# Patient Record
Sex: MALE | Race: WHITE | NOT HISPANIC OR LATINO | Employment: FULL TIME | URBAN - METROPOLITAN AREA
[De-identification: names, ages, dates, MRNs, and addresses within clinical notes are randomized per-mention and may not be internally consistent; named-entity substitution may affect disease eponyms.]

---

## 2017-11-15 ENCOUNTER — LAB VISIT (OUTPATIENT)
Dept: LAB | Facility: HOSPITAL | Age: 62
End: 2017-11-15
Attending: INTERNAL MEDICINE
Payer: COMMERCIAL

## 2017-11-15 DIAGNOSIS — E78.5 HYPERLIPIDEMIA, UNSPECIFIED HYPERLIPIDEMIA TYPE: ICD-10-CM

## 2017-11-15 DIAGNOSIS — E80.4 GILBERT'S SYNDROME: ICD-10-CM

## 2017-11-15 LAB
ALBUMIN SERPL BCP-MCNC: 3.6 G/DL
ALP SERPL-CCNC: 89 U/L
ALT SERPL W/O P-5'-P-CCNC: 29 U/L
ANION GAP SERPL CALC-SCNC: 6 MMOL/L
AST SERPL-CCNC: 23 U/L
BILIRUB SERPL-MCNC: 2 MG/DL
BUN SERPL-MCNC: 13 MG/DL
CALCIUM SERPL-MCNC: 9.1 MG/DL
CHLORIDE SERPL-SCNC: 107 MMOL/L
CHOLEST SERPL-MCNC: 140 MG/DL
CHOLEST/HDLC SERPL: 2.8 {RATIO}
CO2 SERPL-SCNC: 28 MMOL/L
COMPLEXED PSA SERPL-MCNC: 0.37 NG/ML
CREAT SERPL-MCNC: 0.9 MG/DL
ERYTHROCYTE [DISTWIDTH] IN BLOOD BY AUTOMATED COUNT: 13 %
EST. GFR  (AFRICAN AMERICAN): >60 ML/MIN/1.73 M^2
EST. GFR  (NON AFRICAN AMERICAN): >60 ML/MIN/1.73 M^2
GLUCOSE SERPL-MCNC: 86 MG/DL
HCT VFR BLD AUTO: 42.2 %
HDLC SERPL-MCNC: 50 MG/DL
HDLC SERPL: 35.7 %
HGB BLD-MCNC: 14.5 G/DL
LDLC SERPL CALC-MCNC: 80.2 MG/DL
MCH RBC QN AUTO: 30.3 PG
MCHC RBC AUTO-ENTMCNC: 34.4 G/DL
MCV RBC AUTO: 88 FL
NONHDLC SERPL-MCNC: 90 MG/DL
PLATELET # BLD AUTO: 171 K/UL
PMV BLD AUTO: 10.5 FL
POTASSIUM SERPL-SCNC: 4.4 MMOL/L
PROT SERPL-MCNC: 6.7 G/DL
RBC # BLD AUTO: 4.78 M/UL
SODIUM SERPL-SCNC: 141 MMOL/L
TRIGL SERPL-MCNC: 49 MG/DL
WBC # BLD AUTO: 4.07 K/UL

## 2017-11-15 PROCEDURE — 80053 COMPREHEN METABOLIC PANEL: CPT

## 2017-11-15 PROCEDURE — 85027 COMPLETE CBC AUTOMATED: CPT

## 2017-11-15 PROCEDURE — 36415 COLL VENOUS BLD VENIPUNCTURE: CPT

## 2017-11-15 PROCEDURE — 80061 LIPID PANEL: CPT

## 2017-11-15 PROCEDURE — 84153 ASSAY OF PSA TOTAL: CPT

## 2017-11-28 ENCOUNTER — OFFICE VISIT (OUTPATIENT)
Dept: CARDIOLOGY | Facility: CLINIC | Age: 62
End: 2017-11-28
Payer: COMMERCIAL

## 2017-11-28 VITALS
HEART RATE: 72 BPM | HEIGHT: 67 IN | BODY MASS INDEX: 25.3 KG/M2 | DIASTOLIC BLOOD PRESSURE: 82 MMHG | WEIGHT: 161.19 LBS | SYSTOLIC BLOOD PRESSURE: 124 MMHG | OXYGEN SATURATION: 100 %

## 2017-11-28 DIAGNOSIS — I20.89 EXERTIONAL ANGINA: ICD-10-CM

## 2017-11-28 DIAGNOSIS — E80.4 GILBERT'S SYNDROME: ICD-10-CM

## 2017-11-28 DIAGNOSIS — E78.5 HYPERLIPIDEMIA, UNSPECIFIED HYPERLIPIDEMIA TYPE: Primary | ICD-10-CM

## 2017-11-28 PROCEDURE — 99999 PR PBB SHADOW E&M-EST. PATIENT-LVL IV: CPT | Mod: PBBFAC,,, | Performed by: INTERNAL MEDICINE

## 2017-11-28 PROCEDURE — 99213 OFFICE O/P EST LOW 20 MIN: CPT | Mod: S$GLB,,, | Performed by: INTERNAL MEDICINE

## 2017-11-28 PROCEDURE — 93000 ELECTROCARDIOGRAM COMPLETE: CPT | Mod: S$GLB,,, | Performed by: INTERNAL MEDICINE

## 2017-11-28 NOTE — ASSESSMENT & PLAN NOTE
-EKG in the office showed NSR with RBBB  -Atypical symptoms but given risk factors and strong family history we will obtain exercise stress echo  -Should continue optimal blood pressure control and cholesterol management

## 2017-11-28 NOTE — PROGRESS NOTES
Subjective:    Patient ID:  Michael Espinoza is a 62 y.o. male who presents for follow-up of Hyperlipidemia      HPI  Mr Espinoza is a 63 yo man with medical history significant for DLPD who comes in today for annual check up. He was last seen in the clinic one year ago. At that time, he was doing well.  He reports today to be doing well. He exercises 2x/week in the gym. He reports intermittent angina on exertion after walking up to 400 yards with associated SOB. The discomfort last couple of minutes and goes away after he takes a break. He started to notice this after this summer when he did not have any symptoms after exertion.  He also reports left lower extremity edema by the level of his ankle. The edema is noticeable at night time and improves in the morning. He denies edema of his right leg. Symptoms are present for the past 3 months.   He denies palpitations, diaphoresis or syncopal events.   He is the high-school cross country team .  Today's labs reviewed with patient and demonstrate an excellent cholesterol profile (, HDL 50, LDL 80), LFT's however tbili elevated (2.0) due to Gilbert's syndrome.    Review of Systems   Constitution: Negative.   HENT: Negative.    Eyes: Negative.    Cardiovascular: Negative for chest pain, claudication, cyanosis, dyspnea on exertion, irregular heartbeat, leg swelling, near-syncope, orthopnea, palpitations, paroxysmal nocturnal dyspnea and syncope.   Respiratory: Negative for cough, hemoptysis, shortness of breath, sleep disturbances due to breathing, snoring, sputum production and wheezing.    Endocrine: Negative.    Hematologic/Lymphatic: Negative.    Gastrointestinal: Negative.         Objective:    Physical Exam   Constitutional: He is oriented to person, place, and time. He appears well-developed and well-nourished.   HENT:   Head: Normocephalic and atraumatic.   Eyes: Conjunctivae are normal.   Neck: Normal range of motion. No JVD present. No thyromegaly present.    Cardiovascular: Normal rate, regular rhythm and normal heart sounds.  Exam reveals no gallop and no friction rub.    No murmur heard.  Pulmonary/Chest: Effort normal and breath sounds normal. No respiratory distress. He has no wheezes. He has no rales. He exhibits no tenderness.   Abdominal: Soft. Bowel sounds are normal. He exhibits no distension. There is no tenderness. There is no rebound.   Musculoskeletal: Normal range of motion. He exhibits no edema or deformity.   Neurological: He is oriented to person, place, and time.   Nursing note and vitals reviewed.        Assessment:       1. Hyperlipidemia, unspecified hyperlipidemia type    2. Gilbert's syndrome    3. Exertional angina         Plan:       Exertional angina  -EKG in the office showed NSR with RBBB  -Atypical chest pain symptoms but given risk factors and strong family history we will obtain exercise stress echo  -Should continue optimal blood pressure control and cholesterol management        HLD (hyperlipidemia)  Reviewed labs   Should continue current medical therapy  Discussed about healthy life style modification including daily exercise 30 min/5 x week and diet improvement. Questions and concerns were properly answered.       Odilon Gustafson MD  Cardiology Fellow, PGY VI  Pager: [phone]  11/28/2017 9:53 AM        I have personally taken the history and examined this patient. I have discussed and agree with the resident's findings and plan as documented in the resident's note.  RBBB on ECG, no prior for comparison. Smx = Sob and occasional chest tightness with walking. Denies smx working out in Gym. Atypical for cardiac smx however I will perform Stress ECHO to rule out CAD.   Lc Rowe

## 2017-11-28 NOTE — ASSESSMENT & PLAN NOTE
Reviewed labs   Should continue current medical therapy  Discussed about healthy life style modification including daily exercise 30 min/5 x week and diet improvement. Questions and concerns were properly answered.

## 2017-11-30 ENCOUNTER — HOSPITAL ENCOUNTER (OUTPATIENT)
Dept: CARDIOLOGY | Facility: CLINIC | Age: 62
Discharge: HOME | End: 2017-11-30
Attending: STUDENT IN AN ORGANIZED HEALTH CARE EDUCATION/TRAINING PROGRAM
Payer: COMMERCIAL

## 2017-11-30 DIAGNOSIS — E78.5 HYPERLIPIDEMIA, UNSPECIFIED HYPERLIPIDEMIA TYPE: ICD-10-CM

## 2017-11-30 DIAGNOSIS — I20.89 EXERTIONAL ANGINA: ICD-10-CM

## 2017-11-30 LAB
AORTIC VALVE REGURGITATION: NORMAL
DIASTOLIC DYSFUNCTION: NO
ESTIMATED PA SYSTOLIC PRESSURE: 27.18
RETIRED EF AND QEF - SEE NOTES: 55 (ref 55–65)

## 2017-11-30 PROCEDURE — 93320 DOPPLER ECHO COMPLETE: CPT | Mod: S$GLB,,, | Performed by: INTERNAL MEDICINE

## 2017-11-30 PROCEDURE — 93351 STRESS TTE COMPLETE: CPT | Mod: S$GLB,,, | Performed by: INTERNAL MEDICINE

## 2017-11-30 PROCEDURE — 93325 DOPPLER ECHO COLOR FLOW MAPG: CPT | Mod: S$GLB,,, | Performed by: INTERNAL MEDICINE

## 2017-12-01 DIAGNOSIS — I25.3 ATRIAL SEPTAL ANEURYSM: Primary | ICD-10-CM

## 2017-12-07 ENCOUNTER — TELEPHONE (OUTPATIENT)
Dept: CARDIOLOGY | Facility: CLINIC | Age: 62
End: 2017-12-07

## 2017-12-07 ENCOUNTER — DOCUMENTATION ONLY (OUTPATIENT)
Dept: CARDIOLOGY | Facility: CLINIC | Age: 62
End: 2017-12-07

## 2017-12-07 NOTE — PROGRESS NOTES
You have been scheduled for a procedure in the echo lab on Monday, December 11, 2017.     Please report to the Cardiology Waiting Area on the Third floor of the hospital and check in at 1 PM.     You will then be taken to the SSCU (Short Stay Cardiac Unit) and prepared for your procedure. Please be aware that this is not the time of your procedure but the time you are to arrive. The procedures are scheduled on an hourly basis; however, emergency cases take precedence over all other cases.     You may not have anything to eat or drink after midnight the night before your test. You may take your regular morning medications with water.    The procedure will take 1-2 hours to perform. After the procedure, you will return to SSCU on the third floor of the hospital. Your family may remain in the room with you during this time.     You will be discharged home that same afternoon. You must have someone to drive you home.  Your doctor will determine, based on your progress, the time of your discharge. The results of your procedure will be discussed with you before you are discharged. Any further testing or procedures will be scheduled for you either before you leave or you will be called with these appointments.     You will be contacted by the echo department prior to your procedure for a  pre-procedure questionnaire.    If you should have any questions, concerns, or need to change the date of your procedure, please call  LIZZIE Faria @ [phone]    Special Instructions:  none        THE ABOVE INSTRUCTIONS WERE GIVEN TO THE PATIENT VERBALLY AND THEY VERBALIZED UNDERSTANDING.  THEY DO NOT REQUIRE ANY SPECIAL NEEDS AND DO NOT HAVE ANY LEARNING BARRIERS.          Directions for Reporting to Cardiology Waiting Area in the Hospital  If you park in the Parking Garage:  Take elevators to the1st floor of the parking garage.  Continue past the gift shop, coffee shop, and piano.  Take a right and go to the gold elevators.  (Elevator B)  Take the elevator to the 3rd floor.  Follow the arrow on the sign on the wall that says Cath Lab Registration/EP Lab Registration.  Follow the long hallway all the way around until you come to a big open area.  This is the registration area.  Check in at Reception Desk.    OR    If family is dropping you off:  Have them drop you off at the front of the Hospital under the green overhang.  Enter through the doors and take a right.  Take the E elevators to the 3rd floor Cardiology Waiting Area.  Check in at the Reception Desk in the waiting room.

## 2017-12-07 NOTE — TELEPHONE ENCOUNTER
Patient called about CHARMAINE scheduled on 12/11/17. Pre-procedural questions asked.  Denies swallowing issues and esophageal issues. Denies previous sedation/anesthesia problems. States does not snore or have sleep apnea. Has dentures and implants.  Denies recent trauma/surgery/radiation therapy to head/neck/airway. States is able to move neck without difficulty. CHARMAINE described to patient. Instructed NPO past midnight and to have a designated  to drive patient home after the procedures due to sedation being given. Instructed to report to   sscu at 1300.  Verbalizes understanding. Questions answered.

## 2017-12-11 ENCOUNTER — ANESTHESIA EVENT (OUTPATIENT)
Dept: MEDSURG UNIT | Facility: HOSPITAL | Age: 62
End: 2017-12-11
Payer: COMMERCIAL

## 2017-12-11 ENCOUNTER — ANESTHESIA (OUTPATIENT)
Dept: MEDSURG UNIT | Facility: HOSPITAL | Age: 62
End: 2017-12-11
Payer: COMMERCIAL

## 2017-12-11 ENCOUNTER — HOSPITAL ENCOUNTER (OUTPATIENT)
Facility: HOSPITAL | Age: 62
Discharge: HOME | End: 2017-12-11
Attending: INTERNAL MEDICINE | Admitting: INTERNAL MEDICINE
Payer: COMMERCIAL

## 2017-12-11 VITALS
HEART RATE: 81 BPM | SYSTOLIC BLOOD PRESSURE: 134 MMHG | HEIGHT: 67 IN | DIASTOLIC BLOOD PRESSURE: 73 MMHG | BODY MASS INDEX: 25.27 KG/M2 | WEIGHT: 161 LBS | RESPIRATION RATE: 16 BRPM | TEMPERATURE: 97 F | OXYGEN SATURATION: 95 %

## 2017-12-11 DIAGNOSIS — I25.3 ATRIAL SEPTAL ANEURYSM: ICD-10-CM

## 2017-12-11 DIAGNOSIS — Q21.10 ASD (ATRIAL SEPTAL DEFECT): Primary | ICD-10-CM

## 2017-12-11 DIAGNOSIS — E80.4 GILBERT'S SYNDROME: ICD-10-CM

## 2017-12-11 LAB
ANION GAP SERPL CALC-SCNC: 10 MMOL/L
AORTIC VALVE REGURGITATION: NORMAL
BUN SERPL-MCNC: 11 MG/DL
CALCIUM SERPL-MCNC: 9.6 MG/DL
CHLORIDE SERPL-SCNC: 106 MMOL/L
CO2 SERPL-SCNC: 23 MMOL/L
CREAT SERPL-MCNC: 0.9 MG/DL
DIASTOLIC DYSFUNCTION: NO
ERYTHROCYTE [DISTWIDTH] IN BLOOD BY AUTOMATED COUNT: 13.2 %
EST. GFR  (AFRICAN AMERICAN): >60 ML/MIN/1.73 M^2
EST. GFR  (NON AFRICAN AMERICAN): >60 ML/MIN/1.73 M^2
GLUCOSE SERPL-MCNC: 78 MG/DL
HCT VFR BLD AUTO: 42.7 %
HGB BLD-MCNC: 14.6 G/DL
INR PPP: 1.1
MCH RBC QN AUTO: 30.5 PG
MCHC RBC AUTO-ENTMCNC: 34.2 G/DL
MCV RBC AUTO: 89 FL
MITRAL VALVE MOBILITY: NORMAL
PLATELET # BLD AUTO: 198 K/UL
PMV BLD AUTO: 10 FL
POTASSIUM SERPL-SCNC: 4.3 MMOL/L
PROTHROMBIN TIME: 11.5 SEC
RBC # BLD AUTO: 4.78 M/UL
RETIRED EF AND QEF - SEE NOTES: 66 (ref 55–65)
SODIUM SERPL-SCNC: 139 MMOL/L
TRICUSPID VALVE REGURGITATION: NORMAL
WBC # BLD AUTO: 5.27 K/UL

## 2017-12-11 PROCEDURE — 93010 ELECTROCARDIOGRAM REPORT: CPT | Mod: ,,, | Performed by: INTERNAL MEDICINE

## 2017-12-11 PROCEDURE — D9220A PRA ANESTHESIA: Mod: ANES,,, | Performed by: ANESTHESIOLOGY

## 2017-12-11 PROCEDURE — 93320 DOPPLER ECHO COMPLETE: CPT

## 2017-12-11 PROCEDURE — 85610 PROTHROMBIN TIME: CPT

## 2017-12-11 PROCEDURE — 93325 DOPPLER ECHO COLOR FLOW MAPG: CPT | Mod: 26,,, | Performed by: INTERNAL MEDICINE

## 2017-12-11 PROCEDURE — D9220A PRA ANESTHESIA: Mod: CRNA,,, | Performed by: NURSE ANESTHETIST, CERTIFIED REGISTERED

## 2017-12-11 PROCEDURE — 37000008 HC ANESTHESIA 1ST 15 MINUTES: Performed by: INTERNAL MEDICINE

## 2017-12-11 PROCEDURE — 93312 ECHO TRANSESOPHAGEAL: CPT | Mod: 26,,, | Performed by: INTERNAL MEDICINE

## 2017-12-11 PROCEDURE — 85027 COMPLETE CBC AUTOMATED: CPT

## 2017-12-11 PROCEDURE — 37000009 HC ANESTHESIA EA ADD 15 MINS: Performed by: INTERNAL MEDICINE

## 2017-12-11 PROCEDURE — 25000003 PHARM REV CODE 250: Performed by: NURSE ANESTHETIST, CERTIFIED REGISTERED

## 2017-12-11 PROCEDURE — 93320 DOPPLER ECHO COMPLETE: CPT | Mod: 26,,, | Performed by: INTERNAL MEDICINE

## 2017-12-11 PROCEDURE — 80048 BASIC METABOLIC PNL TOTAL CA: CPT

## 2017-12-11 PROCEDURE — 93325 DOPPLER ECHO COLOR FLOW MAPG: CPT

## 2017-12-11 PROCEDURE — 63600175 PHARM REV CODE 636 W HCPCS: Performed by: NURSE ANESTHETIST, CERTIFIED REGISTERED

## 2017-12-11 PROCEDURE — 25000003 PHARM REV CODE 250: Performed by: INTERNAL MEDICINE

## 2017-12-11 PROCEDURE — 93005 ELECTROCARDIOGRAM TRACING: CPT

## 2017-12-11 RX ORDER — LIDOCAINE HCL/PF 100 MG/5ML
SYRINGE (ML) INTRAVENOUS
Status: DISCONTINUED | OUTPATIENT
Start: 2017-12-11 | End: 2017-12-11

## 2017-12-11 RX ORDER — SODIUM CHLORIDE 9 MG/ML
INJECTION, SOLUTION INTRAVENOUS ONCE
Status: COMPLETED | OUTPATIENT
Start: 2017-12-11 | End: 2017-12-11

## 2017-12-11 RX ORDER — PROPOFOL 10 MG/ML
VIAL (ML) INTRAVENOUS
Status: DISCONTINUED | OUTPATIENT
Start: 2017-12-11 | End: 2017-12-11

## 2017-12-11 RX ORDER — ETOMIDATE 2 MG/ML
INJECTION INTRAVENOUS
Status: DISCONTINUED | OUTPATIENT
Start: 2017-12-11 | End: 2017-12-11

## 2017-12-11 RX ORDER — PROPOFOL 10 MG/ML
VIAL (ML) INTRAVENOUS CONTINUOUS PRN
Status: DISCONTINUED | OUTPATIENT
Start: 2017-12-11 | End: 2017-12-11

## 2017-12-11 RX ORDER — ONDANSETRON 2 MG/ML
INJECTION INTRAMUSCULAR; INTRAVENOUS
Status: DISCONTINUED | OUTPATIENT
Start: 2017-12-11 | End: 2017-12-11

## 2017-12-11 RX ORDER — FENTANYL CITRATE 50 UG/ML
INJECTION, SOLUTION INTRAMUSCULAR; INTRAVENOUS
Status: DISCONTINUED | OUTPATIENT
Start: 2017-12-11 | End: 2017-12-11

## 2017-12-11 RX ORDER — SODIUM CHLORIDE 0.9 % (FLUSH) 0.9 %
3 SYRINGE (ML) INJECTION
Status: DISCONTINUED | OUTPATIENT
Start: 2017-12-11 | End: 2017-12-11 | Stop reason: HOSPADM

## 2017-12-11 RX ADMIN — PROPOFOL 50 MG: 10 INJECTION, EMULSION INTRAVENOUS at 03:12

## 2017-12-11 RX ADMIN — LIDOCAINE HYDROCHLORIDE 50 MG: 20 INJECTION, SOLUTION INTRAVENOUS at 03:12

## 2017-12-11 RX ADMIN — ETOMIDATE 2 MG: 2 INJECTION, SOLUTION INTRAVENOUS at 03:12

## 2017-12-11 RX ADMIN — FENTANYL CITRATE 25 MCG: 50 INJECTION, SOLUTION INTRAMUSCULAR; INTRAVENOUS at 03:12

## 2017-12-11 RX ADMIN — FENTANYL CITRATE 50 MCG: 50 INJECTION, SOLUTION INTRAMUSCULAR; INTRAVENOUS at 03:12

## 2017-12-11 RX ADMIN — ONDANSETRON 4 MG: 2 INJECTION INTRAMUSCULAR; INTRAVENOUS at 03:12

## 2017-12-11 RX ADMIN — PROPOFOL 150 MCG/KG/MIN: 10 INJECTION, EMULSION INTRAVENOUS at 03:12

## 2017-12-11 RX ADMIN — SODIUM CHLORIDE: 0.9 INJECTION, SOLUTION INTRAVENOUS at 02:12

## 2017-12-11 RX ADMIN — ETOMIDATE 4 MG: 2 INJECTION, SOLUTION INTRAVENOUS at 03:12

## 2017-12-11 NOTE — NURSING
Pt ambulated on unit with transporter at side from procedure.  Awake alert and oriented.  Denies pain or SOB.  Resp even and unlabored.  Breath sounds clear.  Dr. Harrington at pt side.

## 2017-12-11 NOTE — DISCHARGE SUMMARY
.Physician Discharge Summary     Patient ID:  Michael Espinoza  [id number]  62 y.o.  1955    Admit date: 12/11/2017    Discharge date and time:  12/11/2017 3:33 PM    Admitting Service:Echo    Admitting Physician: Michell Gunn MD     Discharge Physician: Russell Harrington MD    Primary Diagnoses: ASD (atrial septal defect) [Q21.1]    Secondary Diagnoses:   Past Medical History:   Diagnosis Date    Hyperlipidemia         Hospital Course:   62 y.o. man with PMHx of HLD, [place] syndrome found to have possible ASD with L-->R shunt on REYNA for CONTRERAS who is here for CHARMAINE for further eval of ASD. Pt with mildly depressed RV function, PAP 27mmhg and RBBB on EKG. Underwent successful CHARMAINE w/o complications. No ASD or PFO noted by color flow or bubble study. There is an Atrial septal aneuryms    Discharge Medications:    Current Discharge Medication List      CONTINUE these medications which have NOT CHANGED    Details   aspirin (ECOTRIN) 81 MG EC tablet Take 81 mg by mouth once daily.        atorvastatin (LIPITOR) 40 MG tablet Take 1 tablet (40 mg total) by mouth every evening.  Qty: 90 tablet, Refills: 4    Associated Diagnoses: Dyslipidemia      flu vac ts 2013,18yr+,cell,PF, (FLULAVAL) 45 mcg (15 mcg x 3)/0.5 mL Syrg Inject 0.5 mLs into the muscle.               Patient Instructions:   Activity: activity as tolerated  Diet: cardiac diet  Wound Care: keep wound clean and dry    Follow-up with Dr. Rowe as scheduled    Discharged Condition: good    Signed:  Russell Harrington  12/11/2017  3:33 PM

## 2017-12-11 NOTE — TRANSFER OF CARE
"Anesthesia Transfer of Care Note    Patient: Michael Espinoza    Procedure(s) Performed: Procedure(s) (LRB):  TRANSESOPHAGEAL ECHOCARDIOGRAM (CHARMAINE) (N/A)    Patient location: PACU    Anesthesia Type: general    Transport from OR: Transported from OR on room air with adequate spontaneous ventilation    Post pain: adequate analgesia    Post assessment: no apparent anesthetic complications and tolerated procedure well    Post vital signs: stable    Level of consciousness: awake, alert and oriented    Nausea/Vomiting: no nausea/vomiting    Complications: none    Transfer of care protocol was followed      Last vitals:   Visit Vitals  /83 (BP Location: Left arm, Patient Position: Sitting)   Pulse 82   Temp 36.1 °C (96.9 °F) (Oral)   Resp 20   Ht 5' 7" (1.702 m)   Wt 73 kg (161 lb)   SpO2 98%   BMI 25.22 kg/m²     "

## 2017-12-11 NOTE — NURSING
IV d/c'd with cath tip intact.  Dr. Harrington at pt side.  Denies pain or SOB.  Verbalized understanding of dc instructions.  Ambulated off unit for discharge home

## 2017-12-11 NOTE — ANESTHESIA PREPROCEDURE EVALUATION
12/11/2017  Michael Espinoza is a 62 y.o., male presenting for CHARMAINE.    Past Medical History:   Diagnosis Date    Hyperlipidemia      History reviewed. No pertinent surgical history.  Review of patient's allergies indicates:  No Known Allergies  No current facility-administered medications on file prior to encounter.      Current Outpatient Prescriptions on File Prior to Encounter   Medication Sig Dispense Refill    aspirin (ECOTRIN) 81 MG EC tablet Take 81 mg by mouth once daily.        atorvastatin (LIPITOR) 40 MG tablet Take 1 tablet (40 mg total) by mouth every evening. 90 tablet 4    flu vac ts 2013,18yr+,cell,PF, (FLULAVAL) 45 mcg (15 mcg x 3)/0.5 mL Syrg Inject 0.5 mLs into the muscle.       Lab Results   Component Value Date    WBC 5.27 12/11/2017    HGB 14.6 12/11/2017    HCT 42.7 12/11/2017    MCV 89 12/11/2017     12/11/2017     BMP  Lab Results   Component Value Date     12/11/2017    K 4.3 12/11/2017     12/11/2017    CO2 23 12/11/2017    BUN 11 12/11/2017    CREATININE 0.9 12/11/2017    CALCIUM 9.6 12/11/2017    ANIONGAP 10 12/11/2017    ESTGFRAFRICA >60.0 12/11/2017    EGFRNONAA >60.0 12/11/2017         Anesthesia Evaluation    I have reviewed the Patient Summary Reports.     I have reviewed the Medications.     Review of Systems  Anesthesia Hx:  No problems with previous Anesthesia   Denies Personal Hx of Anesthesia complications.   Cardiovascular:   Exercise tolerance: good Denies Valvular problems/Murmurs.  Denies MI.   Angina    Pulmonary:  Pulmonary Normal  Denies Sleep Apnea.    Renal/:  Renal/ Normal     Hepatic/GI:  Hepatic/GI Normal    Neurological:  Neurology Normal        Physical Exam  General:  Well nourished    Airway/Jaw/Neck:  Airway Findings: Mouth Opening: Normal Tongue: Normal  General Airway Assessment: Adult  Mallampati: III  Improves to II with  phonation.  TM Distance: Normal, at least 6 cm  Jaw/Neck Findings:  Neck ROM: Normal ROM      Dental:  Dental Findings: In tact        Mental Status:  Mental Status Findings:  Cooperative, Alert and Oriented         Anesthesia Plan  Type of Anesthesia, risks & benefits discussed:  Anesthesia Type:  MAC, general  Patient's Preference:   Intra-op Monitoring Plan: standard ASA monitors  Intra-op Monitoring Plan Comments:   Post Op Pain Control Plan: per primary service following discharge from PACU  Post Op Pain Control Plan Comments:   Induction:   IV  Beta Blocker:  Patient is not currently on a Beta-Blocker (No further documentation required).       Informed Consent: Patient understands risks and agrees with Anesthesia plan.  Questions answered. Anesthesia consent signed with patient.  ASA Score: 2     Day of Surgery Review of History & Physical:    H&P update referred to the surgeon.         Ready For Surgery From Anesthesia Perspective.

## 2017-12-11 NOTE — H&P
TRANSESOPHAGEAL ECHOCARDIOGRAPHY   PRE-PROCEDURE NOTE    2017    HPI:     Michael Espinoza is a 62 y.o. man with PMHx of HLD, [place] syndrome found to have ASD with L-->R shunt on REYNA for CONTRERAS who is here for CHARMAINE for further eval of ASD. Pt with mildly depressed RV function, PAP 27mmhg and RBBB on EKG. never had problems with sedation or EGD in past for gastritis. EF normal.     Dysphagia or odynophagia:  No  Liver Disease, esophageal disease, or known varices:  No  Upper GI Bleeding: No  Snoring:  No  Sleep Apnea:  No  Prior neck surgery or radiation:  No  History of anesthetic difficulties:  No  Family history of anesthetic difficulties:  No  Last oral intake:  12 hours ago  Able to move neck in all directions:  Yes      Meds:     Scheduled Meds:   sodium chloride 0.9%   Intravenous Once     PRN Meds:  Continuous Infusions:     Physical Exam:     Vitals:  Temp:  [96.9 °F (36.1 °C)]   Pulse:  [82]   Resp:  [20]   BP: (128-132)/(78-83)   SpO2:  [98 %]        Constitutional: NAD, conversant  HEENT:   Sclera anicteric, Uvula midline, EOMI, OP clear  Neck:               No JVD, moves to all direction without any limitations  CV:               RRR, no murmurs / rubs / gallops, normal S1/S2  Pulm:               CTAB, no wheezes, rales, or ronchi  GI:               Abdomen soft, NTND, +BS  Extremities:              No LE edema, warm with palpable pulses  Neuro:   AAOX3, no focal motor deficits    Mallampati:II   ASA:II      Labs:     No results found for this or any previous visit (from the past 336 hour(s)).    No results found for this or any previous visit (from the past 336 hour(s)).    CrCl cannot be calculated (Patient's most recent lab result is older than the maximum 7 days allowed.).        Imaging:      [id number]  NSR RBBB          Assessment & Plan:     PLAN:  1. CHARMAINE for evaluation of ASD for possible closure    -The risks, benefits & alternatives of the procedure were explained to the patient.     -The risks of transesophageal echo include but are not limited to:  Dental trauma, esophageal trauma/perforation, bleeding, laryngospasm/brochospasm, aspiration, sore throat/hoarseness, & dislodgement of the endotracheal tube/nasogastric tube (where applicable).    -The risks of moderate sedation include hypotension, respiratory depression, arrhythmias, bronchospasm, & death.    -Informed consent was obtained & the patient is agreeable to proceed with the procedure.    I will discuss with the attending physician. Attending addendum is to follow.

## 2017-12-12 NOTE — ANESTHESIA POSTPROCEDURE EVALUATION
"Anesthesia Post Evaluation    Patient: Michael Espinoza    Procedure(s) Performed: Procedure(s) (LRB):  TRANSESOPHAGEAL ECHOCARDIOGRAM (CHARMAINE) (N/A)    Final Anesthesia Type: general  Patient location during evaluation: PACU  Patient participation: Yes- Able to Participate  Level of consciousness: awake and alert  Post-procedure vital signs: reviewed and stable  Pain management: adequate  Airway patency: patent  PONV status at discharge: No PONV  Anesthetic complications: no      Cardiovascular status: hemodynamically stable  Respiratory status: unassisted  Hydration status: euvolemic  Follow-up not needed.        Visit Vitals  /73   Pulse 81   Temp 36.3 °C (97.4 °F) (Oral)   Resp 16   Ht 5' 7" (1.702 m)   Wt 73 kg (161 lb)   SpO2 95%   BMI 25.22 kg/m²       Pain/Eduin Score: Pain Assessment Performed: Yes (12/11/2017  4:29 PM)  Presence of Pain: denies (12/11/2017  4:29 PM)  Eduin Score: 10 (12/11/2017  4:29 PM)      "

## 2017-12-13 DIAGNOSIS — R06.02 SOB (SHORTNESS OF BREATH) ON EXERTION: Primary | ICD-10-CM

## 2017-12-20 ENCOUNTER — HOSPITAL ENCOUNTER (OUTPATIENT)
Dept: PULMONOLOGY | Facility: CLINIC | Age: 62
Discharge: HOME | End: 2017-12-20
Payer: COMMERCIAL

## 2017-12-20 ENCOUNTER — HOSPITAL ENCOUNTER (OUTPATIENT)
Dept: RADIOLOGY | Facility: HOSPITAL | Age: 62
Discharge: HOME | End: 2017-12-20
Attending: INTERNAL MEDICINE
Payer: COMMERCIAL

## 2017-12-20 VITALS — WEIGHT: 164.44 LBS | BODY MASS INDEX: 27.4 KG/M2 | HEIGHT: 65 IN

## 2017-12-20 DIAGNOSIS — R06.02 SOB (SHORTNESS OF BREATH) ON EXERTION: ICD-10-CM

## 2017-12-20 LAB
POST FEV1 FVC: 0.84
POST FEV1: 2.23
POST FVC: 2.65
PRE FEV1 FVC: 79
PRE FEV1: 2.11
PRE FVC: 2.68
PREDICTED FEV1 FVC: 81
PREDICTED FEV1: 2.67
PREDICTED FVC: 3.29

## 2017-12-20 PROCEDURE — 94727 GAS DIL/WSHOT DETER LNG VOL: CPT | Mod: S$GLB,,, | Performed by: INTERNAL MEDICINE

## 2017-12-20 PROCEDURE — 71250 CT THORAX DX C-: CPT | Mod: TC

## 2017-12-20 PROCEDURE — 94060 EVALUATION OF WHEEZING: CPT | Mod: S$GLB,,, | Performed by: INTERNAL MEDICINE

## 2017-12-20 PROCEDURE — 94620 PR PULMONARY STRESS TESTING,SIMPLE: CPT | Mod: S$GLB,,, | Performed by: INTERNAL MEDICINE

## 2017-12-20 PROCEDURE — 94729 DIFFUSING CAPACITY: CPT | Mod: S$GLB,,, | Performed by: INTERNAL MEDICINE

## 2017-12-20 PROCEDURE — 71250 CT THORAX DX C-: CPT | Mod: 26,,, | Performed by: RADIOLOGY

## 2017-12-20 NOTE — PROCEDURES
Michael Espinoza is a 62 y.o.  male patient, who presents for a 6 minute walk test ordered by Lc Rowe MD.  The diagnosis is Shortness of Breath.  The patient's BMI is 27.4 kg/m2.  Predicted distance (lower limit of normal) is 403.01 meters.      Test Results:    The test was completed without stopping.  The total time walked was 360 seconds.  During walking, the patient reported:  Dyspnea.  The patient used no assistive devices during testing.     12/20/2017---------Distance: 487.68 meters (1600 feet)     O2 Sat % Supplemental Oxygen Heart Rate Blood Pressure Aurora Scale   Pre-exercise  (Resting) 97 % Room Air 77 bpm 128/74 mmHg 0.5   During Exercise 100 % Room Air 94 bpm 145/78 mmHg 3   Post-exercise  (Recovery) 98 % Room Air  80 bpm       Recovery Time:  55 seconds    Performing nurse/tech:  MAG Ellison.      PREVIOUS STUDY:   The patient has not had a previous study.      CLINICAL INTERPRETATION:  Six minute walk distance is 487.68 meters (1600 feet) with moderate dyspnea.  During exercise, there was no desaturation while breathing room air.  Both blood pressure and heart rate remained stable with walking.  The patient did not report non-pulmonary symptoms during exercise.  No previous study performed.  Based upon age and body mass index, exercise capacity is normal.

## 2017-12-22 DIAGNOSIS — R06.02 SOB (SHORTNESS OF BREATH): Primary | ICD-10-CM

## 2017-12-29 ENCOUNTER — DOCUMENTATION ONLY (OUTPATIENT)
Dept: CARDIOLOGY | Facility: CLINIC | Age: 62
End: 2017-12-29

## 2017-12-29 ENCOUNTER — PATIENT MESSAGE (OUTPATIENT)
Dept: CARDIOLOGY | Facility: CLINIC | Age: 62
End: 2017-12-29

## 2017-12-29 NOTE — PROGRESS NOTES
Left messages on two separate occasions to schedule pulmonary and endocrine consultations. Left messages with my direct number. Await his return call.

## 2018-01-08 ENCOUNTER — TELEPHONE (OUTPATIENT)
Dept: ENDOCRINOLOGY | Facility: CLINIC | Age: 63
End: 2018-01-08

## 2018-01-08 NOTE — TELEPHONE ENCOUNTER
Spoke to Odilon at Dr. Rowe office and offered our first available apt which was with Dr. Levy for March 13th , he said that would be fine. Tamie BARAKAT MA              ----- Message from Amalia Cummings sent at 1/8/2018 10:43 AM CST -----  Contact: odilon [phone] Lc Rowe Office     Odilon called to schedule an endo appointment for pt for an abnormal CT Report / CT chest without Contrast.   He can be reached at [phone]

## 2018-02-24 DIAGNOSIS — E78.5 DYSLIPIDEMIA: ICD-10-CM

## 2018-02-25 RX ORDER — ATORVASTATIN CALCIUM 40 MG/1
TABLET, FILM COATED ORAL
Qty: 90 TABLET | Refills: 0 | Status: SHIPPED | OUTPATIENT
Start: 2018-02-25 | End: 2018-05-20 | Stop reason: SDUPTHER

## 2018-03-06 ENCOUNTER — HOSPITAL ENCOUNTER (OUTPATIENT)
Dept: PULMONOLOGY | Facility: CLINIC | Age: 63
Discharge: HOME | End: 2018-03-06
Payer: COMMERCIAL

## 2018-03-06 ENCOUNTER — OFFICE VISIT (OUTPATIENT)
Dept: PULMONOLOGY | Facility: CLINIC | Age: 63
End: 2018-03-06
Payer: COMMERCIAL

## 2018-03-06 VITALS
DIASTOLIC BLOOD PRESSURE: 74 MMHG | HEIGHT: 65 IN | WEIGHT: 165.56 LBS | HEART RATE: 80 BPM | OXYGEN SATURATION: 98 % | SYSTOLIC BLOOD PRESSURE: 126 MMHG | BODY MASS INDEX: 27.58 KG/M2

## 2018-03-06 DIAGNOSIS — R93.89 ABNORMAL CT OF THE CHEST: ICD-10-CM

## 2018-03-06 DIAGNOSIS — R93.89 ABNORMAL CT OF THE CHEST: Primary | ICD-10-CM

## 2018-03-06 DIAGNOSIS — R06.02 SHORTNESS OF BREATH: ICD-10-CM

## 2018-03-06 DIAGNOSIS — M79.89 LEG SWELLING: ICD-10-CM

## 2018-03-06 PROCEDURE — 99204 OFFICE O/P NEW MOD 45 MIN: CPT | Mod: S$GLB,,, | Performed by: INTERNAL MEDICINE

## 2018-03-06 PROCEDURE — 99999 PR PBB SHADOW E&M-EST. PATIENT-LVL III: CPT | Mod: PBBFAC,,, | Performed by: INTERNAL MEDICINE

## 2018-03-06 RX ORDER — ALBUTEROL SULFATE 90 UG/1
AEROSOL, METERED RESPIRATORY (INHALATION)
Qty: 1 INHALER | Refills: 5 | Status: SHIPPED | OUTPATIENT
Start: 2018-03-06 | End: 2018-06-12

## 2018-03-06 NOTE — LETTER
March 6, 2018      Lc Rowe MD  [address]  [place] [address]           [facility]  [address]  [place] [address]  Phone: [phone]          Patient: Michael Espinoza   MR Number: [id number]   YOB: 1955   Date of Visit: 3/6/2018       Dear Dr. Lc Rowe:    Thank you for referring Michael Espinoza to me for evaluation. Attached you will find relevant portions of my assessment and plan of care.    If you have questions, please do not hesitate to call me. I look forward to following Michael Espinoza along with you.    Sincerely,    Melinda Monge MD    Enclosure  CC:  No Recipients    If you would like to receive this communication electronically, please contact [email or web address] or [phone] to request more information on [employer] access.    For providers and/or their staff who would like to refer a patient to [facility], please contact us through our one-stop-shop provider referral line, [facility] , at [phone].    If you feel you have received this communication in error or would no longer like to receive these types of communications, please e-mail [email or web address]

## 2018-03-06 NOTE — PROGRESS NOTES
Subjective:       Patient ID: Michael Espinoza is a 62 y.o. male.    Chief Complaint: abnormal pft's    HPI:   Michael Espinoza is a 62 y.o. male who presents with dyspnea on exertion.    Mr. Espinoza is an active person and typically exercises twice weekly.  Coaches a cross country team. Recnetly noticed chest tightness after a 1/4 mile run, more difficultly recovering after sets, and  and sought the advice of his cardiologist.    He was originally seen by his cardiologist, who performed an REYNA (EF=55-60%, biatrial enlargement, depressed RV systolic fxn, EPASP=27mmHg).  There was initially concern for an exercised induced atrial shunt and thus a CHARMAINE was performed and shunt was ruled out.     6MWT was performed and showed no evidence of desaturation with walking and pulse and BP remained stable.  PFTs were then done which showed no evidence of obstruction (though + bronchodilator response), very mild restriction, and very mildly decreased DLCO.     Chest CT is significant for elevated left hemidiaphragm with linear atelectasis in the LLL. No comparison imaging available.     No known occupational exposures- but some mold growing in the ceilings at the school.   Works as a teacher.   Never smoker   Denies snoring. No history consistent with sleep apnea.    Review of Systems   Constitutional: Positive for weight gain. Negative for fever, chills and activity change.   HENT: Negative for postnasal drip, rhinorrhea, sinus pressure and congestion.    Respiratory: Positive for dyspnea on extertion. Negative for cough, hemoptysis, sputum production, shortness of breath and wheezing.    Cardiovascular: Positive for leg swelling (left leg, late in the day 2cm larger than right). Negative for chest pain.   Genitourinary: Negative for difficulty urinating.   Endocrine: Negative for cold intolerance and heat intolerance.    Musculoskeletal: Negative for joint swelling and myalgias.   Gastrointestinal: Negative for nausea, vomiting and  abdominal pain.   Neurological: Negative for headaches.   Hematological: Negative for adenopathy. No excessive bruising.   Psychiatric/Behavioral: Negative for confusion and sleep disturbance.         Social History   Substance Use Topics    Smoking status: Never Smoker    Smokeless tobacco: Never Used    Alcohol use 0.6 oz/week     1 Glasses of wine per week      Comment: social       Review of patient's allergies indicates:  No Known Allergies  Past Medical History:   Diagnosis Date    Hyperlipidemia      No past surgical history on file.  Current Outpatient Prescriptions on File Prior to Visit   Medication Sig    aspirin (ECOTRIN) 81 MG EC tablet Take 81 mg by mouth once daily.      atorvastatin (LIPITOR) 40 MG tablet TAKE 1 TABLET(40 MG) BY MOUTH EVERY EVENING    flu vac ts 2013,18yr+,cell,PF, (FLULAVAL) 45 mcg (15 mcg x 3)/0.5 mL Syrg Inject 0.5 mLs into the muscle.     No current facility-administered medications on file prior to visit.        Objective:      Vitals:    03/06/18 1350   BP: 126/74   Pulse: 80     Physical Exam   Constitutional: He is oriented to person, place, and time. He appears well-developed and well-nourished. No distress.   HENT:   Head: Normocephalic.   Mouth/Throat: Mallampati Score: IV.   Neck: Normal range of motion.   Cardiovascular: Normal rate, regular rhythm and normal heart sounds.    No murmur heard.  Pulmonary/Chest: Effort normal and breath sounds normal. He has no wheezes. He has no rhonchi. He has no rales.   Abdominal: Soft. Bowel sounds are normal. He exhibits no distension. There is no tenderness.   Musculoskeletal: Normal range of motion. He exhibits no edema.   Neurological: He is alert and oriented to person, place, and time.   Skin: Skin is warm and dry. He is not diaphoretic. No cyanosis. Nails show no clubbing.   Psychiatric: He has a normal mood and affect.     Personal Diagnostic Review    Pulmonary Function Tests 3/6/2018   SpO2 98   Height 65.000   Weight  2649.05   BMI (Calculated) 27.6   Some recent data might be hidden         Assessment:     Orders Placed This Encounter   Procedures    AFB Culture & Smear    Culture, Respiratory with Gram Stain    Fungus culture    Sputum Induction (PFT)     Standing Status:   Future     Number of Occurrences:   1     Standing Expiration Date:   3/6/2019     1. Abnormal CT of the chest    2. Shortness of breath      Dyspnea on exertion of unclear etiology.  No profound pulmonary compromise noted on pulmonary function testing.  CT Chest does have linear atelectasis at the left lung base and elevation of the left hemidiaphragm.  No evidence that this elevation was 'sudden' in nature, but it could lead to the symptoms he relays. Unfortunately no prior imaging is available to evaluate for this phenomenon.  Less likely PE: no hypoxia, no tachycardia, present only with exertion.  Plan:       · Based on positive bronchodilator response noted on PFTs would recommend albuterol pre-treatment prior to exercise.  · Attempt sputum induction for micro testing, but patient was unable to produce a sample.  · Will communicate with his cardiologist, Dr. Rowe, regarding venous studies of the lower extremities.

## 2018-03-09 DIAGNOSIS — R06.09 DYSPNEA ON EXERTION: Primary | ICD-10-CM

## 2018-03-13 ENCOUNTER — HOSPITAL ENCOUNTER (OUTPATIENT)
Dept: RADIOLOGY | Facility: HOSPITAL | Age: 63
Discharge: HOME | End: 2018-03-13
Attending: INTERNAL MEDICINE
Payer: COMMERCIAL

## 2018-03-13 ENCOUNTER — CLINICAL SUPPORT (OUTPATIENT)
Dept: CARDIOLOGY | Facility: CLINIC | Age: 63
End: 2018-03-13
Attending: INTERNAL MEDICINE
Payer: COMMERCIAL

## 2018-03-13 ENCOUNTER — OFFICE VISIT (OUTPATIENT)
Dept: ENDOCRINOLOGY | Facility: CLINIC | Age: 63
End: 2018-03-13
Payer: COMMERCIAL

## 2018-03-13 VITALS
WEIGHT: 166 LBS | HEART RATE: 72 BPM | DIASTOLIC BLOOD PRESSURE: 78 MMHG | SYSTOLIC BLOOD PRESSURE: 118 MMHG | HEIGHT: 65 IN | BODY MASS INDEX: 27.66 KG/M2

## 2018-03-13 DIAGNOSIS — R06.09 DYSPNEA ON EXERTION: ICD-10-CM

## 2018-03-13 DIAGNOSIS — S22.009A CLOSED FRACTURE OF THORACIC VERTEBRA, UNSPECIFIED FRACTURE MORPHOLOGY, UNSPECIFIED THORACIC VERTEBRAL LEVEL, INITIAL ENCOUNTER: Primary | ICD-10-CM

## 2018-03-13 LAB
CREAT SERPL-MCNC: 0.9 MG/DL (ref 0.5–1.4)
SAMPLE: NORMAL

## 2018-03-13 PROCEDURE — 93970 EXTREMITY STUDY: CPT | Mod: S$GLB,,, | Performed by: INTERNAL MEDICINE

## 2018-03-13 PROCEDURE — 99204 OFFICE O/P NEW MOD 45 MIN: CPT | Mod: S$GLB,,, | Performed by: INTERNAL MEDICINE

## 2018-03-13 PROCEDURE — 71275 CT ANGIOGRAPHY CHEST: CPT | Mod: 26,,, | Performed by: RADIOLOGY

## 2018-03-13 PROCEDURE — 25500020 PHARM REV CODE 255: Performed by: INTERNAL MEDICINE

## 2018-03-13 PROCEDURE — 99999 PR PBB SHADOW E&M-EST. PATIENT-LVL III: CPT | Mod: PBBFAC,,, | Performed by: INTERNAL MEDICINE

## 2018-03-13 PROCEDURE — 71275 CT ANGIOGRAPHY CHEST: CPT | Mod: TC

## 2018-03-13 RX ADMIN — IOHEXOL 75 ML: 350 INJECTION, SOLUTION INTRAVENOUS at 05:03

## 2018-03-13 NOTE — PROGRESS NOTES
"Mr. Espinoza is a 62 y.o. M with history of hyperlipidemia, here for initial visit for thoracic fractures.     He is following w pulmonary and cardiology also.    Since mid 2017 had increased CONTRERAS.  CT Dec 2017 done for for this SOB incidentally showed "The osseous structures demonstrate kyphosis of the thoracic spine with several prominent biconcave compression deformities."   Pt was not aware of previous fractures or falls/traumatic events that may have lead to these vertebral abnormalities.  No back pain or history of episodes of significant back pain.  His PFTs show a small restrictive defect in early 2018.   No significant steroid use history.   He does endorse exercising - strength training - free weights 300lbs dead lift.  Squats using weight 120lbs. Also walks a lot.     Has normal size for parents.  No family hx of Op, calcium disorders, fractures. Thyroglossal cyst at age 8, club foot, only 8 permanent tooth even came in. Notes he had "soft teeth" as a child by a dentist.  No history of kidney stones.     Also over the last several years noticed gradual increase in kyphosis of his upper back.     Takes 1-2 servings calcium daily in diet. No vitD.  Takes biotin daily.     No back pain, no leg numbness weakness.     Review of labs show hx of calcium, GFR>60.            Past Medical History:   Diagnosis Date    Hyperlipidemia         reports that he has never smoked. He has never used smokeless tobacco. He reports that he drinks about 0.6 oz of alcohol per week . He reports that he does not use drugs.    Family History   Problem Relation Age of Onset    Hyperlipidemia Brother     Hypertension Brother     Heart disease Brother     Heart attack Brother        No past surgical history on file.    Patient's Medications   New Prescriptions    No medications on file   Previous Medications    ALBUTEROL 90 MCG/ACTUATION INHALER    2 puffs 15 minutes prior to exercise    ASPIRIN (ECOTRIN) 81 MG EC TABLET    Take 81 mg " "by mouth once daily.      ATORVASTATIN (LIPITOR) 40 MG TABLET    TAKE 1 TABLET(40 MG) BY MOUTH EVERY EVENING    FLU VAC TS 2013,18YR+,CELL,PF, (FLULAVAL) 45 MCG (15 MCG X 3)/0.5 ML SYRG    Inject 0.5 mLs into the muscle.   Modified Medications    No medications on file   Discontinued Medications    No medications on file         Review of Systems:  General: no fevers  Eyes: no vision changes  ENT: no sore throat  Lung: +sob  CV: no palpitations  GI: no nausea   : no dysuria   Endo: no heat intolerance  Heme: no easy bruising   MSK: no joint swelling      /78   Pulse 72   Ht 5' 5" (1.651 m)   Wt 75.3 kg (166 lb 0.1 oz)   BMI 27.62 kg/m²     Physical Exam:  General: normal body habitus, not in acute distress   Eyes: anicteric, no proptosis   ENT: no facial lesions, no oral lesions - pt had many implants  Neck: no masses, no LAD   Back: moderate thoracic kyphosis, no step off, no tender points  Lung; ctabl, no wheezing or stridor   Psych: normal affect, appropriate in conversation  CV: RRR, no rubs or murmurs   Skin: exposed skin without bruising or bleeding   Ext: no peripheral edema or erythema  Neuro: AOx3, moving all extremities, normal gait     Labs:    Chemistry        Component Value Date/Time     12/11/2017 1302    K 4.3 12/11/2017 1302     12/11/2017 1302    CO2 23 12/11/2017 1302    BUN 11 12/11/2017 1302    CREATININE 0.9 12/11/2017 1302    GLU 78 12/11/2017 1302        Component Value Date/Time    CALCIUM 9.6 12/11/2017 1302    ALKPHOS 89 11/15/2017 0701    AST 23 11/15/2017 0701    ALT 29 11/15/2017 0701    BILITOT 2.0 (H) 11/15/2017 0701    ESTGFRAFRICA >60.0 12/11/2017 1302    EGFRNONAA >60.0 12/11/2017 1302          Lab Results   Component Value Date    WBC 5.27 12/11/2017    HGB 14.6 12/11/2017    HCT 42.7 12/11/2017    MCV 89 12/11/2017     12/11/2017        Lab Results   Component Value Date    HDL 50 11/15/2017    HDL 57 11/17/2016    HDL 56 11/11/2015     Lab Results "   Component Value Date    LDLCALC 80.2 11/15/2017    LDLCALC 71.2 11/17/2016    LDLCALC 80.4 11/11/2015     Lab Results   Component Value Date    TRIG 49 11/15/2017    TRIG 49 11/17/2016    TRIG 43 11/11/2015     Lab Results   Component Value Date    CHOLHDL 35.7 11/15/2017    CHOLHDL 41.3 11/17/2016    CHOLHDL 38.6 11/11/2015       No results found for: HGBA1C    Lab Results   Component Value Date    TSH 2.993 10/10/2012       Assessment and Plan:  Mr. Espinoza is a 62 y.o. M with history of hyperlipidemia, here for followup.    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

## 2018-03-14 ENCOUNTER — DOCUMENTATION ONLY (OUTPATIENT)
Dept: CARDIOLOGY | Facility: CLINIC | Age: 63
End: 2018-03-14

## 2018-03-14 NOTE — PROGRESS NOTES
OUTPATIENT CATHETERIZATION INSTRUCTIONS    You have been scheduled for a procedure in the catheterization lab on Monday, April 2, 2018.     Please report to the Cardiology Waiting Area on the Third floor of the hospital and check in at 6 AM.   You will then be taken to the SSCU (Short Stay Cardiac Unit) and prepared for your procedure. Please be aware that this is not the time of your procedure but the time you are to arrive. The procedures are scheduled on an hourly basis; however, emergency cases take precedence over all other cases.       You may not have anything to eat or drink after midnight the night before your test. You may take your regular morning medications with water. If there are any medications that you should not take you will be instructed to hold them that morning. If you are diabetic and on Metformin (Glucophage) do not take it the day before, the day of, and for 2 days after your procedure.      The procedure will take 1-2 hours to perform. After the procedure, you will return to SSCU on the third floor of the hospital. You will need to lie still (or keep your arm still) for the next 4 to 6 hours to minimize bleeding from the puncture site. Your family may remain in the room with you during this time.       You may be able to be discharged home that same afternoon if there is someone to drive you home and there were no complications. If you have one of the balloon, stent, or device procedures you may spend the night in the hospital. Your doctor will determine, based on your progress, the date and time of your discharge. The results of your procedure will be discussed with you before you are discharged. Any further testing or procedures will be scheduled for you either before you leave or you will be called with these appointments.       If you should have any questions, concerns, or need to change the date of your procedure, please call  LIZZIE Faria @ [phone]    Special  Instructions:  none        THE ABOVE INSTRUCTIONS WERE GIVEN TO THE PATIENT VERBALLY AND THEY VERBALIZED UNDERSTANDING.  THEY DO NOT REQUIRE ANY SPECIAL NEEDS AND DO NOT HAVE ANY LEARNING BARRIERS.          Directions for Reporting to Cardiology Waiting Area in the Hospital  If you park in the Parking Garage:  Take elevators to the1st floor of the parking garage.  Continue past the gift shop, coffee shop, and piano.  Take a right and go to the gold elevators. (Elevator B)  Take the elevator to the 3rd floor.  Follow the arrow on the sign on the wall that says Cath Lab Registration/EP Lab Registration.  Follow the long hallway all the way around until you come to a big open area.  This is the registration area.  Check in at Reception Desk.    OR    If family is dropping you off:  Have them drop you off at the front of the Hospital under the green overhang.  Enter through the doors and take a right.  Take the E elevators to the 3rd floor Cardiology Waiting Area.  Check in at the Reception Desk in the waiting room.

## 2018-03-21 ENCOUNTER — LAB VISIT (OUTPATIENT)
Dept: LAB | Facility: HOSPITAL | Age: 63
End: 2018-03-21
Attending: INTERNAL MEDICINE
Payer: COMMERCIAL

## 2018-03-21 DIAGNOSIS — S22.009A CLOSED FRACTURE OF THORACIC VERTEBRA, UNSPECIFIED FRACTURE MORPHOLOGY, UNSPECIFIED THORACIC VERTEBRAL LEVEL, INITIAL ENCOUNTER: ICD-10-CM

## 2018-03-21 LAB
25(OH)D3+25(OH)D2 SERPL-MCNC: 29 NG/ML
ALBUMIN SERPL BCP-MCNC: 4.1 G/DL
ALP SERPL-CCNC: 90 U/L
ALT SERPL W/O P-5'-P-CCNC: 26 U/L
ANION GAP SERPL CALC-SCNC: 7 MMOL/L
AST SERPL-CCNC: 22 U/L
BILIRUB SERPL-MCNC: 2.4 MG/DL
BUN SERPL-MCNC: 14 MG/DL
CALCIUM SERPL-MCNC: 9.2 MG/DL
CHLORIDE SERPL-SCNC: 106 MMOL/L
CO2 SERPL-SCNC: 27 MMOL/L
CREAT SERPL-MCNC: 0.9 MG/DL
EST. GFR  (AFRICAN AMERICAN): >60 ML/MIN/1.73 M^2
EST. GFR  (NON AFRICAN AMERICAN): >60 ML/MIN/1.73 M^2
FERRITIN SERPL-MCNC: 35 NG/ML
FSH SERPL-ACNC: 3.1 MIU/ML
GLUCOSE SERPL-MCNC: 88 MG/DL
LH SERPL-ACNC: 4.1 MIU/ML
MAGNESIUM SERPL-MCNC: 2 MG/DL
PHOSPHATE SERPL-MCNC: 3.2 MG/DL
POTASSIUM SERPL-SCNC: 4.1 MMOL/L
PROLACTIN SERPL IA-MCNC: 6.9 NG/ML
PROT SERPL-MCNC: 7 G/DL
PTH-INTACT SERPL-MCNC: 66 PG/ML
SODIUM SERPL-SCNC: 140 MMOL/L
TSH SERPL DL<=0.005 MIU/L-ACNC: 2.44 UIU/ML

## 2018-03-21 PROCEDURE — 80053 COMPREHEN METABOLIC PANEL: CPT

## 2018-03-21 PROCEDURE — 36415 COLL VENOUS BLD VENIPUNCTURE: CPT

## 2018-03-21 PROCEDURE — 83970 ASSAY OF PARATHORMONE: CPT

## 2018-03-21 PROCEDURE — 84443 ASSAY THYROID STIM HORMONE: CPT

## 2018-03-21 PROCEDURE — 82024 ASSAY OF ACTH: CPT

## 2018-03-21 PROCEDURE — 83735 ASSAY OF MAGNESIUM: CPT

## 2018-03-21 PROCEDURE — 84165 PROTEIN E-PHORESIS SERUM: CPT

## 2018-03-21 PROCEDURE — 82306 VITAMIN D 25 HYDROXY: CPT

## 2018-03-21 PROCEDURE — 84146 ASSAY OF PROLACTIN: CPT

## 2018-03-21 PROCEDURE — 82728 ASSAY OF FERRITIN: CPT

## 2018-03-21 PROCEDURE — 83002 ASSAY OF GONADOTROPIN (LH): CPT

## 2018-03-21 PROCEDURE — 83001 ASSAY OF GONADOTROPIN (FSH): CPT

## 2018-03-21 PROCEDURE — 84100 ASSAY OF PHOSPHORUS: CPT

## 2018-03-21 PROCEDURE — 84165 PROTEIN E-PHORESIS SERUM: CPT | Mod: 26,,, | Performed by: PATHOLOGY

## 2018-03-22 LAB
ALBUMIN SERPL ELPH-MCNC: 4.04 G/DL
ALPHA1 GLOB SERPL ELPH-MCNC: 0.23 G/DL
ALPHA2 GLOB SERPL ELPH-MCNC: 0.67 G/DL
B-GLOBULIN SERPL ELPH-MCNC: 0.68 G/DL
GAMMA GLOB SERPL ELPH-MCNC: 0.88 G/DL
PATHOLOGIST INTERPRETATION SPE: NORMAL
PROT SERPL-MCNC: 6.5 G/DL

## 2018-03-23 DIAGNOSIS — I25.118 CORONARY ARTERY DISEASE OF NATIVE ARTERY OF NATIVE HEART WITH STABLE ANGINA PECTORIS: Primary | ICD-10-CM

## 2018-03-23 DIAGNOSIS — Q21.10 ASD (ATRIAL SEPTAL DEFECT): ICD-10-CM

## 2018-03-23 DIAGNOSIS — R07.9 CHEST PAIN: ICD-10-CM

## 2018-03-23 LAB — ACTH PLAS-MCNC: 22 PG/ML

## 2018-03-23 RX ORDER — DIPHENHYDRAMINE HCL 50 MG
50 CAPSULE ORAL EVERY 6 HOURS
Status: CANCELLED | OUTPATIENT
Start: 2018-03-23 | End: 2018-03-24

## 2018-03-23 RX ORDER — SODIUM CHLORIDE 9 MG/ML
3 INJECTION, SOLUTION INTRAVENOUS CONTINUOUS
Status: CANCELLED | OUTPATIENT
Start: 2018-03-23 | End: 2018-03-23

## 2018-03-28 ENCOUNTER — HOSPITAL ENCOUNTER (OUTPATIENT)
Dept: RADIOLOGY | Facility: CLINIC | Age: 63
Discharge: HOME | End: 2018-03-28
Attending: INTERNAL MEDICINE
Payer: COMMERCIAL

## 2018-03-28 DIAGNOSIS — S22.009A CLOSED FRACTURE OF THORACIC VERTEBRA, UNSPECIFIED FRACTURE MORPHOLOGY, UNSPECIFIED THORACIC VERTEBRAL LEVEL, INITIAL ENCOUNTER: ICD-10-CM

## 2018-03-28 PROCEDURE — 77080 DXA BONE DENSITY AXIAL: CPT | Mod: TC

## 2018-03-28 PROCEDURE — 77080 DXA BONE DENSITY AXIAL: CPT | Mod: 26,,, | Performed by: INTERNAL MEDICINE

## 2018-04-02 ENCOUNTER — PATIENT MESSAGE (OUTPATIENT)
Dept: ENDOCRINOLOGY | Facility: CLINIC | Age: 63
End: 2018-04-02

## 2018-04-02 ENCOUNTER — HOSPITAL ENCOUNTER (OUTPATIENT)
Facility: HOSPITAL | Age: 63
Discharge: HOME | End: 2018-04-02
Attending: INTERNAL MEDICINE | Admitting: INTERNAL MEDICINE
Payer: COMMERCIAL

## 2018-04-02 VITALS
HEIGHT: 67 IN | HEART RATE: 85 BPM | TEMPERATURE: 97 F | BODY MASS INDEX: 25.27 KG/M2 | SYSTOLIC BLOOD PRESSURE: 118 MMHG | OXYGEN SATURATION: 95 % | DIASTOLIC BLOOD PRESSURE: 86 MMHG | WEIGHT: 161 LBS | RESPIRATION RATE: 18 BRPM

## 2018-04-02 DIAGNOSIS — M81.0 OSTEOPOROSIS, UNSPECIFIED OSTEOPOROSIS TYPE, UNSPECIFIED PATHOLOGICAL FRACTURE PRESENCE: Primary | ICD-10-CM

## 2018-04-02 DIAGNOSIS — I20.89 EXERTIONAL ANGINA: Primary | ICD-10-CM

## 2018-04-02 DIAGNOSIS — Z95.1 HX OF CABG: Primary | ICD-10-CM

## 2018-04-02 DIAGNOSIS — E78.5 HYPERLIPIDEMIA: ICD-10-CM

## 2018-04-02 DIAGNOSIS — R07.9 CHEST PAIN: ICD-10-CM

## 2018-04-02 LAB
ABO + RH BLD: NORMAL
ANION GAP SERPL CALC-SCNC: 6 MMOL/L
ANION GAP SERPL CALC-SCNC: 7 MMOL/L
BASOPHILS # BLD AUTO: 0.05 K/UL
BASOPHILS NFR BLD: 1.2 %
BLD GP AB SCN CELLS X3 SERPL QL: NORMAL
BUN SERPL-MCNC: 12 MG/DL
BUN SERPL-MCNC: 15 MG/DL
CALCIUM SERPL-MCNC: 8.7 MG/DL
CALCIUM SERPL-MCNC: 9.7 MG/DL
CHLORIDE SERPL-SCNC: 106 MMOL/L
CHLORIDE SERPL-SCNC: 109 MMOL/L
CO2 SERPL-SCNC: 24 MMOL/L
CO2 SERPL-SCNC: 27 MMOL/L
CORONARY STENOSIS: ABNORMAL
CREAT SERPL-MCNC: 0.8 MG/DL
CREAT SERPL-MCNC: 0.8 MG/DL
DIFFERENTIAL METHOD: NORMAL
EOSINOPHIL # BLD AUTO: 0.2 K/UL
EOSINOPHIL NFR BLD: 4.8 %
ERYTHROCYTE [DISTWIDTH] IN BLOOD BY AUTOMATED COUNT: 13 %
EST. GFR  (AFRICAN AMERICAN): >60 ML/MIN/1.73 M^2
EST. GFR  (AFRICAN AMERICAN): >60 ML/MIN/1.73 M^2
EST. GFR  (NON AFRICAN AMERICAN): >60 ML/MIN/1.73 M^2
EST. GFR  (NON AFRICAN AMERICAN): >60 ML/MIN/1.73 M^2
GLUCOSE SERPL-MCNC: 146 MG/DL
GLUCOSE SERPL-MCNC: 93 MG/DL
HCT VFR BLD AUTO: 46.5 %
HGB BLD-MCNC: 15.9 G/DL
IMM GRANULOCYTES # BLD AUTO: 0.02 K/UL
IMM GRANULOCYTES NFR BLD AUTO: 0.5 %
LYMPHOCYTES # BLD AUTO: 1.1 K/UL
LYMPHOCYTES NFR BLD: 25.9 %
MCH RBC QN AUTO: 30.2 PG
MCHC RBC AUTO-ENTMCNC: 34.2 G/DL
MCV RBC AUTO: 88 FL
MONOCYTES # BLD AUTO: 0.4 K/UL
MONOCYTES NFR BLD: 9.2 %
NEUTROPHILS # BLD AUTO: 2.5 K/UL
NEUTROPHILS NFR BLD: 58.4 %
NRBC BLD-RTO: 0 /100 WBC
PLATELET # BLD AUTO: 174 K/UL
PMV BLD AUTO: 10.2 FL
POTASSIUM SERPL-SCNC: 4.1 MMOL/L
POTASSIUM SERPL-SCNC: 4.1 MMOL/L
RBC # BLD AUTO: 5.26 M/UL
SODIUM SERPL-SCNC: 139 MMOL/L
SODIUM SERPL-SCNC: 140 MMOL/L
WBC # BLD AUTO: 4.33 K/UL

## 2018-04-02 PROCEDURE — 25000003 PHARM REV CODE 250: Performed by: INTERNAL MEDICINE

## 2018-04-02 PROCEDURE — 25000003 PHARM REV CODE 250

## 2018-04-02 PROCEDURE — 36415 COLL VENOUS BLD VENIPUNCTURE: CPT

## 2018-04-02 PROCEDURE — 93458 L HRT ARTERY/VENTRICLE ANGIO: CPT

## 2018-04-02 PROCEDURE — 93458 L HRT ARTERY/VENTRICLE ANGIO: CPT | Mod: 26,,, | Performed by: INTERNAL MEDICINE

## 2018-04-02 PROCEDURE — 93010 ELECTROCARDIOGRAM REPORT: CPT | Mod: ,,, | Performed by: INTERNAL MEDICINE

## 2018-04-02 PROCEDURE — 63600175 PHARM REV CODE 636 W HCPCS

## 2018-04-02 PROCEDURE — 85025 COMPLETE CBC W/AUTO DIFF WBC: CPT

## 2018-04-02 PROCEDURE — 99152 MOD SED SAME PHYS/QHP 5/>YRS: CPT

## 2018-04-02 PROCEDURE — 99152 MOD SED SAME PHYS/QHP 5/>YRS: CPT | Mod: ,,, | Performed by: INTERNAL MEDICINE

## 2018-04-02 PROCEDURE — 80048 BASIC METABOLIC PNL TOTAL CA: CPT | Mod: 91

## 2018-04-02 PROCEDURE — 86901 BLOOD TYPING SEROLOGIC RH(D): CPT

## 2018-04-02 PROCEDURE — 80048 BASIC METABOLIC PNL TOTAL CA: CPT

## 2018-04-02 PROCEDURE — 93005 ELECTROCARDIOGRAM TRACING: CPT

## 2018-04-02 RX ORDER — DIPHENHYDRAMINE HCL 50 MG
50 CAPSULE ORAL EVERY 6 HOURS
Status: DISCONTINUED | OUTPATIENT
Start: 2018-04-02 | End: 2018-04-02 | Stop reason: HOSPADM

## 2018-04-02 RX ORDER — ASPIRIN 81 MG/1
81 TABLET ORAL ONCE
Status: COMPLETED | OUTPATIENT
Start: 2018-04-02 | End: 2018-04-02

## 2018-04-02 RX ORDER — CLOPIDOGREL 300 MG/1
600 TABLET, FILM COATED ORAL ONCE
Status: COMPLETED | OUTPATIENT
Start: 2018-04-02 | End: 2018-04-02

## 2018-04-02 RX ORDER — SODIUM CHLORIDE 9 MG/ML
3 INJECTION, SOLUTION INTRAVENOUS CONTINUOUS
Status: ACTIVE | OUTPATIENT
Start: 2018-04-02 | End: 2018-04-02

## 2018-04-02 RX ORDER — CALCIUM CARBONATE 600 MG
600 TABLET ORAL DAILY
COMMUNITY
End: 2019-08-06

## 2018-04-02 RX ADMIN — SODIUM CHLORIDE 3 ML/KG/HR: 0.9 INJECTION, SOLUTION INTRAVENOUS at 06:04

## 2018-04-02 RX ADMIN — CLOPIDOGREL BISULFATE 600 MG: 300 TABLET, FILM COATED ORAL at 07:04

## 2018-04-02 RX ADMIN — ASPIRIN 81 MG: 81 TABLET, COATED ORAL at 07:04

## 2018-04-02 RX ADMIN — DIPHENHYDRAMINE HYDROCHLORIDE 50 MG: 50 CAPSULE ORAL at 06:04

## 2018-04-02 NOTE — SUBJECTIVE & OBJECTIVE
Past Medical History:   Diagnosis Date    Hyperlipidemia        Past Surgical History:   Procedure Laterality Date    TONSILLECTOMY         Review of patient's allergies indicates:  No Known Allergies    PTA Medications   Medication Sig    aspirin (ECOTRIN) 81 MG EC tablet Take 81 mg by mouth once daily.      atorvastatin (LIPITOR) 40 MG tablet TAKE 1 TABLET(40 MG) BY MOUTH EVERY EVENING    calcium carbonate (CALCIUM 600) 600 mg calcium (1,500 mg) Tab Take 600 mg by mouth once daily.    ERGOCALCIFEROL, VITAMIN D2, (VITAMIN D ORAL) Take by mouth once daily.    albuterol 90 mcg/actuation inhaler 2 puffs 15 minutes prior to exercise    flu vac ts 2013,18yr+,cell,PF, (FLULAVAL) 45 mcg (15 mcg x 3)/0.5 mL Syrg Inject 0.5 mLs into the muscle.     Family History     Problem Relation (Age of Onset)    Heart attack Brother    Heart disease Brother    Hyperlipidemia Brother    Hypertension Brother        Social History Main Topics    Smoking status: Never Smoker    Smokeless tobacco: Never Used    Alcohol use 0.6 oz/week     1 Glasses of wine per week      Comment: 1 drink 2-3 times/weekly    Drug use: No    Sexual activity: Not on file     ROS   Review of Systems   Constitution: Negative.   HENT: Negative.    Eyes: Negative.    Cardiovascular: Negative for chest pain, claudication, cyanosis, dyspnea on exertion, irregular heartbeat, leg swelling, near-syncope, orthopnea, palpitations, paroxysmal nocturnal dyspnea and syncope.   Respiratory: Negative for cough, hemoptysis, shortness of breath, sleep disturbances due to breathing, snoring, sputum production and wheezing.    Endocrine: Negative.    Hematologic/Lymphatic: Negative.    Gastrointestinal: Negative.              Objective:     Vital Signs (Most Recent):  Pulse: 70 (04/02/18 0618)  BP: 131/81 (04/02/18 0618) Vital Signs (24h Range):  Pulse:  [70] 70  BP: (131)/(81) 131/81     Weight: 73 kg (161 lb)  Body mass index is 25.22 kg/m².            No intake or  output data in the 24 hours ending 04/02/18 0639    Lines/Drains/Airways     Peripheral Intravenous Line                 Peripheral IV - Single Lumen 03/13/18 1651 Left Antecubital 19 days         Peripheral IV - Single Lumen 04/02/18 0618 Left Antecubital less than 1 day         Peripheral IV - Single Lumen 04/02/18 0618 Left Forearm less than 1 day                Physical Exam   Objective:    Physical Exam   Constitutional: He is oriented to person, place, and time. He appears well-developed and well-nourished.   HENT:   Head: Normocephalic and atraumatic.   Eyes: Conjunctivae are normal.   Neck: Normal range of motion. No JVD present. No thyromegaly present.   Cardiovascular: Normal rate, regular rhythm and normal heart sounds.  Exam reveals no gallop and no friction rub.    No murmur heard.  Pulmonary/Chest: Effort normal and breath sounds normal. No respiratory distress. He has no wheezes. He has no rales. He exhibits no tenderness.   Abdominal: Soft. Bowel sounds are normal. He exhibits no distension. There is no tenderness. There is no rebound.   Musculoskeletal: Normal range of motion. He exhibits no edema or deformity.   Neurological: He is oriented to person, place, and time.   Nursing note and vitals reviewed.    Significant Labs: All pertinent lab results from the last 24 hours have been reviewed.    Significant Imaging: Echocardiogram: 2D echo with color flow doppler: No results found for this or any previous visit.

## 2018-04-02 NOTE — DISCHARGE SUMMARY
[facility]  Interventional Cardiology  Discharge Summary      Patient Name: Michael Espinoza  MRN: [id number]  Admission Date: 4/2/2018  Hospital Length of Stay: 0 days  Discharge Date and Time:  04/02/2018 8:46 AM  Attending Physician: Lc Rowe MD  Discharging Provider: Bere Gardner MD  Primary Care Physician: Primary Doctor No      Procedure(s) (LRB):  HEART CATH-LEFT (Left)     Indwelling Lines/Drains at time of discharge:  Lines/Drains/Airways          No matching active lines, drains, or airways          Hospital Course:    Pt presented for outpt [facility]. He has severe 3V CAD. See full cath report for further details. Pt tolerated the procedure well without any complications. He remained asymptomatic and hemodynamically stable on day of discharge without any acute event.. He was able to ambulate adequately without difficulty and did not have any vascular access site complications/chest pain/claudication symptoms.      We are referring him for CABG. D/w Dr. Edwards. Have scheduled for Carotid US.    Significant Diagnostic Studies: Labs: All labs within the past 24 hours have been reviewed    Pending Diagnostic Studies:     None        No new Assessment & Plan notes have been filed under this hospital service since the last note was generated.  Service: Interventional Cardiology      Discharged Condition: good    Follow Up:  Follow-up Information     Lc Rowe MD. Schedule an appointment as soon as possible for a visit in 4 weeks.    Specialties:  Cardiology, INTERVENTIONAL CARDIOLOGY  Contact information:  [facility]  [place] [address]  [phone]                 Patient Instructions:   No discharge procedures on file.  Medications:  Reconciled Home Medications:      Medication List      CONTINUE taking these medications    albuterol 90 mcg/actuation inhaler  2 puffs 15 minutes prior to exercise     aspirin 81 MG EC tablet  Commonly known as:  ECOTRIN     atorvastatin 40  MG tablet  Commonly known as:  LIPITOR  TAKE 1 TABLET(40 MG) BY MOUTH EVERY EVENING     CALCIUM 600 600 mg calcium (1,500 mg) Tab  Generic drug:  calcium carbonate     flu vacc ts13-14(18,up)violetta(PF) 45 mcg (15 mcg x 3)/0.5 mL Syrg  Commonly known as:  Flulaval     VITAMIN D ORAL            Time spent on the discharge of patient: 30 minutes    Bere Gardner MD  Interventional Cardiology  [facility]

## 2018-04-02 NOTE — TELEPHONE ENCOUNTER
Component      Latest Ref Rng & Units 3/21/2018 3/19/2018   Cortisol, Saliva      ng/dL  <50   AM Cortisol        Test Not Performed   PM Cortisol        Test Not Performed   Midnight Cortisol        Test Not Performed   TSH      0.400 - 4.000 uIU/mL 2.439    Vit D, 25-Hydroxy      30 - 96 ng/mL 29 (L)    PTH      9.0 - 77.0 pg/mL 66.0    Phosphorus      2.7 - 4.5 mg/dL 3.2    LH      0.6 - 12.1 mIU/mL 4.1    FSH      0.95 - 11.95 mIU/mL 3.10    Prolactin      3.5 - 19.4 ng/mL 6.9    Ferritin      20.0 - 300.0 ng/mL 35    Magnesium      1.6 - 2.6 mg/dL 2.0    ACTH      0 - 46 pg/mL 22    Pathologist Interpretation SPE       REVIEWED (normal)

## 2018-04-02 NOTE — CONSULTS
[facility]  Interventional Cardiology  Consult Note    Patient Name: Michael Espinoza  MRN: [id number]  Admission Date: 4/2/2018  Hospital Length of Stay: 0 days  Code Status: No Order   Attending Provider: Lc Rowe MD  Consulting Provider: Bere Gardner MD  Primary Care Physician: Primary Doctor No  Principal Problem:<principal problem not specified>    Patient information was obtained from patient and past medical records.     Consults  Subjective:       HPI:  63 yo man with medical history significant for DLPD who comes in today for annual check up. He was last seen in the clinic one year ago. At that time, he was doing well.  He reports today to be doing well. He exercises 2x/week in the gym. He reports intermittent angina on exertion after walking up to 400 yards with associated SOB. The discomfort last couple of minutes and goes away after he takes a break. He started to notice this after this summer when he did not have any symptoms after exertion.  He also reports left lower extremity edema by the level of his ankle. The edema is noticeable at night time and improves in the morning. He denies edema of his right leg. Symptoms are present for the past 3 months.   He denies palpitations, diaphoresis or syncopal events.   He is the high-school cross country team .  Today's labs reviewed with patient and demonstrate an excellent cholesterol profile (, HDL 50, LDL 80), LFT's however tbili elevated (2.0) due to Gilbert's syndrome.    Past Medical History:   Diagnosis Date    Hyperlipidemia        Past Surgical History:   Procedure Laterality Date    TONSILLECTOMY         Review of patient's allergies indicates:  No Known Allergies    PTA Medications   Medication Sig    aspirin (ECOTRIN) 81 MG EC tablet Take 81 mg by mouth once daily.      atorvastatin (LIPITOR) 40 MG tablet TAKE 1 TABLET(40 MG) BY MOUTH EVERY EVENING    calcium carbonate (CALCIUM 600) 600 mg calcium (1,500 mg)  Tab Take 600 mg by mouth once daily.    ERGOCALCIFEROL, VITAMIN D2, (VITAMIN D ORAL) Take by mouth once daily.    albuterol 90 mcg/actuation inhaler 2 puffs 15 minutes prior to exercise    flu vac ts 2013,18yr+,cell,PF, (FLULAVAL) 45 mcg (15 mcg x 3)/0.5 mL Syrg Inject 0.5 mLs into the muscle.     Family History     Problem Relation (Age of Onset)    Heart attack Brother    Heart disease Brother    Hyperlipidemia Brother    Hypertension Brother        Social History Main Topics    Smoking status: Never Smoker    Smokeless tobacco: Never Used    Alcohol use 0.6 oz/week     1 Glasses of wine per week      Comment: 1 drink 2-3 times/weekly    Drug use: No    Sexual activity: Not on file     ROS   Review of Systems   Constitution: Negative.   HENT: Negative.    Eyes: Negative.    Cardiovascular: Negative for chest pain, claudication, cyanosis, dyspnea on exertion, irregular heartbeat, leg swelling, near-syncope, orthopnea, palpitations, paroxysmal nocturnal dyspnea and syncope.   Respiratory: Negative for cough, hemoptysis, shortness of breath, sleep disturbances due to breathing, snoring, sputum production and wheezing.    Endocrine: Negative.    Hematologic/Lymphatic: Negative.    Gastrointestinal: Negative.              Objective:     Vital Signs (Most Recent):  Pulse: 70 (04/02/18 0618)  BP: 131/81 (04/02/18 0618) Vital Signs (24h Range):  Pulse:  [70] 70  BP: (131)/(81) 131/81     Weight: 73 kg (161 lb)  Body mass index is 25.22 kg/m².            No intake or output data in the 24 hours ending 04/02/18 0639    Lines/Drains/Airways     Peripheral Intravenous Line                 Peripheral IV - Single Lumen 03/13/18 1651 Left Antecubital 19 days         Peripheral IV - Single Lumen 04/02/18 0618 Left Antecubital less than 1 day         Peripheral IV - Single Lumen 04/02/18 0618 Left Forearm less than 1 day                Physical Exam   Objective:    Physical Exam   Constitutional: He is oriented to person,  place, and time. He appears well-developed and well-nourished.   HENT:   Head: Normocephalic and atraumatic.   Eyes: Conjunctivae are normal.   Neck: Normal range of motion. No JVD present. No thyromegaly present.   Cardiovascular: Normal rate, regular rhythm and normal heart sounds.  Exam reveals no gallop and no friction rub.    No murmur heard.  Pulmonary/Chest: Effort normal and breath sounds normal. No respiratory distress. He has no wheezes. He has no rales. He exhibits no tenderness.   Abdominal: Soft. Bowel sounds are normal. He exhibits no distension. There is no tenderness. There is no rebound.   Musculoskeletal: Normal range of motion. He exhibits no edema or deformity.   Neurological: He is oriented to person, place, and time.   Nursing note and vitals reviewed.    Significant Labs: All pertinent lab results from the last 24 hours have been reviewed.    Significant Imaging: Echocardiogram: 2D echo with color flow doppler: No results found for this or any previous visit.    Assessment and Plan:     Exertional angina    - Cardiac Catheterization with probable PCI  - Access: R Radial 5F  - Allergies: No shellfish/Iodine allergy  - Pre-Hydration: 3 cc/kg/hr IV, continuous, for 1 hour, Pre-Procedure  - Pre-Op Med: Diphenhydramine (Benadryl) 50 mg, Oral, Once, Pre-Procedure   - Pt is a MINOR candidate and understands the importance of taking Ticagrelor 90 mg BID or Plavix 75 mg daily or Prasugrel 10 mg daily for at least one year, understands that in case of receiving a drug coated stent the failure to comply with dual anti-platelet therapy is likely to result in stent clotting, heart attack and death.   - The patient understands the risks and benefits and wishes to go ahead with the procedure.  - All patient's questions were answered.  - The risks, benefits & alternatives of the procedure were explained to the patient.   - The risks of coronary angiography include but are not limited to: Bleeding, infection,  heart rhythm abnormalities, allergic reactions, kidney injury requiring dilaysis, limb loss, stroke and death.   - Should stenting be indicated, the patient has agreed to dual anti-platelet therapy for 1-consecutive year with a drug-eluting stent and a minimum of 1-month with the use of a bare metal stent.   - Additionally, pt is aware that non-compliance is likely to result in stent clotting with heart attack, heart failure, and/or death.  - The risks of moderate sedation include hypotension, respiratory depression, arrhythmias, bronchospasm, & death.   - Informed consent was obtained & the patient is agreeable to proceed with the procedure.  - This patient was discussed with the attending interventional cardiologist who agrees with the above assessment & plan.              VTE Risk Mitigation     None          Thank you for your consult.    Bere Gardner MD  Interventional Cardiology   [facility]

## 2018-04-02 NOTE — ASSESSMENT & PLAN NOTE
- Cardiac Catheterization with probable PCI  - Access: R Radial 5F  - Allergies: No shellfish/Iodine allergy  - Pre-Hydration: 3 cc/kg/hr IV, continuous, for 1 hour, Pre-Procedure  - Pre-Op Med: Diphenhydramine (Benadryl) 50 mg, Oral, Once, Pre-Procedure   - Pt is a MINOR candidate and understands the importance of taking Ticagrelor 90 mg BID or Plavix 75 mg daily or Prasugrel 10 mg daily for at least one year, understands that in case of receiving a drug coated stent the failure to comply with dual anti-platelet therapy is likely to result in stent clotting, heart attack and death.   - The patient understands the risks and benefits and wishes to go ahead with the procedure.  - All patient's questions were answered.  - The risks, benefits & alternatives of the procedure were explained to the patient.   - The risks of coronary angiography include but are not limited to: Bleeding, infection, heart rhythm abnormalities, allergic reactions, kidney injury requiring dilaysis, limb loss, stroke and death.   - Should stenting be indicated, the patient has agreed to dual anti-platelet therapy for 1-consecutive year with a drug-eluting stent and a minimum of 1-month with the use of a bare metal stent.   - Additionally, pt is aware that non-compliance is likely to result in stent clotting with heart attack, heart failure, and/or death.  - The risks of moderate sedation include hypotension, respiratory depression, arrhythmias, bronchospasm, & death.   - Informed consent was obtained & the patient is agreeable to proceed with the procedure.  - This patient was discussed with the attending interventional cardiologist who agrees with the above assessment & plan.

## 2018-04-02 NOTE — PROCEDURES
"            Interventional Cardiology   Post Cath Note      Referring Physician: Lc Rowe MD  Procedure: [facility]  Indication: Angina    SUBJECTIVE:     Patient tolerated the procedure well, no complications    Cath Results:   Access:  R Radial 5F    Severe 3V CAD    See full cath report for further details        Post Cath Exam:   /88 (BP Location: Right arm, Patient Position: Lying)   Pulse 70   Temp 96.4 °F (35.8 °C) (Oral)   Resp 18   Ht 5' 7" (1.702 m)   Wt 73 kg (161 lb)   SpO2 99%   BMI 25.22 kg/m²     No unusual pain, hematoma, thrill or bruit at vascular access site.  Distal pulse present without signs of ischemia.    Assessment / Plan:       - Routine post cath protocol  - IVFs for MAUREEN prevention  - CT Surgery referral for CABG    Please page/call if any questions or concerns.    Bere Gardner MD  Interventional Cardiology Fellow  Pager: [phone]      "

## 2018-04-02 NOTE — HPI
61 yo man with medical history significant for DLPD who comes in today for annual check up. He was last seen in the clinic one year ago. At that time, he was doing well.  He reports today to be doing well. He exercises 2x/week in the gym. He reports intermittent angina on exertion after walking up to 400 yards with associated SOB. The discomfort last couple of minutes and goes away after he takes a break. He started to notice this after this summer when he did not have any symptoms after exertion.  He also reports left lower extremity edema by the level of his ankle. The edema is noticeable at night time and improves in the morning. He denies edema of his right leg. Symptoms are present for the past 3 months.   He denies palpitations, diaphoresis or syncopal events.   He is the high-school cross country team .  Today's labs reviewed with patient and demonstrate an excellent cholesterol profile (, HDL 50, LDL 80), LFT's however tbili elevated (2.0) due to Gilbert's syndrome.

## 2018-04-04 ENCOUNTER — CLINICAL SUPPORT (OUTPATIENT)
Dept: CARDIOLOGY | Facility: CLINIC | Age: 63
End: 2018-04-04
Payer: COMMERCIAL

## 2018-04-04 DIAGNOSIS — Z95.1 HX OF CABG: ICD-10-CM

## 2018-04-04 PROCEDURE — 93880 EXTRACRANIAL BILAT STUDY: CPT | Mod: PBBFAC | Performed by: INTERNAL MEDICINE

## 2018-04-05 LAB — INTERNAL CAROTID STENOSIS: NORMAL

## 2018-04-10 ENCOUNTER — OFFICE VISIT (OUTPATIENT)
Dept: CARDIOTHORACIC SURGERY | Facility: CLINIC | Age: 63
End: 2018-04-10
Payer: COMMERCIAL

## 2018-04-10 ENCOUNTER — HOSPITAL ENCOUNTER (OUTPATIENT)
Dept: CARDIOLOGY | Facility: CLINIC | Age: 63
Discharge: HOME | End: 2018-04-10
Payer: COMMERCIAL

## 2018-04-10 VITALS
DIASTOLIC BLOOD PRESSURE: 89 MMHG | HEART RATE: 71 BPM | SYSTOLIC BLOOD PRESSURE: 139 MMHG | WEIGHT: 162.38 LBS | BODY MASS INDEX: 25.43 KG/M2 | TEMPERATURE: 98 F

## 2018-04-10 DIAGNOSIS — I25.118 CORONARY ARTERY DISEASE OF NATIVE ARTERY OF NATIVE HEART WITH STABLE ANGINA PECTORIS: ICD-10-CM

## 2018-04-10 DIAGNOSIS — Q21.10 ASD (ATRIAL SEPTAL DEFECT): ICD-10-CM

## 2018-04-10 LAB
AORTIC VALVE REGURGITATION: NORMAL
ESTIMATED PA SYSTOLIC PRESSURE: 17.81
MITRAL VALVE MOBILITY: NORMAL
RETIRED EF AND QEF - SEE NOTES: 60 (ref 55–65)
TRICUSPID VALVE REGURGITATION: NORMAL

## 2018-04-10 PROCEDURE — 99999 PR PBB SHADOW E&M-EST. PATIENT-LVL III: CPT | Mod: PBBFAC,,, | Performed by: THORACIC SURGERY (CARDIOTHORACIC VASCULAR SURGERY)

## 2018-04-10 PROCEDURE — 99205 OFFICE O/P NEW HI 60 MIN: CPT | Mod: S$GLB,,, | Performed by: THORACIC SURGERY (CARDIOTHORACIC VASCULAR SURGERY)

## 2018-04-10 PROCEDURE — 93306 TTE W/DOPPLER COMPLETE: CPT | Mod: S$GLB,,, | Performed by: INTERNAL MEDICINE

## 2018-04-10 RX ORDER — NITROGLYCERIN 0.4 MG/1
0.4 TABLET SUBLINGUAL EVERY 5 MIN PRN
Qty: 60 TABLET | Refills: 3 | Status: ON HOLD | OUTPATIENT
Start: 2018-04-10 | End: 2018-05-17 | Stop reason: HOSPADM

## 2018-04-10 NOTE — PROGRESS NOTES
[facility] - Cardiovascular Surg  History & Physical  Cardiothoracic Surgery    SUBJECTIVE:     Chief Complaint/Reason for Admission: CAD    History of Present Illness:  Patient is a 62 y.o. male presents with 3V CAD. PMHx significant for for diffuse parenchymal lung disease. He has been having chest pain on exertion associated with some dypsnea.  He also reports left lower extremity edema by the level of his ankle. The edema is noticeable at night time and improves in the morning. He denies edema of his right leg. Symptoms are present for the past 3 months. Denies orthopnea, palpitations, arrhythmias, diaphoresis or syncopal events.     He is the high-school cross country team .  Today's labs reviewed with patient and demonstrate an excellent cholesterol profile (, HDL 50, LDL 80), LFT's however tbili elevated (2.0) due to Gilbert's syndrome.    NYHA Class: 0  Family History of Heart Disease: brother      (Not in a hospital admission)    Review of patient's allergies indicates:  No Known Allergies    Past Medical History:   Diagnosis Date    Hyperlipidemia      Past Surgical History:   Procedure Laterality Date    TONSILLECTOMY       Family History   Problem Relation Age of Onset    Hyperlipidemia Brother     Hypertension Brother     Heart disease Brother     Heart attack Brother      Social History   Substance Use Topics    Smoking status: Never Smoker    Smokeless tobacco: Never Used    Alcohol use 0.6 oz/week     1 Glasses of wine per week      Comment: 1 drink 2-3 times/weekly        Review of Systems:  Constitutional: no fever or chills  Eyes: no visual changes  ENT: no nasal congestion or sore throat  Respiratory: positive for dyspnea on exertion  Cardiovascular: positive for exertional chest pressure/discomfort  Gastrointestinal: no nausea or vomiting, tolerating diet  Genitourinary: no hematuria or dysuria  Integument/Breast: no rash or pruritis  Hematologic/Lymphatic: no easy bruising or  lymphadenopathy  Musculoskeletal: no arthralgias or myalgias  Neurological: no seizures or tremors  Behavioral/Psych: no auditory or visual hallucinations  Endocrine: no heat or cold intolerance    OBJECTIVE:     Vital Signs (Most Recent):  Temp: 97.9 °F (36.6 °C) (04/10/18 1124)  Pulse: 71 (04/10/18 1124)  BP: 139/89 (04/10/18 1124)    Admission: Weight: 73.6 kg (162 lb 5.9 oz) (04/10/18 1124)   Most Recent: Weight: 73.6 kg (162 lb 5.9 oz) (04/10/18 1124)    Physical Exam:  GEN: No acute distress  HEENT: normocephalic, atraumatic  CV: RRR, no murmurs, rubs, gallops  Resp: normal work of breathing, full inspiratory expansion, clear to auscultation  Abd: non distended, bowel sounds normal  Ext: Distal pulses intact, no peripheral edema  Neuro: alert and oriented x3  Skin: no rashes, bruising    Laboratory:  BMP:     Diagnostic Results:    CHARMAINE 12/2017    Procedure:  Informed consent was obtained from patient.  Deep sedation was achieved.    Hemodynamics:    Baseline: HR: 80, BP: 116/80.    Complications:  no complications.     Left Atrium:  The left atrium is normal in size.  There is no visualized thrombus.  Spontaneous echo contrast was not present.  The atrial septum is aneurysmal and bulging to the left.   Right Atrium:  The right atrium is normal in size.    Right Ventricle:  The right ventricle is normal in size. Global right ventricular systolic function appears normal.   Left Ventricle:  The left ventricle is normal in size.    Left ventricular systolic function appears normal. Visually estimated ejection fraction is 65-70%.   Diastolic indices: Diastolic function is normal.   Intracardiac Shunt Evaluation:  No evidence of intracardiac shunt as evaluated by both color flow Doppler and bubble study from left arm.    Aortic Valve:  The aortic valve is normal in structure with normal leaflet mobility. Additionally, there is trivial aortic regurgitation.   Mitral Valve:  The mitral valve is normal in structure  with normal leaflet mobility.   Tricuspid Valve:  The tricuspid valve is normal in structure with normal leaflet mobility. There is trivial tricuspid regurgitation.   Pulmonic Valve:  The pulmonic valve is not well seen.   Aorta:  The aortic root is normal in size.  The proximal ascending aorta measures 2.9 cm across.  No atheromatous disease was detected in the aorta.    Pericardium: Visualized pericardium is normal. There is no evidence of pericardial effusion.      CONCLUSIONS     1 - Atrial septal aneurysm without evidence of right to left shunt.     2 - TTE images reviewed, the color flow Doppler seen on atrial septum of  the 4 chamber most likely represents flow secondary to the floppying of intra-atrial septum and not a defect in the IAS.     3 - Normal left ventricular systolic function (EF 65-70%).     4 - Challenging images.       [facility] 4/2/18:  Hemodynamic Results  LVEDP (Pre): 10 mmHg  LVEDP (Post): 15 mmHg  Ejection Fraction: 65%    Angiographic Results     Diagnostic:          Patient has a right dominant coronary artery.        - Left Main Coronary Artery:             The LM has luminal irregularities. There is SHARMAINE 3 flow.     - Left Anterior Descending Artery:             The proximal LAD has a 95% stenosis. There is SHARMAINE 3 flow.     - Left Circumflex Artery:             The LCX has a 70% stenosis. There is SHARMAINE 3 flow. Tandem lesions.      - Right Coronary Artery:             The RCA has chronic total occlusion. There is SHARMAINE 0 flow.    Carotid Duplex  RIGHT  The right Distal Common Carotid Artery is visualized.   The right carotid bulb artery is visualized.   The right Distal Internal Carotid Artery has 0 - 19% stenosis.   The right external carotid artery is visualized.   The right vertebral artery is visualized, associated with anterograde flow.   The right ICA/CCA ratio is: .91    LEFT  The left Mid Common Carotid Artery is visualized.   The left carotid bulb artery is visualized, associated with  heterogeneous plaque.   The left Mid Internal Carotid Artery has 20 - 39% stenosis, associated with tortuosity.   The left external carotid artery is visualized.   The left vertebral artery is visualized, associated with anterograde flow.   The left ICA/CCA ratio is: .80      CONCLUSIONS   There is 0 - 19% right Internal Carotid stenosis.  There is 20 - 39% left Internal Carotid stenosis.    Risk Scores:  STS Score:   Procedure: CAB Only  Risk of Mortality: 0.321%  Morbidity or Mortality: 4.048%  Long Length of Stay: 1.307%  Short Length of Stay: 79.759%  Permanent Stroke: 0.237%  Prolonged Ventilation: 2.412%  DSW Infection: 0.147%  Renal Failure: 0.351%  Reoperation: 2.676%    ASSESSMENT/PLAN:     63 yo man with stable CAD.  Reviewed [facility]  Good candidate for 3V CABG    CTS Attending Note:    I have personally taken the history and examined this patient and agree with the resident's note as stated above.  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  He notes discomfort with brisk walking.  He underwent a thoughtful and thorough evaluation which included coronary angiography.  This study demonstrated 3 vessel disease.  I recommended 3 vessel coronary artery bypass grafting, with left internal mammary artery to the left anterior descending, saphenous vein graft to the first obtuse marginal, and a saphenous vein to the PL VB.  We discussed the risks and benefits of the proposed procedure, including but not limited to death, stroke, respiratory complications, renal complications, arrythmia, and infection. His questions have been answered, and he wishes to proceed.

## 2018-04-10 NOTE — LETTER
April 10, 2018        Lc Rowe MD  [address]  [place] [address]             [facility] - Cardiovascular Surg  [address]  [facility] [address]  Phone: [phone]   Patient: Micheal Espinoza   MR Number: [id number]   YOB: 1955   Date of Visit: 4/10/2018       Dear Dr. Rowe:    Thank you for referring Michael Espinoza to me for evaluation. Below are the relevant portions of my assessment and plan of care.            If you have questions, please do not hesitate to call me. I look forward to following Michael CURIEL along with you.    Sincerely,      Chad Edwards MD           CC  No Recipients

## 2018-04-13 DIAGNOSIS — I25.118 CORONARY ARTERY DISEASE OF NATIVE ARTERY OF NATIVE HEART WITH STABLE ANGINA PECTORIS: Primary | ICD-10-CM

## 2018-05-10 ENCOUNTER — OFFICE VISIT (OUTPATIENT)
Dept: CARDIOTHORACIC SURGERY | Facility: CLINIC | Age: 63
End: 2018-05-10
Payer: COMMERCIAL

## 2018-05-10 ENCOUNTER — HOSPITAL ENCOUNTER (OUTPATIENT)
Dept: CARDIOLOGY | Facility: CLINIC | Age: 63
Discharge: HOME | End: 2018-05-10
Attending: THORACIC SURGERY (CARDIOTHORACIC VASCULAR SURGERY)
Payer: COMMERCIAL

## 2018-05-10 ENCOUNTER — DOCUMENTATION ONLY (OUTPATIENT)
Dept: CARDIOTHORACIC SURGERY | Facility: CLINIC | Age: 63
End: 2018-05-10

## 2018-05-10 ENCOUNTER — HOSPITAL ENCOUNTER (OUTPATIENT)
Dept: PULMONOLOGY | Facility: CLINIC | Age: 63
Discharge: HOME | End: 2018-05-10
Attending: THORACIC SURGERY (CARDIOTHORACIC VASCULAR SURGERY)
Payer: COMMERCIAL

## 2018-05-10 ENCOUNTER — HOSPITAL ENCOUNTER (OUTPATIENT)
Dept: PREADMISSION TESTING | Facility: HOSPITAL | Age: 63
Discharge: HOME | End: 2018-05-10
Attending: ANESTHESIOLOGY
Payer: COMMERCIAL

## 2018-05-10 ENCOUNTER — HOSPITAL ENCOUNTER (OUTPATIENT)
Dept: VASCULAR SURGERY | Facility: CLINIC | Age: 63
Discharge: HOME | End: 2018-05-10
Attending: THORACIC SURGERY (CARDIOTHORACIC VASCULAR SURGERY)
Payer: COMMERCIAL

## 2018-05-10 ENCOUNTER — ANESTHESIA EVENT (OUTPATIENT)
Dept: SURGERY | Facility: HOSPITAL | Age: 63
DRG: 236 | End: 2018-05-10
Payer: COMMERCIAL

## 2018-05-10 ENCOUNTER — HOSPITAL ENCOUNTER (OUTPATIENT)
Dept: RADIOLOGY | Facility: HOSPITAL | Age: 63
Discharge: HOME | End: 2018-05-10
Attending: THORACIC SURGERY (CARDIOTHORACIC VASCULAR SURGERY)
Payer: COMMERCIAL

## 2018-05-10 VITALS
HEART RATE: 85 BPM | SYSTOLIC BLOOD PRESSURE: 115 MMHG | TEMPERATURE: 98 F | OXYGEN SATURATION: 97 % | WEIGHT: 162 LBS | HEIGHT: 67 IN | RESPIRATION RATE: 16 BRPM | DIASTOLIC BLOOD PRESSURE: 80 MMHG | BODY MASS INDEX: 25.43 KG/M2

## 2018-05-10 VITALS
HEART RATE: 64 BPM | BODY MASS INDEX: 25.28 KG/M2 | DIASTOLIC BLOOD PRESSURE: 92 MMHG | OXYGEN SATURATION: 99 % | TEMPERATURE: 98 F | SYSTOLIC BLOOD PRESSURE: 136 MMHG | WEIGHT: 161.06 LBS | HEIGHT: 67 IN

## 2018-05-10 DIAGNOSIS — I25.118 CORONARY ARTERY DISEASE OF NATIVE ARTERY OF NATIVE HEART WITH STABLE ANGINA PECTORIS: ICD-10-CM

## 2018-05-10 DIAGNOSIS — Z01.810 PRE-OPERATIVE CARDIOVASCULAR EXAMINATION: ICD-10-CM

## 2018-05-10 DIAGNOSIS — I25.118 CORONARY ARTERY DISEASE OF NATIVE ARTERY OF NATIVE HEART WITH STABLE ANGINA PECTORIS: Primary | ICD-10-CM

## 2018-05-10 LAB
PRE FEV1 FVC: 78
PRE FEV1: 2.13
PRE FVC: 2.73
PREDICTED FEV1 FVC: 81
PREDICTED FEV1: 2.67
PREDICTED FVC: 3.29

## 2018-05-10 PROCEDURE — 71046 X-RAY EXAM CHEST 2 VIEWS: CPT | Mod: TC,FY

## 2018-05-10 PROCEDURE — 99999 PR PBB SHADOW E&M-EST. PATIENT-LVL III: CPT | Mod: PBBFAC,,, | Performed by: THORACIC SURGERY (CARDIOTHORACIC VASCULAR SURGERY)

## 2018-05-10 PROCEDURE — 36600 WITHDRAWAL OF ARTERIAL BLOOD: CPT | Mod: S$GLB,,, | Performed by: INTERNAL MEDICINE

## 2018-05-10 PROCEDURE — 82803 BLOOD GASES ANY COMBINATION: CPT | Mod: S$GLB,,, | Performed by: INTERNAL MEDICINE

## 2018-05-10 PROCEDURE — 94010 BREATHING CAPACITY TEST: CPT | Mod: S$GLB,,, | Performed by: INTERNAL MEDICINE

## 2018-05-10 PROCEDURE — 93000 ELECTROCARDIOGRAM COMPLETE: CPT | Mod: S$GLB,,, | Performed by: INTERNAL MEDICINE

## 2018-05-10 PROCEDURE — 99499 UNLISTED E&M SERVICE: CPT | Mod: S$GLB,,, | Performed by: THORACIC SURGERY (CARDIOTHORACIC VASCULAR SURGERY)

## 2018-05-10 PROCEDURE — 71046 X-RAY EXAM CHEST 2 VIEWS: CPT | Mod: 26,,, | Performed by: RADIOLOGY

## 2018-05-10 PROCEDURE — 93880 EXTRACRANIAL BILAT STUDY: CPT | Mod: S$GLB,,, | Performed by: SURGERY

## 2018-05-10 NOTE — PROGRESS NOTES
"PREPARING FOR SURGERY  Your surgery has been scheduled for:   Day: _Monday_____ Date:  _5-80-14_____  Arrival Time: 5A  Start Time: 7A  You should report to the second floor surgery center, located on the [facility] side of the second floor of the [facility]. The phone number is [phone].     PLEASE NOTE  · If you are allergic to any medications, please inform your doctor or the nurse responsible for your care.  · Tell the doctor if you take aspirin, products containing aspirin, herbal medications or blood thinners, such as Coumadin, Pradaxa, or Plavix.  · Notify your doctor if you are diabetic and provide information about the medications you take.  · Arrange for someone to drive you home following surgery. You will not be allowed to leave the surgical facility alone or drive yourself home following sedation and anesthesia.  · If you have not already done so, please bring a list of your medications with you the day of your surgery.     BEFORE SURGERY  Stop taking all herbal medications 14 days prior to surgery  Stop taking aspirin, products containing aspirin 0 days before surgery  Stop taking blood thinners 5 days before surgery  Refrain from drinking alcohol beverages for 24 hours before and after surgery  Stop or limit smoking 0 days before surgery  Other: ________________________________________________________     THE NIGHT BEFORE SURGERY  Eat a light supper on the night before your surgery, no greasy or fatty foods.  DO NOT EAT OR DRINK ANYTHING AFTER MIDNIGHT, INCLUDING GUM, HARD CANDY, MINTS, OR CHEWING TOBACCO  Take a complete shower. Wash your body from the neck down with Hibiclens (chlorhexidine gluconate) soap. Hibiclens soap may be purchased over the counter at the pharmacy. Keep the soap away from your eyes, ears, and mouth. After washing with Hibiclens, rinse thoroughly. You may also use any soap labeled "antibacterial". Shampoo your hair with your regular shampoo.     THE DAY OF " SURGERY  Take another shower with Hibiclens or any antibacterial soap, to reduce the change of infection.  Medications to take the morning of surgery:__N/A____ with a small sip of water. Do not take diuretics or fluid pills.  Diabetic medication instructions will be given by the preop center. They will call you before your surgery.  You may brush your teeth and rinse your mouth, but do not shallow any water.  Do not apply perfume, powder, body lotions or deodorant on the day of surgery.  Do not wear any makeup, including mascara and false eyelashes.  Nail polish should be removed.  Wear comfortable clothes, such as button front shirt and loose-fitting pants.  Leave all jewelry, including body piercings and valuables at home.  Hairpins and clasps must be removed before you enter the operating room.  You may wear glasses, dentures and hearing aids before and after surgery. They may need to be removed before going into the operating room. Contact lenses worn before surgery must be removed before entering the operating room. Please bring a case for your hearing aids, glasses and/or contacts.  Bring any devices you will need after surgery such as crutches or canes.  If you have sleep apnea, please bring your CPAP machine.  If you have an implantable device, such as a pacemaker or AICD, please bring the device information card, if you have one.     If you have any questions or concerns, please don't hesitate to call.     Janett Garcia RN  RN Clinician  Thoracic and Cardiovascular Surgery  [address] [place] [address] [id number] [phone] fax

## 2018-05-10 NOTE — ANESTHESIA PREPROCEDURE EVALUATION
[facility]  Anesthesia Pre-Operative Evaluation         Patient Name: Michael Espinoza  YOB: 1955  MRN: [id number]    SUBJECTIVE:     Pre-operative evaluation for Procedure(s) (LRB):  AORTOCORONARY BYPASS-CABG x 3 (N/A)  [place]-Vein-Endovascular (N/A)     05/10/2018    Michael Espinoza is a 62 y.o. male w/ a significant PMHx of HLD, Gilbert's syndrome, parenchymal lung disease, ASD, and three vessel CAD who presents for the above procedure(s). Surgical plan is to perform 3 vessel coronary artery bypass grafting, with left internal mammary artery to the left anterior descending, saphenous vein graft to the first obtuse marginal, and a saphenous vein to the PL VB.        LDA: Normal Saline.      Prev airway: None documented.    Drips: Normal Saline.       Patient Active Problem List   Diagnosis    HLD (hyperlipidemia)    Gilbert's syndrome    Exertional angina    ASD (atrial septal defect)    SOB (shortness of breath) on exertion    Abnormal CT of the chest    Chest pain    Coronary artery disease of native artery of native heart with stable angina pectoris       Review of patient's allergies indicates:  No Known Allergies    Current Inpatient Medications:      Current Outpatient Prescriptions on File Prior to Encounter   Medication Sig Dispense Refill    albuterol 90 mcg/actuation inhaler 2 puffs 15 minutes prior to exercise 1 Inhaler 5    aspirin (ECOTRIN) 81 MG EC tablet Take 81 mg by mouth once daily.        atorvastatin (LIPITOR) 40 MG tablet TAKE 1 TABLET(40 MG) BY MOUTH EVERY EVENING 90 tablet 0    calcium carbonate (CALCIUM 600) 600 mg calcium (1,500 mg) Tab Take 600 mg by mouth once daily.      ERGOCALCIFEROL, VITAMIN D2, (VITAMIN D ORAL) Take by mouth once daily.      flu vac ts 2013,18yr+,cell,PF, (FLULAVAL) 45 mcg (15 mcg x 3)/0.5 mL Syrg Inject 0.5 mLs into the muscle.      nitroGLYCERIN (NITROSTAT) 0.4 MG SL tablet Place 1 tablet (0.4 mg total) under the tongue  every 5 (five) minutes as needed for Chest pain. 60 tablet 3     No current facility-administered medications on file prior to encounter.        Past Surgical History:   Procedure Laterality Date    TONSILLECTOMY         Social History     Social History    Marital status: Single     Spouse name: N/A    Number of children: N/A    Years of education: N/A     Occupational History    Not on file.     Social History Main Topics    Smoking status: Never Smoker    Smokeless tobacco: Never Used    Alcohol use 0.6 oz/week     1 Glasses of wine per week      Comment: 1 drink 2-3 times/weekly    Drug use: No    Sexual activity: Not on file     Other Topics Concern    Not on file     Social History Narrative    No narrative on file       OBJECTIVE:     Vital Signs Range (Last 24H):  Temp:  [36.7 °C (98 °F)]   Pulse:  [85]   Resp:  [16]   BP: (115)/(80)   SpO2:  [97 %]       CBC:   No results for input(s): WBC, RBC, HGB, HCT, PLT, MCV, MCH, MCHC in the last 72 hours.    CMP: No results for input(s): NA, K, CL, CO2, BUN, CREATININE, GLU, MG, PHOS, CALCIUM, ALBUMIN, PROT, ALKPHOS, ALT, AST, BILITOT in the last 72 hours.    INR:  No results for input(s): PT, INR, PROTIME, APTT in the last 72 hours.    Diagnostic Studies:     CHARMAINE 12/2017    Procedure:  Informed consent was obtained from patient.  Deep sedation was achieved.    Hemodynamics:    Baseline: HR: 80, BP: 116/80.    Complications:  no complications.     Left Atrium:  The left atrium is normal in size.  There is no visualized thrombus.  Spontaneous echo contrast was not present.  The atrial septum is aneurysmal and bulging to the left.   Right Atrium:  The right atrium is normal in size.    Right Ventricle:  The right ventricle is normal in size. Global right ventricular systolic function appears normal.   Left Ventricle:  The left ventricle is normal in size.    Left ventricular systolic function appears normal. Visually estimated ejection fraction is 65-70%.    Diastolic indices: Diastolic function is normal.   Intracardiac Shunt Evaluation:  No evidence of intracardiac shunt as evaluated by both color flow Doppler and bubble study from left arm.    Aortic Valve:  The aortic valve is normal in structure with normal leaflet mobility. Additionally, there is trivial aortic regurgitation.   Mitral Valve:  The mitral valve is normal in structure with normal leaflet mobility.   Tricuspid Valve:  The tricuspid valve is normal in structure with normal leaflet mobility. There is trivial tricuspid regurgitation.   Pulmonic Valve:  The pulmonic valve is not well seen.   Aorta:  The aortic root is normal in size.  The proximal ascending aorta measures 2.9 cm across.  No atheromatous disease was detected in the aorta.    Pericardium: Visualized pericardium is normal. There is no evidence of pericardial effusion.      CONCLUSIONS     1 - Atrial septal aneurysm without evidence of right to left shunt.     2 - TTE images reviewed, the color flow Doppler seen on atrial septum of  the 4 chamber most likely represents flow secondary to the floppying of intra-atrial septum and not a defect in the IAS.     3 - Normal left ventricular systolic function (EF 65-70%).     4 - Challenging images.         [facility] 4/2/18:  Hemodynamic Results  LVEDP (Pre): 10 mmHg  LVEDP (Post): 15 mmHg  Ejection Fraction: 65%    Angiographic Results     Diagnostic:          Patient has a right dominant coronary artery.        - Left Main Coronary Artery:             The LM has luminal irregularities. There is SHARMAINE 3 flow.     - Left Anterior Descending Artery:             The proximal LAD has a 95% stenosis. There is SHARMAINE 3 flow.     - Left Circumflex Artery:             The LCX has a 70% stenosis. There is SHARMAINE 3 flow. Tandem lesions.      - Right Coronary Artery:             The RCA has chronic total occlusion. There is SHARMAINE 0 flow.     Carotid Duplex  RIGHT  The right Distal Common Carotid Artery is visualized.    The right carotid bulb artery is visualized.   The right Distal Internal Carotid Artery has 0 - 19% stenosis.   The right external carotid artery is visualized.   The right vertebral artery is visualized, associated with anterograde flow.   The right ICA/CCA ratio is: .91    LEFT  The left Mid Common Carotid Artery is visualized.   The left carotid bulb artery is visualized, associated with heterogeneous plaque.   The left Mid Internal Carotid Artery has 20 - 39% stenosis, associated with tortuosity.   The left external carotid artery is visualized.   The left vertebral artery is visualized, associated with anterograde flow.   The left ICA/CCA ratio is: .80      CONCLUSIONS   There is 0 - 19% right Internal Carotid stenosis.  There is 20 - 39% left Internal Carotid stenosis.     Risk Scores:  STS Score:   Procedure: CAB Only  Risk of Mortality: 0.321%  Morbidity or Mortality: 4.048%  Long Length of Stay: 1.307%  Short Length of Stay: 79.759%  Permanent Stroke: 0.237%  Prolonged Ventilation: 2.412%  DSW Infection: 0.147%  Renal Failure: 0.351%  Reoperation: 2.676%        ASSESSMENT/PLAN:         Anesthesia Evaluation    I have reviewed the Patient Summary Reports.    I have reviewed the Nursing Notes.      Review of Systems  Anesthesia Hx:  History of prior surgery of interest to airway management or planning: Denies Family Hx of Anesthesia complications.   Denies Personal Hx of Anesthesia complications.   Hematology/Oncology:  Hematology Normal   Oncology Normal     EENT/Dental:EENT/Dental Normal   Cardiovascular:   CAD   Angina hyperlipidemia CONTRERAS ASD   Pulmonary:   parenchymal lung disease   Renal/:  Renal/ Normal     Hepatic/GI:   Gilbert's syndrome   Musculoskeletal:  Musculoskeletal Normal    Neurological:  Neurology Normal    Endocrine:  Endocrine Normal    Dermatological:  Skin Normal    Psych:  Psychiatric Normal           Physical Exam  General:  Well nourished    Airway/Jaw/Neck:  Airway Findings:  Mouth Opening: Normal Tongue: Normal  General Airway Assessment: Adult  Mallampati: II  TM Distance: Normal, at least 6 cm  Jaw/Neck Findings:  Neck ROM: Normal ROM  Neck Findings: Normal    Eyes/Ears/Nose:  EYES/EARS/NOSE FINDINGS: Normal   Dental:  Dental Findings: In tact, Periodontal disease, Mild   Chest/Lungs:  Chest/Lungs Findings: Clear to auscultation, Normal Respiratory Rate     Heart/Vascular:  Heart Findings: Rate: Normal  Rhythm: Regular Rhythm  Sounds: Normal  Vascular Findings: Normal    Abdomen:  Abdomen Findings: Normal    Musculoskeletal:  Musculoskeletal Findings: Normal   Skin:  Skin Findings: Normal    Mental Status:  Mental Status Findings:  Cooperative, Alert and Oriented         Anesthesia Plan  Type of Anesthesia, risks & benefits discussed:  Anesthesia Type:  general  Patient's Preference:   Intra-op Monitoring Plan: arterial line, central line and standard ASA monitors  Intra-op Monitoring Plan Comments:   Post Op Pain Control Plan: per primary service following discharge from PACU and multimodal analgesia  Post Op Pain Control Plan Comments:   Induction:   IV  Beta Blocker:  Patient is not currently on a Beta-Blocker (No further documentation required).       Informed Consent: Patient understands risks and agrees with Anesthesia plan.  Questions answered. Anesthesia consent signed with patient.  ASA Score: 3     Day of Surgery Review of History & Physical:    H&P update referred to the surgeon.         Ready For Surgery From Anesthesia Perspective.

## 2018-05-10 NOTE — PROGRESS NOTES
Patient ID: Michael Espinoza is a 62 y.o. male.    Chief Complaint: Pre-op Exam      HPI:  Patient is a 62 y.o. male presents with 3V CAD. PMHx significant for for diffuse parenchymal lung disease. He has been having chest pain on exertion associated with some dypsnea.  He also reports left lower extremity edema by the level of his ankle. The edema is noticeable at night time and improves in the morning. He denies edema of his right leg. Symptoms are present for the past 3 months. Denies orthopnea, palpitations, arrhythmias, diaphoresis or syncopal events.      He is the high-school cross country team .  Today's labs reviewed with patient and demonstrate an excellent cholesterol profile (, HDL 50, LDL 80), LFT's however tbili elevated (2.0) due to Gilbert's syndrome.     NYHA Class: 0  Family History of Heart Disease: brother        Review of Systems  Constitutional: no fever or chills  Eyes: no visual changes  ENT: no nasal congestion or sore throat  Respiratory: positive for dyspnea on exertion  Cardiovascular: positive for exertional chest pressure/discomfort  Gastrointestinal: no nausea or vomiting, tolerating diet  Genitourinary: no hematuria or dysuria  Integument/Breast: no rash or pruritis  Hematologic/Lymphatic: no easy bruising or lymphadenopathy  Musculoskeletal: no arthralgias or myalgias  Neurological: no seizures or tremors  Behavioral/Psych: no auditory or visual hallucinations  Endocrine: no heat or cold intolerance  Current Outpatient Prescriptions   Medication Sig Dispense Refill    aspirin (ECOTRIN) 81 MG EC tablet Take 81 mg by mouth once daily.        atorvastatin (LIPITOR) 40 MG tablet TAKE 1 TABLET(40 MG) BY MOUTH EVERY EVENING 90 tablet 0    calcium carbonate (CALCIUM 600) 600 mg calcium (1,500 mg) Tab Take 600 mg by mouth once daily.      ERGOCALCIFEROL, VITAMIN D2, (VITAMIN D ORAL) Take by mouth once daily.      nitroGLYCERIN (NITROSTAT) 0.4 MG SL tablet Place 1 tablet (0.4 mg  total) under the tongue every 5 (five) minutes as needed for Chest pain. 60 tablet 3    albuterol 90 mcg/actuation inhaler 2 puffs 15 minutes prior to exercise 1 Inhaler 5    flu vac ts 2013,18yr+,cell,PF, (FLULAVAL) 45 mcg (15 mcg x 3)/0.5 mL Syrg Inject 0.5 mLs into the muscle.       No current facility-administered medications for this visit.        Review of patient's allergies indicates:  No Known Allergies    Past Medical History:   Diagnosis Date    Coronary artery disease of native artery of native heart with stable angina pectoris 4/10/2018    Hyperlipidemia        Past Surgical History:   Procedure Laterality Date    TONSILLECTOMY         Family History   Problem Relation Age of Onset    Hyperlipidemia Brother     Hypertension Brother     Heart disease Brother     Heart attack Brother        Social History     Social History    Marital status: Single     Spouse name: N/A    Number of children: N/A    Years of education: N/A     Occupational History    Not on file.     Social History Main Topics    Smoking status: Never Smoker    Smokeless tobacco: Never Used    Alcohol use 0.6 oz/week     1 Glasses of wine per week      Comment: 1 drink 2-3 times/weekly    Drug use: No    Sexual activity: Not on file     Other Topics Concern    Not on file     Social History Narrative    No narrative on file       Vitals:    05/10/18 1623   BP: (!) 136/92   Pulse: 64   Temp: 98.4 °F (36.9 °C)       Physical Exam  GEN: No acute distress  HEENT: normocephalic, atraumatic  CV: RRR, no murmurs, rubs, gallops  Resp: normal work of breathing, full inspiratory expansion, clear to auscultation  Abd: non distended, bowel sounds normal  Ext: Distal pulses intact, no peripheral edema  Neuro: alert and oriented x3  Skin: no rashes, bruising    CHARMAINE 12/2017    Procedure:  Informed consent was obtained from patient.  Deep sedation was achieved.    Hemodynamics:    Baseline: HR: 80, BP: 116/80.    Complications:  no  complications.     Left Atrium:  The left atrium is normal in size.  There is no visualized thrombus.  Spontaneous echo contrast was not present.  The atrial septum is aneurysmal and bulging to the left.   Right Atrium:  The right atrium is normal in size.    Right Ventricle:  The right ventricle is normal in size. Global right ventricular systolic function appears normal.   Left Ventricle:  The left ventricle is normal in size.    Left ventricular systolic function appears normal. Visually estimated ejection fraction is 65-70%.   Diastolic indices: Diastolic function is normal.   Intracardiac Shunt Evaluation:  No evidence of intracardiac shunt as evaluated by both color flow Doppler and bubble study from left arm.    Aortic Valve:  The aortic valve is normal in structure with normal leaflet mobility. Additionally, there is trivial aortic regurgitation.   Mitral Valve:  The mitral valve is normal in structure with normal leaflet mobility.   Tricuspid Valve:  The tricuspid valve is normal in structure with normal leaflet mobility. There is trivial tricuspid regurgitation.   Pulmonic Valve:  The pulmonic valve is not well seen.   Aorta:  The aortic root is normal in size.  The proximal ascending aorta measures 2.9 cm across.  No atheromatous disease was detected in the aorta.    Pericardium: Visualized pericardium is normal. There is no evidence of pericardial effusion.      CONCLUSIONS     1 - Atrial septal aneurysm without evidence of right to left shunt.     2 - TTE images reviewed, the color flow Doppler seen on atrial septum of  the 4 chamber most likely represents flow secondary to the floppying of intra-atrial septum and not a defect in the IAS.     3 - Normal left ventricular systolic function (EF 65-70%).     4 - Challenging images.         [facility] 4/2/18:  Hemodynamic Results  LVEDP (Pre): 10 mmHg  LVEDP (Post): 15 mmHg  Ejection Fraction: 65%    Angiographic Results     Diagnostic:          Patient has a right  dominant coronary artery.        - Left Main Coronary Artery:             The LM has luminal irregularities. There is SHARMAINE 3 flow.     - Left Anterior Descending Artery:             The proximal LAD has a 95% stenosis. There is SHARMAINE 3 flow.     - Left Circumflex Artery:             The LCX has a 70% stenosis. There is SHARMAINE 3 flow. Tandem lesions.      - Right Coronary Artery:             The RCA has chronic total occlusion. There is SHARMAINE 0 flow.     Carotid Duplex  RIGHT  The right Distal Common Carotid Artery is visualized.   The right carotid bulb artery is visualized.   The right Distal Internal Carotid Artery has 0 - 19% stenosis.   The right external carotid artery is visualized.   The right vertebral artery is visualized, associated with anterograde flow.   The right ICA/CCA ratio is: .91    LEFT  The left Mid Common Carotid Artery is visualized.   The left carotid bulb artery is visualized, associated with heterogeneous plaque.   The left Mid Internal Carotid Artery has 20 - 39% stenosis, associated with tortuosity.   The left external carotid artery is visualized.   The left vertebral artery is visualized, associated with anterograde flow.   The left ICA/CCA ratio is: .80      CONCLUSIONS   There is 0 - 19% right Internal Carotid stenosis.  There is 20 - 39% left Internal Carotid stenosis.     Risk Scores:  STS Score:   Procedure: CAB Only  Risk of Mortality: 0.321%  Morbidity or Mortality: 4.048%  Long Length of Stay: 1.307%  Short Length of Stay: 79.759%  Permanent Stroke: 0.237%  Prolonged Ventilation: 2.412%  DSW Infection: 0.147%  Renal Failure: 0.351%  Reoperation: 2.676%    Assessment & Plan:  63yo M with stable CAD    - Plan for CABG x3v  - Consent done in clinic    Yolanda Marques MD  Surgery Resident, PGY-2  Pager [phone]  05/10/2018     CTS Attending Note:    I have personally taken the history and examined this patient and agree with the resident's note as stated above.  Plan three-vessel CABG.   His questions have been answered, and he wishes to proceed.

## 2018-05-11 ENCOUNTER — TELEPHONE (OUTPATIENT)
Dept: CARDIOTHORACIC SURGERY | Facility: CLINIC | Age: 63
End: 2018-05-11

## 2018-05-11 NOTE — TELEPHONE ENCOUNTER
Pt called and informed of surgery and arrival time on Monday.  Pt instructed to report to [facility] at 5:00 Monday morning.  Pt reminded to become NPO at midnight and to perform the Hibiclens shower Sunday night and Monday morning.  Pt verbalized understanding.

## 2018-05-14 ENCOUNTER — ANESTHESIA (OUTPATIENT)
Dept: SURGERY | Facility: HOSPITAL | Age: 63
DRG: 236 | End: 2018-05-14
Payer: COMMERCIAL

## 2018-05-14 ENCOUNTER — HOSPITAL ENCOUNTER (INPATIENT)
Facility: HOSPITAL | Age: 63
LOS: 5 days | Discharge: HOME | DRG: 236 | End: 2018-05-19
Attending: THORACIC SURGERY (CARDIOTHORACIC VASCULAR SURGERY) | Admitting: THORACIC SURGERY (CARDIOTHORACIC VASCULAR SURGERY)
Payer: COMMERCIAL

## 2018-05-14 DIAGNOSIS — I25.10 CAD (CORONARY ARTERY DISEASE): ICD-10-CM

## 2018-05-14 DIAGNOSIS — I25.10 CORONARY ARTERY DISEASE INVOLVING NATIVE CORONARY ARTERY OF NATIVE HEART WITHOUT ANGINA PECTORIS: ICD-10-CM

## 2018-05-14 DIAGNOSIS — R73.9 HYPERGLYCEMIA: ICD-10-CM

## 2018-05-14 DIAGNOSIS — Z98.890 HISTORY OF HEART SURGERY: ICD-10-CM

## 2018-05-14 DIAGNOSIS — Z99.11 ENCOUNTER FOR WEANING FROM VENTILATOR: ICD-10-CM

## 2018-05-14 DIAGNOSIS — I49.9 ARRHYTHMIA: ICD-10-CM

## 2018-05-14 LAB
ALLENS TEST: ABNORMAL
ANION GAP SERPL CALC-SCNC: 8 MMOL/L
APTT BLDCRRT: 24.2 SEC
BASOPHILS # BLD AUTO: 0.04 K/UL
BASOPHILS NFR BLD: 0.3 %
BUN SERPL-MCNC: 11 MG/DL
CALCIUM SERPL-MCNC: 8.2 MG/DL
CHLORIDE SERPL-SCNC: 110 MMOL/L
CO2 SERPL-SCNC: 23 MMOL/L
CREAT SERPL-MCNC: 0.8 MG/DL
DIFFERENTIAL METHOD: ABNORMAL
EOSINOPHIL # BLD AUTO: 0 K/UL
EOSINOPHIL NFR BLD: 0.3 %
ERYTHROCYTE [DISTWIDTH] IN BLOOD BY AUTOMATED COUNT: 13.3 %
EST. GFR  (AFRICAN AMERICAN): >60 ML/MIN/1.73 M^2
EST. GFR  (NON AFRICAN AMERICAN): >60 ML/MIN/1.73 M^2
GLUCOSE SERPL-MCNC: 169 MG/DL (ref 70–110)
GLUCOSE SERPL-MCNC: 176 MG/DL
GLUCOSE SERPL-MCNC: 194 MG/DL (ref 70–110)
GLUCOSE SERPL-MCNC: 212 MG/DL (ref 70–110)
HCO3 UR-SCNC: 20.1 MMOL/L (ref 24–28)
HCO3 UR-SCNC: 23 MMOL/L (ref 24–28)
HCO3 UR-SCNC: 23.4 MMOL/L (ref 24–28)
HCO3 UR-SCNC: 24.6 MMOL/L (ref 24–28)
HCO3 UR-SCNC: 24.8 MMOL/L (ref 24–28)
HCO3 UR-SCNC: 25.9 MMOL/L (ref 24–28)
HCO3 UR-SCNC: 27 MMOL/L (ref 24–28)
HCT VFR BLD AUTO: 38.4 %
HCT VFR BLD CALC: 26 %PCV (ref 36–54)
HCT VFR BLD CALC: 27 %PCV (ref 36–54)
HCT VFR BLD CALC: 30 %PCV (ref 36–54)
HCT VFR BLD CALC: 36 %PCV (ref 36–54)
HGB BLD-MCNC: 13.1 G/DL
IMM GRANULOCYTES # BLD AUTO: 0.07 K/UL
IMM GRANULOCYTES NFR BLD AUTO: 0.5 %
INR PPP: 1.3
LYMPHOCYTES # BLD AUTO: 1.5 K/UL
LYMPHOCYTES NFR BLD: 10.6 %
MAGNESIUM SERPL-MCNC: 2.5 MG/DL
MAGNESIUM SERPL-MCNC: 2.5 MG/DL
MCH RBC QN AUTO: 31.3 PG
MCHC RBC AUTO-ENTMCNC: 34.1 G/DL
MCV RBC AUTO: 92 FL
MONOCYTES # BLD AUTO: 1 K/UL
MONOCYTES NFR BLD: 7.4 %
NEUTROPHILS # BLD AUTO: 11.4 K/UL
NEUTROPHILS NFR BLD: 80.9 %
NRBC BLD-RTO: 0 /100 WBC
PCO2 BLDA: 40.5 MMHG (ref 35–45)
PCO2 BLDA: 41.1 MMHG (ref 35–45)
PCO2 BLDA: 45.8 MMHG (ref 35–45)
PCO2 BLDA: 47.9 MMHG (ref 35–45)
PCO2 BLDA: 55.2 MMHG (ref 35–45)
PCO2 BLDA: 55.7 MMHG (ref 35–45)
PCO2 BLDA: 65.4 MMHG (ref 35–45)
PH SMN: 7.17 [PH] (ref 7.35–7.45)
PH SMN: 7.18 [PH] (ref 7.35–7.45)
PH SMN: 7.22 [PH] (ref 7.35–7.45)
PH SMN: 7.32 [PH] (ref 7.35–7.45)
PH SMN: 7.32 [PH] (ref 7.35–7.45)
PH SMN: 7.41 [PH] (ref 7.35–7.45)
PH SMN: 7.43 [PH] (ref 7.35–7.45)
PHOSPHATE SERPL-MCNC: 4.5 MG/DL
PHOSPHATE SERPL-MCNC: 4.5 MG/DL
PLATELET # BLD AUTO: 131 K/UL
PMV BLD AUTO: 10.3 FL
PO2 BLDA: 117 MMHG (ref 80–100)
PO2 BLDA: 162 MMHG (ref 80–100)
PO2 BLDA: 213 MMHG (ref 80–100)
PO2 BLDA: 36 MMHG (ref 40–60)
PO2 BLDA: 395 MMHG (ref 80–100)
PO2 BLDA: 53 MMHG (ref 40–60)
PO2 BLDA: 82 MMHG (ref 80–100)
POC BE: -1 MMOL/L
POC BE: -3 MMOL/L
POC BE: -4 MMOL/L
POC BE: -5 MMOL/L
POC BE: -8 MMOL/L
POC BE: 1 MMOL/L
POC BE: 3 MMOL/L
POC IONIZED CALCIUM: 0.94 MMOL/L (ref 1.06–1.42)
POC IONIZED CALCIUM: 0.96 MMOL/L (ref 1.06–1.42)
POC IONIZED CALCIUM: 1.03 MMOL/L (ref 1.06–1.42)
POC IONIZED CALCIUM: 1.24 MMOL/L (ref 1.06–1.42)
POC SATURATED O2: 100 % (ref 95–100)
POC SATURATED O2: 100 % (ref 95–100)
POC SATURATED O2: 70 % (ref 95–100)
POC SATURATED O2: 84 % (ref 95–100)
POC SATURATED O2: 92 % (ref 95–100)
POC SATURATED O2: 97 % (ref 95–100)
POC SATURATED O2: 99 % (ref 95–100)
POC TCO2: 22 MMOL/L (ref 23–27)
POC TCO2: 25 MMOL/L (ref 23–27)
POC TCO2: 25 MMOL/L (ref 24–29)
POC TCO2: 26 MMOL/L (ref 23–27)
POC TCO2: 27 MMOL/L (ref 23–27)
POC TCO2: 27 MMOL/L (ref 24–29)
POC TCO2: 28 MMOL/L (ref 23–27)
POCT GLUCOSE: 111 MG/DL (ref 70–110)
POCT GLUCOSE: 112 MG/DL (ref 70–110)
POCT GLUCOSE: 114 MG/DL (ref 70–110)
POCT GLUCOSE: 117 MG/DL (ref 70–110)
POCT GLUCOSE: 136 MG/DL (ref 70–110)
POCT GLUCOSE: 147 MG/DL (ref 70–110)
POCT GLUCOSE: 161 MG/DL (ref 70–110)
POCT GLUCOSE: 162 MG/DL (ref 70–110)
POCT GLUCOSE: 165 MG/DL (ref 70–110)
POCT GLUCOSE: 174 MG/DL (ref 70–110)
POCT GLUCOSE: 192 MG/DL (ref 70–110)
POTASSIUM BLD-SCNC: 4.2 MMOL/L (ref 3.5–5.1)
POTASSIUM BLD-SCNC: 6.1 MMOL/L (ref 3.5–5.1)
POTASSIUM BLD-SCNC: 6.2 MMOL/L (ref 3.5–5.1)
POTASSIUM BLD-SCNC: 6.3 MMOL/L (ref 3.5–5.1)
POTASSIUM SERPL-SCNC: 4.4 MMOL/L
POTASSIUM SERPL-SCNC: 4.4 MMOL/L
PROTHROMBIN TIME: 13.2 SEC
RBC # BLD AUTO: 4.19 M/UL
SAMPLE: ABNORMAL
SITE: ABNORMAL
SODIUM BLD-SCNC: 136 MMOL/L (ref 136–145)
SODIUM BLD-SCNC: 142 MMOL/L (ref 136–145)
SODIUM SERPL-SCNC: 141 MMOL/L
WBC # BLD AUTO: 14.11 K/UL

## 2018-05-14 PROCEDURE — 99222 1ST HOSP IP/OBS MODERATE 55: CPT | Mod: ,,, | Performed by: NURSE PRACTITIONER

## 2018-05-14 PROCEDURE — 85025 COMPLETE CBC W/AUTO DIFF WBC: CPT

## 2018-05-14 PROCEDURE — 83735 ASSAY OF MAGNESIUM: CPT

## 2018-05-14 PROCEDURE — 27200678 HC TRANSDUCER MONITOR KIT TRIPLE: Performed by: ANESTHESIOLOGY

## 2018-05-14 PROCEDURE — 99900035 HC TECH TIME PER 15 MIN (STAT)

## 2018-05-14 PROCEDURE — C1751 CATH, INF, PER/CENT/MIDLINE: HCPCS | Performed by: ANESTHESIOLOGY

## 2018-05-14 PROCEDURE — 36592 COLLECT BLOOD FROM PICC: CPT

## 2018-05-14 PROCEDURE — 33508 ENDOSCOPIC VEIN HARVEST: CPT | Mod: ,,, | Performed by: THORACIC SURGERY (CARDIOTHORACIC VASCULAR SURGERY)

## 2018-05-14 PROCEDURE — 63600175 PHARM REV CODE 636 W HCPCS: Performed by: STUDENT IN AN ORGANIZED HEALTH CARE EDUCATION/TRAINING PROGRAM

## 2018-05-14 PROCEDURE — 27200953 HC CARDIOPLEGIA SYSTEM

## 2018-05-14 PROCEDURE — 37799 UNLISTED PX VASCULAR SURGERY: CPT

## 2018-05-14 PROCEDURE — 84132 ASSAY OF SERUM POTASSIUM: CPT

## 2018-05-14 PROCEDURE — 94761 N-INVAS EAR/PLS OXIMETRY MLT: CPT

## 2018-05-14 PROCEDURE — 27201423 OPTIME MED/SURG SUP & DEVICES STERILE SUPPLY: Performed by: THORACIC SURGERY (CARDIOTHORACIC VASCULAR SURGERY)

## 2018-05-14 PROCEDURE — 021109W BYPASS CORONARY ARTERY, TWO ARTERIES FROM AORTA WITH AUTOLOGOUS VENOUS TISSUE, OPEN APPROACH: ICD-10-PCS | Performed by: THORACIC SURGERY (CARDIOTHORACIC VASCULAR SURGERY)

## 2018-05-14 PROCEDURE — 33533 CABG ARTERIAL SINGLE: CPT | Mod: ,,, | Performed by: THORACIC SURGERY (CARDIOTHORACIC VASCULAR SURGERY)

## 2018-05-14 PROCEDURE — 02100Z9 BYPASS CORONARY ARTERY, ONE ARTERY FROM LEFT INTERNAL MAMMARY, OPEN APPROACH: ICD-10-PCS | Performed by: THORACIC SURGERY (CARDIOTHORACIC VASCULAR SURGERY)

## 2018-05-14 PROCEDURE — 27000175 HC ADULT BYPASS PUMP

## 2018-05-14 PROCEDURE — 20000000 HC ICU ROOM

## 2018-05-14 PROCEDURE — 36556 INSERT NON-TUNNEL CV CATH: CPT | Mod: 59,,, | Performed by: ANESTHESIOLOGY

## 2018-05-14 PROCEDURE — 27201037 HC PRESSURE MONITORING SET UP

## 2018-05-14 PROCEDURE — 25000003 PHARM REV CODE 250: Performed by: THORACIC SURGERY (CARDIOTHORACIC VASCULAR SURGERY)

## 2018-05-14 PROCEDURE — 27000191 HC C-V MONITORING

## 2018-05-14 PROCEDURE — 36000712 HC OR TIME LEV V 1ST 15 MIN: Performed by: THORACIC SURGERY (CARDIOTHORACIC VASCULAR SURGERY)

## 2018-05-14 PROCEDURE — 85730 THROMBOPLASTIN TIME PARTIAL: CPT

## 2018-05-14 PROCEDURE — 63600175 PHARM REV CODE 636 W HCPCS: Performed by: THORACIC SURGERY (CARDIOTHORACIC VASCULAR SURGERY)

## 2018-05-14 PROCEDURE — 36620 INSERTION CATHETER ARTERY: CPT | Mod: 59,,, | Performed by: ANESTHESIOLOGY

## 2018-05-14 PROCEDURE — 76942 ECHO GUIDE FOR BIOPSY: CPT | Mod: 26,,, | Performed by: ANESTHESIOLOGY

## 2018-05-14 PROCEDURE — 63600175 PHARM REV CODE 636 W HCPCS: Performed by: ANESTHESIOLOGY

## 2018-05-14 PROCEDURE — 85014 HEMATOCRIT: CPT

## 2018-05-14 PROCEDURE — 25000003 PHARM REV CODE 250: Performed by: STUDENT IN AN ORGANIZED HEALTH CARE EDUCATION/TRAINING PROGRAM

## 2018-05-14 PROCEDURE — 27000221 HC OXYGEN, UP TO 24 HOURS

## 2018-05-14 PROCEDURE — 82330 ASSAY OF CALCIUM: CPT

## 2018-05-14 PROCEDURE — 85610 PROTHROMBIN TIME: CPT

## 2018-05-14 PROCEDURE — C1729 CATH, DRAINAGE: HCPCS | Performed by: THORACIC SURGERY (CARDIOTHORACIC VASCULAR SURGERY)

## 2018-05-14 PROCEDURE — 25000003 PHARM REV CODE 250: Performed by: ANESTHESIOLOGY

## 2018-05-14 PROCEDURE — 27200651 HC AIRWAY, LMA: Performed by: ANESTHESIOLOGY

## 2018-05-14 PROCEDURE — 25000242 PHARM REV CODE 250 ALT 637 W/ HCPCS: Performed by: STUDENT IN AN ORGANIZED HEALTH CARE EDUCATION/TRAINING PROGRAM

## 2018-05-14 PROCEDURE — 80048 BASIC METABOLIC PNL TOTAL CA: CPT

## 2018-05-14 PROCEDURE — 93010 ELECTROCARDIOGRAM REPORT: CPT | Mod: ,,, | Performed by: INTERNAL MEDICINE

## 2018-05-14 PROCEDURE — 37000008 HC ANESTHESIA 1ST 15 MINUTES: Performed by: THORACIC SURGERY (CARDIOTHORACIC VASCULAR SURGERY)

## 2018-05-14 PROCEDURE — 37000009 HC ANESTHESIA EA ADD 15 MINS: Performed by: THORACIC SURGERY (CARDIOTHORACIC VASCULAR SURGERY)

## 2018-05-14 PROCEDURE — 27100025 HC TUBING, SET FLUID WARMER: Performed by: ANESTHESIOLOGY

## 2018-05-14 PROCEDURE — 94640 AIRWAY INHALATION TREATMENT: CPT

## 2018-05-14 PROCEDURE — 99223 1ST HOSP IP/OBS HIGH 75: CPT | Mod: ,,, | Performed by: SURGERY

## 2018-05-14 PROCEDURE — 36000713 HC OR TIME LEV V EA ADD 15 MIN: Performed by: THORACIC SURGERY (CARDIOTHORACIC VASCULAR SURGERY)

## 2018-05-14 PROCEDURE — 84295 ASSAY OF SERUM SODIUM: CPT

## 2018-05-14 PROCEDURE — A4216 STERILE WATER/SALINE, 10 ML: HCPCS | Performed by: STUDENT IN AN ORGANIZED HEALTH CARE EDUCATION/TRAINING PROGRAM

## 2018-05-14 PROCEDURE — 84100 ASSAY OF PHOSPHORUS: CPT

## 2018-05-14 PROCEDURE — 64450 NJX AA&/STRD OTHER PN/BRANCH: CPT | Mod: 50,59,, | Performed by: ANESTHESIOLOGY

## 2018-05-14 PROCEDURE — 27200750 HC INSULATED NEEDLE/ STIMUPLEX: Performed by: ANESTHESIOLOGY

## 2018-05-14 PROCEDURE — C9113 INJ PANTOPRAZOLE SODIUM, VIA: HCPCS | Performed by: STUDENT IN AN ORGANIZED HEALTH CARE EDUCATION/TRAINING PROGRAM

## 2018-05-14 PROCEDURE — 27100021 HC MULTIPORT INFUSION MANIFOLD: Performed by: ANESTHESIOLOGY

## 2018-05-14 PROCEDURE — 33518 CABG ARTERY-VEIN TWO: CPT | Mod: ,,, | Performed by: THORACIC SURGERY (CARDIOTHORACIC VASCULAR SURGERY)

## 2018-05-14 PROCEDURE — D9220A PRA ANESTHESIA: Mod: ,,, | Performed by: ANESTHESIOLOGY

## 2018-05-14 PROCEDURE — 82803 BLOOD GASES ANY COMBINATION: CPT

## 2018-05-14 PROCEDURE — 5A1221Z PERFORMANCE OF CARDIAC OUTPUT, CONTINUOUS: ICD-10-PCS | Performed by: THORACIC SURGERY (CARDIOTHORACIC VASCULAR SURGERY)

## 2018-05-14 PROCEDURE — 93312 ECHO TRANSESOPHAGEAL: CPT | Mod: 26,59,, | Performed by: ANESTHESIOLOGY

## 2018-05-14 PROCEDURE — 93005 ELECTROCARDIOGRAM TRACING: CPT

## 2018-05-14 PROCEDURE — 85520 HEPARIN ASSAY: CPT

## 2018-05-14 PROCEDURE — 06BQ4ZZ EXCISION OF LEFT SAPHENOUS VEIN, PERCUTANEOUS ENDOSCOPIC APPROACH: ICD-10-PCS | Performed by: THORACIC SURGERY (CARDIOTHORACIC VASCULAR SURGERY)

## 2018-05-14 RX ORDER — PROTAMINE SULFATE 10 MG/ML
INJECTION, SOLUTION INTRAVENOUS
Status: DISCONTINUED | OUTPATIENT
Start: 2018-05-14 | End: 2018-05-14

## 2018-05-14 RX ORDER — ONDANSETRON 2 MG/ML
4 INJECTION INTRAMUSCULAR; INTRAVENOUS EVERY 12 HOURS PRN
Status: DISCONTINUED | OUTPATIENT
Start: 2018-05-14 | End: 2018-05-14

## 2018-05-14 RX ORDER — BACITRACIN 50000 [IU]/1
INJECTION, POWDER, FOR SOLUTION INTRAMUSCULAR
Status: DISCONTINUED | OUTPATIENT
Start: 2018-05-14 | End: 2018-05-14 | Stop reason: HOSPADM

## 2018-05-14 RX ORDER — MAGNESIUM SULFATE HEPTAHYDRATE 40 MG/ML
4 INJECTION, SOLUTION INTRAVENOUS
Status: DISCONTINUED | OUTPATIENT
Start: 2018-05-14 | End: 2018-05-15

## 2018-05-14 RX ORDER — ONDANSETRON 2 MG/ML
INJECTION INTRAMUSCULAR; INTRAVENOUS
Status: DISCONTINUED | OUTPATIENT
Start: 2018-05-14 | End: 2018-05-14

## 2018-05-14 RX ORDER — ASPIRIN 325 MG
325 TABLET ORAL DAILY
Status: DISCONTINUED | OUTPATIENT
Start: 2018-05-14 | End: 2018-05-15 | Stop reason: SDUPTHER

## 2018-05-14 RX ORDER — OXYCODONE HYDROCHLORIDE 5 MG/1
5 TABLET ORAL EVERY 4 HOURS PRN
Status: DISCONTINUED | OUTPATIENT
Start: 2018-05-14 | End: 2018-05-19 | Stop reason: HOSPADM

## 2018-05-14 RX ORDER — GLYCOPYRROLATE 0.2 MG/ML
INJECTION INTRAMUSCULAR; INTRAVENOUS
Status: DISCONTINUED | OUTPATIENT
Start: 2018-05-14 | End: 2018-05-14

## 2018-05-14 RX ORDER — LIDOCAINE HCL/PF 100 MG/5ML
SYRINGE (ML) INTRAVENOUS
Status: DISCONTINUED | OUTPATIENT
Start: 2018-05-14 | End: 2018-05-14

## 2018-05-14 RX ORDER — FENTANYL CITRATE 50 UG/ML
50 INJECTION, SOLUTION INTRAMUSCULAR; INTRAVENOUS
Status: DISCONTINUED | OUTPATIENT
Start: 2018-05-19 | End: 2018-05-15

## 2018-05-14 RX ORDER — KETAMINE HCL IN 0.9 % NACL 50 MG/5 ML
SYRINGE (ML) INTRAVENOUS
Status: DISCONTINUED | OUTPATIENT
Start: 2018-05-14 | End: 2018-05-14

## 2018-05-14 RX ORDER — HEPARIN SODIUM 1000 [USP'U]/ML
INJECTION, SOLUTION INTRAVENOUS; SUBCUTANEOUS
Status: DISCONTINUED | OUTPATIENT
Start: 2018-05-14 | End: 2018-05-14

## 2018-05-14 RX ORDER — TRANEXAMIC ACID 100 MG/ML
INJECTION, SOLUTION INTRAVENOUS CONTINUOUS PRN
Status: DISCONTINUED | OUTPATIENT
Start: 2018-05-14 | End: 2018-05-14

## 2018-05-14 RX ORDER — ACETAMINOPHEN 10 MG/ML
INJECTION, SOLUTION INTRAVENOUS
Status: DISCONTINUED | OUTPATIENT
Start: 2018-05-14 | End: 2018-05-14

## 2018-05-14 RX ORDER — ACETAMINOPHEN 10 MG/ML
1000 INJECTION, SOLUTION INTRAVENOUS ONCE
Status: COMPLETED | OUTPATIENT
Start: 2018-05-14 | End: 2018-05-14

## 2018-05-14 RX ORDER — LIDOCAINE HYDROCHLORIDE 10 MG/ML
1 INJECTION, SOLUTION EPIDURAL; INFILTRATION; INTRACAUDAL; PERINEURAL
Status: COMPLETED | OUTPATIENT
Start: 2018-05-14 | End: 2018-05-14

## 2018-05-14 RX ORDER — DEXTROSE MONOHYDRATE, SODIUM CHLORIDE, AND POTASSIUM CHLORIDE 50; 1.49; 4.5 G/1000ML; G/1000ML; G/1000ML
INJECTION, SOLUTION INTRAVENOUS CONTINUOUS
Status: DISCONTINUED | OUTPATIENT
Start: 2018-05-14 | End: 2018-05-15

## 2018-05-14 RX ORDER — CEFAZOLIN SODIUM 1 G/3ML
2 INJECTION, POWDER, FOR SOLUTION INTRAMUSCULAR; INTRAVENOUS
Status: COMPLETED | OUTPATIENT
Start: 2018-05-14 | End: 2018-05-16

## 2018-05-14 RX ORDER — ASPIRIN 325 MG
325 TABLET ORAL ONCE
Status: DISCONTINUED | OUTPATIENT
Start: 2018-05-14 | End: 2018-05-15

## 2018-05-14 RX ORDER — PROPOFOL 10 MG/ML
VIAL (ML) INTRAVENOUS
Status: DISCONTINUED | OUTPATIENT
Start: 2018-05-14 | End: 2018-05-14

## 2018-05-14 RX ORDER — IPRATROPIUM BROMIDE AND ALBUTEROL SULFATE 2.5; .5 MG/3ML; MG/3ML
3 SOLUTION RESPIRATORY (INHALATION) EVERY 4 HOURS PRN
Status: DISCONTINUED | OUTPATIENT
Start: 2018-05-14 | End: 2018-05-19 | Stop reason: HOSPADM

## 2018-05-14 RX ORDER — METOPROLOL TARTRATE 25 MG/1
25 TABLET, FILM COATED ORAL
Status: COMPLETED | OUTPATIENT
Start: 2018-05-14 | End: 2018-05-14

## 2018-05-14 RX ORDER — MUPIROCIN 20 MG/G
OINTMENT TOPICAL
Status: DISCONTINUED | OUTPATIENT
Start: 2018-05-14 | End: 2018-05-14 | Stop reason: HOSPADM

## 2018-05-14 RX ORDER — PROPOFOL 10 MG/ML
5 INJECTION, EMULSION INTRAVENOUS CONTINUOUS
Status: DISCONTINUED | OUTPATIENT
Start: 2018-05-14 | End: 2018-05-14

## 2018-05-14 RX ORDER — MAGNESIUM SULFATE HEPTAHYDRATE 40 MG/ML
2 INJECTION, SOLUTION INTRAVENOUS
Status: DISCONTINUED | OUTPATIENT
Start: 2018-05-14 | End: 2018-05-15

## 2018-05-14 RX ORDER — MUPIROCIN 20 MG/G
1 OINTMENT TOPICAL
Status: COMPLETED | OUTPATIENT
Start: 2018-05-14 | End: 2018-05-14

## 2018-05-14 RX ORDER — HEPARIN SODIUM 1000 [USP'U]/ML
INJECTION, SOLUTION INTRAVENOUS; SUBCUTANEOUS
Status: DISCONTINUED | OUTPATIENT
Start: 2018-05-14 | End: 2018-05-14 | Stop reason: HOSPADM

## 2018-05-14 RX ORDER — ONDANSETRON 4 MG/1
4 TABLET, ORALLY DISINTEGRATING ORAL EVERY 12 HOURS PRN
Status: DISCONTINUED | OUTPATIENT
Start: 2018-05-14 | End: 2018-05-19 | Stop reason: HOSPADM

## 2018-05-14 RX ORDER — CEFAZOLIN SODIUM 1 G/3ML
INJECTION, POWDER, FOR SOLUTION INTRAMUSCULAR; INTRAVENOUS
Status: DISCONTINUED | OUTPATIENT
Start: 2018-05-14 | End: 2018-05-14

## 2018-05-14 RX ORDER — ASPIRIN 300 MG/1
300 SUPPOSITORY RECTAL ONCE AS NEEDED
Status: ACTIVE | OUTPATIENT
Start: 2018-05-14 | End: 2018-05-14

## 2018-05-14 RX ORDER — BISACODYL 10 MG
10 SUPPOSITORY, RECTAL RECTAL EVERY 12 HOURS PRN
Status: DISCONTINUED | OUTPATIENT
Start: 2018-05-14 | End: 2018-05-19 | Stop reason: HOSPADM

## 2018-05-14 RX ORDER — POLYETHYLENE GLYCOL 3350 17 G/17G
17 POWDER, FOR SOLUTION ORAL DAILY
Status: DISCONTINUED | OUTPATIENT
Start: 2018-05-14 | End: 2018-05-19 | Stop reason: HOSPADM

## 2018-05-14 RX ORDER — CEFAZOLIN SODIUM 1 G/3ML
2 INJECTION, POWDER, FOR SOLUTION INTRAMUSCULAR; INTRAVENOUS
Status: DISCONTINUED | OUTPATIENT
Start: 2018-05-14 | End: 2018-05-14

## 2018-05-14 RX ORDER — ALBUMIN HUMAN 50 G/1000ML
500 SOLUTION INTRAVENOUS ONCE AS NEEDED
Status: ACTIVE | OUTPATIENT
Start: 2018-05-14 | End: 2018-05-14

## 2018-05-14 RX ORDER — SODIUM CHLORIDE 9 MG/ML
INJECTION, SOLUTION INTRAVENOUS CONTINUOUS
Status: DISCONTINUED | OUTPATIENT
Start: 2018-05-14 | End: 2018-05-15

## 2018-05-14 RX ORDER — SODIUM CHLORIDE 0.9 % (FLUSH) 0.9 %
3 SYRINGE (ML) INJECTION EVERY 8 HOURS
Status: DISCONTINUED | OUTPATIENT
Start: 2018-05-14 | End: 2018-05-19 | Stop reason: HOSPADM

## 2018-05-14 RX ORDER — MIDAZOLAM HYDROCHLORIDE 1 MG/ML
INJECTION, SOLUTION INTRAMUSCULAR; INTRAVENOUS
Status: DISCONTINUED | OUTPATIENT
Start: 2018-05-14 | End: 2018-05-14

## 2018-05-14 RX ORDER — ACETAMINOPHEN 325 MG/1
650 TABLET ORAL EVERY 4 HOURS PRN
Status: DISCONTINUED | OUTPATIENT
Start: 2018-05-14 | End: 2018-05-15

## 2018-05-14 RX ORDER — MUPIROCIN 20 MG/G
1 OINTMENT TOPICAL 2 TIMES DAILY
Status: DISCONTINUED | OUTPATIENT
Start: 2018-05-14 | End: 2018-05-19 | Stop reason: HOSPADM

## 2018-05-14 RX ORDER — METOCLOPRAMIDE HYDROCHLORIDE 5 MG/ML
5 INJECTION INTRAMUSCULAR; INTRAVENOUS EVERY 6 HOURS PRN
Status: DISCONTINUED | OUTPATIENT
Start: 2018-05-14 | End: 2018-05-19 | Stop reason: HOSPADM

## 2018-05-14 RX ORDER — FENTANYL CITRATE 50 UG/ML
25 INJECTION, SOLUTION INTRAMUSCULAR; INTRAVENOUS
Status: DISCONTINUED | OUTPATIENT
Start: 2018-05-14 | End: 2018-05-15

## 2018-05-14 RX ORDER — PHENYLEPHRINE HYDROCHLORIDE 10 MG/ML
INJECTION INTRAVENOUS
Status: DISCONTINUED | OUTPATIENT
Start: 2018-05-14 | End: 2018-05-14

## 2018-05-14 RX ORDER — ROCURONIUM BROMIDE 10 MG/ML
INJECTION, SOLUTION INTRAVENOUS
Status: DISCONTINUED | OUTPATIENT
Start: 2018-05-14 | End: 2018-05-14

## 2018-05-14 RX ORDER — PAPAVERINE HYDROCHLORIDE 30 MG/ML
INJECTION INTRAMUSCULAR; INTRAVENOUS
Status: DISCONTINUED | OUTPATIENT
Start: 2018-05-14 | End: 2018-05-14 | Stop reason: HOSPADM

## 2018-05-14 RX ORDER — IPRATROPIUM BROMIDE AND ALBUTEROL SULFATE 2.5; .5 MG/3ML; MG/3ML
3 SOLUTION RESPIRATORY (INHALATION) EVERY 4 HOURS
Status: COMPLETED | OUTPATIENT
Start: 2018-05-14 | End: 2018-05-15

## 2018-05-14 RX ORDER — PANTOPRAZOLE SODIUM 40 MG/1
40 TABLET, DELAYED RELEASE ORAL DAILY
Status: DISCONTINUED | OUTPATIENT
Start: 2018-05-15 | End: 2018-05-19 | Stop reason: HOSPADM

## 2018-05-14 RX ORDER — NEOSTIGMINE METHYLSULFATE 1 MG/ML
INJECTION, SOLUTION INTRAVENOUS
Status: DISCONTINUED | OUTPATIENT
Start: 2018-05-14 | End: 2018-05-14

## 2018-05-14 RX ORDER — OXYCODONE HYDROCHLORIDE 5 MG/1
10 TABLET ORAL EVERY 4 HOURS PRN
Status: DISCONTINUED | OUTPATIENT
Start: 2018-05-14 | End: 2018-05-19 | Stop reason: HOSPADM

## 2018-05-14 RX ORDER — FENTANYL CITRATE 50 UG/ML
INJECTION, SOLUTION INTRAMUSCULAR; INTRAVENOUS
Status: DISCONTINUED | OUTPATIENT
Start: 2018-05-14 | End: 2018-05-14

## 2018-05-14 RX ORDER — CEFAZOLIN SODIUM 1 G/3ML
2 INJECTION, POWDER, FOR SOLUTION INTRAMUSCULAR; INTRAVENOUS
Status: DISCONTINUED | OUTPATIENT
Start: 2018-05-14 | End: 2018-05-14 | Stop reason: HOSPADM

## 2018-05-14 RX ORDER — TRANEXAMIC ACID 100 MG/ML
INJECTION, SOLUTION INTRAVENOUS
Status: DISCONTINUED | OUTPATIENT
Start: 2018-05-14 | End: 2018-05-14

## 2018-05-14 RX ORDER — DOCUSATE SODIUM 100 MG/1
100 CAPSULE, LIQUID FILLED ORAL 2 TIMES DAILY
Status: DISCONTINUED | OUTPATIENT
Start: 2018-05-14 | End: 2018-05-19 | Stop reason: HOSPADM

## 2018-05-14 RX ADMIN — PROTAMINE SULFATE 200 MG: 10 INJECTION, SOLUTION INTRAVENOUS at 12:05

## 2018-05-14 RX ADMIN — MIDAZOLAM HYDROCHLORIDE 2 MG: 1 INJECTION, SOLUTION INTRAMUSCULAR; INTRAVENOUS at 07:05

## 2018-05-14 RX ADMIN — SODIUM CHLORIDE, SODIUM GLUCONATE, SODIUM ACETATE, POTASSIUM CHLORIDE, MAGNESIUM CHLORIDE, SODIUM PHOSPHATE, DIBASIC, AND POTASSIUM PHOSPHATE: .53; .5; .37; .037; .03; .012; .00082 INJECTION, SOLUTION INTRAVENOUS at 07:05

## 2018-05-14 RX ADMIN — LIDOCAINE HYDROCHLORIDE 100 MG: 20 INJECTION, SOLUTION INTRAVENOUS at 07:05

## 2018-05-14 RX ADMIN — PHENYLEPHRINE HYDROCHLORIDE 200 MCG: 10 INJECTION INTRAVENOUS at 07:05

## 2018-05-14 RX ADMIN — SODIUM CHLORIDE 1 UNITS/HR: 9 INJECTION, SOLUTION INTRAVENOUS at 04:05

## 2018-05-14 RX ADMIN — FENTANYL CITRATE 100 MCG: 50 INJECTION, SOLUTION INTRAMUSCULAR; INTRAVENOUS at 11:05

## 2018-05-14 RX ADMIN — ROCURONIUM BROMIDE 100 MG: 10 INJECTION, SOLUTION INTRAVENOUS at 07:05

## 2018-05-14 RX ADMIN — FENTANYL CITRATE 50 MCG: 50 INJECTION, SOLUTION INTRAMUSCULAR; INTRAVENOUS at 01:05

## 2018-05-14 RX ADMIN — LIDOCAINE HYDROCHLORIDE 0.1 MG: 10 INJECTION, SOLUTION EPIDURAL; INFILTRATION; INTRACAUDAL; PERINEURAL at 05:05

## 2018-05-14 RX ADMIN — IPRATROPIUM BROMIDE AND ALBUTEROL SULFATE 3 ML: .5; 3 SOLUTION RESPIRATORY (INHALATION) at 08:05

## 2018-05-14 RX ADMIN — ROCURONIUM BROMIDE 50 MG: 10 INJECTION, SOLUTION INTRAVENOUS at 08:05

## 2018-05-14 RX ADMIN — TRANEXAMIC ACID 1 MG/KG/HR: 100 INJECTION, SOLUTION INTRAVENOUS at 07:05

## 2018-05-14 RX ADMIN — OXYCODONE HYDROCHLORIDE 10 MG: 5 TABLET ORAL at 08:05

## 2018-05-14 RX ADMIN — ACETAMINOPHEN 1000 MG: 10 INJECTION, SOLUTION INTRAVENOUS at 02:05

## 2018-05-14 RX ADMIN — METOPROLOL TARTRATE 25 MG: 25 TABLET ORAL at 05:05

## 2018-05-14 RX ADMIN — GLYCOPYRROLATE 0.6 MG: 0.2 INJECTION, SOLUTION INTRAMUSCULAR; INTRAVENOUS at 12:05

## 2018-05-14 RX ADMIN — DOCUSATE SODIUM 100 MG: 50 CAPSULE, LIQUID FILLED ORAL at 08:05

## 2018-05-14 RX ADMIN — PROPOFOL 50 MG: 10 INJECTION, EMULSION INTRAVENOUS at 07:05

## 2018-05-14 RX ADMIN — CALCIUM CHLORIDE 500 MG: 100 INJECTION, SOLUTION INTRAVENOUS at 12:05

## 2018-05-14 RX ADMIN — DEXTROSE MONOHYDRATE, SODIUM CHLORIDE, AND POTASSIUM CHLORIDE: 50; 4.5; 1.49 INJECTION, SOLUTION INTRAVENOUS at 02:05

## 2018-05-14 RX ADMIN — Medication 3 ML: at 02:05

## 2018-05-14 RX ADMIN — MUPIROCIN 1 G: 20 OINTMENT TOPICAL at 05:05

## 2018-05-14 RX ADMIN — OXYCODONE HYDROCHLORIDE 5 MG: 5 TABLET ORAL at 04:05

## 2018-05-14 RX ADMIN — IPRATROPIUM BROMIDE AND ALBUTEROL SULFATE 3 ML: .5; 3 SOLUTION RESPIRATORY (INHALATION) at 03:05

## 2018-05-14 RX ADMIN — FENTANYL CITRATE 150 MCG: 50 INJECTION, SOLUTION INTRAMUSCULAR; INTRAVENOUS at 08:05

## 2018-05-14 RX ADMIN — FENTANYL CITRATE 50 MCG: 50 INJECTION, SOLUTION INTRAMUSCULAR; INTRAVENOUS at 07:05

## 2018-05-14 RX ADMIN — MUPIROCIN 1 G: 20 OINTMENT TOPICAL at 09:05

## 2018-05-14 RX ADMIN — CEFAZOLIN 2 G: 1 INJECTION, POWDER, FOR SOLUTION INTRAMUSCULAR; INTRAVENOUS at 08:05

## 2018-05-14 RX ADMIN — Medication 3 ML: at 09:05

## 2018-05-14 RX ADMIN — NEOSTIGMINE METHYLSULFATE 5 MG: 1 INJECTION INTRAVENOUS at 12:05

## 2018-05-14 RX ADMIN — ONDANSETRON 4 MG: 2 INJECTION INTRAMUSCULAR; INTRAVENOUS at 12:05

## 2018-05-14 RX ADMIN — CEFAZOLIN 2 G: 330 INJECTION, POWDER, FOR SOLUTION INTRAMUSCULAR; INTRAVENOUS at 12:05

## 2018-05-14 RX ADMIN — FENTANYL CITRATE 25 MCG: 50 INJECTION, SOLUTION INTRAMUSCULAR; INTRAVENOUS at 02:05

## 2018-05-14 RX ADMIN — PANTOPRAZOLE SODIUM 40 MG: 40 INJECTION, POWDER, FOR SOLUTION INTRAVENOUS at 02:05

## 2018-05-14 RX ADMIN — IPRATROPIUM BROMIDE AND ALBUTEROL SULFATE 3 ML: .5; 3 SOLUTION RESPIRATORY (INHALATION) at 11:05

## 2018-05-14 RX ADMIN — POLYETHYLENE GLYCOL 3350 17 G: 17 POWDER, FOR SOLUTION ORAL at 04:05

## 2018-05-14 RX ADMIN — TRANEXAMIC ACID 700 MG: 100 INJECTION, SOLUTION INTRAVENOUS at 07:05

## 2018-05-14 RX ADMIN — SODIUM CHLORIDE: 0.9 INJECTION, SOLUTION INTRAVENOUS at 05:05

## 2018-05-14 RX ADMIN — CEFAZOLIN 2 G: 330 INJECTION, POWDER, FOR SOLUTION INTRAMUSCULAR; INTRAVENOUS at 08:05

## 2018-05-14 RX ADMIN — HEPARIN SODIUM 25000 UNITS: 1000 INJECTION, SOLUTION INTRAVENOUS; SUBCUTANEOUS at 09:05

## 2018-05-14 RX ADMIN — ACETAMINOPHEN 1000 MG: 10 INJECTION, SOLUTION INTRAVENOUS at 07:05

## 2018-05-14 RX ADMIN — ASPIRIN 325 MG ORAL TABLET 325 MG: 325 PILL ORAL at 04:05

## 2018-05-14 RX ADMIN — FENTANYL CITRATE 150 MCG: 50 INJECTION, SOLUTION INTRAMUSCULAR; INTRAVENOUS at 07:05

## 2018-05-14 RX ADMIN — Medication 50 MG: at 07:05

## 2018-05-14 RX ADMIN — NOREPINEPHRINE BITARTRATE 0.04 MCG/KG/MIN: 1 INJECTION, SOLUTION, CONCENTRATE INTRAVENOUS at 07:05

## 2018-05-14 NOTE — ANESTHESIA PROCEDURE NOTES
Central Line    Diagnosis: CAD  Doctor requesting consult: Grace  Patient location during procedure: done in OR  Procedure start time: 5/14/2018 7:21 AM  Timeout: 5/14/2018 7:20 AM  Procedure end time: 5/14/2018 7:30 AM  Staffing  Anesthesiologist: MERE ESCOBAR  Other anesthesia staff: GORAN LIMA  Performed: other anesthesia staff   Anesthesiologist was present at the time of the procedure.  Preanesthetic Checklist  Completed: patient identified, site marked, surgical consent, pre-op evaluation, timeout performed, IV checked, risks and benefits discussed, monitors and equipment checked and anesthesia consent given  Indication  Indication: hemodynamic monitoring, vascular access, med administration     Anesthesia   general anesthesia    Central Line  Skin Prep: skin prepped with ChloraPrep, skin prep agent completely dried prior to procedure  maximum sterile barriers used during central venous catheter insertion  hand hygiene performed prior to central venous catheter insertion  Location: left subclavian,   Catheter type: quad lumen  Catheter Size: 8.5 Fr  Inserted Catheter Length: 15 cm  Ultrasound: vascular probe with ultrasound  Vessel Caliber: medium, patent  Vascular Doppler:  not done, compressibility normal  Needle advanced into vessel with real time Ultrasound guidance.  Guidewire confirmed in vessel.  Sterile sheath used.   Manometry: Venous cannualation confirmed by visual estimation of blood vessel pressure using manometry.  Insertion Attempts: 1   Securement:line sutured, chlorhexidine patch, sterile dressing applied and blood return through all ports     Post-Procedure  Adverse Events:none

## 2018-05-14 NOTE — ANESTHESIA PROCEDURE NOTES
Bilateral Transverse Thoracic Plane Block    Patient location during procedure: OR   Block not for primary anesthetic.  Reason for block: at surgeon's request and post-op pain management   Post-op Pain Location: Sternum  Start time: 5/14/2018 7:46 AM  Timeout: 5/14/2018 7:45 AM   End time: 5/14/2018 7:50 AM  Surgery related to: CAD  Staffing  Anesthesiologist: MERE ESCOBAR  Resident/CRNA: MILENA SUMNER  Other anesthesia staff: GORAN LIMA  Performed: resident/CRNA and other anesthesia staff   Preanesthetic Checklist  Completed: patient identified, site marked, surgical consent, pre-op evaluation, timeout performed, IV checked, risks and benefits discussed and monitors and equipment checked  Peripheral Block  Patient position: supine  Prep: ChloraPrep  Patient monitoring: heart rate, cardiac monitor, continuous pulse ox, continuous capnometry and frequent blood pressure checks  Anesthesia block type: transverse thoracic.  Laterality: bilateral  Injection technique: single shot  Needle  Needle type: Stimuplex   Needle gauge: 22 G  Needle length: 2 in  Needle localization: ultrasound guidance   -ultrasound image captured on disc.  Assessment  Injection assessment: negative aspiration, negative parasthesia and local visualized surrounding nerve  Paresthesia pain: none  Heart rate change: no  Slow fractionated injection: yes  Medications:  Bolus administered: 30 mL of 0.5 bupivacaine  Epinephrine added: 3.75 mcg/mL (1/300,000)  Additional Notes  15cc of 0.5% bupivacaine injected bilaterally under ultrasound guidance.  No evidence of pneumothorax or intravascular injection

## 2018-05-14 NOTE — ASSESSMENT & PLAN NOTE
BG goal 110-140 mg/dl  Continue CTS protocol w/ q1h bg monitoring.     Discharge plans: anticipate damon resolution.

## 2018-05-14 NOTE — TRANSFER OF CARE
"Anesthesia Transfer of Care Note    Patient: Michael Espinoza    Procedure(s) Performed: Procedure(s) (LRB):  AORTOCORONARY BYPASS-CABG x 3 (N/A)  [place]-Vein-Endovascular (N/A)    Patient location: ICU    Anesthesia Type: general    Transport from OR: Transported from OR on 6-10 L/min O2 by face mask with adequate spontaneous ventilation. Continuous ECG monitoring in transport. Continuous SpO2 monitoring in transport. Continuos invasive BP monitoring in transport. Continuous CVP monitoring in transport    Post pain: adequate analgesia    Post assessment: no apparent anesthetic complications and tolerated procedure well    Post vital signs: stable    Level of consciousness: awake, alert and oriented    Nausea/Vomiting: no nausea/vomiting    Complications: none    Transfer of care protocol was followed      Last vitals:   Visit Vitals  /87 (BP Location: Left arm, Patient Position: Sitting)   Pulse 73   Temp 36.7 °C (98 °F) (Oral)   Resp 16   Ht 5' 7" (1.702 m)   Wt 73 kg (161 lb)   SpO2 100%   BMI 25.22 kg/m²     "

## 2018-05-14 NOTE — SUBJECTIVE & OBJECTIVE
Interval HPI:   Intubated, on CTS protocol.      PMH, PSH, FH, SH updated and reviewed     Review of Systems   Alyse r/t sedation, intubation    Current Medications and/or Treatments Impacting Glycemic Control  Immunotherapy:    Immunosuppressants     None        Steroids:   Hormones     None        Pressors:    Autonomic Drugs     Start     Stop Route Frequency Ordered    05/14/18 1345  epinephrine 4 mg in sodium chloride 0.9% 250 mL infusion     Question Answer Comment   Titrate by: (in mcg/kg/min) 0.02    Titrate interval: (in minutes) 5    Titrate to maintain: (SBP or MAP or Cardiac Index) MAP    Greater than: (in mmHg) 65    Cardiac index greater than: (in L/min) 2.2    Maximum dose: (in mcg/kg/min) 2        -- IV Continuous 05/14/18 1340        Hyperglycemia/Diabetes Medications:   Antihyperglycemics     Start     Stop Route Frequency Ordered    05/14/18 1345  insulin regular (Humulin R) 100 Units in sodium chloride 0.9% 100 mL infusion     Question:  Insulin rate changes (DO NOT MODIFY ANSWER)  Answer:  [email or web address] [email or web address] [id number]    -- IV Continuous 05/14/18 1340             PHYSICAL EXAMINATION:  Vitals:    05/14/18 1435   BP: 107/67   Pulse: 72   Resp: (!) 27   Temp:      Body mass index is 25.22 kg/m².    Physical Exam     Constitutional:  Well developed, well nourished, NAD.  ENT: External ears no masses with nose patent; normal hearing.   Neck:  Supple; trachea midline; no thyromegaly.   Cardiovascular: Normal heart sounds, no LE edema.     Lungs:  Normal effort; lungs anterior bilaterally clear to auscultation.  Abdomen:  Soft, no masses,  no hernias.  MS: No clubbing or cyanosis of nails noted; unable to assess gait.  Skin: No rashes, lesions, or ulcers; no nodules.  Psychiatric: alyse  Neurological: alyse

## 2018-05-14 NOTE — OP NOTE
DATE OF PROCEDURE:  05/14/2018    ATTENDING SURGEON:  Chad Edwards M.D.    PREOPERATIVE DIAGNOSES:  1.  Coronary artery disease.  2.  Interstitial lung disease.  3.  Exertional angina.  4.  Hyperlipidemia.  5.  Gilbert syndrome.    POSTOPERATIVE DIAGNOSES:  1.  Coronary artery disease.  2.  Interstitial lung disease.  3.  Exertional angina.  4.  Hyperlipidemia.  5.  Gilbert syndrome.    OPERATION PERFORMED:  Coronary artery bypass grafting x3, with left internal   mammary artery to the left anterior descending, saphenous vein graft to the   first obtuse marginal, and saphenous vein graft to the posterior descending   artery.    ANESTHESIA:  General endotracheal.    ESTIMATED BLOOD LOSS:  100 mL    BRIEF HISTORY:  Mr. Espinoza is a 62-year-old gentleman who remains very active.  He   works out twice a week at a gym.  He reported intermittent angina with exertion   that was also associated with dyspnea.  He underwent coronary angiography,   which demonstrated three-vessel disease.  He now presents for coronary artery   bypass grafting.    PROCEDURE IN DETAIL:  After obtaining informed and written consent, the patient   was brought to the Operating Room and placed on the operating table in supine   position.  After induction of adequate general endotracheal anesthesia, the   patient's chest, abdomen, pelvis and bilateral lower extremities were prepped   and draped in usual sterile fashion.  Skin incisions were made over the chest   and left lower extremity.  Through the left lower extremity skin incision,   greater saphenous vein was harvested using endoscopic technique.  Once the vein   was out, the leg was closed in multiple layers of absorbable suture material.    Through the chest incision, a median sternotomy was performed.  The left   internal mammary artery was completely dissected, but not divided.  A   pericardial well was created.  The patient was systemically heparinized.    Cannulation sutures were  placed in the aorta and in the right atrial appendage.    The aortic cannula was inserted, followed by the venous.  Antegrade and   retrograde cardioplegia catheters were placed.  The mammary was divided   distally, and its end was prepared.  The patient was then put on cardiopulmonary   bypass.  Once on bypass, the aortic crossclamp was applied, and the heart was   arrested using cold blood enhanced antegrade cardioplegia.  A prompt   electromechanical arrest was achieved.  Once the cardioplegia was all in, we   first addressed the distal right coronary circulation.  The right coronary   itself was severely diseased, and the posterior descending artery was relatively   diminutive given the LAD course, which wrapped around the apex of the heart.    The right coronary itself was unsuitable for grafting due to significant   atherosclerotic disease.  The PDA was a very small vessel.  It was exposed.  A   site suitable for grafting was located, and an arteriotomy was made.  The vein   was brought up, its end was spatulated, and the anastomosis was performed using   a running 7-0 Prolene suture.  After completion of the anastomosis, it was   tested.  It was hemostatic.  Another dose of cardioplegia was given retrograde   and down the PDA vein.  We then turned our attention to the lateral wall.  The   obtuse marginal was a large vessel.  A site suitable for grafting was located,   and an arteriotomy was made.  The vein was brought up, its end was spatulated,   and the anastomosis was performed using a running 7-0 Prolene suture.  After   completion of the anastomosis, it was tested.  It was hemostatic.  Another dose   of cardioplegia was given retrograde and down the lateral wall vein.  We then   turned our attention to the anterior surface of the heart.  The LAD was   identified in its middle third, and a site suitable for grafting was located.    An arteriotomy was made, the mammary was brought up, and the anastomosis  was   performed using a running 7-0 Prolene suture.  After completion of the   anastomosis, it was tested.  It was hemostatic.  Another dose of cardioplegia   was given retrograde and down the lateral wall vein.  The mammary pedicle was   tacked to the heart using two 5-0 Prolene sutures.  We then prepared to do the 2   proximal anastomoses.  Both were done in a similar fashion, using #11 blade to   incise the aorta and an aortic punch to enlarge the aortotomies.  The veins were   checked for rotation, and cut to an appropriate length.  Their ends were   spatulated, and the proximal anastomoses were performed using running 5-0   Prolene suture.  After completion of the proximals, the hot shot was given   retrograde.  Root de-airing maneuvers were performed, and the aortic cross-clamp   was removed.  The patient was subsequently weaned from cardiopulmonary bypass.    The patient did separate easily from bypass.  Once off bypass, all surgical   sites were inspected.  There was good hemostasis.  The test dose of protamine   was administered, and this was well tolerated.  The total dose was then given.    MCFP through the total dose, all cannulas were removed.  All surgical sites   were again inspected, again there was good hemostasis.  Ventricular pacing wires   were placed.  Two drains were placed in the mediastinum, and one in the left   chest.  After again confirming adequate hemostasis, the patient's chest was   closed using eight #6 stainless steel wires to reapproximate the sternum.  The   overlying soft tissues were reapproximated using absorbable suture material.    The patient's chest was washed and dried, and a dry dressing was applied.  The   patient tolerated the procedure well, there were no complications.  At the   conclusion of the case, sponge and instrument counts were correct.      PP/IN  dd: 05/14/2018 13:01:50 (CDT)  td: 05/14/2018 13:43:33 (CDT)  Doc ID   #[id number]  Job ID #[id number]    CC:

## 2018-05-14 NOTE — PLAN OF CARE
Problem: Patient Care Overview  Goal: Plan of Care Review  Hx: HLD, Gilbert's syndrome, parenchymal lung disease, ASD    5/14: CABG x3, extubated       Patient stable since arrival from OR. VSS. No pressure support required. Patient on non-rebreather mask due to elevated CO2 level upon arrival from OR. HR 80-90's SR. Insulin gtt initiated and followed per protocol. Bedside swallow study passed. Pain managed with PRN medication. UO adequate. Chest tube output approx 320 ml.

## 2018-05-14 NOTE — H&P (VIEW-ONLY)
Patient ID: Michael Espinoza is a 62 y.o. male.    Chief Complaint: Pre-op Exam      HPI:  Patient is a 62 y.o. male presents with 3V CAD. PMHx significant for for diffuse parenchymal lung disease. He has been having chest pain on exertion associated with some dypsnea.  He also reports left lower extremity edema by the level of his ankle. The edema is noticeable at night time and improves in the morning. He denies edema of his right leg. Symptoms are present for the past 3 months. Denies orthopnea, palpitations, arrhythmias, diaphoresis or syncopal events.      He is the high-school cross country team .  Today's labs reviewed with patient and demonstrate an excellent cholesterol profile (, HDL 50, LDL 80), LFT's however tbili elevated (2.0) due to Gilbert's syndrome.     NYHA Class: 0  Family History of Heart Disease: brother        Review of Systems  Constitutional: no fever or chills  Eyes: no visual changes  ENT: no nasal congestion or sore throat  Respiratory: positive for dyspnea on exertion  Cardiovascular: positive for exertional chest pressure/discomfort  Gastrointestinal: no nausea or vomiting, tolerating diet  Genitourinary: no hematuria or dysuria  Integument/Breast: no rash or pruritis  Hematologic/Lymphatic: no easy bruising or lymphadenopathy  Musculoskeletal: no arthralgias or myalgias  Neurological: no seizures or tremors  Behavioral/Psych: no auditory or visual hallucinations  Endocrine: no heat or cold intolerance  Current Outpatient Prescriptions   Medication Sig Dispense Refill    aspirin (ECOTRIN) 81 MG EC tablet Take 81 mg by mouth once daily.        atorvastatin (LIPITOR) 40 MG tablet TAKE 1 TABLET(40 MG) BY MOUTH EVERY EVENING 90 tablet 0    calcium carbonate (CALCIUM 600) 600 mg calcium (1,500 mg) Tab Take 600 mg by mouth once daily.      ERGOCALCIFEROL, VITAMIN D2, (VITAMIN D ORAL) Take by mouth once daily.      nitroGLYCERIN (NITROSTAT) 0.4 MG SL tablet Place 1 tablet (0.4 mg  total) under the tongue every 5 (five) minutes as needed for Chest pain. 60 tablet 3    albuterol 90 mcg/actuation inhaler 2 puffs 15 minutes prior to exercise 1 Inhaler 5    flu vac ts 2013,18yr+,cell,PF, (FLULAVAL) 45 mcg (15 mcg x 3)/0.5 mL Syrg Inject 0.5 mLs into the muscle.       No current facility-administered medications for this visit.        Review of patient's allergies indicates:  No Known Allergies    Past Medical History:   Diagnosis Date    Coronary artery disease of native artery of native heart with stable angina pectoris 4/10/2018    Hyperlipidemia        Past Surgical History:   Procedure Laterality Date    TONSILLECTOMY         Family History   Problem Relation Age of Onset    Hyperlipidemia Brother     Hypertension Brother     Heart disease Brother     Heart attack Brother        Social History     Social History    Marital status: Single     Spouse name: N/A    Number of children: N/A    Years of education: N/A     Occupational History    Not on file.     Social History Main Topics    Smoking status: Never Smoker    Smokeless tobacco: Never Used    Alcohol use 0.6 oz/week     1 Glasses of wine per week      Comment: 1 drink 2-3 times/weekly    Drug use: No    Sexual activity: Not on file     Other Topics Concern    Not on file     Social History Narrative    No narrative on file       Vitals:    05/10/18 1623   BP: (!) 136/92   Pulse: 64   Temp: 98.4 °F (36.9 °C)       Physical Exam  GEN: No acute distress  HEENT: normocephalic, atraumatic  CV: RRR, no murmurs, rubs, gallops  Resp: normal work of breathing, full inspiratory expansion, clear to auscultation  Abd: non distended, bowel sounds normal  Ext: Distal pulses intact, no peripheral edema  Neuro: alert and oriented x3  Skin: no rashes, bruising    CHARMAINE 12/2017    Procedure:  Informed consent was obtained from patient.  Deep sedation was achieved.    Hemodynamics:    Baseline: HR: 80, BP: 116/80.    Complications:  no  complications.     Left Atrium:  The left atrium is normal in size.  There is no visualized thrombus.  Spontaneous echo contrast was not present.  The atrial septum is aneurysmal and bulging to the left.   Right Atrium:  The right atrium is normal in size.    Right Ventricle:  The right ventricle is normal in size. Global right ventricular systolic function appears normal.   Left Ventricle:  The left ventricle is normal in size.    Left ventricular systolic function appears normal. Visually estimated ejection fraction is 65-70%.   Diastolic indices: Diastolic function is normal.   Intracardiac Shunt Evaluation:  No evidence of intracardiac shunt as evaluated by both color flow Doppler and bubble study from left arm.    Aortic Valve:  The aortic valve is normal in structure with normal leaflet mobility. Additionally, there is trivial aortic regurgitation.   Mitral Valve:  The mitral valve is normal in structure with normal leaflet mobility.   Tricuspid Valve:  The tricuspid valve is normal in structure with normal leaflet mobility. There is trivial tricuspid regurgitation.   Pulmonic Valve:  The pulmonic valve is not well seen.   Aorta:  The aortic root is normal in size.  The proximal ascending aorta measures 2.9 cm across.  No atheromatous disease was detected in the aorta.    Pericardium: Visualized pericardium is normal. There is no evidence of pericardial effusion.      CONCLUSIONS     1 - Atrial septal aneurysm without evidence of right to left shunt.     2 - TTE images reviewed, the color flow Doppler seen on atrial septum of  the 4 chamber most likely represents flow secondary to the floppying of intra-atrial septum and not a defect in the IAS.     3 - Normal left ventricular systolic function (EF 65-70%).     4 - Challenging images.         [facility] 4/2/18:  Hemodynamic Results  LVEDP (Pre): 10 mmHg  LVEDP (Post): 15 mmHg  Ejection Fraction: 65%    Angiographic Results     Diagnostic:          Patient has a right  dominant coronary artery.        - Left Main Coronary Artery:             The LM has luminal irregularities. There is SHARMAINE 3 flow.     - Left Anterior Descending Artery:             The proximal LAD has a 95% stenosis. There is SHARMAINE 3 flow.     - Left Circumflex Artery:             The LCX has a 70% stenosis. There is SHARMAINE 3 flow. Tandem lesions.      - Right Coronary Artery:             The RCA has chronic total occlusion. There is SHARMAINE 0 flow.     Carotid Duplex  RIGHT  The right Distal Common Carotid Artery is visualized.   The right carotid bulb artery is visualized.   The right Distal Internal Carotid Artery has 0 - 19% stenosis.   The right external carotid artery is visualized.   The right vertebral artery is visualized, associated with anterograde flow.   The right ICA/CCA ratio is: .91    LEFT  The left Mid Common Carotid Artery is visualized.   The left carotid bulb artery is visualized, associated with heterogeneous plaque.   The left Mid Internal Carotid Artery has 20 - 39% stenosis, associated with tortuosity.   The left external carotid artery is visualized.   The left vertebral artery is visualized, associated with anterograde flow.   The left ICA/CCA ratio is: .80      CONCLUSIONS   There is 0 - 19% right Internal Carotid stenosis.  There is 20 - 39% left Internal Carotid stenosis.     Risk Scores:  STS Score:   Procedure: CAB Only  Risk of Mortality: 0.321%  Morbidity or Mortality: 4.048%  Long Length of Stay: 1.307%  Short Length of Stay: 79.759%  Permanent Stroke: 0.237%  Prolonged Ventilation: 2.412%  DSW Infection: 0.147%  Renal Failure: 0.351%  Reoperation: 2.676%    Assessment & Plan:  61yo M with stable CAD    - Plan for CABG x3v  - Consent done in clinic    Yolanda Maqrues MD  Surgery Resident, PGY-2  Pager [phone]  05/10/2018     CTS Attending Note:    I have personally taken the history and examined this patient and agree with the resident's note as stated above.  Plan three-vessel CABG.   His questions have been answered, and he wishes to proceed.

## 2018-05-14 NOTE — ANESTHESIA PROCEDURE NOTES
Final CHARMAINE    Diagnosis: CAD  Patient location during procedure: OR  Procedure start time: 5/14/2018 12:31 PM  Timeout: 5/14/2018 12:30 PM  Procedure end time: 5/14/2018 12:35 PM  Surgery related to: CAD  Exam type: Final  Staffing  Anesthesiologist: MERE ESCOBAR  Other anesthesia staff: GORAN LIMA  Performed: other anesthesia staff and anesthesiologist   Preanesthetic Checklist  Completed: patient identified, surgical consent, pre-op evaluation, timeout performed, risks and benefits discussed, monitors and equipment checked, anesthesia consent given, oxygen available, suction available, hand hygiene performed and patient being monitored    Exam  Estimated Ejection Fraction: > 55% normal  Regional Wall Abnormalities: no RWMA        Right Heart  Right Ventricle Function: normal      Aortic Valve:  Prosthesis: none  Perivalvular leak: no  Regurgitation(color flow): 0-tr     Mitral Valve:  Prosthesis: none  Perivalvlular Leak: no  Regurgitation(color flow): 0-tr     Tricuspid Valve:  Prosthesis: none  Pervalvular Leak: no  Regurgitation: 0-tr    Pulmonic Valve:  Prosthesis: none  Regurgitation(color flow): 0-tr  Perivalvular Leak: no      Aorta  Descending Aorta dissection: no  Descending aorta IABP: no  Aortic Arch Dissection: no  Ascending Aorta Dissection: no    LVAD:no        Effusions    Summary    Other Findings  Normal biventricular function, no valvular abnormalities  Possible ASD

## 2018-05-14 NOTE — SUBJECTIVE & OBJECTIVE
Follow-up For: Procedure(s) (LRB):  AORTOCORONARY BYPASS-CABG x 3 (N/A)  [place]-Vein-Endovascular (N/A)    Post-Operative Day: Day of Surgery     Past Medical History:   Diagnosis Date    Coronary artery disease of native artery of native heart with stable angina pectoris 4/10/2018    Hyperlipidemia        Past Surgical History:   Procedure Laterality Date    CYST REMOVAL      TONSILLECTOMY         Review of patient's allergies indicates:  No Known Allergies    Family History     Problem Relation (Age of Onset)    Heart attack Brother    Heart disease Brother    Hyperlipidemia Brother    Hypertension Brother        Social History Main Topics    Smoking status: Never Smoker    Smokeless tobacco: Never Used    Alcohol use 0.6 oz/week     1 Glasses of wine per week      Comment: 1 drink 2-3 times/weekly    Drug use: No    Sexual activity: Not on file      Review of Systems   Unable to perform ROS: Acuity of condition     Objective:     Vital Signs (Most Recent):  Temp: 97.6 °F (36.4 °C) (05/14/18 1345)  Pulse: 61 (05/14/18 1345)  Resp: 20 (05/14/18 1345)  BP: (!) 102/59 (05/14/18 1345)  SpO2: 95 % (05/14/18 1345) Vital Signs (24h Range):  Temp:  [97.6 °F (36.4 °C)-98 °F (36.7 °C)] 97.6 °F (36.4 °C)  Pulse:  [61-73] 61  Resp:  [16-20] 20  SpO2:  [95 %-100 %] 95 %  BP: (102-131)/(59-87) 102/59  Arterial Line BP: (104)/(56) 104/56     Weight: 73 kg (161 lb)  Body mass index is 25.22 kg/m².      Intake/Output Summary (Last 24 hours) at 05/14/18 1402  Last data filed at 05/14/18 1330   Gross per 24 hour   Intake                0 ml   Output             1050 ml   Net            -1050 ml       Physical Exam   Constitutional: He appears well-developed and well-nourished.   HENT:   Head: Normocephalic and atraumatic.   Eyes: EOM are normal. Pupils are equal, round, and reactive to light.   Neck: Normal range of motion. Neck supple.   Cardiovascular: Normal rate, regular rhythm, normal heart sounds and intact distal  pulses.    Pulmonary/Chest: Effort normal and breath sounds normal.   Abdominal: Bowel sounds are normal.   Musculoskeletal: Normal range of motion.   Neurological: He is alert.   Skin: Skin is dry.        Lines/Drains/Airways     Central Venous Catheter Line                 Percutaneous Central Line Insertion/Assessment - Quad lumen  05/14/18 0721 left subclavian less than 1 day         Percutaneous Central Line Insertion/Assessment - quad lumen  05/14/18 0721 left subclavian;other (see comments) less than 1 day          Drain                 Chest Tube 05/14/18 1243 3 Right Mediastinal 19 Fr. less than 1 day         Urethral Catheter 05/14/18 0720 Temperature probe;Straight-tip;Latex 14 Fr. less than 1 day         Y Chest Tube 1 and 2 05/14/18 1242 1 Right Pleural 19 Fr. 2 Left Pleural less than 1 day          Epidural Line                 Perineural Analgesia/Anesthesia Assessment (using dermatomes) Epidural 05/14/18 0746 less than 1 day          Arterial Line                 Arterial Line 05/14/18 0706 Left Radial less than 1 day          Line                 Pacer Wires 05/14/18 1216 less than 1 day          Peripheral Intravenous Line                 Peripheral IV - Single Lumen 05/14/18 0552 Left Forearm less than 1 day                Significant Labs:    CBC/Anemia Profile:    Recent Labs  Lab 05/14/18  1045 05/14/18  1111 05/14/18  1143   HCT 30* 27* 26*        Chemistries:  No results for input(s): NA, K, CL, CO2, BUN, CREATININE, CALCIUM, ALBUMIN, PROT, BILITOT, ALKPHOS, ALT, AST, GLUCOSE, MG, PHOS in the last 48 hours.

## 2018-05-14 NOTE — H&P
[facility]  Critical Care - Surgery  History & Physical    Patient Name: Michael Espinoza  MRN: [id number]  Admission Date: 5/14/2018  Code Status: Full Code  Attending Physician: Chad Edwards MD   Primary Care Provider: Primary Doctor No   Principal Problem: <principal problem not specified>    Subjective:     HPI:  Michael Espinoza is a 62 y.o. male w/ a significant PMHx of HLD, Gilbert's syndrome, parenchymal lung disease, ASD, and three vessel CAD who presents to SICU following CABG x3. He's extubated and off pressors. He is awake and alert but not oriented.     Follow-up For: Procedure(s) (LRB):  AORTOCORONARY BYPASS-CABG x 3 (N/A)  [place]-Vein-Endovascular (N/A)    Post-Operative Day: Day of Surgery     Past Medical History:   Diagnosis Date    Coronary artery disease of native artery of native heart with stable angina pectoris 4/10/2018    Hyperlipidemia        Past Surgical History:   Procedure Laterality Date    CYST REMOVAL      TONSILLECTOMY         Review of patient's allergies indicates:  No Known Allergies    Family History     Problem Relation (Age of Onset)    Heart attack Brother    Heart disease Brother    Hyperlipidemia Brother    Hypertension Brother        Social History Main Topics    Smoking status: Never Smoker    Smokeless tobacco: Never Used    Alcohol use 0.6 oz/week     1 Glasses of wine per week      Comment: 1 drink 2-3 times/weekly    Drug use: No    Sexual activity: Not on file      Review of Systems   Unable to perform ROS: Acuity of condition     Objective:     Vital Signs (Most Recent):  Temp: 97.6 °F (36.4 °C) (05/14/18 1345)  Pulse: 61 (05/14/18 1345)  Resp: 20 (05/14/18 1345)  BP: (!) 102/59 (05/14/18 1345)  SpO2: 95 % (05/14/18 1345) Vital Signs (24h Range):  Temp:  [97.6 °F (36.4 °C)-98 °F (36.7 °C)] 97.6 °F (36.4 °C)  Pulse:  [61-73] 61  Resp:  [16-20] 20  SpO2:  [95 %-100 %] 95 %  BP: (102-131)/(59-87) 102/59  Arterial Line BP: (104)/(56) 104/56      Weight: 73 kg (161 lb)  Body mass index is 25.22 kg/m².      Intake/Output Summary (Last 24 hours) at 05/14/18 1402  Last data filed at 05/14/18 1330   Gross per 24 hour   Intake                0 ml   Output             1050 ml   Net            -1050 ml       Physical Exam   Constitutional: He appears well-developed and well-nourished.   HENT:   Head: Normocephalic and atraumatic.   Eyes: EOM are normal. Pupils are equal, round, and reactive to light.   Neck: Normal range of motion. Neck supple.   Cardiovascular: Normal rate, regular rhythm, normal heart sounds and intact distal pulses.    Pulmonary/Chest: Effort normal and breath sounds normal.   Abdominal: Bowel sounds are normal.   Musculoskeletal: Normal range of motion.   Neurological: He is alert.   Skin: Skin is dry.        Lines/Drains/Airways     Central Venous Catheter Line                 Percutaneous Central Line Insertion/Assessment - Quad lumen  05/14/18 0721 left subclavian less than 1 day         Percutaneous Central Line Insertion/Assessment - quad lumen  05/14/18 0721 left subclavian;other (see comments) less than 1 day          Drain                 Chest Tube 05/14/18 1243 3 Right Mediastinal 19 Fr. less than 1 day         Urethral Catheter 05/14/18 0720 Temperature probe;Straight-tip;Latex 14 Fr. less than 1 day         Y Chest Tube 1 and 2 05/14/18 1242 1 Right Pleural 19 Fr. 2 Left Pleural less than 1 day          Epidural Line                 Perineural Analgesia/Anesthesia Assessment (using dermatomes) Epidural 05/14/18 0746 less than 1 day          Arterial Line                 Arterial Line 05/14/18 0706 Left Radial less than 1 day          Line                 Pacer Wires 05/14/18 1216 less than 1 day          Peripheral Intravenous Line                 Peripheral IV - Single Lumen 05/14/18 0552 Left Forearm less than 1 day                Significant Labs:    CBC/Anemia Profile:    Recent Labs  Lab 05/14/18  1045 05/14/18  1111  05/14/18  1143   HCT 30* 27* 26*        Chemistries:  No results for input(s): NA, K, CL, CO2, BUN, CREATININE, CALCIUM, ALBUMIN, PROT, BILITOT, ALKPHOS, ALT, AST, GLUCOSE, MG, PHOS in the last 48 hours.        Assessment/Plan:   Michael Espinoza is a 62 y.o. male w/ a significant PMHx of HLD, Gilbert's syndrome, parenchymal lung disease, ASD, and three vessel CAD who presented to the SICU s/p CABG x3.     CAD (coronary artery disease)    Plan:  Neuro:  - Awake and alert; somewhat disoriented postop; continue to monitor for improvement     Cardiac:  - Off pressors; hemodynamically stable  - POD 0 from CABG x3  - MAP goal greater than 65    Pulmonary:  - Extubated and satting well on O2 mask  - PRN O2 nasal cannula  - Respiratory acidosis upon arrival to SICU; pH 7.183, pCO2 65.4, pO2 82, HCO3 24.6, O2 Sat 92%; Continue to monitor with PRN ABG's    Renal:  - Preop BUN/Crt was 14/0.9  - Monitor I/O's closely  - Daily CMP's     Hematology/Oncology:  - H/H preop of 13.1/38.4  - Daily H/H's  - No need for intraop blood products    ID:  - Postop cefazolin  - F/u WBC    Endocrine:  - F/u POCT glucose  - Endocrine consulted; insulin gtt per endocrine    FEN/GI:  - NPO; advance diet as tolerated  - Replace electrolytes PRN    Dispo: Continue SICU care; CTS primary            Critical care was time spent personally by me on the following activities: development of treatment plan with patient or surrogate and bedside caregivers, discussions with consultants, evaluation of patient's response to treatment, examination of patient, ordering and performing treatments and interventions, ordering and review of laboratory studies, ordering and review of radiographic studies, pulse oximetry, re-evaluation of patient's condition.  This critical care time did not overlap with that of any other provider or involve time for any procedures.     Kit Taylor MD  Critical Care - Surgery  [facility]

## 2018-05-14 NOTE — CONSULTS
[facility]  Endocrinology  Diabetes Consult Note    Consult Requested by: Chad Edwards MD   Reason for admit: <principal problem not specified>    HISTORY OF PRESENT ILLNESS:  Reason for Consult: Management of Hyperglycemia     Surgical Procedure and Date: s/p CABG x 3, [place] vein endovascular on 5/14/18      HPI:   Patient is a 62 y.o. male with no prior history of diabetes. He presented w/ 3V CAD. PMHx significant for for diffuse parenchymal lung disease. He is now s/p CABG x 3. Endocrinology consulted for optimal bg management.           Interval HPI:   Intubated, on CTS protocol.      PMH, PSH, FH, SH updated and reviewed     Review of Systems   Beatrice r/t sedation, intubation    Current Medications and/or Treatments Impacting Glycemic Control  Immunotherapy:    Immunosuppressants     None        Steroids:   Hormones     None        Pressors:    Autonomic Drugs     Start     Stop Route Frequency Ordered    05/14/18 1345  epinephrine 4 mg in sodium chloride 0.9% 250 mL infusion     Question Answer Comment   Titrate by: (in mcg/kg/min) 0.02    Titrate interval: (in minutes) 5    Titrate to maintain: (SBP or MAP or Cardiac Index) MAP    Greater than: (in mmHg) 65    Cardiac index greater than: (in L/min) 2.2    Maximum dose: (in mcg/kg/min) 2        -- IV Continuous 05/14/18 1340        Hyperglycemia/Diabetes Medications:   Antihyperglycemics     Start     Stop Route Frequency Ordered    05/14/18 1345  insulin regular (Humulin R) 100 Units in sodium chloride 0.9% 100 mL infusion     Question:  Insulin rate changes (DO NOT MODIFY ANSWER)  Answer:  [email or web address] [id number]    -- IV Continuous 05/14/18 1340             PHYSICAL EXAMINATION:  Vitals:    05/14/18 1435   BP: 107/67   Pulse: 72   Resp: (!) 27   Temp:      Body mass index is 25.22 kg/m².    Physical Exam     Constitutional:  Well developed, well nourished, NAD.  ENT: External ears  no masses with nose patent; normal hearing.   Neck:  Supple; trachea midline; no thyromegaly.   Cardiovascular: Normal heart sounds, no LE edema.     Lungs:  On oxygen per face mask. Normal effort; lungs anterior bilaterally clear to auscultation.  Abdomen:  Soft, no masses,  no hernias.  MS: No clubbing or cyanosis of nails noted; unable to assess gait.  Skin: No rashes, lesions, or ulcers; no nodules. MS surgical dressing intact.  Psychiatric: alyse  Neurological: alyse      Labs Reviewed and Include     Recent Labs  Lab 05/14/18  1340   *   CALCIUM 8.2*      K 4.4   CO2 23      BUN 11   CREATININE 0.8     Lab Results   Component Value Date    WBC 14.11 (H) 05/14/2018    HGB 13.1 (L) 05/14/2018    HCT 36 05/14/2018    MCV 92 05/14/2018     (L) 05/14/2018     No results for input(s): TSH, FREET4 in the last 168 hours.  Lab Results   Component Value Date    HGBA1C 5.4 05/10/2018       Nutritional status:   Body mass index is 25.22 kg/m².  Lab Results   Component Value Date    ALBUMIN 4.0 05/10/2018    ALBUMIN 4.1 03/21/2018    ALBUMIN 3.6 11/15/2017     No results found for: PREALBUMIN    Estimated Creatinine Clearance: 89.5 mL/min (based on SCr of 0.8 mg/dL).    Accu-Checks  Recent Labs      05/14/18   1340  05/14/18   1408  05/14/18   1507   POCTGLUCOSE  162*  147*  192*        ASSESSMENT and PLAN    Hyperglycemia      BG goal 110-140 mg/dl  Continue CTS protocol w/ q1h bg monitoring, last bg 174-start insulin gtt at 1.5 u/hr.    Discharge plans: anticipate damon resolution.         CAD (coronary artery disease)    S/p CABG x 3 . Managed per cards.              Plan discussed with patient and RN at bedside.     KELIN Ruelas, FNP  Endocrinology  [facility]

## 2018-05-14 NOTE — HPI
Reason for Consult: Management of Hyperglycemia     Surgical Procedure and Date: s/p CABG x 3, [place] vein endovascular on 5/14/18      HPI:   Patient is a 62 y.o. male with no prior history of diabetes. He presented w/ 3V CAD. PMHx significant for for diffuse parenchymal lung disease. He is now s/p CABG x 3. Endocrinology consulted for optimal bg management.

## 2018-05-14 NOTE — ANESTHESIA PROCEDURE NOTES
Arterial    Diagnosis: CAD  Doctor requesting consult: sebastián    Patient location during procedure: done in OR  Procedure start time: 5/14/2018 7:06 AM  Timeout: 5/14/2018 7:05 AM  Procedure end time: 5/14/2018 7:10 AM  Staffing  Anesthesiologist: MERE ESCOBAR  Other anesthesia staff: GORAN LIMA  Performed: other anesthesia staff   Anesthesiologist was present at the time of the procedure.  Preanesthetic Checklist  Completed: patient identified, site marked, surgical consent, pre-op evaluation, timeout performed, IV checked, risks and benefits discussed, monitors and equipment checked and anesthesia consent givenArterial  Skin Prep: chlorhexidine gluconate and isopropyl alcohol  Local Infiltration: lidocaine  Orientation: left  Location: radial  Catheter Size: 20 G  Catheter placement by Anatomical landmarks. Heme positive aspiration all ports.Insertion Attempts: 1  Assessment  Dressing: secured with tape and tegaderm

## 2018-05-14 NOTE — ANESTHESIA PROCEDURE NOTES
Baseline CHARMAINE    Diagnosis: CAD  Patient location during procedure: OR  Procedure start time: 5/14/2018 8:01 AM  Timeout: 5/14/2018 8:00 AM  Procedure end time: 5/14/2018 8:20 AM  Surgery related to: CAD  Exam type: Baseline  Staffing  Anesthesiologist: MERE ESCOBAR  Resident/CRNA: MILENA SUMNER  Other anesthesia staff: GORAN LIMA  Performed: anesthesiologist, resident/CRNA and other anesthesia staff   Preanesthetic Checklist  Completed: patient identified, surgical consent, pre-op evaluation, timeout performed, risks and benefits discussed, monitors and equipment checked, anesthesia consent given, oxygen available, suction available, hand hygiene performed and patient being monitored  Setup & Induction  Patient preparation: bite block inserted  Probe Insertion: difficult  Exam: complete  Exam     Left Heart        LV Wall Thickness (posterior wall):1.22 cm    LVAD:no  Estimated Ejection Fraction: > 55% normal        Left Ventricle Diastolic Function    E/A Ratio: 1.9, normal (1-2)  Lateral E': 10 cm/s  E/E' Ratio: 5.7    Right Heart  Right Ventricle: normal  Right Ventricle Function: normal    Intra Atrial Septum  PFO: no shunt by color flow doppler  no IAS aneurysm  no lipomatous hypertrophy  no Atrial Septal Defect (Asd)    Right Ventricle  Size: normal    Aortic Valve:  Stenosis: none  Morphology: trileaflet  Regurgitation: no aortic valve regurgitation     Mitral Valve:  Morphology:normal  Jet Description: none    Tricuspid Valve:  Morphology: normal  Regurgitation: none    Pulmonic Valve:  Morphology:normal  Regurgitation(color flow): none    Great Vessels  Ascending Aorta Atherosclerosis: 1=nl-min dz  Aortic Arch Atherosclerosis: 1=nl-min dz  IABP: no  Descending Aorta Atherosclerosis: 1=nl-min dz  Aorta    Descending aorta IABP: no    Effusions  Effusions: none    Summary  Findings discussed with surgeon.    Other Findings   Normal biventricular function  All valves normal structure and  function  Atrial septum large but not aneurysmal, no definitive PFO or ASD

## 2018-05-14 NOTE — BRIEF OP NOTE
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        Complications: None    Estimated Blood Loss (EBL): 100 mL             Specimens:   Specimen (12h ago through future)    None                  Attestation:  I was present and scrubbed for the entire procedure. No qualified resident was available.

## 2018-05-15 LAB
ANION GAP SERPL CALC-SCNC: 6 MMOL/L
APTT BLDCRRT: 22.4 SEC
BASOPHILS # BLD AUTO: 0.01 K/UL
BASOPHILS NFR BLD: 0.1 %
BUN SERPL-MCNC: 12 MG/DL
CALCIUM SERPL-MCNC: 8.2 MG/DL
CHLORIDE SERPL-SCNC: 106 MMOL/L
CO2 SERPL-SCNC: 25 MMOL/L
CREAT SERPL-MCNC: 0.8 MG/DL
DIFFERENTIAL METHOD: ABNORMAL
EOSINOPHIL # BLD AUTO: 0 K/UL
EOSINOPHIL NFR BLD: 0 %
ERYTHROCYTE [DISTWIDTH] IN BLOOD BY AUTOMATED COUNT: 13.4 %
EST. GFR  (AFRICAN AMERICAN): >60 ML/MIN/1.73 M^2
EST. GFR  (NON AFRICAN AMERICAN): >60 ML/MIN/1.73 M^2
GLUCOSE SERPL-MCNC: 113 MG/DL (ref 70–110)
GLUCOSE SERPL-MCNC: 116 MG/DL
GLUCOSE SERPL-MCNC: 213 MG/DL (ref 70–110)
HCO3 UR-SCNC: 22.7 MMOL/L (ref 24–28)
HCO3 UR-SCNC: 22.7 MMOL/L (ref 24–28)
HCT VFR BLD AUTO: 37.4 %
HCT VFR BLD CALC: 24 %PCV (ref 36–54)
HCT VFR BLD CALC: 38 %PCV (ref 36–54)
HGB BLD-MCNC: 12.8 G/DL
IMM GRANULOCYTES # BLD AUTO: 0.03 K/UL
IMM GRANULOCYTES NFR BLD AUTO: 0.4 %
INR PPP: 1.2
LYMPHOCYTES # BLD AUTO: 0.8 K/UL
LYMPHOCYTES NFR BLD: 9.8 %
MAGNESIUM SERPL-MCNC: 2 MG/DL
MCH RBC QN AUTO: 30.6 PG
MCHC RBC AUTO-ENTMCNC: 34.2 G/DL
MCV RBC AUTO: 90 FL
MONOCYTES # BLD AUTO: 0.8 K/UL
MONOCYTES NFR BLD: 9.3 %
NEUTROPHILS # BLD AUTO: 6.6 K/UL
NEUTROPHILS NFR BLD: 80.4 %
NRBC BLD-RTO: 0 /100 WBC
PCO2 BLDA: 35.8 MMHG (ref 35–45)
PCO2 BLDA: 39.9 MMHG (ref 35–45)
PH SMN: 7.36 [PH] (ref 7.35–7.45)
PH SMN: 7.41 [PH] (ref 7.35–7.45)
PHOSPHATE SERPL-MCNC: 4 MG/DL
PLATELET # BLD AUTO: 118 K/UL
PMV BLD AUTO: 10.7 FL
PO2 BLDA: 126 MMHG (ref 80–100)
PO2 BLDA: 90 MMHG (ref 80–100)
POC BE: -2 MMOL/L
POC BE: -3 MMOL/L
POC IONIZED CALCIUM: 1.17 MMOL/L (ref 1.06–1.42)
POC IONIZED CALCIUM: 1.27 MMOL/L (ref 1.06–1.42)
POC SATURATED O2: 97 % (ref 95–100)
POC SATURATED O2: 99 % (ref 95–100)
POC TCO2: 24 MMOL/L (ref 23–27)
POC TCO2: 24 MMOL/L (ref 23–27)
POCT GLUCOSE: 102 MG/DL (ref 70–110)
POCT GLUCOSE: 103 MG/DL (ref 70–110)
POCT GLUCOSE: 106 MG/DL (ref 70–110)
POCT GLUCOSE: 107 MG/DL (ref 70–110)
POCT GLUCOSE: 115 MG/DL (ref 70–110)
POCT GLUCOSE: 115 MG/DL (ref 70–110)
POCT GLUCOSE: 116 MG/DL (ref 70–110)
POCT GLUCOSE: 117 MG/DL (ref 70–110)
POCT GLUCOSE: 95 MG/DL (ref 70–110)
POCT GLUCOSE: 98 MG/DL (ref 70–110)
POCT GLUCOSE: 99 MG/DL (ref 70–110)
POTASSIUM BLD-SCNC: 3.8 MMOL/L (ref 3.5–5.1)
POTASSIUM BLD-SCNC: 4.3 MMOL/L (ref 3.5–5.1)
POTASSIUM SERPL-SCNC: 3.7 MMOL/L
POTASSIUM SERPL-SCNC: 4.3 MMOL/L
PROTHROMBIN TIME: 12.7 SEC
RBC # BLD AUTO: 4.18 M/UL
SAMPLE: ABNORMAL
SAMPLE: ABNORMAL
SODIUM BLD-SCNC: 139 MMOL/L (ref 136–145)
SODIUM BLD-SCNC: 140 MMOL/L (ref 136–145)
SODIUM SERPL-SCNC: 137 MMOL/L
WBC # BLD AUTO: 8.19 K/UL

## 2018-05-15 PROCEDURE — 97165 OT EVAL LOW COMPLEX 30 MIN: CPT

## 2018-05-15 PROCEDURE — 25000242 PHARM REV CODE 250 ALT 637 W/ HCPCS: Performed by: STUDENT IN AN ORGANIZED HEALTH CARE EDUCATION/TRAINING PROGRAM

## 2018-05-15 PROCEDURE — 25000003 PHARM REV CODE 250: Performed by: STUDENT IN AN ORGANIZED HEALTH CARE EDUCATION/TRAINING PROGRAM

## 2018-05-15 PROCEDURE — 63600175 PHARM REV CODE 636 W HCPCS: Performed by: THORACIC SURGERY (CARDIOTHORACIC VASCULAR SURGERY)

## 2018-05-15 PROCEDURE — 94761 N-INVAS EAR/PLS OXIMETRY MLT: CPT

## 2018-05-15 PROCEDURE — 85610 PROTHROMBIN TIME: CPT

## 2018-05-15 PROCEDURE — 84100 ASSAY OF PHOSPHORUS: CPT

## 2018-05-15 PROCEDURE — 99232 SBSQ HOSP IP/OBS MODERATE 35: CPT | Mod: ,,, | Performed by: NURSE PRACTITIONER

## 2018-05-15 PROCEDURE — 94640 AIRWAY INHALATION TREATMENT: CPT

## 2018-05-15 PROCEDURE — 20600001 HC STEP DOWN PRIVATE ROOM

## 2018-05-15 PROCEDURE — A4216 STERILE WATER/SALINE, 10 ML: HCPCS | Performed by: STUDENT IN AN ORGANIZED HEALTH CARE EDUCATION/TRAINING PROGRAM

## 2018-05-15 PROCEDURE — 85025 COMPLETE CBC W/AUTO DIFF WBC: CPT

## 2018-05-15 PROCEDURE — 63600175 PHARM REV CODE 636 W HCPCS: Performed by: STUDENT IN AN ORGANIZED HEALTH CARE EDUCATION/TRAINING PROGRAM

## 2018-05-15 PROCEDURE — 84132 ASSAY OF SERUM POTASSIUM: CPT

## 2018-05-15 PROCEDURE — 85730 THROMBOPLASTIN TIME PARTIAL: CPT

## 2018-05-15 PROCEDURE — 83735 ASSAY OF MAGNESIUM: CPT

## 2018-05-15 PROCEDURE — 99233 SBSQ HOSP IP/OBS HIGH 50: CPT | Mod: ,,, | Performed by: SURGERY

## 2018-05-15 PROCEDURE — 94799 UNLISTED PULMONARY SVC/PX: CPT

## 2018-05-15 PROCEDURE — 27000221 HC OXYGEN, UP TO 24 HOURS

## 2018-05-15 PROCEDURE — 80048 BASIC METABOLIC PNL TOTAL CA: CPT

## 2018-05-15 PROCEDURE — 97535 SELF CARE MNGMENT TRAINING: CPT

## 2018-05-15 PROCEDURE — 27100088 HC CELL SAVER

## 2018-05-15 RX ORDER — PANTOPRAZOLE SODIUM 40 MG/1
40 TABLET, DELAYED RELEASE ORAL
Status: DISCONTINUED | OUTPATIENT
Start: 2018-05-16 | End: 2018-05-15

## 2018-05-15 RX ORDER — METOPROLOL TARTRATE 25 MG/1
12.5 TABLET ORAL 2 TIMES DAILY
Status: DISCONTINUED | OUTPATIENT
Start: 2018-05-15 | End: 2018-05-16

## 2018-05-15 RX ORDER — IBUPROFEN 200 MG
24 TABLET ORAL
Status: DISCONTINUED | OUTPATIENT
Start: 2018-05-15 | End: 2018-05-19 | Stop reason: HOSPADM

## 2018-05-15 RX ORDER — ATORVASTATIN CALCIUM 20 MG/1
40 TABLET, FILM COATED ORAL NIGHTLY
Status: DISCONTINUED | OUTPATIENT
Start: 2018-05-15 | End: 2018-05-19 | Stop reason: HOSPADM

## 2018-05-15 RX ORDER — IBUPROFEN 200 MG
16 TABLET ORAL
Status: DISCONTINUED | OUTPATIENT
Start: 2018-05-15 | End: 2018-05-19 | Stop reason: HOSPADM

## 2018-05-15 RX ORDER — GLUCAGON 1 MG
1 KIT INJECTION
Status: DISCONTINUED | OUTPATIENT
Start: 2018-05-15 | End: 2018-05-19 | Stop reason: HOSPADM

## 2018-05-15 RX ORDER — MUPIROCIN 20 MG/G
1 OINTMENT TOPICAL 2 TIMES DAILY
Status: DISCONTINUED | OUTPATIENT
Start: 2018-05-15 | End: 2018-05-15

## 2018-05-15 RX ORDER — FUROSEMIDE 10 MG/ML
20 INJECTION INTRAMUSCULAR; INTRAVENOUS 2 TIMES DAILY
Status: DISCONTINUED | OUTPATIENT
Start: 2018-05-15 | End: 2018-05-16

## 2018-05-15 RX ORDER — ASPIRIN 325 MG
325 TABLET, DELAYED RELEASE (ENTERIC COATED) ORAL DAILY
Status: DISCONTINUED | OUTPATIENT
Start: 2018-05-16 | End: 2018-05-19 | Stop reason: HOSPADM

## 2018-05-15 RX ORDER — INSULIN ASPART 100 [IU]/ML
1-10 INJECTION, SOLUTION INTRAVENOUS; SUBCUTANEOUS
Status: DISCONTINUED | OUTPATIENT
Start: 2018-05-15 | End: 2018-05-19 | Stop reason: HOSPADM

## 2018-05-15 RX ADMIN — Medication 12.5 MG: at 08:05

## 2018-05-15 RX ADMIN — DOCUSATE SODIUM 100 MG: 50 CAPSULE, LIQUID FILLED ORAL at 08:05

## 2018-05-15 RX ADMIN — CEFAZOLIN 2 G: 1 INJECTION, POWDER, FOR SOLUTION INTRAMUSCULAR; INTRAVENOUS at 04:05

## 2018-05-15 RX ADMIN — OXYCODONE HYDROCHLORIDE 10 MG: 5 TABLET ORAL at 12:05

## 2018-05-15 RX ADMIN — Medication 3 ML: at 10:05

## 2018-05-15 RX ADMIN — FUROSEMIDE 20 MG: 10 INJECTION, SOLUTION INTRAMUSCULAR; INTRAVENOUS at 05:05

## 2018-05-15 RX ADMIN — OXYCODONE HYDROCHLORIDE 10 MG: 5 TABLET ORAL at 08:05

## 2018-05-15 RX ADMIN — POTASSIUM BICARBONATE 25 MEQ: 25 TABLET, EFFERVESCENT ORAL at 12:05

## 2018-05-15 RX ADMIN — IPRATROPIUM BROMIDE AND ALBUTEROL SULFATE 3 ML: .5; 3 SOLUTION RESPIRATORY (INHALATION) at 12:05

## 2018-05-15 RX ADMIN — IPRATROPIUM BROMIDE AND ALBUTEROL SULFATE 3 ML: .5; 3 SOLUTION RESPIRATORY (INHALATION) at 08:05

## 2018-05-15 RX ADMIN — IPRATROPIUM BROMIDE AND ALBUTEROL SULFATE 3 ML: .5; 3 SOLUTION RESPIRATORY (INHALATION) at 04:05

## 2018-05-15 RX ADMIN — ATORVASTATIN CALCIUM 40 MG: 20 TABLET, FILM COATED ORAL at 09:05

## 2018-05-15 RX ADMIN — CEFAZOLIN 2 G: 1 INJECTION, POWDER, FOR SOLUTION INTRAMUSCULAR; INTRAVENOUS at 12:05

## 2018-05-15 RX ADMIN — PANTOPRAZOLE SODIUM 40 MG: 40 TABLET, DELAYED RELEASE ORAL at 08:05

## 2018-05-15 RX ADMIN — Medication 3 ML: at 08:05

## 2018-05-15 RX ADMIN — MUPIROCIN 1 G: 20 OINTMENT TOPICAL at 08:05

## 2018-05-15 RX ADMIN — ASPIRIN 325 MG ORAL TABLET 325 MG: 325 PILL ORAL at 08:05

## 2018-05-15 RX ADMIN — POTASSIUM BICARBONATE 25 MEQ: 25 TABLET, EFFERVESCENT ORAL at 08:05

## 2018-05-15 RX ADMIN — POLYETHYLENE GLYCOL 3350 17 G: 17 POWDER, FOR SOLUTION ORAL at 08:05

## 2018-05-15 RX ADMIN — OXYCODONE HYDROCHLORIDE 10 MG: 5 TABLET ORAL at 04:05

## 2018-05-15 RX ADMIN — Medication 3 ML: at 06:05

## 2018-05-15 RX ADMIN — CEFAZOLIN 2 G: 1 INJECTION, POWDER, FOR SOLUTION INTRAMUSCULAR; INTRAVENOUS at 08:05

## 2018-05-15 NOTE — PLAN OF CARE
Problem: Occupational Therapy Goal  Goal: Occupational Therapy Goal  Goals to be met by: 5/25/18     Patient will increase functional independence with ADLs by performing:    Feeding with [place].  UE Dressing with Supervision.  LE Dressing with Supervision.  Grooming while standing at sink with Supervision.  Toileting from toilet with Supervision for hygiene and clothing management.   Toilet transfer to toilet with Supervision.    Outcome: Ongoing (interventions implemented as appropriate)  OT eval completed, and above goals established. WONG Chong  5/15/2018

## 2018-05-15 NOTE — ANESTHESIA POSTPROCEDURE EVALUATION
"Anesthesia Post Evaluation    Patient: Michael Espinoza    Procedure(s) Performed: Procedure(s) (LRB):  AORTOCORONARY BYPASS-CABG x 3 (N/A)  [place]-Vein-Endovascular (N/A)    Final Anesthesia Type: general  Patient location during evaluation: ICU  Patient participation: Yes- Able to Participate  Level of consciousness: awake and alert and oriented  Post-procedure vital signs: reviewed and stable  Pain management: adequate  Airway patency: patent  PONV status at discharge: No PONV  Anesthetic complications: no      Cardiovascular status: blood pressure returned to baseline and hemodynamically stable  Respiratory status: unassisted and spontaneous ventilation  Hydration status: euvolemic  Follow-up not needed.        Visit Vitals  BP 97/64 (BP Location: Left arm, Patient Position: Lying)   Pulse 96   Temp 36.6 °C (97.9 °F) (Oral)   Resp (!) 31   Ht 5' 7" (1.702 m)   Wt 76.2 kg (167 lb 15.9 oz)   SpO2 97%   BMI 26.31 kg/m²       Pain/Eduin Score: Pain Assessment Performed: Yes (5/15/2018  3:00 AM)  Presence of Pain: denies (5/15/2018  3:00 AM)  Pain Rating Prior to Med Admin: 7 (5/15/2018  4:15 AM)  Pain Rating Post Med Admin: 3 (5/15/2018  5:15 AM)      "

## 2018-05-15 NOTE — SUBJECTIVE & OBJECTIVE
"Interval HPI:   BG at or below goal.  On intensive overnight, rate 1.5 to 0.5 u/hr.  Afebrile.  No hypoglycemia.  On SF clears.    BP 97/64 (BP Location: Left arm, Patient Position: Lying)   Pulse 92   Temp 98.4 °F (36.9 °C) (Oral)   Resp (!) 2   Ht 5' 7" (1.702 m)   Wt 76.2 kg (167 lb 15.9 oz)   SpO2 96%   BMI 26.31 kg/m²     Labs Reviewed and Include      Recent Labs  Lab 05/15/18  0300   *   CALCIUM 8.2*      K 4.3   CO2 25      BUN 12   CREATININE 0.8     Lab Results   Component Value Date    WBC 8.19 05/15/2018    HGB 12.8 (L) 05/15/2018    HCT 37.4 (L) 05/15/2018    MCV 90 05/15/2018     (L) 05/15/2018     No results for input(s): TSH, FREET4 in the last 168 hours.  Lab Results   Component Value Date    HGBA1C 5.4 05/10/2018       Nutritional status:   Body mass index is 26.31 kg/m².  Lab Results   Component Value Date    ALBUMIN 4.0 05/10/2018    ALBUMIN 4.1 03/21/2018    ALBUMIN 3.6 11/15/2017     No results found for: PREALBUMIN    Estimated Creatinine Clearance: 89.5 mL/min (based on SCr of 0.8 mg/dL).    Accu-Checks  Recent Labs      05/14/18   2310  05/15/18   0006  05/15/18   0111  05/15/18   0200  05/15/18   0302  05/15/18   0403  05/15/18   0506  05/15/18   0558  05/15/18   0710  05/15/18   0805   POCTGLUCOSE  117*  107  117*  115*  115*  98  103  99  95  102       Current Medications and/or Treatments Impacting Glycemic Control  Immunotherapy:  Immunosuppressants     None        Steroids:   Hormones     None        Pressors:    Autonomic Drugs     Start     Stop Route Frequency Ordered    05/14/18 1345  epinephrine 4 mg in sodium chloride 0.9% 250 mL infusion     Question Answer Comment   Titrate by: (in mcg/kg/min) 0.02    Titrate interval: (in minutes) 5    Titrate to maintain: (SBP or MAP or Cardiac Index) MAP    Greater than: (in mmHg) 65    Cardiac index greater than: (in L/min) 2.2    Maximum dose: (in mcg/kg/min) 2        -- IV Continuous 05/14/18 1340    "     Hyperglycemia/Diabetes Medications: Antihyperglycemics     Start     Stop Route Frequency Ordered    05/15/18 0918  insulin aspart U-100 pen 1-10 Units      -- SubQ Before meals & nightly PRN 05/15/18 0818

## 2018-05-15 NOTE — ASSESSMENT & PLAN NOTE
Neuro  - Oxy PRN    CV  - CTs to suction  - HDS  - Metoprolol 12.5 BID    Pulm  - Satting well on NC    Renal  - Nur  - Strict I/Os  - Lasix 20 BID    FEN/GI  - Advance diet  - Fluid restriction 1500cc    Dispo  - Stepdown

## 2018-05-15 NOTE — PROGRESS NOTES
"[facility]  Endocrinology  Progress Note    Admit Date: 5/14/2018     Reason for Consult: Management of Hyperglycemia     Surgical Procedure and Date: s/p CABG x 3, [place] vein endovascular on 5/14/18      HPI:   Patient is a 62 y.o. male with no prior history of diabetes. He presented w/ 3V CAD. PMHx significant for for diffuse parenchymal lung disease. He is now s/p CABG x 3. Endocrinology consulted for optimal bg management.           Interval HPI:   BG at or below goal.  On intensive overnight, rate 1.5 to 0.5 u/hr.  Afebrile.  No hypoglycemia.  On SF clears.    BP 97/64 (BP Location: Left arm, Patient Position: Lying)   Pulse 92   Temp 98.4 °F (36.9 °C) (Oral)   Resp (!) 2   Ht 5' 7" (1.702 m)   Wt 76.2 kg (167 lb 15.9 oz)   SpO2 96%   BMI 26.31 kg/m²       Labs Reviewed and Include      Recent Labs  Lab 05/15/18  0300   *   CALCIUM 8.2*      K 4.3   CO2 25      BUN 12   CREATININE 0.8     Lab Results   Component Value Date    WBC 8.19 05/15/2018    HGB 12.8 (L) 05/15/2018    HCT 37.4 (L) 05/15/2018    MCV 90 05/15/2018     (L) 05/15/2018     No results for input(s): TSH, FREET4 in the last 168 hours.  Lab Results   Component Value Date    HGBA1C 5.4 05/10/2018       Nutritional status:   Body mass index is 26.31 kg/m².  Lab Results   Component Value Date    ALBUMIN 4.0 05/10/2018    ALBUMIN 4.1 03/21/2018    ALBUMIN 3.6 11/15/2017     No results found for: PREALBUMIN    Estimated Creatinine Clearance: 89.5 mL/min (based on SCr of 0.8 mg/dL).    Accu-Checks  Recent Labs      05/14/18   2310  05/15/18   0006  05/15/18   0111  05/15/18   0200  05/15/18   0302  05/15/18   0403  05/15/18   0506  05/15/18   0558  05/15/18   0710  05/15/18   0805   POCTGLUCOSE  117*  107  117*  115*  115*  98  103  99  95  102       Current Medications and/or Treatments Impacting Glycemic Control  Immunotherapy:  Immunosuppressants     None        Steroids:   Hormones     None    "     Pressors:    Autonomic Drugs     Start     Stop Route Frequency Ordered    05/14/18 1345  epinephrine 4 mg in sodium chloride 0.9% 250 mL infusion     Question Answer Comment   Titrate by: (in mcg/kg/min) 0.02    Titrate interval: (in minutes) 5    Titrate to maintain: (SBP or MAP or Cardiac Index) MAP    Greater than: (in mmHg) 65    Cardiac index greater than: (in L/min) 2.2    Maximum dose: (in mcg/kg/min) 2        -- IV Continuous 05/14/18 1340        Hyperglycemia/Diabetes Medications: Antihyperglycemics     Start     Stop Route Frequency Ordered    05/15/18 0918  insulin aspart U-100 pen 1-10 Units      -- SubQ Before meals & nightly PRN 05/15/18 0818          ASSESSMENT and PLAN    * CAD (coronary artery disease)    S/p CABG x 3 . Managed per cards.          Hyperglycemia      BG goal 110-140 mg/dl  BG at or below goal.  Change to transition at 0.5 u/hr  BG monitoring ac/hs/0200  Moderate dose correction scale.       Addendum: currently off insulin, only on correction scale, bg monitoring ac/hs  Will sign off today.    Discharge plans: anticipate damon resolution.             KELIN Ruelas, FNP  Endocrinology  [facility]

## 2018-05-15 NOTE — PLAN OF CARE
S/p CABG x3 on 5/14/18. CM visited with pt to discuss dc plan. Pt lives alone. Is independent. CM informed him of LOS and dc plan. No dc needs noted at this time. CM gave pt heart surgery pillow to use to splint incision to cough and deep breath.        05/15/18 1351   Discharge Assessment   Assessment Type Discharge Planning Assessment   Confirmed/corrected address and phone number on facesheet? Yes   Assessment information obtained from? Patient   Expected Length of Stay (days) 4.5   Prior to hospitilization cognitive status: Alert/Oriented   Prior to hospitalization functional status: Independent   Current cognitive status: Alert/Oriented   Current Functional Status: Independent   Lives With alone   Able to Return to Prior Arrangements yes   Is patient able to care for self after discharge? Yes   Who are your caregiver(s) and their phone number(s)? Gerard Espinoza brother [phone]   Patient's perception of discharge disposition home or selfcare   Readmission Within The Last 30 Days no previous admission in last 30 days   Patient currently being followed by outpatient case management? No   Patient currently receives any other outside agency services? No   Equipment Currently Used at Home none   Do you have any problems affording any of your prescribed medications? No   Is the patient taking medications as prescribed? yes   Does the patient have transportation home? Yes   Transportation Available family or friend will provide   Discharge Plan A Home   Discharge Plan B Home Health   Patient/Family In Agreement With Plan yes

## 2018-05-15 NOTE — ASSESSMENT & PLAN NOTE
BG goal 110-140 mg/dl  BG at or below goal.  Change to transition at 0.5 u/hr  BG monitoring ac/hs/0200  Moderate dose correction scale.   Discharge plans: anticipate damon resolution.

## 2018-05-15 NOTE — SUBJECTIVE & OBJECTIVE
Interval History/Significant Events: Patient seen and examined this AM. STELLA.     Follow-up For: Procedure(s) (LRB):  AORTOCORONARY BYPASS-CABG x 3 (N/A)  [place]-Vein-Endovascular (N/A)    Post-Operative Day: 1 Day Post-Op    Objective:     Vital Signs (Most Recent):  Temp: 97.9 °F (36.6 °C) (05/15/18 0300)  Pulse: 87 (05/15/18 0500)  Resp: 18 (05/15/18 0500)  BP: 91/61 (05/15/18 0500)  SpO2: 98 % (05/15/18 0500) Vital Signs (24h Range):  Temp:  [97.6 °F (36.4 °C)-98 °F (36.7 °C)] 97.9 °F (36.6 °C)  Pulse:  [61-96] 87  Resp:  [14-29] 18  SpO2:  [95 %-100 %] 98 %  BP: ()/(50-91) 91/61  Arterial Line BP: (100-119)/(56-70) 104/60     Weight: 76.2 kg (167 lb 15.9 oz)  Body mass index is 26.31 kg/m².      Intake/Output Summary (Last 24 hours) at 05/15/18 0537  Last data filed at 05/15/18 0500   Gross per 24 hour   Intake            575.3 ml   Output             2465 ml   Net          -1889.7 ml       Physical Exam    Vents:  Oxygen Concentration (%): 40 (05/15/18 0000)    Lines/Drains/Airways     Central Venous Catheter Line                 Percutaneous Central Line Insertion/Assessment - Quad lumen  05/14/18 0721 left subclavian less than 1 day         Percutaneous Central Line Insertion/Assessment - quad lumen  05/14/18 0721 left subclavian;other (see comments) less than 1 day          Drain                 Chest Tube 05/14/18 1243 3 Right Mediastinal 19 Fr. less than 1 day         Urethral Catheter 05/14/18 0720 Temperature probe;Straight-tip;Latex 14 Fr. less than 1 day         Y Chest Tube 1 and 2 05/14/18 1242 1 Right Pleural 19 Fr. 2 Left Pleural less than 1 day          Arterial Line                 Arterial Line 05/14/18 0706 Left Radial less than 1 day          Line                 Pacer Wires 05/14/18 1216 less than 1 day          Peripheral Intravenous Line                 Peripheral IV - Single Lumen 05/14/18 0552 Left Forearm less than 1 day                Significant Labs:    CBC/Anemia  Profile:    Recent Labs  Lab 05/14/18  1340 05/14/18  1346 05/15/18  0300   WBC 14.11*  --  8.19   HGB 13.1*  --  12.8*   HCT 38.4* 36 37.4*   *  --  118*   MCV 92  --  90   RDW 13.3  --  13.4        Chemistries:    Recent Labs  Lab 05/14/18  1340 05/14/18  2000 05/15/18  0300     --  137   K 4.4 4.4 4.3     --  106   CO2 23  --  25   BUN 11  --  12   CREATININE 0.8  --  0.8   CALCIUM 8.2*  --  8.2*   MG 2.5  2.5  --  2.0   PHOS 4.5  4.5  --  4.0

## 2018-05-15 NOTE — PT/OT/SLP EVAL
Occupational Therapy   Evaluation    Name: Michael Espinoza  MRN: [id number]  Admitting Diagnosis:  CAD (coronary artery disease) s/p CABG x 3 1 Day Post-Op    Recommendations:     Discharge Recommendations: home  Discharge Equipment Recommendations:  none  Barriers to discharge:  None    History:     Occupational Profile:  Living Environment: lives alone in [facility] with 4 LUZ MARIA and B HR  Previous level of function: (I) with ADL and ambulation  Roles and Routines: FT   Equipment Owned:  none  Assistance upon Discharge: neighbor/friend will be able to assist as needed    Past Medical History:   Diagnosis Date    Coronary artery disease of native artery of native heart with stable angina pectoris 4/10/2018    Hyperlipidemia        Past Surgical History:   Procedure Laterality Date    CYST REMOVAL      TONSILLECTOMY         Subjective     Chief Complaint: weakness  Patient/Family stated goals: to go home  Communicated with: RN prior to session.  Pain/Comfort:  · Pain Rating 1: 5/10  · Location - Side 1: Bilateral  · Location - Orientation 1: midline  · Location 1: sternal  · Pain Addressed 1: Pre-medicate for activity, Distraction  · Pain Rating Post-Intervention 1: 5/10    Patients cultural, spiritual, Methodist conflicts given the current situation: none reported    Objective:     Patient found with: arterial line, blood pressure cuff, central line, chest tube, pulse ox (continuous), telemetry, oxygen, stevens catheter (external pacer)    General Precautions: Standard, fall, sternal   Orthopedic Precautions:    Braces:       Occupational Performance:    Bed Mobility:    · NT as pt UIC    Functional Mobility/Transfers:  · Patient completed Sit <> Stand Transfer with minimum assistance  with  no assistive device from b/s chair  · Functional Mobility: Minimal A x 8 feet    Activities of Daily Living:  · Feeding:  stand by assistance    · Grooming: stand by assistance    · UB Dressing: moderate assistance    · LB  "Dressing: moderate assistance    · Toileting: maximal assistance      Cognitive/Visual Perceptual:  Oriented to: Person, Place, Time and Situation  Follows Commands/attention: Follows multistep  commands  Communication: clear/fluent  Memory:  No Deficits noted  Safety awareness/insight to disability: intact  Coping skills/emotional control: Appropriate to situation    Physical Exam:  Postural examination/scapula alignment:    -       No postural abnormalities identified  Sensation:    -       Intact  Upper Extremity Range of Motion:     -       Right Upper Extremity: WFL  -       Left Upper Extremity: WFL  Upper Extremity Strength:    -       Right Upper Extremity: WFL  -       Left Upper Extremity: WFL   Strength:    -       Right Upper Extremity: WFL  -       Left Upper Extremity: WFL  Fine Motor Coordination:    -       Intact  Gross motor coordination:   WFL    Patient left up in chair with all lines intact, call button in reach and RN notified    [facility] 6 Click:  [facility] Total Score: 14    Treatment & Education:  Pt ed on OT POC  Pt ed on sternal precautions during ADL; handout provided   Pt stood x 3 minutes with CGA for grooming/hygiene tasks  Education:    Assessment:     Michael Espinoza is a 62 y.o. male with a medical diagnosis of CAD (coronary artery disease).  He presents s/p CABG x 3 .  Performance deficits affecting function are weakness, impaired endurance, impaired self care skills, gait instability, impaired functional mobilty, impaired balance, decreased upper extremity function, pain, impaired cardiopulmonary response to activity.      Rehab Prognosis:  Good; patient would benefit from acute skilled OT services to address these deficits and reach maximum level of function.         Clinical Decision Makin.  OT Low:  "Pt evaluation falls under low complexity for evaluation coding due to performance deficits noted in 1-3 areas as stated above and 0 co-morbities affecting current functional " "status. Data obtained from problem focused assessments. No modifications or assistance was required for completion of evaluation. Only brief occupational profile and history review completed."     Plan:     Patient to be seen 5 x/week to address the above listed problems via self-care/home management, therapeutic activities, therapeutic exercises  · Plan of Care Expires: 06/14/18  · Plan of Care Reviewed with: patient    This Plan of care has been discussed with the patient who was involved in its development and understands and is in agreement with the identified goals and treatment plan    GOALS:    Occupational Therapy Goals        Problem: Occupational Therapy Goal    Goal Priority Disciplines Outcome Interventions   Occupational Therapy Goal     OT, PT/OT Ongoing (interventions implemented as appropriate)    Description:  Goals to be met by: 5/25/18     Patient will increase functional independence with ADLs by performing:    Feeding with [place].  UE Dressing with Supervision.  LE Dressing with Supervision.  Grooming while standing at sink with Supervision.  Toileting from toilet with Supervision for hygiene and clothing management.   Toilet transfer to toilet with Supervision.                      Time Tracking:     OT Date of Treatment: 05/15/18  OT Start Time: 1041  OT Stop Time: 1102  OT Total Time (min): 21 min    Billable Minutes:Evaluation 10  Therapeutic Activity 10    WONG Chong  5/15/2018    "

## 2018-05-15 NOTE — SUBJECTIVE & OBJECTIVE
Interval History: No acute event overnight. Doing well, OOBTC this am. Pain controlled.    Medications:  Continuous Infusions:   sodium chloride 0.9% 10 mL/hr at 05/14/18 0553    dextrose 5 % and 0.45 % NaCl with KCl 20 mEq 5 mL/hr at 05/15/18 1500    epinephrine Stopped (05/14/18 1345)     Scheduled Meds:   aspirin  325 mg Oral Daily    ceFAZolin (ANCEF) IVPB  2 g Intravenous Q8H    docusate sodium  100 mg Oral BID    furosemide  20 mg Intravenous BID    metoprolol tartrate  12.5 mg Oral BID    mupirocin  1 g Nasal BID    pantoprazole  40 mg Oral Daily    polyethylene glycol  17 g Oral Daily    potassium bicarbonate  25 mEq Oral BID    sodium chloride 0.9%  3 mL Intravenous Q8H     PRN Meds:acetaminophen, albuterol-ipratropium 2.5mg-0.5mg/3mL, bisacodyl, dextrose 50%, dextrose 50%, dextrose 50%, dextrose 50%, fentaNYL, fentaNYL **FOLLOWED BY** [START ON 5/19/2018] fentaNYL, glucagon (human recombinant), glucose, glucose, insulin aspart U-100, magnesium sulfate IVPB, magnesium sulfate IVPB, metoclopramide HCl, ondansetron, oxyCODONE, oxyCODONE, sodium phosphate IVPB, sodium phosphate IVPB, sodium phosphate IVPB     Objective:     Vital Signs (Most Recent):  Temp: 98.1 °F (36.7 °C) (05/15/18 1500)  Pulse: 93 (05/15/18 1515)  Resp: 10 (05/15/18 1515)  BP: 97/64 (05/15/18 0600)  SpO2: 97 % (05/15/18 1515) Vital Signs (24h Range):  Temp:  [97.9 °F (36.6 °C)-98.4 °F (36.9 °C)] 98.1 °F (36.7 °C)  Pulse:  [] 93  Resp:  [2-43] 10  SpO2:  [90 %-100 %] 97 %  BP: ()/(59-69) 97/64  Arterial Line BP: ()/() 107/64     Weight: 76.2 kg (167 lb 15.9 oz)  Body mass index is 26.31 kg/m².    SpO2: 97 %  O2 Device (Oxygen Therapy): nasal cannula    Intake/Output - Last 3 Shifts       05/13 0700 - 05/14 0659 05/14 0700 - 05/15 0659 05/15 0700 - 05/16 0659    I.V. (mL/kg)  746.3 (9.8)     Total Intake(mL/kg)  746.3 (9.8)     Urine (mL/kg/hr)  1975 (1.1) 250 (0.4)    Chest Tube  520 (0.3) 190 (0.3)     Total Output   2495 440    Net   -1748.7 -440                 Lines/Drains/Airways     Central Venous Catheter Line                 Percutaneous Central Line Insertion/Assessment - Quad lumen  05/14/18 0721 left subclavian 1 day         Percutaneous Central Line Insertion/Assessment - quad lumen  05/14/18 0721 left subclavian;other (see comments) 1 day          Drain                 Chest Tube 05/14/18 1243 3 Right Mediastinal 19 Fr. 1 day         Urethral Catheter 05/14/18 0720 Temperature probe;Straight-tip;Latex 14 Fr. 1 day         Y Chest Tube 1 and 2 05/14/18 1242 1 Right Pleural 19 Fr. 2 Left Pleural 1 day          Arterial Line                 Arterial Line 05/14/18 0706 Left Radial 1 day          Line                 Pacer Wires 05/14/18 1216 1 day          Peripheral Intravenous Line                 Peripheral IV - Single Lumen 05/14/18 0552 Left Forearm 1 day                Physical Exam   Constitutional: He appears well-developed.   Cardiovascular: Normal rate and regular rhythm.    Incision c/d/i   Pulmonary/Chest: Effort normal.   Abdominal: Soft.   Neurological: He is alert.   Skin: Skin is warm and dry.       Significant Labs:  CBC:   Recent Labs  Lab 05/15/18  0300   WBC 8.19   RBC 4.18*   HGB 12.8*   HCT 37.4*   *   MCV 90   MCH 30.6   MCHC 34.2     CMP:   Recent Labs  Lab 05/15/18  0300   *   CALCIUM 8.2*      K 4.3   CO2 25      BUN 12   CREATININE 0.8

## 2018-05-15 NOTE — PROGRESS NOTES
[facility]  Cardiothoracic Surgery  Progress Note    Patient Name: Michael Espinoza  MRN: [id number]  Admission Date: 5/14/2018  Hospital Length of Stay: 1 days  Code Status: Full Code   Attending Physician: Chad Edwards MD   Referring Provider: Chad Edwards MD  Principal Problem:CAD (coronary artery disease)    Subjective:     Post-Op Info:  Procedure(s) (LRB):  AORTOCORONARY BYPASS-CABG x 3 (N/A)  [place]-Vein-Endovascular (N/A)   1 Day Post-Op     Interval History: No acute event overnight. Doing well, OOBTC this am. Pain controlled.    Medications:  Continuous Infusions:   sodium chloride 0.9% 10 mL/hr at 05/14/18 0553    dextrose 5 % and 0.45 % NaCl with KCl 20 mEq 5 mL/hr at 05/15/18 1500    epinephrine Stopped (05/14/18 1345)     Scheduled Meds:   aspirin  325 mg Oral Daily    ceFAZolin (ANCEF) IVPB  2 g Intravenous Q8H    docusate sodium  100 mg Oral BID    furosemide  20 mg Intravenous BID    metoprolol tartrate  12.5 mg Oral BID    mupirocin  1 g Nasal BID    pantoprazole  40 mg Oral Daily    polyethylene glycol  17 g Oral Daily    potassium bicarbonate  25 mEq Oral BID    sodium chloride 0.9%  3 mL Intravenous Q8H     PRN Meds:acetaminophen, albuterol-ipratropium 2.5mg-0.5mg/3mL, bisacodyl, dextrose 50%, dextrose 50%, dextrose 50%, dextrose 50%, fentaNYL, fentaNYL **FOLLOWED BY** [START ON 5/19/2018] fentaNYL, glucagon (human recombinant), glucose, glucose, insulin aspart U-100, magnesium sulfate IVPB, magnesium sulfate IVPB, metoclopramide HCl, ondansetron, oxyCODONE, oxyCODONE, sodium phosphate IVPB, sodium phosphate IVPB, sodium phosphate IVPB     Objective:     Vital Signs (Most Recent):  Temp: 98.1 °F (36.7 °C) (05/15/18 1500)  Pulse: 93 (05/15/18 1515)  Resp: 10 (05/15/18 1515)  BP: 97/64 (05/15/18 0600)  SpO2: 97 % (05/15/18 1515) Vital Signs (24h Range):  Temp:  [97.9 °F (36.6 °C)-98.4 °F (36.9 °C)] 98.1 °F (36.7 °C)  Pulse:  [] 93  Resp:  [2-43]  10  SpO2:  [90 %-100 %] 97 %  BP: ()/(59-69) 97/64  Arterial Line BP: ()/() 107/64     Weight: 76.2 kg (167 lb 15.9 oz)  Body mass index is 26.31 kg/m².    SpO2: 97 %  O2 Device (Oxygen Therapy): nasal cannula    Intake/Output - Last 3 Shifts       05/13 0700 - 05/14 0659 05/14 0700 - 05/15 0659 05/15 0700 - 05/16 0659    I.V. (mL/kg)  746.3 (9.8)     Total Intake(mL/kg)  746.3 (9.8)     Urine (mL/kg/hr)  1975 (1.1) 250 (0.4)    Chest Tube  520 (0.3) 190 (0.3)    Total Output   2495 440    Net   -1748.7 -440                 Lines/Drains/Airways     Central Venous Catheter Line                 Percutaneous Central Line Insertion/Assessment - Quad lumen  05/14/18 0721 left subclavian 1 day         Percutaneous Central Line Insertion/Assessment - quad lumen  05/14/18 0721 left subclavian;other (see comments) 1 day          Drain                 Chest Tube 05/14/18 1243 3 Right Mediastinal 19 Fr. 1 day         Urethral Catheter 05/14/18 0720 Temperature probe;Straight-tip;Latex 14 Fr. 1 day         Y Chest Tube 1 and 2 05/14/18 1242 1 Right Pleural 19 Fr. 2 Left Pleural 1 day          Arterial Line                 Arterial Line 05/14/18 0706 Left Radial 1 day          Line                 Pacer Wires 05/14/18 1216 1 day          Peripheral Intravenous Line                 Peripheral IV - Single Lumen 05/14/18 0552 Left Forearm 1 day                Physical Exam   Constitutional: He appears well-developed.   Cardiovascular: Normal rate and regular rhythm.    Incision c/d/i   Pulmonary/Chest: Effort normal.   Abdominal: Soft.   Neurological: He is alert.   Skin: Skin is warm and dry.       Significant Labs:  CBC:   Recent Labs  Lab 05/15/18  0300   WBC 8.19   RBC 4.18*   HGB 12.8*   HCT 37.4*   *   MCV 90   MCH 30.6   MCHC 34.2     CMP:   Recent Labs  Lab 05/15/18  0300   *   CALCIUM 8.2*      K 4.3   CO2 25      BUN 12   CREATININE 0.8     Assessment/Plan:     * CAD (coronary  artery disease)    Neuro  - Oxy PRN    CV  - CTs to suction  - HDS  - Metoprolol 12.5 BID    Pulm  - Satting well on NC    Renal  - Nur  - Strict I/Os  - Lasix 20 BID    FEN/GI  - Advance diet  - Fluid restriction 1500cc    Dispo  - Stepdown            Yolanda Marques MD  Cardiothoracic Surgery  [facility]

## 2018-05-15 NOTE — PLAN OF CARE
Problem: Patient Care Overview  Goal: Plan of Care Review  Hx: HLD, Gilbert's syndrome, parenchymal lung disease, ASD    5/14: CABG x3, extubated       Outcome: Ongoing (interventions implemented as appropriate)  Patient resting comfortably up in chair on 3L nasal cannula. HR  - Ventricular wires hooked up to pacer at backup rate of 40, pacing not needed throughout shift. SBP <160. Pain well controlled with Oxy10 Q4H. Urine output 15-40mL/hr - 20mg lasix IVP given x1. Minimal serosanguinous output from medial chest tube, small amount of serosanguinous output from pleural chest tube - see flowsheets for specific volumes. Plan to continue to encourage mobility and transfer to stepdown when a bed becomes available. Patient updated regarding plan of care - questions answered. Will continue to monitor.

## 2018-05-15 NOTE — ASSESSMENT & PLAN NOTE
Michael Espinoza is a 62 y.o. male w/ a significant PMHx of HLD, Gilbert's syndrome, parenchymal lung disease, ASD, and three vessel CAD who presented to the SICU s/p CABG x3.          CAD (coronary artery disease)     Plan:  Neuro:  - Awake and alert; somewhat disoriented postop; continue to monitor for improvement      Cardiac:  - Off pressors; hemodynamically stable  - POD 0 from CABG x3  - MAP goal greater than 65     Pulmonary:  - Extubated and satting well on O2 mask  - PRN O2 nasal cannula  - Respiratory acidosis upon arrival to SICU; pH 7.183, pCO2 65.4, pO2 82, HCO3 24.6, O2 Sat 92%; Continue to monitor with PRN ABG's     Renal:  - Preop BUN/Crt was 14/0.9  - Monitor I/O's closely  - Daily CMP's      Hematology/Oncology:  - H/H preop of 13.1/38.4  - Daily H/H's  - No need for intraop blood products     ID:  - Postop cefazolin  - F/u WBC     Endocrine:  - F/u POCT glucose  - Endocrine consulted; insulin gtt per endocrine     FEN/GI:  - NPO; advance diet as tolerated  - Replace electrolytes PRN     Dispo: Continue SICU care; CTS primary

## 2018-05-15 NOTE — PROGRESS NOTES
[facility]  Critical Care - Surgery  Progress Note    Patient Name: Michael Espinoza  MRN: [id number]  Admission Date: 5/14/2018  Hospital Length of Stay: 1 days  Code Status: Full Code  Attending Provider: Chad Edwards MD  Primary Care Provider: Primary Doctor No   Principal Problem: <principal problem not specified>    Subjective:     Interval History/Significant Events: Patient seen and examined this AM. NAEON. Extubated and off pressors.     Follow-up For: Procedure(s) (LRB):  AORTOCORONARY BYPASS-CABG x 3 (N/A)  [place]-Vein-Endovascular (N/A)    Post-Operative Day: 1 Day Post-Op    Objective:     Vital Signs (Most Recent):  Temp: 97.9 °F (36.6 °C) (05/15/18 0300)  Pulse: 87 (05/15/18 0500)  Resp: 18 (05/15/18 0500)  BP: 91/61 (05/15/18 0500)  SpO2: 98 % (05/15/18 0500) Vital Signs (24h Range):  Temp:  [97.6 °F (36.4 °C)-98 °F (36.7 °C)] 97.9 °F (36.6 °C)  Pulse:  [61-96] 87  Resp:  [14-29] 18  SpO2:  [95 %-100 %] 98 %  BP: ()/(50-91) 91/61  Arterial Line BP: (100-119)/(56-70) 104/60     Weight: 76.2 kg (167 lb 15.9 oz)  Body mass index is 26.31 kg/m².      Intake/Output Summary (Last 24 hours) at 05/15/18 0537  Last data filed at 05/15/18 0500   Gross per 24 hour   Intake            575.3 ml   Output             2465 ml   Net          -1889.7 ml       Physical Exam    Vents:  Oxygen Concentration (%): 40 (05/15/18 0000)    Lines/Drains/Airways     Central Venous Catheter Line                 Percutaneous Central Line Insertion/Assessment - Quad lumen  05/14/18 0721 left subclavian less than 1 day         Percutaneous Central Line Insertion/Assessment - quad lumen  05/14/18 0721 left subclavian;other (see comments) less than 1 day          Drain                 Chest Tube 05/14/18 1243 3 Right Mediastinal 19 Fr. less than 1 day         Urethral Catheter 05/14/18 0720 Temperature probe;Straight-tip;Latex 14 Fr. less than 1 day         Y Chest Tube 1 and 2 05/14/18 1242 1 Right Pleural  19 Fr. 2 Left Pleural less than 1 day          Arterial Line                 Arterial Line 05/14/18 0706 Left Radial less than 1 day          Line                 Pacer Wires 05/14/18 1216 less than 1 day          Peripheral Intravenous Line                 Peripheral IV - Single Lumen 05/14/18 0552 Left Forearm less than 1 day                Significant Labs:    CBC/Anemia Profile:    Recent Labs  Lab 05/14/18  1340 05/14/18  1346 05/15/18  0300   WBC 14.11*  --  8.19   HGB 13.1*  --  12.8*   HCT 38.4* 36 37.4*   *  --  118*   MCV 92  --  90   RDW 13.3  --  13.4        Chemistries:    Recent Labs  Lab 05/14/18  1340 05/14/18  2000 05/15/18  0300     --  137   K 4.4 4.4 4.3     --  106   CO2 23  --  25   BUN 11  --  12   CREATININE 0.8  --  0.8   CALCIUM 8.2*  --  8.2*   MG 2.5  2.5  --  2.0   PHOS 4.5  4.5  --  4.0         Assessment/Plan:     CAD (coronary artery disease)    Michael Espinoza is a 62 y.o. male w/ a significant PMHx of HLD, Gilbert's syndrome, parenchymal lung disease, ASD, and three vessel CAD who presented to the SICU s/p CABG x3.          CAD (coronary artery disease)     Plan:  Neuro:  - Awake and alert; Off sedation      Cardiac:  - Off pressors; hemodynamically stable  - POD 1 from CABG x3  - MAP goal greater than 65     Pulmonary:  - Extubated and satting well on O2 mask  - PRN O2 nasal cannula  - Last ABG: pH 7.322, pCO2 47.9, pO2 213, HCO3 24.8, O2Sat 100     Renal:  - Preop BUN/Crt was 12/0.8  - Monitor I/O's closely  - Daily CMP's      Hematology/Oncology:  - H/H preop of 12/37  - Daily H/H's  - No need for intraop blood products     ID:  - Postop cefazolin  - WBC 8     Endocrine:  - F/u POCT glucose  - Endocrine consulted; insulin gtt per endocrine     FEN/GI:  - NPO; advance diet as tolerated  - Replace electrolytes PRN     Dispo: Continue SICU care; CTS primary                Critical care was time spent personally by me on the following activities: development of  treatment plan with patient or surrogate and bedside caregivers, discussions with consultants, evaluation of patient's response to treatment, examination of patient, ordering and performing treatments and interventions, ordering and review of laboratory studies, ordering and review of radiographic studies, pulse oximetry, re-evaluation of patient's condition.  This critical care time did not overlap with that of any other provider or involve time for any procedures.     Kit Taylor MD  Critical Care - Surgery  [facility]

## 2018-05-16 PROBLEM — R73.9 HYPERGLYCEMIA: Status: RESOLVED | Noted: 2018-05-16 | Resolved: 2018-05-16

## 2018-05-16 PROBLEM — I25.10 CAD (CORONARY ARTERY DISEASE): Status: RESOLVED | Noted: 2018-05-14 | Resolved: 2018-05-16

## 2018-05-16 PROBLEM — R73.9 HYPERGLYCEMIA: Status: RESOLVED | Noted: 2018-05-14 | Resolved: 2018-05-16

## 2018-05-16 LAB
ANION GAP SERPL CALC-SCNC: 7 MMOL/L
APTT BLDCRRT: 23.3 SEC
BASOPHILS # BLD AUTO: 0.01 K/UL
BASOPHILS NFR BLD: 0.1 %
BUN SERPL-MCNC: 12 MG/DL
CALCIUM SERPL-MCNC: 8.6 MG/DL
CHLORIDE SERPL-SCNC: 97 MMOL/L
CO2 SERPL-SCNC: 28 MMOL/L
CREAT SERPL-MCNC: 0.7 MG/DL
DIFFERENTIAL METHOD: ABNORMAL
EOSINOPHIL # BLD AUTO: 0 K/UL
EOSINOPHIL NFR BLD: 0.1 %
ERYTHROCYTE [DISTWIDTH] IN BLOOD BY AUTOMATED COUNT: 13.2 %
EST. GFR  (AFRICAN AMERICAN): >60 ML/MIN/1.73 M^2
EST. GFR  (NON AFRICAN AMERICAN): >60 ML/MIN/1.73 M^2
GLUCOSE SERPL-MCNC: 111 MG/DL
HCT VFR BLD AUTO: 37.8 %
HGB BLD-MCNC: 13 G/DL
IMM GRANULOCYTES # BLD AUTO: 0.03 K/UL
IMM GRANULOCYTES NFR BLD AUTO: 0.4 %
INR PPP: 1.2
LYMPHOCYTES # BLD AUTO: 0.8 K/UL
LYMPHOCYTES NFR BLD: 10.4 %
MAGNESIUM SERPL-MCNC: 1.7 MG/DL
MCH RBC QN AUTO: 31.2 PG
MCHC RBC AUTO-ENTMCNC: 34.4 G/DL
MCV RBC AUTO: 91 FL
MONOCYTES # BLD AUTO: 0.8 K/UL
MONOCYTES NFR BLD: 9.5 %
NEUTROPHILS # BLD AUTO: 6.5 K/UL
NEUTROPHILS NFR BLD: 79.5 %
NRBC BLD-RTO: 0 /100 WBC
PHOSPHATE SERPL-MCNC: 2.3 MG/DL
PLATELET # BLD AUTO: 122 K/UL
PMV BLD AUTO: 9.9 FL
POCT GLUCOSE: 91 MG/DL (ref 70–110)
POTASSIUM SERPL-SCNC: 4.5 MMOL/L
PROTHROMBIN TIME: 11.9 SEC
RBC # BLD AUTO: 4.17 M/UL
SODIUM SERPL-SCNC: 132 MMOL/L
WBC # BLD AUTO: 8.11 K/UL

## 2018-05-16 PROCEDURE — 85610 PROTHROMBIN TIME: CPT

## 2018-05-16 PROCEDURE — 97161 PT EVAL LOW COMPLEX 20 MIN: CPT

## 2018-05-16 PROCEDURE — 85025 COMPLETE CBC W/AUTO DIFF WBC: CPT

## 2018-05-16 PROCEDURE — 83735 ASSAY OF MAGNESIUM: CPT

## 2018-05-16 PROCEDURE — 80048 BASIC METABOLIC PNL TOTAL CA: CPT

## 2018-05-16 PROCEDURE — 25000003 PHARM REV CODE 250: Performed by: NURSE PRACTITIONER

## 2018-05-16 PROCEDURE — 25000003 PHARM REV CODE 250: Performed by: STUDENT IN AN ORGANIZED HEALTH CARE EDUCATION/TRAINING PROGRAM

## 2018-05-16 PROCEDURE — 63600175 PHARM REV CODE 636 W HCPCS: Performed by: THORACIC SURGERY (CARDIOTHORACIC VASCULAR SURGERY)

## 2018-05-16 PROCEDURE — 20600001 HC STEP DOWN PRIVATE ROOM

## 2018-05-16 PROCEDURE — 63600175 PHARM REV CODE 636 W HCPCS: Performed by: NURSE PRACTITIONER

## 2018-05-16 PROCEDURE — 85730 THROMBOPLASTIN TIME PARTIAL: CPT

## 2018-05-16 PROCEDURE — A4216 STERILE WATER/SALINE, 10 ML: HCPCS | Performed by: STUDENT IN AN ORGANIZED HEALTH CARE EDUCATION/TRAINING PROGRAM

## 2018-05-16 PROCEDURE — 84100 ASSAY OF PHOSPHORUS: CPT

## 2018-05-16 PROCEDURE — 97116 GAIT TRAINING THERAPY: CPT

## 2018-05-16 RX ORDER — FUROSEMIDE 10 MG/ML
40 INJECTION INTRAMUSCULAR; INTRAVENOUS 2 TIMES DAILY
Status: DISCONTINUED | OUTPATIENT
Start: 2018-05-16 | End: 2018-05-17

## 2018-05-16 RX ORDER — SODIUM,POTASSIUM PHOSPHATES 280-250MG
2 POWDER IN PACKET (EA) ORAL
Status: DISPENSED | OUTPATIENT
Start: 2018-05-16 | End: 2018-05-17

## 2018-05-16 RX ORDER — METOPROLOL TARTRATE 25 MG/1
25 TABLET, FILM COATED ORAL 2 TIMES DAILY
Status: DISCONTINUED | OUTPATIENT
Start: 2018-05-16 | End: 2018-05-17

## 2018-05-16 RX ORDER — MAGNESIUM SULFATE HEPTAHYDRATE 40 MG/ML
2 INJECTION, SOLUTION INTRAVENOUS ONCE
Status: COMPLETED | OUTPATIENT
Start: 2018-05-16 | End: 2018-05-16

## 2018-05-16 RX ADMIN — OXYCODONE HYDROCHLORIDE 10 MG: 5 TABLET ORAL at 06:05

## 2018-05-16 RX ADMIN — METOPROLOL TARTRATE 25 MG: 25 TABLET ORAL at 08:05

## 2018-05-16 RX ADMIN — OXYCODONE HYDROCHLORIDE 10 MG: 5 TABLET ORAL at 12:05

## 2018-05-16 RX ADMIN — ATORVASTATIN CALCIUM 40 MG: 20 TABLET, FILM COATED ORAL at 08:05

## 2018-05-16 RX ADMIN — CEFAZOLIN 2 G: 1 INJECTION, POWDER, FOR SOLUTION INTRAMUSCULAR; INTRAVENOUS at 04:05

## 2018-05-16 RX ADMIN — FUROSEMIDE 40 MG: 10 INJECTION, SOLUTION INTRAMUSCULAR; INTRAVENOUS at 05:05

## 2018-05-16 RX ADMIN — FUROSEMIDE 40 MG: 10 INJECTION, SOLUTION INTRAMUSCULAR; INTRAVENOUS at 08:05

## 2018-05-16 RX ADMIN — POTASSIUM & SODIUM PHOSPHATES POWDER PACK 280-160-250 MG 2 PACKET: 280-160-250 PACK at 05:05

## 2018-05-16 RX ADMIN — POTASSIUM & SODIUM PHOSPHATES POWDER PACK 280-160-250 MG 2 PACKET: 280-160-250 PACK at 12:05

## 2018-05-16 RX ADMIN — MAGNESIUM SULFATE IN WATER 2 G: 40 INJECTION, SOLUTION INTRAVENOUS at 09:05

## 2018-05-16 RX ADMIN — OXYCODONE HYDROCHLORIDE 5 MG: 5 TABLET ORAL at 10:05

## 2018-05-16 RX ADMIN — POTASSIUM & SODIUM PHOSPHATES POWDER PACK 280-160-250 MG 2 PACKET: 280-160-250 PACK at 08:05

## 2018-05-16 RX ADMIN — MUPIROCIN 1 G: 20 OINTMENT TOPICAL at 08:05

## 2018-05-16 RX ADMIN — DOCUSATE SODIUM 100 MG: 50 CAPSULE, LIQUID FILLED ORAL at 08:05

## 2018-05-16 RX ADMIN — ASPIRIN 325 MG: 325 TABLET, DELAYED RELEASE ORAL at 08:05

## 2018-05-16 RX ADMIN — ONDANSETRON 4 MG: 4 TABLET, ORALLY DISINTEGRATING ORAL at 01:05

## 2018-05-16 RX ADMIN — Medication 3 ML: at 08:05

## 2018-05-16 RX ADMIN — Medication 3 ML: at 04:05

## 2018-05-16 RX ADMIN — PANTOPRAZOLE SODIUM 40 MG: 40 TABLET, DELAYED RELEASE ORAL at 08:05

## 2018-05-16 RX ADMIN — POLYETHYLENE GLYCOL 3350 17 G: 17 POWDER, FOR SOLUTION ORAL at 08:05

## 2018-05-16 NOTE — PLAN OF CARE
Problem: Patient Care Overview  Goal: Plan of Care Review  Hx: HLD, Gilbert's syndrome, parenchymal lung disease, ASD    5/14: CABG x3, extubated       Outcome: Revised  Plan of care discussed with patient.  Patient ambulating with therapy. fall precautions in place.Continuing to encourage sternal precautions, IS, and ambulation. Pain management with PRN pain medication.   2g Mg replacement given.   Discussed medications and care. Patient has no questions at this time. Will continue to monitor.

## 2018-05-16 NOTE — PLAN OF CARE
Problem: Physical Therapy Goal  Goal: Physical Therapy Goal  Goals to be met by: 18     Patient will increase functional independence with mobility by performin. Supine to sit with [place]  2. Sit to stand transfer with Supervision  3. Gait  x 550 feet with Supervision   4. Ascend/descend 5 stair with bilateral Handrails Supervision  Eval completed and goals appropriate. Luz Burnett, SPT 2018

## 2018-05-16 NOTE — PT/OT/SLP EVAL
Physical Therapy Evaluation    Patient Name:  Michael Espinoza   MRN:  [id number]    Recommendations:     Discharge Recommendations:  home   Discharge Equipment Recommendations: none   Barriers to discharge: None    Assessment:     Michael Espinoza is a 62 y.o. male admitted with a medical diagnosis of CAD (coronary artery disease).  He presents with the following impairments/functional limitations:  impaired endurance, gait instability, impaired functional mobilty Pt did well with treatment of gait training and would benefit from continued skilled PT to return to highest level function possible. D/c home with no DME needs. CABG done 5/14/18.    Rehab Prognosis:  great; patient would benefit from acute skilled PT services to address these deficits and reach maximum level of function.      Recent Surgery: Procedure(s) (LRB):  AORTOCORONARY BYPASS-CABG x 3 (N/A)  [place]-Vein-Endovascular (N/A) 2 Days Post-Op    Plan:     During this hospitalization, patient to be seen 5 x/week to address the above listed problems via gait training, therapeutic activities  · Plan of Care Expires:  06/13/18   Plan of Care Reviewed with: patient    Subjective     Communicated with nurse prior to session.  Patient found sitting in chair upon PT entry to room, agreeable to evaluation.      Chief Complaint: slight pain during treatment  Patient comments/goals: to get better and to go home.  Pain/Comfort:  · Pain Rating 1: 5/10  · Location 1:  (chest)    Patients cultural, spiritual, Methodist conflicts given the current situation: none    Living Environment:  Pt lives independently in [facility] with [address] with [facility]. Pt has family and friends that are able to help if needed. Pt is a  and will have summers off.   Prior to admission, patients level of function was independent.  Patient has the following equipment: none.  DME owned (not currently used): none.  Upon discharge, patient will have assistance from family and friends.    Objective:      Patient found with: telemetry, oxygen, central line, chest tube, peripheral IV     General Precautions: Standard, fall, sternal   Orthopedic Precautions:    Braces:       Exams:  · Cognitive Exam:  Patient is oriented to Person, Place, Time and Situation and follows    · RLE ROM: WFL  · RLE Strength: WFL  · LLE ROM: WFL  · LLE Strength: WFL    Functional Mobility:  · Transfers: provided verbal cues to bring arms to chest for sit to stand    · Sit to Stand:  contact guard assistance with no AD  · Gait: Pt walked 230 ft with CGA with O2 intact.    AM-PAC 6 CLICK MOBILITY  Total Score:18       Therapeutic Activities and Exercises:   Education on sternal precautions with verbal and written instructions. Pt verbally expressed understanding of such.    Patient left up in chair with all lines intact and call button in reach.    GOALS:    Physical Therapy Goals        Problem: Physical Therapy Goal    Goal Priority Disciplines Outcome Goal Variances Interventions   Physical Therapy Goal     PT/OT, PT      Description:  Goals to be met by: 18     Patient will increase functional independence with mobility by performin. Supine to sit with [place]  2. Sit to stand transfer with Supervision  3. Gait  x 550 feet with Supervision   4. Ascend/descend 5 stair with bilateral Handrails Supervision                    History:     Past Medical History:   Diagnosis Date    Coronary artery disease of native artery of native heart with stable angina pectoris 4/10/2018    Hyperlipidemia        Past Surgical History:   Procedure Laterality Date    CYST REMOVAL      TONSILLECTOMY         Clinical Decision Making:     History  Co-morbidities and personal factors that may impact the plan of care Examination  Body Structures and Functions, activity limitations and participation restrictions that may impact the plan of care Clinical Presentation   Decision Making/ Complexity Score   Co-morbidities:   [] Time since onset  of injury / illness / exacerbation  [] Status of current condition  []Patient's cognitive status and safety concerns    [] Multiple Medical Problems (see med hx)  Personal Factors:   [] Patient's age  [] Prior Level of function   [] Patient's home situation (environment and family support)  [] Patient's level of motivation  [] Expected progression of patient      HISTORY:(criteria)    [] 43119 - no personal factors/history    [] 75267 - has 1-2 personal factor/comorbidity     [] 27863 - has >3 personal factor/comorbidity     Body Regions:  [] Objective examination findings  [] Head     []  Neck  [] Trunk   [] Upper Extremity  [] Lower Extremity    Body Systems:  [] For communication ability, affect, cognition, language, and learning style: the assessment of the ability to make needs known, consciousness, orientation (person, place, and time), expected emotional /behavioral responses, and learning preferences (eg, learning barriers, education  needs)  [] For the neuromuscular system: a general assessment of gross coordinated movement (eg, balance, gait, locomotion, transfers, and transitions) and motor function  (motor control and motor learning)  [] For the musculoskeletal system: the assessment of gross symmetry, gross range of motion, gross strength, height, and weight  [] For the integumentary system: the assessment of pliability(texture), presence of scar formation, skin color, and skin integrity  [] For cardiovascular/pulmonary system: the assessment of heart rate, respiratory rate, blood pressure, and edema     Activity limitations:    [] Patient's cognitive status and saf ety concerns          [] Status of current condition      [] Weight bearing restriction  [] Cardiopulmunary Restriction    Participation Restrictions:   [] Goals and goal agreement with the patient     [] Rehab potential (prognosis) and probable outcome      Examination of Body System: (criteria)    [] 54508 - addressing 1-2 elements    []  99131 - addressing a total of 3 or more elements     [] 38926 -  Addressing a total of 4 or more elements         Clinical Presentation: (criteria)  Choose one     On examination of body system using standardized tests and measures patient presents with (CHOOSE ONE) elements from any of the following: body structures and functions, activity limitations, and/or participation restrictions.  Leading to a clinical presentation that is considered (CHOOSE ONE)                              Clinical Decision Making  (Eval Complexity):  Choose One     Time Tracking:     PT Received On: 05/16/18  PT Start Time: 0958     PT Stop Time: 1015  PT Total Time (min): 17 min     Billable Minutes: Evaluation 8 minutes and Gait Training 9 minutes      Luz Burnett, ARIAN  05/16/2018

## 2018-05-16 NOTE — HOSPITAL COURSE
On 5/14/18, the patient was taken to the Operating Room for the above stated procedure. Please see the previously dictated operative report for complete details. Postoperatively, the patient was taken from the  Operating Room to the ICU where the vital signs were monitored and pain was kept under control. The patient was weaned from the drips and extubated in the ICU per protocol. Once hemodynamically stable, the patient was transferred to the Cardiac Step-Down floor for continued strengthening and ambulation. On postoperative day ***, the patient was ready for discharge to home. At the time of discharge, the patient was ambulating unassisted. Pain was well controlled with oral analgesics and the patient was tolerating the diet.     MOBILITY AND ACTIVITY: As tolerated. Patient may shower. No heavy lifting of greater than 5 pounds and no driving.     DIET: An 1800-calorie ADA with a 1500 mL fluid restriction.     WOUND CARE INSTRUCTIONS: Check for redness, swelling and drainage around the  incision or wound. Patient is to call for any obvious bleeding, drainage, pus from the wound, unusual problems or difficulties or temperature of greater than 101   degrees.     FOLLOWUP: Follow up with Dr. Edwards in approximately 3 weeks. Prior to this  appointment, the patient will have a chest x-ray and EKG.     Patient not placed on Lasix or Ace-Inhibitor at the time of discharge due to potential for hypotension.       DISCHARGE CONDITION: At the time of discharge, the patient was in sinus rhythm and afebrile with stable vital signs.

## 2018-05-16 NOTE — PLAN OF CARE
Problem: Patient Care Overview  Goal: Plan of Care Review  Hx: HLD, Gilbert's syndrome, parenchymal lung disease, ASD    5/14: CABG x3, extubated       Outcome: Ongoing (interventions implemented as appropriate)  Patient resting quietly and with c/o pain.  Medicated per PRN orders with adequate analgesia verbalized by patient.  Post operative instructions reviewed with patient including sternal precautions, fall precautions, use of incentive spirometer every hour during the day followed by coughing and deep breathing exercises.  Patient verbalized understanding.  Also instructed to only get out of bed with assistance from staff.  Patient verbalized understanding.  Will continue to monitor.

## 2018-05-16 NOTE — ASSESSMENT & PLAN NOTE
Contributing Nutrition Diagnosis  Inadequate energy intake    Related to (etiology):   Early satiety    Signs and Symptoms (as evidenced by):   Intake <25% of meals     Interventions/Recommendations (treatment strategy):  See recs.    Nutrition Diagnosis Status:   New

## 2018-05-16 NOTE — SUBJECTIVE & OBJECTIVE
Interval History: No acute events overnight. NSR/ST on monitor.      Medications:  Continuous Infusions:  Scheduled Meds:   aspirin  325 mg Oral Daily    atorvastatin  40 mg Oral QHS    docusate sodium  100 mg Oral BID    furosemide  40 mg Intravenous BID    metoprolol tartrate  25 mg Oral BID    mupirocin  1 g Nasal BID    pantoprazole  40 mg Oral Daily    polyethylene glycol  17 g Oral Daily    potassium, sodium phosphates  2 packet Oral Q4H    sodium chloride 0.9%  3 mL Intravenous Q8H     PRN Meds:albuterol-ipratropium 2.5mg-0.5mg/3mL, bisacodyl, dextrose 50%, dextrose 50%, glucagon (human recombinant), glucose, glucose, insulin aspart U-100, metoclopramide HCl, ondansetron, oxyCODONE, oxyCODONE     Objective:     Vital Signs (Most Recent):  Temp: 97.3 °F (36.3 °C) (05/16/18 1250)  Pulse: 96 (05/16/18 1400)  Resp: 17 (05/16/18 1250)  BP: 94/68 (05/16/18 1250)  SpO2: 96 % (05/16/18 1250) Vital Signs (24h Range):  Temp:  [97.3 °F (36.3 °C)-98.3 °F (36.8 °C)] 97.3 °F (36.3 °C)  Pulse:  [] 96  Resp:  [7-40] 17  SpO2:  [91 %-98 %] 96 %  BP: ()/(68-78) 94/68  Arterial Line BP: (102-129)/(61-78) 115/68     Weight: 76.2 kg (167 lb 15.9 oz)  Body mass index is 26.31 kg/m².    SpO2: 96 %  O2 Device (Oxygen Therapy): nasal cannula    Intake/Output - Last 3 Shifts       05/14 0700 - 05/15 0659 05/15 0700 - 05/16 0659 05/16 0700 - 05/17 0659    P.O.  270 540    I.V. (mL/kg) 746.3 (9.8) 50 (0.7) 50 (0.7)    Total Intake(mL/kg) 746.3 (9.8) 320 (4.2) 590 (7.7)    Urine (mL/kg/hr) 1975 (1.1) 1915 (1) 475 (0.7)    Chest Tube 520 (0.3) 300 (0.2)     Total Output 2495 2215 475    Net -1748.7 -1895 +115                 Lines/Drains/Airways     Central Venous Catheter Line                 Percutaneous Central Line Insertion/Assessment - Quad lumen  05/14/18 0721 left subclavian 2 days         Percutaneous Central Line Insertion/Assessment - quad lumen  05/14/18 0721 left subclavian;other (see comments) 2 days           Drain                 Chest Tube 05/14/18 1243 3 Right Mediastinal 19 Fr. 2 days         Y Chest Tube 1 and 2 05/14/18 1242 1 Right Pleural 19 Fr. 2 Left Pleural 2 days          Line                 Pacer Wires 05/14/18 1216 2 days          Peripheral Intravenous Line                 Peripheral IV - Single Lumen 05/14/18 0552 Left Forearm 2 days                Physical Exam   Constitutional: He is oriented to person, place, and time. He appears well-developed and well-nourished.   HENT:   Head: Normocephalic and atraumatic.   Eyes: Pupils are equal, round, and reactive to light.   Neck: Normal range of motion. Neck supple.   Cardiovascular: Normal rate, regular rhythm and normal heart sounds.    Pulmonary/Chest: Effort normal and breath sounds normal.   Abdominal: Soft. Bowel sounds are normal.   Musculoskeletal: Normal range of motion.   Neurological: He is alert and oriented to person, place, and time.   Skin: Skin is warm and dry.   Psychiatric: He has a normal mood and affect. His behavior is normal.       Significant Labs:  CBC:   Recent Labs  Lab 05/16/18  0407   WBC 8.11   RBC 4.17*   HGB 13.0*   HCT 37.8*   *   MCV 91   MCH 31.2*   MCHC 34.4     CMP:   Recent Labs  Lab 05/16/18  0407   *   CALCIUM 8.6*   *   K 4.5   CO2 28   CL 97   BUN 12   CREATININE 0.7       Significant Diagnostics:  CXR: I have reviewed all pertinent results/findings within the past 24 hours

## 2018-05-16 NOTE — ASSESSMENT & PLAN NOTE
--Continue ASA, increase BB, statin  --Sternal precautions  --PT/OT  --Ambulate x4   --Wean O2 as tolerated for O2 sat >92%  --monitor CXR   --CT output- P 120 Meds 100- DC Meds   --Monitor electrolytes and replace prn  --Lasix 40mg BID        DISPO: Discharge planning ongoing

## 2018-05-16 NOTE — NURSING TRANSFER
Nursing Transfer Note      5/15/2018 2015    Transfer From: ICU to 300    Transfer via bed    Transfer with  to O2, cardiac monitoring    Transported by ICU RN    Medicines sent: mupirocin ointment    Chart send with patient: Yes    Notified: family notified by patient    Patient reassessed at: 05- 2030    Upon arrival to floor: cardiac monitor applied, patient oriented to room, call bell in reach and bed in lowest position.  Initial assessment completed and care plan updated.  Patient resting quietly without complaints.  Will continue to monitor.

## 2018-05-16 NOTE — CONSULTS
"Cardiac Rehab     Michael Espinoza   [id number]   5/16/2018       Activity taught: Yes    Cardiac Rehab Phase Taught: Phase I & II    Risk Factors-Modifiable: nutrition, hyperlipidemia, exercise    Risk Factors-Non modifiable: age, heredity, gender    Teaching Method: Verbal, Written, Living with Heart Disease    Understanding: Yes    Response: Yes    Comments: S/P CABG X3 05/14/2018. Discussed cardiac rehab and risk factor modification. Patient is interested in attending [facility] Cardiac Rehab Phase II. Educational materials were used in the process and given to the patient. They included "Your Guide to Living with Heart Disease", Phase Two Cardiac Rehabilitation information along with a sample Mediterranean diet.The patient expressed understanding of the teaching and expressed desire to take a role in modifying the risk factors when they return home.      Sanjiv De Jesus RN  Cardiac Rehab  "

## 2018-05-16 NOTE — CONSULTS
"  [facility]  Adult Nutrition  Consult Note    SUMMARY     Recommendations    Recommendation/Intervention:   1. Order Boost Plus as pt with poor appetite at this time. Continue current cardiac diet.   2. Cardiac diet education provided.   3. RD following.    Goals: Intake >/=85% EEN/EPN  Nutrition Goal Status: new  Communication of RD Recs:  (POC)    Reason for Assessment    Reason for Assessment: consult  Diagnosis: surgery/postoperative complications (s/p CABG x 3)  Relevant Medical History: HLD, Gilbert's syndrome, CAD, HLD    General Information Comments: Pt reports early satiety at this time with poor PO intake. Has not had BM since sx. Agreeable to Boost. Cardiac diet education provided per pathway.    Nutrition Discharge Planning: Adequate PO intake on cardiac diet    Nutrition Risk Screen    Nutrition Risk Screen: no indicators present    Nutrition/Diet History    Patient Reported Diet/Restrictions/Preferences: general  Do you have any cultural, spiritual, Evangelical conflicts, given your current situation?: none  Factors Affecting Nutritional Intake: early satiety, constipation    Anthropometrics    Temp: 97.3 °F (36.3 °C)  Height Method: Stated  Height: 5' 7" (170.2 cm)  Height (inches): 67 in  Weight Method: Bed Scale  Weight: 76.2 kg (167 lb 15.9 oz)  Weight (lb): 167.99 lb  Ideal Body Weight (IBW), Male: 148 lb  % Ideal Body Weight, Male (lb): 113.51 lb  BMI (Calculated): 26.4  BMI Grade: 25 - 29.9 - overweight       Lab/Procedures/Meds    Pertinent Labs Reviewed: reviewed  Pertinent Labs Comments: Na 132, Glu 111, Phos 2.3  Pertinent Medications Reviewed: reviewed  Pertinent Medications Comments: statin, docusate, lasix, Mg, pantoprazole, plyethylene glycol, K-Na-phosphates    Physical Findings/Assessment    Overall Physical Appearance: nourished  Tubes:  (chest tubes)  Oral/Mouth Cavity: WDL  Skin: incision(s)    Estimated/Assessed Needs    Weight Used For Calorie Calculations: 76.2 " kg (167 lb 15.9 oz)  Energy Calorie Requirements (kcal): 1901  Energy Need Method: Adams-St Jeor (x 1.25 (PAL))  Protein Requirements:  gm (1.2-1.4 gm/kg)  Weight Used For Protein Calculations: 76.2 kg (167 lb 15.9 oz)  Fluid Requirements (mL): per MD     RDA Method (mL): 1901       Nutrition Prescription Ordered    Current Diet Order: Cardiac    Evaluation of Received Nutrient/Fluid Intake       % Intake of Estimated Energy Needs: 0 - 25 %  % Meal Intake: 0 - 25 %    Nutrition Risk    Level of Risk/Frequency of Follow-up:  (F/u 2x weekly)     Assessment and Plan    History of heart surgery    Contributing Nutrition Diagnosis  Inadequate energy intake    Related to (etiology):   Early satiety    Signs and Symptoms (as evidenced by):   Intake <25% of meals     Interventions/Recommendations (treatment strategy):  See recs.    Nutrition Diagnosis Status:   New           Monitor and Evaluation    Food and Nutrient Intake: energy intake, food and beverage intake  Food and Nutrient Adminstration: diet order  Knowledge/Beliefs/Attitudes: food and nutrition knowledge/skill  Physical Activity and Function: nutrition-related ADLs and IADLs  Anthropometric Measurements: weight, weight change, body mass index  Biochemical Data, Medical Tests and Procedures: electrolyte and renal panel, gastrointestinal profile, glucose/endocrine profile, inflammatory profile  Nutrition-Focused Physical Findings: overall appearance     Nutrition Follow-Up    RD Follow-up?: Yes

## 2018-05-16 NOTE — PROGRESS NOTES
[facility]  Cardiothoracic Surgery  Progress Note    Patient Name: Michael Espinoza  MRN: [id number]  Admission Date: 5/14/2018  Hospital Length of Stay: 2 days  Code Status: Prior   Attending Physician: Chad Edwards MD   Referring Provider: Chad Edwards MD  Principal Problem:CAD (coronary artery disease)    Subjective:     Post-Op Info:  Procedure(s) (LRB):  AORTOCORONARY BYPASS-CABG x 3 (N/A)  [place]-Vein-Endovascular (N/A)   2 Days Post-Op     Interval History: No acute events overnight. NSR/ST on monitor.      Medications:  Continuous Infusions:  Scheduled Meds:   aspirin  325 mg Oral Daily    atorvastatin  40 mg Oral QHS    docusate sodium  100 mg Oral BID    furosemide  40 mg Intravenous BID    metoprolol tartrate  25 mg Oral BID    mupirocin  1 g Nasal BID    pantoprazole  40 mg Oral Daily    polyethylene glycol  17 g Oral Daily    potassium, sodium phosphates  2 packet Oral Q4H    sodium chloride 0.9%  3 mL Intravenous Q8H     PRN Meds:albuterol-ipratropium 2.5mg-0.5mg/3mL, bisacodyl, dextrose 50%, dextrose 50%, glucagon (human recombinant), glucose, glucose, insulin aspart U-100, metoclopramide HCl, ondansetron, oxyCODONE, oxyCODONE     Objective:     Vital Signs (Most Recent):  Temp: 97.3 °F (36.3 °C) (05/16/18 1250)  Pulse: 96 (05/16/18 1400)  Resp: 17 (05/16/18 1250)  BP: 94/68 (05/16/18 1250)  SpO2: 96 % (05/16/18 1250) Vital Signs (24h Range):  Temp:  [97.3 °F (36.3 °C)-98.3 °F (36.8 °C)] 97.3 °F (36.3 °C)  Pulse:  [] 96  Resp:  [7-40] 17  SpO2:  [91 %-98 %] 96 %  BP: ()/(68-78) 94/68  Arterial Line BP: (102-129)/(61-78) 115/68     Weight: 76.2 kg (167 lb 15.9 oz)  Body mass index is 26.31 kg/m².    SpO2: 96 %  O2 Device (Oxygen Therapy): nasal cannula    Intake/Output - Last 3 Shifts       05/14 0700 - 05/15 0659 05/15 0700 - 05/16 0659 05/16 0700 - 05/17 0659    P.O.  270 540    I.V. (mL/kg) 746.3 (9.8) 50 (0.7) 50 (0.7)    Total Intake(mL/kg) 746.3  (9.8) 320 (4.2) 590 (7.7)    Urine (mL/kg/hr) 1975 (1.1) 1915 (1) 475 (0.7)    Chest Tube 520 (0.3) 300 (0.2)     Total Output 2495 2215 475    Net -1748.7 -1895 +115                 Lines/Drains/Airways     Central Venous Catheter Line                 Percutaneous Central Line Insertion/Assessment - Quad lumen  05/14/18 0721 left subclavian 2 days         Percutaneous Central Line Insertion/Assessment - quad lumen  05/14/18 0721 left subclavian;other (see comments) 2 days          Drain                 Chest Tube 05/14/18 1243 3 Right Mediastinal 19 Fr. 2 days         Y Chest Tube 1 and 2 05/14/18 1242 1 Right Pleural 19 Fr. 2 Left Pleural 2 days          Line                 Pacer Wires 05/14/18 1216 2 days          Peripheral Intravenous Line                 Peripheral IV - Single Lumen 05/14/18 0552 Left Forearm 2 days                Physical Exam   Constitutional: He is oriented to person, place, and time. He appears well-developed and well-nourished.   HENT:   Head: Normocephalic and atraumatic.   Eyes: Pupils are equal, round, and reactive to light.   Neck: Normal range of motion. Neck supple.   Cardiovascular: Normal rate, regular rhythm and normal heart sounds.    Pulmonary/Chest: Effort normal and breath sounds normal.   Abdominal: Soft. Bowel sounds are normal.   Musculoskeletal: Normal range of motion.   Neurological: He is alert and oriented to person, place, and time.   Skin: Skin is warm and dry.   Psychiatric: He has a normal mood and affect. His behavior is normal.       Significant Labs:  CBC:   Recent Labs  Lab 05/16/18  0407   WBC 8.11   RBC 4.17*   HGB 13.0*   HCT 37.8*   *   MCV 91   MCH 31.2*   MCHC 34.4     CMP:   Recent Labs  Lab 05/16/18  0407   *   CALCIUM 8.6*   *   K 4.5   CO2 28   CL 97   BUN 12   CREATININE 0.7       Significant Diagnostics:  CXR: I have reviewed all pertinent results/findings within the past 24 hours    Assessment/Plan:     S/P CABG x 3     --Continue ASA, increase BB, statin  --Sternal precautions  --PT/OT  --Ambulate x4   --Wean O2 as tolerated for O2 sat >92%  --monitor CXR   --CT output- P 120 Meds 100- DC Meds   --Monitor electrolytes and replace prn  --Lasix 40mg BID        DISPO: Discharge planning ongoing              Ashley Hernandez NP  Cardiothoracic Surgery  [facility]

## 2018-05-17 LAB
ANION GAP SERPL CALC-SCNC: 8 MMOL/L
APTT BLDCRRT: 21.9 SEC
BASOPHILS # BLD AUTO: 0.01 K/UL
BASOPHILS NFR BLD: 0.1 %
BUN SERPL-MCNC: 15 MG/DL
CALCIUM SERPL-MCNC: 8.7 MG/DL
CHLORIDE SERPL-SCNC: 92 MMOL/L
CO2 SERPL-SCNC: 32 MMOL/L
CREAT SERPL-MCNC: 0.7 MG/DL
DIFFERENTIAL METHOD: ABNORMAL
EOSINOPHIL # BLD AUTO: 0.1 K/UL
EOSINOPHIL NFR BLD: 0.7 %
ERYTHROCYTE [DISTWIDTH] IN BLOOD BY AUTOMATED COUNT: 13.2 %
EST. GFR  (AFRICAN AMERICAN): >60 ML/MIN/1.73 M^2
EST. GFR  (NON AFRICAN AMERICAN): >60 ML/MIN/1.73 M^2
GLUCOSE SERPL-MCNC: 92 MG/DL
HCT VFR BLD AUTO: 37.3 %
HGB BLD-MCNC: 12.9 G/DL
IMM GRANULOCYTES # BLD AUTO: 0.03 K/UL
IMM GRANULOCYTES NFR BLD AUTO: 0.4 %
INR PPP: 1.1
LYMPHOCYTES # BLD AUTO: 0.7 K/UL
LYMPHOCYTES NFR BLD: 10.1 %
MAGNESIUM SERPL-MCNC: 1.8 MG/DL
MCH RBC QN AUTO: 30.8 PG
MCHC RBC AUTO-ENTMCNC: 34.6 G/DL
MCV RBC AUTO: 89 FL
MONOCYTES # BLD AUTO: 0.7 K/UL
MONOCYTES NFR BLD: 10.1 %
NEUTROPHILS # BLD AUTO: 5.6 K/UL
NEUTROPHILS NFR BLD: 78.6 %
NRBC BLD-RTO: 0 /100 WBC
PHOSPHATE SERPL-MCNC: 2.7 MG/DL
PLATELET # BLD AUTO: 126 K/UL
PMV BLD AUTO: 10.3 FL
POTASSIUM SERPL-SCNC: 3.9 MMOL/L
POTASSIUM SERPL-SCNC: 3.9 MMOL/L
PROTHROMBIN TIME: 11.2 SEC
RBC # BLD AUTO: 4.19 M/UL
SODIUM SERPL-SCNC: 132 MMOL/L
WBC # BLD AUTO: 7.15 K/UL

## 2018-05-17 PROCEDURE — 85610 PROTHROMBIN TIME: CPT

## 2018-05-17 PROCEDURE — 80048 BASIC METABOLIC PNL TOTAL CA: CPT

## 2018-05-17 PROCEDURE — 83735 ASSAY OF MAGNESIUM: CPT

## 2018-05-17 PROCEDURE — 85730 THROMBOPLASTIN TIME PARTIAL: CPT

## 2018-05-17 PROCEDURE — 97116 GAIT TRAINING THERAPY: CPT

## 2018-05-17 PROCEDURE — 25000003 PHARM REV CODE 250: Performed by: NURSE PRACTITIONER

## 2018-05-17 PROCEDURE — A4216 STERILE WATER/SALINE, 10 ML: HCPCS | Performed by: STUDENT IN AN ORGANIZED HEALTH CARE EDUCATION/TRAINING PROGRAM

## 2018-05-17 PROCEDURE — 25000003 PHARM REV CODE 250: Performed by: STUDENT IN AN ORGANIZED HEALTH CARE EDUCATION/TRAINING PROGRAM

## 2018-05-17 PROCEDURE — 85025 COMPLETE CBC W/AUTO DIFF WBC: CPT

## 2018-05-17 PROCEDURE — 20600001 HC STEP DOWN PRIVATE ROOM

## 2018-05-17 PROCEDURE — 63600175 PHARM REV CODE 636 W HCPCS: Performed by: NURSE PRACTITIONER

## 2018-05-17 PROCEDURE — 97535 SELF CARE MNGMENT TRAINING: CPT

## 2018-05-17 PROCEDURE — 84100 ASSAY OF PHOSPHORUS: CPT

## 2018-05-17 PROCEDURE — 36415 COLL VENOUS BLD VENIPUNCTURE: CPT

## 2018-05-17 RX ORDER — MAGNESIUM SULFATE HEPTAHYDRATE 40 MG/ML
2 INJECTION, SOLUTION INTRAVENOUS ONCE
Status: COMPLETED | OUTPATIENT
Start: 2018-05-17 | End: 2018-05-17

## 2018-05-17 RX ORDER — POTASSIUM CHLORIDE 20 MEQ/1
20 TABLET, EXTENDED RELEASE ORAL 3 TIMES DAILY
Status: DISCONTINUED | OUTPATIENT
Start: 2018-05-17 | End: 2018-05-18

## 2018-05-17 RX ORDER — ASPIRIN 325 MG
325 TABLET, DELAYED RELEASE (ENTERIC COATED) ORAL DAILY
Refills: 0 | COMMUNITY
Start: 2018-05-18 | End: 2019-08-06 | Stop reason: DRUGHIGH

## 2018-05-17 RX ORDER — FUROSEMIDE 10 MG/ML
40 INJECTION INTRAMUSCULAR; INTRAVENOUS 3 TIMES DAILY
Status: DISCONTINUED | OUTPATIENT
Start: 2018-05-17 | End: 2018-05-19 | Stop reason: HOSPADM

## 2018-05-17 RX ORDER — METOPROLOL TARTRATE 50 MG/1
50 TABLET ORAL 2 TIMES DAILY
Status: DISCONTINUED | OUTPATIENT
Start: 2018-05-17 | End: 2018-05-18

## 2018-05-17 RX ORDER — METOPROLOL TARTRATE 50 MG/1
50 TABLET ORAL 2 TIMES DAILY
Qty: 60 TABLET | Refills: 11 | Status: SHIPPED | OUTPATIENT
Start: 2018-05-17 | End: 2018-05-19 | Stop reason: HOSPADM

## 2018-05-17 RX ADMIN — FUROSEMIDE 40 MG: 10 INJECTION, SOLUTION INTRAMUSCULAR; INTRAVENOUS at 09:05

## 2018-05-17 RX ADMIN — MUPIROCIN 1 G: 20 OINTMENT TOPICAL at 09:05

## 2018-05-17 RX ADMIN — MUPIROCIN 1 G: 20 OINTMENT TOPICAL at 10:05

## 2018-05-17 RX ADMIN — PANTOPRAZOLE SODIUM 40 MG: 40 TABLET, DELAYED RELEASE ORAL at 10:05

## 2018-05-17 RX ADMIN — Medication 3 ML: at 10:05

## 2018-05-17 RX ADMIN — MAGNESIUM SULFATE IN WATER 2 G: 40 INJECTION, SOLUTION INTRAVENOUS at 10:05

## 2018-05-17 RX ADMIN — POTASSIUM CHLORIDE 20 MEQ: 1500 TABLET, EXTENDED RELEASE ORAL at 09:05

## 2018-05-17 RX ADMIN — POLYETHYLENE GLYCOL 3350 17 G: 17 POWDER, FOR SOLUTION ORAL at 10:05

## 2018-05-17 RX ADMIN — POTASSIUM CHLORIDE 20 MEQ: 1500 TABLET, EXTENDED RELEASE ORAL at 10:05

## 2018-05-17 RX ADMIN — ATORVASTATIN CALCIUM 40 MG: 20 TABLET, FILM COATED ORAL at 09:05

## 2018-05-17 RX ADMIN — METOPROLOL TARTRATE 50 MG: 50 TABLET ORAL at 10:05

## 2018-05-17 RX ADMIN — FUROSEMIDE 40 MG: 10 INJECTION, SOLUTION INTRAMUSCULAR; INTRAVENOUS at 02:05

## 2018-05-17 RX ADMIN — ASPIRIN 325 MG: 325 TABLET, DELAYED RELEASE ORAL at 10:05

## 2018-05-17 RX ADMIN — DOCUSATE SODIUM 100 MG: 50 CAPSULE, LIQUID FILLED ORAL at 09:05

## 2018-05-17 RX ADMIN — FUROSEMIDE 40 MG: 10 INJECTION, SOLUTION INTRAMUSCULAR; INTRAVENOUS at 10:05

## 2018-05-17 RX ADMIN — POTASSIUM CHLORIDE 20 MEQ: 1500 TABLET, EXTENDED RELEASE ORAL at 02:05

## 2018-05-17 RX ADMIN — DOCUSATE SODIUM 100 MG: 50 CAPSULE, LIQUID FILLED ORAL at 10:05

## 2018-05-17 RX ADMIN — METOPROLOL TARTRATE 50 MG: 50 TABLET ORAL at 09:05

## 2018-05-17 NOTE — PLAN OF CARE
Problem: Patient Care Overview  Goal: Plan of Care Review  Hx: HLD, Gilbert's syndrome, parenchymal lung disease, ASD    5/14: CABG x3, extubated       Outcome: Ongoing (interventions implemented as appropriate)  Pt verbalized understanding of plan of care. Informed patient and family of risk of falling.  Discussed Fall prevention strategies. Bed locked and low.  Call light and personal items within reach. SR up x 2. IS use and sternal precautions reinforced. CT removed.  Pacer wires isolated and secured. Ambulated once around the nurses station.

## 2018-05-17 NOTE — PLAN OF CARE
Problem: Patient Care Overview  Goal: Plan of Care Review  Hx: HLD, Gilbert's syndrome, parenchymal lung disease, ASD    5/14: CABG x3, extubated       Plan of care discussed with patient.  Patient ambulating independently, fall precautions in place. Continuing to encourage sternal precautions, IS, and ambulation. VS stable, NAD noted. Chest Tube draining.  Patient has no complaints of pain. Discussed medications and care. Patient has no questions at this time. Will continue to monitor.

## 2018-05-17 NOTE — PLAN OF CARE
Problem: Occupational Therapy Goal  Goal: Occupational Therapy Goal  Goals to be met by: 5/25/18     Patient will increase functional independence with ADLs by performing:    Feeding with [place].  UE Dressing with Supervision.  LE Dressing with Supervision.  Grooming while standing at sink with Supervision.  Toileting from toilet with Supervision for hygiene and clothing management.   Toilet transfer to toilet with Supervision.     Outcome: Ongoing (interventions implemented as appropriate)  Goals reviewed and remain appropriate.

## 2018-05-17 NOTE — PT/OT/SLP PROGRESS
Occupational Therapy   Treatment    Name: Michael Espinoza  MRN: [id number]  Admitting Diagnosis:  CAD (coronary artery disease)  3 Days Post-Op    Recommendations:     Discharge Recommendations: home  Discharge Equipment Recommendations:  none  Barriers to discharge:  None    Subjective     Communicated with: RN (Radha) prior to session. Pt agreeable to OT treatment session.   Pain/Comfort:  · Pain Rating 1: 0/10  · Pain Rating Post-Intervention 1: 0/10    Patients cultural, spiritual, Methodist conflicts given the current situation: none reported     Objective:     Patient found with: telemetry (chest tube to wall suction; 4L O2)    General Precautions: Standard, fall, sternal   Orthopedic Precautions:N/A   Braces: N/A     Occupational Performance:    Bed Mobility:    · N/A-pt found seated [facility]    Functional Mobility/Transfers:  · Sit<>stand: CGA   · From bedside chair   · Holding onto pillow to maintain sternal precautions   · Functional Mobility: Pt engaging in functional mobility to simulate household distances from bedside chair>bathroom>chair with CGA and without the use of any AD without any LOB.    Activities of Daily Living:  · Grooming: supervision  · To stank at sink for approx 3 minutes to brush teeth and wash face   · Pt able to cross midline to access ADL items placed on counter     Patient left up in chair with all lines intact, call button in reach and RN notified    AMPA 6 Click:  AMPAC Total Score: 21    Treatment & Education:  -Pt requiring frequent reminders to maintain sternal precautions with transfers   -Updated communication board   Education:    Assessment:     Michael Espinoza is a 62 y.o. male with a medical diagnosis of CAD (coronary artery disease). Performance deficits affecting function are impaired endurance, impaired self care skills, impaired functional mobilty, impaired balance, impaired cardiopulmonary response to activity. Pt requiring CGA for transfers and functional mobility, and  supervision for grooming. Pt continuing to require mod vc's for sternal precautions.     Rehab Prognosis:  good; patient would benefit from acute skilled OT services to address these deficits and reach maximum level of function.       Plan:     Patient to be seen 4 x/week to address the above listed problems via self-care/home management, therapeutic activities, therapeutic exercises  · Plan of Care Expires: 06/04/18  · Plan of Care Reviewed with: patient    This Plan of care has been discussed with the patient who was involved in its development and understands and is in agreement with the identified goals and treatment plan    GOALS:    Occupational Therapy Goals        Problem: Occupational Therapy Goal    Goal Priority Disciplines Outcome Interventions   Occupational Therapy Goal     OT, PT/OT Ongoing (interventions implemented as appropriate)    Description:  Goals to be met by: 5/25/18     Patient will increase functional independence with ADLs by performing:    Feeding with [place].  UE Dressing with Supervision.  LE Dressing with Supervision.  Grooming while standing at sink with Supervision.  Toileting from toilet with Supervision for hygiene and clothing management.   Toilet transfer to toilet with Supervision.                      Time Tracking:     OT Date of Treatment: 05/17/18  OT Start Time: 0906  OT Stop Time: 0920  OT Total Time (min): 14 min    Billable Minutes:Self Care/Home Management 14 minutes    Sarah Beth Jorgensen OT  5/17/2018

## 2018-05-17 NOTE — PT/OT/SLP PROGRESS
Physical Therapy Treatment    Patient Name:  Michael Espinoza   MRN:  [id number]    Recommendations:     Discharge Recommendations:  Home.  Discharge Equipment Recommendations: none   Barriers to discharge: None    Assessment:     Michael Espinoza is a 62 y.o. male admitted with a medical diagnosis of CAD (coronary artery disease).  He presents with the following impairments/functional limitations:  weakness, impaired endurance, gait instability, impaired functional mobilty, impaired balance Pt tam treatment better being able to gait train further but had slight dizziness and nausea afterwards. Pt will benefit from cont skilled PT services to improve strength, gait training, and reach highest level of function. CABG 5/14/18.    Rehab Prognosis:  good; patient would benefit from acute skilled PT services to address these deficits and reach maximum level of function.      Recent Surgery: Procedure(s) (LRB):  AORTOCORONARY BYPASS-CABG x 3 (N/A)  [place]-Vein-Endovascular (N/A) 3 Days Post-Op    Plan:     During this hospitalization, patient to be seen 5 x/week to address the above listed problems via gait training, therapeutic activities  · Plan of Care Expires:  06/13/18   Plan of Care Reviewed with: patient    Subjective     Communicated with nurse prior to session.  Patient found sitting in chair upon PT entry to room, agreeable to treatment.      Chief Complaint: Pt reported stomach was a little upset from medication. Reported feeling a little shaky, dizzy, and nauseous after treatment.  Patient comments/goals: to get better and to go home.  Pain/Comfort:  Pain Rating 1: 0/10  Pain Rating Post-Intervention 1: 0/10    Patients cultural, spiritual, Nondenominational conflicts given the current situation: none    Objective:     Patient found with: telemetry, oxygen, peripheral IV     General Precautions: Standard, fall, sternal   Orthopedic Precautions:    Braces:       Functional Mobility:  · Transfers:     · Sit to Stand:  contact  guard assistance with no AD.  ·   · Gait: 550 ft CGA with O2 intact with two episodes of loss of balance but was able to self correct. Followed with chair.      AM-PAC 6 CLICK MOBILITY  Turning over in bed (including adjusting bedclothes, sheets and blankets)?: 3  Sitting down on and standing up from a chair with arms (e.g., wheelchair, bedside commode, etc.): 3  Moving from lying on back to sitting on the side of the bed?: 3  Moving to and from a bed to a chair (including a wheelchair)?: 3  Need to walk in hospital room?: 3  Climbing 3-5 steps with a railing?: 3  Total Score: 18         Patient left up in chair with all lines intact and call button in reach..    GOALS:    Physical Therapy Goals        Problem: Physical Therapy Goal    Goal Priority Disciplines Outcome Goal Variances Interventions   Physical Therapy Goal     PT/OT, PT      Description:  Goals to be met by: 18     Patient will increase functional independence with mobility by performin. Supine to sit with [place]  2. Sit to stand transfer with Supervision  3. Gait  x 550 feet with Supervision   4. Ascend/descend 5 stair with bilateral Handrails Supervision                    Time Tracking:     PT Received On: 18  PT Start Time: 1038     PT Stop Time: 1053  PT Total Time (min): 15 min     Billable Minutes: Gait Training 15 minutes    Treatment Type: Treatment  PT/PTA: PT     PTA Visit Number: 0     ARIAN Rainey  2018

## 2018-05-17 NOTE — ASSESSMENT & PLAN NOTE
--Continue ASA, increase BB to 50, statin  --Sternal precautions  --PT/OT  --Ambulate x4   --Wean O2 as tolerated for O2 sat >92%  --monitor CXR   --CTs DC'ed  --Monitor electrolytes and replace prn  --Lasix 40mg TID        DISPO: Discharge planning ongoing

## 2018-05-17 NOTE — PROGRESS NOTES
[facility]  Cardiothoracic Surgery  Progress Note    Patient Name: Michael Espinoza  MRN: [id number]  Admission Date: 5/14/2018  Hospital Length of Stay: 3 days  Code Status: Prior   Attending Physician: Chad Edwards MD   Referring Provider: Chad Edwards MD  Principal Problem:CAD (coronary artery disease)    Subjective:     Post-Op Info:  Procedure(s) (LRB):  AORTOCORONARY BYPASS-CABG x 3 (N/A)  [place]-Vein-Endovascular (N/A)   3 Days Post-Op     Interval History: No acute events overnight. NSR on monitor. Made the block     Medications:  Continuous Infusions:  Scheduled Meds:   aspirin  325 mg Oral Daily    atorvastatin  40 mg Oral QHS    docusate sodium  100 mg Oral BID    furosemide  40 mg Intravenous TID    magnesium sulfate IVPB  2 g Intravenous Once    metoprolol tartrate  50 mg Oral BID    mupirocin  1 g Nasal BID    pantoprazole  40 mg Oral Daily    polyethylene glycol  17 g Oral Daily    potassium chloride  20 mEq Oral TID    sodium chloride 0.9%  3 mL Intravenous Q8H     PRN Meds:albuterol-ipratropium, bisacodyl, dextrose 50%, dextrose 50%, glucagon (human recombinant), glucose, glucose, insulin aspart U-100, metoclopramide HCl, ondansetron, oxyCODONE, oxyCODONE     Objective:     Vital Signs (Most Recent):  Temp: 98.1 °F (36.7 °C) (05/17/18 0725)  Pulse: (!) 116 (05/17/18 1000)  Resp: 18 (05/17/18 0725)  BP: 118/73 (05/17/18 0725)  SpO2: (!) 93 % (05/17/18 1000) Vital Signs (24h Range):  Temp:  [96.2 °F (35.7 °C)-98.1 °F (36.7 °C)] 98.1 °F (36.7 °C)  Pulse:  [] 116  Resp:  [15-18] 18  SpO2:  [80 %-96 %] 93 %  BP: ()/(68-81) 118/73     Weight: 72.3 kg (159 lb 6.3 oz)  Body mass index is 24.96 kg/m².    SpO2: (!) 93 %  O2 Device (Oxygen Therapy): nasal cannula    Intake/Output - Last 3 Shifts       05/15 0700 - 05/16 0659 05/16 0700 - 05/17 0659 05/17 0700 - 05/18 0659    P.O. 270 720     I.V. (mL/kg) 50 (0.7) 50 (0.7)     Total Intake(mL/kg) 320 (4.2) 770  (10.1)     Urine (mL/kg/hr) 1915 (1) 1425 (0.8)     Chest Tube 300 (0.2) 35 (0) 30 (0.1)    Total Output 2215 1460 30    Net -1895 -690 -30           Stool Occurrence  1 x           Lines/Drains/Airways     Central Venous Catheter Line                 Percutaneous Central Line Insertion/Assessment - Quad lumen  05/14/18 0721 left subclavian 3 days         Percutaneous Central Line Insertion/Assessment - quad lumen  05/14/18 0721 left subclavian;other (see comments) 3 days          Drain                 Chest Tube 05/14/18 1243 3 Right Mediastinal 19 Fr. 2 days         Y Chest Tube 1 and 2 05/14/18 1242 1 Right Pleural 19 Fr. 2 Left Pleural 2 days          Line                 Pacer Wires 05/14/18 1216 2 days          Peripheral Intravenous Line                 Peripheral IV - Single Lumen 05/14/18 0552 Left Forearm 3 days                Physical Exam   Constitutional: He is oriented to person, place, and time. He appears well-developed and well-nourished.   HENT:   Head: Normocephalic and atraumatic.   Eyes: Pupils are equal, round, and reactive to light.   Neck: Normal range of motion. Neck supple.   Cardiovascular: Normal rate, regular rhythm and normal heart sounds.    Pulmonary/Chest: Effort normal and breath sounds normal.   Abdominal: Soft. Bowel sounds are normal.   Musculoskeletal: Normal range of motion.   Neurological: He is alert and oriented to person, place, and time.   Skin: Skin is warm and dry.   Psychiatric: He has a normal mood and affect. His behavior is normal.       Significant Labs:  CBC:   Recent Labs  Lab 05/17/18  0409   WBC 7.15   RBC 4.19*   HGB 12.9*   HCT 37.3*   *   MCV 89   MCH 30.8   MCHC 34.6     CMP:   Recent Labs  Lab 05/17/18  0409   GLU 92   CALCIUM 8.7   *   K 3.9  3.9   CO2 32*   CL 92*   BUN 15   CREATININE 0.7       Significant Diagnostics:  CXR: I have reviewed all pertinent results/findings within the past 24 hours    Assessment/Plan:     S/P CABG x 3     --Continue ASA, increase BB to 50, statin  --Sternal precautions  --PT/OT  --Ambulate x4   --Wean O2 as tolerated for O2 sat >92%  --monitor CXR   --CTs DC'ed  --Monitor electrolytes and replace prn  --Lasix 40mg TID        DISPO: Discharge planning ongoing              Ashley Hernandez NP  Cardiothoracic Surgery  [facility]

## 2018-05-17 NOTE — PLAN OF CARE
Problem: Physical Therapy Goal  Goal: Physical Therapy Goal  Goals to be met by: 18     Patient will increase functional independence with mobility by performin. Supine to sit with [place] -not met  2. Sit to stand transfer with Supervision -not met  3. Gait  x 550 feet with Supervision -not met  4. Ascend/descend 5 stair with bilateral Handrails Supervision -not met  Goals still appropriate. Luz Burnett, ARIAN 2018

## 2018-05-17 NOTE — SUBJECTIVE & OBJECTIVE
Interval History: No acute events overnight. NSR on monitor.    Medications:  Continuous Infusions:  Scheduled Meds:   aspirin  325 mg Oral Daily    atorvastatin  40 mg Oral QHS    docusate sodium  100 mg Oral BID    furosemide  40 mg Intravenous TID    magnesium sulfate IVPB  2 g Intravenous Once    metoprolol tartrate  50 mg Oral BID    mupirocin  1 g Nasal BID    pantoprazole  40 mg Oral Daily    polyethylene glycol  17 g Oral Daily    potassium chloride  20 mEq Oral TID    sodium chloride 0.9%  3 mL Intravenous Q8H     PRN Meds:albuterol-ipratropium, bisacodyl, dextrose 50%, dextrose 50%, glucagon (human recombinant), glucose, glucose, insulin aspart U-100, metoclopramide HCl, ondansetron, oxyCODONE, oxyCODONE     Objective:     Vital Signs (Most Recent):  Temp: 98.1 °F (36.7 °C) (05/17/18 0725)  Pulse: (!) 116 (05/17/18 1000)  Resp: 18 (05/17/18 0725)  BP: 118/73 (05/17/18 0725)  SpO2: (!) 93 % (05/17/18 1000) Vital Signs (24h Range):  Temp:  [96.2 °F (35.7 °C)-98.1 °F (36.7 °C)] 98.1 °F (36.7 °C)  Pulse:  [] 116  Resp:  [15-18] 18  SpO2:  [80 %-96 %] 93 %  BP: ()/(68-81) 118/73     Weight: 72.3 kg (159 lb 6.3 oz)  Body mass index is 24.96 kg/m².    SpO2: (!) 93 %  O2 Device (Oxygen Therapy): nasal cannula    Intake/Output - Last 3 Shifts       05/15 0700 - 05/16 0659 05/16 0700 - 05/17 0659 05/17 0700 - 05/18 0659    P.O. 270 720     I.V. (mL/kg) 50 (0.7) 50 (0.7)     Total Intake(mL/kg) 320 (4.2) 770 (10.1)     Urine (mL/kg/hr) 1915 (1) 1425 (0.8)     Chest Tube 300 (0.2) 35 (0) 30 (0.1)    Total Output 2215 1460 30    Net -1895 -690 -30           Stool Occurrence  1 x           Lines/Drains/Airways     Central Venous Catheter Line                 Percutaneous Central Line Insertion/Assessment - Quad lumen  05/14/18 0721 left subclavian 3 days         Percutaneous Central Line Insertion/Assessment - quad lumen  05/14/18 0721 left subclavian;other (see comments) 3 days          Drain                  Chest Tube 05/14/18 1243 3 Right Mediastinal 19 Fr. 2 days         Y Chest Tube 1 and 2 05/14/18 1242 1 Right Pleural 19 Fr. 2 Left Pleural 2 days          Line                 Pacer Wires 05/14/18 1216 2 days          Peripheral Intravenous Line                 Peripheral IV - Single Lumen 05/14/18 0552 Left Forearm 3 days                Physical Exam   Constitutional: He is oriented to person, place, and time. He appears well-developed and well-nourished.   HENT:   Head: Normocephalic and atraumatic.   Eyes: Pupils are equal, round, and reactive to light.   Neck: Normal range of motion. Neck supple.   Cardiovascular: Normal rate, regular rhythm and normal heart sounds.    Pulmonary/Chest: Effort normal and breath sounds normal.   Abdominal: Soft. Bowel sounds are normal.   Musculoskeletal: Normal range of motion.   Neurological: He is alert and oriented to person, place, and time.   Skin: Skin is warm and dry.   Psychiatric: He has a normal mood and affect. His behavior is normal.       Significant Labs:  CBC:   Recent Labs  Lab 05/17/18  0409   WBC 7.15   RBC 4.19*   HGB 12.9*   HCT 37.3*   *   MCV 89   MCH 30.8   MCHC 34.6     CMP:   Recent Labs  Lab 05/17/18  0409   GLU 92   CALCIUM 8.7   *   K 3.9  3.9   CO2 32*   CL 92*   BUN 15   CREATININE 0.7       Significant Diagnostics:  CXR: I have reviewed all pertinent results/findings within the past 24 hours

## 2018-05-18 LAB
ANION GAP SERPL CALC-SCNC: 10 MMOL/L
APTT BLDCRRT: <21 SEC
BASOPHILS # BLD AUTO: 0.01 K/UL
BASOPHILS NFR BLD: 0.2 %
BUN SERPL-MCNC: 17 MG/DL
CALCIUM SERPL-MCNC: 8.5 MG/DL
CHLORIDE SERPL-SCNC: 93 MMOL/L
CO2 SERPL-SCNC: 30 MMOL/L
CREAT SERPL-MCNC: 0.7 MG/DL
DIFFERENTIAL METHOD: ABNORMAL
EOSINOPHIL # BLD AUTO: 0 K/UL
EOSINOPHIL NFR BLD: 0.6 %
ERYTHROCYTE [DISTWIDTH] IN BLOOD BY AUTOMATED COUNT: 13.1 %
EST. GFR  (AFRICAN AMERICAN): >60 ML/MIN/1.73 M^2
EST. GFR  (NON AFRICAN AMERICAN): >60 ML/MIN/1.73 M^2
GLUCOSE SERPL-MCNC: 95 MG/DL
HCT VFR BLD AUTO: 38.3 %
HGB BLD-MCNC: 13.5 G/DL
IMM GRANULOCYTES # BLD AUTO: 0.04 K/UL
IMM GRANULOCYTES NFR BLD AUTO: 0.6 %
INR PPP: 1.1
LYMPHOCYTES # BLD AUTO: 0.8 K/UL
LYMPHOCYTES NFR BLD: 11.6 %
MAGNESIUM SERPL-MCNC: 2 MG/DL
MCH RBC QN AUTO: 31 PG
MCHC RBC AUTO-ENTMCNC: 35.2 G/DL
MCV RBC AUTO: 88 FL
MONOCYTES # BLD AUTO: 0.7 K/UL
MONOCYTES NFR BLD: 11.1 %
NEUTROPHILS # BLD AUTO: 5 K/UL
NEUTROPHILS NFR BLD: 75.9 %
NRBC BLD-RTO: 0 /100 WBC
PHOSPHATE SERPL-MCNC: 2.2 MG/DL
PLATELET # BLD AUTO: 160 K/UL
PMV BLD AUTO: 10.5 FL
POTASSIUM SERPL-SCNC: 3.7 MMOL/L
PROTHROMBIN TIME: 11 SEC
RBC # BLD AUTO: 4.36 M/UL
SODIUM SERPL-SCNC: 133 MMOL/L
WBC # BLD AUTO: 6.64 K/UL

## 2018-05-18 PROCEDURE — 25000003 PHARM REV CODE 250: Performed by: NURSE PRACTITIONER

## 2018-05-18 PROCEDURE — 97116 GAIT TRAINING THERAPY: CPT

## 2018-05-18 PROCEDURE — 25000003 PHARM REV CODE 250: Performed by: STUDENT IN AN ORGANIZED HEALTH CARE EDUCATION/TRAINING PROGRAM

## 2018-05-18 PROCEDURE — 36415 COLL VENOUS BLD VENIPUNCTURE: CPT

## 2018-05-18 PROCEDURE — 20600001 HC STEP DOWN PRIVATE ROOM

## 2018-05-18 PROCEDURE — 85610 PROTHROMBIN TIME: CPT

## 2018-05-18 PROCEDURE — 85025 COMPLETE CBC W/AUTO DIFF WBC: CPT

## 2018-05-18 PROCEDURE — 85730 THROMBOPLASTIN TIME PARTIAL: CPT

## 2018-05-18 PROCEDURE — 63600175 PHARM REV CODE 636 W HCPCS: Performed by: NURSE PRACTITIONER

## 2018-05-18 PROCEDURE — 80048 BASIC METABOLIC PNL TOTAL CA: CPT

## 2018-05-18 PROCEDURE — 83735 ASSAY OF MAGNESIUM: CPT

## 2018-05-18 PROCEDURE — 84100 ASSAY OF PHOSPHORUS: CPT

## 2018-05-18 PROCEDURE — A4216 STERILE WATER/SALINE, 10 ML: HCPCS | Performed by: STUDENT IN AN ORGANIZED HEALTH CARE EDUCATION/TRAINING PROGRAM

## 2018-05-18 RX ADMIN — FUROSEMIDE 40 MG: 10 INJECTION, SOLUTION INTRAMUSCULAR; INTRAVENOUS at 09:05

## 2018-05-18 RX ADMIN — MUPIROCIN 1 G: 20 OINTMENT TOPICAL at 09:05

## 2018-05-18 RX ADMIN — ONDANSETRON 4 MG: 4 TABLET, ORALLY DISINTEGRATING ORAL at 09:05

## 2018-05-18 RX ADMIN — Medication 3 ML: at 05:05

## 2018-05-18 RX ADMIN — METOPROLOL TARTRATE 75 MG: 50 TABLET ORAL at 09:05

## 2018-05-18 RX ADMIN — POTASSIUM BICARBONATE 25 MEQ: 25 TABLET, EFFERVESCENT ORAL at 03:05

## 2018-05-18 RX ADMIN — DOCUSATE SODIUM 100 MG: 50 CAPSULE, LIQUID FILLED ORAL at 09:05

## 2018-05-18 RX ADMIN — POTASSIUM BICARBONATE 25 MEQ: 25 TABLET, EFFERVESCENT ORAL at 09:05

## 2018-05-18 RX ADMIN — POLYETHYLENE GLYCOL 3350 17 G: 17 POWDER, FOR SOLUTION ORAL at 09:05

## 2018-05-18 RX ADMIN — Medication 3 ML: at 09:05

## 2018-05-18 RX ADMIN — FUROSEMIDE 40 MG: 10 INJECTION, SOLUTION INTRAMUSCULAR; INTRAVENOUS at 04:05

## 2018-05-18 RX ADMIN — PANTOPRAZOLE SODIUM 40 MG: 40 TABLET, DELAYED RELEASE ORAL at 09:05

## 2018-05-18 RX ADMIN — ATORVASTATIN CALCIUM 40 MG: 20 TABLET, FILM COATED ORAL at 09:05

## 2018-05-18 RX ADMIN — ASPIRIN 325 MG: 325 TABLET, DELAYED RELEASE ORAL at 09:05

## 2018-05-18 NOTE — PLAN OF CARE
Problem: Patient Care Overview  Goal: Plan of Care Review  Hx: HLD, Gilbert's syndrome, parenchymal lung disease, ASD    5/14: CABG x3, extubated       Plan of care discussed with patient.  Patient ambulating independently, fall precautions in place. Continuing to encourage sternal precautions, IS, and ambulation. VS stable, NAD noted. Patient has no complaints of pain. Discussed medications and care. Patient has no questions at this time. Will continue to monitor.

## 2018-05-18 NOTE — PLAN OF CARE
Problem: Patient Care Overview  Goal: Plan of Care Review  Hx: HLD, Gilbert's syndrome, parenchymal lung disease, ASD    5/14: CABG x3, extubated       Outcome: Ongoing (interventions implemented as appropriate)  Pt verbalized understanding of plan of care. Informed patient and family of risk of falling.  Discussed Fall prevention strategies. Bed locked and low.  Call light and personal items within reach. SR up x 2. IS use and sternal precautions reinforced. Ambulating independently in halls, once around the unit.

## 2018-05-18 NOTE — PLAN OF CARE
Problem: Physical Therapy Goal  Goal: Physical Therapy Goal  Goals to be met by: 18     Patient will increase functional independence with mobility by performin. Supine to sit with [place] -met 2018  2. Sit to stand transfer with Supervision -met 18  3. Gait  x 550 feet with Supervision -met 2018  4. Ascend/descend 5 stair with bilateral Handrails Supervision -met 2018    Goals have been met and planned to discharge from PT. Luz Burnett, ARIAN 2018

## 2018-05-18 NOTE — HPI
Patient is a 62 y.o. male presents with 3V CAD. PMHx significant for for diffuse parenchymal lung disease. He has been having chest pain on exertion associated with some dypsnea.  He also reports left lower extremity edema by the level of his ankle. The edema is noticeable at night time and improves in the morning. He denies edema of his right leg. Symptoms are present for the past 3 months. Denies orthopnea, palpitations, arrhythmias, diaphoresis or syncopal events.      He is the high-school cross country team .  Today's labs reviewed with patient and demonstrate an excellent cholesterol profile (, HDL 50, LDL 80), LFT's however tbili elevated (2.0) due to Gilbert's syndrome.

## 2018-05-18 NOTE — PT/OT/SLP PROGRESS
Physical Therapy Treatment/Discharge    Patient Name:  Michael Espinoza   MRN:  [id number]    Recommendations:     Discharge Recommendations:  home   Discharge Equipment Recommendations: none   Barriers to discharge: None    Assessment:     Michael Espinoza is a 62 y.o. male admitted with a medical diagnosis of CAD (coronary artery disease). Pt tam treatment better and is safe to ambulate with nursing staff and/or family. Planned to discharge from PT. CABG 5/14/18.    Rehab Prognosis:  good;     Recent Surgery: Procedure(s) (LRB):  AORTOCORONARY BYPASS-CABG x 3 (N/A)  [place]-Vein-Endovascular (N/A) 4 Days Post-Op    Plan:     During this hospitalization, patient is discharged, see above.  · Plan of Care Expires:  06/13/18   Plan of Care Reviewed with: patient    Subjective     Communicated with nurse prior to session.  Patient found standing upon PT entry to room, agreeable to treatment.      Chief Complaint: slight nausea from medications  Patient comments/goals: to get better and go home.  Pain/Comfort:  · Pain Rating 1: 0/10    Patients cultural, spiritual, Sabianism conflicts given the current situation: none    Objective:     Patient found with: telemetry, peripheral IV     General Precautions: Standard, fall, sternal   Orthopedic Precautions:    Braces:       Functional Mobility:  · Transfers:     · Sit to Stand:  supervision with no AD  ·   · Gait: 780ft with supervision.  ·  Ascended/descended 4 steps with supervision using LHR for balance.      AM-PAC 6 CLICK MOBILITY  Turning over in bed (including adjusting bedclothes, sheets and blankets)?: 4  Sitting down on and standing up from a chair with arms (e.g., wheelchair, bedside commode, etc.): 4  Moving from lying on back to sitting on the side of the bed?: 4  Moving to and from a bed to a chair (including a wheelchair)?: 4  Need to walk in hospital room?: 4  Climbing 3-5 steps with a railing?: 4  Total Score: 24       Therapeutic Activities and  Exercises:  Educated pt on proper usage of HR when ascending/descending stairs for balance reminding him of sternal precautions. Pt gave verbal understanding and demonstrated as such.    Patient left up in chair with all lines intact and call button in reach..    GOALS:    Physical Therapy Goals        Problem: Physical Therapy Goal    Goal Priority Disciplines Outcome Goal Variances Interventions   Physical Therapy Goal     PT/OT, PT      Description:  Goals to be met by: 18     Patient will increase functional independence with mobility by performin. Supine to sit with [place] -met 2018  2. Sit to stand transfer with Supervision -met 18  3. Gait  x 550 feet with Supervision -met 2018  4. Ascend/descend 5 stair with bilateral Handrails Supervision -met 2018                      Time Tracking:     PT Received On: 18  PT Start Time: 1025     PT Stop Time: 1035  PT Total Time (min): 10 min     Billable Minutes: Gait Training 10 minutes    Treatment Type: Treatment  PT/PTA: PT     PTA Visit Number: 0     ARIAN Rainey  2018

## 2018-05-18 NOTE — PLAN OF CARE
POD # 4 S/P CABG x 3.  AAOX4. Doing well. Ambulating in hallway. PT recommending home. Pt. lives alone. D/C planning in progress. SW/CM will facilitate identified d/c needs.

## 2018-05-19 VITALS
RESPIRATION RATE: 16 BRPM | WEIGHT: 152.75 LBS | HEART RATE: 90 BPM | DIASTOLIC BLOOD PRESSURE: 63 MMHG | BODY MASS INDEX: 23.98 KG/M2 | OXYGEN SATURATION: 95 % | TEMPERATURE: 98 F | SYSTOLIC BLOOD PRESSURE: 93 MMHG | HEIGHT: 67 IN

## 2018-05-19 LAB
ANION GAP SERPL CALC-SCNC: 13 MMOL/L
APTT BLDCRRT: <21 SEC
BASOPHILS # BLD AUTO: 0.03 K/UL
BASOPHILS NFR BLD: 0.5 %
BUN SERPL-MCNC: 22 MG/DL
CALCIUM SERPL-MCNC: 8.9 MG/DL
CHLORIDE SERPL-SCNC: 90 MMOL/L
CO2 SERPL-SCNC: 31 MMOL/L
CREAT SERPL-MCNC: 0.9 MG/DL
DIFFERENTIAL METHOD: ABNORMAL
EOSINOPHIL # BLD AUTO: 0.1 K/UL
EOSINOPHIL NFR BLD: 1.4 %
ERYTHROCYTE [DISTWIDTH] IN BLOOD BY AUTOMATED COUNT: 12.9 %
EST. GFR  (AFRICAN AMERICAN): >60 ML/MIN/1.73 M^2
EST. GFR  (NON AFRICAN AMERICAN): >60 ML/MIN/1.73 M^2
GLUCOSE SERPL-MCNC: 87 MG/DL
HCT VFR BLD AUTO: 38.2 %
HGB BLD-MCNC: 13.3 G/DL
IMM GRANULOCYTES # BLD AUTO: 0.05 K/UL
IMM GRANULOCYTES NFR BLD AUTO: 0.9 %
INR PPP: 1.1
LYMPHOCYTES # BLD AUTO: 1 K/UL
LYMPHOCYTES NFR BLD: 16.6 %
MAGNESIUM SERPL-MCNC: 1.9 MG/DL
MCH RBC QN AUTO: 30.6 PG
MCHC RBC AUTO-ENTMCNC: 34.8 G/DL
MCV RBC AUTO: 88 FL
MONOCYTES # BLD AUTO: 0.8 K/UL
MONOCYTES NFR BLD: 13.3 %
NEUTROPHILS # BLD AUTO: 4 K/UL
NEUTROPHILS NFR BLD: 67.3 %
NRBC BLD-RTO: 0 /100 WBC
PHOSPHATE SERPL-MCNC: 2.8 MG/DL
PLATELET # BLD AUTO: 187 K/UL
PMV BLD AUTO: 9.8 FL
POTASSIUM SERPL-SCNC: 3.2 MMOL/L
POTASSIUM SERPL-SCNC: 3.2 MMOL/L
PROTHROMBIN TIME: 11.2 SEC
RBC # BLD AUTO: 4.35 M/UL
SODIUM SERPL-SCNC: 134 MMOL/L
WBC # BLD AUTO: 5.86 K/UL

## 2018-05-19 PROCEDURE — 85730 THROMBOPLASTIN TIME PARTIAL: CPT

## 2018-05-19 PROCEDURE — 80048 BASIC METABOLIC PNL TOTAL CA: CPT

## 2018-05-19 PROCEDURE — 63600175 PHARM REV CODE 636 W HCPCS: Performed by: NURSE PRACTITIONER

## 2018-05-19 PROCEDURE — 84100 ASSAY OF PHOSPHORUS: CPT

## 2018-05-19 PROCEDURE — 25000003 PHARM REV CODE 250: Performed by: STUDENT IN AN ORGANIZED HEALTH CARE EDUCATION/TRAINING PROGRAM

## 2018-05-19 PROCEDURE — A4216 STERILE WATER/SALINE, 10 ML: HCPCS | Performed by: STUDENT IN AN ORGANIZED HEALTH CARE EDUCATION/TRAINING PROGRAM

## 2018-05-19 PROCEDURE — 85610 PROTHROMBIN TIME: CPT

## 2018-05-19 PROCEDURE — 83735 ASSAY OF MAGNESIUM: CPT

## 2018-05-19 PROCEDURE — 85025 COMPLETE CBC W/AUTO DIFF WBC: CPT

## 2018-05-19 RX ORDER — METOPROLOL TARTRATE 75 MG/1
75 TABLET, FILM COATED ORAL 2 TIMES DAILY
Qty: 60 TABLET | Refills: 3 | Status: SHIPPED | OUTPATIENT
Start: 2018-05-19 | End: 2019-08-06

## 2018-05-19 RX ORDER — POTASSIUM CHLORIDE 20 MEQ/1
60 TABLET, EXTENDED RELEASE ORAL ONCE
Status: DISCONTINUED | OUTPATIENT
Start: 2018-05-19 | End: 2018-05-19

## 2018-05-19 RX ORDER — POTASSIUM CHLORIDE 20 MEQ/1
40 TABLET, EXTENDED RELEASE ORAL ONCE
Status: DISCONTINUED | OUTPATIENT
Start: 2018-05-19 | End: 2018-05-19

## 2018-05-19 RX ORDER — OXYCODONE AND ACETAMINOPHEN 5; 325 MG/1; MG/1
1 TABLET ORAL EVERY 4 HOURS PRN
Qty: 30 TABLET | Refills: 0 | Status: SHIPPED | OUTPATIENT
Start: 2018-05-19 | End: 2018-06-12

## 2018-05-19 RX ORDER — POTASSIUM CHLORIDE 20 MEQ/15ML
40 SOLUTION ORAL ONCE
Status: DISCONTINUED | OUTPATIENT
Start: 2018-05-19 | End: 2018-05-19 | Stop reason: HOSPADM

## 2018-05-19 RX ADMIN — ASPIRIN 325 MG: 325 TABLET, DELAYED RELEASE ORAL at 08:05

## 2018-05-19 RX ADMIN — FUROSEMIDE 40 MG: 10 INJECTION, SOLUTION INTRAMUSCULAR; INTRAVENOUS at 08:05

## 2018-05-19 RX ADMIN — Medication 3 ML: at 05:05

## 2018-05-19 RX ADMIN — METOPROLOL TARTRATE 75 MG: 50 TABLET ORAL at 08:05

## 2018-05-19 RX ADMIN — PANTOPRAZOLE SODIUM 40 MG: 40 TABLET, DELAYED RELEASE ORAL at 08:05

## 2018-05-19 RX ADMIN — POTASSIUM BICARBONATE 50 MEQ: 25 TABLET, EFFERVESCENT ORAL at 09:05

## 2018-05-19 RX ADMIN — MUPIROCIN 1 G: 20 OINTMENT TOPICAL at 08:05

## 2018-05-19 RX ADMIN — POTASSIUM BICARBONATE 25 MEQ: 25 TABLET, EFFERVESCENT ORAL at 08:05

## 2018-05-19 NOTE — PLAN OF CARE
Problem: Patient Care Overview  Goal: Plan of Care Review  Hx: HLD, Gilbert's syndrome, parenchymal lung disease, ASD    5/14: CABG x3, extubated       Outcome: Outcome(s) achieved Date Met: 05/19/18  Pt free of falls/trauma/injuries.  Denies c/o SOB, CP, or discomfort.  Generalized skin remains CDI; no edema noted.  Pt S/P CABG 5/5.  Surgical incisions remain ANASTASIA, CDI without redness or edema; steri-strips intact.  Pt being diuresed with Lasix 40mg PO TID; diuresing well.   Wt trending down. Electrolytes replaced as ordered.  Pt to be discharged today per MD orders.  Pt tolerating plan of care.

## 2018-05-19 NOTE — SUBJECTIVE & OBJECTIVE
Interval History: No acute events overnight. NSR, OOBTC. Encouraged to ambulated.    Medications:  Continuous Infusions:  Scheduled Meds:   aspirin  325 mg Oral Daily    atorvastatin  40 mg Oral QHS    docusate sodium  100 mg Oral BID    furosemide  40 mg Intravenous TID    metoprolol tartrate  75 mg Oral BID    mupirocin  1 g Nasal BID    pantoprazole  40 mg Oral Daily    polyethylene glycol  17 g Oral Daily    potassium bicarbonate  25 mEq Oral TID    sodium chloride 0.9%  3 mL Intravenous Q8H     PRN Meds:albuterol-ipratropium, bisacodyl, dextrose 50%, dextrose 50%, glucagon (human recombinant), glucose, glucose, insulin aspart U-100, metoclopramide HCl, ondansetron, oxyCODONE, oxyCODONE     Objective:     Vital Signs (Most Recent):  Temp: 98.5 °F (36.9 °C) (05/18/18 1200)  Pulse: 106 (05/18/18 1800)  Resp: 16 (05/18/18 1612)  BP: 105/76 (05/18/18 1612)  SpO2: (!) 92 % (05/18/18 1612) Vital Signs (24h Range):  Temp:  [97.4 °F (36.3 °C)-98.6 °F (37 °C)] 98.5 °F (36.9 °C)  Pulse:  [] 106  Resp:  [16] 16  SpO2:  [90 %-94 %] 92 %  BP: (100-121)/(56-81) 105/76     Weight: 69.3 kg (152 lb 12.5 oz)  Body mass index is 23.93 kg/m².    SpO2: (!) 92 %  O2 Device (Oxygen Therapy): nasal cannula    Intake/Output - Last 3 Shifts       05/16 0700 - 05/17 0659 05/17 0700 - 05/18 0659 05/18 0700 - 05/19 0659    P.O. 720 540 600    I.V. (mL/kg) 50 (0.7) 50 (0.7)     Total Intake(mL/kg) 770 (10.1) 590 (8.2) 600 (8.7)    Urine (mL/kg/hr) 1425 (0.8) 2375 (1.4) 1950 (2.2)    Chest Tube 35 (0) 30 (0)     Total Output 1468 9896 0044    Net -690 -1815 -1350           Urine Occurrence  2 x     Stool Occurrence 1 x 1 x           Lines/Drains/Airways     Central Venous Catheter Line                 Percutaneous Central Line Insertion/Assessment - Quad lumen  05/14/18 0721 left subclavian 4 days         Percutaneous Central Line Insertion/Assessment - quad lumen  05/14/18 0721 left subclavian;other (see comments) 4 days                 Physical Exam   Constitutional: He appears well-developed.   Cardiovascular: Normal rate and regular rhythm.    Incision c/d/i   Pulmonary/Chest: Effort normal.   Neurological: He is alert.   Skin: Skin is warm and dry.       Significant Labs:  CBC:   Recent Labs  Lab 05/18/18  0558   WBC 6.64   RBC 4.36*   HGB 13.5*   HCT 38.3*      MCV 88   MCH 31.0   MCHC 35.2     CMP:   Recent Labs  Lab 05/18/18  0558   GLU 95   CALCIUM 8.5*   *   K 3.7   CO2 30*   CL 93*   BUN 17   CREATININE 0.7     Coagulation:   Recent Labs  Lab 05/18/18  0558   INR 1.1   APTT <21.0

## 2018-05-19 NOTE — PROGRESS NOTES
[facility]  Cardiothoracic Surgery  Progress Note    Patient Name: Michael Espinoza  MRN: [id number]  Admission Date: 5/14/2018  Hospital Length of Stay: 4 days  Code Status: Prior   Attending Physician: Chad Edwards MD   Referring Provider: Chad Edwards MD  Principal Problem:CAD (coronary artery disease)    Subjective:     Post-Op Info:  Procedure(s) (LRB):  AORTOCORONARY BYPASS-CABG x 3 (N/A)  [place]-Vein-Endovascular (N/A)   4 Days Post-Op     Interval History: No acute events overnight. NSR, OOBTC. Encouraged to ambulated.    Medications:  Continuous Infusions:  Scheduled Meds:   aspirin  325 mg Oral Daily    atorvastatin  40 mg Oral QHS    docusate sodium  100 mg Oral BID    furosemide  40 mg Intravenous TID    metoprolol tartrate  75 mg Oral BID    mupirocin  1 g Nasal BID    pantoprazole  40 mg Oral Daily    polyethylene glycol  17 g Oral Daily    potassium bicarbonate  25 mEq Oral TID    sodium chloride 0.9%  3 mL Intravenous Q8H     PRN Meds:albuterol-ipratropium, bisacodyl, dextrose 50%, dextrose 50%, glucagon (human recombinant), glucose, glucose, insulin aspart U-100, metoclopramide HCl, ondansetron, oxyCODONE, oxyCODONE     Objective:     Vital Signs (Most Recent):  Temp: 98.5 °F (36.9 °C) (05/18/18 1200)  Pulse: 106 (05/18/18 1800)  Resp: 16 (05/18/18 1612)  BP: 105/76 (05/18/18 1612)  SpO2: (!) 92 % (05/18/18 1612) Vital Signs (24h Range):  Temp:  [97.4 °F (36.3 °C)-98.6 °F (37 °C)] 98.5 °F (36.9 °C)  Pulse:  [] 106  Resp:  [16] 16  SpO2:  [90 %-94 %] 92 %  BP: (100-121)/(56-81) 105/76     Weight: 69.3 kg (152 lb 12.5 oz)  Body mass index is 23.93 kg/m².    SpO2: (!) 92 %  O2 Device (Oxygen Therapy): nasal cannula    Intake/Output - Last 3 Shifts       05/16 0700 - 05/17 0659 05/17 0700 - 05/18 0659 05/18 0700 - 05/19 0659    P.O. 720 540 600    I.V. (mL/kg) 50 (0.7) 50 (0.7)     Total Intake(mL/kg) 770 (10.1) 590 (8.2) 600 (8.7)    Urine (mL/kg/hr) 1425  (0.8) 2375 (1.4) 1950 (2.2)    Chest Tube 35 (0) 30 (0)     Total Output 1460 2405 1950    Net -690 -1815 -1350           Urine Occurrence  2 x     Stool Occurrence 1 x 1 x           Lines/Drains/Airways     Central Venous Catheter Line                 Percutaneous Central Line Insertion/Assessment - Quad lumen  05/14/18 0721 left subclavian 4 days         Percutaneous Central Line Insertion/Assessment - quad lumen  05/14/18 0721 left subclavian;other (see comments) 4 days                Physical Exam   Constitutional: He appears well-developed.   Cardiovascular: Normal rate and regular rhythm.    Incision c/d/i   Pulmonary/Chest: Effort normal.   Neurological: He is alert.   Skin: Skin is warm and dry.       Significant Labs:  CBC:   Recent Labs  Lab 05/18/18  0558   WBC 6.64   RBC 4.36*   HGB 13.5*   HCT 38.3*      MCV 88   MCH 31.0   MCHC 35.2     CMP:   Recent Labs  Lab 05/18/18  0558   GLU 95   CALCIUM 8.5*   *   K 3.7   CO2 30*   CL 93*   BUN 17   CREATININE 0.7     Coagulation:   Recent Labs  Lab 05/18/18  0558   INR 1.1   APTT <21.0         Assessment/Plan:     S/P CABG x 3    --Continue ASA, increase BB to 75, statin  --Sternal precautions  --PT/OT  --Ambulate x4   --Wean O2 as tolerated for O2 sat >92%  --monitor CXR   --CTs DC'ed  --Monitor electrolytes and replace prn  --Lasix 40mg TID        DISPO: Discharge planning ongoing              Ad Marques MD  Cardiothoracic Surgery  [facility]

## 2018-05-19 NOTE — ASSESSMENT & PLAN NOTE
--Continue ASA, increase BB to 75, statin  --Sternal precautions  --PT/OT  --Ambulate x4   --Wean O2 as tolerated for O2 sat >92%  --monitor CXR   --CTs DC'ed  --Monitor electrolytes and replace prn  --Lasix 40mg TID        DISPO: Discharge planning ongoing

## 2018-05-19 NOTE — PLAN OF CARE
Problem: Patient Care Overview  Goal: Plan of Care Review  Hx: HLD, Gilbert's syndrome, parenchymal lung disease, ASD    5/14: CABG x3, extubated       Outcome: Ongoing (interventions implemented as appropriate)  Plan of care discussed with patient.  Patient ambulating independently, fall precautions in place.Continuing to encourage sternal precautions, IS, and ambulation. Patient has no complaints of pain. Discussed medications and care. Patient has no questions at this time. Possible D/C today

## 2018-05-19 NOTE — PROGRESS NOTES
Pt discharged per MD orders.  Medication list and prescriptions reviewed; prescriptions sent to pt preferred pharmacy and printed prescriptions provided.  Pt verbalizes understanding of all written and verbal discharge instructions.  MIS performed and documented in chart. Pt awaiting family/escort arrival.  Will continue to monitor.

## 2018-05-19 NOTE — PROGRESS NOTES
Pt to be discharged per MD orders.  Central line removed per MD.  Pt reports that his family will be here around 1400 to pick him up.  Will let pt eat lunch, then discuss AVS.  Telemetry to remain until D/C. Will continue to monitor.

## 2018-05-19 NOTE — ASSESSMENT & PLAN NOTE
--Continue ASA, increase BB to 75, statin  --Sternal precautions  --PT/OT  --Ambulate x4   --Wean O2 as tolerated for O2 sat >92%  --monitor CXR   --Monitor electrolytes and replace prn  - Stop lasix        DISPO: Discharge home

## 2018-05-20 DIAGNOSIS — E78.5 DYSLIPIDEMIA: ICD-10-CM

## 2018-05-21 RX ORDER — ATORVASTATIN CALCIUM 40 MG/1
TABLET, FILM COATED ORAL
Qty: 90 TABLET | Refills: 0 | Status: SHIPPED | OUTPATIENT
Start: 2018-05-21 | End: 2018-08-23 | Stop reason: SDUPTHER

## 2018-05-22 DIAGNOSIS — Z95.1 S/P CABG X 3: Primary | ICD-10-CM

## 2018-05-26 NOTE — PT/OT/SLP DISCHARGE
Occupational Therapy Discharge Summary    Michael Espinoza  MRN: [id number]   Principal Problem: S/P CABG x 3      Patient Discharged from acute Occupational Therapy on 5/18/2018.  Please refer to prior OT note dated 5/17/2018 for functional status.    Assessment:      Goals partially met.    Objective:     GOALS:    Occupational Therapy Goals        Problem: Occupational Therapy Goal    Goal Priority Disciplines Outcome Interventions   Occupational Therapy Goal     OT, PT/OT Ongoing (interventions implemented as appropriate)    Description:  Goals to be met by: 5/25/18     Patient will increase functional independence with ADLs by performing:    Feeding with Roland.  UE Dressing with Supervision.  LE Dressing with Supervision.  Grooming while standing at sink with Supervision.  Toileting from toilet with Supervision for hygiene and clothing management.   Toilet transfer to toilet with Supervision.                      Reasons for Discontinuation of Therapy Services  Transfer to alternate level of care.      Plan:     Patient Discharged to: Home no OT services needed    Sarah Beth Jorgensen OT  5/26/2018

## 2018-06-12 ENCOUNTER — HOSPITAL ENCOUNTER (OUTPATIENT)
Dept: RADIOLOGY | Facility: HOSPITAL | Age: 63
Discharge: HOME | End: 2018-06-12
Attending: THORACIC SURGERY (CARDIOTHORACIC VASCULAR SURGERY)
Payer: COMMERCIAL

## 2018-06-12 ENCOUNTER — HOSPITAL ENCOUNTER (OUTPATIENT)
Dept: CARDIOLOGY | Facility: CLINIC | Age: 63
Discharge: HOME | End: 2018-06-12
Payer: COMMERCIAL

## 2018-06-12 ENCOUNTER — OFFICE VISIT (OUTPATIENT)
Dept: CARDIOTHORACIC SURGERY | Facility: CLINIC | Age: 63
End: 2018-06-12
Payer: COMMERCIAL

## 2018-06-12 VITALS
SYSTOLIC BLOOD PRESSURE: 118 MMHG | WEIGHT: 153.75 LBS | OXYGEN SATURATION: 98 % | TEMPERATURE: 98 F | BODY MASS INDEX: 24.13 KG/M2 | HEIGHT: 67 IN | HEART RATE: 65 BPM | DIASTOLIC BLOOD PRESSURE: 75 MMHG

## 2018-06-12 DIAGNOSIS — Z95.1 S/P CABG X 3: Primary | ICD-10-CM

## 2018-06-12 DIAGNOSIS — Z95.1 S/P CABG X 3: ICD-10-CM

## 2018-06-12 DIAGNOSIS — I25.118 CORONARY ARTERY DISEASE OF NATIVE ARTERY OF NATIVE HEART WITH STABLE ANGINA PECTORIS: Primary | ICD-10-CM

## 2018-06-12 PROCEDURE — 99999 PR PBB SHADOW E&M-EST. PATIENT-LVL III: CPT | Mod: PBBFAC,,, | Performed by: THORACIC SURGERY (CARDIOTHORACIC VASCULAR SURGERY)

## 2018-06-12 PROCEDURE — 93000 ELECTROCARDIOGRAM COMPLETE: CPT | Mod: S$GLB,,, | Performed by: INTERNAL MEDICINE

## 2018-06-12 PROCEDURE — 99024 POSTOP FOLLOW-UP VISIT: CPT | Mod: S$GLB,,, | Performed by: THORACIC SURGERY (CARDIOTHORACIC VASCULAR SURGERY)

## 2018-06-12 PROCEDURE — 71046 X-RAY EXAM CHEST 2 VIEWS: CPT | Mod: 26,,, | Performed by: RADIOLOGY

## 2018-06-12 PROCEDURE — 71046 X-RAY EXAM CHEST 2 VIEWS: CPT | Mod: TC,FY

## 2018-06-12 NOTE — LETTER
June 12, 2018        Lc Rowe MD  [address]  [place] [address]             [facility] Cardiovascular [facility]urg  [address]  [facility] [address]  Phone: [phone]   Patient: Michael Espinoza   MR Number: [id number]   YOB: 1955   Date of Visit: 6/12/2018       Dear Dr. Rowe:    Thank you for referring Michael Espinoza to me for evaluation. Below are the relevant portions of my assessment and plan of care.            If you have questions, please do not hesitate to call me. I look forward to following Michael CURIEL along with you.    Sincerely,      Chad Edwards MD           CC  No Recipients

## 2018-06-12 NOTE — PROGRESS NOTES
Patient seen and examined. Patient is progressively increasing activity. No significant complaints.     Sternum: stable, incision CDI  Chest xray: Acceptable post op chest  EKG: NSR     Assessment:  S/p CABG X 4     Plan:  Can begin driving as long as he has power steering  Can begin cardiac rehab in the next couple of weeks  We will refer to cardiology to assume care, Dr. Rowe  Full dose ASA for 90 days.        No scheduled appointment, RTC prn    CTS Attending Note:    I have personally taken the history and examined this patient and agree with the NP's note as stated above.

## 2018-06-13 ENCOUNTER — DOCUMENTATION ONLY (OUTPATIENT)
Dept: CARDIOTHORACIC SURGERY | Facility: CLINIC | Age: 63
End: 2018-06-13

## 2018-06-13 NOTE — PROGRESS NOTES
Phase II cardiac rehab orders submitted to [facility] in [place].  Pt will follow up with Dr. Rowe, his cardiologist, on July 17.

## 2018-06-18 ENCOUNTER — TELEPHONE (OUTPATIENT)
Dept: CARDIAC REHAB | Facility: CLINIC | Age: 63
End: 2018-06-18

## 2018-06-25 ENCOUNTER — PATIENT MESSAGE (OUTPATIENT)
Dept: CARDIOTHORACIC SURGERY | Facility: CLINIC | Age: 63
End: 2018-06-25

## 2018-08-07 ENCOUNTER — OFFICE VISIT (OUTPATIENT)
Dept: CARDIOLOGY | Facility: CLINIC | Age: 63
End: 2018-08-07
Payer: COMMERCIAL

## 2018-08-07 VITALS
BODY MASS INDEX: 25.53 KG/M2 | OXYGEN SATURATION: 98 % | WEIGHT: 162.69 LBS | DIASTOLIC BLOOD PRESSURE: 74 MMHG | HEIGHT: 67 IN | SYSTOLIC BLOOD PRESSURE: 104 MMHG | HEART RATE: 70 BPM

## 2018-08-07 DIAGNOSIS — Z95.1 S/P CABG X 3: ICD-10-CM

## 2018-08-07 DIAGNOSIS — I25.118 CORONARY ARTERY DISEASE OF NATIVE ARTERY OF NATIVE HEART WITH STABLE ANGINA PECTORIS: Primary | ICD-10-CM

## 2018-08-07 DIAGNOSIS — E78.5 HYPERLIPIDEMIA, UNSPECIFIED HYPERLIPIDEMIA TYPE: Primary | ICD-10-CM

## 2018-08-07 DIAGNOSIS — I25.10 CORONARY ARTERY DISEASE, ANGINA PRESENCE UNSPECIFIED, UNSPECIFIED VESSEL OR LESION TYPE, UNSPECIFIED WHETHER NATIVE OR TRANSPLANTED HEART: ICD-10-CM

## 2018-08-07 DIAGNOSIS — I20.89 EXERTIONAL ANGINA: ICD-10-CM

## 2018-08-07 PROCEDURE — 99999 PR PBB SHADOW E&M-EST. PATIENT-LVL III: CPT | Mod: PBBFAC,,, | Performed by: INTERNAL MEDICINE

## 2018-08-07 PROCEDURE — 99215 OFFICE O/P EST HI 40 MIN: CPT | Mod: S$GLB,,, | Performed by: INTERNAL MEDICINE

## 2018-08-07 PROCEDURE — 3008F BODY MASS INDEX DOCD: CPT | Mod: CPTII,S$GLB,, | Performed by: INTERNAL MEDICINE

## 2018-08-07 NOTE — ASSESSMENT & PLAN NOTE
-Doing well since surgery  -No acute complications  -Should continue cardiac rehab   -ASA indefinitely, high potency statin and optimal blood pressure control

## 2018-08-07 NOTE — PROGRESS NOTES
Subjective:    Patient ID:  Michael Espinoza is a 62 y.o. male who presents for follow-up of No chief complaint on file.      HPI    Mr Espinoza is a 61 yo man with medical history significant for HLD, Gilbert's syndrome, parenchymal lung disease, ASD, and three vessel CAD s/p CABG x3V LIMA-LAD, SVG to OM1 and SVG to PDA. Patient did well since surgery and he is here for post surgery follow up.  He is back to his normal daily activities. Denies any anginal symptoms. Denies diaphoresis, palpitations or SOB.       Review of Systems   Constitution: Negative.   HENT: Negative.    Eyes: Negative.    Cardiovascular: Negative for chest pain, claudication, cyanosis, dyspnea on exertion, irregular heartbeat, leg swelling, near-syncope, orthopnea, palpitations, paroxysmal nocturnal dyspnea and syncope.   Respiratory: Negative for cough, hemoptysis, shortness of breath, sleep disturbances due to breathing, snoring, sputum production and wheezing.    Endocrine: Negative.    Hematologic/Lymphatic: Negative.    Gastrointestinal: Negative.    Genitourinary: Negative.         Objective:    Physical Exam   Constitutional: He is oriented to person, place, and time. He appears well-developed and well-nourished.   HENT:   Head: Normocephalic and atraumatic.   Eyes: Conjunctivae are normal.   Neck: Neck supple. No JVD present. No thyromegaly present.   Cardiovascular: Normal rate, regular rhythm, normal heart sounds and intact distal pulses.  Exam reveals no gallop and no friction rub.    No murmur heard.  Pulmonary/Chest: Effort normal and breath sounds normal. No respiratory distress. He has no wheezes. He has no rales.   Abdominal: Soft. Bowel sounds are normal. He exhibits no distension. There is no tenderness. There is no rebound.   Neurological: He is oriented to person, place, and time.   Nursing note and vitals reviewed.        Assessment:       1. Coronary artery disease of native artery of native heart with stable angina pectoris     2. Exertional angina    3. S/P CABG x 3         Plan:       S/P CABG x 3  -Doing well since surgery  -No acute complications  -Should continue cardiac rehab   -ASA indefinitely, high potency statin and optimal blood pressure control       Coronary artery disease of native artery of native heart with stable angina pectoris  Exercise stress echo in one year  Continue statin and ASA     Odilon Gustafson MD  Interventional Cardiovascular Fellow, PGY VII  Pager: [phone]  8/7/2018 8:18 AM          I have personally taken the history and examined this patient. I have discussed and agree with the resident's findings and plan as documented in the resident's note.  Lc Rowe

## 2018-08-09 ENCOUNTER — LAB VISIT (OUTPATIENT)
Dept: LAB | Facility: HOSPITAL | Age: 63
End: 2018-08-09
Attending: INTERNAL MEDICINE
Payer: COMMERCIAL

## 2018-08-09 ENCOUNTER — TELEPHONE (OUTPATIENT)
Dept: CARDIOLOGY | Facility: CLINIC | Age: 63
End: 2018-08-09

## 2018-08-09 DIAGNOSIS — E78.5 HYPERLIPIDEMIA, UNSPECIFIED HYPERLIPIDEMIA TYPE: ICD-10-CM

## 2018-08-09 LAB
ALBUMIN SERPL BCP-MCNC: 3.9 G/DL
ALP SERPL-CCNC: 85 U/L
ALT SERPL W/O P-5'-P-CCNC: 29 U/L
AST SERPL-CCNC: 26 U/L
BILIRUB DIRECT SERPL-MCNC: 0.6 MG/DL
BILIRUB SERPL-MCNC: 1.7 MG/DL
CHOLEST SERPL-MCNC: 148 MG/DL
CHOLEST/HDLC SERPL: 2.9 {RATIO}
HDLC SERPL-MCNC: 51 MG/DL
HDLC SERPL: 34.5 %
LDLC SERPL CALC-MCNC: 83.2 MG/DL
NONHDLC SERPL-MCNC: 97 MG/DL
PROT SERPL-MCNC: 6.8 G/DL
TRIGL SERPL-MCNC: 69 MG/DL

## 2018-08-09 PROCEDURE — 80061 LIPID PANEL: CPT

## 2018-08-09 PROCEDURE — 80076 HEPATIC FUNCTION PANEL: CPT

## 2018-08-09 PROCEDURE — 36415 COLL VENOUS BLD VENIPUNCTURE: CPT

## 2018-08-14 ENCOUNTER — HOSPITAL ENCOUNTER (OUTPATIENT)
Dept: RADIOLOGY | Facility: HOSPITAL | Age: 63
Discharge: HOME | End: 2018-08-14
Attending: ORTHOPAEDIC SURGERY
Payer: COMMERCIAL

## 2018-08-14 ENCOUNTER — OFFICE VISIT (OUTPATIENT)
Dept: SPORTS MEDICINE | Facility: CLINIC | Age: 63
End: 2018-08-14
Payer: COMMERCIAL

## 2018-08-14 VITALS
HEART RATE: 59 BPM | WEIGHT: 162 LBS | HEIGHT: 67 IN | BODY MASS INDEX: 25.43 KG/M2 | SYSTOLIC BLOOD PRESSURE: 113 MMHG | DIASTOLIC BLOOD PRESSURE: 80 MMHG

## 2018-08-14 DIAGNOSIS — M25.511 RIGHT SHOULDER PAIN, UNSPECIFIED CHRONICITY: ICD-10-CM

## 2018-08-14 DIAGNOSIS — M25.511 RIGHT SHOULDER PAIN, UNSPECIFIED CHRONICITY: Primary | ICD-10-CM

## 2018-08-14 PROCEDURE — 99203 OFFICE O/P NEW LOW 30 MIN: CPT | Mod: S$GLB,,, | Performed by: ORTHOPAEDIC SURGERY

## 2018-08-14 PROCEDURE — 3008F BODY MASS INDEX DOCD: CPT | Mod: CPTII,S$GLB,, | Performed by: ORTHOPAEDIC SURGERY

## 2018-08-14 PROCEDURE — 73030 X-RAY EXAM OF SHOULDER: CPT | Mod: 26,RT,, | Performed by: RADIOLOGY

## 2018-08-14 PROCEDURE — 99999 PR PBB SHADOW E&M-EST. PATIENT-LVL III: CPT | Mod: PBBFAC,,, | Performed by: ORTHOPAEDIC SURGERY

## 2018-08-14 PROCEDURE — 73030 X-RAY EXAM OF SHOULDER: CPT | Mod: TC,FY,PO,RT

## 2018-08-14 NOTE — Clinical Note
August 16, 2018      [facility]            [facility]  [address]  [facility] [address]  Phone: [phone]          Patient: Michael Esipnoza   MR Number: [id number]   YOB: 1955   Date of Visit: 8/14/2018       Dear [facility] :    Thank you for referring Michael Espinoza to me for evaluation. Attached you will find relevant portions of my assessment and plan of care.    If you have questions, please do not hesitate to call me. I look forward to following Michael Espinoza along with you.    Sincerely,    Torrey Kaur MD    Enclosure  CC:  No Recipients    If you would like to receive this communication electronically, please contact [email or web address] or [phone] to request more information on [facility] Link access.    For providers and/or their staff who would like to refer a patient to [facility], please contact us through our one-stop-shop provider referral line, Tonya Jung, at [phone].    If you feel you have received this communication in error or would no longer like to receive these types of communications, please e-mail [email or web address]

## 2018-08-14 NOTE — PROGRESS NOTES
CC: RIGHT shoulder pain     62 y.o. Male with a history of right shoulder pain since April/May 2018.  Reports he was in bed and reached backwards (abduction with hyperextension and external rotation) to turn off a lamp and felt pain in his shoulder.  Couple weeks later he underwent a coronary artery bypass grafting x 3 with Dr. Edwards. Reports recently more frequent pain with certain movements (ex: drying off with towel for upper back).    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    He reports that the pain and weakness is worse with overhead activity. It also bothers him at night.    Is affecting ADLs.  Pain is 2/10 at it's worst.      Past Medical History:   Diagnosis Date    Coronary artery disease of native artery of native heart with stable angina pectoris 4/10/2018    Hyperlipidemia        Past Surgical History:   Procedure Laterality Date    CYST REMOVAL      TONSILLECTOMY         Family History   Problem Relation Age of Onset    Hyperlipidemia Brother     Hypertension Brother     Heart disease Brother     Heart attack Brother          Current Outpatient Medications:     aspirin (ECOTRIN) 325 MG EC tablet, Take 1 tablet (325 mg total) by mouth once daily., Disp: , Rfl: 0    atorvastatin (LIPITOR) 40 MG tablet, TAKE 1 TABLET(40 MG) BY MOUTH EVERY EVENING, Disp: 90 tablet, Rfl: 0    calcium carbonate (CALCIUM 600) 600 mg calcium (1,500 mg) Tab, Take 600 mg by mouth once daily., Disp: , Rfl:     ERGOCALCIFEROL, VITAMIN D2, (VITAMIN D ORAL), Take by mouth once daily., Disp: , Rfl:     flu vac ts 2013,18yr+,cell,PF, (FLULAVAL) 45 mcg (15 mcg x 3)/0.5 mL Syrg, Inject 0.5 mLs into the muscle., Disp: , Rfl:     metoprolol tartrate 75 mg Tab, Take 75 mg by mouth 2 (two) times daily., Disp: 60 tablet, Rfl: 3    Review of patient's allergies indicates:  No Known Allergies       REVIEW OF SYSTEMS:  Constitution: Negative. Negative for chills, fever and night sweats.   HENT: Negative for  "congestion and headaches.    Eyes: Negative for blurred vision, left vision loss and right vision loss.   Cardiovascular: Negative for chest pain and syncope.   Respiratory: Negative for cough and shortness of breath.    Endocrine: Negative for polydipsia, polyphagia and polyuria.   Hematologic/Lymphatic: Negative for bleeding problem. Does not bruise/bleed easily.   Skin: Negative for dry skin, itching and rash.   Musculoskeletal: Negative for falls.  Positive for right shoulder pain and muscle weakness.   Gastrointestinal: Negative for abdominal pain and bowel incontinence.   Genitourinary: Negative for bladder incontinence and nocturia.   Neurological: Negative for disturbances in coordination, loss of balance and seizures.   Psychiatric/Behavioral: Negative for depression. The patient does not have insomnia.    Allergic/Immunologic: Negative for hives and persistent infections.      PHYSICAL EXAMINATION:  Vitals:  /80   Pulse (!) 59   Ht 5' 7" (1.702 m)   Wt 73.5 kg (162 lb)   BMI 25.37 kg/m²    General: The patient is alert and oriented x 3.  Mood is pleasant.  Observation of ears, eyes and nose reveal no gross abnormalities.  No labored breathing observed.  Gait is coordinated. Patient can toe walk and heel walk without difficulty.      RIGHT Shoulder / Upper Extremity Exam    OBSERVATION:     Swelling  none  Deformity  none   Discoloration  none   Scapular winging none   Scars   none  Atrophy  none    TENDERNESS / CREPITUS (T/C):          T/C      T/C   Clavicle   -/-  SUPRAspinatus    -/-     AC Jt.    -/-  INFRAspinatus  -/-    SC Jt.    -/-  Deltoid    -/-      G. Tuberosity  -/-  LH BICEP groove  -/-   Acromion:  -/-  Midline Neck   -/-     Scapular Spine -/-  Trapezium   -/-   SMA Scapula  -/-  GH jt. line - post  -/-     Scapulothoracic  -/-         ROM: (* = with pain)  Left shoulder   Right shoulder        AROM (PROM)   AROM (PROM)   FE    170° (175°)     155° (160°)  *   ER at 90° " ABD  90°  (90°)    75°  (80°) *   IR (spine level)   T10     BP *    STRENGTH: (* = with pain) Left shoulder   Right shoulder   SCAPTION   5/5    4+/5    IR    5/5    5/5   ER    5/5    5/5   BICEPS   5/5    5/5   Deltoid    5/5    5/5     SIGNS:  Painful side       NEER   +    OABELS  neg    CASTILLO   +    SPEEDS  neg     DROP ARM   -   BELLY PRESS neg   Superior escape none    LIFT-OFF  neg   X-Body ADD    neg    MOVING VALGUS neg        STABILITY TESTING    Left shoulder   Right shoulder    Translation     Anterior  up face     up face    Posterior  up face    up face    Sulcus   < 10mm    < 10 mm     Signs   Apprehension   neg      neg       Relocation   no change     no change      Jerk test  neg     neg    EXTREMITY NEURO-VASCULAR EXAM:    Sensation grossly intact to light touch all dermatomal regions.    DTR 2+ Biceps, Triceps, BR and Negative Kalyans sign   Grossly intact motor function at Elbow, Wrist and Hand   Distal pulses radial and ulnar 2+, brisk cap refill, symmetric.      NECK:  Painless FROM and spinous processes non-tender. Negative Spurlings sign.      OTHER FINDINGS:  + scapular dyskinesia    XRAYS (8/14/18): Visualized osseous structures appear unremarkable, with no evidence of recent or healing fracture, lytic destructive process, appreciable arthritic change, or other significant abnormality identified.  No glenohumeral dislocation.  No abnormal soft tissue calcifications.  Sternotomy sutures are incidentally observed.      ASSESSMENT:     Right shoulder pain, possible rotator cuff tear    PLAN:    1. MRI without contrast to evaluate for rotator cuff tear  2. Follow up in clinic for MRI results    All questions were answered, patient will contact us for questions or concerns in the interim.

## 2018-08-21 ENCOUNTER — HOSPITAL ENCOUNTER (OUTPATIENT)
Dept: RADIOLOGY | Facility: HOSPITAL | Age: 63
Discharge: HOME | End: 2018-08-21
Attending: ORTHOPAEDIC SURGERY
Payer: COMMERCIAL

## 2018-08-21 DIAGNOSIS — M25.511 RIGHT SHOULDER PAIN, UNSPECIFIED CHRONICITY: ICD-10-CM

## 2018-08-21 PROCEDURE — 73221 MRI JOINT UPR EXTREM W/O DYE: CPT | Mod: TC,RT

## 2018-08-21 PROCEDURE — 73221 MRI JOINT UPR EXTREM W/O DYE: CPT | Mod: 26,RT,, | Performed by: RADIOLOGY

## 2018-08-23 ENCOUNTER — OFFICE VISIT (OUTPATIENT)
Dept: SPORTS MEDICINE | Facility: CLINIC | Age: 63
End: 2018-08-23
Payer: COMMERCIAL

## 2018-08-23 VITALS
SYSTOLIC BLOOD PRESSURE: 108 MMHG | BODY MASS INDEX: 25.43 KG/M2 | DIASTOLIC BLOOD PRESSURE: 69 MMHG | HEIGHT: 67 IN | HEART RATE: 80 BPM | WEIGHT: 162 LBS

## 2018-08-23 DIAGNOSIS — G89.29 CHRONIC RIGHT SHOULDER PAIN: Primary | ICD-10-CM

## 2018-08-23 DIAGNOSIS — E78.5 DYSLIPIDEMIA: ICD-10-CM

## 2018-08-23 DIAGNOSIS — M75.100 ROTATOR CUFF TEAR: ICD-10-CM

## 2018-08-23 DIAGNOSIS — M25.511 CHRONIC RIGHT SHOULDER PAIN: Primary | ICD-10-CM

## 2018-08-23 PROCEDURE — 99214 OFFICE O/P EST MOD 30 MIN: CPT | Mod: 25,S$GLB,, | Performed by: ORTHOPAEDIC SURGERY

## 2018-08-23 PROCEDURE — 99999 PR PBB SHADOW E&M-EST. PATIENT-LVL III: CPT | Mod: PBBFAC,,, | Performed by: ORTHOPAEDIC SURGERY

## 2018-08-23 PROCEDURE — 3008F BODY MASS INDEX DOCD: CPT | Mod: CPTII,S$GLB,, | Performed by: ORTHOPAEDIC SURGERY

## 2018-08-23 PROCEDURE — 20610 DRAIN/INJ JOINT/BURSA W/O US: CPT | Mod: RT,S$GLB,, | Performed by: ORTHOPAEDIC SURGERY

## 2018-08-23 RX ADMIN — TRIAMCINOLONE ACETONIDE 40 MG: 40 INJECTION, SUSPENSION INTRA-ARTICULAR; INTRAMUSCULAR at 11:08

## 2018-08-23 NOTE — PROGRESS NOTES
CC: RIGHT shoulder pain     62 y.o. Male with a history of right shoulder pain.  Returns to clinic to discuss MRI and treatment options.    Right shoulder pain since April/May 2018.  Reports he was in bed and reached backwards (abduction with hyperextension and external rotation) to turn off a lamp and felt pain in his shoulder.  Couple weeks later he underwent a coronary artery bypass grafting x 3 with Dr. Edwards. Reports recently more frequent pain with certain movements (ex: drying off with towel for upper back).    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    He reports that the pain and weakness is worse with overhead activity. It also bothers him at night.    Is affecting ADLs.  Pain is 0/10 at it's worst.      Past Medical History:   Diagnosis Date    Coronary artery disease of native artery of native heart with stable angina pectoris 4/10/2018    Hyperlipidemia        Past Surgical History:   Procedure Laterality Date    CYST REMOVAL      TONSILLECTOMY         Family History   Problem Relation Age of Onset    Hyperlipidemia Brother     Hypertension Brother     Heart disease Brother     Heart attack Brother          Current Outpatient Medications:     aspirin (ECOTRIN) 325 MG EC tablet, Take 1 tablet (325 mg total) by mouth once daily., Disp: , Rfl: 0    atorvastatin (LIPITOR) 40 MG tablet, TAKE 1 TABLET(40 MG) BY MOUTH EVERY EVENING, Disp: 90 tablet, Rfl: 0    calcium carbonate (CALCIUM 600) 600 mg calcium (1,500 mg) Tab, Take 600 mg by mouth once daily., Disp: , Rfl:     ERGOCALCIFEROL, VITAMIN D2, (VITAMIN D ORAL), Take by mouth once daily., Disp: , Rfl:     flu vac ts 2013,18yr+,cell,PF, (FLULAVAL) 45 mcg (15 mcg x 3)/0.5 mL Syrg, Inject 0.5 mLs into the muscle., Disp: , Rfl:     metoprolol tartrate 75 mg Tab, Take 75 mg by mouth 2 (two) times daily., Disp: 60 tablet, Rfl: 3    Review of patient's allergies indicates:  No Known Allergies       REVIEW OF SYSTEMS:  Constitution:  "Negative. Negative for chills, fever and night sweats.   HENT: Negative for congestion and headaches.    Eyes: Negative for blurred vision, left vision loss and right vision loss.   Cardiovascular: Negative for chest pain and syncope.   Respiratory: Negative for cough and shortness of breath.    Endocrine: Negative for polydipsia, polyphagia and polyuria.   Hematologic/Lymphatic: Negative for bleeding problem. Does not bruise/bleed easily.   Skin: Negative for dry skin, itching and rash.   Musculoskeletal: Negative for falls.  Positive for right shoulder pain and muscle weakness.   Gastrointestinal: Negative for abdominal pain and bowel incontinence.   Genitourinary: Negative for bladder incontinence and nocturia.   Neurological: Negative for disturbances in coordination, loss of balance and seizures.   Psychiatric/Behavioral: Negative for depression. The patient does not have insomnia.    Allergic/Immunologic: Negative for hives and persistent infections.      PHYSICAL EXAMINATION:  Vitals:  /69   Pulse 80   Ht 5' 7" (1.702 m)   Wt 73.5 kg (162 lb)   BMI 25.37 kg/m²    General: The patient is alert and oriented x 3.  Mood is pleasant.  Observation of ears, eyes and nose reveal no gross abnormalities.  No labored breathing observed.  Gait is coordinated. Patient can toe walk and heel walk without difficulty.      RIGHT Shoulder / Upper Extremity Exam    OBSERVATION:     Swelling  none  Deformity  none   Discoloration  none   Scapular winging none   Scars   none  Atrophy  none    TENDERNESS / CREPITUS (T/C):          T/C      T/C   Clavicle   -/-  SUPRAspinatus    -/-     AC Jt.    -/-  INFRAspinatus  -/-    SC Jt.    -/-  Deltoid    -/-      G. Tuberosity  -/-  LH BICEP groove  -/-   Acromion:  -/-  Midline Neck   -/-     Scapular Spine -/-  Trapezium   -/-   SMA Scapula  -/-  GH jt. line - post  -/-     Scapulothoracic  -/-         ROM: (* = with pain)  Left shoulder   Right shoulder        AROM (PROM) "   AROM (PROM)   FE    170° (175°)     155° (160°)  *   ER at 90° ABD  90°  (90°)    75°  (80°) *   IR (spine level)   T10     BP *    STRENGTH: (* = with pain) Left shoulder   Right shoulder   SCAPTION   5/5    4+/5    IR    5/5    5/5   ER    5/5    5/5   BICEPS   5/5    5/5   Deltoid    5/5    5/5     SIGNS:  Painful side       NEER   +    OABELS  neg    CASTILLO   +    SPEEDS  neg     DROP ARM   -   BELLY PRESS neg   Superior escape none    LIFT-OFF  neg   X-Body ADD    neg    MOVING VALGUS neg        STABILITY TESTING    Left shoulder   Right shoulder    Translation     Anterior  up face     up face    Posterior  up face    up face    Sulcus   < 10mm    < 10 mm     Signs   Apprehension   neg      neg       Relocation   no change     no change      Jerk test  neg     neg    EXTREMITY NEURO-VASCULAR EXAM:    Sensation grossly intact to light touch all dermatomal regions.    DTR 2+ Biceps, Triceps, BR and Negative Kalyans sign   Grossly intact motor function at Elbow, Wrist and Hand   Distal pulses radial and ulnar 2+, brisk cap refill, symmetric.      NECK:  Painless FROM and spinous processes non-tender. Negative Spurlings sign.      OTHER FINDINGS:  + scapular dyskinesia    XRAYS (8/14/18): Visualized osseous structures appear unremarkable, with no evidence of recent or healing fracture, lytic destructive process, appreciable arthritic change, or other significant abnormality identified.  No glenohumeral dislocation.  No abnormal soft tissue calcifications.  Sternotomy sutures are incidentally observed.    MRI (8/14/18):  Low-grade partial-thickness tearing/ tendinosis of the rotator cuff, involving the insertional fibers of the supraspinatus, infraspinatus, and subscapularis tendons, as above.    Intra-articular biceps tendinosis.    Trace subdeltoid fluid, probable bursitis.      ASSESSMENT:     Right shoulder pain, partial-thickness rotator cuff tear    PLAN:    1. Cortisone injection today  2.  Physical therapy at [facility]  3. Follow up 2 months    All questions were answered, patient will contact us for questions or concerns in the interim.

## 2018-08-24 RX ORDER — TRIAMCINOLONE ACETONIDE 40 MG/ML
40 INJECTION, SUSPENSION INTRA-ARTICULAR; INTRAMUSCULAR
Status: DISCONTINUED | OUTPATIENT
Start: 2018-08-23 | End: 2018-08-24 | Stop reason: HOSPADM

## 2018-08-24 RX ORDER — ATORVASTATIN CALCIUM 40 MG/1
TABLET, FILM COATED ORAL
Qty: 90 TABLET | Refills: 0 | Status: SHIPPED | OUTPATIENT
Start: 2018-08-24 | End: 2018-12-11 | Stop reason: SDUPTHER

## 2018-08-24 NOTE — PROCEDURES
Large Joint Aspiration/Injection: R subacromial bursa  Date/Time: 8/23/2018 11:55 AM  Performed by: Torrey Kaur MD  Authorized by: Torrey Kaur MD     Consent Done?:  Yes (Verbal)  Indications:  Pain  Procedure site marked: Yes    Timeout: Prior to procedure the correct patient, procedure, and site was verified      Location:  Shoulder  Site:  R subacromial bursa  Prep: Patient was prepped and draped in usual sterile fashion    Ultrasonic Guidance for needle placement: No  Needle size:  22 G  Approach:  Posterior  Medications:  40 mg triamcinolone acetonide 40 mg/mL  Patient tolerance:  Patient tolerated the procedure well with no immediate complications

## 2018-09-12 ENCOUNTER — CLINICAL SUPPORT (OUTPATIENT)
Dept: REHABILITATION | Facility: HOSPITAL | Age: 63
End: 2018-09-12
Attending: ORTHOPAEDIC SURGERY
Payer: COMMERCIAL

## 2018-09-12 DIAGNOSIS — M25.511 CHRONIC RIGHT SHOULDER PAIN: ICD-10-CM

## 2018-09-12 DIAGNOSIS — G89.29 CHRONIC RIGHT SHOULDER PAIN: ICD-10-CM

## 2018-09-12 PROCEDURE — 97110 THERAPEUTIC EXERCISES: CPT | Performed by: PHYSICAL THERAPIST

## 2018-09-12 PROCEDURE — G8979 MOBILITY GOAL STATUS: HCPCS | Mod: CJ | Performed by: PHYSICAL THERAPIST

## 2018-09-12 PROCEDURE — 97161 PT EVAL LOW COMPLEX 20 MIN: CPT | Performed by: PHYSICAL THERAPIST

## 2018-09-12 PROCEDURE — G8978 MOBILITY CURRENT STATUS: HCPCS | Mod: CJ | Performed by: PHYSICAL THERAPIST

## 2018-09-12 NOTE — PLAN OF CARE
[facility]  Physical Therapy Initial Evaluation    Name: Michael Espinoza  Clinic Number: [id number]    Therapy Diagnosis:   Encounter Diagnosis   Name Primary?    Chronic right shoulder pain      Physician: Torrey Kaur MD    Physician Orders: PT Eval and Treat   Medical Diagnosis: M25.511,G89.29 (ICD-10-CM) - Chronic right shoulder pain M75.100 (ICD-10-CM) - Rotator cuff tear   Evaluation Date: 9/12/2018  Authorization Period Expiration: 12-31-18  Plan of Care Certification Period: 11-07-18  Visit # / Visits authorized: 1/ 20    Time In: 7:45 am  Time Out: 8:45 am  Total Billable Time: 50 minutes    Precautions: Standard    Subjective     Date of onset: 3 wks ago  History of current condition - Michael reports: pain began when he reached behind his body to turn on a light.  He states sx have decreased a little after getting a cortisone injection.  States he wants to avoid surgery.        Past Medical History:   Diagnosis Date    Coronary artery disease of native artery of native heart with stable angina pectoris 4/10/2018    Hyperlipidemia      Michael Espinoza  has a past surgical history that includes Tonsillectomy; Cyst Removal; AORTOCORONARY BYPASS-CABG x 3 (N/A, 5/14/2018); [place]-Vein-Endovascular (N/A, 5/14/2018); and TRANSESOPHAGEAL ECHOCARDIOGRAM ([place]) (N/A, 12/11/2017).    Michael CURIEL has a current medication list which includes the following prescription(s): aspirin, atorvastatin, calcium carbonate, ergocalciferol (vitamin d2), flu vacc ts13-14(18,up)violetta(pf), and metoprolol tartrate.    Review of patient's allergies indicates:  No Known Allergies     Imaging: x-ray neg; MRI Low-grade partial-thickness tearing/ tendinosis of the rotator cuff, involving the insertional fibers of the supraspinatus, infraspinatus, and subscapularis tendons, as above.  Intra-articular biceps tendinosis. Trace subdeltoid fluid, probable bursitis.       Prior Therapy: na  Social History:  lives with  their family  Occupation: /teacher  Prior Level of Function: independent with ADL  Current Level of Function: limited lifting    Pain:  Current 0/10, worst 3/10, best 0/10   Location: right shoulder   Description: Aching  Aggravating Factors: Lifting  Easing Factors: rest    Pts goals: strengthen shld to avoid surgery    Objective     Postural examination:  Increased TH-kyphosis; protracted shld     Functional assessment:   - walking:   independent             AROM:  160 deg flexion/abd with painful arc, 70 ER and 60 IR with scap compensation     MMT:   4/5 ER and 5/5 others    Tone:  Decreased scapula muscles    Flexibility testing:   Tight posterior capsule    Special tests:   Neg [place] and empty can; painful Rand    Palpation:   No TTP    Joint mobility: decreased accessory movts    Swelling:  na      CMS Impairment/Limitation/Restriction for FOTO shoulder Survey    Therapist reviewed FOTO scores for Michael Espinoza on 9/12/2018.   FOTO documents entered into [employer] - see Media section.    Limitation Score: 34%  Category: Mobility    Current : CK = at least 40% but < 60% impaired, limited or restricted  Goal: CK = at least 40% but < 60% impaired, limited or restricted         TREATMENT     Treatment Time In: 7:45 am  Treatment Time Out: 8:45 am  Total Treatment time separate from Evaluation time: 50 min    Michael received therapeutic exercises to develop strength, ROM and flexibility for 20 minutes including:  UBE x 6 min, PROM x 5 min, GTB x 3 ways, GTB IR/ER step outs, prone rows with 3#, wand flexion, serratus punches with 3#, SL ER and HABD, prone ext 90-0 deg, shrugs and scap retractions with 3# and posture education.    Michael received cold pack for 10 minutes to to decrease circulation, pain, and swelling.       Home Exercises and Patient Education Provided    Education provided:   - icing for pain and posture education    Written Home Exercises Provided: yes.  Exercises were reviewed and Michael  was able to demonstrate them prior to the end of the session.  Michael demonstrated good  understanding of the education provided.     See EMR under Media for exercises provided 9/12/2018.      Assessment     Michael CURIEL is a 63 y.o. male referred to outpatient Physical Therapy with a medical diagnosis of M25.511,G89.29 (ICD-10-CM) - Chronic right shoulder pain M75.100 (ICD-10-CM) - Rotator cuff tear   .  Pt presents with pain, decreased ROM and weakness R shld.    Pt prognosis is Fair.   Pt will benefit from skilled outpatient Physical Therapy to address the deficits stated above and in the chart below, provide pt/family education, and to maximize pt's level of independence.     Plan of care discussed with patient: Yes  Pt's spiritual, cultural and educational needs considered and patient is agreeable to the plan of care and goals as stated below:     Anticipated Barriers for therapy: none    Medical Necessity is demonstrated by the following:  History  Co-morbidities and personal factors that may impact the plan of care Co-morbidities:   MI    Personal Factors:   no deficits     low   Examination  Body Structures and Functions, activity limitations and participation restrictions that may impact the plan of care Body Regions:   upper extremities    Body Systems:    ROM  strength    Participation Restrictions:   No heavy lifting    Activity limitations:   Learning and applying knowledge  no deficits    General Tasks and Commands  no deficits    Communication  no deficits    Mobility  lifting and carrying objects    Self care  no deficits    Domestic Life  no deficits    Interactions/Relationships  no deficits    Life Areas  no deficits    Community and Social Life  no deficits         low   Clinical Presentation stable and uncomplicated low   Decision Making/ Complexity Score: low     Goals:  Short Term Goals: 2 weeks         1.   Independent with HEP        2.  Pt will report decreased pain level of < 50% from above  measure with ADL    Long Term Goals:   GOALS:    8_   weeks. Pt agrees with goals set.  1. Independent with HEP.  2. Report decreased    R shld    pain  <   / =  2/10 with ADL such as light lifting  3. Increased MMT  for  R ER to 4+/5 to 5/5  with ADL such as yardwork  4. Increased arom  for  R shld to WFL-WNL with functional activities such walking or self-care      Plan     Certification Period/Plan of care expiration: 9/12/2018 to 11-07-18.    Outpatient Physical Therapy 2 times weekly for 8 weeks to include the following interventions: Manual Therapy, Moist Heat/ Ice, Patient Education and Therapeutic Exercise.     Luis Manuel Montez, PT

## 2018-09-12 NOTE — PATIENT INSTRUCTIONS
ROM: Pendulum (Circular)    Let R/L arm hang and shift body weight to move arm in a [place] clockwise, then counterclockwise.  Do 5 minutes per session. Do 2 sessions per day.        Scapular Retraction (Standing)    With arms at sides, squeeze shoulder blades together. Do not shrug and do not hold your breath. Hold 5 seconds.  Repeat 25 times per session. Do 2 sessions per day.       ROM: Flexion - Wand     In supine, hold wand with both hands and in front of you. Raise arms above your head as tolerated. Repeat 10 times  Do 1 set per session. Do 2 sessions per day.    Scapular: Protraction - 90° of Flexion - Serratus Punches     Holding  weights, push arms up toward ceiling, keeping elbows straight and back against floor.  Repeat 25 times. Do 1 set per session. Do 2 sessions per day.      Progressive Resisted: External Rotation (Side-Lying)    Lie on non affected side with  shoulder blade squeezed back and down. Raise forearm toward ceiling. Keep elbow bent and at side.  Repeat 25 times. Do 1 set per session. Do 2 sessions per day.        Abduction (Side-Lying)    Lie on non affected side. With thumb up, raise top arm above head.  Repeat 25 times . Do 1 set per session. Do 2 sessions per day.     [email or web address].[email or web address]         Prone Row    Perform 25 times, 1 set, 2x per day (HEP to go)      Shoulder External rotation    Perform 25 times, 1 set, 2x per day (HEP to go)      Shoulder Internal Rotation    Perform 25 times, 1 set, 2x per day (HEP to go)      Shoulder Extension    Perform 25 times, 1 set, 2x per day (HEP to go)

## 2018-09-14 ENCOUNTER — CLINICAL SUPPORT (OUTPATIENT)
Dept: REHABILITATION | Facility: HOSPITAL | Age: 63
End: 2018-09-14
Attending: ORTHOPAEDIC SURGERY
Payer: COMMERCIAL

## 2018-09-14 DIAGNOSIS — M25.511 CHRONIC RIGHT SHOULDER PAIN: Primary | ICD-10-CM

## 2018-09-14 DIAGNOSIS — G89.29 CHRONIC RIGHT SHOULDER PAIN: Primary | ICD-10-CM

## 2018-09-14 PROCEDURE — 97140 MANUAL THERAPY 1/> REGIONS: CPT

## 2018-09-14 PROCEDURE — 97110 THERAPEUTIC EXERCISES: CPT

## 2018-09-14 NOTE — PROGRESS NOTES
Physical Therapy Daily Treatment Note     Name: Michael CURIEL [facility] Number: [id number]    Therapy Diagnosis:        Encounter Diagnosis   Name Primary?    Chronic right shoulder pain        Physician: Torrey Kaur MD     Physician Orders: PT Eval and Treat   Medical Diagnosis: M25.511,G89.29 (ICD-10-CM) - Chronic right shoulder pain M75.100 (ICD-10-CM) - Rotator cuff tear   Evaluation Date: 9/12/2018  Authorization Period Expiration: 12-31-18  Plan of Care Certification Period: 11-07-18  Visit # / Visits authorized: 3/ 20     Time In: 1320  Time Out: 1420  Total Billable Time: 50     Precautions: Standard    Subjective     Pt reports: no pain at present time. Cortisone shot has really helped me.  He was compliant with home exercise program.  Response to previous treatment: no adverse effect   Functional change: dereased pain     Pain: 0/10  Location: right shoulder      Objective     Michael received therapeutic exercises to develop strength, endurance, ROM, flexibility and posture for 40 minutes including:    serratus punches with 3#   wand flexion 25x  UBE x 6 min  GTB x 3 ways 25x ea  GTB IR/ER step outs 25x  prone rows  3# 25x  SL ER 3# 25x   Prone [id number]   prone ext 90-0 deg  shrugs and scap retractions with 3# 25x ea       Pt received the following manual therapy techniques: Joint mobilizations were applied to the: R shld  for 10 minutes, including Shld and scap mobs and stretching      Michael received hot pack for 10 minutes to increase circulation and promote tissue healing.  Ice pack for 10' for decreased circulation, edema, and pain     Home Exercises Provided and Patient Education Provided     Education provided:   Posture awareness    Written Home Exercises Provided: yes.  Exercises were reviewed and Michael was able to demonstrate them prior to the end of the session.  Michael demonstrated good  understanding of the education provided.       Assessment   Pt guard with manual therapy. VC/TC for  mm relaxation and correcting form/technique. Min discomfort end range with shld abd and shld flex today.   Michael is progressing well towards his goals.   Pt prognosis is Good.     Pt will continue to benefit from skilled outpatient physical therapy to address the deficits listed in the problem list box on initial evaluation, provide pt/family education and to maximize pt's level of independence in the home and community environment.     Pt's spiritual, cultural and educational needs considered and pt agreeable to plan of care and goals.    Anticipated barriers to physical therapy: none    Goals: Short Term Goals: 2 weeks         1.   Independent with HEP (not met, progressing)        2.  Pt will report decreased pain level of < 50% from above measure with ADL(not met, progressing)     Long Term Goals:   GOALS:    8_   weeks. Pt agrees with goals set.  1. Independent with HEP.(not met, progressing)  2. Report decreased    R shld    pain  <   / =  2/10 with ADL such as light lifting(not met, progressing)  3. Increased MMT  for  R ER to 4+/5 to 5/5  with ADL such as yardwork(not met, progressing)  4. Increased arom  for  R shld to WFL-WNL with functional activities such (not met, progressing)      Plan     Cont with periscapular mm strengthening and ROM.     Torrey Waters, PTA, STS

## 2018-09-25 ENCOUNTER — CLINICAL SUPPORT (OUTPATIENT)
Dept: REHABILITATION | Facility: HOSPITAL | Age: 63
End: 2018-09-25
Attending: ORTHOPAEDIC SURGERY
Payer: COMMERCIAL

## 2018-09-25 DIAGNOSIS — M25.511 CHRONIC RIGHT SHOULDER PAIN: ICD-10-CM

## 2018-09-25 DIAGNOSIS — G89.29 CHRONIC RIGHT SHOULDER PAIN: ICD-10-CM

## 2018-09-25 PROCEDURE — 97110 THERAPEUTIC EXERCISES: CPT | Performed by: PHYSICAL THERAPIST

## 2018-09-25 NOTE — PROGRESS NOTES
"  Physical Therapy Daily Treatment Note     Name: Michael CURIEL [facility] Number: [id number]    Therapy Diagnosis:        Encounter Diagnosis   Name Primary?    Chronic right shoulder pain        Physician: Torrey Kaur MD     Physician Orders: PT Eval and Treat   Medical Diagnosis: M25.511,G89.29 (ICD-10-CM) - Chronic right shoulder pain M75.100 (ICD-10-CM) - Rotator cuff tear   Evaluation Date: 9/12/2018  Authorization Period Expiration: 12-31-18  Plan of Care Certification Period: 11-07-18  Visit # / Visits authorized: 3/ 20     Time In: 6:20 am  Time Out: 7:15 am  Total Billable Time: 45 min     Precautions: Standard    Subjective     Pt reports: R shld feels "better" and rates pain as 0/10 presently.  He was compliant with home exercise program.  Response to previous treatment: less pain  Functional change: increased ROM     Pain: 0/10  Location: right shoulder      Objective     PROM is WNL without pain.    Michael received therapeutic exercises to develop strength, endurance, ROM, flexibility and posture for 40 minutes including:    serratus punches with 4#   wand flexion and IR 25x  UBE x 6 min  GTB x 3 ways 25x ea  GTB IR/ER step outs 25x  prone rows  4# 25x  SL HABD 2# 25x   Prone [id number]   prone ext 90-0 deg with 2#  shrugs and scap retractions with 4# 25x ea   Ball co-contractions      Pt received the following manual therapy techniques: Joint mobilizations were applied to the: R shld  for 7 minutes, including Shld and scap mobs and stretching    Ice pack for 10' for decreased circulation, edema, and pain     Home Exercises Provided and Patient Education Provided     Education provided:   Posture awareness    Written Home Exercises Provided: yes.  Exercises were reviewed and Michael was able to demonstrate them prior to the end of the session.  Michael demonstrated good  understanding of the education provided.       Assessment   Pt exhibits improved PROM.  No c/o pain with treatment.   Michael is " progressing well towards his goals.   Pt prognosis is Good.     Pt will continue to benefit from skilled outpatient physical therapy to address the deficits listed in the problem list box on initial evaluation, provide pt/family education and to maximize pt's level of independence in the home and community environment.     Pt's spiritual, cultural and educational needs considered and pt agreeable to plan of care and goals.    Anticipated barriers to physical therapy: none    Goals: Short Term Goals: 2 weeks         1.   Independent with HEP (not met, progressing)        2.  Pt will report decreased pain level of < 50% from above measure with ADL(not met, progressing)     Long Term Goals:   GOALS:    8_   weeks. Pt agrees with goals set.  1. Independent with HEP.(not met, progressing)  2. Report decreased    R shld    pain  <   / =  2/10 with ADL such as light lifting(not met, progressing)  3. Increased MMT  for  R ER to 4+/5 to 5/5  with ADL such as yardwork(not met, progressing)  4. Increased arom  for  R shld to WFL-WNL with functional activities such (not met, progressing)      Plan     Cont with periscapular strengthening and ROM.     Luis Manuel Montez, PT, STS

## 2018-09-28 ENCOUNTER — CLINICAL SUPPORT (OUTPATIENT)
Dept: REHABILITATION | Facility: HOSPITAL | Age: 63
End: 2018-09-28
Attending: ORTHOPAEDIC SURGERY
Payer: COMMERCIAL

## 2018-09-28 DIAGNOSIS — M25.511 CHRONIC RIGHT SHOULDER PAIN: ICD-10-CM

## 2018-09-28 DIAGNOSIS — G89.29 CHRONIC RIGHT SHOULDER PAIN: ICD-10-CM

## 2018-09-28 PROCEDURE — 97110 THERAPEUTIC EXERCISES: CPT | Performed by: PHYSICAL THERAPIST

## 2018-09-28 PROCEDURE — 97140 MANUAL THERAPY 1/> REGIONS: CPT | Performed by: PHYSICAL THERAPIST

## 2018-09-28 NOTE — PROGRESS NOTES
"  Physical Therapy Daily Treatment Note     Name: Michael CURIEL [facility] Number: [id number]    Therapy Diagnosis:        Encounter Diagnosis   Name Primary?    Chronic right shoulder pain        Physician: Torrey Kaur MD     Physician Orders: PT Eval and Treat   Medical Diagnosis: M25.511,G89.29 (ICD-10-CM) - Chronic right shoulder pain M75.100 (ICD-10-CM) - Rotator cuff tear   Evaluation Date: 9/12/2018  Authorization Period Expiration: 12-31-18  Plan of Care Certification Period: 11-07-18  Visit # / Visits authorized: 3/ 20     Time In: 6:10 am  Time Out: 7:00 am  Total Billable Time: 40 min     Precautions: Standard    Subjective     Pt reports: MARY shiban feels "pretty good" and rates pain as 0/10 presently.  He was compliant with home exercise program.  Response to previous treatment: less pain  Functional change: increased ROM     Pain: 0/10  Location: right shoulder      Objective     PROM is WNL without pain.  AROM is WFL-WNL with some scap compensation noted. No TTP.    Michael received therapeutic exercises to develop strength, endurance, ROM, flexibility and posture for 40 minutes including:    serratus punches with 4#   wand flexion and IR 25x  UBE x 6 min  GTB x 3 ways 25x ea  GTB IR/ER step outs 25x  prone rows  4# 25x  SL HABD 2# 25x   Prone ext 90-0 deg with 2#  shrugs and scap retractions with 4# 25x ea   Ball co-contractions  SL ER with 2#      Pt received the following manual therapy techniques: Joint mobilizations were applied to the: R shld  for 8 minutes, including Shld and scap mobs and stretching    Ice pack for 10' for decreased circulation, edema, and pain     Home Exercises Provided and Patient Education Provided     Education provided:   Posture awareness    Written Home Exercises Provided: yes.  Exercises were reviewed and Michael was able to demonstrate them prior to the end of the session.  Michael demonstrated good  understanding of the education provided.       Assessment   Pt " exhibits improved PROM.  No TTP noted.   Michael is progressing well towards his goals.   Pt prognosis is Good.     Pt will continue to benefit from skilled outpatient physical therapy to address the deficits listed in the problem list box on initial evaluation, provide pt/family education and to maximize pt's level of independence in the home and community environment.     Pt's spiritual, cultural and educational needs considered and pt agreeable to plan of care and goals.    Anticipated barriers to physical therapy: none    Goals: Short Term Goals: 2 weeks         1.   Independent with HEP (not met, progressing)        2.  Pt will report decreased pain level of < 50% from above measure with ADL(not met, progressing)     Long Term Goals:   GOALS:    8_   weeks. Pt agrees with goals set.  1. Independent with HEP.(not met, progressing)  2. Report decreased    R shld    pain  <   / =  2/10 with ADL such as light lifting(not met, progressing)  3. Increased MMT  for  R ER to 4+/5 to 5/5  with ADL such as yardwork(not met, progressing)  4. Increased arom  for  R shld to WFL-WNL with functional activities such (not met, progressing)      Plan     Cont with periscapular strengthening and ROM.     Luis Manuel Montez, PT, STS

## 2018-10-05 ENCOUNTER — CLINICAL SUPPORT (OUTPATIENT)
Dept: REHABILITATION | Facility: HOSPITAL | Age: 63
End: 2018-10-05
Attending: ORTHOPAEDIC SURGERY
Payer: COMMERCIAL

## 2018-10-05 DIAGNOSIS — G89.29 CHRONIC RIGHT SHOULDER PAIN: ICD-10-CM

## 2018-10-05 DIAGNOSIS — M25.511 CHRONIC RIGHT SHOULDER PAIN: ICD-10-CM

## 2018-10-05 PROCEDURE — 97110 THERAPEUTIC EXERCISES: CPT | Performed by: PHYSICAL THERAPIST

## 2018-10-05 NOTE — PROGRESS NOTES
"  Physical Therapy Daily Treatment Note     Name: Michael CURIEL [facility] Number: [id number]    Therapy Diagnosis:        Encounter Diagnosis   Name Primary?    Chronic right shoulder pain        Physician: Torrey Kaur MD     Physician Orders: PT Eval and Treat   Medical Diagnosis: M25.511,G89.29 (ICD-10-CM) - Chronic right shoulder pain M75.100 (ICD-10-CM) - Rotator cuff tear   Evaluation Date: 9/12/2018  Authorization Period Expiration: 12-31-18  Plan of Care Certification Period: 11-07-18  Visit # / Visits authorized: 5/ 20     Time In: 6:15 am  Time Out: 7:10 am  Total Billable Time: 40 min     Precautions: Standard    Subjective     Pt reports: R shiban feels "better" and rates pain as 0/10 presently.  He was compliant with home exercise program.  Response to previous treatment: less pain  Functional change: increased ROM     Pain: 0/10  Location: right shoulder      Objective     PROM is WNL without pain.  AROM is WFL-WNL with some scap compensation noted. No TTP.    Michael received therapeutic exercises to develop strength, endurance, ROM, flexibility and posture for 40 minutes including:    serratus punches with 5#   wand flexion and IR 25x  UBE x 6 min  GTB x 3 ways 25x ea  GTB IR/ER step outs 25x  prone rows  4# 25x  SL HABD 2# 25x   Prone ext 90-0 deg with 2#  shrugs and scap retractions with 5# 25x ea   Ball co-contractions  SL ER with 2#  bodyblade      Pt received the following manual therapy techniques: Joint mobilizations were applied to the: R shld  for 8 minutes, including Shld and scap mobs and stretching    Ice pack for 10' for decreased circulation, edema, and pain     Home Exercises Provided and Patient Education Provided     Education provided:   Posture awareness    Written Home Exercises Provided: yes.  Exercises were reviewed and Michael was able to demonstrate them prior to the end of the session.  Michael demonstrated good  understanding of the education provided.       Assessment "   Progressing well and without c/o pain.   Michael is progressing well towards his goals.   Pt prognosis is Good.     Pt will continue to benefit from skilled outpatient physical therapy to address the deficits listed in the problem list box on initial evaluation, provide pt/family education and to maximize pt's level of independence in the home and community environment.     Pt's spiritual, cultural and educational needs considered and pt agreeable to plan of care and goals.    Anticipated barriers to physical therapy: none    Goals: Short Term Goals: 2 weeks         1.   Independent with HEP (not met, progressing)        2.  Pt will report decreased pain level of < 50% from above measure with ADL(not met, progressing)     Long Term Goals:   GOALS:    8_   weeks. Pt agrees with goals set.  1. Independent with HEP.(not met, progressing)  2. Report decreased    R shld    pain  <   / =  2/10 with ADL such as light lifting(not met, progressing)  3. Increased MMT  for  R ER to 4+/5 to 5/5  with ADL such as yardwork(not met, progressing)  4. Increased arom  for  R shld to WFL-WNL with functional activities such (not met, progressing)      Plan     Cont with periscapular strengthening and ROM.     Luis Manuel Montez, PT, STS

## 2018-10-08 ENCOUNTER — CLINICAL SUPPORT (OUTPATIENT)
Dept: REHABILITATION | Facility: HOSPITAL | Age: 63
End: 2018-10-08
Attending: ORTHOPAEDIC SURGERY
Payer: COMMERCIAL

## 2018-10-08 DIAGNOSIS — G89.29 CHRONIC RIGHT SHOULDER PAIN: ICD-10-CM

## 2018-10-08 DIAGNOSIS — M25.511 CHRONIC RIGHT SHOULDER PAIN: ICD-10-CM

## 2018-10-08 PROCEDURE — 97110 THERAPEUTIC EXERCISES: CPT | Performed by: PHYSICAL THERAPIST

## 2018-10-08 NOTE — PROGRESS NOTES
"  Physical Therapy Daily Treatment Note     Name: Michael CURIEL [facility] Number: [id number]    Therapy Diagnosis:        Encounter Diagnosis   Name Primary?    Chronic right shoulder pain        Physician: Torrey Kaur MD     Physician Orders: PT Eval and Treat   Medical Diagnosis: M25.511,G89.29 (ICD-10-CM) - Chronic right shoulder pain M75.100 (ICD-10-CM) - Rotator cuff tear   Evaluation Date: 9/12/2018  Authorization Period Expiration: 12-31-18  Plan of Care Certification Period: 11-07-18  Visit # / Visits authorized: 5/ 20     Time In: 7:50 am  Time Out: 8:40 am  Total Billable Time: 40 min     Precautions: Standard    Subjective     Pt reports: MARY travis feels "a little sore" and rates pain as 0/10 presently.  He was compliant with home exercise program.  Response to previous treatment: less pain  Functional change: increased ROM     Pain: 0/10  Location: right shoulder      Objective     PROM is WNL, pain end range elevation.  AROM is WFL-WNL with some scap compensation noted. No TTP.    Michael received therapeutic exercises to develop strength, endurance, ROM, flexibility and posture for 40 minutes including:    serratus punches with 5#   wand flexion and IR 25x  UBE x 6 min  GTB x 3 ways 25x ea  GTB IR/ER step outs 25x  prone rows  4# 25x  SL HABD 2# 25x   Prone ext 90-0 deg with 2#  shrugs and scap retractions with 5# 25x ea   Ball co-contractions  SL ER with 2#  bodyblade      Pt received the following manual therapy techniques: Joint mobilizations were applied to the: R shld  for 7 minutes, including Shld and scap mobs and stretching    Ice pack for 10' for decreased circulation, edema, and pain     Home Exercises Provided and Patient Education Provided     Education provided:   Posture awareness    Written Home Exercises Provided: yes.  Exercises were reviewed and Michael was able to demonstrate them prior to the end of the session.  Michael demonstrated good  understanding of the education provided. "       Assessment   Progressing well.  Mild pain with PROM.   Michael is progressing well towards his goals.   Pt prognosis is Good.     Pt will continue to benefit from skilled outpatient physical therapy to address the deficits listed in the problem list box on initial evaluation, provide pt/family education and to maximize pt's level of independence in the home and community environment.     Pt's spiritual, cultural and educational needs considered and pt agreeable to plan of care and goals.    Anticipated barriers to physical therapy: none    Goals: Short Term Goals: 2 weeks         1.   Independent with HEP (not met, progressing)        2.  Pt will report decreased pain level of < 50% from above measure with ADL(not met, progressing)     Long Term Goals:   GOALS:    8_   weeks. Pt agrees with goals set.  1. Independent with HEP.(not met, progressing)  2. Report decreased    R shld    pain  <   / =  2/10 with ADL such as light lifting(not met, progressing)  3. Increased MMT  for  R ER to 4+/5 to 5/5  with ADL such as yardwork(not met, progressing)  4. Increased arom  for  R shld to WFL-WNL with functional activities such (not met, progressing)      Plan     Cont with periscapular strengthening and ROM.     Luis Manuel Montez, PT, STS

## 2018-10-11 ENCOUNTER — CLINICAL SUPPORT (OUTPATIENT)
Dept: REHABILITATION | Facility: HOSPITAL | Age: 63
End: 2018-10-11
Attending: ORTHOPAEDIC SURGERY
Payer: COMMERCIAL

## 2018-10-11 PROCEDURE — 97140 MANUAL THERAPY 1/> REGIONS: CPT

## 2018-10-11 PROCEDURE — 97110 THERAPEUTIC EXERCISES: CPT

## 2018-10-11 NOTE — PROGRESS NOTES
"  Physical Therapy Daily Treatment Note     Name: Michael Espinoza  [facility] Number: [id number]    Therapy Diagnosis:        Encounter Diagnosis   Name Primary?    Chronic right shoulder pain        Physician: Torrey Kaur MD     Physician Orders: PT Eval and Treat   Medical Diagnosis: M25.511,G89.29 (ICD-10-CM) - Chronic right shoulder pain M75.100 (ICD-10-CM) - Rotator cuff tear   Evaluation Date: 9/12/2018  Authorization Period Expiration: 12-31-18  Plan of Care Certification Period: 11-07-18  Visit # / Visits authorized: 7/ 20     Time In: 1025  Time Out: 1125  Total Billable Time: 50 min     Precautions: Standard    Subjective     Pt reports: MARY travis feels "a little sore" and rates pain as 0/10 presently.  He was compliant with home exercise program.  Response to previous treatment: less pain  Functional change: increased ROM     Pain: 0/10  Location: right shoulder      Objective     PROM is WNL, pain end range elevation.  AROM is WFL-WNL with some scap compensation noted. No TTP.    Michael received therapeutic exercises to develop strength, endurance, ROM, flexibility and posture for 40 minutes including:    serratus punches with 5#   Supine wand flexion 25x 3#  Standing wand IR 25x 3#  UBE x 6 min  GTB x 3 ways 25x ea  GTB IR/ER step outs 25x  shrugs and scap retractions with 5# 25x ea   prone rows  4# 25x  SL HABD 2# 25x   Prone ext 90-0 deg with 2#      Ball co-contractions np  SL ER with 2# np  bodyblade np      Pt received the following manual therapy techniques: Joint mobilizations were applied to the: R shld  for 10' minutes, including Shld and scap mobs and stretching    Ice pack for 10' for decreased circulation, edema, and pain     Home Exercises Provided and Patient Education Provided     Education provided:   Posture awareness    Written Home Exercises Provided: yes.  Exercises were reviewed and Michael was able to demonstrate them prior to the end of the session.  Michael demonstrated good  " understanding of the education provided.       Assessment   Pt tolerating tx well with Mild pain R shld in PROM end range. VC/TC for correcting form/technique with therex. Continue to progress as tolerated.  Michael is progressing well towards his goals.   Pt prognosis is Good.     Pt will continue to benefit from skilled outpatient physical therapy to address the deficits listed in the problem list box on initial evaluation, provide pt/family education and to maximize pt's level of independence in the home and community environment.     Pt's spiritual, cultural and educational needs considered and pt agreeable to plan of care and goals.    Anticipated barriers to physical therapy: none    Goals: Short Term Goals: 2 weeks         1.   Independent with HEP (not met, progressing)        2.  Pt will report decreased pain level of < 50% from above measure with ADL(not met, progressing)     Long Term Goals:   GOALS:    8_   weeks. Pt agrees with goals set.  1. Independent with HEP.(not met, progressing)  2. Report decreased    R shld    pain  <   / =  2/10 with ADL such as light lifting(not met, progressing)  3. Increased MMT  for  R ER to 4+/5 to 5/5  with ADL such as yardwork(not met, progressing)  4. Increased arom  for  R shld to WFL-WNL with functional activities such (not met, progressing)      Plan     Cont with periscapular strengthening and ROM.     Torrey Waters, PTA, STS

## 2018-10-16 ENCOUNTER — CLINICAL SUPPORT (OUTPATIENT)
Dept: REHABILITATION | Facility: HOSPITAL | Age: 63
End: 2018-10-16
Attending: ORTHOPAEDIC SURGERY
Payer: COMMERCIAL

## 2018-10-16 DIAGNOSIS — G89.29 CHRONIC RIGHT SHOULDER PAIN: ICD-10-CM

## 2018-10-16 DIAGNOSIS — M25.511 CHRONIC RIGHT SHOULDER PAIN: ICD-10-CM

## 2018-10-16 PROCEDURE — 97110 THERAPEUTIC EXERCISES: CPT | Performed by: PHYSICAL THERAPIST

## 2018-10-16 NOTE — PROGRESS NOTES
"  Physical Therapy Daily Treatment Note     Name: Michael Espinoza  [facility] Number: [id number]    Therapy Diagnosis:        Encounter Diagnosis   Name Primary?    Chronic right shoulder pain        Physician: Torrey Kaur MD     Physician Orders: PT Eval and Treat   Medical Diagnosis: M25.511,G89.29 (ICD-10-CM) - Chronic right shoulder pain M75.100 (ICD-10-CM) - Rotator cuff tear   Evaluation Date: 9/12/2018  Authorization Period Expiration: 12-31-18  Plan of Care Certification Period: 11-07-18  Visit # / Visits authorized: 8/ 20     Time In: 1:50 am  Time Out: 2:50 am  Total Billable Time: 45 min     Precautions: Standard    Subjective     Pt reports: R shld feels "better" and rates pain as 0/10 presently.  States only occasional pain.  He was compliant with home exercise program.  Response to previous treatment: less pain  Functional change: increased ROM     Pain: 0/10  Location: right shoulder      Objective     PROM is WNL, pain end range elevation.  AROM is WFL-WNL with some scap compensation noted, pain end range flexion.  No TTP.  Gross ER strength 5/5.    Michael received therapeutic exercises to develop strength, endurance, ROM, flexibility and posture for 45 minutes including:    serratus punches with 5#   wand flexion and IR 25x  UBE x 6 min  BTB x 3 ways 25x ea  BTB IR/ER step outs 25x  prone rows  4# 25x  SL HABD 2# 25x   Prone ext 90-0 deg with 2#  shrugs and scap retractions with 5# 25x ea   Ball co-contractions  SL ER with 2#  bodyblade 2 x 1 min  Ball drop/catch in ER  Standing flexion and scaption      Pt received the following manual therapy techniques: Joint mobilizations were applied to the: R shld  for 7 minutes, including Shld and scap mobs and stretching    Ice pack for 10' for decreased circulation, edema, and pain     Home Exercises Provided and Patient Education Provided     Education provided:   Posture awareness    Written Home Exercises Provided: yes.  Exercises were reviewed and " Michael was able to demonstrate them prior to the end of the session.  Michael demonstrated good  understanding of the education provided.       Assessment   Progressing well.  Mild pain with end range flexion which I feel is due to impingement caused by severe TH kyphosis.  Gross strength improved.   Michael is progressing well towards his goals.   Pt prognosis is Good.     Pt will continue to benefit from skilled outpatient physical therapy to address the deficits listed in the problem list box on initial evaluation, provide pt/family education and to maximize pt's level of independence in the home and community environment.     Pt's spiritual, cultural and educational needs considered and pt agreeable to plan of care and goals.    Anticipated barriers to physical therapy: none    Goals: Short Term Goals: 2 weeks         1.   Independent with HEP (not met, progressing)        2.  Pt will report decreased pain level of < 50% from above measure with ADL( met)     Long Term Goals:   GOALS:    8_   weeks. Pt agrees with goals set.  1. Independent with HEP.(not met, progressing)  2. Report decreased    R shld    pain  <   / =  2/10 with ADL such as light lifting(not met, progressing)  3. Increased MMT  for  R ER to 4+/5 to 5/5  with ADL such as yardwork(met)  4. Increased arom  for  R shld to WFL-WNL with functional activities such (met)      Plan     Cont with periscapular strengthening and ROM.     Luis Manuel Montez, PT,

## 2018-10-25 ENCOUNTER — OFFICE VISIT (OUTPATIENT)
Dept: SPORTS MEDICINE | Facility: CLINIC | Age: 63
End: 2018-10-25
Payer: COMMERCIAL

## 2018-10-25 VITALS
HEIGHT: 67 IN | BODY MASS INDEX: 25.43 KG/M2 | HEART RATE: 80 BPM | DIASTOLIC BLOOD PRESSURE: 69 MMHG | SYSTOLIC BLOOD PRESSURE: 103 MMHG | WEIGHT: 162 LBS

## 2018-10-25 DIAGNOSIS — M75.111 PARTIAL NONTRAUMATIC TEAR OF ROTATOR CUFF, RIGHT: Primary | ICD-10-CM

## 2018-10-25 PROCEDURE — 99999 PR PBB SHADOW E&M-EST. PATIENT-LVL III: CPT | Mod: PBBFAC,,, | Performed by: ORTHOPAEDIC SURGERY

## 2018-10-25 PROCEDURE — 3008F BODY MASS INDEX DOCD: CPT | Mod: CPTII,S$GLB,, | Performed by: ORTHOPAEDIC SURGERY

## 2018-10-25 PROCEDURE — 99214 OFFICE O/P EST MOD 30 MIN: CPT | Mod: S$GLB,,, | Performed by: ORTHOPAEDIC SURGERY

## 2018-10-25 NOTE — PROGRESS NOTES
CC: RIGHT shoulder pain     63 y.o. Male with a history of right shoulder pain. He is here today for repeat evaluation. I last saw him on 8/24/18 at which time he was given a CSI into the R shoulder and plugged in with PT. He has been doing PT 1-2x/wk at [facility] and states that he feels stronger and has better ROM in the shoulder. He'll still have occasional pain in the shoulder but overall that is improving as well. He is pleased w/ his progress to date.      Past Medical History:   Diagnosis Date    Coronary artery disease of native artery of native heart with stable angina pectoris 4/10/2018    Hyperlipidemia        Past Surgical History:   Procedure Laterality Date    AORTOCORONARY BYPASS-CABG x 3 N/A 5/14/2018    Performed by Chad Edwards MD at [facility] OR 2ND FLR    CYST REMOVAL      [place]-Vein-Endovascular N/A 5/14/2018    Performed by Chad Edwards MD at [facility] OR 2ND FLR    TONSILLECTOMY      TRANSESOPHAGEAL ECHOCARDIOGRAM (CHARMAINE) N/A 12/11/2017    Performed by Michell Gunn MD at [facility] CATH LAB       Family History   Problem Relation Age of Onset    Hyperlipidemia Brother     Hypertension Brother     Heart disease Brother     Heart attack Brother          Current Outpatient Medications:     aspirin (ECOTRIN) 325 MG EC tablet, Take 1 tablet (325 mg total) by mouth once daily., Disp: , Rfl: 0    atorvastatin (LIPITOR) 40 MG tablet, TAKE 1 TABLET(40 MG) BY MOUTH EVERY EVENING, Disp: 90 tablet, Rfl: 0    calcium carbonate (CALCIUM 600) 600 mg calcium (1,500 mg) Tab, Take 600 mg by mouth once daily., Disp: , Rfl:     ERGOCALCIFEROL, VITAMIN D2, (VITAMIN D ORAL), Take by mouth once daily., Disp: , Rfl:     flu vac ts 2013,18yr+,cell,PF, (FLULAVAL) 45 mcg (15 mcg x 3)/0.5 mL Syrg, Inject 0.5 mLs into the muscle., Disp: , Rfl:     metoprolol tartrate 75 mg Tab, Take 75 mg by mouth 2 (two) times daily., Disp: 60 tablet, Rfl: 3    Review of patient's allergies indicates:  No Known  "Allergies       REVIEW OF SYSTEMS:  Constitution: Negative. Negative for chills, fever and night sweats.   HENT: Negative for congestion and headaches.    Eyes: Negative for blurred vision, left vision loss and right vision loss.   Cardiovascular: Negative for chest pain and syncope.   Respiratory: Negative for cough and shortness of breath.    Endocrine: Negative for polydipsia, polyphagia and polyuria.   Hematologic/Lymphatic: Negative for bleeding problem. Does not bruise/bleed easily.   Skin: Negative for dry skin, itching and rash.   Musculoskeletal: Negative for falls.  Positive for right shoulder pain and muscle weakness.   Gastrointestinal: Negative for abdominal pain and bowel incontinence.   Genitourinary: Negative for bladder incontinence and nocturia.   Neurological: Negative for disturbances in coordination, loss of balance and seizures.   Psychiatric/Behavioral: Negative for depression. The patient does not have insomnia.    Allergic/Immunologic: Negative for hives and persistent infections.      PHYSICAL EXAMINATION:  Vitals:  /69   Pulse 80   Ht 5' 7" (1.702 m)   Wt 73.5 kg (162 lb)   BMI 25.37 kg/m²    General: The patient is alert and oriented x 3.  Mood is pleasant.  Observation of ears, eyes and nose reveal no gross abnormalities.  No labored breathing observed.  Gait is coordinated. Patient can toe walk and heel walk without difficulty.      RIGHT Shoulder / Upper Extremity Exam    OBSERVATION:     Swelling  none  Deformity  none   Discoloration  none   Scapular winging none   Scars   none  Atrophy  none    TENDERNESS / CREPITUS (T/C):          T/C      T/C   Clavicle   -/-  SUPRAspinatus    -/-     AC Jt.    -/-  INFRAspinatus  -/-    SC Jt.    -/-  Deltoid    -/-      G. Tuberosity  -/-  LH BICEP groove  -/-   Acromion:  -/-  Midline Neck   -/-     Scapular Spine -/-  Trapezium   -/-   SMA Scapula  -/-  GH jt. line - post  -/-     Scapulothoracic  -/-         ROM: (* = with " pain)  Left shoulder   Right shoulder        AROM (PROM)   AROM (PROM)   FE    170° (175°)     160° (165°)  *   ER at 90° ABD  90°  (90°)    80°  (90°) *   IR (spine level)   T10     L3 *    STRENGTH: (* = with pain) Left shoulder   Right shoulder   SCAPTION   5/5    5/5    IR    5/5    5/5   ER    5/5    5/5   BICEPS   5/5    5/5   Deltoid    5/5    5/5     SIGNS:  Painful side       NEER   +    OABELS  neg    CASTILLO   +    SPEEDS  neg     DROP ARM   -   BELLY PRESS neg   Superior escape none    LIFT-OFF  neg   X-Body ADD    neg    MOVING VALGUS neg        STABILITY TESTING    Left shoulder   Right shoulder    Translation     Anterior  up face     up face    Posterior  up face    up face    Sulcus   < 10mm    < 10 mm     Signs   Apprehension   neg      neg       Relocation   no change     no change      Jerk test  neg     neg    EXTREMITY NEURO-VASCULAR EXAM:    Sensation grossly intact to light touch all dermatomal regions.    DTR 2+ Biceps, Triceps, BR and Negative Kalyans sign   Grossly intact motor function at Elbow, Wrist and Hand   Distal pulses radial and ulnar 2+, brisk cap refill, symmetric.      NECK:  Painless FROM and spinous processes non-tender. Negative Spurlings sign.      OTHER FINDINGS:  + scapular dyskinesia    XRAYS:  No new imaging obtained today    MRI (8/14/18):  Low-grade partial-thickness tearing/ tendinosis of the rotator cuff, involving the insertional fibers of the supraspinatus, infraspinatus, and subscapularis tendons, as above.    Intra-articular biceps tendinosis.    Trace subdeltoid fluid, probable bursitis.      ASSESSMENT:     Right shoulder pain, partial-thickness rotator cuff tear    PLAN:    1. Continue w/ PT at [facility] 1-2x/wk  2. RTC 6-8 weeks for repeat evaluation    All questions were answered, patient will contact us for questions or concerns in the interim.

## 2018-10-26 ENCOUNTER — CLINICAL SUPPORT (OUTPATIENT)
Dept: REHABILITATION | Facility: HOSPITAL | Age: 63
End: 2018-10-26
Attending: ORTHOPAEDIC SURGERY
Payer: COMMERCIAL

## 2018-10-26 DIAGNOSIS — G89.29 CHRONIC RIGHT SHOULDER PAIN: ICD-10-CM

## 2018-10-26 DIAGNOSIS — M25.511 CHRONIC RIGHT SHOULDER PAIN: ICD-10-CM

## 2018-10-26 PROCEDURE — 97110 THERAPEUTIC EXERCISES: CPT | Performed by: PHYSICAL THERAPIST

## 2018-10-26 NOTE — PROGRESS NOTES
"  Physical Therapy Daily Treatment Note     Name: Michael Espinoza  [facility] Number: [id number]    Therapy Diagnosis:        Encounter Diagnosis   Name Primary?    Chronic right shoulder pain        Physician: Torrey Kaur MD     Physician Orders: PT Eval and Treat   Medical Diagnosis: M25.511,G89.29 (ICD-10-CM) - Chronic right shoulder pain M75.100 (ICD-10-CM) - Rotator cuff tear   Evaluation Date: 9/12/2018  Authorization Period Expiration: 12-31-18  Plan of Care Certification Period: 11-07-18  Visit # / Visits authorized: 9/ 20     Time In: 2:15 pm  Time Out: 3:10 pm  Total Billable Time: 45 min     Precautions: Standard    Subjective     Pt reports: R shld feels "OK" and rates pain as 0/10 presently.  States only occasional pain with overhead activity.  He was compliant with home exercise program.  Response to previous treatment: less pain  Functional change: increased ROM     Pain: 0/10  Location: right shoulder      Objective     PROM is WNL, pain end range elevation.  AROM is WFL-WNL with some scap compensation noted, pain end range flexion.  No TTP.  Gross ER strength 5/5.    Michael received therapeutic exercises to develop strength, endurance, ROM, flexibility and posture for 45 minutes including:    serratus punches with 5#   wand flexion and IR 25x  UBE x 6 min  BTB x 3 ways 25x ea  BTB IR/ER step outs 25x  prone rows  4# 25x  SL HABD 2# 25x   Prone ext 90-0 deg with 2#  shrugs and scap retractions with 5# 25x ea   Ball co-contractions  SL ER with 2#  bodyblade 2 x 1 min  Ball drop/catch in ER  Standing flexion and scaption      Pt received the following manual therapy techniques: Joint mobilizations were applied to the: R shld  for 7 minutes, including Shld and scap mobs and stretching    Ice pack for 10' for decreased circulation, edema, and pain     Home Exercises Provided and Patient Education Provided     Education provided:   Posture awareness    Written Home Exercises Provided: yes.  Exercises " were reviewed and Michael was able to demonstrate them prior to the end of the session.  Michael demonstrated good  understanding of the education provided.       Assessment   Progressing well.    Michael is progressing well towards his goals.   Pt prognosis is Good.     Pt will continue to benefit from skilled outpatient physical therapy to address the deficits listed in the problem list box on initial evaluation, provide pt/family education and to maximize pt's level of independence in the home and community environment.     Pt's spiritual, cultural and educational needs considered and pt agreeable to plan of care and goals.    Anticipated barriers to physical therapy: none    Goals: Short Term Goals: 2 weeks         1.   Independent with HEP (not met, progressing)        2.  Pt will report decreased pain level of < 50% from above measure with ADL( met)     Long Term Goals:   GOALS:    8_   weeks. Pt agrees with goals set.  1. Independent with HEP.(not met, progressing)  2. Report decreased    R shld    pain  <   / =  2/10 with ADL such as light lifting(not met, progressing)  3. Increased MMT  for  R ER to 4+/5 to 5/5  with ADL such as yardwork(met)  4. Increased arom  for  R shld to WFL-WNL with functional activities such (met)      Plan     Cont with periscapular strengthening and ROM.     Luis Manuel Montez, PT,

## 2018-10-29 ENCOUNTER — CLINICAL SUPPORT (OUTPATIENT)
Dept: REHABILITATION | Facility: HOSPITAL | Age: 63
End: 2018-10-29
Attending: ORTHOPAEDIC SURGERY
Payer: COMMERCIAL

## 2018-10-29 DIAGNOSIS — G89.29 CHRONIC RIGHT SHOULDER PAIN: ICD-10-CM

## 2018-10-29 DIAGNOSIS — M25.511 CHRONIC RIGHT SHOULDER PAIN: ICD-10-CM

## 2018-10-29 PROCEDURE — 97110 THERAPEUTIC EXERCISES: CPT

## 2018-10-29 PROCEDURE — 97140 MANUAL THERAPY 1/> REGIONS: CPT

## 2018-10-29 NOTE — PROGRESS NOTES
"  Physical Therapy Daily Treatment Note     Name: Michael Espinoza  [facility] Number: [id number]    Therapy Diagnosis:        Encounter Diagnosis   Name Primary?    Chronic right shoulder pain        Physician: Torrey Kaur MD     Physician Orders: PT Eval and Treat   Medical Diagnosis: M25.511,G89.29 (ICD-10-CM) - Chronic right shoulder pain M75.100 (ICD-10-CM) - Rotator cuff tear   Evaluation Date: 9/12/2018  Authorization Period Expiration: 12-31-18  Plan of Care Certification Period: 11-07-18  Visit # / Visits authorized: 10/ 20     Time In: 1255  Time Out: 1355  Total Billable Time: 45 min     Precautions: Standard    Subjective     Pt reports: R shiban feels "OK" and rates pain as 0/10 presently.  States only occasional pain with overhead activity.  He was compliant with home exercise program.  Response to previous treatment: less pain  Functional change: increased ROM     Pain: 0/10  Location: right shoulder      Objective     PROM is WNL with min pain end range    Michael received therapeutic exercises to develop strength, endurance, ROM, flexibility and posture for 40 minutes including:    UBE x 6 min  serratus punches with 5#   shrugs and scap retractions with 5# 25x ea   Supine wand flexion 3# 25x  Standing IR 3# 25x  prone rows  4# 25x  SL HABD 2# 25x   SL shld abd 2# 25x  SL ER with 2#  BTB x 3 ways 25x ea  BTB IR/ER step outs 25x    Prone ext 90-0 deg with 2#np  bodyblade 2 x 1 minnp  Ball drop/catch in ERnp  Standing flexion and scaptionnp      Pt received the following manual therapy techniques: Joint mobilizations were applied to the: R shld  For 10' minutes, including Shld and scap mobs and stretching    Ice pack for 10' for decreased circulation, edema, and pain     Home Exercises Provided and Patient Education Provided     Education provided:   Posture awareness    Written Home Exercises Provided: yes.  Exercises were reviewed and Michael was able to demonstrate them prior to the end of the session.  " Michael demonstrated good  understanding of the education provided.       Assessment   Progressing well tolerating with no increased pain with therex but min pain with Manual therapy end range.   Michael is progressing well towards his goals.   Pt prognosis is Good.     Pt will continue to benefit from skilled outpatient physical therapy to address the deficits listed in the problem list box on initial evaluation, provide pt/family education and to maximize pt's level of independence in the home and community environment.     Pt's spiritual, cultural and educational needs considered and pt agreeable to plan of care and goals.    Anticipated barriers to physical therapy: none    Goals: Short Term Goals: 2 weeks         1.   Independent with HEP (not met, progressing)        2.  Pt will report decreased pain level of < 50% from above measure with ADL( met)     Long Term Goals:   GOALS:    8_   weeks. Pt agrees with goals set.  1. Independent with HEP.(not met, progressing)  2. Report decreased    R shld    pain  <   / =  2/10 with ADL such as light lifting(not met, progressing)  3. Increased MMT  for  R ER to 4+/5 to 5/5  with ADL such as yardwork(met)  4. Increased arom  for  R shld to WFL-WNL with functional activities such (met)      Plan     Cont with periscapular strengthening and ROM.     Torrey Waters, PTA, STS

## 2018-11-02 ENCOUNTER — CLINICAL SUPPORT (OUTPATIENT)
Dept: REHABILITATION | Facility: HOSPITAL | Age: 63
End: 2018-11-02
Attending: ORTHOPAEDIC SURGERY
Payer: COMMERCIAL

## 2018-11-02 DIAGNOSIS — G89.29 CHRONIC RIGHT SHOULDER PAIN: ICD-10-CM

## 2018-11-02 DIAGNOSIS — M25.511 CHRONIC RIGHT SHOULDER PAIN: ICD-10-CM

## 2018-11-02 PROCEDURE — 97110 THERAPEUTIC EXERCISES: CPT

## 2018-11-02 NOTE — PROGRESS NOTES
Physical Therapy Daily Treatment Note     Name: Michael Espinoza  [facility] Number: [id number]    Therapy Diagnosis:        Encounter Diagnosis   Name Primary?    Chronic right shoulder pain        Physician: Torrey Kaur MD     Physician Orders: PT Eval and Treat   Medical Diagnosis: M25.511,G89.29 (ICD-10-CM) - Chronic right shoulder pain M75.100 (ICD-10-CM) - Rotator cuff tear   Evaluation Date: 9/12/2018  Authorization Period Expiration: 12-31-18  Plan of Care Certification Period: 11-07-18  Visit # / Visits authorized: 11/ 20     Time In: 0820  Time Out: 0920  Total Billable Time: 45 min     Precautions: Standard    Subjective     Pt reports:no pain in R shld this morning.  He was compliant with home exercise program.  Response to previous treatment: no soreness   Functional change: increased ROM     Pain: 0/10  Location: right shoulder      Objective     PROM is WNL with min pain end range    Michael received therapeutic exercises to develop strength, endurance, ROM, flexibility and posture for 40 minutes including:    UBE 4'/4' protraction/retraction   serratus punches with 5#   shrugs and scap retractions with 5# 25x ea   Supine wand flexion 3# 25x  Standing IR 3# 25x  prone rows  4# 25x  SL HABD 2# 25x   SL shld abd 2# 25x  SL ER with 2#  Prone ext 90-0 deg with 2#  BTB x 3 ways 25x ea  BTB IR/ER step outs 25x      bodyblade 2 x 1 min np  Ball drop/catch in ER np  Standing flexion and scaptionnp np      Pt received the following manual therapy techniques: Joint mobilizations were applied to the: R shld  For 10' minutes, including Shld and scap mobs and stretching    Ice pack for 10' for decreased circulation, edema, and pain     Home Exercises Provided and Patient Education Provided     Education provided:   Posture awareness    Written Home Exercises Provided: yes.  Exercises were reviewed and Michael was able to demonstrate them prior to the end of the session.  Michael demonstrated good  understanding of  the education provided.       Assessment   Progressing well tolerating with no increased pain with therex but min pain with Manual therapy end range.   Michael is progressing well towards his goals.   Pt prognosis is Good.     Pt will continue to benefit from skilled outpatient physical therapy to address the deficits listed in the problem list box on initial evaluation, provide pt/family education and to maximize pt's level of independence in the home and community environment.     Pt's spiritual, cultural and educational needs considered and pt agreeable to plan of care and goals.    Anticipated barriers to physical therapy: none    Goals: Short Term Goals: 2 weeks         1.   Independent with HEP (not met, progressing)        2.  Pt will report decreased pain level of < 50% from above measure with ADL( met)     Long Term Goals:   GOALS:    8_   weeks. Pt agrees with goals set.  1. Independent with HEP.(not met, progressing)  2. Report decreased    R shld    pain  <   / =  2/10 with ADL such as light lifting(not met, progressing)  3. Increased MMT  for  R ER to 4+/5 to 5/5  with ADL such as yardwork(met)  4. Increased arom  for  R shld to WFL-WNL with functional activities such (met)      Plan     Cont with periscapular strengthening and ROM.     Torrey Waters, PTA, STS

## 2018-11-06 ENCOUNTER — CLINICAL SUPPORT (OUTPATIENT)
Dept: REHABILITATION | Facility: HOSPITAL | Age: 63
End: 2018-11-06
Attending: ORTHOPAEDIC SURGERY
Payer: COMMERCIAL

## 2018-11-06 DIAGNOSIS — G89.29 CHRONIC RIGHT SHOULDER PAIN: ICD-10-CM

## 2018-11-06 DIAGNOSIS — M25.511 CHRONIC RIGHT SHOULDER PAIN: ICD-10-CM

## 2018-11-06 PROCEDURE — 97110 THERAPEUTIC EXERCISES: CPT | Performed by: PHYSICAL THERAPIST

## 2018-11-06 PROCEDURE — 97140 MANUAL THERAPY 1/> REGIONS: CPT | Performed by: PHYSICAL THERAPIST

## 2018-11-06 NOTE — PROGRESS NOTES
"  Physical Therapy Daily Treatment Note     Name: Michael Espinoza  [facility] Number: [id number]    Therapy Diagnosis:        Encounter Diagnosis   Name Primary?    Chronic right shoulder pain        Physician: Torrey Kaur MD     Physician Orders: PT Eval and Treat   Medical Diagnosis: M25.511,G89.29 (ICD-10-CM) - Chronic right shoulder pain M75.100 (ICD-10-CM) - Rotator cuff tear   Evaluation Date: 9/12/2018  Authorization Period Expiration: 12-31-18  Plan of Care Certification Period: 11-07-18  Visit # / Visits authorized: 12/ 20     Time In: 1:50 pm  Time Out: 2:45 pm  Total Billable Time: 45 min     Precautions: Standard    Subjective     Pt reports: R shld feels "pretty good" and rates pain as 0/10 presently.  States only occasional pain with overhead activity.  He was compliant with home exercise program.  Response to previous treatment: less pain  Functional change: increased ROM     Pain: 0/10  Location: right shoulder      Objective     PROM is WNL, pain end range elevation and ER.  AROM is WFL-WNL with some scap compensation noted, pain end range flexion.  No TTP.  Gross ER strength 5/5.    Michael received therapeutic exercises to develop strength, endurance, ROM, flexibility and posture for 45 minutes including:    serratus punches with 5#   wand flexion and IR 25x  UBE x 6 min  BTB x 3 ways 25x ea  BTB IR/ER step outs 25x  prone rows  4# 25x  SL HABD 2# 25x   Prone ext 90-0 deg with 2#  shrugs and scap retractions with 5# 25x ea   Ball co-contractions  SL ER with 2#  bodyblade 2 x 1 min  RTB wall walks  RTB clock  Ball drop/catch in ER  Standing flexion and scaption      Pt received the following manual therapy techniques: Joint mobilizations were applied to the: R shld  for 7 minutes, including Shld and scap mobs and stretching    Ice pack for 10' for decreased circulation, edema, and pain     Home Exercises Provided and Patient Education Provided     Education provided:   Posture awareness    Written " Home Exercises Provided: yes.  Exercises were reviewed and Michael was able to demonstrate them prior to the end of the session.  Michael demonstrated good  understanding of the education provided.       Assessment   Progressing well.    Michael is progressing well towards his goals.   Pt prognosis is Good.     Pt will continue to benefit from skilled outpatient physical therapy to address the deficits listed in the problem list box on initial evaluation, provide pt/family education and to maximize pt's level of independence in the home and community environment.     Pt's spiritual, cultural and educational needs considered and pt agreeable to plan of care and goals.    Anticipated barriers to physical therapy: none    Goals: Short Term Goals: 2 weeks         1.   Independent with HEP (not met, progressing)        2.  Pt will report decreased pain level of < 50% from above measure with ADL( met)     Long Term Goals:   GOALS:    8_   weeks. Pt agrees with goals set.  1. Independent with HEP.(not met, progressing)  2. Report decreased    R shld    pain  <   / =  2/10 with ADL such as light lifting(not met, progressing)  3. Increased MMT  for  R ER to 4+/5 to 5/5  with ADL such as yardwork(met)  4. Increased arom  for  R shld to WFL-WNL with functional activities such (met)      Plan     Cont with periscapular strengthening and ROM.     Luis Manuel Montez, PT,

## 2018-12-11 ENCOUNTER — OFFICE VISIT (OUTPATIENT)
Dept: SPORTS MEDICINE | Facility: CLINIC | Age: 63
End: 2018-12-11
Payer: COMMERCIAL

## 2018-12-11 VITALS
HEART RATE: 60 BPM | HEIGHT: 67 IN | DIASTOLIC BLOOD PRESSURE: 72 MMHG | WEIGHT: 162 LBS | BODY MASS INDEX: 25.43 KG/M2 | SYSTOLIC BLOOD PRESSURE: 99 MMHG

## 2018-12-11 DIAGNOSIS — M25.511 CHRONIC RIGHT SHOULDER PAIN: Primary | ICD-10-CM

## 2018-12-11 DIAGNOSIS — G89.29 CHRONIC RIGHT SHOULDER PAIN: Primary | ICD-10-CM

## 2018-12-11 DIAGNOSIS — E78.5 DYSLIPIDEMIA: ICD-10-CM

## 2018-12-11 PROCEDURE — 20610 DRAIN/INJ JOINT/BURSA W/O US: CPT | Mod: RT,S$GLB,, | Performed by: ORTHOPAEDIC SURGERY

## 2018-12-11 PROCEDURE — 99214 OFFICE O/P EST MOD 30 MIN: CPT | Mod: 25,S$GLB,, | Performed by: ORTHOPAEDIC SURGERY

## 2018-12-11 PROCEDURE — 3008F BODY MASS INDEX DOCD: CPT | Mod: CPTII,S$GLB,, | Performed by: ORTHOPAEDIC SURGERY

## 2018-12-11 PROCEDURE — 99999 PR PBB SHADOW E&M-EST. PATIENT-LVL III: CPT | Mod: PBBFAC,,, | Performed by: ORTHOPAEDIC SURGERY

## 2018-12-11 RX ORDER — CELECOXIB 200 MG/1
200 CAPSULE ORAL DAILY
Qty: 60 CAPSULE | Refills: 0 | Status: SHIPPED | OUTPATIENT
Start: 2018-12-11 | End: 2019-02-09

## 2018-12-11 RX ADMIN — TRIAMCINOLONE ACETONIDE 40 MG: 40 INJECTION, SUSPENSION INTRA-ARTICULAR; INTRAMUSCULAR at 08:12

## 2018-12-11 NOTE — PROCEDURES
Large Joint Aspiration/Injection: R subacromial bursa  Date/Time: 12/11/2018 8:47 AM  Performed by: Torrey Kaur MD  Authorized by: Torrey Kaur MD     Consent Done?:  Yes (Verbal)  Indications:  Pain  Procedure site marked: Yes    Timeout: Prior to procedure the correct patient, procedure, and site was verified      Location:  Shoulder  Site:  R subacromial bursa  Prep: Patient was prepped and draped in usual sterile fashion    Needle size:  22 G  Approach:  Posterior  Medications:  40 mg triamcinolone acetonide 40 mg/mL  Patient tolerance:  Patient tolerated the procedure well with no immediate complications

## 2018-12-11 NOTE — PROGRESS NOTES
CC: RIGHT shoulder pain    HPI: 63 y.o. Male with a history of right shoulder pain. He is here today for repeat evaluation. I last saw him on 10/25/18 at which time we continued PT. He has been doing PT 1-2x/wk at [facility] with Fabby and states that he feels stronger and has better ROM in the shoulder. He'll still have occasional pain in the shoulder with certain movements. He feels as though he has plateaued.     His last CSI into the R shoulder was 8/25/18.    Past Medical History:   Diagnosis Date    Coronary artery disease of native artery of native heart with stable angina pectoris 4/10/2018    Hyperlipidemia        Past Surgical History:   Procedure Laterality Date    AORTOCORONARY BYPASS-CABG x 3 N/A 5/14/2018    Performed by [facility] at [facility] OR 2ND FLR    CYST REMOVAL      [place]-Vein-Endovascular N/A 5/14/2018    Performed by [facility] MD at [facility] OR 2ND FLR    TONSILLECTOMY      TRANSESOPHAGEAL ECHOCARDIOGRAM (CHARMAINE) N/A 12/11/2017    Performed by Michell Gunn MD at [facility] CATH LAB       Family History   Problem Relation Age of Onset    Hyperlipidemia Brother     Hypertension Brother     Heart disease Brother     Heart attack Brother          Current Outpatient Medications:     aspirin (ECOTRIN) 325 MG EC tablet, Take 1 tablet (325 mg total) by mouth once daily., Disp: , Rfl: 0    atorvastatin (LIPITOR) 40 MG tablet, TAKE 1 TABLET(40 MG) BY MOUTH EVERY EVENING, Disp: 90 tablet, Rfl: 0    calcium carbonate (CALCIUM 600) 600 mg calcium (1,500 mg) Tab, Take 600 mg by mouth once daily., Disp: , Rfl:     ERGOCALCIFEROL, VITAMIN D2, (VITAMIN D ORAL), Take by mouth once daily., Disp: , Rfl:     flu vac ts 2013,18yr+,cell,PF, (FLULAVAL) 45 mcg (15 mcg x 3)/0.5 mL Syrg, Inject 0.5 mLs into the muscle., Disp: , Rfl:     metoprolol tartrate 75 mg Tab, Take 75 mg by mouth 2 (two) times daily., Disp: 60 tablet, Rfl: 3    Review of patient's allergies  "indicates:  No Known Allergies       REVIEW OF SYSTEMS:  Constitution: Negative. Negative for chills, fever and night sweats.   HENT: Negative for congestion and headaches.    Eyes: Negative for blurred vision, left vision loss and right vision loss.   Cardiovascular: Negative for chest pain and syncope.   Respiratory: Negative for cough and shortness of breath.    Endocrine: Negative for polydipsia, polyphagia and polyuria.   Hematologic/Lymphatic: Negative for bleeding problem. Does not bruise/bleed easily.   Skin: Negative for dry skin, itching and rash.   Musculoskeletal: Negative for falls.  Positive for right shoulder pain and muscle weakness.   Gastrointestinal: Negative for abdominal pain and bowel incontinence.   Genitourinary: Negative for bladder incontinence and nocturia.   Neurological: Negative for disturbances in coordination, loss of balance and seizures.   Psychiatric/Behavioral: Negative for depression. The patient does not have insomnia.    Allergic/Immunologic: Negative for hives and persistent infections.      PHYSICAL EXAMINATION:  Vitals:  BP 99/72   Pulse 60   Ht 5' 7" (1.702 m)   Wt 73.5 kg (162 lb)   BMI 25.37 kg/m²    General: The patient is alert and oriented x 3.  Mood is pleasant.  Observation of ears, eyes and nose reveal no gross abnormalities.  No labored breathing observed.  Gait is coordinated. Patient can toe walk and heel walk without difficulty.      RIGHT Shoulder / Upper Extremity Exam    OBSERVATION:     Swelling  none  Deformity  none   Discoloration  none   Scapular winging none   Scars   none  Atrophy  none    TENDERNESS / CREPITUS (T/C):          T/C      T/C   Clavicle   -/-  SUPRAspinatus    -/-     AC Jt.    -/-  INFRAspinatus  -/-    SC Jt.    -/-  Deltoid    -/-      G. Tuberosity  -/-  LH BICEP groove  -/-   Acromion:  -/-  Midline Neck   -/-     Scapular Spine -/-  Trapezium   -/-   SMA Scapula  -/-  GH jt. line - post  -/-     Scapulothoracic "  -/-         ROM: (* = with pain)  Left shoulder   Right shoulder        AROM (PROM)   AROM (PROM)   FE    170° (175°)     170° (175°)    ER at 90° ABD  90°  (90°)    90°  (90°)    IR (spine level)   T10     L1     STRENGTH: (* = with pain) Left shoulder   Right shoulder   SCAPTION   5/5    5/5    IR    5/5    5/5   ER    5/5    5/5   BICEPS   5/5    5/5   Deltoid    5/5    5/5     SIGNS:  Painful side       NEER   +    OABELS  neg    CASTILLO   +    SPEEDS  neg     DROP ARM   -   BELLY PRESS neg   Superior escape none    LIFT-OFF  neg   X-Body ADD    neg    MOVING VALGUS neg        STABILITY TESTING    Left shoulder   Right shoulder    Translation     Anterior  up face     up face    Posterior  up face    up face    Sulcus   < 10mm    < 10 mm     Signs   Apprehension   neg      neg       Relocation   no change     no change      Jerk test  neg     neg    EXTREMITY NEURO-VASCULAR EXAM:    Sensation grossly intact to light touch all dermatomal regions.    DTR 2+ Biceps, Triceps, BR and Negative Kalyans sign   Grossly intact motor function at Elbow, Wrist and Hand   Distal pulses radial and ulnar 2+, brisk cap refill, symmetric.      NECK:  Painless FROM and spinous processes non-tender. Negative Spurlings sign.      OTHER FINDINGS:  + scapular dyskinesia    XRAYS:  No new imaging obtained today    MRI (8/14/18):  Low-grade partial-thickness tearing/ tendinosis of the rotator cuff, involving the insertional fibers of the supraspinatus, infraspinatus, and subscapularis tendons, as above.    Intra-articular biceps tendinosis.    Trace subdeltoid fluid, probable bursitis.      ASSESSMENT:     Right shoulder pain, partial-thickness rotator cuff tear    PLAN:    1. Right shoulder steroid injection  2. Continue w/ PT at [facility] 1-2x/wk and HEP  3. Celebrex prescribed  4. RTC 6-8 weeks for repeat evaluation    All questions were answered, patient will contact us for questions or concerns in the  interim.

## 2018-12-11 NOTE — TELEPHONE ENCOUNTER
Patient called to to get an appointment to discuss medications. I spoke with patient and he is asking if he can wean off of his Metoprolol 75mg PO 2x daily. Dr. Rowe reviewed chart and states that patient may wean off of Metoprolol. I called the patient and instructed him on weaning off the metoprolol. I called in 25mg tablets so that the patient could gradually decrease the dose. I also instructed patient to monitor his blood pressure and to let me know if he has any symptoms. Verbalized understanding.

## 2018-12-12 RX ORDER — ATORVASTATIN CALCIUM 40 MG/1
TABLET, FILM COATED ORAL
Qty: 90 TABLET | Refills: 0 | Status: SHIPPED | OUTPATIENT
Start: 2018-12-12 | End: 2019-03-07 | Stop reason: SDUPTHER

## 2018-12-13 RX ORDER — TRIAMCINOLONE ACETONIDE 40 MG/ML
40 INJECTION, SUSPENSION INTRA-ARTICULAR; INTRAMUSCULAR
Status: DISCONTINUED | OUTPATIENT
Start: 2018-12-11 | End: 2018-12-13 | Stop reason: HOSPADM

## 2019-03-07 DIAGNOSIS — E78.5 DYSLIPIDEMIA: ICD-10-CM

## 2019-03-08 RX ORDER — ATORVASTATIN CALCIUM 40 MG/1
TABLET, FILM COATED ORAL
Qty: 90 TABLET | Refills: 0 | Status: SHIPPED | OUTPATIENT
Start: 2019-03-08 | End: 2019-06-10 | Stop reason: SDUPTHER

## 2019-03-11 ENCOUNTER — PATIENT MESSAGE (OUTPATIENT)
Dept: CARDIOLOGY | Facility: CLINIC | Age: 64
End: 2019-03-11

## 2019-03-12 ENCOUNTER — TELEPHONE (OUTPATIENT)
Dept: CARDIOLOGY | Facility: CLINIC | Age: 64
End: 2019-03-12

## 2019-03-12 DIAGNOSIS — I87.2 VENOUS INSUFFICIENCY: Primary | ICD-10-CM

## 2019-03-12 NOTE — TELEPHONE ENCOUNTER
Patient sent email stating that as the day progresses his left lower leg swells up. He states that he can see the difference in the circumference of the leg and he has a noticeable marily on his leg from his socks at the end of the day. Dr. Rowe reviewed chart. Patient called and instructed to decrease salt intake and a venous insufficiency study was scheduled. Patient verbalized understanding.

## 2019-03-13 ENCOUNTER — CLINICAL SUPPORT (OUTPATIENT)
Dept: CARDIOLOGY | Facility: CLINIC | Age: 64
End: 2019-03-13
Attending: INTERNAL MEDICINE
Payer: COMMERCIAL

## 2019-03-13 DIAGNOSIS — I87.2 VENOUS INSUFFICIENCY: ICD-10-CM

## 2019-03-13 LAB
LEFT SMALL SAPHENOUS KNEE DIA: 3.1 MM
LEFT SMALL SAPHENOUS SPJ DIA: 3 MM
RIGHT GREAT SAPHENOUS DISTAL THIGH DIA: 3.3 MM
RIGHT GREAT SAPHENOUS JUNCTION DIA: 4.3 MM
RIGHT GREAT SAPHENOUS KNEE DIA: 3.3 MM
RIGHT GREAT SAPHENOUS MIDDLE THIGH DIA: 3.6 MM
RIGHT GREAT SAPHENOUS PROXIMAL CALF DIA: 3.1 MM

## 2019-03-13 PROCEDURE — 93970 CV US LOWER VENOUS INSUFFICIENCY BILATERAL (CUPID ONLY): ICD-10-PCS | Mod: S$GLB,,, | Performed by: INTERNAL MEDICINE

## 2019-03-13 PROCEDURE — 93970 EXTREMITY STUDY: CPT | Mod: S$GLB,,, | Performed by: INTERNAL MEDICINE

## 2019-03-14 ENCOUNTER — TELEPHONE (OUTPATIENT)
Dept: CARDIOLOGY | Facility: CLINIC | Age: 64
End: 2019-03-14

## 2019-06-10 DIAGNOSIS — E78.5 DYSLIPIDEMIA: ICD-10-CM

## 2019-06-10 RX ORDER — ATORVASTATIN CALCIUM 40 MG/1
TABLET, FILM COATED ORAL
Qty: 90 TABLET | Refills: 0 | Status: SHIPPED | OUTPATIENT
Start: 2019-06-10 | End: 2019-08-22

## 2019-06-17 ENCOUNTER — PATIENT MESSAGE (OUTPATIENT)
Dept: CARDIOLOGY | Facility: CLINIC | Age: 64
End: 2019-06-17

## 2019-08-02 ENCOUNTER — HOSPITAL ENCOUNTER (OUTPATIENT)
Dept: CARDIOLOGY | Facility: CLINIC | Age: 64
Discharge: HOME | End: 2019-08-02
Attending: INTERNAL MEDICINE
Payer: COMMERCIAL

## 2019-08-02 ENCOUNTER — LAB VISIT (OUTPATIENT)
Dept: LAB | Facility: HOSPITAL | Age: 64
End: 2019-08-02
Attending: INTERNAL MEDICINE
Payer: COMMERCIAL

## 2019-08-02 VITALS — WEIGHT: 162 LBS | HEIGHT: 67 IN | BODY MASS INDEX: 25.43 KG/M2

## 2019-08-02 DIAGNOSIS — E78.5 HYPERLIPIDEMIA, UNSPECIFIED HYPERLIPIDEMIA TYPE: ICD-10-CM

## 2019-08-02 DIAGNOSIS — I25.10 CORONARY ARTERY DISEASE, ANGINA PRESENCE UNSPECIFIED, UNSPECIFIED VESSEL OR LESION TYPE, UNSPECIFIED WHETHER NATIVE OR TRANSPLANTED HEART: ICD-10-CM

## 2019-08-02 LAB
ASCENDING AORTA: 3.05 CM
BSA FOR ECHO PROCEDURE: 1.86 M2
CHOLEST SERPL-MCNC: 167 MG/DL (ref 120–199)
CHOLEST/HDLC SERPL: 3 {RATIO} (ref 2–5)
CV ECHO LV RWT: 0.42 CM
CV STRESS BASE HR: 71 BPM
DIASTOLIC BLOOD PRESSURE: 86 MMHG
DOP CALC LVOT AREA: 3.3 CM2
DOP CALC LVOT DIAMETER: 2.04 CM
DOP CALC LVOT PEAK VEL: 0.66 M/S
DOP CALC LVOT STROKE VOLUME: 45.47 CM3
DOP CALCLVOT PEAK VEL VTI: 13.92 CM
E WAVE DECELERATION TIME: 171.77 MSEC
E/A RATIO: 0.7
E/E' RATIO: 6.24 M/S
ECHO LV POSTERIOR WALL: 0.91 CM (ref 0.6–1.1)
FRACTIONAL SHORTENING: 31 % (ref 28–44)
HDLC SERPL-MCNC: 56 MG/DL (ref 40–75)
HDLC SERPL: 33.5 % (ref 20–50)
INTERVENTRICULAR SEPTUM: 0.96 CM (ref 0.6–1.1)
IVRT: 0.13 MSEC
LA MAJOR: 6.26 CM
LA MINOR: 5.96 CM
LA WIDTH: 3.47 CM
LDLC SERPL CALC-MCNC: 95 MG/DL (ref 63–159)
LEFT ATRIUM SIZE: 4.27 CM
LEFT ATRIUM VOLUME INDEX: 41.6 ML/M2
LEFT ATRIUM VOLUME: 76.91 CM3
LEFT INTERNAL DIMENSION IN SYSTOLE: 2.97 CM (ref 2.1–4)
LEFT VENTRICLE DIASTOLIC VOLUME INDEX: 44.97 ML/M2
LEFT VENTRICLE DIASTOLIC VOLUME: 83.14 ML
LEFT VENTRICLE MASS INDEX: 70 G/M2
LEFT VENTRICLE SYSTOLIC VOLUME INDEX: 18.5 ML/M2
LEFT VENTRICLE SYSTOLIC VOLUME: 34.22 ML
LEFT VENTRICULAR INTERNAL DIMENSION IN DIASTOLE: 4.3 CM (ref 3.5–6)
LEFT VENTRICULAR MASS: 129.87 G
LV LATERAL E/E' RATIO: 5.3 M/S
LV SEPTAL E/E' RATIO: 7.57 M/S
MV PEAK A VEL: 0.76 M/S
MV PEAK E VEL: 0.53 M/S
NONHDLC SERPL-MCNC: 111 MG/DL
OHS CV CPX 1 MINUTE RECOVERY HEART RATE: 117 BPM
OHS CV CPX 85 PERCENT MAX PREDICTED HEART RATE MALE: 133
OHS CV CPX ESTIMATED METS: 15
OHS CV CPX MAX PREDICTED HEART RATE: 157
OHS CV CPX PATIENT IS FEMALE: 0
OHS CV CPX PATIENT IS MALE: 1
OHS CV CPX PEAK DIASTOLIC BLOOD PRESSURE: 103 MMHG
OHS CV CPX PEAK HEAR RATE: 157 BPM
OHS CV CPX PEAK RATE PRESSURE PRODUCT: NORMAL
OHS CV CPX PEAK SYSTOLIC BLOOD PRESSURE: 170 MMHG
OHS CV CPX PERCENT MAX PREDICTED HEART RATE ACHIEVED: 100
OHS CV CPX RATE PRESSURE PRODUCT PRESENTING: 8804
PISA TR MAX VEL: 1.97 M/S
PULM VEIN S/D RATIO: 1.21
PV PEAK D VEL: 0.39 M/S
PV PEAK S VEL: 0.47 M/S
RA MAJOR: 6.13 CM
RA PRESSURE: 3 MMHG
RA WIDTH: 3.28 CM
RIGHT VENTRICULAR END-DIASTOLIC DIMENSION: 4 CM
RV TISSUE DOPPLER FREE WALL SYSTOLIC VELOCITY 1 (APICAL 4 CHAMBER VIEW): 5.21 CM/S
SINUS: 3.27 CM
STJ: 2.83 CM
STRESS ECHO POST EXERCISE DUR MIN: 8 MINUTES
STRESS ECHO POST EXERCISE DUR SEC: 42 SECONDS
SYSTOLIC BLOOD PRESSURE: 124 MMHG
TDI LATERAL: 0.1 M/S
TDI SEPTAL: 0.07 M/S
TDI: 0.09 M/S
TR MAX PG: 16 MMHG
TRICUSPID ANNULAR PLANE SYSTOLIC EXCURSION: 1.33 CM
TRIGL SERPL-MCNC: 80 MG/DL (ref 30–150)
TV REST PULMONARY ARTERY PRESSURE: 19 MMHG

## 2019-08-02 PROCEDURE — 93351 STRESS TTE COMPLETE: CPT | Mod: S$GLB,,, | Performed by: INTERNAL MEDICINE

## 2019-08-02 PROCEDURE — 80061 LIPID PANEL: CPT

## 2019-08-02 PROCEDURE — 93351 ECHOCARDIOGRAM STRESS TEST: ICD-10-PCS | Mod: S$GLB,,, | Performed by: INTERNAL MEDICINE

## 2019-08-02 PROCEDURE — 36415 COLL VENOUS BLD VENIPUNCTURE: CPT

## 2019-08-06 ENCOUNTER — OFFICE VISIT (OUTPATIENT)
Dept: CARDIOLOGY | Facility: CLINIC | Age: 64
End: 2019-08-06
Payer: COMMERCIAL

## 2019-08-06 VITALS
HEART RATE: 80 BPM | HEIGHT: 67 IN | WEIGHT: 164.88 LBS | DIASTOLIC BLOOD PRESSURE: 81 MMHG | SYSTOLIC BLOOD PRESSURE: 118 MMHG | BODY MASS INDEX: 25.88 KG/M2

## 2019-08-06 DIAGNOSIS — Z95.1 S/P CABG X 3: Primary | ICD-10-CM

## 2019-08-06 DIAGNOSIS — I25.10 CORONARY ARTERY DISEASE INVOLVING NATIVE CORONARY ARTERY OF NATIVE HEART, ANGINA PRESENCE UNSPECIFIED: ICD-10-CM

## 2019-08-06 DIAGNOSIS — E78.5 HYPERLIPIDEMIA, UNSPECIFIED HYPERLIPIDEMIA TYPE: ICD-10-CM

## 2019-08-06 PROCEDURE — 99999 PR PBB SHADOW E&M-EST. PATIENT-LVL III: ICD-10-PCS | Mod: PBBFAC,,, | Performed by: INTERNAL MEDICINE

## 2019-08-06 PROCEDURE — 99215 PR OFFICE/OUTPT VISIT, EST, LEVL V, 40-54 MIN: ICD-10-PCS | Mod: S$GLB,,, | Performed by: INTERNAL MEDICINE

## 2019-08-06 PROCEDURE — 99999 PR PBB SHADOW E&M-EST. PATIENT-LVL III: CPT | Mod: PBBFAC,,, | Performed by: INTERNAL MEDICINE

## 2019-08-06 PROCEDURE — 99215 OFFICE O/P EST HI 40 MIN: CPT | Mod: S$GLB,,, | Performed by: INTERNAL MEDICINE

## 2019-08-06 RX ORDER — ATORVASTATIN CALCIUM 80 MG/1
80 TABLET, FILM COATED ORAL DAILY
Qty: 90 TABLET | Refills: 3 | Status: SHIPPED | OUTPATIENT
Start: 2019-08-06 | End: 2020-07-23 | Stop reason: SDUPTHER

## 2019-08-06 RX ORDER — ASPIRIN 81 MG/1
81 TABLET ORAL 2 TIMES DAILY
COMMUNITY

## 2019-08-06 NOTE — PROGRESS NOTES
Cardiology Clinic Note  Reason for Visit: Coronary Artery Disease ( swelling left leg)    HPI:   Mr. Michael Espinoza is a 63 y.o. gentleman with history of HLD, Gilbert's syndrome, parenchymal lung disease, ASD, and three vessel CAD s/p CABG x3V LIMA-LAD, SVG to OM1 and SVG to PDA. Patient did well since surgery and he is here for post surgery follow up with REYNA (-), and lipid panel (LDL 95 on atorvastatin 40).      He is back to his normal daily activities. Denies any anginal symptoms. Denies diaphoresis, palpitations or SOB.     ROS:    Review of Systems   Constitution: Negative.   HENT: Negative.    Eyes: Negative.    Cardiovascular: Negative.    Respiratory: Negative.    Endocrine: Negative.    Skin: Negative.    Musculoskeletal: Negative.    Gastrointestinal: Negative.    Genitourinary: Negative.    Neurological: Negative.      PMH:     Past Medical History:   Diagnosis Date    Coronary artery disease of native artery of native heart with stable angina pectoris 4/10/2018    Hyperlipidemia      Past Surgical History:   Procedure Laterality Date    AORTOCORONARY BYPASS-CABG x 3 N/A 5/14/2018    Performed by [facility] 2ND FLR    CYST REMOVAL      [place]-Vein-Endovascular N/A 5/14/2018    Performed by [facility] at [facility] OR 2ND FLR    TONSILLECTOMY      TRANSESOPHAGEAL ECHOCARDIOGRAM (CHARMAINE) N/A 12/11/2017    Performed by Michell Gunn MD at [facility] CATH LAB     Allergies:   Review of patient's allergies indicates:  No Known Allergies  Medications:     Current Outpatient Medications on File Prior to Visit   Medication Sig Dispense Refill    atorvastatin (LIPITOR) 40 MG tablet TAKE 1 TABLET(40 MG) BY MOUTH EVERY EVENING 90 tablet 0    ERGOCALCIFEROL, VITAMIN D2, (VITAMIN D ORAL) Take by mouth once daily.      flu vac ts 2013,18yr+,cell,PF, (FLULAVAL) 45 mcg (15 mcg x 3)/0.5 mL Syrg Inject 0.5 mLs into the muscle.      metoprolol tartrate 75 mg Tab Take 75 mg by mouth 2  (two) times daily. 60 tablet 3    [DISCONTINUED] aspirin (ECOTRIN) 325 MG EC tablet Take 1 tablet (325 mg total) by mouth once daily.  0    [DISCONTINUED] calcium carbonate (CALCIUM 600) 600 mg calcium (1,500 mg) Tab Take 600 mg by mouth once daily.       No current facility-administered medications on file prior to visit.      Social History:     Social History     Tobacco Use    Smoking status: Never Smoker    Smokeless tobacco: Never Used   Substance Use Topics    Alcohol use: Yes     Alcohol/week: 0.6 oz     Types: 1 Glasses of wine per week     Comment: 1 drink 2-3 times/weekly     Family History:     Family History   Problem Relation Age of Onset    Hyperlipidemia Brother     Hypertension Brother     Heart disease Brother     Heart attack Brother      Physical Exam:   There were no vitals taken for this visit.   Physical Exam   Constitutional: He is oriented to person, place, and time. He appears well-developed and well-nourished.   HENT:   Head: Normocephalic and atraumatic.   Eyes: Pupils are equal, round, and reactive to light. Conjunctivae and EOM are normal.   Neck: Normal range of motion. Neck supple. No JVD present.   Cardiovascular: Normal rate, regular rhythm and normal heart sounds. Exam reveals no gallop and no friction rub.   No murmur heard.  Pulmonary/Chest: Effort normal and breath sounds normal. No respiratory distress. He has no wheezes. He has no rales. He exhibits no tenderness.   Abdominal: Soft. Bowel sounds are normal. He exhibits no distension. There is no tenderness.   Musculoskeletal: Normal range of motion. He exhibits edema (LLE chronic). He exhibits no tenderness.   Neurological: He is alert and oriented to person, place, and time.   Skin: Skin is warm and dry. No erythema. No pallor.       Labs:     Lab Results   Component Value Date     (L) 05/19/2018    K 3.2 (L) 05/19/2018    K 3.2 (L) 05/19/2018    CL 90 (L) 05/19/2018    CO2 31 (H) 05/19/2018    BUN 22  05/19/2018    CREATININE 0.9 05/19/2018    ANIONGAP 13 05/19/2018     Lab Results   Component Value Date    HGBA1C 5.4 05/10/2018     No results found for: BNP, BNPTRIAGEBLO Lab Results   Component Value Date    WBC 5.86 05/19/2018    HGB 13.3 (L) 05/19/2018    HCT 38.2 (L) 05/19/2018    HCT 36 05/14/2018     05/19/2018    GRAN 4.0 05/19/2018    GRAN 67.3 05/19/2018     Lab Results   Component Value Date    CHOL 167 08/02/2019    HDL 56 08/02/2019    LDLCALC 95.0 08/02/2019    TRIG 80 08/02/2019          Imaging:   As per hpi    Assessment:     1. S/P CABG x 3    2. Coronary artery disease involving native coronary artery of native heart, angina presence unspecified    3. Hyperlipidemia, unspecified hyperlipidemia type          Plan:   S/P CABG x 3  REYNA negative  Continue asa  Increase atorva to 80mg po daily    Coronary artery disease involving native coronary artery of native heart, angina presence unspecified  Continue asa  Increase statin as above    Hyperlipidemia, unspecified hyperlipidemia type  Increase statin    Follow up with me in 3 mo in [facility] and with Dr Rowe as needed  Check lipid panel in 3 months to assess efficacy of meds      Discussed with Dr. Rowe. RTC prn.    Signed:  Yohan Celis MD  8/6/2019 10:26 AM      I have personally taken the history and examined this patient. I have discussed and agree with the resident's findings and plan as documented in the resident's note.  63-year-old male, with hyperlipidemia, coronary artery disease status post bypass grafting 1 year ago, and Gilbert's syndrome well known to me, and a friend of mine who returns for routine coronary follow-up.  He is asymptomatic, exercises on a routine basis, and has not been followed by either a primary care physician or a primary cardiologist as I have taking care of him for years.  Recommend follow-up with me p.r.n., status Care with both primary care physician and primary cardiologist for  standard Healthcare maintenance.  Lc Rowe

## 2019-08-22 ENCOUNTER — LAB VISIT (OUTPATIENT)
Dept: LAB | Facility: HOSPITAL | Age: 64
End: 2019-08-22
Attending: INTERNAL MEDICINE
Payer: COMMERCIAL

## 2019-08-22 ENCOUNTER — PATIENT MESSAGE (OUTPATIENT)
Dept: INTERNAL MEDICINE | Facility: CLINIC | Age: 64
End: 2019-08-22

## 2019-08-22 ENCOUNTER — OFFICE VISIT (OUTPATIENT)
Dept: INTERNAL MEDICINE | Facility: CLINIC | Age: 64
End: 2019-08-22
Payer: COMMERCIAL

## 2019-08-22 ENCOUNTER — IMMUNIZATION (OUTPATIENT)
Dept: PHARMACY | Facility: CLINIC | Age: 64
End: 2019-08-22
Payer: COMMERCIAL

## 2019-08-22 VITALS
HEIGHT: 67 IN | DIASTOLIC BLOOD PRESSURE: 80 MMHG | WEIGHT: 166 LBS | SYSTOLIC BLOOD PRESSURE: 122 MMHG | HEART RATE: 70 BPM | OXYGEN SATURATION: 97 % | BODY MASS INDEX: 26.06 KG/M2

## 2019-08-22 DIAGNOSIS — Z12.5 SCREENING FOR PROSTATE CANCER: ICD-10-CM

## 2019-08-22 DIAGNOSIS — Z00.00 ROUTINE PHYSICAL EXAMINATION: Primary | ICD-10-CM

## 2019-08-22 DIAGNOSIS — M75.111 PARTIAL NONTRAUMATIC TEAR OF ROTATOR CUFF, RIGHT: ICD-10-CM

## 2019-08-22 DIAGNOSIS — Z11.59 NEED FOR HEPATITIS C SCREENING TEST: ICD-10-CM

## 2019-08-22 DIAGNOSIS — Z95.1 S/P CABG X 3: ICD-10-CM

## 2019-08-22 DIAGNOSIS — I25.118 CORONARY ARTERY DISEASE OF NATIVE ARTERY OF NATIVE HEART WITH STABLE ANGINA PECTORIS: ICD-10-CM

## 2019-08-22 DIAGNOSIS — K40.90 RIGHT INGUINAL HERNIA: ICD-10-CM

## 2019-08-22 LAB — COMPLEXED PSA SERPL-MCNC: 0.34 NG/ML (ref 0–4)

## 2019-08-22 PROCEDURE — 84153 ASSAY OF PSA TOTAL: CPT

## 2019-08-22 PROCEDURE — 99999 PR PBB SHADOW E&M-EST. PATIENT-LVL IV: ICD-10-PCS | Mod: PBBFAC,,, | Performed by: INTERNAL MEDICINE

## 2019-08-22 PROCEDURE — 99386 PREV VISIT NEW AGE 40-64: CPT | Mod: S$GLB,,, | Performed by: INTERNAL MEDICINE

## 2019-08-22 PROCEDURE — 86803 HEPATITIS C AB TEST: CPT

## 2019-08-22 PROCEDURE — 36415 COLL VENOUS BLD VENIPUNCTURE: CPT

## 2019-08-22 PROCEDURE — 99999 PR PBB SHADOW E&M-EST. PATIENT-LVL IV: CPT | Mod: PBBFAC,,, | Performed by: INTERNAL MEDICINE

## 2019-08-22 PROCEDURE — 99386 PR PREVENTIVE VISIT,NEW,40-64: ICD-10-PCS | Mod: S$GLB,,, | Performed by: INTERNAL MEDICINE

## 2019-08-22 NOTE — PROGRESS NOTES
Subjective:       Patient ID: Michael Espinoza is a 63 y.o. male.    Chief Complaint: Establish Care    Extremely nice 63-year-old male new patient to me in new patient to internal medicine.  He is sent by Dr. Rowe from Cardiology.  He thinks he has a possible right-sided hernia and wanted to talk about screenings and vaccines.  We reviewed his personal and family history, cardiac history and medications.  He had bypass surgery a few years ago.  He has primarily been seeing Cardiology now would like to establish with me for his PCP.  He is due for a few labs and a colonoscopy.    Lengthy discussion with patient about screening PSA and hepatitis C screening.  He would like to proceed    Review of Systems   Constitutional: Negative for activity change, chills, fatigue, fever and unexpected weight change.   HENT: Negative for hearing loss, nosebleeds, rhinorrhea and trouble swallowing.    Eyes: Negative for pain, discharge and visual disturbance.   Respiratory: Negative for cough, chest tightness, shortness of breath and wheezing.    Cardiovascular: Negative for chest pain and palpitations.   Gastrointestinal: Negative for abdominal pain, blood in stool, constipation, diarrhea, nausea and vomiting.        Right groin bulge possible hernia   Endocrine: Negative for polydipsia and polyuria.   Genitourinary: Negative for difficulty urinating, hematuria and urgency.   Musculoskeletal: Positive for joint swelling. Negative for arthralgias and neck pain.   Skin: Negative for rash.   Neurological: Negative for dizziness, weakness and headaches.   Hematological: Does not bruise/bleed easily.   Psychiatric/Behavioral: Negative for confusion, dysphoric mood, sleep disturbance and suicidal ideas.       Objective:      Physical Exam   Constitutional: He is oriented to person, place, and time. He appears well-developed and well-nourished. No distress.   HENT:   Head: Normocephalic and atraumatic.   Right Ear: External ear normal.    Left Ear: External ear normal.   Mouth/Throat: Oropharynx is clear and moist. No oropharyngeal exudate.   TM's clear, pharynx clear   Eyes: Pupils are equal, round, and reactive to light. Conjunctivae and EOM are normal. No scleral icterus.   Neck: Normal range of motion. Neck supple. No thyromegaly present.   No supraclavicular nodes palpated   Cardiovascular: Normal rate, regular rhythm and normal heart sounds.   No murmur heard.  Pulmonary/Chest: Effort normal and breath sounds normal. He has no wheezes.   Abdominal: Soft. Bowel sounds are normal. He exhibits no mass. There is no tenderness. A hernia (Reducible right inguinal hernia) is present.   Musculoskeletal: He exhibits no edema.   Lymphadenopathy:     He has no cervical adenopathy.   Neurological: He is alert and oriented to person, place, and time.   Skin: No rash noted. No erythema. No pallor.   Psychiatric: He has a normal mood and affect. His behavior is normal.       Assessment:       1. Routine physical examination    2. Partial nontraumatic tear of rotator cuff, right    3. Coronary artery disease of native artery of native heart with stable angina pectoris    4. S/P CABG x 3    5. Screening for prostate cancer    6. Need for hepatitis C screening test    7. Right inguinal hernia        Plan:       Michael CURIEL was seen today for establish care.    Diagnoses and all orders for this visit:    Routine physical examination    Partial nontraumatic tear of rotator cuff, right    Coronary artery disease of native artery of native heart with stable angina pectoris    S/P CABG x 3    Screening for prostate cancer  -     Case request GI: COLONOSCOPY  -     PSA, Screening; Future    Need for hepatitis C screening test  -     Hepatitis C antibody; Future    Right inguinal hernia  -     Ambulatory consult to General Surgery          Follow-up annually

## 2019-08-23 LAB — HCV AB SERPL QL IA: NEGATIVE

## 2019-08-27 ENCOUNTER — OFFICE VISIT (OUTPATIENT)
Dept: SURGERY | Facility: CLINIC | Age: 64
End: 2019-08-27
Payer: COMMERCIAL

## 2019-08-27 VITALS
TEMPERATURE: 98 F | DIASTOLIC BLOOD PRESSURE: 81 MMHG | WEIGHT: 165.25 LBS | HEART RATE: 71 BPM | SYSTOLIC BLOOD PRESSURE: 122 MMHG | HEIGHT: 67 IN | BODY MASS INDEX: 25.94 KG/M2

## 2019-08-27 DIAGNOSIS — K40.90 NON-RECURRENT UNILATERAL INGUINAL HERNIA WITHOUT OBSTRUCTION OR GANGRENE: Primary | ICD-10-CM

## 2019-08-27 PROCEDURE — 99999 PR PBB SHADOW E&M-EST. PATIENT-LVL III: CPT | Mod: PBBFAC,,, | Performed by: SURGERY

## 2019-08-27 PROCEDURE — 99213 PR OFFICE/OUTPT VISIT, EST, LEVL III, 20-29 MIN: ICD-10-PCS | Mod: S$GLB,,, | Performed by: SURGERY

## 2019-08-27 PROCEDURE — 99999 PR PBB SHADOW E&M-EST. PATIENT-LVL III: ICD-10-PCS | Mod: PBBFAC,,, | Performed by: SURGERY

## 2019-08-27 PROCEDURE — 3008F PR BODY MASS INDEX (BMI) DOCUMENTED: ICD-10-PCS | Mod: CPTII,S$GLB,, | Performed by: SURGERY

## 2019-08-27 PROCEDURE — 99213 OFFICE O/P EST LOW 20 MIN: CPT | Mod: S$GLB,,, | Performed by: SURGERY

## 2019-08-27 PROCEDURE — 3008F BODY MASS INDEX DOCD: CPT | Mod: CPTII,S$GLB,, | Performed by: SURGERY

## 2019-08-27 NOTE — LETTER
September 4, 2019      Dominguez Hyatt MD  [address]  [place] [address]           [facility] General Surgery  [address]  [facility] [address]  Phone: [phone]          Patient: Michael Espinoza   MR Number: [id number]   YOB: 1955   Date of Visit: 8/27/2019       Dear Dr. Dominguez Hyatt:    Thank you for referring Michael Espinoza to me for evaluation. Attached you will find relevant portions of my assessment and plan of care.    If you have questions, please do not hesitate to call me. I look forward to following Michael Espinoza along with you.    Sincerely,    Jefry Iverson MD    Enclosure  CC:  No Recipients    If you would like to receive this communication electronically, please contact [email or web address] or [phone] to request more information on [employer] Link access.    For providers and/or their staff who would like to refer a patient to [facility], please contact us through our one-stop-shop provider referral line, [facility] , at [phone].    If you feel you have received this communication in error or would no longer like to receive these types of communications, please e-mail [email or web address]

## 2019-08-27 NOTE — PROGRESS NOTES
[facility] - General Surgery  General Surgery  History & Physical      Subjective:     Chief Complaint: right inguinal hernia    History of Present Illness:  Patient is a 63 y.o. male with who presents to clinic today for right inguinal hernia. States he noticed a right groin bulge ~2 years ago when he was working out. Recently has noticed that it has grown in size. Has not had any pain from this. Denies fevers, chills, shortness of breath, chest pain, nausea, vomiting, changes to bowel or bladder habits. Has some acid reflux, on prilosec.     PMH: CAD (CABG x3 2018), HLD, Gilbert's syndrome  PSH: CABG x3 (Dr Edwards, 2018), thyroglossal cyst removal (~1963), tonsillectomy/adenoidectomy   Social history: never smoker, social EtOH, denies drug use  Allergies: none    Review of Systems   Constitutional: Negative for chills and fever.   Respiratory: Negative for cough and shortness of breath.    Cardiovascular: Negative for chest pain and palpitations.   Gastrointestinal: Negative for abdominal pain, nausea and vomiting.   Genitourinary: Negative for frequency and urgency.   Musculoskeletal: Negative for back pain and neck pain.   Skin: Negative for itching and rash.   Neurological: Negative for weakness and headaches.         Current Outpatient Medications on File Prior to Visit   Medication Sig    aspirin (ECOTRIN) 81 MG EC tablet Take 81 mg by mouth 2 (two) times daily.    atorvastatin (LIPITOR) 80 MG tablet Take 1 tablet (80 mg total) by mouth once daily.    ERGOCALCIFEROL, VITAMIN D2, (VITAMIN D ORAL) Take by mouth once daily.    multivit-min/FA/lycopen/lutein (CENTRUM SILVER MEN ORAL) Take 1 tablet by mouth once daily.    flu vac ts 2013,18yr+,cell,PF, (FLULAVAL) 45 mcg (15 mcg x 3)/0.5 mL Syrg Inject 0.5 mLs into the muscle.     No current facility-administered medications on file prior to visit.        Review of patient's allergies indicates:  No Known Allergies    Past Medical History:   Diagnosis Date     Coronary artery disease of native artery of native heart with stable angina pectoris 4/10/2018    Hyperlipidemia      Past Surgical History:   Procedure Laterality Date    AORTOCORONARY BYPASS-CABG x 3 N/A 5/14/2018    [facility] F[facility]    CYST REMOVAL      [place]-Vein-Endovascular N/A 5/14/2018    [facility] OR 2ND FLR    TONSILLECTOMY      TRANSESOPHAGEAL ECHOCARDIOGRAM (CHARMAINE) N/A 12/11/2017    Performed by Michell Gunn [facility] at [facility] CATH LAB     Family History     Problem Relation (Age of Onset)    Diabetes Brother    Heart attack Brother    Heart disease Brother    Hyperlipidemia Brother    Hypertension Brother        Tobacco Use    Smoking status: Never Smoker    Smokeless tobacco: Never Used   Substance and Sexual Activity    Alcohol use: Yes     Alcohol/week: 0.6 oz     Types: 1 Glasses of wine per week     Frequency: 2-3 times a week     Drinks per session: 1 or 2     Binge frequency: Never     Comment: 1 drink 2-3 times/weekly    Drug use: No    Sexual activity: Not on file       Objective:     Vital Signs (Most Recent):  Temp: 98 °F (36.7 °C) (08/27/19 0852)  Pulse: 71 (08/27/19 0852)  BP: 122/81 (08/27/19 0852)     Weight: 75 kg (165 lb 3.8 oz)  Body mass index is 25.88 kg/m².    Physical exam:  General: no acute distress  Neuro: awake, alert, oriented x3  Cardio: S1 and S2, RRR  Resp: Moving air appropriately, breathing even and unlabored, breath sounds normal  Abd: Soft, non-tender, non-distended, no palpable masses; R groin bulge easily reducible; no palpable L groin hernia  Ext: Warm and well perfused      Assessment/Plan:   63 y.o. male with right inguinal hernia    - will reach out to cardiology for cardiac clearance given history of CAD, patient is currently compliant with aspirin and other medications and advised to continue  - patient desires repair prior to his trip to [place] next summer, would like repair to be  done late November/early December of this year  - will see back for pre-op visit for scheduling at the patient's convenience   - educated patient's on signs and symptoms of obstruction, advised to come to the ED if he develops these    Harmony Mixon MD  General Surgery Resident, PGY-3  [phone]  8/27/2019 9:08 AM

## 2019-10-15 ENCOUNTER — OFFICE VISIT (OUTPATIENT)
Dept: CARDIOLOGY | Facility: CLINIC | Age: 64
End: 2019-10-15
Payer: COMMERCIAL

## 2019-10-15 VITALS
DIASTOLIC BLOOD PRESSURE: 60 MMHG | BODY MASS INDEX: 25.53 KG/M2 | OXYGEN SATURATION: 99 % | HEIGHT: 67 IN | SYSTOLIC BLOOD PRESSURE: 100 MMHG | WEIGHT: 162.69 LBS | HEART RATE: 67 BPM

## 2019-10-15 DIAGNOSIS — Z95.1 S/P CABG (CORONARY ARTERY BYPASS GRAFT): ICD-10-CM

## 2019-10-15 DIAGNOSIS — Z01.810 PRE-OPERATIVE CARDIOVASCULAR EXAMINATION: Primary | ICD-10-CM

## 2019-10-15 PROCEDURE — 99213 PR OFFICE/OUTPT VISIT, EST, LEVL III, 20-29 MIN: ICD-10-PCS | Mod: S$GLB,,, | Performed by: INTERNAL MEDICINE

## 2019-10-15 PROCEDURE — 99213 OFFICE O/P EST LOW 20 MIN: CPT | Mod: S$GLB,,, | Performed by: INTERNAL MEDICINE

## 2019-10-15 PROCEDURE — 99999 PR PBB SHADOW E&M-EST. PATIENT-LVL III: ICD-10-PCS | Mod: PBBFAC,,, | Performed by: INTERNAL MEDICINE

## 2019-10-15 PROCEDURE — 99999 PR PBB SHADOW E&M-EST. PATIENT-LVL III: CPT | Mod: PBBFAC,,, | Performed by: INTERNAL MEDICINE

## 2019-10-15 NOTE — PROGRESS NOTES
Cardiology Clinic Note  Reason for Visit: Pre-op Exam    HPI:   Mr. Michael Espinoza is a 64 y.o. gentleman with history of HLD, Gilbert's syndrome, parenchymal lung disease, ASD, and three vessel CAD s/p CABG x3V LIMA-LAD, SVG to OM1 and SVG to PDA. Patient did well since surgery, and lipid panel (LDL 95 on atorvastatin 40).       He is back to his normal daily activities. Denies any anginal symptoms. Denies diaphoresis, palpitations or SOB. He has a right sided inguinal hernia that requires surgical intervention.  He presents today for pre-operative clearance.    RCRI is 1 translating to a 6% 30-day risk of MACCE given his history of ischemic heart disease.  Recent REYNA is negative as noted below.    ROS:    Review of Systems   Constitution: Negative.   HENT: Negative.    Eyes: Negative.    Cardiovascular: Negative.    Respiratory: Negative.    Endocrine: Negative.    Skin: Negative.    Musculoskeletal: Negative.    Gastrointestinal: Negative.    Genitourinary: Negative.    Neurological: Negative.      PMH:     Past Medical History:   Diagnosis Date    Coronary artery disease of native artery of native heart with stable angina pectoris 4/10/2018    Hyperlipidemia      Past Surgical History:   Procedure Laterality Date    CYST REMOVAL      TONSILLECTOMY       Allergies:   Review of patient's allergies indicates:  No Known Allergies  Medications:     Current Outpatient Medications on File Prior to Visit   Medication Sig Dispense Refill    aspirin (ECOTRIN) 81 MG EC tablet Take 81 mg by mouth 2 (two) times daily.      atorvastatin (LIPITOR) 80 MG tablet Take 1 tablet (80 mg total) by mouth once daily. 90 tablet 3    ERGOCALCIFEROL, VITAMIN D2, (VITAMIN D ORAL) Take by mouth once daily.      flu vac ts 2013,18yr+,cell,PF, (FLULAVAL) 45 mcg (15 mcg x 3)/0.5 mL Syrg Inject 0.5 mLs into the muscle.      multivit-min/FA/lycopen/lutein (CENTRUM SILVER MEN ORAL) Take 1 tablet by mouth once daily.       No current  "facility-administered medications on file prior to visit.      Social History:     Social History     Tobacco Use    Smoking status: Never Smoker    Smokeless tobacco: Never Used   Substance Use Topics    Alcohol use: Yes     Alcohol/week: 1.0 standard drinks     Types: 1 Glasses of wine per week     Frequency: 2-3 times a week     Drinks per session: 1 or 2     Binge frequency: Never     Comment: 1 drink 2-3 times/weekly     Family History:     Family History   Problem Relation Age of Onset    Hyperlipidemia Brother     Hypertension Brother     Heart disease Brother     Heart attack Brother     Diabetes Brother      Physical Exam:   /60   Pulse 67   Ht 5' 7" (1.702 m)   Wt 73.8 kg (162 lb 11.2 oz)   SpO2 99%   BMI 25.48 kg/m²    Physical Exam   Constitutional: He is oriented to person, place, and time. He appears well-developed and well-nourished.   HENT:   Head: Normocephalic and atraumatic.   Eyes: Pupils are equal, round, and reactive to light. Conjunctivae and EOM are normal.   Neck: Normal range of motion. Neck supple. No JVD present.   Cardiovascular: Normal rate, regular rhythm and normal heart sounds. Exam reveals no gallop and no friction rub.   No murmur heard.  Pulmonary/Chest: Effort normal and breath sounds normal. No respiratory distress. He has no wheezes. He has no rales. He exhibits no tenderness.   Abdominal: Soft. Bowel sounds are normal. He exhibits no distension. There is no tenderness.   Musculoskeletal: Normal range of motion. He exhibits edema (unilateral LLE). He exhibits no tenderness.   Neurological: He is alert and oriented to person, place, and time.   Skin: Skin is warm and dry. No erythema. No pallor.       Labs:     Lab Results   Component Value Date     (L) 05/19/2018    K 3.2 (L) 05/19/2018    K 3.2 (L) 05/19/2018    CL 90 (L) 05/19/2018    CO2 31 (H) 05/19/2018    BUN 22 05/19/2018    CREATININE 0.9 05/19/2018    ANIONGAP 13 05/19/2018     Lab Results "   Component Value Date    HGBA1C 5.4 05/10/2018     No results found for: BNP, BNPTRIAGEBLO Lab Results   Component Value Date    WBC 5.86 05/19/2018    HGB 13.3 (L) 05/19/2018    HCT 38.2 (L) 05/19/2018    HCT 36 05/14/2018     05/19/2018    GRAN 4.0 05/19/2018    GRAN 67.3 05/19/2018     Lab Results   Component Value Date    CHOL 167 08/02/2019    HDL 56 08/02/2019    LDLCALC 95.0 08/02/2019    TRIG 80 08/02/2019          Imaging:   · The test was stopped because the patient experienced shortness of breath.  · Arrhythmias during stress: rare PVCs,  · The EKG portion of this study is negative for myocardial ischemia.  · Moderate left atrial enlargement.  · Normal left ventricular systolic function. The estimated ejection fraction is 55%  · Normal LV diastolic function.  · Mild right ventricular enlargement.  · Normal right ventricular systolic function.  · Moderate right atrial enlargement.  · Normal central venous pressure (3 mm Hg).  · The estimated PA systolic pressure is 19 mm Hg  · The stress echo portion of this study is negative for myocardial ischemia.    EKG: nsr with rbbb  Assessment:     1. Pre-operative cardiovascular examination    2. S/P CABG (coronary artery bypass graft)          Plan:   Pre-operative cardiovascular examination  rcri as above  Recent marcelina negative    S/P CABG (coronary artery bypass graft)  Asymptomatic since that time  Continue asa  Continue atorvastatin  bloodwork scheduled for next month as ldl was 95 on last check and atorva increased to 80 daily.  Patient tolerating well, no side effects.  Will contact pt via [facility].      Discussed with Dr. Gunn. RTC in 6 months.     Signed:  Yohan Celis MD  10/15/2019 8:27 AM

## 2019-10-23 ENCOUNTER — DOCUMENTATION ONLY (OUTPATIENT)
Dept: CARDIOLOGY | Facility: CLINIC | Age: 64
End: 2019-10-23

## 2019-11-04 ENCOUNTER — PATIENT MESSAGE (OUTPATIENT)
Dept: CARDIOLOGY | Facility: CLINIC | Age: 64
End: 2019-11-04

## 2019-11-04 ENCOUNTER — LAB VISIT (OUTPATIENT)
Dept: LAB | Facility: HOSPITAL | Age: 64
End: 2019-11-04
Attending: INTERNAL MEDICINE
Payer: COMMERCIAL

## 2019-11-04 DIAGNOSIS — I25.10 CORONARY ARTERY DISEASE INVOLVING NATIVE CORONARY ARTERY OF NATIVE HEART, ANGINA PRESENCE UNSPECIFIED: ICD-10-CM

## 2019-11-04 DIAGNOSIS — E78.5 HYPERLIPIDEMIA, UNSPECIFIED HYPERLIPIDEMIA TYPE: ICD-10-CM

## 2019-11-04 DIAGNOSIS — Z95.1 S/P CABG X 3: ICD-10-CM

## 2019-11-04 LAB
CHOLEST SERPL-MCNC: 145 MG/DL (ref 120–199)
CHOLEST/HDLC SERPL: 2.8 {RATIO} (ref 2–5)
HDLC SERPL-MCNC: 51 MG/DL (ref 40–75)
HDLC SERPL: 35.2 % (ref 20–50)
LDLC SERPL CALC-MCNC: 82.6 MG/DL (ref 63–159)
NONHDLC SERPL-MCNC: 94 MG/DL
TRIGL SERPL-MCNC: 57 MG/DL (ref 30–150)

## 2019-11-04 PROCEDURE — 80061 LIPID PANEL: CPT

## 2019-11-04 PROCEDURE — 36415 COLL VENOUS BLD VENIPUNCTURE: CPT

## 2019-12-03 ENCOUNTER — TELEPHONE (OUTPATIENT)
Dept: SURGERY | Facility: CLINIC | Age: 64
End: 2019-12-03

## 2019-12-03 DIAGNOSIS — K40.90 RIGHT INGUINAL HERNIA: Primary | ICD-10-CM

## 2019-12-03 NOTE — TELEPHONE ENCOUNTER
Pt called to schedule his inguinal hernia repair with Dr. Iverson for 12/17/19.  Pre-Op appt scheduled for Tuesday 12/10/19 at 8:30am.  He is aware of appt dates and time.

## 2019-12-10 ENCOUNTER — OFFICE VISIT (OUTPATIENT)
Dept: SURGERY | Facility: CLINIC | Age: 64
End: 2019-12-10
Payer: COMMERCIAL

## 2019-12-10 ENCOUNTER — LAB VISIT (OUTPATIENT)
Dept: LAB | Facility: HOSPITAL | Age: 64
End: 2019-12-10
Attending: SURGERY
Payer: COMMERCIAL

## 2019-12-10 VITALS
DIASTOLIC BLOOD PRESSURE: 80 MMHG | WEIGHT: 162.69 LBS | SYSTOLIC BLOOD PRESSURE: 124 MMHG | TEMPERATURE: 98 F | BODY MASS INDEX: 25.53 KG/M2 | HEIGHT: 67 IN | HEART RATE: 70 BPM

## 2019-12-10 DIAGNOSIS — K40.90 RIGHT INGUINAL HERNIA: ICD-10-CM

## 2019-12-10 DIAGNOSIS — K40.90 RIGHT INGUINAL HERNIA: Primary | ICD-10-CM

## 2019-12-10 DIAGNOSIS — K40.90 NON-RECURRENT UNILATERAL INGUINAL HERNIA WITHOUT OBSTRUCTION OR GANGRENE: Primary | ICD-10-CM

## 2019-12-10 LAB
ANION GAP SERPL CALC-SCNC: 8 MMOL/L (ref 8–16)
BASOPHILS # BLD AUTO: 0.04 K/UL (ref 0–0.2)
BASOPHILS NFR BLD: 0.8 % (ref 0–1.9)
BUN SERPL-MCNC: 13 MG/DL (ref 8–23)
CALCIUM SERPL-MCNC: 9.6 MG/DL (ref 8.7–10.5)
CHLORIDE SERPL-SCNC: 104 MMOL/L (ref 95–110)
CO2 SERPL-SCNC: 28 MMOL/L (ref 23–29)
CREAT SERPL-MCNC: 0.9 MG/DL (ref 0.5–1.4)
DIFFERENTIAL METHOD: ABNORMAL
EOSINOPHIL # BLD AUTO: 0.1 K/UL (ref 0–0.5)
EOSINOPHIL NFR BLD: 1.3 % (ref 0–8)
ERYTHROCYTE [DISTWIDTH] IN BLOOD BY AUTOMATED COUNT: 13.1 % (ref 11.5–14.5)
EST. GFR  (AFRICAN AMERICAN): >60 ML/MIN/1.73 M^2
EST. GFR  (NON AFRICAN AMERICAN): >60 ML/MIN/1.73 M^2
GLUCOSE SERPL-MCNC: 87 MG/DL (ref 70–110)
HCT VFR BLD AUTO: 46.4 % (ref 40–54)
HGB BLD-MCNC: 15.7 G/DL (ref 14–18)
IMM GRANULOCYTES # BLD AUTO: 0.01 K/UL (ref 0–0.04)
IMM GRANULOCYTES NFR BLD AUTO: 0.2 % (ref 0–0.5)
LYMPHOCYTES # BLD AUTO: 1 K/UL (ref 1–4.8)
LYMPHOCYTES NFR BLD: 19.9 % (ref 18–48)
MCH RBC QN AUTO: 31.2 PG (ref 27–31)
MCHC RBC AUTO-ENTMCNC: 33.8 G/DL (ref 32–36)
MCV RBC AUTO: 92 FL (ref 82–98)
MONOCYTES # BLD AUTO: 0.4 K/UL (ref 0.3–1)
MONOCYTES NFR BLD: 8.8 % (ref 4–15)
NEUTROPHILS # BLD AUTO: 3.3 K/UL (ref 1.8–7.7)
NEUTROPHILS NFR BLD: 69 % (ref 38–73)
NRBC BLD-RTO: 0 /100 WBC
PLATELET # BLD AUTO: 182 K/UL (ref 150–350)
PMV BLD AUTO: 10.2 FL (ref 9.2–12.9)
POTASSIUM SERPL-SCNC: 4.3 MMOL/L (ref 3.5–5.1)
RBC # BLD AUTO: 5.03 M/UL (ref 4.6–6.2)
SODIUM SERPL-SCNC: 140 MMOL/L (ref 136–145)
WBC # BLD AUTO: 4.77 K/UL (ref 3.9–12.7)

## 2019-12-10 PROCEDURE — 3008F BODY MASS INDEX DOCD: CPT | Mod: CPTII,S$GLB,, | Performed by: SURGERY

## 2019-12-10 PROCEDURE — 99999 PR PBB SHADOW E&M-EST. PATIENT-LVL IV: CPT | Mod: PBBFAC,,, | Performed by: SURGERY

## 2019-12-10 PROCEDURE — 85025 COMPLETE CBC W/AUTO DIFF WBC: CPT

## 2019-12-10 PROCEDURE — 99999 PR PBB SHADOW E&M-EST. PATIENT-LVL IV: ICD-10-PCS | Mod: PBBFAC,,, | Performed by: SURGERY

## 2019-12-10 PROCEDURE — 80048 BASIC METABOLIC PNL TOTAL CA: CPT

## 2019-12-10 PROCEDURE — 36415 COLL VENOUS BLD VENIPUNCTURE: CPT

## 2019-12-10 PROCEDURE — 99213 PR OFFICE/OUTPT VISIT, EST, LEVL III, 20-29 MIN: ICD-10-PCS | Mod: S$GLB,,, | Performed by: SURGERY

## 2019-12-10 PROCEDURE — 99213 OFFICE O/P EST LOW 20 MIN: CPT | Mod: S$GLB,,, | Performed by: SURGERY

## 2019-12-10 PROCEDURE — 3008F PR BODY MASS INDEX (BMI) DOCUMENTED: ICD-10-PCS | Mod: CPTII,S$GLB,, | Performed by: SURGERY

## 2019-12-10 RX ORDER — SODIUM CHLORIDE 9 MG/ML
INJECTION, SOLUTION INTRAVENOUS CONTINUOUS
Status: CANCELLED | OUTPATIENT
Start: 2019-12-10

## 2019-12-10 NOTE — PATIENT INSTRUCTIONS
What Is a Hernia?    A hernia is when an organ or tissue pushes through a weak area in the belly (abdominal) wall. This weak area may be present at birth. Or it may be caused by abdominal strain over time. If not treated, a hernia can get worse with time and physical stress.  When a bulge forms  When there is a weak area in the abdominal wall, an organ or tissue can push outward. This often causes a bulge that you can see under your skin. The bulge may get bigger when you stand up. It may go away when you lie down. You may also feel some pressure or mild pain when lifting, coughing, urinating, or doing other activities.  Types of hernias  The type of hernia you have depends on its location. Most hernias form in the groin at or near the internal ring. This is the entrance to a canal between the abdomen and groin. Hernias can also occur in the abdomen, thigh, or genitals.  · An incisional hernia occurs at the site of a previous surgical incision.  · An umbilical hernia occurs at the navel.  · An indirect inguinal hernia occurs in the groin at the internal ring.  · A direct inguinal hernia occurs in the groin near the internal ring.  · A femoral hernia occurs just below the groin.  · An epigastric hernia occurs in the upper abdomen at the midline.  Diagnosis  In most cases, your healthcare provider can diagnose a hernia by doing a physical exam.  In some cases it might not be clear why you have a swelling in the belly wall. Then your provider may order an imaging test such as an ultrasound. This can help with the diagnosis.  Surgery  A hernia will not heal on its own. Surgery is needed to fix the weak spot in the abdominal wall. If not treated, a hernia can get larger. It can also cause serious health problems. The good news is that hernia surgery can be done quickly and safely. In some cases, you can go home the same day as your surgery.   When to call your provider  Call your healthcare provider right away if the  swelling around your hernia becomes larger, firmer, or more painful. These may be signs that your intestines are stuck in the abdominal wall. This is an emergency. The hernia must be repaired right away to avoid serious problems.  Date Last Reviewed: 7/1/2016  © 0892-0368 [employer]. [address] [place] [address]. All rights reserved. This information is not intended as a substitute for professional medical care. Always follow your healthcare professional's instructions.        How a Hernia Develops  Although a hernia bulge may appear suddenly, hernias often take years to develop. They grow larger as pressure inside the body presses the intestines or other tissues out through a weak area in the abdominal wall, often at the belly button or a site of previous surgery. With time, these tissues can bulge out beneath the skin.  Stages of hernia development    The wall weakens or tears. The abdominal lining bulges out through a weak area and begins to form a hernia sac. The sac may contain fat, intestine, or other tissues. At this point, the hernia may or may not cause a visible bulge.        The intestine pushes into the sac. As the intestine pushes further into the sac, it forms a visible bulge. The bulge may flatten when you lie down or push against it. This is called a reducible hernia and does not cause any immediate danger.             The intestine may become trapped. The sac containing the intestine may become trapped by muscle (incarcerated). If this happens, you wont be able to flatten the bulge. You may also have pain. Prompt treatment is needed.        The intestine may become strangulated. If the intestine is tightly trapped, it becomes strangulated. The strangulated area loses blood supply and may die. This can cause severe pain and block the intestine. Emergency surgery is needed.   Date Last Reviewed: 8/1/2016  © 2218-5694 [employer]. [address] [place] [address],  [place] [address]. All rights reserved. This information is not intended as a substitute for professional medical care. Always follow your healthcare professional's instructions.        Having Hernia Surgery: Patch Repair  Surgery treats a hernia by repairing the weakness in the abdominal wall. An incision is made so the surgeon has a direct view of the hernia. The repair is then done through this incision (open surgery). To repair the defect, special mesh materials are used to patch the weak area and make a tension-free repair. Follow your healthcare providers advice on how to get ready for the procedure. You can usually go home the same day as your surgery. In some cases, though, you may need to stay in the hospital overnight.  Getting ready for surgery  Your healthcare provider will talk with you about preparing for surgery. Follow all the instructions youre given and be sure to:   · Tell your healthcare provider about any medicines, supplements, or herbs you take. This includes both prescription and over-the-counter items.  · Stop taking aspirin, ibuprofen, naproxen and other NSAIDs as directed.  · Arrange for an adult family member or friend to give you a ride home after surgery.  · Stop smoking. Smoking affects blood flow and can slow healing.  · Gently wash the surgical area the night before surgery.  · Follow any directions you are given for not eating or drinking before surgery.     Repair in Front           Repair in Back           Combination Repair      The day of surgery  Arrive at the hospital or surgical center at your scheduled time. Youll be asked to change into a patient gown. Youll then be given an IV to provide fluids and medicine. Shortly before surgery, an anesthesiologist or nurse anesthetist will talk with you. He or she will explain the types of anesthesia used to prevent pain during surgery. You will have one or more of the following:  · Monitored sedation to make you relaxed and  sleepy.  · Local anesthesia to numb the surgical site.  · Regional anesthesia to numb specific areas of your body.  · General anesthesia to let you sleep during surgery.  During the surgery  Most hernias are treated using tension-free repairs. This is surgery that uses special mesh materials to repair the weak area. Unlike traditional repairs, the abdominal fascia (tissue around the muscle that gives strength to the abdominal wall) isnt sewn together. Instead, the mesh covers the weak area like a patch. This repairs the defect without tension on the muscles. It also makes it less likely to happen again. The mesh is made of strong, flexible plastic that stays in the body. Over time, nearby tissues grow into the mesh to strengthen the repair.  After surgery  When the procedure is over, youll be taken to the recovery area to rest. Your blood pressure and heart rate will be monitored. Youll also have a bandage over the surgical site. To help reduce discomfort, youll be given pain medicines. You may also be given breathing exercises to keep your lungs clear. Later, youll be asked to get up and walk. This helps prevent blood clots in the legs. You can go home when your healthcare provider says youre ready.     Risks and complications  Hernia surgery is safe, but does have risks including:  · Bleeding  · Infection  · Anesthesia risks  · Mesh complications  · Inability to urinate   · Bowel or bladder injury   · Numbness or pain in the groin or leg  · Risk the hernia will happen again  · Damage to the testicles or testicular function      Date Last Reviewed: 10/1/2016  © 2143-0082 [employer]. [address] [place] [address], [place] [address]. All rights reserved. This information is not intended as a substitute for professional medical care. Always follow your healthcare professional's instructions.

## 2019-12-10 NOTE — H&P (VIEW-ONLY)
[facility] - General Surgery  General Surgery  History & Physical    Subjective:     Chief Complaint: right inguinal hernia    History of Present Illness:  Patient is a 64 y.o. male who presents to clinic today right inguinal hernia- he has just completed he preoperative clearnance. He  First noticed the  right groin bulge ~2 years ago when he was working out. Recently has noticed that it has grown in size. Has not had any pain from this. Denies fevers, chills, shortness of breath, chest pain, nausea, vomiting, changes to bowel or bladder habits. Has some acid reflux, on prilosec.   He was most recently seen by his cardiology who cleared him for surgery.  RCRI is a 1, approx 6% 30 day risk. He is asymptomatic from a cardiac standpoint. He is to continue his ASA and statin.     PMH: CAD (CABG x3 2018), HLD, Gilbert's syndrome  PSH: CABG x3 (Dr Edwards, 2018), thyroglossal cyst removal (~1963), tonsillectomy/adenoidectomy   Social history: never smoker, social EtOH, denies drug use  Allergies: none    Review of Systems   Constitutional: Negative for chills and fever.   Respiratory: Negative for cough and shortness of breath.    Cardiovascular: Negative for chest pain and palpitations.   Gastrointestinal: Negative for abdominal pain, nausea and vomiting.   Genitourinary: Negative for frequency and urgency.   Musculoskeletal: Negative for back pain and neck pain.   Skin: Negative for itching and rash.   Neurological: Negative for weakness and headaches.         Current Outpatient Medications on File Prior to Visit   Medication Sig    aspirin (ECOTRIN) 81 MG EC tablet Take 81 mg by mouth 2 (two) times daily.    atorvastatin (LIPITOR) 80 MG tablet Take 1 tablet (80 mg total) by mouth once daily.    ERGOCALCIFEROL, VITAMIN D2, (VITAMIN D ORAL) Take by mouth once daily.    flu vac ts 2013,18yr+,cell,PF, (FLULAVAL) 45 mcg (15 mcg x 3)/0.5 mL Syrg Inject 0.5 mLs into the muscle.    multivit-min/FA/lycopen/lutein (CENTRUM  SILVER MEN ORAL) Take 1 tablet by mouth once daily.     No current facility-administered medications on file prior to visit.        Review of patient's allergies indicates:  No Known Allergies    Past Medical History:   Diagnosis Date    Coronary artery disease of native artery of native heart with stable angina pectoris 4/10/2018    Hyperlipidemia      Past Surgical History:   Procedure Laterality Date    CYST REMOVAL      TONSILLECTOMY       Family History     Problem Relation (Age of Onset)    Diabetes Brother    Heart attack Brother    Heart disease Brother    Hyperlipidemia Brother    Hypertension Brother        Tobacco Use    Smoking status: Never Smoker    Smokeless tobacco: Never Used   Substance and Sexual Activity    Alcohol use: Yes     Alcohol/week: 1.0 standard drinks     Types: 1 Glasses of wine per week     Frequency: 2-3 times a week     Drinks per session: 1 or 2     Binge frequency: Never     Comment: 1 drink 2-3 times/weekly    Drug use: No    Sexual activity: Not on file       Objective:     Vital Signs (Most Recent):  Temp: 97.9 °F (36.6 °C) (12/10/19 1101)  Pulse: 70 (12/10/19 1101)  BP: 124/80 (12/10/19 1101)     Weight: 73.8 kg (162 lb 11.2 oz)  Body mass index is 25.48 kg/m².    Physical exam:  General: no acute distress  Neuro: awake, alert, oriented x3  Cardio: S1 and S2, RRR  Resp: Moving air appropriately, breathing even and unlabored, breath sounds normal  Abd: Soft, non-tender, non-distended, no palpable masses; R groin bulge easily reducible; no palpable L groin hernia  Ext: Warm and well perfused      Assessment/Plan:   64 y.o. male with right inguinal hernia. Cleared by cardiology for procedure.    - Scheduled for right open inguinal hernia repair with mesh on 12/17/2019.   -Consented in clinic today. States he understands the risks and benefits of the procedure. All questions and concerns addressed.     NAIMA Mancini MD   General Surgery- PGYIII  [phone]

## 2019-12-10 NOTE — PROGRESS NOTES
[facility] - General Surgery  General Surgery  History & Physical    Subjective:     Chief Complaint: right inguinal hernia    History of Present Illness:  Patient is a 64 y.o. male who presents to clinic today right inguinal hernia- he has just completed he preoperative clearnance. He  First noticed the  right groin bulge ~2 years ago when he was working out. Recently has noticed that it has grown in size. Has not had any pain from this. Denies fevers, chills, shortness of breath, chest pain, nausea, vomiting, changes to bowel or bladder habits. Has some acid reflux, on prilosec.   He was most recently seen by his cardiology who cleared him for surgery.  RCRI is a 1, approx 6% 30 day risk. He is asymptomatic from a cardiac standpoint. He is to continue his ASA and statin.     PMH: CAD (CABG x3 2018), HLD, Gilbert's syndrome  PSH: CABG x3 (Dr Edwards, 2018), thyroglossal cyst removal (~1963), tonsillectomy/adenoidectomy   Social history: never smoker, social EtOH, denies drug use  Allergies: none    Review of Systems   Constitutional: Negative for chills and fever.   Respiratory: Negative for cough and shortness of breath.    Cardiovascular: Negative for chest pain and palpitations.   Gastrointestinal: Negative for abdominal pain, nausea and vomiting.   Genitourinary: Negative for frequency and urgency.   Musculoskeletal: Negative for back pain and neck pain.   Skin: Negative for itching and rash.   Neurological: Negative for weakness and headaches.         Current Outpatient Medications on File Prior to Visit   Medication Sig    aspirin (ECOTRIN) 81 MG EC tablet Take 81 mg by mouth 2 (two) times daily.    atorvastatin (LIPITOR) 80 MG tablet Take 1 tablet (80 mg total) by mouth once daily.    ERGOCALCIFEROL, VITAMIN D2, (VITAMIN D ORAL) Take by mouth once daily.    flu vac ts 2013,18yr+,cell,PF, (FLULAVAL) 45 mcg (15 mcg x 3)/0.5 mL Syrg Inject 0.5 mLs into the muscle.    multivit-min/FA/lycopen/lutein (CENTRUM  SILVER MEN ORAL) Take 1 tablet by mouth once daily.     No current facility-administered medications on file prior to visit.        Review of patient's allergies indicates:  No Known Allergies    Past Medical History:   Diagnosis Date    Coronary artery disease of native artery of native heart with stable angina pectoris 4/10/2018    Hyperlipidemia      Past Surgical History:   Procedure Laterality Date    CYST REMOVAL      TONSILLECTOMY       Family History     Problem Relation (Age of Onset)    Diabetes Brother    Heart attack Brother    Heart disease Brother    Hyperlipidemia Brother    Hypertension Brother        Tobacco Use    Smoking status: Never Smoker    Smokeless tobacco: Never Used   Substance and Sexual Activity    Alcohol use: Yes     Alcohol/week: 1.0 standard drinks     Types: 1 Glasses of wine per week     Frequency: 2-3 times a week     Drinks per session: 1 or 2     Binge frequency: Never     Comment: 1 drink 2-3 times/weekly    Drug use: No    Sexual activity: Not on file       Objective:     Vital Signs (Most Recent):  Temp: 97.9 °F (36.6 °C) (12/10/19 1101)  Pulse: 70 (12/10/19 1101)  BP: 124/80 (12/10/19 1101)     Weight: 73.8 kg (162 lb 11.2 oz)  Body mass index is 25.48 kg/m².    Physical exam:  General: no acute distress  Neuro: awake, alert, oriented x3  Cardio: S1 and S2, RRR  Resp: Moving air appropriately, breathing even and unlabored, breath sounds normal  Abd: Soft, non-tender, non-distended, no palpable masses; R groin bulge easily reducible; no palpable L groin hernia  Ext: Warm and well perfused      Assessment/Plan:   64 y.o. male with right inguinal hernia. Cleared by cardiology for procedure.    - Scheduled for right open inguinal hernia repair with mesh on 12/17/2019.   -Consented in clinic today. States he understands the risks and benefits of the procedure. All questions and concerns addressed.     NAIMA Mancini MD   General Surgery- PGYIII  [phone]

## 2019-12-16 ENCOUNTER — TELEPHONE (OUTPATIENT)
Dept: SURGERY | Facility: CLINIC | Age: 64
End: 2019-12-16

## 2019-12-16 ENCOUNTER — ANESTHESIA EVENT (OUTPATIENT)
Dept: SURGERY | Facility: HOSPITAL | Age: 64
End: 2019-12-16
Payer: COMMERCIAL

## 2019-12-16 NOTE — PRE-PROCEDURE INSTRUCTIONS
>>NPO instructions given per surgeons office.     -- Medication information (what to hold and what to take)   -- Arrival place and directions given; time to be given the day before procedure by the Surgeon's Office   -- Bathing with antibacterial/normal soap   -- Don't wear any jewelry or bring any valuables AM of surgery   -- No powder, lotions, creams (except diaper rash)    Pt verbalized understanding.

## 2019-12-16 NOTE — ANESTHESIA PREPROCEDURE EVALUATION
[facility]  Anesthesia Pre-Operative Evaluation         Patient Name: Michael Espinoza  YOB: 1955  MRN: [id number]    SUBJECTIVE:     Pre-operative evaluation for Procedure(s) (LRB):  REPAIR, HERNIA, INGUINAL, WITHOUT HISTORY OF PRIOR REPAIR, AGE 5 YEARS OR OLDER Open Right With Mesh (Right)     12/16/2019    Michael Espinoza is a 64 y.o. male w/ a significant PMHx of CAD (s/p CABG x3 in 2018; on ASA), ASD, HLD, Gilbert syndrome.    Patient now presents for the above procedure(s).      LDA: None documented.     Prev airway:   Placement Date: 05/14/18; Placement Time: 0718; Method of Intubation: Other (Comment) (intubating LMA, no issues, not difficult airway); Inserted by: Anesthesia MD; Airway Device: Endotracheal Tube, LMA; Mask Ventilation: Easy; Intubated: Postinduction; Airway Device Size: 7.5; Style: Cuffed; Cuff Inflation: Minimal occlusive pressure; Inflation Amount: 5; Placement Verified By: Auscultation, Capnometry, ETT Condensation; Complicating Factors: None; Intubation Findings: Positive EtCO2, Bilateral breath sounds, Atraumatic/Condition of teeth unchanged;  Depth of Insertion: 23; Securment: Lips; Complications: None; Breath Sounds: Equal Bilateral; Insertion Attempts: 1    Drips: None documented.      Patient Active Problem List   Diagnosis    HLD (hyperlipidemia)    Gilbert's syndrome    Exertional angina    ASD (atrial septal defect)    SOB (shortness of breath) on exertion    Abnormal CT of the chest    Chest pain    Coronary artery disease of native artery of native heart with stable angina pectoris    Pre-operative cardiovascular examination    S/P CABG x 3    Chronic right shoulder pain    Partial nontraumatic tear of rotator cuff, right       Review of patient's allergies indicates:  No Known Allergies    Current Inpatient Medications:      No current facility-administered medications on file prior to encounter.      Current Outpatient Medications on File  Prior to Encounter   Medication Sig Dispense Refill    aspirin (ECOTRIN) 81 MG EC tablet Take 81 mg by mouth 2 (two) times daily.      atorvastatin (LIPITOR) 80 MG tablet Take 1 tablet (80 mg total) by mouth once daily. 90 tablet 3    ERGOCALCIFEROL, VITAMIN D2, (VITAMIN D ORAL) Take by mouth once daily.      multivit-min/FA/lycopen/lutein (CENTRUM SILVER MEN ORAL) Take 1 tablet by mouth once daily.      flu vac ts 2013,18yr+,cell,PF, (FLULAVAL) 45 mcg (15 mcg x 3)/0.5 mL Syrg Inject 0.5 mLs into the muscle.         Past Surgical History:   Procedure Laterality Date    CYST REMOVAL      TONSILLECTOMY         Social History     Socioeconomic History    Marital status: Single     Spouse name: Not on file    Number of children: Not on file    Years of education: Not on file    Highest education level: Not on file   Occupational History    Not on file   Social Needs    Financial resource strain: Not hard at all    Food insecurity:     Worry: Never true     Inability: Never true    Transportation needs:     Medical: No     Non-medical: No   Tobacco Use    Smoking status: Never Smoker    Smokeless tobacco: Never Used   Substance and Sexual Activity    Alcohol use: Yes     Alcohol/week: 1.0 standard drinks     Types: 1 Glasses of wine per week     Frequency: 2-3 times a week     Drinks per session: 1 or 2     Binge frequency: Never     Comment: 1 drink 2-3 times/weekly    Drug use: No    Sexual activity: Not on file   Lifestyle    Physical activity:     Days per week: 1 day     Minutes per session: 20 min    Stress: Not at all   Relationships    Social connections:     Talks on phone: More than three times a week     Gets together: Patient refused     Attends Gnosticist service: Not on file     Active member of club or organization: Patient refused     Attends meetings of clubs or organizations: Patient refused     Relationship status: Never    Other Topics Concern    Not on file   Social  History Narrative    Not on file       OBJECTIVE:     Vital Signs Range (Last 24H):         Significant Labs:  Lab Results   Component Value Date    WBC 4.77 12/10/2019    HGB 15.7 12/10/2019    HCT 46.4 12/10/2019     12/10/2019    CHOL 145 11/04/2019    TRIG 57 11/04/2019    HDL 51 11/04/2019    ALT 29 08/09/2018    AST 26 08/09/2018     12/10/2019    K 4.3 12/10/2019     12/10/2019    CREATININE 0.9 12/10/2019    BUN 13 12/10/2019    CO2 28 12/10/2019    TSH 2.439 03/21/2018    PSA 0.34 08/22/2019    INR 1.1 05/19/2018    HGBA1C 5.4 05/10/2018       Diagnostic Studies:   EXAMINATION:  XR CHEST PA AND LATERAL    CLINICAL HISTORY:  Presence of aortocoronary bypass graft    TECHNIQUE:  PA and lateral views of the chest were performed.    COMPARISON:  05/17/2018    FINDINGS:  Cardiac size is slightly enlarged and changes of the recent aorta coronary artery bypass are noted.  Left hemidiaphragm is somewhat elevated and I suspect there may be some subpulmonic pleural effusion on the left.  No infiltrate or consolidation is seen.  There is mild compression 1 of 2 mid to lower thoracic vertebral bodies which can be identified on previous CT exam in December 2017..      Impression       See above      Electronically signed by: Alessandro Medel MD  Date: 06/12/2018  Time: 10:38       EKG:   Results for orders placed or performed during the hospital encounter of 06/12/18   SCHEDULED EKG 12-LEAD (to Muse)    Collection Time: 06/12/18 10:19 AM    Narrative    Test Reason : Z95.1,  Blood Pressure : **/** mmHG  Vent. Rate : 068 BPM     Atrial Rate : 068 BPM     P-R Int : 138 ms          QRS Dur : 130 ms      QT Int : 394 ms       P-R-T Axes : 029 100 -12 degrees     QTc Int : 418 ms    Normal sinus rhythm  Right axis deviation  Right bundle branch block  Nonspecific T wave abnormality  Abnormal ECG  When compared with ECG of 14-MAY-2018 14:08,  Premature atrial complexes are no longer Present    Confirmed  by Nish BROOKS, Werner Pandya ([id number]) on 6/12/2018 1:02:47 PM    Referred By: BG ELLIOTT           Confirmed By:Werner Mock MD       EXERCISE STRESS ECHO (08/02/2019):  · The test was stopped because the patient experienced shortness of breath.  · Arrhythmias during stress: rare PVCs,  · The EKG portion of this study is negative for myocardial ischemia.  · Moderate left atrial enlargement.  · Normal left ventricular systolic function. The estimated ejection fraction is 55%  · Normal LV diastolic function.  · Mild right ventricular enlargement.  · Normal right ventricular systolic function.  · Moderate right atrial enlargement.  · Normal central venous pressure (3 mm Hg).  · The estimated PA systolic pressure is 19 mm Hg  · The stress echo portion of this study is negative for myocardial ischemia.    2D ECHO:  TTE:  No results found for this or any previous visit.    CHARMAINE:  No results found for this or any previous visit.    ASSESSMENT/PLAN:     12/16/2019  Michael Espinoza is a 64 y.o., male    Anesthesia Evaluation    I have reviewed the Patient Summary Reports.    I have reviewed the Nursing Notes.      Review of Systems  Anesthesia Hx:  No problems with previous Anesthesia  History of prior surgery of interest to airway management or planning: Denies Family Hx of Anesthesia complications.   Denies Personal Hx of Anesthesia complications.   Social:  Non-Smoker, Social Alcohol Use    Hematology/Oncology:  Hematology Normal   Oncology Normal    -- Denies Anemia:    EENT/Dental:EENT/Dental Normal   Cardiovascular:   CAD  CABG/stent  hyperlipidemia ASD   Pulmonary:   parenchymal lung disease   Renal/:  Renal/ Normal     Hepatic/GI:   Denies GERD. Gilbert's syndrome   Musculoskeletal:  Musculoskeletal Normal    Neurological:  Neurology Normal  Denies CVA. Denies Seizures.    Endocrine:  Endocrine Normal Denies Diabetes. Denies Hypothyroidism.    Dermatological:  Skin Normal    Psych:  Psychiatric  Normal           Physical Exam  General:  Well nourished    Airway/Jaw/Neck:  Airway Findings: Mouth Opening: Normal Tongue: Normal  General Airway Assessment: Adult  Mallampati: II  TM Distance: Normal, at least 6 cm  Jaw/Neck Findings:  Neck ROM: Normal ROM  Neck Findings: Normal    Eyes/Ears/Nose:  EYES/EARS/NOSE FINDINGS: Normal   Dental:  Dental Findings: In tact, Periodontal disease, Mild   Chest/Lungs:  Chest/Lungs Findings: Clear to auscultation, Normal Respiratory Rate     Heart/Vascular:  Heart Findings: Rate: Normal  Rhythm: Regular Rhythm  Sounds: Normal  Vascular Findings: Normal    Abdomen:  Abdomen Findings: Normal    Musculoskeletal:  Musculoskeletal Findings: Normal   Skin:  Skin Findings: Normal    Mental Status:  Mental Status Findings:  Cooperative, Alert and Oriented         Anesthesia Plan  Type of Anesthesia, risks & benefits discussed:  Anesthesia Type:  general  Patient's Preference:   Intra-op Monitoring Plan: standard ASA monitors  Intra-op Monitoring Plan Comments:   Post Op Pain Control Plan: per primary service following discharge from PACU, multimodal analgesia and IV/PO Opioids PRN  Post Op Pain Control Plan Comments:   Induction:   IV  Beta Blocker:  Patient is not currently on a Beta-Blocker (No further documentation required).       Informed Consent: Patient understands risks and agrees with Anesthesia plan.  Questions answered. Anesthesia consent signed with patient.  ASA Score: 3     Day of Surgery Review of History & Physical:    H&P update referred to the surgeon.         Ready For Surgery From Anesthesia Perspective.

## 2019-12-17 ENCOUNTER — ANESTHESIA (OUTPATIENT)
Dept: SURGERY | Facility: HOSPITAL | Age: 64
End: 2019-12-17
Payer: COMMERCIAL

## 2019-12-17 ENCOUNTER — HOSPITAL ENCOUNTER (OUTPATIENT)
Facility: HOSPITAL | Age: 64
Discharge: HOME | End: 2019-12-17
Attending: SURGERY | Admitting: SURGERY
Payer: COMMERCIAL

## 2019-12-17 VITALS
OXYGEN SATURATION: 96 % | HEIGHT: 67 IN | RESPIRATION RATE: 14 BRPM | SYSTOLIC BLOOD PRESSURE: 120 MMHG | HEART RATE: 83 BPM | BODY MASS INDEX: 25.58 KG/M2 | WEIGHT: 163 LBS | TEMPERATURE: 98 F | DIASTOLIC BLOOD PRESSURE: 86 MMHG

## 2019-12-17 DIAGNOSIS — K40.90 RIGHT INGUINAL HERNIA: Primary | ICD-10-CM

## 2019-12-17 PROCEDURE — 63600175 PHARM REV CODE 636 W HCPCS: Performed by: STUDENT IN AN ORGANIZED HEALTH CARE EDUCATION/TRAINING PROGRAM

## 2019-12-17 PROCEDURE — 37000009 HC ANESTHESIA EA ADD 15 MINS: Performed by: SURGERY

## 2019-12-17 PROCEDURE — 63600175 PHARM REV CODE 636 W HCPCS: Performed by: PHYSICIAN ASSISTANT

## 2019-12-17 PROCEDURE — 37000008 HC ANESTHESIA 1ST 15 MINUTES: Performed by: SURGERY

## 2019-12-17 PROCEDURE — 36000706: Performed by: SURGERY

## 2019-12-17 PROCEDURE — 49505 PR REPAIR ING HERNIA,5+Y/O,REDUCIBL: ICD-10-PCS | Mod: RT,,, | Performed by: SURGERY

## 2019-12-17 PROCEDURE — 71000033 HC RECOVERY, INTIAL HOUR: Performed by: SURGERY

## 2019-12-17 PROCEDURE — 25000003 PHARM REV CODE 250: Performed by: STUDENT IN AN ORGANIZED HEALTH CARE EDUCATION/TRAINING PROGRAM

## 2019-12-17 PROCEDURE — D9220A PRA ANESTHESIA: ICD-10-PCS | Mod: ,,, | Performed by: ANESTHESIOLOGY

## 2019-12-17 PROCEDURE — 36000707: Performed by: SURGERY

## 2019-12-17 PROCEDURE — 71000015 HC POSTOP RECOV 1ST HR: Performed by: SURGERY

## 2019-12-17 PROCEDURE — S0020 INJECTION, BUPIVICAINE HYDRO: HCPCS | Performed by: SURGERY

## 2019-12-17 PROCEDURE — 49505 PRP I/HERN INIT REDUC >5 YR: CPT | Mod: RT,,, | Performed by: SURGERY

## 2019-12-17 PROCEDURE — 94761 N-INVAS EAR/PLS OXIMETRY MLT: CPT

## 2019-12-17 PROCEDURE — C1781 MESH (IMPLANTABLE): HCPCS | Performed by: SURGERY

## 2019-12-17 PROCEDURE — 25000003 PHARM REV CODE 250: Performed by: SURGERY

## 2019-12-17 PROCEDURE — D9220A PRA ANESTHESIA: Mod: ,,, | Performed by: ANESTHESIOLOGY

## 2019-12-17 DEVICE — MESH POLY KNIT PERFIX PLG MED: Type: IMPLANTABLE DEVICE | Site: INGUINAL | Status: FUNCTIONAL

## 2019-12-17 RX ORDER — DEXAMETHASONE SODIUM PHOSPHATE 4 MG/ML
INJECTION, SOLUTION INTRA-ARTICULAR; INTRALESIONAL; INTRAMUSCULAR; INTRAVENOUS; SOFT TISSUE
Status: DISCONTINUED | OUTPATIENT
Start: 2019-12-17 | End: 2019-12-17

## 2019-12-17 RX ORDER — FENTANYL CITRATE 50 UG/ML
INJECTION, SOLUTION INTRAMUSCULAR; INTRAVENOUS
Status: DISCONTINUED | OUTPATIENT
Start: 2019-12-17 | End: 2019-12-17

## 2019-12-17 RX ORDER — ROCURONIUM BROMIDE 10 MG/ML
INJECTION, SOLUTION INTRAVENOUS
Status: DISCONTINUED | OUTPATIENT
Start: 2019-12-17 | End: 2019-12-17

## 2019-12-17 RX ORDER — KETAMINE HCL IN 0.9 % NACL 50 MG/5 ML
SYRINGE (ML) INTRAVENOUS
Status: DISCONTINUED | OUTPATIENT
Start: 2019-12-17 | End: 2019-12-17

## 2019-12-17 RX ORDER — ACETAMINOPHEN 500 MG
1000 TABLET ORAL
Status: COMPLETED | OUTPATIENT
Start: 2019-12-17 | End: 2019-12-17

## 2019-12-17 RX ORDER — ONDANSETRON 2 MG/ML
INJECTION INTRAMUSCULAR; INTRAVENOUS
Status: DISCONTINUED | OUTPATIENT
Start: 2019-12-17 | End: 2019-12-17

## 2019-12-17 RX ORDER — MIDAZOLAM HYDROCHLORIDE 1 MG/ML
INJECTION, SOLUTION INTRAMUSCULAR; INTRAVENOUS
Status: DISCONTINUED | OUTPATIENT
Start: 2019-12-17 | End: 2019-12-17

## 2019-12-17 RX ORDER — HYDROMORPHONE HYDROCHLORIDE 1 MG/ML
0.2 INJECTION, SOLUTION INTRAMUSCULAR; INTRAVENOUS; SUBCUTANEOUS EVERY 5 MIN PRN
Status: DISCONTINUED | OUTPATIENT
Start: 2019-12-17 | End: 2019-12-17 | Stop reason: HOSPADM

## 2019-12-17 RX ORDER — PHENYLEPHRINE HYDROCHLORIDE 10 MG/ML
INJECTION INTRAVENOUS
Status: DISCONTINUED | OUTPATIENT
Start: 2019-12-17 | End: 2019-12-17

## 2019-12-17 RX ORDER — SODIUM CHLORIDE 0.9 % (FLUSH) 0.9 %
10 SYRINGE (ML) INJECTION
Status: DISCONTINUED | OUTPATIENT
Start: 2019-12-17 | End: 2019-12-17 | Stop reason: HOSPADM

## 2019-12-17 RX ORDER — SODIUM CHLORIDE 9 MG/ML
INJECTION, SOLUTION INTRAVENOUS CONTINUOUS
Status: DISCONTINUED | OUTPATIENT
Start: 2019-12-17 | End: 2019-12-17 | Stop reason: HOSPADM

## 2019-12-17 RX ORDER — BUPIVACAINE HYDROCHLORIDE 5 MG/ML
INJECTION, SOLUTION EPIDURAL; INTRACAUDAL
Status: DISCONTINUED | OUTPATIENT
Start: 2019-12-17 | End: 2019-12-17 | Stop reason: HOSPADM

## 2019-12-17 RX ORDER — PROPOFOL 10 MG/ML
VIAL (ML) INTRAVENOUS
Status: DISCONTINUED | OUTPATIENT
Start: 2019-12-17 | End: 2019-12-17

## 2019-12-17 RX ORDER — CEFAZOLIN SODIUM 1 G/3ML
2 INJECTION, POWDER, FOR SOLUTION INTRAMUSCULAR; INTRAVENOUS
Status: COMPLETED | OUTPATIENT
Start: 2019-12-17 | End: 2019-12-17

## 2019-12-17 RX ORDER — OXYCODONE HYDROCHLORIDE 5 MG/1
5 TABLET ORAL EVERY 4 HOURS PRN
Status: DISCONTINUED | OUTPATIENT
Start: 2019-12-17 | End: 2019-12-17 | Stop reason: HOSPADM

## 2019-12-17 RX ORDER — LIDOCAINE HCL/PF 100 MG/5ML
SYRINGE (ML) INTRAVENOUS
Status: DISCONTINUED | OUTPATIENT
Start: 2019-12-17 | End: 2019-12-17

## 2019-12-17 RX ORDER — OXYCODONE AND ACETAMINOPHEN 5; 325 MG/1; MG/1
1 TABLET ORAL EVERY 4 HOURS PRN
Qty: 25 TABLET | Refills: 0 | Status: SHIPPED | OUTPATIENT
Start: 2019-12-17 | End: 2021-03-26

## 2019-12-17 RX ADMIN — PHENYLEPHRINE HYDROCHLORIDE 100 MCG: 10 INJECTION INTRAVENOUS at 07:12

## 2019-12-17 RX ADMIN — ACETAMINOPHEN 1000 MG: 500 TABLET ORAL at 06:12

## 2019-12-17 RX ADMIN — PROPOFOL 120 MG: 10 INJECTION, EMULSION INTRAVENOUS at 07:12

## 2019-12-17 RX ADMIN — PHENYLEPHRINE HYDROCHLORIDE 100 MCG: 10 INJECTION INTRAVENOUS at 08:12

## 2019-12-17 RX ADMIN — LIDOCAINE HYDROCHLORIDE 100 MG: 20 INJECTION, SOLUTION INTRAVENOUS at 07:12

## 2019-12-17 RX ADMIN — ROCURONIUM BROMIDE 30 MG: 10 INJECTION, SOLUTION INTRAVENOUS at 07:12

## 2019-12-17 RX ADMIN — MIDAZOLAM HYDROCHLORIDE 2 MG: 1 INJECTION, SOLUTION INTRAMUSCULAR; INTRAVENOUS at 06:12

## 2019-12-17 RX ADMIN — SODIUM CHLORIDE: 0.9 INJECTION, SOLUTION INTRAVENOUS at 07:12

## 2019-12-17 RX ADMIN — ONDANSETRON 4 MG: 2 INJECTION INTRAMUSCULAR; INTRAVENOUS at 07:12

## 2019-12-17 RX ADMIN — SODIUM CHLORIDE: 0.9 INJECTION, SOLUTION INTRAVENOUS at 06:12

## 2019-12-17 RX ADMIN — Medication 5 MG: at 08:12

## 2019-12-17 RX ADMIN — SUGAMMADEX 150 MG: 100 INJECTION, SOLUTION INTRAVENOUS at 08:12

## 2019-12-17 RX ADMIN — FENTANYL CITRATE 100 MCG: 50 INJECTION, SOLUTION INTRAMUSCULAR; INTRAVENOUS at 07:12

## 2019-12-17 RX ADMIN — SODIUM CHLORIDE: 0.9 INJECTION, SOLUTION INTRAVENOUS at 08:12

## 2019-12-17 RX ADMIN — DEXAMETHASONE SODIUM PHOSPHATE 4 MG: 4 INJECTION, SOLUTION INTRAMUSCULAR; INTRAVENOUS at 07:12

## 2019-12-17 RX ADMIN — CEFAZOLIN 2 G: 330 INJECTION, POWDER, FOR SOLUTION INTRAMUSCULAR; INTRAVENOUS at 07:12

## 2019-12-17 RX ADMIN — Medication 30 MG: at 07:12

## 2019-12-17 NOTE — DISCHARGE INSTRUCTIONS
Discharge Instructions for Open Hernia Repair  You had a procedure called open hernia repair. Also called a rupture, a hernia is a tear or weakness in the wall of the belly. This weakness may be present at birth. Or it can be caused by the wear and tear of daily living. Hernias may get worse with time or with physical stress. But surgery can help repair the weakness and eliminate symptoms.  Activity after surgery  Recommendations include the following:  · After surgery, take it easy for the rest of the day. If you had general anesthesia, dont use machinery or power tools, drink alcohol, or make any major decisions for at least the first 24 hours.  · Dont drive while you are still taking opioid pain medicine, and dont drive until you are able to step firmly on the brake pedal without hesitation.  · Ask others to help with chores and errands while you recover.  · Dont lift anything heavier than 10 pounds until your healthcare provider says its OK.  · Dont mow the lawn, use a vacuum , or do other strenuous activities until your healthcare provider says its OK.  · Walk as often as you feel able.  · Continue the coughing and deep breathing exercises that you learned in the hospital.  · Ask your healthcare provider when you can expect to return to work.  · Avoid constipation:  ¨ Eat fruits, vegetables, and whole grains.  ¨ Drink 6 to 8 glasses of water a day, unless otherwise instructed.  ¨ Use a laxative or a mild stool softener as instructed by your healthcare provider.  Bandage and incision care  Tips include the following:  · Do not get the bandage or wound wet for 48 hours.  · If strips of tape were used to close your incision, dont pull them off. Let them fall off on their own.  · Remove any gauze bandage in 48 hours.  · Wash your incision with mild soap and water. Pat it dry. Dont use oils, powders, or lotions on your incision. Do not soak your incision or take tub baths until cleared by your  healthcare provider.  Follow-up care  Keep follow-up appointments during your recovery. These allow your healthcare provider to check your progress and make sure youre healing well. You may also need to have your stitches, staples, or bandage removed. During office visits, tell your healthcare provider if you have any new symptoms. And be sure to ask any questions you have.     When to call your healthcare provider  Call your healthcare provider immediately if you have any of the following:  · A large amount of swelling or bruising (some testicular swelling and bruising is common)  · Bleeding  · Increasing pain  · Increased redness or drainage of the incision  · Fever of 100.4°F (38.0°C) or higher, or as directed by your healthcare provider  · Trouble urinating  · Nausea or vomiting   Date Last Reviewed: 7/1/2016  © 0365-0769 [employer]. [address] [place] [address], [place] [address]. All rights reserved. This information is not intended as a substitute for professional medical care. Always follow your healthcare professional's instructions.

## 2019-12-17 NOTE — OP NOTE
DATE OF PROCEDURE: 12/17/2019     PREOPERATIVE DIAGNOSIS: Right inguinal hernia.     POSTOPERATIVE DIAGNOSIS: Right inguinal hernia.     PROCEDURE PERFORMED: Right inguinal hernia repair with mesh.     ATTENDING SURGEON: Jefry Iverson MD.     HOUSESTAFF SURGEON: Johnson Erazo MD (RES).     ANESTHESIA: General.     INDICATION: Michael Espinoza is a 64 y.o.male referred to my General Surgery Clinic with a history of a symptomatic, reducible inguinal bulge. The history and exam were consistent with inguinal hernia. We recommended an open inguinal hernia repair with mesh and the patient agreed to proceed. The patient signed informed consent and expressed understanding of the risks and benefits of surgery.     PROCEDURE IN DETAIL: The patient was taken to the operating room and placed supine. After induction of general endotracheal tube anesthesia, the right groin was prepped and draped in the standard fashion. Timeout was performed. Horizontal incision was made over the inguinal canal. Subcutaneous tissue was divided with Bovie   Electrocautery to the level of the external oblique aponeurosis. The aponeurosis was incised in line with its fibers, first with a scalpel and then Metzenbaum scissors, and extended through the external inguinal ring. The inguinal canal contents were bluntly encircled and isolated with a Penrose drain. Skeletonization of the spermatic cord was performed. A large direct inguinal hernia sac was identifed, which was carefully dissected away from the cord structures to the level of the internal ring.  The hernia was reduced by reapproximating the transversalis fascia. All cord structures were confirmed intact. Mesh was cut to fit the defect appropriately and sewn in place with interrupted 2-0 Ethibond suture. Medially, the mesh was anchored to the fascia overlying the pubic tubercle. Inferiorly, the mesh was anchored to the shelving edge of the inguinal ligament. Superiorly, the mesh was anchored  to the conjoined tendon. The tails of the mesh were brought together around the cord structures to create a new internal ring that just admitted the tip of a finger. The mesh laid in appropriate position without any redundancy or tension. The testicle was pulled back down to the scrotum and there was noted to be no tension on the cord structures. The external oblique aponeuosis was closed with a running 2-0 Vicryl suture. Lawrence's fascia was closed with interrupted 3-0 Vicryl sutures and the skin was closed with a running subcuticular 4-0 Monocryl suture. Dermabond was applied. The patient was then awakened from anesthesia and transferred to the PACU in good condition. All needle and sponge counts were correct at the conclusion of the case. I was present for the case in its entirety.    ESTIMATED BLOOD LOSS:  5 mL.     FINDINGS:  Large-sized direct right inguinal hernia repaired with polypropylene mesh.    SPECIMEN:  None     DRAINS: None.     COMPLICATIONS: None.

## 2019-12-17 NOTE — ANESTHESIA POSTPROCEDURE EVALUATION
Anesthesia Post Evaluation    Patient: Michael Espinoza    Procedure(s) Performed: Procedure(s) (LRB):  REPAIR, HERNIA, INGUINAL, WITHOUT HISTORY OF PRIOR REPAIR, AGE 5 YEARS OR OLDER Open Right With Mesh (Right)    Final Anesthesia Type: general    Patient location during evaluation: PACU  Patient participation: Yes- Able to Participate  Level of consciousness: awake and alert and oriented  Post-procedure vital signs: reviewed and stable  Pain management: adequate  Airway patency: patent    PONV status at discharge: No PONV  Anesthetic complications: no      Cardiovascular status: blood pressure returned to baseline  Respiratory status: unassisted, room air and spontaneous ventilation  Hydration status: euvolemic  Follow-up not needed.          Vitals Value Taken Time   /71 12/17/2019  9:17 AM   Temp 36.6 °C (97.8 °F) 12/17/2019  5:54 AM   Pulse 74 12/17/2019  9:30 AM   Resp 15 12/17/2019  9:30 AM   SpO2 93 % 12/17/2019  9:30 AM   Vitals shown include unvalidated device data.      No case tracking events are documented in the log.      Pain/Eduin Score: Pain Rating Prior to Med Admin: 0 (12/17/2019  6:29 AM)  Eduin Score: 8 (12/17/2019  9:05 AM)

## 2019-12-17 NOTE — BRIEF OP NOTE
[facility]  Brief Operative Note    Surgery Date: 12/17/2019     Surgeon(s) and Role:     * Jefry Iverson MD - Primary     * Johnson Erazo MD - Resident - Assisting        Pre-op Diagnosis:  Right inguinal hernia [K40.90]    Post-op Diagnosis:  Post-Op Diagnosis Codes:     * Right inguinal hernia [K40.90]    Procedure(s) (LRB):  REPAIR, HERNIA, INGUINAL, WITHOUT HISTORY OF PRIOR REPAIR, AGE 5 YEARS OR OLDER Open Right With Mesh (Right)    Anesthesia: General    Description of the findings of the procedure(s):  Open Right inguinal hernia repair, direct, with mesh.     Estimated Blood Loss: 10 cc         Specimens:   Specimen (12h ago, onward)    None            Discharge Note    OUTCOME: Patient tolerated treatment/procedure well without complication and is now ready for discharge.    DISPOSITION: Home or Self Care    FINAL DIAGNOSIS:  Right inguinal hernia    FOLLOWUP: In clinic

## 2019-12-17 NOTE — TRANSFER OF CARE
"Anesthesia Transfer of Care Note    Patient: Michael Espinoza    Procedure(s) Performed: Procedure(s) (LRB):  REPAIR, HERNIA, INGUINAL, WITHOUT HISTORY OF PRIOR REPAIR, AGE 5 YEARS OR OLDER Open Right With Mesh (Right)    Patient location: PACU    Anesthesia Type: general    Transport from OR: Transported from OR on 6-10 L/min O2 by face mask with adequate spontaneous ventilation    Post pain: adequate analgesia    Post assessment: no apparent anesthetic complications and tolerated procedure well    Post vital signs: stable    Level of consciousness: awake and alert    Nausea/Vomiting: no nausea/vomiting    Complications: none    Transfer of care protocol was followed      Last vitals:   Visit Vitals  /71 (BP Location: Right arm, Patient Position: Lying)   Pulse 77   Temp 36.6 °C (97.8 °F) (Oral)   Resp 16   Ht 5' 7" (1.702 m)   Wt 73.9 kg (163 lb)   SpO2 98%   BMI 25.53 kg/m²     "

## 2019-12-17 NOTE — ANESTHESIA PROCEDURE NOTES
Intubation  Performed by: Donn Aly MD  Authorized by: Harlan Villavicencio MD     Intubation:     Induction:  Intravenous    Mask Ventilation:  Easy mask    Attempts:  2    Attempted By:  Resident anesthesiologist    Method of Intubation:  Direct    Blade:  Brittney 3    Laryngeal View Grade: Grade III - only epiglottis visible      Attempted By (2nd Attempt):  Staff anesthesiologist    Method of Intubation (2nd Attempt):  Direct    Blade (2nd Attempt):  Brittney 3    Laryngeal View Grade (2nd Attempt): Grade IIa - cords partially seen      Difficult Airway Encountered?: No      Airway Device:  Oral endotracheal tube    Airway Device Size:  7.5    Style/Cuff Inflation:  Cuffed (inflated to minimal occlusive pressure)    Inflation Amount (mL):  8    Tube secured:  24    Secured at:  The lips    Placement Verified By:  Capnometry    Complicating Factors:  Anterior larynx and small mouth    Findings Post-Intubation:  BS equal bilateral  Notes:      Unable to visualize cords with Johnson #2 with cricoid pressure.  Subsequently, Mac #3 used by staff anesthesiologist with success in obtaining Grade IIb view.

## 2019-12-17 NOTE — INTERVAL H&P NOTE
The patient has been examined and the H&P has been reviewed:    I concur with the findings and no changes have occurred since H&P was written.     Site marked. Consent in media tab.   Will proceed with open right inguinal hernia repair.    Anesthesia/Surgery risks, benefits and alternative options discussed and understood by patient/family.          Active Hospital Problems    Diagnosis  POA    Right inguinal hernia [K40.90]  Yes      Resolved Hospital Problems   No resolved problems to display.

## 2019-12-19 NOTE — OP NOTE
DATE OF PROCEDURE: 12/17/19.     PREOPERATIVE DIAGNOSIS: Right inguinal hernia.     POSTOPERATIVE DIAGNOSIS: Right inguinal hernia.     PROCEDURE PERFORMED: Right inguinal hernia repair with mesh.     ATTENDING SURGEON: Jefry Iverson MD.     HOUSESTAFF SURGEON: Johnson Erazo MD (RES).     ANESTHESIA: General.     INDICATION: Michael Espinoza is a 64 y.o.male referred to my General Surgery Clinic with a history of a symptomatic, reducible inguinal bulge. The history and exam were consistent with inguinal hernia. We recommended an open inguinal hernia repair with mesh and the patient agreed to proceed. The patient signed informed consent and expressed understanding of the risks and benefits of surgery.     PROCEDURE IN DETAIL: The patient was taken to the operating room and placed supine. After induction of general endotracheal tube anesthesia, the right groin was prepped and draped in the standard fashion. Timeout was performed. Horizontal incision was made over the inguinal canal. Subcutaneous tissue was divided with Bovie Electrocautery to the level of the external oblique aponeurosis. The aponeurosis was incised in line with its fibers, first with a scalpel and then Metzenbaum scissors, and extended through the external inguinal ring. The inguinal canal contents were bluntly encircled and isolated with a Penrose drain. Skeletonization of the spermatic cord was performed. An indirect inguinal hernia sac was identifed, which was carefully dissected away from the cord structures to the level of the internal ring. The sac was reduced into the abdomen without difficulty. All cord structures were confirmed intact. Mesh was cut to fit the defect appropriately and sewn in place with interrupted 2-0 Ethibond suture. Medially, the mesh was anchored to the fascia overlying the pubic tubercle. Inferiorly, the mesh was anchored to the shelving edge of the inguinal ligament. Superiorly, the mesh was anchored to the conjoined  tendon. The tails of the mesh were brought together around the cord structures to create a new internal ring that just admitted the tip of a finger. The mesh laid in appropriate position without any redundancy or tension. The testicle was pulled back down to the scrotum and there was noted to be no tension on the cord structures. The external oblique aponeuosis was closed with a running 2-0 Vicryl suture. Lawrence's fascia was closed with interrupted 3-0 Vicryl sutures and the skin was closed with a running subcuticular 4-0 Monocryl suture. Dermabond was applied. The patient was then awakened from anesthesia and transferred to the PACU in good condition. All needle and sponge counts were correct at the conclusion of the case. I was present for the case in its entirety.    ESTIMATED BLOOD LOSS: 5 mL.     FINDINGS: Moderate-sized indirect right inguinal hernia repaired with polypropylene mesh.    SPECIMEN: None.     DRAINS: None.     COMPLICATIONS: None.

## 2019-12-24 ENCOUNTER — TELEPHONE (OUTPATIENT)
Dept: SURGERY | Facility: CLINIC | Age: 64
End: 2019-12-24

## 2019-12-24 NOTE — TELEPHONE ENCOUNTER
Spoke with patient as a f/u to his inguinal hernia repair with Dr. Iverson on 12/17/19.  He reports feeling well, denies pain, is ambulating without difficulty, eating a regular diet and having BMs.  He is showering over the incision with soap and water and keeping the site clean and dry.  He will f/u as scheduled for his PO appt on 1/7/2020.

## 2020-01-07 ENCOUNTER — OFFICE VISIT (OUTPATIENT)
Dept: SURGERY | Facility: CLINIC | Age: 65
End: 2020-01-07
Payer: COMMERCIAL

## 2020-01-07 VITALS
BODY MASS INDEX: 25.73 KG/M2 | DIASTOLIC BLOOD PRESSURE: 77 MMHG | HEIGHT: 67 IN | TEMPERATURE: 98 F | WEIGHT: 163.94 LBS | SYSTOLIC BLOOD PRESSURE: 122 MMHG | HEART RATE: 75 BPM

## 2020-01-07 DIAGNOSIS — Z48.89 POSTOPERATIVE VISIT: Primary | ICD-10-CM

## 2020-01-07 PROBLEM — K40.90 RIGHT INGUINAL HERNIA: Status: RESOLVED | Noted: 2019-12-17 | Resolved: 2020-01-07

## 2020-01-07 PROCEDURE — 99999 PR PBB SHADOW E&M-EST. PATIENT-LVL III: CPT | Mod: PBBFAC,,, | Performed by: SURGERY

## 2020-01-07 PROCEDURE — 99024 POSTOP FOLLOW-UP VISIT: CPT | Mod: S$GLB,,, | Performed by: SURGERY

## 2020-01-07 PROCEDURE — 99999 PR PBB SHADOW E&M-EST. PATIENT-LVL III: ICD-10-PCS | Mod: PBBFAC,,, | Performed by: SURGERY

## 2020-01-07 PROCEDURE — 99024 PR POST-OP FOLLOW-UP VISIT: ICD-10-PCS | Mod: S$GLB,,, | Performed by: SURGERY

## 2020-01-07 NOTE — PROGRESS NOTES
"Subjective:       Michael Espinoza presents to the clinic 2 weeks following Right inguinal hernia repair. Eating a regular diet without difficulty. Bowel movements are Normal.  The patient is not having any pain..      Objective:      /77   Pulse 75   Temp 98.3 °F (36.8 °C)   Ht 5' 7" (1.702 m)   Wt 74.3 kg (163 lb 14.6 oz)   BMI 25.67 kg/m²     General:  no distress   Abdomen: soft, bowel sounds active, non-tender   Incision:   healing well, no drainage, no erythema, no hernia, no seroma, no swelling, no dehiscence, incision well approximated       Assessment:      Doing well postoperatively.      Plan:      1. Continue any current medications.  2. Wound care discussed.  3. Continue lifting restrictions for 6 weeks from surgery  4. Follow up: as needed    Jefry Iverson MD  Acute Care Surgery  [facility] [place] [facility]    "

## 2020-04-21 ENCOUNTER — OFFICE VISIT (OUTPATIENT)
Dept: SPORTS MEDICINE | Facility: CLINIC | Age: 65
End: 2020-04-21
Payer: COMMERCIAL

## 2020-04-21 VITALS
HEART RATE: 78 BPM | DIASTOLIC BLOOD PRESSURE: 84 MMHG | SYSTOLIC BLOOD PRESSURE: 132 MMHG | BODY MASS INDEX: 25.58 KG/M2 | WEIGHT: 163 LBS | HEIGHT: 67 IN

## 2020-04-21 DIAGNOSIS — M54.6 ACUTE RIGHT-SIDED THORACIC BACK PAIN: Primary | ICD-10-CM

## 2020-04-21 DIAGNOSIS — M99.05 SOMATIC DYSFUNCTION OF PELVIS REGION: ICD-10-CM

## 2020-04-21 DIAGNOSIS — M99.03 SOMATIC DYSFUNCTION OF LUMBAR REGION: ICD-10-CM

## 2020-04-21 DIAGNOSIS — M99.04 SOMATIC DYSFUNCTION OF SACRAL REGION: ICD-10-CM

## 2020-04-21 DIAGNOSIS — M62.830 BACK MUSCLE SPASM: ICD-10-CM

## 2020-04-21 DIAGNOSIS — M99.02 SOMATIC DYSFUNCTION OF THORACIC REGION: ICD-10-CM

## 2020-04-21 PROCEDURE — 97110 THERAPEUTIC EXERCISES: CPT | Mod: S$GLB,,, | Performed by: ORTHOPAEDIC SURGERY

## 2020-04-21 PROCEDURE — 99999 PR PBB SHADOW E&M-EST. PATIENT-LVL III: CPT | Mod: PBBFAC,,, | Performed by: ORTHOPAEDIC SURGERY

## 2020-04-21 PROCEDURE — 3008F BODY MASS INDEX DOCD: CPT | Mod: CPTII,S$GLB,, | Performed by: ORTHOPAEDIC SURGERY

## 2020-04-21 PROCEDURE — 3008F PR BODY MASS INDEX (BMI) DOCUMENTED: ICD-10-PCS | Mod: CPTII,S$GLB,, | Performed by: ORTHOPAEDIC SURGERY

## 2020-04-21 PROCEDURE — 98926 OSTEOPATH MANJ 3-4 REGIONS: CPT | Mod: S$GLB,,, | Performed by: ORTHOPAEDIC SURGERY

## 2020-04-21 PROCEDURE — 99214 OFFICE O/P EST MOD 30 MIN: CPT | Mod: 25,S$GLB,, | Performed by: ORTHOPAEDIC SURGERY

## 2020-04-21 PROCEDURE — 97110 PR THERAPEUTIC EXERCISES: ICD-10-PCS | Mod: S$GLB,,, | Performed by: ORTHOPAEDIC SURGERY

## 2020-04-21 PROCEDURE — 98926 PR OSTEOPATHIC MANIP,3-4 BODY REGN: ICD-10-PCS | Mod: S$GLB,,, | Performed by: ORTHOPAEDIC SURGERY

## 2020-04-21 PROCEDURE — 99999 PR PBB SHADOW E&M-EST. PATIENT-LVL III: ICD-10-PCS | Mod: PBBFAC,,, | Performed by: ORTHOPAEDIC SURGERY

## 2020-04-21 PROCEDURE — 99214 PR OFFICE/OUTPT VISIT, EST, LEVL IV, 30-39 MIN: ICD-10-PCS | Mod: 25,S$GLB,, | Performed by: ORTHOPAEDIC SURGERY

## 2020-04-21 RX ORDER — CYCLOBENZAPRINE HCL 5 MG
5 TABLET ORAL 3 TIMES DAILY PRN
Qty: 21 TABLET | Refills: 0 | Status: SHIPPED | OUTPATIENT
Start: 2020-04-21 | End: 2021-03-26

## 2020-04-21 NOTE — PROGRESS NOTES
CC: low back pain    64 y.o. Male presents today for evaluation of his low back pain. Patient is the  at [employer]. Patient states he first began experiencing low back pain approximately one week ago exercising with his walking partner. Patient states he had been routinely going on two walks per day, 4 miles in the morning and 2 miles in the evening at a 14 minutes mile pace. Patient states he and his walking partner elected to increase the intensity of the workout by adding a 30 second period of jogging. Patient then admits to feeling low back pain that increased throughout the day. Patient states it took him approximately 5 minutes to get out of bed the next morning and reports he has only been able to sleep in his recliner. He denies numbness, tingling, or radiating pain down either of his legs. Denies weakness. Denies incontinence. Denies recent falls or trauma. When asked where his pain is located he gestures to his mid to low back and believes his pain is more right sided. He describes the quality of his pain as aching and sharp. He also believes his pain has started to gradually improve over the past two days.   How long: Patient admits to experiencing low back pain for approximately one week.   What makes it better: Patient admits to improved pain with ibuprofen 800 mg.   What makes it worse: Patient states his pain increases with activity and while laying down at night.   Does it radiate: Denies radiating pain.  Numbness/tingling down legs: Denies numbness/tingling down legs.   Attempted treatments: Patient states he has been taking ibuprofen 800 and percocet at night to help him sleep. He denies the use of ice or heat. Denies topical creams or gels. Denies formal physical therapy. Denies history of injection or surgery to his low back.   Pain score: 2/10  Any mechanical symptoms: Denies mechanical symptoms.   Feelings of instability: Denies  feelings of instability.   Problems with ADLs: Admits to this problem affecting his ability to perform ADLs.     REVIEW OF SYSTEMS:   Constitution: Patient denies fever, chills, night sweats, and weight changes.  Eyes: Patient denies eye pain or vision changes.  HENT: Patient denies headache, ear pain, sore throat, or nasal discharge.  CVS: Patient denies chest pain.  Lungs: Patient denies shortness of breath or cough.  Abd: Patient denies stomach pain, nausea, or vomiting.  Skin: Patient denies skin rash or itching.    Hematologic/Lymphatic: Patient denies easy bruising.   Musculoskeletal: Patient denies recent falls. See HPI.  Psych: Patient denies any current anxiety or nervousness.    PAST MEDICAL HISTORY:   Past Medical History:   Diagnosis Date    Coronary artery disease of native artery of native heart with stable angina pectoris 4/10/2018    Hyperlipidemia        PAST SURGICAL HISTORY:   Past Surgical History:   Procedure Laterality Date    CYST REMOVAL      TONSILLECTOMY         FAMILY HISTORY:   Family History   Problem Relation Age of Onset    Hyperlipidemia Brother     Hypertension Brother     Heart disease Brother     Heart attack Brother     Diabetes Brother        SOCIAL HISTORY:   Social History     Socioeconomic History    Marital status: Single     Spouse name: Not on file    Number of children: Not on file    Years of education: Not on file    Highest education level: Not on file   Occupational History    Not on file   Social Needs    Financial resource strain: Not hard at all    Food insecurity:     Worry: Never true     Inability: Never true    Transportation needs:     Medical: No     Non-medical: No   Tobacco Use    Smoking status: Never Smoker    Smokeless tobacco: Never Used   Substance and Sexual Activity    Alcohol use: Yes     Alcohol/week: 1.0 standard drinks     Types: 1 Glasses of wine per week     Frequency: 2-3 times a week     Drinks per session: 1 or 2     Binge  "frequency: Never     Comment: 1 drink 2-3 times/weekly    Drug use: No    Sexual activity: Not on file   Lifestyle    Physical activity:     Days per week: 1 day     Minutes per session: 20 min    Stress: Not at all   Relationships    Social connections:     Talks on phone: More than three times a week     Gets together: Patient refused     Attends [employer] service: Not on file     Active member of club or organization: Patient refused     Attends meetings of clubs or organizations: Patient refused     Relationship status: Never    Other Topics Concern    Not on file   Social History Narrative    Not on file       MEDICATIONS:     Current Outpatient Medications:     aspirin (ECOTRIN) 81 MG EC tablet, Take 81 mg by mouth 2 (two) times daily., Disp: , Rfl:     atorvastatin (LIPITOR) 80 MG tablet, Take 1 tablet (80 mg total) by mouth once daily., Disp: 90 tablet, Rfl: 3    ERGOCALCIFEROL, VITAMIN D2, (VITAMIN D ORAL), Take by mouth once daily., Disp: , Rfl:     flu vac ts 2013,18yr+,cell,PF, (FLULAVAL) 45 mcg (15 mcg x 3)/0.5 mL Syrg, Inject 0.5 mLs into the muscle., Disp: , Rfl:     multivit-min/FA/lycopen/lutein (CENTRUM SILVER MEN ORAL), Take 1 tablet by mouth once daily., Disp: , Rfl:     oxyCODONE-acetaminophen (PERCOCET) 5-325 mg per tablet, Take 1 tablet by mouth every 4 (four) hours as needed for Pain., Disp: 25 tablet, Rfl: 0    cyclobenzaprine (FLEXERIL) 5 MG tablet, Take 1 tablet (5 mg total) by mouth 3 (three) times daily as needed for Muscle spasms., Disp: 21 tablet, Rfl: 0    ALLERGIES:   Review of patient's allergies indicates:  No Known Allergies     PHYSICAL EXAMINATION:  /84   Pulse 78   Ht 5' 7" (1.702 m)   Wt 73.9 kg (163 lb)   BMI 25.53 kg/m²   Vitals signs and nursing note have been reviewed.  General: In no acute distress, well developed, well nourished, no diaphoresis  Eyes: EOM full and smooth, no eye redness or discharge  HENT: normocephalic and atraumatic, neck " supple, trachea midline, no nasal discharge, no external ear redness or discharge  Cardiovascular: no LE edema  Lungs: respirations non-labored, no conversational dyspnea   Abd: non-distended, no rigidity  MSK: no amputation, no swelling of extremities  Neuro: AAOx3, CN2-12 grossly intact  Skin: No rashes, warm and dry  Psychiatric: cooperative, pleasant, mood and affect appropriate for age    Lumbar Spine: bilateral lumbar region    Observation:    Left upper rib hump with an S shaped scoliotic curve   No edema, erythema, or ecchymosis noted in lumbosacral region.    No midline skin abnormalities.    No atrophy of lower limb musculature.    Tenderness:  No tenderness throughout the lumbar spine, iliolumbar region, posterior pelvis.  + tenderness from T8-T12 along the right paraspinal musculature  No tenderness over the sacrum, piriformis, greater/lesser trochanters.  No bony deformities or step-offs palpated.     Range of Motion:  Active flexion to 60°.   Active extension to 25°.   Active rotation to 30° on left and 30° on right.    Active sidebending to 25° on left and 25° on right.  Passive hip flexion to 135° on left and 135° on right.    Passive hip internal rotation to 45° on left and 45° on right.    Passive hip external rotation to 45° on left and 45° on right.    All ROM testing is without pain.    Strength Testing:  Hip flexion - 5/5 on left and 5/5 on right  Hip extension - 5/5 on left and 5/5 on right  Knee flexion - 5/5 on left and 5/5 on right  Knee extension - 5/5 on left and 5/5 on right  Dorsiflexion - 5/5 on left and 5/5 on right  Plantarflexion - 5/5 on left and 5/5 on right  Great toe extension - 5/5 on left and 5/5 on right    Special Tests:  Standing Sanabria's test - negative on left and negative on right  Stork test - negative on left and negative on right    Seated straight leg raise - negative on left and negative on right  Supine straight leg raise - negative on left and negative on  right  Slump test - negative on left and negative on right  Provocation maneuvers exhibit no worsening of symptoms on left or on right.    LEVAR test - negative  FADIR test - negative  Log roll test - negative    Posture:  Upright, Increased thoracic kyphosis and Anterior head carriage  Gait: Non-antalgic with Neutral ankle mechanics    TART (Tissue texture abnormality, Asymmetry,  Restriction of motion and/or Tenderness) changes:     Cervical Spine  Thoracic Spine  Lumbar Spine   C1  T1 Neutral L1 TTAR   C2  T2 Neutral L2 TTAR   C3  T3 Neutral L3 TTAR   C4  T4 TTAR L4 ERSL   C5  T5 TTAR L5 FRSR   C6  T6 TTAR     C7  T7 TTAR       T8 Neutral       T9 FRSR       T10 ERSR       T11 TTAR       T12 TTAR       Pelvis:  · Innominate:Right anterior rotation  · Pubic bone:Right inferior pubic shear    Sacrum:Right on Right sacral torsion      Key   F= Flexed   E = Extended   R = Rotated   S = Sidebent   TTA = tissue texture abnormality       Neurovascular Exam:  Sensation intact to light touch in the L2-S2 dermatomes bilaterally.   No pretibial edema or abnormal hair pattern of the shin.    Intact and symmetric DP and PT pulses bilaterally.  Normal gait without trendelenberg, heel walking, toe walking, and tandem walking.      ASSESSMENT:      ICD-10-CM ICD-9-CM   1. Acute right-sided thoracic back pain M54.6 724.1   2. Back muscle spasm M62.830 724.8   3. Somatic dysfunction of pelvis region M99.05 739.5   4. Somatic dysfunction of lumbar region M99.03 739.3   5. Somatic dysfunction of thoracic region M99.02 739.2   6. Somatic dysfunction of sacral region M99.04 739.4       PLAN:  1-2.  Acute right-sided thoracic back pain/back muscle spasm - new complaints, new to provider    - Michael is the  at [employer]. He admits to low back pain for the past week following an exercise session combining jogging and walking. He denies radicular symptoms or other disc/nerve red  flag symptoms.  He will occasionally experience sharp twinges in his lower thoracic spine that will stop him in his tracks.  He denies any specific injury or trauma to this area.  He has been taking ibuprofen during the day and if his pain wakes him up at night a Percocet from his prior hernia surgery.    - Based on his description/body language of pain and somatic dysfunction identified on exam, I discussed osteopathic manipulation as a treatment option today.  He consents to evaluation and treatment.  See below.    - HEP for abdominal bracing, ant marches, pelvic clocks and thoracic mobility  prescribed today. Handout provided, explained, and exercises were demonstrated as needed. Encouraged to do daily.  84784 HOME EXERCISE PROGRAM (HEP):  The patient was taught a homegoing physical therapy regimen as described above. The patient demonstrated understanding of the exercises and proper technique of their execution. This interaction took 15 minutes.     - Flexeril 5 mg t.i.d. prescribed at this time and advised that he can take this with his ibuprofen and with Tylenol if needed for pain.      3-6.  Somatic dysfunction of thoracic, sacral, lumbar, pelvic regions -     - OMT 3-4 regions. Oral consent obtained. Reviewed benefits and potential side effects. OMT indicated today due to signs and symptoms as well as local and remote somatic dysfunction findings and their related neurokinetic, lymphatic, fascial and/or arteriovenous body connections. OMT techniques used: Soft Tissue, Myofascial Release and Muscle Energy. Treatment was tolerated well. Improvement noted in segmental mobility post-treatment in dysfunctional regions. There were no adverse events and no complications immediately following treatment. Advised plenty of water to help alleviate soreness.      Future planning includes - Possibly more OMT if helpful and if indicated     All questions were answered to the best of my ability and all concerns were  addressed at this time.    Follow up in 2 weeks for above, or sooner if needed.      This note is dictated using the M*Modal Fluency Direct word recognition program. There are word recognition mistakes that are occasionally missed on review.

## 2020-04-28 ENCOUNTER — PATIENT MESSAGE (OUTPATIENT)
Dept: SPORTS MEDICINE | Facility: CLINIC | Age: 65
End: 2020-04-28

## 2020-05-01 ENCOUNTER — OFFICE VISIT (OUTPATIENT)
Dept: SPORTS MEDICINE | Facility: CLINIC | Age: 65
End: 2020-05-01
Payer: COMMERCIAL

## 2020-05-01 ENCOUNTER — HOSPITAL ENCOUNTER (OUTPATIENT)
Dept: RADIOLOGY | Facility: HOSPITAL | Age: 65
Discharge: HOME | End: 2020-05-01
Attending: ORTHOPAEDIC SURGERY
Payer: COMMERCIAL

## 2020-05-01 VITALS
WEIGHT: 163 LBS | DIASTOLIC BLOOD PRESSURE: 90 MMHG | HEIGHT: 67 IN | BODY MASS INDEX: 25.58 KG/M2 | HEART RATE: 92 BPM | SYSTOLIC BLOOD PRESSURE: 126 MMHG

## 2020-05-01 DIAGNOSIS — M99.02 SOMATIC DYSFUNCTION OF THORACIC REGION: ICD-10-CM

## 2020-05-01 DIAGNOSIS — M54.50 ACUTE RIGHT-SIDED LOW BACK PAIN WITHOUT SCIATICA: ICD-10-CM

## 2020-05-01 DIAGNOSIS — M54.6 ACUTE RIGHT-SIDED THORACIC BACK PAIN: Primary | ICD-10-CM

## 2020-05-01 DIAGNOSIS — M54.6 ACUTE RIGHT-SIDED THORACIC BACK PAIN: ICD-10-CM

## 2020-05-01 DIAGNOSIS — M99.05 SOMATIC DYSFUNCTION OF PELVIS REGION: ICD-10-CM

## 2020-05-01 DIAGNOSIS — M99.03 SOMATIC DYSFUNCTION OF LUMBAR REGION: ICD-10-CM

## 2020-05-01 PROCEDURE — 98926 OSTEOPATH MANJ 3-4 REGIONS: CPT | Mod: S$GLB,,, | Performed by: ORTHOPAEDIC SURGERY

## 2020-05-01 PROCEDURE — 3008F BODY MASS INDEX DOCD: CPT | Mod: CPTII,S$GLB,, | Performed by: ORTHOPAEDIC SURGERY

## 2020-05-01 PROCEDURE — 99999 PR PBB SHADOW E&M-EST. PATIENT-LVL III: CPT | Mod: PBBFAC,,, | Performed by: ORTHOPAEDIC SURGERY

## 2020-05-01 PROCEDURE — 72110 X-RAY EXAM L-2 SPINE 4/>VWS: CPT | Mod: 26,,, | Performed by: RADIOLOGY

## 2020-05-01 PROCEDURE — 99999 PR PBB SHADOW E&M-EST. PATIENT-LVL III: ICD-10-PCS | Mod: PBBFAC,,, | Performed by: ORTHOPAEDIC SURGERY

## 2020-05-01 PROCEDURE — 3008F PR BODY MASS INDEX (BMI) DOCUMENTED: ICD-10-PCS | Mod: CPTII,S$GLB,, | Performed by: ORTHOPAEDIC SURGERY

## 2020-05-01 PROCEDURE — 99214 PR OFFICE/OUTPT VISIT, EST, LEVL IV, 30-39 MIN: ICD-10-PCS | Mod: 25,S$GLB,, | Performed by: ORTHOPAEDIC SURGERY

## 2020-05-01 PROCEDURE — 72110 X-RAY EXAM L-2 SPINE 4/>VWS: CPT | Mod: TC

## 2020-05-01 PROCEDURE — 72110 XR LUMBAR SPINE COMPLETE 5 VIEW: ICD-10-PCS | Mod: 26,,, | Performed by: RADIOLOGY

## 2020-05-01 PROCEDURE — 99214 OFFICE O/P EST MOD 30 MIN: CPT | Mod: 25,S$GLB,, | Performed by: ORTHOPAEDIC SURGERY

## 2020-05-01 PROCEDURE — 98926 PR OSTEOPATHIC MANIP,3-4 BODY REGN: ICD-10-PCS | Mod: S$GLB,,, | Performed by: ORTHOPAEDIC SURGERY

## 2020-05-01 RX ORDER — MELOXICAM 15 MG/1
TABLET ORAL
Qty: 90 TABLET | Refills: 0 | Status: SHIPPED | OUTPATIENT
Start: 2020-05-01 | End: 2021-03-26

## 2020-05-01 RX ORDER — MELOXICAM 15 MG/1
15 TABLET ORAL DAILY
Qty: 30 TABLET | Refills: 0 | Status: SHIPPED | OUTPATIENT
Start: 2020-05-01 | End: 2020-05-01

## 2020-05-01 NOTE — PROGRESS NOTES
CC: low back pain    Michael is here today for follow up evaluation of his low back pain. Patient reports his pain is 2/10 today. He states his pain is consistently 1-2/10 while sitting and 5-6/10 while standing. Patient states he feels as though his back spasms have ceased, but he continues to have back pain that is preventing him from performing his ADLs. Patient reports he is beginning to feel discouraged as he continues to experience pain while brushing his teeth and picking up a tea kettle. He also feels discouraged due to the increase in his walking mile time as a result of his back pain. Patient states he is taking 800 mg ibuprofen 2-3 times per day. He admits to performing his HEP once daily and states he feels has been improving with his exercises. He also states he completed his flexeril prescription as prescribed. He states he is unsure if he observed any improvement in his symptoms with OMT but states he is not worse after the treatment. He denies falls or trauma since his last visit and continues to deny radicular symptoms.     Recall from visit on 4/21/2020  64 y.o. Male presents today for evaluation of his low back pain. Patient is the  at [employer]. Patient states he first began experiencing low back pain approximately one week ago exercising with his walking partner. Patient states he had been routinely going on two walks per day, 4 miles in the morning and 2 miles in the evening at a 14 minutes mile pace. Patient states he and his walking partner elected to increase the intensity of the workout by adding a 30 second period of jogging. Patient then admits to feeling low back pain that increased throughout the day. Patient states it took him approximately 5 minutes to get out of bed the next morning and reports he has only been able to sleep in his recliner. He denies numbness, tingling, or radiating pain down either of his legs. Denies  weakness. Denies incontinence. Denies recent falls or trauma. When asked where his pain is located he gestures to his mid to low back and believes his pain is more right sided. He describes the quality of his pain as aching and sharp. He also believes his pain has started to gradually improve over the past two days.   How long: Patient admits to experiencing low back pain for approximately one week.   What makes it better: Patient admits to improved pain with ibuprofen 800 mg.   What makes it worse: Patient states his pain increases with activity and while laying down at night.   Does it radiate: Denies radiating pain.  Numbness/tingling down legs: Denies numbness/tingling down legs.   Attempted treatments: Patient states he has been taking ibuprofen 800 and percocet at night to help him sleep. He denies the use of ice or heat. Denies topical creams or gels. Denies formal physical therapy. Denies history of injection or surgery to his low back.   Pain score: 2/10  Any mechanical symptoms: Denies mechanical symptoms.   Feelings of instability: Denies feelings of instability.   Problems with ADLs: Admits to this problem affecting his ability to perform ADLs.     REVIEW OF SYSTEMS:   Constitution: Patient denies fever, chills, night sweats, and weight changes.  Eyes: Patient denies eye pain or vision changes.  HENT: Patient denies headache, ear pain, sore throat, or nasal discharge.  CVS: Patient denies chest pain.  Lungs: Patient denies shortness of breath or cough.  Abd: Patient denies stomach pain, nausea, or vomiting.  Skin: Patient denies skin rash or itching.    Hematologic/Lymphatic: Patient denies easy bruising.   Musculoskeletal: Patient denies recent falls. See HPI.  Psych: Patient denies any current anxiety or nervousness.    PAST MEDICAL HISTORY:   Past Medical History:   Diagnosis Date    Coronary artery disease of native artery of native heart with stable angina pectoris 4/10/2018    Hyperlipidemia         PAST SURGICAL HISTORY:   Past Surgical History:   Procedure Laterality Date    CYST REMOVAL      TONSILLECTOMY         FAMILY HISTORY:   Family History   Problem Relation Age of Onset    Hyperlipidemia Brother     Hypertension Brother     Heart disease Brother     Heart attack Brother     Diabetes Brother        SOCIAL HISTORY:   Social History     Socioeconomic History    Marital status: Single     Spouse name: Not on file    Number of children: Not on file    Years of education: Not on file    Highest education level: Not on file   Occupational History    Not on file   Social Needs    Financial resource strain: Not hard at all    Food insecurity:     Worry: Never true     Inability: Never true    Transportation needs:     Medical: No     Non-medical: No   Tobacco Use    Smoking status: Never Smoker    Smokeless tobacco: Never Used   Substance and Sexual Activity    Alcohol use: Yes     Alcohol/week: 1.0 standard drinks     Types: 1 Glasses of wine per week     Frequency: 2-3 times a week     Drinks per session: 1 or 2     Binge frequency: Never     Comment: 1 drink 2-3 times/weekly    Drug use: No    Sexual activity: Not on file   Lifestyle    Physical activity:     Days per week: 1 day     Minutes per session: 20 min    Stress: Not at all   Relationships    Social connections:     Talks on phone: More than three times a week     Gets together: Patient refused     Attends Religion service: Not on file     Active member of club or organization: Patient refused     Attends meetings of clubs or organizations: Patient refused     Relationship status: Never    Other Topics Concern    Not on file   Social History Narrative    Not on file       MEDICATIONS:     Current Outpatient Medications:     aspirin (ECOTRIN) 81 MG EC tablet, Take 81 mg by mouth 2 (two) times daily., Disp: , Rfl:     atorvastatin (LIPITOR) 80 MG tablet, Take 1 tablet (80 mg total) by mouth once daily., Disp:  "90 tablet, Rfl: 3    cyclobenzaprine (FLEXERIL) 5 MG tablet, Take 1 tablet (5 mg total) by mouth 3 (three) times daily as needed for Muscle spasms., Disp: 21 tablet, Rfl: 0    ERGOCALCIFEROL, VITAMIN D2, (VITAMIN D ORAL), Take by mouth once daily., Disp: , Rfl:     flu vac ts 2013,18yr+,cell,PF, (FLULAVAL) 45 mcg (15 mcg x 3)/0.5 mL Syrg, Inject 0.5 mLs into the muscle., Disp: , Rfl:     multivit-min/FA/lycopen/lutein (CENTRUM SILVER MEN ORAL), Take 1 tablet by mouth once daily., Disp: , Rfl:     oxyCODONE-acetaminophen (PERCOCET) 5-325 mg per tablet, Take 1 tablet by mouth every 4 (four) hours as needed for Pain., Disp: 25 tablet, Rfl: 0    meloxicam (MOBIC) 15 MG tablet, TAKE 1 TABLET(15 MG) BY MOUTH EVERY DAY, Disp: 90 tablet, Rfl: 0    ALLERGIES:   Review of patient's allergies indicates:  No Known Allergies     PHYSICAL EXAMINATION:  BP (!) 126/90   Pulse 92   Ht 5' 7" (1.702 m)   Wt 73.9 kg (163 lb)   BMI 25.53 kg/m²   Vitals signs and nursing note have been reviewed.  General: In no acute distress, well developed, well nourished, no diaphoresis  Eyes: EOM full and smooth, no eye redness or discharge  HENT: normocephalic and atraumatic, neck supple, trachea midline, no nasal discharge, no external ear redness or discharge  Cardiovascular: no LE edema  Lungs: respirations non-labored, no conversational dyspnea   Abd: non-distended, no rigidity  MSK: no amputation, no swelling of extremities  Neuro: AAOx3, CN2-12 grossly intact  Skin: No rashes, warm and dry  Psychiatric: cooperative, pleasant, mood and affect appropriate for age    Lumbar Spine: bilateral thoracic and lumbar region    Observation:    Left upper rib hump, lumbar lordosis, severe thoracic kyphosis  No edema, erythema, or ecchymosis noted in lumbosacral region.    No midline skin abnormalities.    No atrophy of lower limb musculature.    Tenderness:  No tenderness throughout the lumbar spine, iliolumbar region, posterior pelvis.  + " tenderness from T8-T12 along the right paraspinal musculature  No tenderness over the sacrum, piriformis, greater/lesser trochanters.  No bony deformities or step-offs palpated.     Range of Motion:  Active flexion to 60°.   Active extension to 25°.   Active rotation to 30° on left and 30° on right.    Active sidebending to 25° on left and 25° on right.  Passive hip flexion to 135° on left and 135° on right.    Passive hip internal rotation to 45° on left and 45° on right.    Passive hip external rotation to 45° on left and 45° on right.    ROM testing causes mild pain at the thoracolumbar junction    Strength Testing:  Hip flexion - 5/5 on left and 5/5 on right  Hip extension - 5/5 on left and 5/5 on right  Knee flexion - 5/5 on left and 5/5 on right  Knee extension - 5/5 on left and 5/5 on right  Dorsiflexion - 5/5 on left and 5/5 on right  Plantarflexion - 5/5 on left and 5/5 on right  Great toe extension - 5/5 on left and 5/5 on right    Special Tests:  Standing Sanabria's test - negative on left and negative on right  Stork test - negative on left and negative on right    Seated straight leg raise - negative on left and negative on right  Supine straight leg raise - negative on left and negative on right  Slump test - negative on left and negative on right  Provocation maneuvers exhibit no worsening of symptoms on left or on right.    LEVAR test - negative  FADIR test - negative  Log roll test - negative    Posture:  Upright, Increased thoracic kyphosis and Anterior head carriage  Gait: Non-antalgic with Neutral ankle mechanics    TART (Tissue texture abnormality, Asymmetry,  Restriction of motion and/or Tenderness) changes:     Cervical Spine  Thoracic Spine  Lumbar Spine   C1  T1 Neutral L1 TTAR   C2  T2 Neutral L2 FRSR   C3  T3 Neutral L3 ERSR   C4  T4 TTAR L4 Neutral   C5  T5 TTAR L5 Neutral   C6  T6 TTAR     C7  T7 TTAR       T8 TTAR       T9 NSRRL       T10 NSRRL       T11 NSRRL       T12 NSRRL        Pelvis:  · Innominate:Left anterior rotation  · Pubic bone:Neutral    Sacrum:Neutral      Key   F= Flexed   E = Extended   R = Rotated   S = Sidebent   TTA = tissue texture abnormality       Neurovascular Exam:  Sensation intact to light touch in the L2-S2 dermatomes bilaterally.   No pretibial edema or abnormal hair pattern of the shin.    Intact and symmetric DP and PT pulses bilaterally.  Normal gait without trendelenberg, heel walking, toe walking, and tandem walking.    IMAGIN. X-ray ordered due to lumbar spine pain   2. X-ray images were reviewed personally by me and then directly with patient.  3. FINDINGS: X-ray images obtained demonstrate that the alignment is normal.  There is a T12 compression deformity.  There is mild DJD.  The T12 compression deformity is new when compared to 2018.  There is mild DJD.  There is kyphosis.  There are chronic wedge deformities at lower thoracic levels.  4. IMPRESSION: As above.       ASSESSMENT:      ICD-10-CM ICD-9-CM   1. Acute right-sided thoracic back pain M54.6 724.1   2. Acute right-sided low back pain without sciatica M54.5 724.2   3. Somatic dysfunction of lumbar region M99.03 739.3   4. Somatic dysfunction of pelvis region M99.05 739.5   5. Somatic dysfunction of thoracic region M99.02 739.2       PLAN:  1-2.  Acute right-sided thoracic back pain - improved, not resolved    - Michael is the  at [employer]. He admits to low back pain for the past 3 weeks following an exercise session combining jogging and walking. He denies radicular symptoms or other disc/nerve red flag symptoms.  He denies any specific injury or trauma to this area.  Compared to his last visit 1 week ago his spasms have resolved but he is still experiencing pain with ADLs and exercise.  He is still needing to sleep in a recliner as the bed causes him too much pain to sleep.    - XRs ordered in the office today and images were  personally reviewed with the patient. See above for further detail.    - Based on his description/body language of pain and somatic dysfunction identified on exam, I discussed osteopathic manipulation as a treatment option today.  He consents to evaluation and treatment.  See below.    - Continue with previously prescribed HEP.     - We will switch ibuprofen 600 mg t.i.d. to Mobic 15 mg daily.  Advised him to take this for 2 weeks followed by as needed.      3-5.  Somatic dysfunction of thoracic, lumbar, pelvic regions -     - OMT 3-4 regions. Oral consent obtained. Reviewed benefits and potential side effects. OMT indicated today due to signs and symptoms as well as local and remote somatic dysfunction findings and their related neurokinetic, lymphatic, fascial and/or arteriovenous body connections. OMT techniques used: Soft Tissue, Myofascial Release and Muscle Energy. Treatment was tolerated well. Improvement noted in segmental mobility post-treatment in dysfunctional regions. There were no adverse events and no complications immediately following treatment. Advised plenty of water to help alleviate soreness.      Future planning includes - Possibly more OMT if helpful and if indicated, MRI if symptoms persist or worsen     All questions were answered to the best of my ability and all concerns were addressed at this time.    Follow up in 2 weeks for above, or sooner if needed.      This note is dictated using the M*Modal Fluency Direct word recognition program. There are word recognition mistakes that are occasionally missed on review.

## 2020-06-09 ENCOUNTER — TELEPHONE (OUTPATIENT)
Dept: SPORTS MEDICINE | Facility: CLINIC | Age: 65
End: 2020-06-09

## 2020-06-09 NOTE — TELEPHONE ENCOUNTER
Patient e-mail additional back and core exercises including pelvic clocks, cat camel, QL stretching, pelvic tilts and planks.    Celi Marcus MS, OTC  Clinical Assistant to Dr. Daniel Bee

## 2020-06-22 ENCOUNTER — OFFICE VISIT (OUTPATIENT)
Dept: CARDIOLOGY | Facility: CLINIC | Age: 65
End: 2020-06-22
Payer: COMMERCIAL

## 2020-06-22 VITALS
OXYGEN SATURATION: 99 % | SYSTOLIC BLOOD PRESSURE: 121 MMHG | HEIGHT: 67 IN | DIASTOLIC BLOOD PRESSURE: 77 MMHG | WEIGHT: 154.56 LBS | HEART RATE: 65 BPM | BODY MASS INDEX: 24.26 KG/M2

## 2020-06-22 DIAGNOSIS — I25.10 CORONARY ARTERY DISEASE INVOLVING NATIVE CORONARY ARTERY OF NATIVE HEART WITHOUT ANGINA PECTORIS: Primary | ICD-10-CM

## 2020-06-22 DIAGNOSIS — I25.10 CORONARY ARTERY DISEASE INVOLVING NATIVE CORONARY ARTERY OF NATIVE HEART, ANGINA PRESENCE UNSPECIFIED: ICD-10-CM

## 2020-06-22 DIAGNOSIS — Z95.1 S/P CABG (CORONARY ARTERY BYPASS GRAFT): ICD-10-CM

## 2020-06-22 DIAGNOSIS — E78.00 PURE HYPERCHOLESTEROLEMIA: ICD-10-CM

## 2020-06-22 PROCEDURE — 99999 PR PBB SHADOW E&M-EST. PATIENT-LVL IV: CPT | Mod: PBBFAC,,, | Performed by: INTERNAL MEDICINE

## 2020-06-22 PROCEDURE — 99214 PR OFFICE/OUTPT VISIT, EST, LEVL IV, 30-39 MIN: ICD-10-PCS | Mod: S$GLB,,, | Performed by: INTERNAL MEDICINE

## 2020-06-22 PROCEDURE — 3008F BODY MASS INDEX DOCD: CPT | Mod: CPTII,S$GLB,, | Performed by: INTERNAL MEDICINE

## 2020-06-22 PROCEDURE — 3008F PR BODY MASS INDEX (BMI) DOCUMENTED: ICD-10-PCS | Mod: CPTII,S$GLB,, | Performed by: INTERNAL MEDICINE

## 2020-06-22 PROCEDURE — 99214 OFFICE O/P EST MOD 30 MIN: CPT | Mod: S$GLB,,, | Performed by: INTERNAL MEDICINE

## 2020-06-22 PROCEDURE — 99999 PR PBB SHADOW E&M-EST. PATIENT-LVL IV: ICD-10-PCS | Mod: PBBFAC,,, | Performed by: INTERNAL MEDICINE

## 2020-06-22 RX ORDER — EZETIMIBE 10 MG/1
10 TABLET ORAL DAILY
Qty: 90 TABLET | Refills: 3 | Status: SHIPPED | OUTPATIENT
Start: 2020-06-22 | End: 2021-07-23 | Stop reason: SDUPTHER

## 2020-06-22 NOTE — PROGRESS NOTES
Subjective:    Patient ID:  Michael Espinoza is a 64 y.o. 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 He is very active and walks 4 days a week. He has no chest pain or CONTRERAS. His most recent LDL was 82.  Lab Results   Component Value Date     12/10/2019    K 4.3 12/10/2019     12/10/2019    CO2 28 12/10/2019    BUN 13 12/10/2019    CREATININE 0.9 12/10/2019    GLU 87 12/10/2019    HGBA1C 5.4 05/10/2018    MG 1.9 05/19/2018    AST 26 08/09/2018    ALT 29 08/09/2018    ALBUMIN 3.9 08/09/2018    PROT 6.8 08/09/2018    BILITOT 1.7 (H) 08/09/2018    WBC 4.77 12/10/2019    HGB 15.7 12/10/2019    HCT 46.4 12/10/2019    HCT 36 05/14/2018    MCV 92 12/10/2019     12/10/2019    INR 1.1 05/19/2018    PSA 0.34 08/22/2019    TSH 2.439 03/21/2018         Lab Results   Component Value Date    CHOL 145 11/04/2019    HDL 51 11/04/2019    TRIG 57 11/04/2019       Lab Results   Component Value Date    LDLCALC 82.6 11/04/2019       Past Medical History:   Diagnosis Date    Coronary artery disease of native artery of native heart with stable angina pectoris 4/10/2018    Hyperlipidemia        Current Outpatient Medications:     aspirin (ECOTRIN) 81 MG EC tablet, Take 81 mg by mouth 2 (two) times daily., Disp: , Rfl:     atorvastatin (LIPITOR) 80 MG tablet, Take 1 tablet (80 mg total) by mouth once daily., Disp: 90 tablet, Rfl: 3    flu vac ts 2013,18yr+,cell,PF, (FLULAVAL) 45 mcg (15 mcg x 3)/0.5 mL Syrg, Inject 0.5 mLs into the muscle., Disp: , Rfl:     meloxicam (MOBIC) 15 MG tablet, TAKE 1 TABLET(15 MG) BY MOUTH EVERY DAY, Disp: 90 tablet, Rfl: 0    multivit-min/FA/lycopen/lutein (CENTRUM SILVER MEN ORAL), Take 1 tablet by mouth once daily., Disp: , Rfl:     cyclobenzaprine (FLEXERIL) 5 MG tablet, Take 1 tablet (5 mg total) by mouth 3 (three) times daily as needed for Muscle  spasms. (Patient not taking: Reported on 6/22/2020), Disp: 21 tablet, Rfl: 0    ERGOCALCIFEROL, VITAMIN D2, (VITAMIN D ORAL), Take by mouth once daily., Disp: , Rfl:     ezetimibe (ZETIA) 10 mg tablet, Take 1 tablet (10 mg total) by mouth once daily., Disp: 90 tablet, Rfl: 3    oxyCODONE-acetaminophen (PERCOCET) 5-325 mg per tablet, Take 1 tablet by mouth every 4 (four) hours as needed for Pain. (Patient not taking: Reported on 6/22/2020), Disp: 25 tablet, Rfl: 0          Review of Systems   Constitution: Negative for decreased appetite, diaphoresis, fever, malaise/fatigue, weight gain and weight loss.   HENT: Negative for congestion, ear discharge, ear pain and nosebleeds.    Eyes: Negative for blurred vision, double vision and visual disturbance.   Cardiovascular: Negative for chest pain, claudication, cyanosis, dyspnea on exertion, irregular heartbeat, leg swelling, near-syncope, orthopnea, palpitations, paroxysmal nocturnal dyspnea and syncope.   Respiratory: Negative for cough, hemoptysis, shortness of breath, sleep disturbances due to breathing, snoring, sputum production and wheezing.    Endocrine: Negative for polydipsia, polyphagia and polyuria.   Hematologic/Lymphatic: Negative for adenopathy and bleeding problem. Does not bruise/bleed easily.   Skin: Negative for color change, nail changes, poor wound healing and rash.   Musculoskeletal: Negative for muscle cramps and muscle weakness.   Gastrointestinal: Negative for abdominal pain, anorexia, change in bowel habit, hematochezia, nausea and vomiting.   Genitourinary: Negative for dysuria, frequency and hematuria.   Neurological: Negative for brief paralysis, difficulty with concentration, excessive daytime sleepiness, dizziness, focal weakness, headaches, light-headedness, seizures, vertigo and weakness.   Psychiatric/Behavioral: Negative for altered mental status and depression.   Allergic/Immunologic: Negative for persistent infections.       "  Objective:/77   Pulse 65   Ht 5' 7" (1.702 m)   Wt 70.1 kg (154 lb 8.7 oz)   SpO2 99%   BMI 24.20 kg/m²             Physical Exam   Constitutional: He is oriented to person, place, and time. He appears well-developed and well-nourished.   HENT:   Head: Normocephalic.   Right Ear: External ear normal.   Left Ear: External ear normal.   Nose: Nose normal.   Inspection of lips, teeth and gums normal   Eyes: Pupils are equal, round, and reactive to light. EOM are normal. No scleral icterus.   Neck: Normal range of motion. Neck supple. No JVD present. No tracheal deviation present. No thyromegaly present.   Cardiovascular: Normal rate, regular rhythm and intact distal pulses. Exam reveals no gallop and no friction rub.   No murmur heard.  Pulses:       Dorsalis pedis pulses are 2+ on the right side and 2+ on the left side.   Pulmonary/Chest: Effort normal and breath sounds normal.       Abdominal: Bowel sounds are normal. He exhibits no distension. There is no hepatosplenomegaly. There is no abdominal tenderness. There is no guarding.   Musculoskeletal: Normal range of motion.         General: No tenderness or edema.   Lymphadenopathy:   Palpation of neck and groin lymph nodes normal   Neurological: He is alert and oriented to person, place, and time. No cranial nerve deficit. He exhibits normal muscle tone. Coordination normal.   Skin: Skin is dry.   Palpation of skin normal   Psychiatric: His behavior is normal. Judgment and thought content normal.         Assessment:       1. Coronary artery disease involving native coronary artery of native heart without angina pectoris    2. Coronary artery disease involving native coronary artery of native heart, angina presence unspecified    3. S/P CABG (coronary artery bypass graft)    4. Pure hypercholesterolemia         Plan:       Michael CURIEL was seen today for heart problem.    Diagnoses and all orders for this visit:    Coronary artery disease involving native " coronary artery of native heart without angina pectoris  -     ezetimibe (ZETIA) 10 mg tablet; Take 1 tablet (10 mg total) by mouth once daily.  -     CBC auto differential; Future; Expected date: 06/22/2021    Coronary artery disease involving native coronary artery of native heart, angina presence unspecified  -     Comprehensive metabolic panel; Future; Expected date: 06/22/2021  -     Lipid Panel; Future; Expected date: 06/22/2021  -     Lipid Panel; Future; Expected date: 08/03/2020    S/P CABG (coronary artery bypass graft)    Pure hypercholesterolemia  -     ezetimibe (ZETIA) 10 mg tablet; Take 1 tablet (10 mg total) by mouth once daily.  -     Lipid Panel; Future; Expected date: 06/22/2021  -     Lipid Panel; Future; Expected date: 08/03/2020

## 2020-06-22 NOTE — PROGRESS NOTES
Subjective:    Patient ID:  Michael Espinoza is a 64 y.o. male who presents for follow-up of CAD    HPI  Lab Results   Component Value Date     12/10/2019    K 4.3 12/10/2019     12/10/2019    CO2 28 12/10/2019    BUN 13 12/10/2019    CREATININE 0.9 12/10/2019    GLU 87 12/10/2019    HGBA1C 5.4 05/10/2018    MG 1.9 05/19/2018    AST 26 08/09/2018    ALT 29 08/09/2018    ALBUMIN 3.9 08/09/2018    PROT 6.8 08/09/2018    BILITOT 1.7 (H) 08/09/2018    WBC 4.77 12/10/2019    HGB 15.7 12/10/2019    HCT 46.4 12/10/2019    HCT 36 05/14/2018    MCV 92 12/10/2019     12/10/2019    INR 1.1 05/19/2018    PSA 0.34 08/22/2019    TSH 2.439 03/21/2018         Lab Results   Component Value Date    CHOL 145 11/04/2019    HDL 51 11/04/2019    TRIG 57 11/04/2019       Lab Results   Component Value Date    LDLCALC 82.6 11/04/2019       Past Medical History:   Diagnosis Date    Coronary artery disease of native artery of native heart with stable angina pectoris 4/10/2018    Hyperlipidemia        Current Outpatient Medications:     aspirin (ECOTRIN) 81 MG EC tablet, Take 81 mg by mouth 2 (two) times daily., Disp: , Rfl:     atorvastatin (LIPITOR) 80 MG tablet, Take 1 tablet (80 mg total) by mouth once daily., Disp: 90 tablet, Rfl: 3    flu vac ts 2013,18yr+,cell,PF, (FLULAVAL) 45 mcg (15 mcg x 3)/0.5 mL Syrg, Inject 0.5 mLs into the muscle., Disp: , Rfl:     meloxicam (MOBIC) 15 MG tablet, TAKE 1 TABLET(15 MG) BY MOUTH EVERY DAY, Disp: 90 tablet, Rfl: 0    multivit-min/FA/lycopen/lutein (CENTRUM SILVER MEN ORAL), Take 1 tablet by mouth once daily., Disp: , Rfl:     cyclobenzaprine (FLEXERIL) 5 MG tablet, Take 1 tablet (5 mg total) by mouth 3 (three) times daily as needed for Muscle spasms. (Patient not taking: Reported on 6/22/2020), Disp: 21 tablet, Rfl: 0    ERGOCALCIFEROL, VITAMIN D2, (VITAMIN D ORAL), Take by mouth once daily., Disp: , Rfl:     oxyCODONE-acetaminophen (PERCOCET) 5-325 mg per tablet, Take 1  "tablet by mouth every 4 (four) hours as needed for Pain. (Patient not taking: Reported on 6/22/2020), Disp: 25 tablet, Rfl: 0          ROS     Objective:/77   Pulse 65   Ht 5' 7" (1.702 m)   Wt 70.1 kg (154 lb 8.7 oz)   SpO2 99%   BMI 24.20 kg/m²             Physical Exam      Assessment:       No diagnosis found.     Plan:       There are no diagnoses linked to this encounter.              "

## 2020-07-23 RX ORDER — ATORVASTATIN CALCIUM 80 MG/1
80 TABLET, FILM COATED ORAL DAILY
Qty: 90 TABLET | Refills: 3 | Status: SHIPPED | OUTPATIENT
Start: 2020-07-23 | End: 2021-07-23 | Stop reason: SDUPTHER

## 2020-08-03 ENCOUNTER — LAB VISIT (OUTPATIENT)
Dept: LAB | Facility: HOSPITAL | Age: 65
End: 2020-08-03
Attending: INTERNAL MEDICINE
Payer: MEDICARE

## 2020-08-03 DIAGNOSIS — E78.00 PURE HYPERCHOLESTEROLEMIA: ICD-10-CM

## 2020-08-03 DIAGNOSIS — I25.10 CORONARY ARTERY DISEASE INVOLVING NATIVE CORONARY ARTERY OF NATIVE HEART, ANGINA PRESENCE UNSPECIFIED: ICD-10-CM

## 2020-08-03 LAB
CHOLEST SERPL-MCNC: 130 MG/DL (ref 120–199)
CHOLEST/HDLC SERPL: 2.5 {RATIO} (ref 2–5)
HDLC SERPL-MCNC: 51 MG/DL (ref 40–75)
HDLC SERPL: 39.2 % (ref 20–50)
LDLC SERPL CALC-MCNC: 67 MG/DL (ref 63–159)
NONHDLC SERPL-MCNC: 79 MG/DL
TRIGL SERPL-MCNC: 60 MG/DL (ref 30–150)

## 2020-08-03 PROCEDURE — 36415 COLL VENOUS BLD VENIPUNCTURE: CPT

## 2020-08-03 PROCEDURE — 80061 LIPID PANEL: CPT

## 2020-09-19 ENCOUNTER — OFFICE VISIT (OUTPATIENT)
Dept: URGENT CARE | Facility: CLINIC | Age: 65
End: 2020-09-19
Payer: MEDICARE

## 2020-09-19 VITALS
DIASTOLIC BLOOD PRESSURE: 80 MMHG | HEART RATE: 99 BPM | RESPIRATION RATE: 20 BRPM | HEIGHT: 67 IN | TEMPERATURE: 98 F | SYSTOLIC BLOOD PRESSURE: 118 MMHG | BODY MASS INDEX: 25.11 KG/M2 | WEIGHT: 160 LBS | OXYGEN SATURATION: 95 %

## 2020-09-19 DIAGNOSIS — M70.22 OLECRANON BURSITIS OF LEFT ELBOW: Primary | ICD-10-CM

## 2020-09-19 PROCEDURE — 73080 XR ELBOW COMPLETE 3 VIEW LEFT: ICD-10-PCS | Mod: LT,S$GLB,, | Performed by: RADIOLOGY

## 2020-09-19 PROCEDURE — 73080 X-RAY EXAM OF ELBOW: CPT | Mod: LT,S$GLB,, | Performed by: RADIOLOGY

## 2020-09-19 PROCEDURE — 99214 OFFICE O/P EST MOD 30 MIN: CPT | Mod: S$GLB,,, | Performed by: FAMILY MEDICINE

## 2020-09-19 PROCEDURE — 99214 PR OFFICE/OUTPT VISIT, EST, LEVL IV, 30-39 MIN: ICD-10-PCS | Mod: S$GLB,,, | Performed by: FAMILY MEDICINE

## 2020-09-19 NOTE — PATIENT INSTRUCTIONS
Bursitis of the Elbow (Olecranon)  Your elbow joint contains a small fluid-filled sac called a bursa. The bursa helps the muscles and tendons move smoothly over the bone. It also cushions and protects your elbow. Bursitis is when the bursa is inflamed or swollen. This is most often due to overuse of or injury to the elbow. Symptoms include swelling and pain. If the elbow is red and feels warm to the touch, the bursa itself may be infected.  In most cases, elbow bursitis resolves with medicine and self-care at home. It may take several weeks for the bursa to heal and the swelling to go away. In some cases, your healthcare provider may drain excess fluid from the bursa. Or, he or she may inject medicine directly into the bursa to help relieve symptoms. In severe cases, you may need surgery to remove the bursa may. If there is concern that the bursa is infected, your healthcare provider may prescribe antibiotics to treat the infection.    Home care  Your healthcare provider may prescribe medicine to help relieve pain and swelling. This may be an over-the-counter pain reliever or prescription pain medicine. Take all medicines as directed. To help treat or prevent infection, your provider may prescribe antibiotics. If these are prescribed, take them as directed until they are gone.  The following are general care guidelines:  · Apply an ice pack or bag of frozen peas wrapped in a thin towel to your elbow for 15 to 20 minutes at a time. Do this 3 to 4 times a day until pain and swelling improve.  · Keep your elbow raised above the level of your heart whenever possible. This helps reduce swelling. When sitting or lying down, place your arm on a pillow that rests on your chest or on a pillow at your side.  · Use an elastic wrap around the elbow joint to compress the area while it is healing. Make the wrap snug but not tight to the point of causing pain.  · Rest your elbow to give it time to heal. You may need to wear an  elbow pad to help protect and limit the movement of your elbow. During and after healing, avoid leaning on your elbows.  Follow-up care  Follow up with your healthcare provider, or as advised. If you have been referred to a specialist, make that appointment promptly.  When to seek medical advice  Call your healthcare provider right away if any of these occur:  · Fever of 100.4°F (38°C) or higher, or as advised  · Chills  · Increased pain, swelling, warmth, redness, or drainage from the joint  · Trouble moving the elbow joint  · Numbness or tingling in the hand  · Severe pain or swelling in forearm or hand  · Loss of pink color and slow return of color after squeezing fingertip or hand  Date Last Reviewed: 6/1/2016  © 9335-1952 [employer]. [address] [place] [address], [place] [address]. All rights reserved. This information is not intended as a substitute for professional medical care. Always follow your healthcare professional's instructions.

## 2020-09-19 NOTE — PROGRESS NOTES
"Subjective:       Patient ID: Michael Espinoza is a 65 y.o. male.    Vitals:  height is 5' 7" (1.702 m) and weight is 72.6 kg (160 lb). His temperature is 97.9 °F (36.6 °C). His blood pressure is 118/80 and his pulse is 99. His respiration is 20 and oxygen saturation is 95%.     Chief Complaint: Joint Swelling (left elbow)    This is a 65 y.o. male   with Past Medical History:  4/10/2018: Coronary artery disease of native artery of native heart   with stable angina pectoris  No date: Hyperlipidemia   and Past Surgical History:  No date: CYST REMOVAL  No date: TONSILLECTOMY  who presents today with a chief complaint of left elbow swelling that began two days ago. He's complaining of redness, pain and states the area is warm to the touch. He hasn't taken any medication to help relieve his symptoms.     Elbow Injury  This is a new problem. The current episode started in the past 7 days. The problem occurs constantly. The problem has been gradually worsening. Associated symptoms include joint swelling. Pertinent negatives include no arthralgias, chest pain, chills, congestion, coughing, fatigue, fever, headaches, myalgias, nausea, rash, sore throat, vertigo or vomiting. Nothing aggravates the symptoms. He has tried nothing for the symptoms.       Constitution: Negative for chills, fatigue and fever.   HENT: Negative for congestion and sore throat.    Neck: Negative for painful lymph nodes.   Cardiovascular: Negative for chest pain and leg swelling.   Eyes: Negative for double vision and blurred vision.   Respiratory: Negative for cough and shortness of breath.    Gastrointestinal: Negative for nausea, vomiting and diarrhea.   Genitourinary: Negative for dysuria, frequency and urgency.   Musculoskeletal: Positive for joint swelling. Negative for joint pain, muscle cramps and muscle ache.   Skin: Positive for color change. Negative for pale and rash.   Allergic/Immunologic: Negative for seasonal allergies.   Neurological: " Negative for dizziness, history of vertigo, light-headedness, passing out and headaches.   Hematologic/Lymphatic: Negative for swollen lymph nodes, easy bruising/bleeding and history of blood clots. Does not bruise/bleed easily.   Psychiatric/Behavioral: Negative for nervous/anxious, sleep disturbance and depression. The patient is not nervous/anxious.        Objective:      Physical Exam   Constitutional: He appears ill. No distress. normal  Neck: Normal range of motion. Neck supple.   Cardiovascular: Normal rate, regular rhythm, normal heart sounds and normal pulses.   Pulmonary/Chest: Effort normal and breath sounds normal.   Abdominal: Soft. Normal appearance.   Musculoskeletal:         General: Swelling (left elbow, mild erythema, spongy. tender), tenderness and deformity present.   Neurological: He is alert.   Nursing note and vitals reviewed.        Assessment:       1. Olecranon bursitis of left elbow        Plan:         Olecranon bursitis of left elbow  -     X-Ray Elbow Complete 3 view Left; Future; Expected date: 09/19/2020    splint, ice, sleeve. Rest. RTC prn worsening symptoms

## 2020-09-27 ENCOUNTER — OFFICE VISIT (OUTPATIENT)
Dept: URGENT CARE | Facility: CLINIC | Age: 65
End: 2020-09-27
Payer: MEDICARE

## 2020-09-27 VITALS
WEIGHT: 160 LBS | HEART RATE: 73 BPM | DIASTOLIC BLOOD PRESSURE: 82 MMHG | HEIGHT: 67 IN | OXYGEN SATURATION: 96 % | BODY MASS INDEX: 25.11 KG/M2 | TEMPERATURE: 97 F | SYSTOLIC BLOOD PRESSURE: 123 MMHG

## 2020-09-27 DIAGNOSIS — M70.22 OLECRANON BURSITIS OF LEFT ELBOW: Primary | ICD-10-CM

## 2020-09-27 PROCEDURE — 99214 OFFICE O/P EST MOD 30 MIN: CPT | Mod: S$GLB,,, | Performed by: PHYSICIAN ASSISTANT

## 2020-09-27 PROCEDURE — 99214 PR OFFICE/OUTPT VISIT, EST, LEVL IV, 30-39 MIN: ICD-10-PCS | Mod: S$GLB,,, | Performed by: PHYSICIAN ASSISTANT

## 2020-09-27 RX ORDER — SULFAMETHOXAZOLE AND TRIMETHOPRIM 800; 160 MG/1; MG/1
1 TABLET ORAL 2 TIMES DAILY
Qty: 14 TABLET | Refills: 0 | Status: SHIPPED | OUTPATIENT
Start: 2020-09-27 | End: 2020-10-04

## 2020-09-27 NOTE — PATIENT INSTRUCTIONS
Bursitis of the Elbow (Olecranon)  Your elbow joint contains a small fluid-filled sac called a bursa. The bursa helps the muscles and tendons move smoothly over the bone. It also cushions and protects your elbow. Bursitis is when the bursa is inflamed or swollen. This is most often due to overuse of or injury to the elbow. Symptoms include swelling and pain. If the elbow is red and feels warm to the touch, the bursa itself may be infected.  In most cases, elbow bursitis resolves with medicine and self-care at home. It may take several weeks for the bursa to heal and the swelling to go away. In some cases, your healthcare provider may drain excess fluid from the bursa. Or, he or she may inject medicine directly into the bursa to help relieve symptoms. In severe cases, you may need surgery to remove the bursa may. If there is concern that the bursa is infected, your healthcare provider may prescribe antibiotics to treat the infection.    Home care  Your healthcare provider may prescribe medicine to help relieve pain and swelling. This may be an over-the-counter pain reliever or prescription pain medicine. Take all medicines as directed. To help treat or prevent infection, your provider may prescribe antibiotics. If these are prescribed, take them as directed until they are gone.  The following are general care guidelines:  · Apply an ice pack or bag of frozen peas wrapped in a thin towel to your elbow for 15 to 20 minutes at a time. Do this 3 to 4 times a day until pain and swelling improve.  · Keep your elbow raised above the level of your heart whenever possible. This helps reduce swelling. When sitting or lying down, place your arm on a pillow that rests on your chest or on a pillow at your side.  · Use an elastic wrap around the elbow joint to compress the area while it is healing. Make the wrap snug but not tight to the point of causing pain.  · Rest your elbow to give it time to heal. You may need to wear an  elbow pad to help protect and limit the movement of your elbow. During and after healing, avoid leaning on your elbows.  Follow-up care  Follow up with your healthcare provider, or as advised. If you have been referred to a specialist, make that appointment promptly.  When to seek medical advice  Call your healthcare provider right away if any of these occur:  · Fever of 100.4°F (38°C) or higher, or as advised  · Chills  · Increased pain, swelling, warmth, redness, or drainage from the joint  · Trouble moving the elbow joint  · Numbness or tingling in the hand  · Severe pain or swelling in forearm or hand  · Loss of pink color and slow return of color after squeezing fingertip or hand  Date Last Reviewed: 6/1/2016  © 7558-2309 [employer]. [address] [place] [address], [place] [address]. All rights reserved. This information is not intended as a substitute for professional medical care. Always follow your healthcare professional's instructions.      Please follow up with your Primary care provider within 2-5 days if your signs and symptoms have not resolved or worsen.     If your condition worsens or fails to improve we recommend that you receive another evaluation at the emergency room immediately or contact your primary medical clinic to discuss your concerns.   You must understand that you have received an Urgent Care treatment only and that you may be released before all of your medical problems are known or treated. You, the patient, will arrange for follow up care as instructed.     RED FLAGS/WARNING SYMPTOMS DISCUSSED WITH PATIENT THAT WOULD WARRANT EMERGENT MEDICAL ATTENTION. PATIENT VERBALIZED UNDERSTANDING.

## 2020-09-27 NOTE — PROGRESS NOTES
"Subjective:       Patient ID: Michael Espinoza is a 65 y.o. male.    Vitals:  height is 5' 7" (1.702 m) and weight is 72.6 kg (160 lb). His temperature is 97.4 °F (36.3 °C). His blood pressure is 123/82 and his pulse is 73. His oxygen saturation is 96%.     Chief Complaint: Elbow Pain (left elbow )    This is a 65 y.o. male who presents today with a chief complaint of left elbow pain due to bursitis pt was seen on 09/19/2020 treated by . pt still experiencing pain         Elbow Pain  This is a new problem. The current episode started 1 to 4 weeks ago. The problem occurs constantly. The problem has been gradually worsening. Associated symptoms include arthralgias and joint swelling. Pertinent negatives include no chest pain, chills, congestion, coughing, fatigue, fever, headaches, myalgias, nausea, rash, sore throat, vertigo or vomiting. The symptoms are aggravated by bending (general moving of left arm ). He has tried immobilization and NSAIDs for the symptoms. The treatment provided no relief.       Constitution: Negative for chills, fatigue and fever.   HENT: Negative for congestion and sore throat.    Neck: Negative for painful lymph nodes.   Cardiovascular: Negative for chest pain and leg swelling.   Eyes: Negative for double vision and blurred vision.   Respiratory: Negative for cough and shortness of breath.    Gastrointestinal: Negative for nausea, vomiting and diarrhea.   Genitourinary: Negative for dysuria, frequency and urgency.   Musculoskeletal: Positive for joint pain, joint swelling and abnormal ROM of joint. Negative for muscle cramps and muscle ache.   Skin: Negative for color change, pale and rash.   Allergic/Immunologic: Negative for seasonal allergies.   Neurological: Negative for dizziness, history of vertigo, light-headedness, passing out and headaches.   Hematologic/Lymphatic: Negative for swollen lymph nodes, easy bruising/bleeding and history of blood clots. Does not bruise/bleed " easily.   Psychiatric/Behavioral: Negative for nervous/anxious, sleep disturbance and depression. The patient is not nervous/anxious.        Objective:      Physical Exam   Constitutional: He is oriented to person, place, and time. He appears well-developed. He is cooperative.  Non-toxic appearance. He does not appear ill. No distress.   HENT:   Head: Normocephalic and atraumatic.   Ears:   Right Ear: Hearing, tympanic membrane, external ear and ear canal normal.   Left Ear: Hearing, tympanic membrane, external ear and ear canal normal.   Nose: Nose normal. No mucosal edema, rhinorrhea or nasal deformity. No epistaxis. Right sinus exhibits no maxillary sinus tenderness and no frontal sinus tenderness. Left sinus exhibits no maxillary sinus tenderness and no frontal sinus tenderness.   Mouth/Throat: Uvula is midline, oropharynx is clear and moist and mucous membranes are normal. No trismus in the jaw. Normal dentition. No uvula swelling. No posterior oropharyngeal erythema.   Eyes: Conjunctivae and lids are normal. Right eye exhibits no discharge. Left eye exhibits no discharge. No scleral icterus.   Neck: Trachea normal, normal range of motion, full passive range of motion without pain and phonation normal. Neck supple.   Cardiovascular: Normal rate, regular rhythm, normal heart sounds and normal pulses.   Pulmonary/Chest: Effort normal and breath sounds normal. No respiratory distress.   Abdominal: Soft. Normal appearance and bowel sounds are normal. He exhibits no distension, no pulsatile midline mass and no mass. There is no abdominal tenderness.   Musculoskeletal:         General: No deformity.      Left elbow: He exhibits swelling. He exhibits normal range of motion, no effusion, no deformity and no laceration. Tenderness found. Olecranon process tenderness noted. No radial head, no medial epicondyle and no lateral epicondyle tenderness noted.      Left upper arm: He exhibits no tenderness, no bony tenderness,  no swelling, no edema, no deformity and no laceration.      Left forearm: He exhibits no tenderness, no bony tenderness, no swelling, no edema, no deformity and no laceration.        Arms:    Neurological: He is alert and oriented to person, place, and time. He exhibits normal muscle tone. Coordination normal.   Skin: Skin is warm, dry, intact, not diaphoretic and not pale. Psychiatric: His speech is normal and behavior is normal. Judgment and thought content normal.   Nursing note and vitals reviewed.        Assessment:       1. Olecranon bursitis of left elbow        Plan:         Olecranon bursitis of left elbow  -     Ambulatory referral/consult to Orthopedics    Other orders  -     sulfamethoxazole-trimethoprim 800-160mg (BACTRIM DS) 800-160 mg Tab; Take 1 tablet by mouth 2 (two) times daily. for 7 days  Dispense: 14 tablet; Refill: 0    Discussed diagnosis with patient as well as treatment and home care. Discussed return to clinic precautions vs ER precautions. All patients questions answered. Patient verbalized understanding. Patient agreed with plan of care.         Bursitis of the Elbow (Olecranon)  Your elbow joint contains a small fluid-filled sac called a bursa. The bursa helps the muscles and tendons move smoothly over the bone. It also cushions and protects your elbow. Bursitis is when the bursa is inflamed or swollen. This is most often due to overuse of or injury to the elbow. Symptoms include swelling and pain. If the elbow is red and feels warm to the touch, the bursa itself may be infected.  In most cases, elbow bursitis resolves with medicine and self-care at home. It may take several weeks for the bursa to heal and the swelling to go away. In some cases, your healthcare provider may drain excess fluid from the bursa. Or, he or she may inject medicine directly into the bursa to help relieve symptoms. In severe cases, you may need surgery to remove the bursa may. If there is concern that the bursa  is infected, your healthcare provider may prescribe antibiotics to treat the infection.    Home care  Your healthcare provider may prescribe medicine to help relieve pain and swelling. This may be an over-the-counter pain reliever or prescription pain medicine. Take all medicines as directed. To help treat or prevent infection, your provider may prescribe antibiotics. If these are prescribed, take them as directed until they are gone.  The following are general care guidelines:  · Apply an ice pack or bag of frozen peas wrapped in a thin towel to your elbow for 15 to 20 minutes at a time. Do this 3 to 4 times a day until pain and swelling improve.  · Keep your elbow raised above the level of your heart whenever possible. This helps reduce swelling. When sitting or lying down, place your arm on a pillow that rests on your chest or on a pillow at your side.  · Use an elastic wrap around the elbow joint to compress the area while it is healing. Make the wrap snug but not tight to the point of causing pain.  · Rest your elbow to give it time to heal. You may need to wear an elbow pad to help protect and limit the movement of your elbow. During and after healing, avoid leaning on your elbows.  Follow-up care  Follow up with your healthcare provider, or as advised. If you have been referred to a specialist, make that appointment promptly.  When to seek medical advice  Call your healthcare provider right away if any of these occur:  · Fever of 100.4°F (38°C) or higher, or as advised  · Chills  · Increased pain, swelling, warmth, redness, or drainage from the joint  · Trouble moving the elbow joint  · Numbness or tingling in the hand  · Severe pain or swelling in forearm or hand  · Loss of pink color and slow return of color after squeezing fingertip or hand  Date Last Reviewed: 6/1/2016  © 3508-0434 [employer]. [address] [place] [address], [place] [address]. All rights reserved. This information is not intended  as a substitute for professional medical care. Always follow your healthcare professional's instructions.      Please follow up with your Primary care provider within 2-5 days if your signs and symptoms have not resolved or worsen.     If your condition worsens or fails to improve we recommend that you receive another evaluation at the emergency room immediately or contact your primary medical clinic to discuss your concerns.   You must understand that you have received an Urgent Care treatment only and that you may be released before all of your medical problems are known or treated. You, the patient, will arrange for follow up care as instructed.     RED FLAGS/WARNING SYMPTOMS DISCUSSED WITH PATIENT THAT WOULD WARRANT EMERGENT MEDICAL ATTENTION. PATIENT VERBALIZED UNDERSTANDING.

## 2020-09-29 ENCOUNTER — OFFICE VISIT (OUTPATIENT)
Dept: ORTHOPEDICS | Facility: CLINIC | Age: 65
End: 2020-09-29
Payer: MEDICARE

## 2020-09-29 VITALS
HEIGHT: 67 IN | DIASTOLIC BLOOD PRESSURE: 78 MMHG | WEIGHT: 160 LBS | BODY MASS INDEX: 25.11 KG/M2 | SYSTOLIC BLOOD PRESSURE: 135 MMHG | HEART RATE: 75 BPM

## 2020-09-29 DIAGNOSIS — M70.21 OLECRANON BURSITIS OF RIGHT ELBOW: Primary | ICD-10-CM

## 2020-09-29 PROCEDURE — 99999 PR PBB SHADOW E&M-EST. PATIENT-LVL III: CPT | Mod: PBBFAC,,, | Performed by: ORTHOPAEDIC SURGERY

## 2020-09-29 PROCEDURE — 1101F PR PT FALLS ASSESS DOC 0-1 FALLS W/OUT INJ PAST YR: ICD-10-PCS | Mod: CPTII,S$GLB,, | Performed by: ORTHOPAEDIC SURGERY

## 2020-09-29 PROCEDURE — 99203 OFFICE O/P NEW LOW 30 MIN: CPT | Mod: S$GLB,,, | Performed by: ORTHOPAEDIC SURGERY

## 2020-09-29 PROCEDURE — 3008F BODY MASS INDEX DOCD: CPT | Mod: CPTII,S$GLB,, | Performed by: ORTHOPAEDIC SURGERY

## 2020-09-29 PROCEDURE — 99999 PR PBB SHADOW E&M-EST. PATIENT-LVL III: ICD-10-PCS | Mod: PBBFAC,,, | Performed by: ORTHOPAEDIC SURGERY

## 2020-09-29 PROCEDURE — 99203 PR OFFICE/OUTPT VISIT, NEW, LEVL III, 30-44 MIN: ICD-10-PCS | Mod: S$GLB,,, | Performed by: ORTHOPAEDIC SURGERY

## 2020-09-29 PROCEDURE — 3008F PR BODY MASS INDEX (BMI) DOCUMENTED: ICD-10-PCS | Mod: CPTII,S$GLB,, | Performed by: ORTHOPAEDIC SURGERY

## 2020-09-29 PROCEDURE — 1101F PT FALLS ASSESS-DOCD LE1/YR: CPT | Mod: CPTII,S$GLB,, | Performed by: ORTHOPAEDIC SURGERY

## 2020-09-29 RX ORDER — SULFAMETHOXAZOLE AND TRIMETHOPRIM 800; 160 MG/1; MG/1
1 TABLET ORAL 2 TIMES DAILY
Status: SHIPPED | OUTPATIENT
Start: 2020-10-05 | End: 2020-10-12

## 2020-09-29 NOTE — PROGRESS NOTES
[facility]  History & Physical  Orthopedics    SUBJECTIVE:        Chief Complaint: left elbow pain    Referring Provider: Cha Mendoza PA-C     History of Present Illness:  Patient is a 65 y.o. right hand dominant male who presents today with complaints of left elbow pain, redness and swelling. He states this began 2.5 weeks ago. Initially diagnosed with non septic olecranon bursitis. Swelling and inflammation were worsening so he returned to  at which time septic bursitis diagnosis was made. He was started on Bactrim 3 days ago and reports significant improvement in appearance of redness and swelling. He denies history of similar. He denies pain elsewhere.     The patient is a/an teacher    Onset of symptoms/DOI was 2.5 weeks.    Symptoms are aggravated by activity and movement.    Symptoms are alleviated by rest.    Symptoms consist of pain, swelling and erythema.    The patient rates their pain as a 3/10.    Attempted treatment(s) and/or interventions include rest, activity modification and antibiotic therapy.     The patient denies any fevers, chills, N/V, D/C and presents for evaluation.       Past Medical History:   Diagnosis Date    Coronary artery disease of native artery of native heart with stable angina pectoris 4/10/2018    Hyperlipidemia      Past Surgical History:   Procedure Laterality Date    CYST REMOVAL      TONSILLECTOMY       Review of patient's allergies indicates:  No Known Allergies  Social History     Social History Narrative    Not on file     Family History   Problem Relation Age of Onset    Hyperlipidemia Brother     Hypertension Brother     Heart disease Brother     Heart attack Brother     Diabetes Brother          Current Outpatient Medications:     aspirin (ECOTRIN) 81 MG EC tablet, Take 81 mg by mouth 2 (two) times daily., Disp: , Rfl:     atorvastatin (LIPITOR) 80 MG tablet, Take 1 tablet (80 mg total) by mouth once daily., Disp: 90 tablet,  "Rfl: 3    cyclobenzaprine (FLEXERIL) 5 MG tablet, Take 1 tablet (5 mg total) by mouth 3 (three) times daily as needed for Muscle spasms., Disp: 21 tablet, Rfl: 0    ezetimibe (ZETIA) 10 mg tablet, Take 1 tablet (10 mg total) by mouth once daily., Disp: 90 tablet, Rfl: 3    flu vac ts 2013,18yr+,cell,PF, (FLULAVAL) 45 mcg (15 mcg x 3)/0.5 mL Syrg, Inject 0.5 mLs into the muscle., Disp: , Rfl:     meloxicam (MOBIC) 15 MG tablet, TAKE 1 TABLET(15 MG) BY MOUTH EVERY DAY, Disp: 90 tablet, Rfl: 0    multivit-min/FA/lycopen/lutein (CENTRUM SILVER MEN ORAL), Take 1 tablet by mouth once daily., Disp: , Rfl:     oxyCODONE-acetaminophen (PERCOCET) 5-325 mg per tablet, Take 1 tablet by mouth every 4 (four) hours as needed for Pain., Disp: 25 tablet, Rfl: 0    sulfamethoxazole-trimethoprim 800-160mg (BACTRIM DS) 800-160 mg Tab, Take 1 tablet by mouth 2 (two) times daily. for 7 days, Disp: 14 tablet, Rfl: 0    ERGOCALCIFEROL, VITAMIN D2, (VITAMIN D ORAL), Take by mouth once daily., Disp: , Rfl:     Current Facility-Administered Medications:     [START ON 10/5/2020] sulfamethoxazole-trimethoprim 800-160mg per tablet 1 tablet, 1 tablet, Oral, BID, Bert Mitchell MD      Review of Systems:  Constitutional: no fever or chills  Eyes: no visual changes  ENT: no nasal congestion or sore throat  Respiratory: no cough or shortness of breath  Cardiovascular: no chest pain  Gastrointestinal: no nausea or vomiting, tolerating diet  Musculoskeletal: pain and redness, swelling    OBJECTIVE:      Vital Signs (Most Recent):  Vitals:    09/29/20 1027   BP: 135/78   Pulse: 75   Weight: 72.6 kg (160 lb)   Height: 5' 7" (1.702 m)     Body mass index is 25.06 kg/m².      Physical Exam:  Constitutional: The patient appears well-developed and well-nourished. No distress.   Head: Normocephalic and atraumatic.   Nose: Nose normal.   Eyes: Conjunctivae and EOM are normal.   Neck: No tracheal deviation present.   Cardiovascular: Normal rate " and intact distal pulses.    Pulmonary/Chest: Effort normal. No respiratory distress.   Abdominal: There is no guarding.   Neurological: The patient is alert.   Psychiatric: The patient has a normal mood and affect.     Left Hand/Wrist Examination:    Observation/Inspection:  Swelling and redness over the left olecranon bursa with skin peeling over the elbow  Deformity  none  Discoloration  redness    Scars   none    Atrophy  none    Palpation  TTP over olecranon bursa    Neurovascular Exam:  Digits WWP, brisk CR < 3s throughout  NVI motor/LTS to M/R/U nerves, radial pulse 2+    ROM  ROM hand/wrist/elbow full, painless    RRR  CTAB  Abd S/NT/ND +BS    Diagnostic Results:     Xray - L elbow 9/19/20 shows soft tissue swelling over the olecranon. No fracture or dislocation. No joint effusion.    ASSESSMENT/PLAN:      65 y.o. yo male with left elbow septic olecranon bursitis    Plan: continue PO bactrim. Provided additional 7 day prescription. Discussed with patient that we will monitor this closely. RTC in 2 days on Thursday. If he continues to improve then he can continue antibiotics until completion. If stagnant improvement or worsening in appearance this Thursday will schedule bursectomy. Patient will also obtain a padded elbow sleeve to protect and rest elbow. 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

## 2020-09-29 NOTE — LETTER
September 30, 2020      Cha Mendoza PA-C  [address]  [facility] [address]           [address]  [facility] [address]  Phone: [phone]          Patient: Michael Espinoza   MR Number: [id number]   YOB: 1955   Date of Visit: 9/29/2020       Dear Cha Mendoza:    Thank you for referring Michael Espinoza to me for evaluation. Attached you will find relevant portions of my assessment and plan of care.    If you have questions, please do not hesitate to call me. I look forward to following Michael Espinoza along with you.    Sincerely,    Cristal Bell MD    Enclosure  CC:  No Recipients    If you would like to receive this communication electronically, please contact [email or web address] or [phone] to request more information on [employer] Online Scheduling Link access.    For providers and/or their staff who would like to refer a patient to [facility], please contact us through our one-stop-shop provider referral line, [facility], at [phone].    If you feel you have received this communication in error or would no longer like to receive these types of communications, please e-mail [email or web address]

## 2020-09-30 ENCOUNTER — PATIENT MESSAGE (OUTPATIENT)
Dept: ORTHOPEDICS | Facility: CLINIC | Age: 65
End: 2020-09-30

## 2020-09-30 NOTE — PROGRESS NOTES
I have personally taken the history and examined this patient. I agree with the resident's note as stated above. Pt does not want a splint or I an D, swelling does look good but erythema present.    65 y.o. yo male with left elbow septic olecranon bursitis     Plan: continue PO bactrim. Provided additional 7 day prescription. Discussed with patient that we will monitor this closely. RTC in 2 days on Thursday. If he continues to improve then he can continue antibiotics until completion. If stagnant improvement or worsening in appearance this Thursday will schedule bursectomy. Patient will also obtain a padded elbow sleeve to protect and rest elbow. 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

## 2020-10-01 ENCOUNTER — OFFICE VISIT (OUTPATIENT)
Dept: ORTHOPEDICS | Facility: CLINIC | Age: 65
End: 2020-10-01
Payer: MEDICARE

## 2020-10-01 VITALS
HEART RATE: 83 BPM | SYSTOLIC BLOOD PRESSURE: 103 MMHG | WEIGHT: 160.06 LBS | HEIGHT: 67 IN | DIASTOLIC BLOOD PRESSURE: 82 MMHG | BODY MASS INDEX: 25.12 KG/M2

## 2020-10-01 DIAGNOSIS — M70.21 OLECRANON BURSITIS OF RIGHT ELBOW: Primary | ICD-10-CM

## 2020-10-01 PROCEDURE — 99213 PR OFFICE/OUTPT VISIT, EST, LEVL III, 20-29 MIN: ICD-10-PCS | Mod: S$GLB,,, | Performed by: ORTHOPAEDIC SURGERY

## 2020-10-01 PROCEDURE — 99999 PR PBB SHADOW E&M-EST. PATIENT-LVL III: ICD-10-PCS | Mod: PBBFAC,,, | Performed by: ORTHOPAEDIC SURGERY

## 2020-10-01 PROCEDURE — 1101F PT FALLS ASSESS-DOCD LE1/YR: CPT | Mod: CPTII,S$GLB,, | Performed by: ORTHOPAEDIC SURGERY

## 2020-10-01 PROCEDURE — 99999 PR PBB SHADOW E&M-EST. PATIENT-LVL III: CPT | Mod: PBBFAC,,, | Performed by: ORTHOPAEDIC SURGERY

## 2020-10-01 PROCEDURE — 3008F PR BODY MASS INDEX (BMI) DOCUMENTED: ICD-10-PCS | Mod: CPTII,S$GLB,, | Performed by: ORTHOPAEDIC SURGERY

## 2020-10-01 PROCEDURE — 99213 OFFICE O/P EST LOW 20 MIN: CPT | Mod: S$GLB,,, | Performed by: ORTHOPAEDIC SURGERY

## 2020-10-01 PROCEDURE — 1101F PR PT FALLS ASSESS DOC 0-1 FALLS W/OUT INJ PAST YR: ICD-10-PCS | Mod: CPTII,S$GLB,, | Performed by: ORTHOPAEDIC SURGERY

## 2020-10-01 PROCEDURE — 3008F BODY MASS INDEX DOCD: CPT | Mod: CPTII,S$GLB,, | Performed by: ORTHOPAEDIC SURGERY

## 2020-10-01 NOTE — LETTER
October 1, 2020      Cha Mendoza PA-C  [address]  [facility] [address]           [address]  [facility] [address]  Phone: [phone]          Patient: Michael Espinoza   MR Number: [id number]   YOB: 1955   Date of Visit: 10/1/2020       Dear Cha Mendoza:    Thank you for referring Michael Espinoza to me for evaluation. Attached you will find relevant portions of my assessment and plan of care.    If you have questions, please do not hesitate to call me. I look forward to following Michael Espinoza along with you.    Sincerely,    Cristal Bell MD    Enclosure  CC:  No Recipients    If you would like to receive this communication electronically, please contact [email or web address] or [phone] to request more information on [employer] Link access.    For providers and/or their staff who would like to refer a patient to [facility], please contact us through our one-stop-shop provider referral line, [facility] [place], at [phone].    If you feel you have received this communication in error or would no longer like to receive these types of communications, please e-mail [email or web address]

## 2020-10-01 NOTE — PROGRESS NOTES
[facility]  History & Physical  Orthopedics    SUBJECTIVE:        Chief Complaint: left elbow pain    Referring Provider: Cha Mendoza PA-C     History of Present Illness:  At last visit:    Patient is a 65 y.o. right hand dominant male who presents today with complaints of left elbow pain, redness and swelling. He states this began 2.5 weeks ago. Initially diagnosed with non septic olecranon bursitis. Swelling and inflammation were worsening so he returned to  at which time septic bursitis diagnosis was made. He was started on Bactrim 3 days ago and reports significant improvement in appearance of redness and swelling. He denies history of similar. He denies pain elsewhere.     The patient is a/an teacher    Onset of symptoms/DOI was 2.5 weeks.    Symptoms are aggravated by activity and movement.    Symptoms are alleviated by rest.    Symptoms consist of pain, swelling and erythema.    The patient rates their pain as a 3/10.    Attempted treatment(s) and/or interventions include rest, activity modification and antibiotic therapy.     The patient denies any fevers, chills, N/V, D/C and presents for evaluation.       Today: Pt has less pain, less swelling ( per pt)- he has 4 more days of ABX. He has worn an elbow pad    Past Medical History:   Diagnosis Date    Coronary artery disease of native artery of native heart with stable angina pectoris 4/10/2018    Hyperlipidemia      Past Surgical History:   Procedure Laterality Date    CYST REMOVAL      TONSILLECTOMY       Review of patient's allergies indicates:  No Known Allergies  Social History     Social History Narrative    Not on file     Family History   Problem Relation Age of Onset    Hyperlipidemia Brother     Hypertension Brother     Heart disease Brother     Heart attack Brother     Diabetes Brother          Current Outpatient Medications:     aspirin (ECOTRIN) 81 MG EC tablet, Take 81 mg by mouth 2 (two) times daily.,  "Disp: , Rfl:     atorvastatin (LIPITOR) 80 MG tablet, Take 1 tablet (80 mg total) by mouth once daily., Disp: 90 tablet, Rfl: 3    cyclobenzaprine (FLEXERIL) 5 MG tablet, Take 1 tablet (5 mg total) by mouth 3 (three) times daily as needed for Muscle spasms., Disp: 21 tablet, Rfl: 0    ezetimibe (ZETIA) 10 mg tablet, Take 1 tablet (10 mg total) by mouth once daily., Disp: 90 tablet, Rfl: 3    flu vac ts 2013,18yr+,cell,PF, (FLULAVAL) 45 mcg (15 mcg x 3)/0.5 mL Syrg, Inject 0.5 mLs into the muscle., Disp: , Rfl:     meloxicam (MOBIC) 15 MG tablet, TAKE 1 TABLET(15 MG) BY MOUTH EVERY DAY, Disp: 90 tablet, Rfl: 0    multivit-min/FA/lycopen/lutein (CENTRUM SILVER MEN ORAL), Take 1 tablet by mouth once daily., Disp: , Rfl:     oxyCODONE-acetaminophen (PERCOCET) 5-325 mg per tablet, Take 1 tablet by mouth every 4 (four) hours as needed for Pain., Disp: 25 tablet, Rfl: 0    sulfamethoxazole-trimethoprim 800-160mg (BACTRIM DS) 800-160 mg Tab, Take 1 tablet by mouth 2 (two) times daily. for 7 days, Disp: 14 tablet, Rfl: 0    ERGOCALCIFEROL, VITAMIN D2, (VITAMIN D ORAL), Take by mouth once daily., Disp: , Rfl:     Current Facility-Administered Medications:     [START ON 10/5/2020] sulfamethoxazole-trimethoprim 800-160mg per tablet 1 tablet, 1 tablet, Oral, BID, Bert Mitchell MD      Review of Systems:  Constitutional: no fever or chills  Eyes: no visual changes  ENT: no nasal congestion or sore throat  Respiratory: no cough or shortness of breath  Cardiovascular: no chest pain  Gastrointestinal: no nausea or vomiting, tolerating diet  Musculoskeletal: pain and redness, swelling    OBJECTIVE:      Vital Signs (Most Recent):  Vitals:    10/01/20 0755   BP: 103/82   Pulse: 83   Weight: 72.6 kg (160 lb 0.9 oz)   Height: 5' 7" (1.702 m)     Body mass index is 25.07 kg/m².      Physical Exam:  Constitutional: The patient appears well-developed and well-nourished. No distress.   Head: Normocephalic and atraumatic. "   Nose: Nose normal.   Eyes: Conjunctivae and EOM are normal.   Neck: No tracheal deviation present.   Cardiovascular: Normal rate and intact distal pulses.    Pulmonary/Chest: Effort normal. No respiratory distress.   Abdominal: There is no guarding.   Neurological: The patient is alert.   Psychiatric: The patient has a normal mood and affect.     Left Hand/Wrist Examination:    Observation/Inspection:  Swelling and redness over the left olecranon bursa with skin peeling over the elbow  Deformity  none  Discoloration  redness    Scars   none    Atrophy  none    Palpation  TTP over olecranon bursa    Neurovascular Exam:  Digits WWP, brisk CR < 3s throughout  NVI motor/LTS to M/R/U nerves, radial pulse 2+    ROM  ROM hand/wrist/elbow full, painless    RRR  CTAB  Abd S/NT/ND +BS    Today: Less edema, swelling, erythema now just at the olecranon, NTTP, FROM elbow.     Diagnostic Results:     Xray - L elbow 9/19/20 shows soft tissue swelling over the olecranon. No fracture or dislocation. No joint effusion.    ASSESSMENT/PLAN:      65 y.o. yo male with left elbow septic olecranon bursitis  F/u next week with wound check, if ok then F/U PRN

## 2020-10-05 ENCOUNTER — PATIENT MESSAGE (OUTPATIENT)
Dept: ADMINISTRATIVE | Facility: HOSPITAL | Age: 65
End: 2020-10-05

## 2020-10-28 ENCOUNTER — PATIENT OUTREACH (OUTPATIENT)
Dept: ADMINISTRATIVE | Facility: HOSPITAL | Age: 65
End: 2020-10-28

## 2020-10-28 ENCOUNTER — PATIENT MESSAGE (OUTPATIENT)
Dept: ADMINISTRATIVE | Facility: HOSPITAL | Age: 65
End: 2020-10-28

## 2020-10-28 DIAGNOSIS — Z12.5 PROSTATE CANCER SCREENING: Primary | ICD-10-CM

## 2020-10-28 DIAGNOSIS — Z12.11 COLON CANCER SCREENING: Primary | ICD-10-CM

## 2020-11-06 ENCOUNTER — LAB VISIT (OUTPATIENT)
Dept: LAB | Facility: HOSPITAL | Age: 65
End: 2020-11-06
Payer: MEDICARE

## 2020-11-06 DIAGNOSIS — E78.00 PURE HYPERCHOLESTEROLEMIA: ICD-10-CM

## 2020-11-06 DIAGNOSIS — Z12.5 PROSTATE CANCER SCREENING: ICD-10-CM

## 2020-11-06 DIAGNOSIS — I25.10 CORONARY ARTERY DISEASE INVOLVING NATIVE CORONARY ARTERY OF NATIVE HEART WITHOUT ANGINA PECTORIS: ICD-10-CM

## 2020-11-06 DIAGNOSIS — I25.10 CORONARY ARTERY DISEASE INVOLVING NATIVE CORONARY ARTERY OF NATIVE HEART, ANGINA PRESENCE UNSPECIFIED: ICD-10-CM

## 2020-11-06 LAB
ALBUMIN SERPL BCP-MCNC: 4 G/DL (ref 3.5–5.2)
ALP SERPL-CCNC: 95 U/L (ref 55–135)
ALT SERPL W/O P-5'-P-CCNC: 37 U/L (ref 10–44)
ANION GAP SERPL CALC-SCNC: 10 MMOL/L (ref 8–16)
AST SERPL-CCNC: 36 U/L (ref 10–40)
BASOPHILS # BLD AUTO: 0.05 K/UL (ref 0–0.2)
BASOPHILS NFR BLD: 1 % (ref 0–1.9)
BILIRUB SERPL-MCNC: 2.2 MG/DL (ref 0.1–1)
BUN SERPL-MCNC: 14 MG/DL (ref 8–23)
CALCIUM SERPL-MCNC: 9.6 MG/DL (ref 8.7–10.5)
CHLORIDE SERPL-SCNC: 105 MMOL/L (ref 95–110)
CHOLEST SERPL-MCNC: 117 MG/DL (ref 120–199)
CHOLEST/HDLC SERPL: 2 {RATIO} (ref 2–5)
CO2 SERPL-SCNC: 26 MMOL/L (ref 23–29)
COMPLEXED PSA SERPL-MCNC: 0.36 NG/ML (ref 0–4)
CREAT SERPL-MCNC: 0.9 MG/DL (ref 0.5–1.4)
DIFFERENTIAL METHOD: NORMAL
EOSINOPHIL # BLD AUTO: 0.1 K/UL (ref 0–0.5)
EOSINOPHIL NFR BLD: 1 % (ref 0–8)
ERYTHROCYTE [DISTWIDTH] IN BLOOD BY AUTOMATED COUNT: 13 % (ref 11.5–14.5)
EST. GFR  (AFRICAN AMERICAN): >60 ML/MIN/1.73 M^2
EST. GFR  (NON AFRICAN AMERICAN): >60 ML/MIN/1.73 M^2
GLUCOSE SERPL-MCNC: 83 MG/DL (ref 70–110)
HCT VFR BLD AUTO: 46 % (ref 40–54)
HDLC SERPL-MCNC: 58 MG/DL (ref 40–75)
HDLC SERPL: 49.6 % (ref 20–50)
HGB BLD-MCNC: 15 G/DL (ref 14–18)
IMM GRANULOCYTES # BLD AUTO: 0.01 K/UL (ref 0–0.04)
IMM GRANULOCYTES NFR BLD AUTO: 0.2 % (ref 0–0.5)
LDLC SERPL CALC-MCNC: 48 MG/DL (ref 63–159)
LYMPHOCYTES # BLD AUTO: 1 K/UL (ref 1–4.8)
LYMPHOCYTES NFR BLD: 19.7 % (ref 18–48)
MCH RBC QN AUTO: 30.6 PG (ref 27–31)
MCHC RBC AUTO-ENTMCNC: 32.6 G/DL (ref 32–36)
MCV RBC AUTO: 94 FL (ref 82–98)
MONOCYTES # BLD AUTO: 0.4 K/UL (ref 0.3–1)
MONOCYTES NFR BLD: 7.2 % (ref 4–15)
NEUTROPHILS # BLD AUTO: 3.6 K/UL (ref 1.8–7.7)
NEUTROPHILS NFR BLD: 70.9 % (ref 38–73)
NONHDLC SERPL-MCNC: 59 MG/DL
NRBC BLD-RTO: 0 /100 WBC
PLATELET # BLD AUTO: 185 K/UL (ref 150–350)
PMV BLD AUTO: 10.7 FL (ref 9.2–12.9)
POTASSIUM SERPL-SCNC: 4.4 MMOL/L (ref 3.5–5.1)
PROT SERPL-MCNC: 7.1 G/DL (ref 6–8.4)
RBC # BLD AUTO: 4.9 M/UL (ref 4.6–6.2)
SODIUM SERPL-SCNC: 141 MMOL/L (ref 136–145)
TRIGL SERPL-MCNC: 55 MG/DL (ref 30–150)
WBC # BLD AUTO: 5.03 K/UL (ref 3.9–12.7)

## 2020-11-06 PROCEDURE — 80053 COMPREHEN METABOLIC PANEL: CPT

## 2020-11-06 PROCEDURE — 84153 ASSAY OF PSA TOTAL: CPT

## 2020-11-06 PROCEDURE — 80061 LIPID PANEL: CPT

## 2020-11-06 PROCEDURE — 85025 COMPLETE CBC W/AUTO DIFF WBC: CPT

## 2020-11-06 PROCEDURE — 36415 COLL VENOUS BLD VENIPUNCTURE: CPT

## 2020-11-07 ENCOUNTER — PATIENT MESSAGE (OUTPATIENT)
Dept: CARDIOLOGY | Facility: CLINIC | Age: 65
End: 2020-11-07

## 2020-11-11 ENCOUNTER — CLINICAL SUPPORT (OUTPATIENT)
Dept: INTERNAL MEDICINE | Facility: CLINIC | Age: 65
End: 2020-11-11
Payer: MEDICARE

## 2020-11-11 ENCOUNTER — OFFICE VISIT (OUTPATIENT)
Dept: INTERNAL MEDICINE | Facility: CLINIC | Age: 65
End: 2020-11-11
Payer: MEDICARE

## 2020-11-11 VITALS
DIASTOLIC BLOOD PRESSURE: 80 MMHG | BODY MASS INDEX: 23.81 KG/M2 | HEART RATE: 67 BPM | WEIGHT: 151.69 LBS | SYSTOLIC BLOOD PRESSURE: 118 MMHG | HEIGHT: 67 IN | OXYGEN SATURATION: 97 %

## 2020-11-11 DIAGNOSIS — Z12.11 COLON CANCER SCREENING: ICD-10-CM

## 2020-11-11 DIAGNOSIS — R07.9 CHEST PAIN, UNSPECIFIED TYPE: ICD-10-CM

## 2020-11-11 DIAGNOSIS — I25.118 CORONARY ARTERY DISEASE OF NATIVE ARTERY OF NATIVE HEART WITH STABLE ANGINA PECTORIS: ICD-10-CM

## 2020-11-11 DIAGNOSIS — E80.4 GILBERT'S SYNDROME: ICD-10-CM

## 2020-11-11 DIAGNOSIS — Z00.00 ROUTINE PHYSICAL EXAMINATION: Primary | ICD-10-CM

## 2020-11-11 DIAGNOSIS — Z95.1 S/P CABG X 3: ICD-10-CM

## 2020-11-11 PROCEDURE — 90670 PNEUMOCOCCAL CONJUGATE VACCINE 13-VALENT LESS THAN 5YO & GREATER THAN: ICD-10-PCS | Mod: S$GLB,,, | Performed by: INTERNAL MEDICINE

## 2020-11-11 PROCEDURE — 99397 PER PM REEVAL EST PAT 65+ YR: CPT | Mod: 25,S$GLB,, | Performed by: INTERNAL MEDICINE

## 2020-11-11 PROCEDURE — 93010 EKG 12-LEAD: ICD-10-PCS | Mod: S$GLB,,, | Performed by: INTERNAL MEDICINE

## 2020-11-11 PROCEDURE — 90670 PCV13 VACCINE IM: CPT | Mod: S$GLB,,, | Performed by: INTERNAL MEDICINE

## 2020-11-11 PROCEDURE — 99999 PR PBB SHADOW E&M-EST. PATIENT-LVL IV: ICD-10-PCS | Mod: PBBFAC,,, | Performed by: INTERNAL MEDICINE

## 2020-11-11 PROCEDURE — G0009 ADMIN PNEUMOCOCCAL VACCINE: HCPCS | Mod: S$GLB,,, | Performed by: INTERNAL MEDICINE

## 2020-11-11 PROCEDURE — G0009 PNEUMOCOCCAL CONJUGATE VACCINE 13-VALENT LESS THAN 5YO & GREATER THAN: ICD-10-PCS | Mod: S$GLB,,, | Performed by: INTERNAL MEDICINE

## 2020-11-11 PROCEDURE — 99999 PR PBB SHADOW E&M-EST. PATIENT-LVL IV: CPT | Mod: PBBFAC,,, | Performed by: INTERNAL MEDICINE

## 2020-11-11 PROCEDURE — 93005 ELECTROCARDIOGRAM TRACING: CPT | Mod: S$GLB,,, | Performed by: INTERNAL MEDICINE

## 2020-11-11 PROCEDURE — 93005 EKG 12-LEAD: ICD-10-PCS | Mod: S$GLB,,, | Performed by: INTERNAL MEDICINE

## 2020-11-11 PROCEDURE — 93010 ELECTROCARDIOGRAM REPORT: CPT | Mod: S$GLB,,, | Performed by: INTERNAL MEDICINE

## 2020-11-11 PROCEDURE — 99397 PR PREVENTIVE VISIT,EST,65 & OVER: ICD-10-PCS | Mod: 25,S$GLB,, | Performed by: INTERNAL MEDICINE

## 2020-11-11 NOTE — PROGRESS NOTES
Subjective:       Patient ID: Michael Espinoza is a 65 y.o. male.    Chief Complaint: Annual Exam    Team here for annual exam.  Overall he says he has been doing fairly well.  He has a history of CAD and is status post bypass surgery.  He said he had a few minor episodes of chest tightness or soreness.  It may have been associated with eating or digestion and did not last long but he will follow-up.  We will also do a prostate check.  He is due for colon screening and will reach out to set that up on the holiday break.  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    Review of Systems   Constitutional: Negative for activity change, chills, fatigue, fever and unexpected weight change.   HENT: Negative for hearing loss, nosebleeds, rhinorrhea and trouble swallowing.    Eyes: Negative for pain, discharge and visual disturbance.   Respiratory: Positive for chest tightness (Not during exertion. May be with eating. He is not certain. ). Negative for cough, shortness of breath and wheezing.    Cardiovascular: Negative for chest pain and palpitations.   Gastrointestinal: Negative for abdominal pain, blood in stool, constipation, diarrhea, nausea and vomiting.   Endocrine: Negative for polydipsia and polyuria.   Genitourinary: Negative for difficulty urinating, hematuria and urgency.   Musculoskeletal: Positive for arthralgias (Left elbow burisitis, treated with antibiotics. ). Negative for back pain, joint swelling and neck pain.   Integumentary:  Negative for rash.   Neurological: Negative for dizziness, weakness and headaches.   Hematological: Does not bruise/bleed easily.   Psychiatric/Behavioral: Negative for confusion, dysphoric mood and sleep disturbance.         Objective:      Physical Exam  Constitutional:       General: He is not in acute distress.     Appearance: He is well-developed.   HENT:      Head: Normocephalic and atraumatic.      Right Ear: Tympanic membrane,  ear canal and external ear normal.      Left Ear: Tympanic membrane, ear canal and external ear normal.      Nose: Nose normal. No rhinorrhea.      Mouth/Throat:      Pharynx: No oropharyngeal exudate or posterior oropharyngeal erythema.   Eyes:      General: No scleral icterus.     Conjunctiva/sclera: Conjunctivae normal.      Pupils: Pupils are equal, round, and reactive to light.   Neck:      Musculoskeletal: Normal range of motion and neck supple.      Thyroid: No thyromegaly.      Vascular: No JVD.      Comments: No supraclavicular nodes palpated  Cardiovascular:      Rate and Rhythm: Normal rate and regular rhythm.      Pulses: Normal pulses.      Heart sounds: Normal heart sounds. No murmur.   Pulmonary:      Effort: Pulmonary effort is normal.      Breath sounds: Normal breath sounds. No wheezing or rales.   Abdominal:      General: Bowel sounds are normal. There is no distension.      Palpations: Abdomen is soft. There is no mass.      Tenderness: There is no abdominal tenderness.   Genitourinary:     Prostate: Normal. Not enlarged and not tender.      Rectum: Normal. Guaiac result negative. No tenderness.   Musculoskeletal: Normal range of motion.         General: No tenderness.      Right lower leg: No edema.      Left lower leg: No edema.   Lymphadenopathy:      Cervical: No cervical adenopathy.      Upper Body:      Right upper body: No supraclavicular adenopathy.      Left upper body: No supraclavicular adenopathy.   Skin:     General: Skin is warm and dry.      Coloration: Skin is not jaundiced or pale.      Findings: No rash.   Neurological:      General: No focal deficit present.      Mental Status: He is alert and oriented to person, place, and time.      Cranial Nerves: No cranial nerve deficit.      Coordination: Coordination normal.      Deep Tendon Reflexes: Reflexes are normal and symmetric.      Reflex Scores:       Bicep reflexes are 2+ on the right side and 2+ on the left side.        Patellar reflexes are 2+ on the right side and 2+ on the left side.  Psychiatric:         Mood and Affect: Mood normal. Mood is not depressed.         Behavior: Behavior normal.         Thought Content: Thought content normal.         Assessment:       1. Routine physical examination    2. Coronary artery disease of native artery of native heart with stable angina pectoris    3. Gilbert's syndrome    4. S/P CABG x 3    5. Chest pain, unspecified type        Plan:       Michael CURIEL was seen today for annual exam.    Diagnoses and all orders for this visit:    Routine physical examination    Coronary artery disease of native artery of native heart with stable angina pectoris  -     EKG 12-lead    Gilbert's syndrome    S/P CABG x 3  -     EKG 12-lead    Chest pain, unspecified type  -     EKG 12-lead    Colon cancer screening  -     Case request GI: COLONOSCOPY            He does not have Nitro, but he will speak with his Cards doctor as to whether that is needed.   Review EKG.     Prevnar 13.  He has 20 to    EKG showed normal sinus rhythm right bundle-branch block possibly new left posterior fascicular block.  Will wait for Cardiology final interpretation

## 2020-11-12 ENCOUNTER — PATIENT MESSAGE (OUTPATIENT)
Dept: INTERNAL MEDICINE | Facility: CLINIC | Age: 65
End: 2020-11-12

## 2020-11-12 ENCOUNTER — PATIENT MESSAGE (OUTPATIENT)
Dept: CARDIOLOGY | Facility: CLINIC | Age: 65
End: 2020-11-12

## 2020-12-22 ENCOUNTER — CLINICAL SUPPORT (OUTPATIENT)
Dept: URGENT CARE | Facility: CLINIC | Age: 65
End: 2020-12-22
Payer: MEDICARE

## 2020-12-22 DIAGNOSIS — Z20.822 EXPOSURE TO COVID-19 VIRUS: Primary | ICD-10-CM

## 2020-12-22 LAB
CTP QC/QA: YES
SARS-COV-2 RDRP RESP QL NAA+PROBE: NEGATIVE

## 2020-12-22 PROCEDURE — U0002 COVID-19 LAB TEST NON-CDC: HCPCS | Mod: QW,S$GLB,, | Performed by: FAMILY MEDICINE

## 2020-12-22 PROCEDURE — U0002: ICD-10-PCS | Mod: QW,S$GLB,, | Performed by: FAMILY MEDICINE

## 2021-02-15 ENCOUNTER — IMMUNIZATION (OUTPATIENT)
Dept: INTERNAL MEDICINE | Facility: CLINIC | Age: 66
End: 2021-02-15
Payer: MEDICARE

## 2021-02-15 DIAGNOSIS — Z23 NEED FOR VACCINATION: Primary | ICD-10-CM

## 2021-02-15 PROCEDURE — 91300 COVID-19, MRNA, LNP-S, PF, 30 MCG/0.3 ML DOSE VACCINE: CPT | Mod: PBBFAC | Performed by: INTERNAL MEDICINE

## 2021-03-08 ENCOUNTER — IMMUNIZATION (OUTPATIENT)
Dept: INTERNAL MEDICINE | Facility: CLINIC | Age: 66
End: 2021-03-08
Payer: MEDICARE

## 2021-03-08 DIAGNOSIS — Z23 NEED FOR VACCINATION: Primary | ICD-10-CM

## 2021-03-08 PROCEDURE — 91300 COVID-19, MRNA, LNP-S, PF, 30 MCG/0.3 ML DOSE VACCINE: CPT | Mod: PBBFAC | Performed by: INTERNAL MEDICINE

## 2021-03-08 PROCEDURE — 0002A COVID-19, MRNA, LNP-S, PF, 30 MCG/0.3 ML DOSE VACCINE: CPT | Mod: PBBFAC | Performed by: INTERNAL MEDICINE

## 2021-03-10 ENCOUNTER — PATIENT OUTREACH (OUTPATIENT)
Dept: ADMINISTRATIVE | Facility: HOSPITAL | Age: 66
End: 2021-03-10

## 2021-03-25 ENCOUNTER — PATIENT MESSAGE (OUTPATIENT)
Dept: SPORTS MEDICINE | Facility: CLINIC | Age: 66
End: 2021-03-25

## 2021-03-25 ENCOUNTER — TELEPHONE (OUTPATIENT)
Dept: SPORTS MEDICINE | Facility: CLINIC | Age: 66
End: 2021-03-25

## 2021-03-26 ENCOUNTER — OFFICE VISIT (OUTPATIENT)
Dept: SPORTS MEDICINE | Facility: CLINIC | Age: 66
End: 2021-03-26
Payer: MEDICARE

## 2021-03-26 VITALS
SYSTOLIC BLOOD PRESSURE: 152 MMHG | WEIGHT: 152.31 LBS | DIASTOLIC BLOOD PRESSURE: 91 MMHG | BODY MASS INDEX: 23.91 KG/M2 | HEART RATE: 74 BPM | HEIGHT: 67 IN

## 2021-03-26 DIAGNOSIS — M54.6 ACUTE RIGHT-SIDED THORACIC BACK PAIN: ICD-10-CM

## 2021-03-26 DIAGNOSIS — M54.9 DORSALGIA, UNSPECIFIED: Primary | ICD-10-CM

## 2021-03-26 PROCEDURE — 3288F FALL RISK ASSESSMENT DOCD: CPT | Mod: CPTII,S$GLB,, | Performed by: ORTHOPAEDIC SURGERY

## 2021-03-26 PROCEDURE — 99999 PR PBB SHADOW E&M-EST. PATIENT-LVL III: ICD-10-PCS | Mod: PBBFAC,,, | Performed by: ORTHOPAEDIC SURGERY

## 2021-03-26 PROCEDURE — 1101F PR PT FALLS ASSESS DOC 0-1 FALLS W/OUT INJ PAST YR: ICD-10-PCS | Mod: CPTII,S$GLB,, | Performed by: ORTHOPAEDIC SURGERY

## 2021-03-26 PROCEDURE — 99214 OFFICE O/P EST MOD 30 MIN: CPT | Mod: 25,S$GLB,, | Performed by: ORTHOPAEDIC SURGERY

## 2021-03-26 PROCEDURE — 99214 PR OFFICE/OUTPT VISIT, EST, LEVL IV, 30-39 MIN: ICD-10-PCS | Mod: 25,S$GLB,, | Performed by: ORTHOPAEDIC SURGERY

## 2021-03-26 PROCEDURE — 96372 PR INJECTION,THERAP/PROPH/DIAG2ST, IM OR SUBCUT: ICD-10-PCS | Mod: S$GLB,,, | Performed by: ORTHOPAEDIC SURGERY

## 2021-03-26 PROCEDURE — 3008F PR BODY MASS INDEX (BMI) DOCUMENTED: ICD-10-PCS | Mod: CPTII,S$GLB,, | Performed by: ORTHOPAEDIC SURGERY

## 2021-03-26 PROCEDURE — 1157F PR ADVANCE CARE PLAN OR EQUIV PRESENT IN MEDICAL RECORD: ICD-10-PCS | Mod: S$GLB,,, | Performed by: ORTHOPAEDIC SURGERY

## 2021-03-26 PROCEDURE — 3008F BODY MASS INDEX DOCD: CPT | Mod: CPTII,S$GLB,, | Performed by: ORTHOPAEDIC SURGERY

## 2021-03-26 PROCEDURE — 96372 THER/PROPH/DIAG INJ SC/IM: CPT | Mod: S$GLB,,, | Performed by: ORTHOPAEDIC SURGERY

## 2021-03-26 PROCEDURE — 1125F PR PAIN SEVERITY QUANTIFIED, PAIN PRESENT: ICD-10-PCS | Mod: S$GLB,,, | Performed by: ORTHOPAEDIC SURGERY

## 2021-03-26 PROCEDURE — 3288F PR FALLS RISK ASSESSMENT DOCUMENTED: ICD-10-PCS | Mod: CPTII,S$GLB,, | Performed by: ORTHOPAEDIC SURGERY

## 2021-03-26 PROCEDURE — 1157F ADVNC CARE PLAN IN RCRD: CPT | Mod: S$GLB,,, | Performed by: ORTHOPAEDIC SURGERY

## 2021-03-26 PROCEDURE — 1125F AMNT PAIN NOTED PAIN PRSNT: CPT | Mod: S$GLB,,, | Performed by: ORTHOPAEDIC SURGERY

## 2021-03-26 PROCEDURE — 1101F PT FALLS ASSESS-DOCD LE1/YR: CPT | Mod: CPTII,S$GLB,, | Performed by: ORTHOPAEDIC SURGERY

## 2021-03-26 PROCEDURE — 99999 PR PBB SHADOW E&M-EST. PATIENT-LVL III: CPT | Mod: PBBFAC,,, | Performed by: ORTHOPAEDIC SURGERY

## 2021-03-26 RX ORDER — PREDNISONE 10 MG/1
30 TABLET ORAL DAILY
Qty: 15 TABLET | Refills: 0 | Status: SHIPPED | OUTPATIENT
Start: 2021-03-26 | End: 2021-05-28

## 2021-04-05 ENCOUNTER — HOSPITAL ENCOUNTER (OUTPATIENT)
Dept: RADIOLOGY | Facility: HOSPITAL | Age: 66
Discharge: HOME | End: 2021-04-05
Attending: ORTHOPAEDIC SURGERY
Payer: MEDICARE

## 2021-04-05 DIAGNOSIS — M54.9 DORSALGIA, UNSPECIFIED: ICD-10-CM

## 2021-04-05 PROCEDURE — 72148 MRI LUMBAR SPINE W/O DYE: CPT | Mod: TC

## 2021-04-05 PROCEDURE — 72148 MRI LUMBAR SPINE WITHOUT CONTRAST: ICD-10-PCS | Mod: 26,,, | Performed by: RADIOLOGY

## 2021-04-05 PROCEDURE — 72148 MRI LUMBAR SPINE W/O DYE: CPT | Mod: 26,,, | Performed by: RADIOLOGY

## 2021-04-07 ENCOUNTER — OFFICE VISIT (OUTPATIENT)
Dept: SPORTS MEDICINE | Facility: CLINIC | Age: 66
End: 2021-04-07
Payer: MEDICARE

## 2021-04-07 DIAGNOSIS — S32.010A COMPRESSION FRACTURE OF L1 LUMBAR VERTEBRA, CLOSED, INITIAL ENCOUNTER: Primary | ICD-10-CM

## 2021-04-07 DIAGNOSIS — M54.50 DORSALGIA OF LUMBAR REGION: ICD-10-CM

## 2021-04-07 PROCEDURE — 1157F PR ADVANCE CARE PLAN OR EQUIV PRESENT IN MEDICAL RECORD: ICD-10-PCS | Mod: 95,,, | Performed by: ORTHOPAEDIC SURGERY

## 2021-04-07 PROCEDURE — 99214 OFFICE O/P EST MOD 30 MIN: CPT | Mod: 95,,, | Performed by: ORTHOPAEDIC SURGERY

## 2021-04-07 PROCEDURE — 1157F ADVNC CARE PLAN IN RCRD: CPT | Mod: 95,,, | Performed by: ORTHOPAEDIC SURGERY

## 2021-04-07 PROCEDURE — 99214 PR OFFICE/OUTPT VISIT, EST, LEVL IV, 30-39 MIN: ICD-10-PCS | Mod: 95,,, | Performed by: ORTHOPAEDIC SURGERY

## 2021-04-20 ENCOUNTER — PATIENT MESSAGE (OUTPATIENT)
Dept: ORTHOPEDICS | Facility: CLINIC | Age: 66
End: 2021-04-20

## 2021-04-20 ENCOUNTER — PATIENT OUTREACH (OUTPATIENT)
Dept: ADMINISTRATIVE | Facility: OTHER | Age: 66
End: 2021-04-20

## 2021-04-22 ENCOUNTER — HOSPITAL ENCOUNTER (OUTPATIENT)
Dept: RADIOLOGY | Facility: HOSPITAL | Age: 66
Discharge: HOME | End: 2021-04-22
Attending: ORTHOPAEDIC SURGERY
Payer: MEDICARE

## 2021-04-22 ENCOUNTER — OFFICE VISIT (OUTPATIENT)
Dept: ORTHOPEDICS | Facility: CLINIC | Age: 66
End: 2021-04-22
Payer: MEDICARE

## 2021-04-22 VITALS — BODY MASS INDEX: 23.13 KG/M2 | WEIGHT: 147.38 LBS | HEIGHT: 67 IN

## 2021-04-22 DIAGNOSIS — M54.50 CHRONIC BILATERAL LOW BACK PAIN WITHOUT SCIATICA: Primary | ICD-10-CM

## 2021-04-22 DIAGNOSIS — S32.010A COMPRESSION FRACTURE OF L1 LUMBAR VERTEBRA, CLOSED, INITIAL ENCOUNTER: ICD-10-CM

## 2021-04-22 DIAGNOSIS — G89.29 CHRONIC BILATERAL LOW BACK PAIN WITHOUT SCIATICA: Primary | ICD-10-CM

## 2021-04-22 DIAGNOSIS — M51.36 DDD (DEGENERATIVE DISC DISEASE), LUMBAR: ICD-10-CM

## 2021-04-22 PROCEDURE — 1125F AMNT PAIN NOTED PAIN PRSNT: CPT | Mod: S$GLB,,, | Performed by: ORTHOPAEDIC SURGERY

## 2021-04-22 PROCEDURE — 3288F PR FALLS RISK ASSESSMENT DOCUMENTED: ICD-10-PCS | Mod: CPTII,S$GLB,, | Performed by: ORTHOPAEDIC SURGERY

## 2021-04-22 PROCEDURE — 3008F PR BODY MASS INDEX (BMI) DOCUMENTED: ICD-10-PCS | Mod: CPTII,S$GLB,, | Performed by: ORTHOPAEDIC SURGERY

## 2021-04-22 PROCEDURE — 1157F ADVNC CARE PLAN IN RCRD: CPT | Mod: S$GLB,,, | Performed by: ORTHOPAEDIC SURGERY

## 2021-04-22 PROCEDURE — 1101F PT FALLS ASSESS-DOCD LE1/YR: CPT | Mod: CPTII,S$GLB,, | Performed by: ORTHOPAEDIC SURGERY

## 2021-04-22 PROCEDURE — 99213 OFFICE O/P EST LOW 20 MIN: CPT | Mod: S$GLB,,, | Performed by: ORTHOPAEDIC SURGERY

## 2021-04-22 PROCEDURE — 1101F PR PT FALLS ASSESS DOC 0-1 FALLS W/OUT INJ PAST YR: ICD-10-PCS | Mod: CPTII,S$GLB,, | Performed by: ORTHOPAEDIC SURGERY

## 2021-04-22 PROCEDURE — 99213 PR OFFICE/OUTPT VISIT, EST, LEVL III, 20-29 MIN: ICD-10-PCS | Mod: S$GLB,,, | Performed by: ORTHOPAEDIC SURGERY

## 2021-04-22 PROCEDURE — 72110 X-RAY EXAM L-2 SPINE 4/>VWS: CPT | Mod: TC

## 2021-04-22 PROCEDURE — 1157F PR ADVANCE CARE PLAN OR EQUIV PRESENT IN MEDICAL RECORD: ICD-10-PCS | Mod: S$GLB,,, | Performed by: ORTHOPAEDIC SURGERY

## 2021-04-22 PROCEDURE — 99999 PR PBB SHADOW E&M-EST. PATIENT-LVL III: ICD-10-PCS | Mod: PBBFAC,,, | Performed by: ORTHOPAEDIC SURGERY

## 2021-04-22 PROCEDURE — 1125F PR PAIN SEVERITY QUANTIFIED, PAIN PRESENT: ICD-10-PCS | Mod: S$GLB,,, | Performed by: ORTHOPAEDIC SURGERY

## 2021-04-22 PROCEDURE — 99999 PR PBB SHADOW E&M-EST. PATIENT-LVL III: CPT | Mod: PBBFAC,,, | Performed by: ORTHOPAEDIC SURGERY

## 2021-04-22 PROCEDURE — 3288F FALL RISK ASSESSMENT DOCD: CPT | Mod: CPTII,S$GLB,, | Performed by: ORTHOPAEDIC SURGERY

## 2021-04-22 PROCEDURE — 72110 XR LUMBAR SPINE AP AND LAT WITH FLEX/EXT: ICD-10-PCS | Mod: 26,,, | Performed by: RADIOLOGY

## 2021-04-22 PROCEDURE — 72110 X-RAY EXAM L-2 SPINE 4/>VWS: CPT | Mod: 26,,, | Performed by: RADIOLOGY

## 2021-04-22 PROCEDURE — 3008F BODY MASS INDEX DOCD: CPT | Mod: CPTII,S$GLB,, | Performed by: ORTHOPAEDIC SURGERY

## 2021-04-27 ENCOUNTER — TELEPHONE (OUTPATIENT)
Dept: ORTHOPEDICS | Facility: CLINIC | Age: 66
End: 2021-04-27

## 2021-05-05 ENCOUNTER — TELEPHONE (OUTPATIENT)
Dept: ORTHOPEDICS | Facility: CLINIC | Age: 66
End: 2021-05-05

## 2021-05-05 ENCOUNTER — LAB VISIT (OUTPATIENT)
Dept: LAB | Facility: HOSPITAL | Age: 66
End: 2021-05-05
Payer: MEDICARE

## 2021-05-05 ENCOUNTER — OFFICE VISIT (OUTPATIENT)
Dept: ORTHOPEDICS | Facility: CLINIC | Age: 66
End: 2021-05-05
Attending: INTERNAL MEDICINE
Payer: MEDICARE

## 2021-05-05 VITALS
SYSTOLIC BLOOD PRESSURE: 118 MMHG | HEIGHT: 67 IN | DIASTOLIC BLOOD PRESSURE: 80 MMHG | BODY MASS INDEX: 22.82 KG/M2 | WEIGHT: 145.38 LBS | HEART RATE: 69 BPM

## 2021-05-05 DIAGNOSIS — M80.80XA PATHOLOGICAL FRACTURE DUE TO OSTEOPOROSIS, UNSPECIFIED FRACTURE SITE, UNSPECIFIED OSTEOPOROSIS TYPE, INITIAL ENCOUNTER: ICD-10-CM

## 2021-05-05 DIAGNOSIS — M80.00XA AGE-RELATED OSTEOPOROSIS WITH CURRENT PATHOLOGICAL FRACTURE, INITIAL ENCOUNTER: ICD-10-CM

## 2021-05-05 DIAGNOSIS — M81.6 LOCALIZED OSTEOPOROSIS OF LEQUESNE: ICD-10-CM

## 2021-05-05 DIAGNOSIS — M80.80XA PATHOLOGICAL FRACTURE DUE TO OSTEOPOROSIS, UNSPECIFIED FRACTURE SITE, UNSPECIFIED OSTEOPOROSIS TYPE, INITIAL ENCOUNTER: Primary | ICD-10-CM

## 2021-05-05 LAB
25(OH)D3+25(OH)D2 SERPL-MCNC: 38 NG/ML (ref 30–96)
ALBUMIN SERPL BCP-MCNC: 4.2 G/DL (ref 3.5–5.2)
ALP SERPL-CCNC: 121 U/L (ref 55–135)
ALT SERPL W/O P-5'-P-CCNC: 33 U/L (ref 10–44)
ANION GAP SERPL CALC-SCNC: 6 MMOL/L (ref 8–16)
AST SERPL-CCNC: 33 U/L (ref 10–40)
BILIRUB SERPL-MCNC: 2.4 MG/DL (ref 0.1–1)
BUN SERPL-MCNC: 16 MG/DL (ref 8–23)
CALCIUM SERPL-MCNC: 10.3 MG/DL (ref 8.7–10.5)
CHLORIDE SERPL-SCNC: 106 MMOL/L (ref 95–110)
CO2 SERPL-SCNC: 29 MMOL/L (ref 23–29)
CREAT SERPL-MCNC: 0.9 MG/DL (ref 0.5–1.4)
EST. GFR  (AFRICAN AMERICAN): >60 ML/MIN/1.73 M^2
EST. GFR  (NON AFRICAN AMERICAN): >60 ML/MIN/1.73 M^2
GLUCOSE SERPL-MCNC: 82 MG/DL (ref 70–110)
POTASSIUM SERPL-SCNC: 5 MMOL/L (ref 3.5–5.1)
PROT SERPL-MCNC: 7.4 G/DL (ref 6–8.4)
PTH-INTACT SERPL-MCNC: 54 PG/ML (ref 9–77)
SODIUM SERPL-SCNC: 141 MMOL/L (ref 136–145)
T4 FREE SERPL-MCNC: 1.18 NG/DL (ref 0.71–1.51)
TSH SERPL DL<=0.005 MIU/L-ACNC: 1.74 UIU/ML (ref 0.4–4)

## 2021-05-05 PROCEDURE — 84439 ASSAY OF FREE THYROXINE: CPT | Performed by: PHYSICIAN ASSISTANT

## 2021-05-05 PROCEDURE — 80053 COMPREHEN METABOLIC PANEL: CPT | Performed by: PHYSICIAN ASSISTANT

## 2021-05-05 PROCEDURE — 99999 PR PBB SHADOW E&M-EST. PATIENT-LVL III: CPT | Mod: PBBFAC,,, | Performed by: PHYSICIAN ASSISTANT

## 2021-05-05 PROCEDURE — 84075 ASSAY ALKALINE PHOSPHATASE: CPT | Mod: 91 | Performed by: PHYSICIAN ASSISTANT

## 2021-05-05 PROCEDURE — 36415 COLL VENOUS BLD VENIPUNCTURE: CPT | Performed by: PHYSICIAN ASSISTANT

## 2021-05-05 PROCEDURE — 3008F BODY MASS INDEX DOCD: CPT | Mod: CPTII,S$GLB,, | Performed by: PHYSICIAN ASSISTANT

## 2021-05-05 PROCEDURE — 3008F PR BODY MASS INDEX (BMI) DOCUMENTED: ICD-10-PCS | Mod: CPTII,S$GLB,, | Performed by: PHYSICIAN ASSISTANT

## 2021-05-05 PROCEDURE — 83970 ASSAY OF PARATHORMONE: CPT | Performed by: PHYSICIAN ASSISTANT

## 2021-05-05 PROCEDURE — 1157F PR ADVANCE CARE PLAN OR EQUIV PRESENT IN MEDICAL RECORD: ICD-10-PCS | Mod: S$GLB,,, | Performed by: PHYSICIAN ASSISTANT

## 2021-05-05 PROCEDURE — 1157F ADVNC CARE PLAN IN RCRD: CPT | Mod: S$GLB,,, | Performed by: PHYSICIAN ASSISTANT

## 2021-05-05 PROCEDURE — 82306 VITAMIN D 25 HYDROXY: CPT | Performed by: PHYSICIAN ASSISTANT

## 2021-05-05 PROCEDURE — 99999 PR PBB SHADOW E&M-EST. PATIENT-LVL III: ICD-10-PCS | Mod: PBBFAC,,, | Performed by: PHYSICIAN ASSISTANT

## 2021-05-05 PROCEDURE — 99214 OFFICE O/P EST MOD 30 MIN: CPT | Mod: 25,S$GLB,, | Performed by: PHYSICIAN ASSISTANT

## 2021-05-05 PROCEDURE — 84443 ASSAY THYROID STIM HORMONE: CPT | Performed by: PHYSICIAN ASSISTANT

## 2021-05-05 PROCEDURE — 99214 PR OFFICE/OUTPT VISIT, EST, LEVL IV, 30-39 MIN: ICD-10-PCS | Mod: 25,S$GLB,, | Performed by: PHYSICIAN ASSISTANT

## 2021-05-07 LAB — ALP BONE SERPL-MCNC: 20 UG/L (ref 7.6–14.9)

## 2021-05-25 ENCOUNTER — PATIENT OUTREACH (OUTPATIENT)
Dept: ADMINISTRATIVE | Facility: OTHER | Age: 66
End: 2021-05-25

## 2021-05-25 DIAGNOSIS — Z12.11 ENCOUNTER FOR FIT (FECAL IMMUNOCHEMICAL TEST) SCREENING: Primary | ICD-10-CM

## 2021-05-28 ENCOUNTER — HOSPITAL ENCOUNTER (OUTPATIENT)
Dept: RADIOLOGY | Facility: CLINIC | Age: 66
Discharge: HOME | End: 2021-05-28
Attending: PHYSICIAN ASSISTANT
Payer: MEDICARE

## 2021-05-28 ENCOUNTER — OFFICE VISIT (OUTPATIENT)
Dept: ORTHOPEDICS | Facility: CLINIC | Age: 66
End: 2021-05-28
Payer: MEDICARE

## 2021-05-28 DIAGNOSIS — M80.00XA AGE-RELATED OSTEOPOROSIS WITH CURRENT PATHOLOGICAL FRACTURE, INITIAL ENCOUNTER: ICD-10-CM

## 2021-05-28 DIAGNOSIS — S32.010A COMPRESSION FRACTURE OF L1 LUMBAR VERTEBRA, CLOSED, INITIAL ENCOUNTER: ICD-10-CM

## 2021-05-28 DIAGNOSIS — M80.80XA PATHOLOGICAL FRACTURE DUE TO OSTEOPOROSIS, UNSPECIFIED FRACTURE SITE, UNSPECIFIED OSTEOPOROSIS TYPE, INITIAL ENCOUNTER: ICD-10-CM

## 2021-05-28 DIAGNOSIS — M80.80XA PATHOLOGICAL FRACTURE DUE TO OSTEOPOROSIS, UNSPECIFIED FRACTURE SITE, UNSPECIFIED OSTEOPOROSIS TYPE, INITIAL ENCOUNTER: Primary | ICD-10-CM

## 2021-05-28 DIAGNOSIS — M81.6 LOCALIZED OSTEOPOROSIS OF LEQUESNE: ICD-10-CM

## 2021-05-28 PROCEDURE — 1157F ADVNC CARE PLAN IN RCRD: CPT | Mod: S$GLB,,, | Performed by: PHYSICIAN ASSISTANT

## 2021-05-28 PROCEDURE — 1157F PR ADVANCE CARE PLAN OR EQUIV PRESENT IN MEDICAL RECORD: ICD-10-PCS | Mod: S$GLB,,, | Performed by: PHYSICIAN ASSISTANT

## 2021-05-28 PROCEDURE — 99999 PR PBB SHADOW E&M-EST. PATIENT-LVL II: CPT | Mod: PBBFAC,,, | Performed by: PHYSICIAN ASSISTANT

## 2021-05-28 PROCEDURE — 1126F AMNT PAIN NOTED NONE PRSNT: CPT | Mod: S$GLB,,, | Performed by: PHYSICIAN ASSISTANT

## 2021-05-28 PROCEDURE — 99999 PR PBB SHADOW E&M-EST. PATIENT-LVL II: ICD-10-PCS | Mod: PBBFAC,,, | Performed by: PHYSICIAN ASSISTANT

## 2021-05-28 PROCEDURE — 99213 OFFICE O/P EST LOW 20 MIN: CPT | Mod: S$GLB,,, | Performed by: PHYSICIAN ASSISTANT

## 2021-05-28 PROCEDURE — 77080 DXA BONE DENSITY AXIAL: CPT | Mod: 26,,, | Performed by: INTERNAL MEDICINE

## 2021-05-28 PROCEDURE — 99213 PR OFFICE/OUTPT VISIT, EST, LEVL III, 20-29 MIN: ICD-10-PCS | Mod: S$GLB,,, | Performed by: PHYSICIAN ASSISTANT

## 2021-05-28 PROCEDURE — 77080 DXA BONE DENSITY AXIAL: CPT | Mod: TC

## 2021-05-28 PROCEDURE — 77080 DEXA BONE DENSITY SPINE HIP: ICD-10-PCS | Mod: 26,,, | Performed by: INTERNAL MEDICINE

## 2021-05-28 PROCEDURE — 1126F PR PAIN SEVERITY QUANTIFIED, NO PAIN PRESENT: ICD-10-PCS | Mod: S$GLB,,, | Performed by: PHYSICIAN ASSISTANT

## 2021-05-28 RX ORDER — ZOLEDRONIC ACID 5 MG/100ML
5 INJECTION, SOLUTION INTRAVENOUS
Status: CANCELLED | OUTPATIENT
Start: 2021-05-28

## 2021-05-28 RX ORDER — HEPARIN 100 UNIT/ML
500 SYRINGE INTRAVENOUS
Status: CANCELLED | OUTPATIENT
Start: 2021-05-28

## 2021-05-28 RX ORDER — SODIUM CHLORIDE 0.9 % (FLUSH) 0.9 %
10 SYRINGE (ML) INJECTION
Status: CANCELLED | OUTPATIENT
Start: 2021-05-28

## 2021-06-03 ENCOUNTER — INFUSION (OUTPATIENT)
Dept: INFECTIOUS DISEASES | Facility: HOSPITAL | Age: 66
End: 2021-06-03
Attending: INTERNAL MEDICINE
Payer: MEDICARE

## 2021-06-03 VITALS
RESPIRATION RATE: 18 BRPM | DIASTOLIC BLOOD PRESSURE: 75 MMHG | HEART RATE: 66 BPM | TEMPERATURE: 98 F | WEIGHT: 147.25 LBS | SYSTOLIC BLOOD PRESSURE: 135 MMHG | HEIGHT: 67 IN | BODY MASS INDEX: 23.11 KG/M2 | OXYGEN SATURATION: 97 %

## 2021-06-03 DIAGNOSIS — M81.6 LOCALIZED OSTEOPOROSIS OF LEQUESNE: ICD-10-CM

## 2021-06-03 DIAGNOSIS — M80.80XA PATHOLOGICAL FRACTURE DUE TO OSTEOPOROSIS, UNSPECIFIED FRACTURE SITE, UNSPECIFIED OSTEOPOROSIS TYPE, INITIAL ENCOUNTER: Primary | ICD-10-CM

## 2021-06-03 DIAGNOSIS — M80.00XA AGE-RELATED OSTEOPOROSIS WITH CURRENT PATHOLOGICAL FRACTURE, INITIAL ENCOUNTER: ICD-10-CM

## 2021-06-03 PROCEDURE — 63600175 PHARM REV CODE 636 W HCPCS: Performed by: PHYSICIAN ASSISTANT

## 2021-06-03 PROCEDURE — 96365 THER/PROPH/DIAG IV INF INIT: CPT

## 2021-06-03 RX ORDER — ZOLEDRONIC ACID 5 MG/100ML
5 INJECTION, SOLUTION INTRAVENOUS
Status: COMPLETED | OUTPATIENT
Start: 2021-06-03 | End: 2021-06-03

## 2021-06-03 RX ORDER — HEPARIN 100 UNIT/ML
500 SYRINGE INTRAVENOUS
Status: CANCELLED | OUTPATIENT
Start: 2021-06-03

## 2021-06-03 RX ORDER — HEPARIN 100 UNIT/ML
500 SYRINGE INTRAVENOUS
Status: DISCONTINUED | OUTPATIENT
Start: 2021-06-03 | End: 2021-06-03 | Stop reason: HOSPADM

## 2021-06-03 RX ORDER — SODIUM CHLORIDE 0.9 % (FLUSH) 0.9 %
10 SYRINGE (ML) INJECTION
Status: CANCELLED | OUTPATIENT
Start: 2021-06-03

## 2021-06-03 RX ORDER — SODIUM CHLORIDE 0.9 % (FLUSH) 0.9 %
10 SYRINGE (ML) INJECTION
Status: DISCONTINUED | OUTPATIENT
Start: 2021-06-03 | End: 2021-06-03 | Stop reason: HOSPADM

## 2021-06-03 RX ORDER — ZOLEDRONIC ACID 5 MG/100ML
5 INJECTION, SOLUTION INTRAVENOUS
Status: CANCELLED | OUTPATIENT
Start: 2021-06-03

## 2021-06-03 RX ADMIN — ZOLEDRONIC ACID 5 MG: 5 INJECTION, SOLUTION INTRAVENOUS at 01:06

## 2021-06-28 ENCOUNTER — PATIENT MESSAGE (OUTPATIENT)
Dept: ORTHOPEDICS | Facility: CLINIC | Age: 66
End: 2021-06-28

## 2021-07-01 ENCOUNTER — LAB VISIT (OUTPATIENT)
Dept: LAB | Facility: HOSPITAL | Age: 66
End: 2021-07-01
Payer: MEDICARE

## 2021-07-01 DIAGNOSIS — M80.00XA AGE-RELATED OSTEOPOROSIS WITH CURRENT PATHOLOGICAL FRACTURE, INITIAL ENCOUNTER: ICD-10-CM

## 2021-07-01 DIAGNOSIS — M81.6 LOCALIZED OSTEOPOROSIS OF LEQUESNE: ICD-10-CM

## 2021-07-01 DIAGNOSIS — M80.80XA PATHOLOGICAL FRACTURE DUE TO OSTEOPOROSIS, UNSPECIFIED FRACTURE SITE, UNSPECIFIED OSTEOPOROSIS TYPE, INITIAL ENCOUNTER: ICD-10-CM

## 2021-07-01 LAB
25(OH)D3+25(OH)D2 SERPL-MCNC: 31 NG/ML (ref 30–96)
ALBUMIN SERPL BCP-MCNC: 4 G/DL (ref 3.5–5.2)
ALP SERPL-CCNC: 78 U/L (ref 55–135)
ALT SERPL W/O P-5'-P-CCNC: 36 U/L (ref 10–44)
ANION GAP SERPL CALC-SCNC: 8 MMOL/L (ref 8–16)
AST SERPL-CCNC: 34 U/L (ref 10–40)
BILIRUB SERPL-MCNC: 2.4 MG/DL (ref 0.1–1)
BUN SERPL-MCNC: 12 MG/DL (ref 8–23)
CALCIUM SERPL-MCNC: 9.2 MG/DL (ref 8.7–10.5)
CHLORIDE SERPL-SCNC: 110 MMOL/L (ref 95–110)
CO2 SERPL-SCNC: 25 MMOL/L (ref 23–29)
CREAT SERPL-MCNC: 0.9 MG/DL (ref 0.5–1.4)
EST. GFR  (AFRICAN AMERICAN): >60 ML/MIN/1.73 M^2
EST. GFR  (NON AFRICAN AMERICAN): >60 ML/MIN/1.73 M^2
GLUCOSE SERPL-MCNC: 55 MG/DL (ref 70–110)
POTASSIUM SERPL-SCNC: 4.6 MMOL/L (ref 3.5–5.1)
PROT SERPL-MCNC: 7.1 G/DL (ref 6–8.4)
SODIUM SERPL-SCNC: 143 MMOL/L (ref 136–145)

## 2021-07-01 PROCEDURE — 80053 COMPREHEN METABOLIC PANEL: CPT | Performed by: PHYSICIAN ASSISTANT

## 2021-07-01 PROCEDURE — 82306 VITAMIN D 25 HYDROXY: CPT | Performed by: PHYSICIAN ASSISTANT

## 2021-07-01 PROCEDURE — 36415 COLL VENOUS BLD VENIPUNCTURE: CPT | Performed by: PHYSICIAN ASSISTANT

## 2021-07-07 ENCOUNTER — PATIENT MESSAGE (OUTPATIENT)
Dept: ADMINISTRATIVE | Facility: HOSPITAL | Age: 66
End: 2021-07-07

## 2021-07-07 ENCOUNTER — PATIENT MESSAGE (OUTPATIENT)
Dept: ORTHOPEDICS | Facility: CLINIC | Age: 66
End: 2021-07-07

## 2021-07-11 ENCOUNTER — PATIENT OUTREACH (OUTPATIENT)
Dept: ADMINISTRATIVE | Facility: OTHER | Age: 66
End: 2021-07-11

## 2021-07-14 ENCOUNTER — OFFICE VISIT (OUTPATIENT)
Dept: CARDIOLOGY | Facility: CLINIC | Age: 66
End: 2021-07-14
Payer: MEDICARE

## 2021-07-14 VITALS
HEIGHT: 67 IN | OXYGEN SATURATION: 98 % | HEART RATE: 65 BPM | BODY MASS INDEX: 23.25 KG/M2 | DIASTOLIC BLOOD PRESSURE: 85 MMHG | WEIGHT: 148.13 LBS | SYSTOLIC BLOOD PRESSURE: 136 MMHG

## 2021-07-14 DIAGNOSIS — E78.00 PURE HYPERCHOLESTEROLEMIA: ICD-10-CM

## 2021-07-14 DIAGNOSIS — I25.118 CORONARY ARTERY DISEASE OF NATIVE ARTERY OF NATIVE HEART WITH STABLE ANGINA PECTORIS: ICD-10-CM

## 2021-07-14 DIAGNOSIS — R07.2 PRECORDIAL PAIN: Primary | ICD-10-CM

## 2021-07-14 DIAGNOSIS — Z95.1 S/P CABG X 3: ICD-10-CM

## 2021-07-14 DIAGNOSIS — I25.118 CORONARY ARTERY DISEASE OF NATIVE ARTERY OF NATIVE HEART WITH STABLE ANGINA PECTORIS: Primary | ICD-10-CM

## 2021-07-14 PROCEDURE — 99214 OFFICE O/P EST MOD 30 MIN: CPT | Mod: S$GLB,,, | Performed by: INTERNAL MEDICINE

## 2021-07-14 PROCEDURE — 3008F BODY MASS INDEX DOCD: CPT | Mod: CPTII,S$GLB,, | Performed by: INTERNAL MEDICINE

## 2021-07-14 PROCEDURE — 99999 PR PBB SHADOW E&M-EST. PATIENT-LVL III: CPT | Mod: PBBFAC,,, | Performed by: INTERNAL MEDICINE

## 2021-07-14 PROCEDURE — 1126F AMNT PAIN NOTED NONE PRSNT: CPT | Mod: S$GLB,,, | Performed by: INTERNAL MEDICINE

## 2021-07-14 PROCEDURE — 1157F PR ADVANCE CARE PLAN OR EQUIV PRESENT IN MEDICAL RECORD: ICD-10-PCS | Mod: S$GLB,,, | Performed by: INTERNAL MEDICINE

## 2021-07-14 PROCEDURE — 1157F ADVNC CARE PLAN IN RCRD: CPT | Mod: S$GLB,,, | Performed by: INTERNAL MEDICINE

## 2021-07-14 PROCEDURE — 93000 EKG 12-LEAD: ICD-10-PCS | Mod: S$GLB,,, | Performed by: INTERNAL MEDICINE

## 2021-07-14 PROCEDURE — 93000 ELECTROCARDIOGRAM COMPLETE: CPT | Mod: S$GLB,,, | Performed by: INTERNAL MEDICINE

## 2021-07-14 PROCEDURE — 1126F PR PAIN SEVERITY QUANTIFIED, NO PAIN PRESENT: ICD-10-PCS | Mod: S$GLB,,, | Performed by: INTERNAL MEDICINE

## 2021-07-14 PROCEDURE — 99999 PR PBB SHADOW E&M-EST. PATIENT-LVL III: ICD-10-PCS | Mod: PBBFAC,,, | Performed by: INTERNAL MEDICINE

## 2021-07-14 PROCEDURE — 99214 PR OFFICE/OUTPT VISIT, EST, LEVL IV, 30-39 MIN: ICD-10-PCS | Mod: S$GLB,,, | Performed by: INTERNAL MEDICINE

## 2021-07-14 PROCEDURE — 3008F PR BODY MASS INDEX (BMI) DOCUMENTED: ICD-10-PCS | Mod: CPTII,S$GLB,, | Performed by: INTERNAL MEDICINE

## 2021-07-19 ENCOUNTER — TELEPHONE (OUTPATIENT)
Dept: CARDIOLOGY | Facility: CLINIC | Age: 66
End: 2021-07-19

## 2021-07-21 ENCOUNTER — HOSPITAL ENCOUNTER (OUTPATIENT)
Dept: CARDIOLOGY | Facility: HOSPITAL | Age: 66
Discharge: HOME | End: 2021-07-21
Attending: INTERNAL MEDICINE
Payer: MEDICARE

## 2021-07-21 ENCOUNTER — TELEPHONE (OUTPATIENT)
Dept: CARDIOLOGY | Facility: HOSPITAL | Age: 66
End: 2021-07-21

## 2021-07-21 VITALS — BODY MASS INDEX: 23.23 KG/M2 | WEIGHT: 148 LBS | HEIGHT: 67 IN

## 2021-07-21 DIAGNOSIS — Z95.1 S/P CABG X 3: ICD-10-CM

## 2021-07-21 DIAGNOSIS — R07.2 PRECORDIAL PAIN: ICD-10-CM

## 2021-07-21 DIAGNOSIS — I25.118 CORONARY ARTERY DISEASE OF NATIVE ARTERY OF NATIVE HEART WITH STABLE ANGINA PECTORIS: ICD-10-CM

## 2021-07-21 DIAGNOSIS — I44.1 2ND DEGREE AV BLOCK: Primary | ICD-10-CM

## 2021-07-21 LAB
CV PHARM DOSE: 0.4 MG
CV STRESS BASE HR: 81 BPM
DIASTOLIC BLOOD PRESSURE: 85 MMHG
EJECTION FRACTION- HIGH: 65 %
END DIASTOLIC INDEX-HIGH: 153 ML/M2
END DIASTOLIC INDEX-LOW: 93 ML/M2
END SYSTOLIC INDEX-HIGH: 71 ML/M2
END SYSTOLIC INDEX-LOW: 31 ML/M2
NUC REST DIASTOLIC VOLUME INDEX: 81
NUC REST EJECTION FRACTION: 58
NUC REST SYSTOLIC VOLUME INDEX: 34
NUC STRESS DIASTOLIC VOLUME INDEX: 90
NUC STRESS EJECTION FRACTION: 56 %
NUC STRESS SYSTOLIC VOLUME INDEX: 40
OHS CV CPX 85 PERCENT MAX PREDICTED HEART RATE MALE: 132
OHS CV CPX MAX PREDICTED HEART RATE: 155
OHS CV CPX PATIENT IS FEMALE: 0
OHS CV CPX PATIENT IS MALE: 1
OHS CV CPX PEAK DIASTOLIC BLOOD PRESSURE: 92 MMHG
OHS CV CPX PEAK HEAR RATE: 68 BPM
OHS CV CPX PEAK RATE PRESSURE PRODUCT: NORMAL
OHS CV CPX PEAK SYSTOLIC BLOOD PRESSURE: 158 MMHG
OHS CV CPX PERCENT MAX PREDICTED HEART RATE ACHIEVED: 44
OHS CV CPX RATE PRESSURE PRODUCT PRESENTING: NORMAL
RETIRED EF AND QEF - SEE NOTES: 53 %
SYSTOLIC BLOOD PRESSURE: 140 MMHG

## 2021-07-21 PROCEDURE — 93018 CV STRESS TEST I&R ONLY: CPT | Mod: ,,, | Performed by: INTERNAL MEDICINE

## 2021-07-21 PROCEDURE — 78452 HT MUSCLE IMAGE SPECT MULT: CPT | Mod: 26,,, | Performed by: INTERNAL MEDICINE

## 2021-07-21 PROCEDURE — 93016 STRESS TEST WITH MYOCARDIAL PERFUSION (CUPID ONLY): ICD-10-PCS | Mod: ,,, | Performed by: INTERNAL MEDICINE

## 2021-07-21 PROCEDURE — 78452 STRESS TEST WITH MYOCARDIAL PERFUSION (CUPID ONLY): ICD-10-PCS | Mod: 26,,, | Performed by: INTERNAL MEDICINE

## 2021-07-21 PROCEDURE — 93017 CV STRESS TEST TRACING ONLY: CPT

## 2021-07-21 PROCEDURE — 93016 CV STRESS TEST SUPVJ ONLY: CPT | Mod: ,,, | Performed by: INTERNAL MEDICINE

## 2021-07-21 PROCEDURE — 63600175 PHARM REV CODE 636 W HCPCS: Performed by: INTERNAL MEDICINE

## 2021-07-21 PROCEDURE — 93018 STRESS TEST WITH MYOCARDIAL PERFUSION (CUPID ONLY): ICD-10-PCS | Mod: ,,, | Performed by: INTERNAL MEDICINE

## 2021-07-21 PROCEDURE — A9502 TC99M TETROFOSMIN: HCPCS

## 2021-07-21 RX ORDER — REGADENOSON 0.08 MG/ML
0.4 INJECTION, SOLUTION INTRAVENOUS ONCE
Status: COMPLETED | OUTPATIENT
Start: 2021-07-21 | End: 2021-07-21

## 2021-07-21 RX ADMIN — REGADENOSON 0.4 MG: 0.08 INJECTION, SOLUTION INTRAVENOUS at 08:07

## 2021-07-22 ENCOUNTER — DOCUMENTATION ONLY (OUTPATIENT)
Dept: ADMINISTRATIVE | Facility: HOSPITAL | Age: 66
End: 2021-07-22

## 2021-07-22 ENCOUNTER — PATIENT OUTREACH (OUTPATIENT)
Dept: ADMINISTRATIVE | Facility: HOSPITAL | Age: 66
End: 2021-07-22

## 2021-07-23 ENCOUNTER — PATIENT MESSAGE (OUTPATIENT)
Dept: CARDIOLOGY | Facility: CLINIC | Age: 66
End: 2021-07-23

## 2021-07-23 DIAGNOSIS — E78.00 PURE HYPERCHOLESTEROLEMIA: ICD-10-CM

## 2021-07-23 DIAGNOSIS — I25.10 CORONARY ARTERY DISEASE INVOLVING NATIVE CORONARY ARTERY OF NATIVE HEART WITHOUT ANGINA PECTORIS: ICD-10-CM

## 2021-07-23 RX ORDER — EZETIMIBE 10 MG/1
10 TABLET ORAL DAILY
Qty: 90 TABLET | Refills: 3 | Status: SHIPPED | OUTPATIENT
Start: 2021-07-23 | End: 2022-07-20

## 2021-07-23 RX ORDER — ATORVASTATIN CALCIUM 80 MG/1
80 TABLET, FILM COATED ORAL DAILY
Qty: 90 TABLET | Refills: 3 | Status: SHIPPED | OUTPATIENT
Start: 2021-07-23 | End: 2022-07-20

## 2021-08-03 ENCOUNTER — PATIENT MESSAGE (OUTPATIENT)
Dept: CARDIOLOGY | Facility: CLINIC | Age: 66
End: 2021-08-03

## 2021-10-04 ENCOUNTER — PATIENT MESSAGE (OUTPATIENT)
Dept: ADMINISTRATIVE | Facility: HOSPITAL | Age: 66
End: 2021-10-04

## 2021-10-09 ENCOUNTER — IMMUNIZATION (OUTPATIENT)
Dept: INTERNAL MEDICINE | Facility: CLINIC | Age: 66
End: 2021-10-09
Payer: MEDICARE

## 2021-10-09 DIAGNOSIS — Z23 NEED FOR VACCINATION: Primary | ICD-10-CM

## 2021-10-09 PROCEDURE — 91300 COVID-19, MRNA, LNP-S, PF, 30 MCG/0.3 ML DOSE VACCINE: CPT | Mod: PBBFAC | Performed by: INTERNAL MEDICINE

## 2021-10-09 PROCEDURE — 0003A COVID-19, MRNA, LNP-S, PF, 30 MCG/0.3 ML DOSE VACCINE: CPT | Mod: CV19,PBBFAC | Performed by: INTERNAL MEDICINE

## 2021-10-22 ENCOUNTER — PATIENT MESSAGE (OUTPATIENT)
Dept: ADMINISTRATIVE | Facility: OTHER | Age: 66
End: 2021-10-22
Payer: MEDICARE

## 2021-12-08 ENCOUNTER — LAB VISIT (OUTPATIENT)
Dept: LAB | Facility: HOSPITAL | Age: 66
End: 2021-12-08
Attending: INTERNAL MEDICINE
Payer: MEDICARE

## 2021-12-08 ENCOUNTER — PATIENT MESSAGE (OUTPATIENT)
Dept: CARDIOLOGY | Facility: CLINIC | Age: 66
End: 2021-12-08
Payer: MEDICARE

## 2021-12-08 DIAGNOSIS — I25.118 CORONARY ARTERY DISEASE OF NATIVE ARTERY OF NATIVE HEART WITH STABLE ANGINA PECTORIS: ICD-10-CM

## 2021-12-08 DIAGNOSIS — E78.00 PURE HYPERCHOLESTEROLEMIA: ICD-10-CM

## 2021-12-08 LAB
ALBUMIN SERPL BCP-MCNC: 4 G/DL (ref 3.5–5.2)
ALP SERPL-CCNC: 58 U/L (ref 55–135)
ALT SERPL W/O P-5'-P-CCNC: 37 U/L (ref 10–44)
ANION GAP SERPL CALC-SCNC: 9 MMOL/L (ref 8–16)
AST SERPL-CCNC: 35 U/L (ref 10–40)
BASOPHILS # BLD AUTO: 0.03 K/UL (ref 0–0.2)
BASOPHILS NFR BLD: 0.7 % (ref 0–1.9)
BILIRUB SERPL-MCNC: 2.3 MG/DL (ref 0.1–1)
BUN SERPL-MCNC: 12 MG/DL (ref 8–23)
CALCIUM SERPL-MCNC: 9 MG/DL (ref 8.7–10.5)
CHLORIDE SERPL-SCNC: 105 MMOL/L (ref 95–110)
CHOLEST SERPL-MCNC: 131 MG/DL (ref 120–199)
CHOLEST/HDLC SERPL: 2.3 {RATIO} (ref 2–5)
CO2 SERPL-SCNC: 24 MMOL/L (ref 23–29)
CREAT SERPL-MCNC: 0.9 MG/DL (ref 0.5–1.4)
DIFFERENTIAL METHOD: NORMAL
EOSINOPHIL # BLD AUTO: 0.1 K/UL (ref 0–0.5)
EOSINOPHIL NFR BLD: 3.2 % (ref 0–8)
ERYTHROCYTE [DISTWIDTH] IN BLOOD BY AUTOMATED COUNT: 13.2 % (ref 11.5–14.5)
EST. GFR  (AFRICAN AMERICAN): >60 ML/MIN/1.73 M^2
EST. GFR  (NON AFRICAN AMERICAN): >60 ML/MIN/1.73 M^2
GLUCOSE SERPL-MCNC: 89 MG/DL (ref 70–110)
HCT VFR BLD AUTO: 44 % (ref 40–54)
HDLC SERPL-MCNC: 57 MG/DL (ref 40–75)
HDLC SERPL: 43.5 % (ref 20–50)
HGB BLD-MCNC: 14.7 G/DL (ref 14–18)
IMM GRANULOCYTES # BLD AUTO: 0.01 K/UL (ref 0–0.04)
IMM GRANULOCYTES NFR BLD AUTO: 0.2 % (ref 0–0.5)
LDLC SERPL CALC-MCNC: 64.8 MG/DL (ref 63–159)
LYMPHOCYTES # BLD AUTO: 1.1 K/UL (ref 1–4.8)
LYMPHOCYTES NFR BLD: 25.5 % (ref 18–48)
MCH RBC QN AUTO: 30.9 PG (ref 27–31)
MCHC RBC AUTO-ENTMCNC: 33.4 G/DL (ref 32–36)
MCV RBC AUTO: 93 FL (ref 82–98)
MONOCYTES # BLD AUTO: 0.4 K/UL (ref 0.3–1)
MONOCYTES NFR BLD: 9.9 % (ref 4–15)
NEUTROPHILS # BLD AUTO: 2.6 K/UL (ref 1.8–7.7)
NEUTROPHILS NFR BLD: 60.5 % (ref 38–73)
NONHDLC SERPL-MCNC: 74 MG/DL
NRBC BLD-RTO: 0 /100 WBC
PLATELET # BLD AUTO: 174 K/UL (ref 150–450)
PMV BLD AUTO: 11.2 FL (ref 9.2–12.9)
POTASSIUM SERPL-SCNC: 4.4 MMOL/L (ref 3.5–5.1)
PROT SERPL-MCNC: 6.6 G/DL (ref 6–8.4)
RBC # BLD AUTO: 4.75 M/UL (ref 4.6–6.2)
SODIUM SERPL-SCNC: 138 MMOL/L (ref 136–145)
TRIGL SERPL-MCNC: 46 MG/DL (ref 30–150)
WBC # BLD AUTO: 4.36 K/UL (ref 3.9–12.7)

## 2021-12-08 PROCEDURE — 85025 COMPLETE CBC W/AUTO DIFF WBC: CPT | Performed by: INTERNAL MEDICINE

## 2021-12-08 PROCEDURE — 80061 LIPID PANEL: CPT | Performed by: INTERNAL MEDICINE

## 2021-12-08 PROCEDURE — 80053 COMPREHEN METABOLIC PANEL: CPT | Performed by: INTERNAL MEDICINE

## 2021-12-08 PROCEDURE — 36415 COLL VENOUS BLD VENIPUNCTURE: CPT | Performed by: INTERNAL MEDICINE

## 2022-01-10 ENCOUNTER — LAB VISIT (OUTPATIENT)
Dept: PRIMARY CARE CLINIC | Facility: CLINIC | Age: 67
End: 2022-01-10
Payer: MEDICARE

## 2022-01-10 ENCOUNTER — PATIENT MESSAGE (OUTPATIENT)
Dept: ADMINISTRATIVE | Facility: OTHER | Age: 67
End: 2022-01-10
Payer: MEDICARE

## 2022-01-10 DIAGNOSIS — Z20.822 CONTACT WITH AND (SUSPECTED) EXPOSURE TO COVID-19: ICD-10-CM

## 2022-01-10 LAB
CTP QC/QA: YES
SARS-COV-2 AG RESP QL IA.RAPID: NEGATIVE

## 2022-01-10 PROCEDURE — 87811 SARS-COV-2 COVID19 W/OPTIC: CPT

## 2022-01-11 ENCOUNTER — PATIENT MESSAGE (OUTPATIENT)
Dept: INTERNAL MEDICINE | Facility: CLINIC | Age: 67
End: 2022-01-11
Payer: MEDICARE

## 2022-01-26 ENCOUNTER — PATIENT MESSAGE (OUTPATIENT)
Dept: ADMINISTRATIVE | Facility: HOSPITAL | Age: 67
End: 2022-01-26
Payer: MEDICARE

## 2022-01-31 ENCOUNTER — PATIENT OUTREACH (OUTPATIENT)
Dept: ADMINISTRATIVE | Facility: HOSPITAL | Age: 67
End: 2022-01-31
Payer: MEDICARE

## 2022-01-31 DIAGNOSIS — Z12.5 ENCOUNTER FOR PROSTATE CANCER SCREENING: Primary | ICD-10-CM

## 2022-01-31 NOTE — PROGRESS NOTES
Health Maintenance Due   Topic Date Due    Colorectal Cancer Screening  08/11/2005    Shingles Vaccine (1 of 2) Never done    Influenza Vaccine (1) 09/01/2021    PROSTATE-SPECIFIC ANTIGEN  11/06/2021    Pneumococcal Vaccines (Age 65+) (2 of 2 - PPSV23) 11/11/2021     Triggered LINKS & reconciled immunizations.  Unable to update Care Everywhere.  Order has been placed for PSA screening.  Chart review completed.

## 2022-02-02 NOTE — DISCHARGE SUMMARY
[facility]  Cardiothoracic Surgery  Discharge Summary      Patient Name: Michael Espinoza  MRN: [id number]  Admission Date: 5/14/2018  Hospital Length of Stay: 5 days  Discharge Date and Time:  05/19/2018 10:47 AM  Attending Physician: Chad Edwards MD   Discharging Provider: Daniel Arredondo MD  Primary Care Provider: Primary Doctor No    HPI:   Patient is a 62 y.o. male presents with 3V CAD. PMHx significant for for diffuse parenchymal lung disease. He has been having chest pain on exertion associated with some dypsnea.  He also reports left lower extremity edema by the level of his ankle. The edema is noticeable at night time and improves in the morning. He denies edema of his right leg. Symptoms are present for the past 3 months. Denies orthopnea, palpitations, arrhythmias, diaphoresis or syncopal events.      He is the high-school cross country team .  Today's labs reviewed with patient and demonstrate an excellent cholesterol profile (, HDL 50, LDL 80), LFT's however tbili elevated (2.0) due to Gilbert's syndrome.       Procedure(s) (LRB):  AORTOCORONARY BYPASS-CABG x 3 (N/A)  [place]-Vein-Endovascular (N/A)      Indwelling Lines/Drains at time of discharge:   Lines/Drains/Airways     Central Venous Catheter Line                 Percutaneous Central Line Insertion/Assessment - Quad lumen  05/14/18 0721 left subclavian 5 days         Percutaneous Central Line Insertion/Assessment - quad lumen  05/14/18 0721 left subclavian;other (see comments) 5 days              Hospital Course: On 5/14/18, the patient was taken to the Operating Room for the above stated procedure. Please see the previously dictated operative report for complete details. Postoperatively, the patient was taken from the  Operating Room to the ICU where the vital signs were monitored and pain was kept under control. The patient was weaned from the drips and extubated in the ICU per protocol. Once hemodynamically  stable, the patient was transferred to the Cardiac Step-Down floor for continued strengthening and ambulation. On postoperative day 5, the patient was ready for discharge to home. At the time of discharge, the patient was ambulating unassisted. Pain was well controlled with oral analgesics and the patient was tolerating the diet.     MOBILITY AND ACTIVITY: As tolerated. Patient may shower. No heavy lifting of greater than 5 pounds and no driving.     DIET: An 1800-calorie ADA with a 1500 mL fluid restriction.     WOUND CARE INSTRUCTIONS: Check for redness, swelling and drainage around the  incision or wound. Patient is to call for any obvious bleeding, drainage, pus from the wound, unusual problems or difficulties or temperature of greater than 101   degrees.     FOLLOWUP: Follow up with Dr. Edwards in approximately 3 weeks. Prior to this  appointment, the patient will have a chest x-ray and EKG.     Patient not placed on Lasix or Ace-Inhibitor at the time of discharge due to potential for hypotension.       DISCHARGE CONDITION: At the time of discharge, the patient was in sinus rhythm and afebrile with stable vital signs.      Consults         Status Ordering Provider     Consult Case Management/Social Work  Once     Provider:  (Not yet assigned)    Acknowledged LYNDA URBINA     Consult to Endocrinology  Once     Provider:  (Not yet assigned)    Completed LYNDA URBINA     Inpatient consult to Cardiac Rehab  Once     Provider:  (Not yet assigned)    Completed LYNDA URBINA     Inpatient consult to Registered Dietitian/Nutritionist  Once     Provider:  (Not yet assigned)    Completed LYNDA URBINA          Significant Diagnostic Studies: See chart    Pending Diagnostic Studies:     Procedure Component Value Units Date/Time    Potassium [[id number]] Collected:  05/17/18 1553    Order Status:  Sent Lab Status:  In process Updated:  05/17/18 1553    Specimen:   Blood from Blood     Potassium [[id number]] Collected:  05/15/18 0741    Order Status:  Sent Lab Status:  In process Updated:  05/15/18 0741    Specimen:  Blood from Blood         Final Active Diagnoses:    Diagnosis Date Noted POA    PRINCIPAL PROBLEM:  S/P CABG x 3 [Z95.1]  Not Applicable      Problems Resolved During this Admission:    Diagnosis Date Noted Date Resolved POA    Hyperglycemia [R73.9] 05/16/2018 05/16/2018 No    CAD (coronary artery disease) [I25.10] 05/14/2018 05/16/2018 Yes    Encounter for weaning from ventilator [Z99.11]  05/16/2018 Not Applicable      Discharged Condition: good    Disposition: Home or Self Care    Follow Up:  Follow-up Information     Chad Edwards MD In 3 weeks.    Specialties:  Cardiothoracic Surgery, Transplant  Contact information:  [address]  [facility] [address]  [phone]                 Patient Instructions:     X-Ray Chest PA And Lateral   Standing Status: Future  Standing Exp. Date: 05/19/19   Order Specific Question Answer Comments   May the Radiologist modify the order per protocol to meet the clinical needs of the patient? Yes      Diet Cardiac     Other restrictions (specify):   Order Comments: Sternal precautions     Notify your health care provider if you experience any of the following:  temperature >100.4     Notify your health care provider if you experience any of the following:  persistent nausea and vomiting or diarrhea     Notify your health care provider if you experience any of the following:  severe uncontrolled pain     Notify your health care provider if you experience any of the following:  redness, tenderness, or signs of infection (pain, swelling, redness, odor or green/yellow discharge around incision site)     Notify your health care provider if you experience any of the following:  difficulty breathing or increased cough     Notify your health care provider if you experience any of the following:  severe persistent headache      Notify your health care provider if you experience any of the following:  increased confusion or weakness     Notify your health care provider if you experience any of the following:  persistent dizziness, light-headedness, or visual disturbances     Notify your health care provider if you experience any of the following:  worsening rash     No dressing needed       Medications:  Reconciled Home Medications:      Medication List      START taking these medications    metoprolol tartrate 75 mg Tab  Take 75 mg by mouth 2 (two) times daily.        CHANGE how you take these medications    aspirin 325 MG EC tablet  Commonly known as:  ECOTRIN  Take 1 tablet (325 mg total) by mouth once daily.  What changed:  · medication strength  · how much to take        CONTINUE taking these medications    albuterol 90 mcg/actuation inhaler  2 puffs 15 minutes prior to exercise     atorvastatin 40 MG tablet  Commonly known as:  LIPITOR  TAKE 1 TABLET(40 MG) BY MOUTH EVERY EVENING     CALCIUM 600 600 mg calcium (1,500 mg) Tab  Generic drug:  calcium carbonate  Take 600 mg by mouth once daily.     flu vacc ts13-14(18,up)violetta(PF) 45 mcg (15 mcg x 3)/0.5 mL Syrg  Commonly known as:  Flulaval  Inject 0.5 mLs into the muscle.     VITAMIN D ORAL  Take by mouth once daily.        STOP taking these medications    nitroGLYCERIN 0.4 MG SL tablet  Commonly known as:  NITROSTAT          Time spent on the discharge of patient: 30 minutes    Daniel Arredondo MD  Cardiothoracic Surgery  [facility]   Patient refused

## 2022-03-16 ENCOUNTER — PATIENT MESSAGE (OUTPATIENT)
Dept: ADMINISTRATIVE | Facility: HOSPITAL | Age: 67
End: 2022-03-16
Payer: MEDICARE

## 2022-05-19 ENCOUNTER — IMMUNIZATION (OUTPATIENT)
Dept: INTERNAL MEDICINE | Facility: CLINIC | Age: 67
End: 2022-05-19
Payer: MEDICARE

## 2022-05-19 DIAGNOSIS — Z23 NEED FOR VACCINATION: Primary | ICD-10-CM

## 2022-05-19 PROCEDURE — 91305 COVID-19, MRNA, LNP-S, PF, 30 MCG/0.3 ML DOSE VACCINE (PFIZER): CPT | Mod: PBBFAC | Performed by: INTERNAL MEDICINE

## 2022-05-30 ENCOUNTER — PATIENT MESSAGE (OUTPATIENT)
Dept: ADMINISTRATIVE | Facility: HOSPITAL | Age: 67
End: 2022-05-30
Payer: MEDICARE

## 2022-06-08 NOTE — PATIENT INSTRUCTIONS
Calcium 600mg once daily    Vitamin D 2000 units daily    Salivary cortisols 11pm for 2 nights in a row - drop and get AM blood levels at 7-8am   Fall with Harm Risk

## 2022-06-14 ENCOUNTER — PATIENT MESSAGE (OUTPATIENT)
Dept: INTERNAL MEDICINE | Facility: CLINIC | Age: 67
End: 2022-06-14
Payer: MEDICARE

## 2022-06-14 DIAGNOSIS — Z12.11 COLON CANCER SCREENING: Primary | ICD-10-CM

## 2022-06-16 ENCOUNTER — PATIENT MESSAGE (OUTPATIENT)
Dept: ENDOSCOPY | Facility: HOSPITAL | Age: 67
End: 2022-06-16
Payer: MEDICARE

## 2022-06-16 DIAGNOSIS — Z12.11 SPECIAL SCREENING FOR MALIGNANT NEOPLASMS, COLON: Primary | ICD-10-CM

## 2022-06-16 RX ORDER — SODIUM, POTASSIUM,MAG SULFATES 17.5-3.13G
1 SOLUTION, RECONSTITUTED, ORAL ORAL DAILY
Qty: 1 KIT | Refills: 0 | Status: SHIPPED | OUTPATIENT
Start: 2022-06-16 | End: 2022-06-18

## 2022-06-24 ENCOUNTER — PATIENT MESSAGE (OUTPATIENT)
Dept: CARDIOLOGY | Facility: CLINIC | Age: 67
End: 2022-06-24
Payer: MEDICARE

## 2022-06-24 ENCOUNTER — LAB VISIT (OUTPATIENT)
Dept: LAB | Facility: HOSPITAL | Age: 67
End: 2022-06-24
Attending: INTERNAL MEDICINE
Payer: MEDICARE

## 2022-06-24 DIAGNOSIS — Z12.5 ENCOUNTER FOR PROSTATE CANCER SCREENING: ICD-10-CM

## 2022-06-24 LAB — COMPLEXED PSA SERPL-MCNC: 0.31 NG/ML (ref 0–4)

## 2022-06-24 PROCEDURE — 36415 COLL VENOUS BLD VENIPUNCTURE: CPT | Performed by: INTERNAL MEDICINE

## 2022-06-24 PROCEDURE — 84153 ASSAY OF PSA TOTAL: CPT | Performed by: INTERNAL MEDICINE

## 2022-06-29 ENCOUNTER — TELEPHONE (OUTPATIENT)
Dept: CARDIOLOGY | Facility: CLINIC | Age: 67
End: 2022-06-29
Payer: MEDICARE

## 2022-06-29 ENCOUNTER — OFFICE VISIT (OUTPATIENT)
Dept: CARDIOLOGY | Facility: CLINIC | Age: 67
End: 2022-06-29
Payer: MEDICARE

## 2022-06-29 VITALS
WEIGHT: 152.56 LBS | HEIGHT: 67 IN | BODY MASS INDEX: 23.94 KG/M2 | DIASTOLIC BLOOD PRESSURE: 87 MMHG | SYSTOLIC BLOOD PRESSURE: 141 MMHG | HEART RATE: 59 BPM

## 2022-06-29 DIAGNOSIS — E78.00 PURE HYPERCHOLESTEROLEMIA: ICD-10-CM

## 2022-06-29 DIAGNOSIS — I25.708 CORONARY ARTERY DISEASE OF BYPASS GRAFT OF NATIVE HEART WITH STABLE ANGINA PECTORIS: Primary | ICD-10-CM

## 2022-06-29 DIAGNOSIS — R00.2 PALPITATIONS: Primary | ICD-10-CM

## 2022-06-29 DIAGNOSIS — I20.89 ANGINA OF EFFORT: ICD-10-CM

## 2022-06-29 PROCEDURE — 1157F ADVNC CARE PLAN IN RCRD: CPT | Mod: CPTII,S$GLB,, | Performed by: INTERNAL MEDICINE

## 2022-06-29 PROCEDURE — 1101F PR PT FALLS ASSESS DOC 0-1 FALLS W/OUT INJ PAST YR: ICD-10-PCS | Mod: CPTII,S$GLB,, | Performed by: INTERNAL MEDICINE

## 2022-06-29 PROCEDURE — 3008F BODY MASS INDEX DOCD: CPT | Mod: CPTII,S$GLB,, | Performed by: INTERNAL MEDICINE

## 2022-06-29 PROCEDURE — 99999 PR PBB SHADOW E&M-EST. PATIENT-LVL III: ICD-10-PCS | Mod: PBBFAC,,, | Performed by: INTERNAL MEDICINE

## 2022-06-29 PROCEDURE — 3288F PR FALLS RISK ASSESSMENT DOCUMENTED: ICD-10-PCS | Mod: CPTII,S$GLB,, | Performed by: INTERNAL MEDICINE

## 2022-06-29 PROCEDURE — 99214 PR OFFICE/OUTPT VISIT, EST, LEVL IV, 30-39 MIN: ICD-10-PCS | Mod: S$GLB,,, | Performed by: INTERNAL MEDICINE

## 2022-06-29 PROCEDURE — 99214 OFFICE O/P EST MOD 30 MIN: CPT | Mod: S$GLB,,, | Performed by: INTERNAL MEDICINE

## 2022-06-29 PROCEDURE — 3288F FALL RISK ASSESSMENT DOCD: CPT | Mod: CPTII,S$GLB,, | Performed by: INTERNAL MEDICINE

## 2022-06-29 PROCEDURE — 3079F DIAST BP 80-89 MM HG: CPT | Mod: CPTII,S$GLB,, | Performed by: INTERNAL MEDICINE

## 2022-06-29 PROCEDURE — 3077F SYST BP >= 140 MM HG: CPT | Mod: CPTII,S$GLB,, | Performed by: INTERNAL MEDICINE

## 2022-06-29 PROCEDURE — 3077F PR MOST RECENT SYSTOLIC BLOOD PRESSURE >= 140 MM HG: ICD-10-PCS | Mod: CPTII,S$GLB,, | Performed by: INTERNAL MEDICINE

## 2022-06-29 PROCEDURE — 3079F PR MOST RECENT DIASTOLIC BLOOD PRESSURE 80-89 MM HG: ICD-10-PCS | Mod: CPTII,S$GLB,, | Performed by: INTERNAL MEDICINE

## 2022-06-29 PROCEDURE — 1159F PR MEDICATION LIST DOCUMENTED IN MEDICAL RECORD: ICD-10-PCS | Mod: CPTII,S$GLB,, | Performed by: INTERNAL MEDICINE

## 2022-06-29 PROCEDURE — 1126F AMNT PAIN NOTED NONE PRSNT: CPT | Mod: CPTII,S$GLB,, | Performed by: INTERNAL MEDICINE

## 2022-06-29 PROCEDURE — 99999 PR PBB SHADOW E&M-EST. PATIENT-LVL III: CPT | Mod: PBBFAC,,, | Performed by: INTERNAL MEDICINE

## 2022-06-29 PROCEDURE — 1101F PT FALLS ASSESS-DOCD LE1/YR: CPT | Mod: CPTII,S$GLB,, | Performed by: INTERNAL MEDICINE

## 2022-06-29 PROCEDURE — 1159F MED LIST DOCD IN RCRD: CPT | Mod: CPTII,S$GLB,, | Performed by: INTERNAL MEDICINE

## 2022-06-29 PROCEDURE — 1157F PR ADVANCE CARE PLAN OR EQUIV PRESENT IN MEDICAL RECORD: ICD-10-PCS | Mod: CPTII,S$GLB,, | Performed by: INTERNAL MEDICINE

## 2022-06-29 PROCEDURE — 1126F PR PAIN SEVERITY QUANTIFIED, NO PAIN PRESENT: ICD-10-PCS | Mod: CPTII,S$GLB,, | Performed by: INTERNAL MEDICINE

## 2022-06-29 PROCEDURE — 3008F PR BODY MASS INDEX (BMI) DOCUMENTED: ICD-10-PCS | Mod: CPTII,S$GLB,, | Performed by: INTERNAL MEDICINE

## 2022-06-29 RX ORDER — NITROGLYCERIN 0.4 MG/1
0.4 TABLET SUBLINGUAL EVERY 5 MIN PRN
Qty: 60 TABLET | Refills: 12 | Status: SHIPPED | OUTPATIENT
Start: 2022-06-29 | End: 2023-08-08

## 2022-06-29 NOTE — PROGRESS NOTES
"Subjective:    Patient ID:  Michael Espinoza is a 66 y.o. male who presents for follow-up of CAD    HPI   The patient is a 66 year old male post CABG 2018 presents for routine follow up he is a teacher and distant runner . When seen 7/14/21 he had been very active and walking 6 miles 4 days a week but was interrupted  by a compression fracture spine due to osteoporosis. He has resumed exercise and over the past month has has noted increased  CONTRERAS and on occasion exercise induced precordial "heaviness" releved with rest.  Lab Results   Component Value Date     07/05/2022    K 4.4 07/05/2022     07/05/2022    CO2 21 (L) 07/05/2022    BUN 18 07/05/2022    CREATININE 1.0 07/05/2022    GLU 91 07/05/2022    HGBA1C 5.4 05/10/2018    MG 1.9 05/19/2018    AST 34 07/05/2022    ALT 33 07/05/2022    ALBUMIN 4.4 07/05/2022    PROT 7.7 07/05/2022    BILITOT 2.8 (H) 07/05/2022    WBC 7.68 07/05/2022    HGB 16.1 07/05/2022    HCT 47.6 07/05/2022    HCT 47 07/05/2022    MCV 92 07/05/2022     07/05/2022    INR 1.1 05/19/2018    PSA 0.31 06/24/2022    TSH 1.737 05/05/2021         Lab Results   Component Value Date    CHOL 126 07/05/2022    HDL 54 07/05/2022    TRIG 62 07/05/2022       Lab Results   Component Value Date    LDLCALC 59.6 (L) 07/05/2022       Past Medical History:   Diagnosis Date    Complete heart block 7/5/2022    Coronary artery disease of native artery of native heart with stable angina pectoris 4/10/2018    Hyperlipidemia      No current facility-administered medications for this visit.  No current outpatient medications on file.    Facility-Administered Medications Ordered in Other Visits:     [START ON 7/6/2022] aspirin EC tablet 81 mg, 81 mg, Oral, Daily, Aretha Barreto MD    [START ON 7/6/2022] atorvastatin tablet 80 mg, 80 mg, Oral, Daily, Aretha Barreto MD    BUPivacaine (PF) 0.25% (2.5 mg/ml) injection, , , PRN, Alessandro Canales MD, 10 mL at 07/05/22 1443    doxycycline tablet 100 mg, " 100 mg, Oral, Q12H, Lew Marroquin MD    [START ON 7/6/2022] ezetimibe tablet 10 mg, 10 mg, Oral, Daily, Aretha Barreto MD    LIDOcaine HCL 20 mg/ml (2%) injection, , , PRN, Alessandro Canales MD, 10 mL at 07/05/22 1443    sodium chloride 0.9% flush 10 mL, 10 mL, Intravenous, PRN, Feroz Rowe MD    sodium chloride 0.9% irrigation, , , PRN, Alessandro Canales MD, 1,000 mL at 07/05/22 1433    vancomycin injection, , , PRN, Alessandro Canales MD, 1,000 mg at 07/05/22 1433          Review of Systems   Constitutional: Negative for decreased appetite, diaphoresis, fever, malaise/fatigue, weight gain and weight loss.   HENT: Negative for congestion, ear discharge, ear pain and nosebleeds.    Eyes: Negative for blurred vision, double vision and visual disturbance.   Cardiovascular: Positive for chest pain and dyspnea on exertion. Negative for claudication, cyanosis, irregular heartbeat, leg swelling, near-syncope, orthopnea, palpitations, paroxysmal nocturnal dyspnea and syncope.   Respiratory: Negative for cough, hemoptysis, shortness of breath, sleep disturbances due to breathing, snoring, sputum production and wheezing.    Endocrine: Negative for polydipsia, polyphagia and polyuria.   Hematologic/Lymphatic: Negative for adenopathy and bleeding problem. Does not bruise/bleed easily.   Skin: Negative for color change, nail changes, poor wound healing and rash.   Musculoskeletal: Negative for muscle cramps and muscle weakness.   Gastrointestinal: Negative for abdominal pain, anorexia, change in bowel habit, hematochezia, nausea and vomiting.   Genitourinary: Negative for dysuria, frequency and hematuria.   Neurological: Negative for brief paralysis, difficulty with concentration, excessive daytime sleepiness, dizziness, focal weakness, headaches, light-headedness, seizures, vertigo and weakness.   Psychiatric/Behavioral: Negative for altered mental status and depression.   Allergic/Immunologic: Negative  "for persistent infections.        Objective:BP (!) 141/87 (BP Location: Left arm, Patient Position: Sitting, BP Method: Medium (Automatic))   Pulse (!) 59   Ht 5' 7" (1.702 m)   Wt 69.2 kg (152 lb 8.9 oz)   BMI 23.89 kg/m²             Physical Exam  Constitutional:       Appearance: Normal appearance. He is well-developed and normal weight.   HENT:      Head: Normocephalic.      Right Ear: External ear normal.      Left Ear: External ear normal.      Nose: Nose normal.   Eyes:      General: No scleral icterus.     Pupils: Pupils are equal, round, and reactive to light.   Neck:      Thyroid: No thyromegaly.      Vascular: No JVD.      Trachea: No tracheal deviation.   Cardiovascular:      Rate and Rhythm: Normal rate and regular rhythm.      Pulses: Intact distal pulses.           Carotid pulses are 2+ on the right side and 2+ on the left side.       Dorsalis pedis pulses are 2+ on the right side and 2+ on the left side.      Heart sounds: No murmur heard.    No friction rub. No gallop.   Pulmonary:      Effort: Pulmonary effort is normal.      Breath sounds: Normal breath sounds.   Chest:       Abdominal:      General: Bowel sounds are normal. There is no distension.      Tenderness: There is no abdominal tenderness. There is no guarding.   Musculoskeletal:         General: No tenderness. Normal range of motion.      Cervical back: Normal range of motion and neck supple.   Lymphadenopathy:      Comments: Palpation of neck and groin lymph nodes normal   Skin:     General: Skin is dry.      Comments: Palpation of skin normal   Neurological:      Mental Status: He is alert and oriented to person, place, and time.      Cranial Nerves: No cranial nerve deficit.      Motor: No abnormal muscle tone.      Coordination: Coordination normal.   Psychiatric:         Behavior: Behavior normal.         Thought Content: Thought content normal.         Judgment: Judgment normal.           Assessment:       1. Coronary artery " disease of bypass graft of native heart with stable angina pectoris    2. Pure hypercholesterolemia    3. Angina of effort         Plan:       Michael was seen today for follow-up and fatigue.    Diagnoses and all orders for this visit:    Coronary artery disease of bypass graft of native heart with stable angina pectoris  -     Cardiac PET Scan Stress; Future  -     CBC Auto Differential; Future; Expected date: 06/29/2022  -     Comprehensive Metabolic Panel; Future; Expected date: 06/29/2022  -     nitroGLYCERIN (NITROSTAT) 0.4 MG SL tablet; Place 1 tablet (0.4 mg total) under the tongue every 5 (five) minutes as needed for Chest pain.    Pure hypercholesterolemia  -     Lipid Panel; Future; Expected date: 06/29/2022    Angina of effort  -     Cardiac PET Scan Stress; Future

## 2022-07-01 ENCOUNTER — OFFICE VISIT (OUTPATIENT)
Dept: INTERNAL MEDICINE | Facility: CLINIC | Age: 67
End: 2022-07-01
Payer: MEDICARE

## 2022-07-01 VITALS
DIASTOLIC BLOOD PRESSURE: 80 MMHG | WEIGHT: 151 LBS | HEIGHT: 67 IN | BODY MASS INDEX: 23.7 KG/M2 | SYSTOLIC BLOOD PRESSURE: 116 MMHG | OXYGEN SATURATION: 97 % | HEART RATE: 60 BPM

## 2022-07-01 DIAGNOSIS — M81.6 LOCALIZED OSTEOPOROSIS OF LEQUESNE: ICD-10-CM

## 2022-07-01 DIAGNOSIS — E78.00 PURE HYPERCHOLESTEROLEMIA: ICD-10-CM

## 2022-07-01 DIAGNOSIS — Z00.00 ROUTINE PHYSICAL EXAMINATION: Primary | ICD-10-CM

## 2022-07-01 DIAGNOSIS — M80.80XA PATHOLOGICAL FRACTURE DUE TO OSTEOPOROSIS, UNSPECIFIED FRACTURE SITE, UNSPECIFIED OSTEOPOROSIS TYPE, INITIAL ENCOUNTER: Primary | ICD-10-CM

## 2022-07-01 DIAGNOSIS — M80.00XA AGE-RELATED OSTEOPOROSIS WITH CURRENT PATHOLOGICAL FRACTURE, INITIAL ENCOUNTER: ICD-10-CM

## 2022-07-01 DIAGNOSIS — Z95.1 S/P CABG X 3: ICD-10-CM

## 2022-07-01 DIAGNOSIS — I20.89 EXERTIONAL ANGINA: ICD-10-CM

## 2022-07-01 DIAGNOSIS — I25.118 CORONARY ARTERY DISEASE OF NATIVE ARTERY OF NATIVE HEART WITH STABLE ANGINA PECTORIS: ICD-10-CM

## 2022-07-01 PROCEDURE — 1101F PR PT FALLS ASSESS DOC 0-1 FALLS W/OUT INJ PAST YR: ICD-10-PCS | Mod: CPTII,S$GLB,, | Performed by: INTERNAL MEDICINE

## 2022-07-01 PROCEDURE — 1157F ADVNC CARE PLAN IN RCRD: CPT | Mod: CPTII,S$GLB,, | Performed by: INTERNAL MEDICINE

## 2022-07-01 PROCEDURE — 3008F BODY MASS INDEX DOCD: CPT | Mod: CPTII,S$GLB,, | Performed by: INTERNAL MEDICINE

## 2022-07-01 PROCEDURE — 3079F PR MOST RECENT DIASTOLIC BLOOD PRESSURE 80-89 MM HG: ICD-10-PCS | Mod: CPTII,S$GLB,, | Performed by: INTERNAL MEDICINE

## 2022-07-01 PROCEDURE — 3074F SYST BP LT 130 MM HG: CPT | Mod: CPTII,S$GLB,, | Performed by: INTERNAL MEDICINE

## 2022-07-01 PROCEDURE — 1159F MED LIST DOCD IN RCRD: CPT | Mod: CPTII,S$GLB,, | Performed by: INTERNAL MEDICINE

## 2022-07-01 PROCEDURE — 1157F PR ADVANCE CARE PLAN OR EQUIV PRESENT IN MEDICAL RECORD: ICD-10-PCS | Mod: CPTII,S$GLB,, | Performed by: INTERNAL MEDICINE

## 2022-07-01 PROCEDURE — 1159F PR MEDICATION LIST DOCUMENTED IN MEDICAL RECORD: ICD-10-PCS | Mod: CPTII,S$GLB,, | Performed by: INTERNAL MEDICINE

## 2022-07-01 PROCEDURE — 99999 PR PBB SHADOW E&M-EST. PATIENT-LVL III: CPT | Mod: PBBFAC,,, | Performed by: INTERNAL MEDICINE

## 2022-07-01 PROCEDURE — 1126F AMNT PAIN NOTED NONE PRSNT: CPT | Mod: CPTII,S$GLB,, | Performed by: INTERNAL MEDICINE

## 2022-07-01 PROCEDURE — 99397 PR PREVENTIVE VISIT,EST,65 & OVER: ICD-10-PCS | Mod: S$GLB,,, | Performed by: INTERNAL MEDICINE

## 2022-07-01 PROCEDURE — 3074F PR MOST RECENT SYSTOLIC BLOOD PRESSURE < 130 MM HG: ICD-10-PCS | Mod: CPTII,S$GLB,, | Performed by: INTERNAL MEDICINE

## 2022-07-01 PROCEDURE — 3288F PR FALLS RISK ASSESSMENT DOCUMENTED: ICD-10-PCS | Mod: CPTII,S$GLB,, | Performed by: INTERNAL MEDICINE

## 2022-07-01 PROCEDURE — 3008F PR BODY MASS INDEX (BMI) DOCUMENTED: ICD-10-PCS | Mod: CPTII,S$GLB,, | Performed by: INTERNAL MEDICINE

## 2022-07-01 PROCEDURE — 99397 PER PM REEVAL EST PAT 65+ YR: CPT | Mod: S$GLB,,, | Performed by: INTERNAL MEDICINE

## 2022-07-01 PROCEDURE — 3288F FALL RISK ASSESSMENT DOCD: CPT | Mod: CPTII,S$GLB,, | Performed by: INTERNAL MEDICINE

## 2022-07-01 PROCEDURE — 3079F DIAST BP 80-89 MM HG: CPT | Mod: CPTII,S$GLB,, | Performed by: INTERNAL MEDICINE

## 2022-07-01 PROCEDURE — 1126F PR PAIN SEVERITY QUANTIFIED, NO PAIN PRESENT: ICD-10-PCS | Mod: CPTII,S$GLB,, | Performed by: INTERNAL MEDICINE

## 2022-07-01 PROCEDURE — 1101F PT FALLS ASSESS-DOCD LE1/YR: CPT | Mod: CPTII,S$GLB,, | Performed by: INTERNAL MEDICINE

## 2022-07-01 PROCEDURE — 99999 PR PBB SHADOW E&M-EST. PATIENT-LVL III: ICD-10-PCS | Mod: PBBFAC,,, | Performed by: INTERNAL MEDICINE

## 2022-07-01 RX ORDER — HEPARIN 100 UNIT/ML
500 SYRINGE INTRAVENOUS
Status: CANCELLED | OUTPATIENT
Start: 2022-07-01

## 2022-07-01 RX ORDER — SODIUM CHLORIDE 0.9 % (FLUSH) 0.9 %
10 SYRINGE (ML) INJECTION
Status: CANCELLED | OUTPATIENT
Start: 2022-07-01

## 2022-07-01 RX ORDER — ZOLEDRONIC ACID 5 MG/100ML
5 INJECTION, SOLUTION INTRAVENOUS
Status: CANCELLED | OUTPATIENT
Start: 2022-07-01

## 2022-07-01 NOTE — PROGRESS NOTES
Subjective:       Patient ID: Michael Espinoza is a 66 y.o. male.    Chief Complaint: Annual Exam    Patient here for annual exam.  Doing well.  Labs were reviewed, PSA stable previously had chemistry and lipids through Cardiology.  He is having a stress test soon for some mild nonspecific symptoms that may relate to angina.  He tells me he may need a angiogram.  He has an eye appointment and a colonoscopy coming up.  Prescriptions are up-to-date.  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  He had a Reclast infusion through Orthopedics but that was a year ago in said he is not sure if he missed a follow-up or something happen because of the pandemic but he was never called back.  I will send a message to orthopedics.  We will update his exam  Prescriptions reviewed.  He is due for a pneumonia follow-up but will get that later in the summer    Review of Systems   Constitutional: Positive for activity change. Negative for unexpected weight change.   HENT: Negative for hearing loss, rhinorrhea and trouble swallowing.    Eyes: Positive for visual disturbance. Negative for discharge.   Respiratory: Negative for chest tightness and wheezing.    Cardiovascular: Negative for chest pain and palpitations.   Gastrointestinal: Positive for diarrhea. Negative for blood in stool, constipation and vomiting.   Endocrine: Negative for polydipsia and polyuria.   Genitourinary: Negative for difficulty urinating, hematuria and urgency.   Musculoskeletal: Negative for arthralgias, joint swelling and neck pain.   Neurological: Negative for weakness and headaches.   Psychiatric/Behavioral: Negative for confusion and dysphoric mood.           Past Medical History:   Diagnosis Date    Coronary artery disease of native artery of native heart with stable angina pectoris 4/10/2018    Hyperlipidemia      Past Surgical History:    Procedure Laterality Date    CYST REMOVAL      TONSILLECTOMY        Patient Active Problem List   Diagnosis    HLD (hyperlipidemia)    Gilbert's syndrome    Exertional angina    ASD (atrial septal defect)    SOB (shortness of breath) on exertion    Abnormal CT of the chest    Chest pain    Coronary artery disease of native artery of native heart with stable angina pectoris    Pre-operative cardiovascular examination    S/P CABG x 3    Chronic right shoulder pain    Partial nontraumatic tear of rotator cuff, right    Pathological fracture due to osteoporosis    Age-related osteoporosis with current pathological fracture    Localized osteoporosis of [place]        Objective:      Physical Exam  Constitutional:       General: He is not in acute distress.     Appearance: He is well-developed.   HENT:      Head: Normocephalic and atraumatic.      Right Ear: Tympanic membrane, ear canal and external ear normal.      Left Ear: Tympanic membrane, ear canal and external ear normal.      Nose: Nose normal. No rhinorrhea.      Mouth/Throat:      Pharynx: No oropharyngeal exudate or posterior oropharyngeal erythema.   Eyes:      General: No scleral icterus.     Conjunctiva/sclera: Conjunctivae normal.      Pupils: Pupils are equal, round, and reactive to light.   Neck:      Thyroid: No thyromegaly.      Vascular: No JVD.      Comments: No supraclavicular nodes palpated  Cardiovascular:      Rate and Rhythm: Normal rate and regular rhythm.      Pulses: Normal pulses.      Heart sounds: Normal heart sounds. No murmur heard.  Pulmonary:      Effort: Pulmonary effort is normal.      Breath sounds: Normal breath sounds. No wheezing or rales.   Chest:   Breasts:      Right: No supraclavicular adenopathy.      Left: No supraclavicular adenopathy.       Abdominal:      General: Bowel sounds are normal. There is no distension.      Palpations: Abdomen is soft. There is no mass.      Tenderness: There is no abdominal  tenderness.   Genitourinary:     Prostate: Normal. Not enlarged and not tender.      Rectum: Normal. Guaiac result negative. No tenderness.   Musculoskeletal:         General: No tenderness. Normal range of motion.      Cervical back: Normal range of motion and neck supple.      Right lower leg: No edema.      Left lower leg: No edema.   Lymphadenopathy:      Cervical: No cervical adenopathy.      Upper Body:      Right upper body: No supraclavicular adenopathy.      Left upper body: No supraclavicular adenopathy.   Skin:     General: Skin is warm and dry.      Coloration: Skin is not jaundiced or pale.      Findings: No rash.   Neurological:      General: No focal deficit present.      Mental Status: He is alert and oriented to person, place, and time.      Cranial Nerves: No cranial nerve deficit.      Coordination: Coordination normal.      Deep Tendon Reflexes: Reflexes are normal and symmetric.      Reflex Scores:       Bicep reflexes are 2+ on the right side and 2+ on the left side.       Patellar reflexes are 2+ on the right side and 2+ on the left side.  Psychiatric:         Mood and Affect: Mood normal. Mood is not depressed.         Behavior: Behavior normal.         Thought Content: Thought content normal.         Assessment:       Problem List Items Addressed This Visit        Cardiac/Vascular    HLD (hyperlipidemia)    Exertional angina    Coronary artery disease of native artery of native heart with stable angina pectoris    S/P CABG x 3       Orthopedic    Localized osteoporosis of [place]      Other Visit Diagnoses     Routine physical examination    -  Primary          Plan:         Michael was seen today for annual exam.    Diagnoses and all orders for this visit:    Routine physical examination    Exertional angina    S/P CABG x 3    Coronary artery disease of native artery of native heart with stable angina pectoris    Pure hypercholesterolemia    Localized osteoporosis of [place]            "  C-scope scheduled for about 6 weeks from now.   Seeing his Eye doctor [facility]. May have to have cataract surgery.     Follow-up annually.  Continue to see Cardiology.    Portions of this note may have been created with voice recognition software. Occasional "wrong-word" or "sound-a-like" substitutions may have occurred due to the inherent limitations of voice recognition software. Please, read the note carefully and recognize, using context, where substitutions have occurred.  "

## 2022-07-05 ENCOUNTER — TELEPHONE (OUTPATIENT)
Dept: CARDIOLOGY | Facility: HOSPITAL | Age: 67
End: 2022-07-05
Payer: MEDICARE

## 2022-07-05 ENCOUNTER — ANESTHESIA EVENT (OUTPATIENT)
Dept: MEDSURG UNIT | Facility: HOSPITAL | Age: 67
DRG: 243 | End: 2022-07-05
Payer: MEDICARE

## 2022-07-05 ENCOUNTER — HOSPITAL ENCOUNTER (OUTPATIENT)
Dept: CARDIOLOGY | Facility: HOSPITAL | Age: 67
Discharge: HOME | DRG: 243 | End: 2022-07-05
Attending: INTERNAL MEDICINE
Payer: MEDICARE

## 2022-07-05 ENCOUNTER — HOSPITAL ENCOUNTER (INPATIENT)
Facility: HOSPITAL | Age: 67
LOS: 1 days | Discharge: HOME | DRG: 243 | End: 2022-07-06
Attending: EMERGENCY MEDICINE | Admitting: INTERNAL MEDICINE
Payer: MEDICARE

## 2022-07-05 ENCOUNTER — ANESTHESIA (OUTPATIENT)
Dept: MEDSURG UNIT | Facility: HOSPITAL | Age: 67
DRG: 243 | End: 2022-07-05
Payer: MEDICARE

## 2022-07-05 DIAGNOSIS — I44.1 2ND DEGREE AV BLOCK: Primary | ICD-10-CM

## 2022-07-05 DIAGNOSIS — R07.9 CHEST PAIN: ICD-10-CM

## 2022-07-05 DIAGNOSIS — I25.708 CORONARY ARTERY DISEASE OF BYPASS GRAFT OF NATIVE HEART WITH STABLE ANGINA PECTORIS: ICD-10-CM

## 2022-07-05 DIAGNOSIS — Z95.0 CARDIAC PACEMAKER IN SITU: ICD-10-CM

## 2022-07-05 DIAGNOSIS — I20.89 ANGINA OF EFFORT: ICD-10-CM

## 2022-07-05 DIAGNOSIS — I44.2 COMPLETE HEART BLOCK: Primary | ICD-10-CM

## 2022-07-05 DIAGNOSIS — I44.2 CHB (COMPLETE HEART BLOCK): ICD-10-CM

## 2022-07-05 DIAGNOSIS — I49.9 ARRHYTHMIA: ICD-10-CM

## 2022-07-05 PROBLEM — N39.0 UTI (URINARY TRACT INFECTION): Status: ACTIVE | Noted: 2022-07-05

## 2022-07-05 LAB
ABO + RH BLD: NORMAL
ALBUMIN SERPL BCP-MCNC: 4.4 G/DL (ref 3.5–5.2)
ALP SERPL-CCNC: 65 U/L (ref 55–135)
ALT SERPL W/O P-5'-P-CCNC: 33 U/L (ref 10–44)
ANION GAP SERPL CALC-SCNC: 9 MMOL/L (ref 8–16)
ASCENDING AORTA: 3.11 CM
AST SERPL-CCNC: 34 U/L (ref 10–40)
AV INDEX (PROSTH): 0.55
AV MEAN GRADIENT: 5 MMHG
AV PEAK GRADIENT: 9 MMHG
AV VALVE AREA: 1.78 CM2
AV VELOCITY RATIO: 0.59
BACTERIA #/AREA URNS AUTO: ABNORMAL /HPF
BASOPHILS # BLD AUTO: 0.04 K/UL (ref 0–0.2)
BASOPHILS NFR BLD: 0.5 % (ref 0–1.9)
BILIRUB SERPL-MCNC: 2.8 MG/DL (ref 0.1–1)
BILIRUB UR QL STRIP: NEGATIVE
BLD GP AB SCN CELLS X3 SERPL QL: NORMAL
BNP SERPL-MCNC: 596 PG/ML (ref 0–99)
BSA FOR ECHO PROCEDURE: 1.78 M2
BUN SERPL-MCNC: 18 MG/DL (ref 8–23)
BUN SERPL-MCNC: 21 MG/DL (ref 6–30)
CALCIUM SERPL-MCNC: 9.7 MG/DL (ref 8.7–10.5)
CHLORIDE SERPL-SCNC: 108 MMOL/L (ref 95–110)
CHLORIDE SERPL-SCNC: 108 MMOL/L (ref 95–110)
CLARITY UR REFRACT.AUTO: ABNORMAL
CO2 SERPL-SCNC: 21 MMOL/L (ref 23–29)
COLOR UR AUTO: YELLOW
CREAT SERPL-MCNC: 1 MG/DL (ref 0.5–1.4)
CREAT SERPL-MCNC: 1 MG/DL (ref 0.5–1.4)
CTP QC/QA: YES
CV ECHO LV RWT: 0.39 CM
DIFFERENTIAL METHOD: ABNORMAL
DOP CALC AO PEAK VEL: 1.53 M/S
DOP CALC AO VTI: 38.01 CM
DOP CALC LVOT AREA: 3.2 CM2
DOP CALC LVOT DIAMETER: 2.02 CM
DOP CALC LVOT PEAK VEL: 0.91 M/S
DOP CALC LVOT STROKE VOLUME: 67.49 CM3
DOP CALCLVOT PEAK VEL VTI: 21.07 CM
E WAVE DECELERATION TIME: 192.99 MSEC
E/A RATIO: 1.27
E/E' RATIO: 8.08 M/S
ECHO LV POSTERIOR WALL: 0.84 CM (ref 0.6–1.1)
EJECTION FRACTION: 65 %
EOSINOPHIL # BLD AUTO: 0.1 K/UL (ref 0–0.5)
EOSINOPHIL NFR BLD: 0.8 % (ref 0–8)
ERYTHROCYTE [DISTWIDTH] IN BLOOD BY AUTOMATED COUNT: 13.3 % (ref 11.5–14.5)
EST. GFR  (AFRICAN AMERICAN): >60 ML/MIN/1.73 M^2
EST. GFR  (NON AFRICAN AMERICAN): >60 ML/MIN/1.73 M^2
FRACTIONAL SHORTENING: 20 % (ref 28–44)
GLUCOSE SERPL-MCNC: 91 MG/DL (ref 70–110)
GLUCOSE SERPL-MCNC: 95 MG/DL (ref 70–110)
GLUCOSE UR QL STRIP: NEGATIVE
HCT VFR BLD AUTO: 47.6 % (ref 40–54)
HCT VFR BLD CALC: 47 %PCV (ref 36–54)
HGB BLD-MCNC: 16.1 G/DL (ref 14–18)
HGB UR QL STRIP: NEGATIVE
IMM GRANULOCYTES # BLD AUTO: 0.03 K/UL (ref 0–0.04)
IMM GRANULOCYTES NFR BLD AUTO: 0.4 % (ref 0–0.5)
INTERVENTRICULAR SEPTUM: 0.98 CM (ref 0.6–1.1)
KETONES UR QL STRIP: NEGATIVE
LA MAJOR: 5.96 CM
LA MINOR: 5.98 CM
LA WIDTH: 3.36 CM
LEFT ATRIUM SIZE: 3.25 CM
LEFT ATRIUM VOLUME INDEX: 31.1 ML/M2
LEFT ATRIUM VOLUME: 55.41 CM3
LEFT INTERNAL DIMENSION IN SYSTOLE: 3.44 CM (ref 2.1–4)
LEFT VENTRICLE DIASTOLIC VOLUME INDEX: 46.81 ML/M2
LEFT VENTRICLE DIASTOLIC VOLUME: 83.32 ML
LEFT VENTRICLE MASS INDEX: 71 G/M2
LEFT VENTRICLE SYSTOLIC VOLUME INDEX: 27.3 ML/M2
LEFT VENTRICLE SYSTOLIC VOLUME: 48.66 ML
LEFT VENTRICULAR INTERNAL DIMENSION IN DIASTOLE: 4.31 CM (ref 3.5–6)
LEFT VENTRICULAR MASS: 125.64 G
LEUKOCYTE ESTERASE UR QL STRIP: ABNORMAL
LV LATERAL E/E' RATIO: 5.53 M/S
LV SEPTAL E/E' RATIO: 15 M/S
LYMPHOCYTES # BLD AUTO: 1.7 K/UL (ref 1–4.8)
LYMPHOCYTES NFR BLD: 21.7 % (ref 18–48)
MCH RBC QN AUTO: 31.3 PG (ref 27–31)
MCHC RBC AUTO-ENTMCNC: 33.8 G/DL (ref 32–36)
MCV RBC AUTO: 92 FL (ref 82–98)
MICROSCOPIC COMMENT: ABNORMAL
MONOCYTES # BLD AUTO: 0.7 K/UL (ref 0.3–1)
MONOCYTES NFR BLD: 9.4 % (ref 4–15)
MV PEAK A VEL: 0.83 M/S
MV PEAK E VEL: 1.05 M/S
MV STENOSIS PRESSURE HALF TIME: 55.97 MS
MV VALVE AREA P 1/2 METHOD: 3.93 CM2
NEUTROPHILS # BLD AUTO: 5.2 K/UL (ref 1.8–7.7)
NEUTROPHILS NFR BLD: 67.2 % (ref 38–73)
NITRITE UR QL STRIP: NEGATIVE
NRBC BLD-RTO: 0 /100 WBC
PH UR STRIP: 5 [PH] (ref 5–8)
PISA TR MAX VEL: 2.52 M/S
PLATELET # BLD AUTO: 177 K/UL (ref 150–450)
PMV BLD AUTO: 10.9 FL (ref 9.2–12.9)
POC IONIZED CALCIUM: 1.12 MMOL/L (ref 1.06–1.42)
POC TCO2 (MEASURED): 24 MMOL/L (ref 23–29)
POTASSIUM BLD-SCNC: 4.3 MMOL/L (ref 3.5–5.1)
POTASSIUM SERPL-SCNC: 4.4 MMOL/L (ref 3.5–5.1)
PROT SERPL-MCNC: 7.7 G/DL (ref 6–8.4)
PROT UR QL STRIP: NEGATIVE
RA MAJOR: 5.01 CM
RA PRESSURE: 3 MMHG
RA WIDTH: 3.7 CM
RBC # BLD AUTO: 5.15 M/UL (ref 4.6–6.2)
RBC #/AREA URNS AUTO: 12 /HPF (ref 0–4)
RIGHT VENTRICULAR END-DIASTOLIC DIMENSION: 4.44 CM
SAMPLE: NORMAL
SARS-COV-2 RDRP RESP QL NAA+PROBE: NEGATIVE
SINUS: 2.91 CM
SODIUM BLD-SCNC: 141 MMOL/L (ref 136–145)
SODIUM SERPL-SCNC: 138 MMOL/L (ref 136–145)
SP GR UR STRIP: 1.01 (ref 1–1.03)
STJ: 3.06 CM
TDI LATERAL: 0.19 M/S
TDI SEPTAL: 0.07 M/S
TDI: 0.13 M/S
TR MAX PG: 25 MMHG
TRICUSPID ANNULAR PLANE SYSTOLIC EXCURSION: 1.43 CM
TROPONIN I SERPL DL<=0.01 NG/ML-MCNC: 0.01 NG/ML (ref 0–0.03)
TROPONIN I SERPL DL<=0.01 NG/ML-MCNC: <0.006 NG/ML (ref 0–0.03)
TV REST PULMONARY ARTERY PRESSURE: 28 MMHG
URN SPEC COLLECT METH UR: ABNORMAL
WBC # BLD AUTO: 7.68 K/UL (ref 3.9–12.7)
WBC #/AREA URNS AUTO: 100 /HPF (ref 0–5)

## 2022-07-05 PROCEDURE — 93010 EKG 12-LEAD: ICD-10-PCS | Mod: ,,, | Performed by: INTERNAL MEDICINE

## 2022-07-05 PROCEDURE — 33208 INSRT HEART PM ATRIAL & VENT: CPT | Mod: KX,GC,, | Performed by: INTERNAL MEDICINE

## 2022-07-05 PROCEDURE — D9220A PRA ANESTHESIA: Mod: CRNA,,, | Performed by: NURSE ANESTHETIST, CERTIFIED REGISTERED

## 2022-07-05 PROCEDURE — 99233 SBSQ HOSP IP/OBS HIGH 50: CPT | Mod: 57,,, | Performed by: INTERNAL MEDICINE

## 2022-07-05 PROCEDURE — 86901 BLOOD TYPING SEROLOGIC RH(D): CPT | Performed by: STUDENT IN AN ORGANIZED HEALTH CARE EDUCATION/TRAINING PROGRAM

## 2022-07-05 PROCEDURE — C1898 LEAD, PMKR, OTHER THAN TRANS: HCPCS | Performed by: INTERNAL MEDICINE

## 2022-07-05 PROCEDURE — 63600175 PHARM REV CODE 636 W HCPCS: Performed by: INTERNAL MEDICINE

## 2022-07-05 PROCEDURE — 99223 1ST HOSP IP/OBS HIGH 75: CPT | Mod: AI,GC,, | Performed by: INTERNAL MEDICINE

## 2022-07-05 PROCEDURE — 25000003 PHARM REV CODE 250: Performed by: INTERNAL MEDICINE

## 2022-07-05 PROCEDURE — C1769 GUIDE WIRE: HCPCS | Performed by: INTERNAL MEDICINE

## 2022-07-05 PROCEDURE — 99285 EMERGENCY DEPT VISIT HI MDM: CPT | Mod: 25

## 2022-07-05 PROCEDURE — U0002 COVID-19 LAB TEST NON-CDC: HCPCS | Performed by: STUDENT IN AN ORGANIZED HEALTH CARE EDUCATION/TRAINING PROGRAM

## 2022-07-05 PROCEDURE — 99223 PR INITIAL HOSPITAL CARE,LEVL III: ICD-10-PCS | Mod: AI,GC,, | Performed by: INTERNAL MEDICINE

## 2022-07-05 PROCEDURE — 87186 SC STD MICRODIL/AGAR DIL: CPT | Performed by: INTERNAL MEDICINE

## 2022-07-05 PROCEDURE — 99285 PR EMERGENCY DEPT VISIT,LEVEL V: ICD-10-PCS | Mod: CS,,, | Performed by: STUDENT IN AN ORGANIZED HEALTH CARE EDUCATION/TRAINING PROGRAM

## 2022-07-05 PROCEDURE — 83880 ASSAY OF NATRIURETIC PEPTIDE: CPT | Performed by: STUDENT IN AN ORGANIZED HEALTH CARE EDUCATION/TRAINING PROGRAM

## 2022-07-05 PROCEDURE — 87077 CULTURE AEROBIC IDENTIFY: CPT | Performed by: INTERNAL MEDICINE

## 2022-07-05 PROCEDURE — 25000003 PHARM REV CODE 250: Performed by: NURSE ANESTHETIST, CERTIFIED REGISTERED

## 2022-07-05 PROCEDURE — 20000000 HC ICU ROOM

## 2022-07-05 PROCEDURE — 85025 COMPLETE CBC W/AUTO DIFF WBC: CPT | Performed by: STUDENT IN AN ORGANIZED HEALTH CARE EDUCATION/TRAINING PROGRAM

## 2022-07-05 PROCEDURE — 37000009 HC ANESTHESIA EA ADD 15 MINS: Performed by: INTERNAL MEDICINE

## 2022-07-05 PROCEDURE — C1894 INTRO/SHEATH, NON-LASER: HCPCS | Performed by: INTERNAL MEDICINE

## 2022-07-05 PROCEDURE — 87086 URINE CULTURE/COLONY COUNT: CPT | Performed by: INTERNAL MEDICINE

## 2022-07-05 PROCEDURE — 84484 ASSAY OF TROPONIN QUANT: CPT | Mod: 91 | Performed by: STUDENT IN AN ORGANIZED HEALTH CARE EDUCATION/TRAINING PROGRAM

## 2022-07-05 PROCEDURE — 93005 ELECTROCARDIOGRAM TRACING: CPT

## 2022-07-05 PROCEDURE — D9220A PRA ANESTHESIA: Mod: ANES,,, | Performed by: ANESTHESIOLOGY

## 2022-07-05 PROCEDURE — 33208 PR INSER HART PACER XVENOUS ATR/VENTR: ICD-10-PCS | Mod: KX,GC,, | Performed by: INTERNAL MEDICINE

## 2022-07-05 PROCEDURE — 93010 ELECTROCARDIOGRAM REPORT: CPT | Mod: ,,, | Performed by: INTERNAL MEDICINE

## 2022-07-05 PROCEDURE — 37000008 HC ANESTHESIA 1ST 15 MINUTES: Performed by: INTERNAL MEDICINE

## 2022-07-05 PROCEDURE — 25000003 PHARM REV CODE 250: Performed by: STUDENT IN AN ORGANIZED HEALTH CARE EDUCATION/TRAINING PROGRAM

## 2022-07-05 PROCEDURE — 80053 COMPREHEN METABOLIC PANEL: CPT | Performed by: STUDENT IN AN ORGANIZED HEALTH CARE EDUCATION/TRAINING PROGRAM

## 2022-07-05 PROCEDURE — D9220A PRA ANESTHESIA: ICD-10-PCS | Mod: ANES,,, | Performed by: ANESTHESIOLOGY

## 2022-07-05 PROCEDURE — 63600175 PHARM REV CODE 636 W HCPCS: Performed by: NURSE ANESTHETIST, CERTIFIED REGISTERED

## 2022-07-05 PROCEDURE — 99233 PR SUBSEQUENT HOSPITAL CARE,LEVL III: ICD-10-PCS | Mod: 57,,, | Performed by: INTERNAL MEDICINE

## 2022-07-05 PROCEDURE — 93005 ELECTROCARDIOGRAM TRACING: CPT | Mod: 59

## 2022-07-05 PROCEDURE — 94761 N-INVAS EAR/PLS OXIMETRY MLT: CPT

## 2022-07-05 PROCEDURE — 81001 URINALYSIS AUTO W/SCOPE: CPT | Performed by: INTERNAL MEDICINE

## 2022-07-05 PROCEDURE — C1785 PMKR, DUAL, RATE-RESP: HCPCS | Performed by: INTERNAL MEDICINE

## 2022-07-05 PROCEDURE — 33208 INSRT HEART PM ATRIAL & VENT: CPT | Mod: KX,GC | Performed by: INTERNAL MEDICINE

## 2022-07-05 PROCEDURE — 99285 EMERGENCY DEPT VISIT HI MDM: CPT | Mod: CS,,, | Performed by: STUDENT IN AN ORGANIZED HEALTH CARE EDUCATION/TRAINING PROGRAM

## 2022-07-05 PROCEDURE — 87088 URINE BACTERIA CULTURE: CPT | Performed by: INTERNAL MEDICINE

## 2022-07-05 PROCEDURE — 80047 BASIC METABLC PNL IONIZED CA: CPT | Mod: 91

## 2022-07-05 PROCEDURE — D9220A PRA ANESTHESIA: ICD-10-PCS | Mod: CRNA,,, | Performed by: NURSE ANESTHETIST, CERTIFIED REGISTERED

## 2022-07-05 DEVICE — PACING LEAD
Type: IMPLANTABLE DEVICE | Site: HEART | Status: FUNCTIONAL
Brand: TENDRIL™

## 2022-07-05 DEVICE — PULSE GENERATOR
Type: IMPLANTABLE DEVICE | Site: CHEST | Status: FUNCTIONAL
Brand: ASSURITY MRI™

## 2022-07-05 RX ORDER — DOXYCYCLINE HYCLATE 100 MG
100 TABLET ORAL EVERY 12 HOURS
Status: DISCONTINUED | OUTPATIENT
Start: 2022-07-05 | End: 2022-07-06 | Stop reason: HOSPADM

## 2022-07-05 RX ORDER — ASPIRIN 81 MG/1
81 TABLET ORAL DAILY
Status: DISCONTINUED | OUTPATIENT
Start: 2022-07-06 | End: 2022-07-06 | Stop reason: HOSPADM

## 2022-07-05 RX ORDER — LIDOCAINE HYDROCHLORIDE 20 MG/ML
INJECTION, SOLUTION INFILTRATION; PERINEURAL
Status: DISCONTINUED | OUTPATIENT
Start: 2022-07-05 | End: 2022-07-06 | Stop reason: HOSPADM

## 2022-07-05 RX ORDER — EPINEPHRINE 0.1 MG/ML
INJECTION INTRAVENOUS
Status: DISCONTINUED | OUTPATIENT
Start: 2022-07-05 | End: 2022-07-05

## 2022-07-05 RX ORDER — VANCOMYCIN HYDROCHLORIDE 1 G/20ML
INJECTION, POWDER, LYOPHILIZED, FOR SOLUTION INTRAVENOUS
Status: DISCONTINUED | OUTPATIENT
Start: 2022-07-05 | End: 2022-07-06 | Stop reason: HOSPADM

## 2022-07-05 RX ORDER — ASPIRIN 325 MG
325 TABLET ORAL
Status: COMPLETED | OUTPATIENT
Start: 2022-07-05 | End: 2022-07-05

## 2022-07-05 RX ORDER — PROPOFOL 10 MG/ML
VIAL (ML) INTRAVENOUS CONTINUOUS PRN
Status: DISCONTINUED | OUTPATIENT
Start: 2022-07-05 | End: 2022-07-05

## 2022-07-05 RX ORDER — ATORVASTATIN CALCIUM 20 MG/1
80 TABLET, FILM COATED ORAL DAILY
Status: DISCONTINUED | OUTPATIENT
Start: 2022-07-06 | End: 2022-07-06 | Stop reason: HOSPADM

## 2022-07-05 RX ORDER — BUPIVACAINE HYDROCHLORIDE 2.5 MG/ML
INJECTION, SOLUTION EPIDURAL; INFILTRATION; INTRACAUDAL
Status: DISCONTINUED | OUTPATIENT
Start: 2022-07-05 | End: 2022-07-06 | Stop reason: HOSPADM

## 2022-07-05 RX ORDER — SODIUM CHLORIDE 0.9 % (FLUSH) 0.9 %
10 SYRINGE (ML) INJECTION
Status: DISCONTINUED | OUTPATIENT
Start: 2022-07-05 | End: 2022-07-06 | Stop reason: HOSPADM

## 2022-07-05 RX ORDER — EZETIMIBE 10 MG/1
10 TABLET ORAL DAILY
Status: DISCONTINUED | OUTPATIENT
Start: 2022-07-06 | End: 2022-07-06 | Stop reason: HOSPADM

## 2022-07-05 RX ORDER — PHENYLEPHRINE HYDROCHLORIDE 10 MG/ML
INJECTION INTRAVENOUS
Status: DISCONTINUED | OUTPATIENT
Start: 2022-07-05 | End: 2022-07-05

## 2022-07-05 RX ORDER — SODIUM CHLORIDE 0.9 G/100ML
IRRIGANT IRRIGATION
Status: DISCONTINUED | OUTPATIENT
Start: 2022-07-05 | End: 2022-07-06 | Stop reason: HOSPADM

## 2022-07-05 RX ADMIN — PROPOFOL 175 MCG/KG/MIN: 10 INJECTION, EMULSION INTRAVENOUS at 02:07

## 2022-07-05 RX ADMIN — EPINEPHRINE 10 MCG: 0.1 INJECTION, SOLUTION ENDOTRACHEAL; INTRACARDIAC; INTRAVENOUS at 03:07

## 2022-07-05 RX ADMIN — SODIUM CHLORIDE: 0.9 INJECTION, SOLUTION INTRAVENOUS at 02:07

## 2022-07-05 RX ADMIN — EPINEPHRINE 10 MCG: 0.1 INJECTION, SOLUTION ENDOTRACHEAL; INTRACARDIAC; INTRAVENOUS at 02:07

## 2022-07-05 RX ADMIN — ASPIRIN 325 MG ORAL TABLET 325 MG: 325 PILL ORAL at 10:07

## 2022-07-05 RX ADMIN — EPINEPHRINE 5 MCG: 0.1 INJECTION, SOLUTION ENDOTRACHEAL; INTRACARDIAC; INTRAVENOUS at 02:07

## 2022-07-05 RX ADMIN — EPINEPHRINE 5 MCG: 0.1 INJECTION, SOLUTION ENDOTRACHEAL; INTRACARDIAC; INTRAVENOUS at 03:07

## 2022-07-05 RX ADMIN — DOXYCYCLINE HYCLATE 100 MG: 100 TABLET, COATED ORAL at 09:07

## 2022-07-05 RX ADMIN — PHENYLEPHRINE HYDROCHLORIDE 100 MCG: 10 INJECTION, SOLUTION INTRAMUSCULAR; INTRAVENOUS; SUBCUTANEOUS at 03:07

## 2022-07-05 NOTE — HPI
Michael Espinoza is a 66 year old male with coronary artery disease status post CABG in 2018, HLD who presented for outpatient cardiac PET stress but was noted to be bradycardic. Patient reports 1 month hx of fatigue, exertional dyspnea, chest pain relieved by rest. He also reports occasional lightheadedness in the past 2 weeks, without any syncope. He presented for outpatient cardiac PET as part of his work up for exertional dyspnea. ECG revealed complete heart block (HR 20-30s) for which he was sent to the ED. Upon arrival to the ED, he was hypertensive. HR 30s. He denies any new medications- not on CCB or BB. He hasn't recently used NTG for chest pain relief. NPO since midnight. Not on any OAC; only taking cobb ASA 81mg. EP was consulted for CHB eval and pacemaker placement.

## 2022-07-05 NOTE — CONSULTS
[facility] - Emergency Dept  Cardiac Electrophysiology  Consult Note    Admission Date: 7/5/2022  Code Status: Full Code   Attending Provider: Ashley Felton MD  Consulting Provider: Tamika Ellsworth MD  Principal Problem:<principal problem not specified>    Inpatient consult to Electrophysiology  Consult performed by: Tamika Ellsworth MD  Consult ordered by: Feroz Rowe MD        Subjective:     Chief Complaint:  Lightheadness     HPI:   Michael Espinoza is a 66 year old male with coronary artery disease status post CABG in 2018, HLD who presented for outpatient cardiac PET stress but was noted to be bradycardic. Patient reports 1 month hx of fatigue, exertional dyspnea, chest pain relieved by rest. He also reports occasional lightheadedness in the past 2 weeks, without any syncope. He presented for outpatient cardiac PET as part of his work up for exertional dyspnea. ECG revealed complete heart block (HR 20-30s) for which he was sent to the ED. Upon arrival to the ED, he was hypertensive. HR 30s. He denies any new medications- not on CCB or BB. He hasn't recently used NTG for chest pain relief. NPO since midnight. Not on any OAC; only taking cobb ASA 81mg. EP was consulted for CHB eval and pacemaker placement.       Past Medical History:   Diagnosis Date    Coronary artery disease of native artery of native heart with stable angina pectoris 4/10/2018    Hyperlipidemia        Past Surgical History:   Procedure Laterality Date    CYST REMOVAL      TONSILLECTOMY         Review of patient's allergies indicates:  No Known Allergies    No current facility-administered medications on file prior to encounter.     Current Outpatient Medications on File Prior to Encounter   Medication Sig    aspirin (ECOTRIN) 81 MG EC tablet Take 81 mg by mouth 2 (two) times daily.    atorvastatin (LIPITOR) 80 MG tablet Take 1 tablet (80 mg total) by mouth once daily.    ERGOCALCIFEROL, VITAMIN D2, (VITAMIN D ORAL) Take by mouth once  daily.    ezetimibe (ZETIA) 10 mg tablet Take 1 tablet (10 mg total) by mouth once daily.    nitroGLYCERIN (NITROSTAT) 0.4 MG SL tablet Place 1 tablet (0.4 mg total) under the tongue every 5 (five) minutes as needed for Chest pain.    flu vac ts 2013,18yr+,cell,PF, (FLULAVAL) 45 mcg (15 mcg x 3)/0.5 mL Syrg Inject 0.5 mLs into the muscle.    multivit-min/FA/lycopen/lutein (CENTRUM SILVER MEN ORAL) Take 1 tablet by mouth once daily.     Family History       Problem Relation (Age of Onset)    Diabetes Brother    Heart attack Brother    Heart disease Brother    Hyperlipidemia Brother    Hypertension Brother          Tobacco Use    Smoking status: Never Smoker    Smokeless tobacco: Never Used   Substance and Sexual Activity    Alcohol use: Yes     Alcohol/week: 1.0 standard drink     Types: 1 Glasses of wine per week     Comment: 1 drink 2-3 times/weekly    Drug use: No    Sexual activity: Not on file     Review of Systems   Cardiovascular:  Positive for dyspnea on exertion. Negative for irregular heartbeat, leg swelling and orthopnea.   Respiratory:  Positive for shortness of breath.    Neurological:  Positive for light-headedness.   Psychiatric/Behavioral:  The patient is not nervous/anxious.    Objective:     Vital Signs (Most Recent):  Temp: 98.1 °F (36.7 °C) (07/05/22 0945)  Pulse: (!) 29 (MD isabel, Mickie in room) (07/05/22 1003)  Resp: 20 (07/05/22 0945)  BP: (!) 181/74 (07/05/22 0945)  SpO2: 97 % (07/05/22 0945)   Vital Signs (24h Range):  Temp:  [98.1 °F (36.7 °C)] 98.1 °F (36.7 °C)  Pulse:  [29-31] 29  Resp:  [20] 20  SpO2:  [97 %] 97 %  BP: (181)/(74) 181/74          There is no height or weight on file to calculate BMI.    SpO2: 97 %  O2 Device (Oxygen Therapy): room air    Physical Exam  Constitutional:       General: He is not in acute distress.     Appearance: Normal appearance. He is normal weight. He is not ill-appearing, toxic-appearing or diaphoretic.   HENT:      Head: Normocephalic and  atraumatic.      Nose: Nose normal.      Mouth/Throat:      Mouth: Mucous membranes are moist.   Eyes:      Extraocular Movements: Extraocular movements intact.   Cardiovascular:      Rate and Rhythm: Bradycardia present.      Pulses: Normal pulses.      Heart sounds: Normal heart sounds. No murmur heard.  Pulmonary:      Effort: Pulmonary effort is normal. No respiratory distress.      Breath sounds: Normal breath sounds. No wheezing.   Abdominal:      General: Abdomen is flat.   Musculoskeletal:         General: No swelling or tenderness. Normal range of motion.      Cervical back: Normal range of motion.   Skin:     General: Skin is warm.   Neurological:      Mental Status: He is alert and oriented to person, place, and time. Mental status is at baseline.   Psychiatric:         Mood and Affect: Mood normal.         Behavior: Behavior normal.         Thought Content: Thought content normal.       Significant Labs: BMP:   Recent Labs   Lab 07/05/22  0840   GLU 89      K 4.8      CO2 23   BUN 19   CREATININE 0.9   CALCIUM 9.5   , CMP:   Recent Labs   Lab 07/05/22  0840      K 4.8      CO2 23   GLU 89   BUN 19   CREATININE 0.9   CALCIUM 9.5   PROT 6.9   ALBUMIN 4.2   BILITOT 2.6*   ALKPHOS 62   AST 30   ALT 31   ANIONGAP 8   ESTGFRAFRICA >60.0   EGFRNONAA >60.0   , CBC:   Recent Labs   Lab 07/05/22  0840 07/05/22  1016   WBC 6.14  --    HGB 15.3  --    HCT 45.1 47     --    , and INR: No results for input(s): INR, PROTIME in the last 48 hours.                  Assessment and Plan:     Complete heart block  Michael Espinoza is a 66 year old male with coronary artery disease status post CABG in 2018, HLD who presented with  1 month hx of fatigue, exertional dyspnea, chest pain relieved by rest; occasional lightheadedness in the past 2 weeks, without any syncope. He presented for his cardiac PET as part of his work up for exertional dyspnea. ECG revealed complete heart block (HR 20-30s), with  RBBB for which he was sent to the ED.     Recommendations:  - Remain NPO for now. Was NPO overnight for outpatient PET stress.   - Will tentatively plan for DC pacemaker today if schedule permits.  - STAT TTE.   - Avoid AVN blocking agents  - Zoll pad on chest .  - Please call EP fellow on call at [phone] for further questions/ concerns.         Thank you for your consult. I will follow-up with patient. Please contact us if you have any additional questions.    Tamika Ellsworth MD  Cardiac Electrophysiology  [facility] - Emergency Dept

## 2022-07-05 NOTE — ED PROVIDER NOTES
Encounter Date: 7/5/2022       History     Chief Complaint   Patient presents with    Sent by MD     Sent from stress test. Pt in complete heart block. Denies any symptoms.     66-year-old male with coronary artery disease status post CABG in 2018, HLD presents for slow heart rhythm and chest pain.  Patient was plan to receive a PET stress test when he was found to have severe bradycardia and suspicion for complete heart block.  Her rate was in the high 20s to low 30s. Patient reports he has had some intermittent dull chest pain for the past several days.  Patient also reports generalized weakness and occasional lightheadedness.  Patient denies any new medications and is not on a beta-blocker.  Patient denies any nausea/vomiting, fever/chills, cough, shortness of breath    The history is provided by the patient and medical records.     Review of patient's allergies indicates:  No Known Allergies  Past Medical History:   Diagnosis Date    Complete heart block 7/5/2022    Coronary artery disease of native artery of native heart with stable angina pectoris 4/10/2018    Hyperlipidemia      Past Surgical History:   Procedure Laterality Date    CYST REMOVAL      TONSILLECTOMY       Family History   Problem Relation Age of Onset    Hyperlipidemia Brother     Hypertension Brother     Heart disease Brother     Heart attack Brother     Diabetes Brother      Social History     Tobacco Use    Smoking status: Never Smoker    Smokeless tobacco: Never Used   Substance Use Topics    Alcohol use: Yes     Alcohol/week: 1.0 standard drink     Types: 1 Glasses of wine per week     Comment: 1 drink 2-3 times/weekly    Drug use: No     Review of Systems   Constitutional: Negative for fatigue and fever.   HENT: Negative for rhinorrhea and sore throat.    Eyes: Negative for discharge and redness.   Respiratory: Positive for chest tightness. Negative for cough and shortness of breath.    Cardiovascular: Positive for chest  pain. Negative for palpitations.   Gastrointestinal: Negative for diarrhea, nausea and vomiting.   Endocrine: Negative for cold intolerance and heat intolerance.   Genitourinary: Negative for dysuria and frequency.   Musculoskeletal: Negative for myalgias and neck stiffness.   Skin: Negative for pallor and rash.   Neurological: Positive for weakness and light-headedness. Negative for dizziness and headaches.   Psychiatric/Behavioral: Negative for agitation and confusion.       Physical Exam     Initial Vitals [07/05/22 0945]   BP Pulse Resp Temp SpO2   (!) 181/74 (!) 31 20 98.1 °F (36.7 °C) 97 %      MAP       --         Physical Exam    Nursing note and vitals reviewed.  Constitutional:   Alert, anxious-appearing, speaking full sentences   HENT:   Head: Normocephalic and atraumatic.   Mouth/Throat: Oropharynx is clear and moist.   Eyes: Conjunctivae are normal. No scleral icterus.   Cardiovascular: Regular rhythm, normal heart sounds and intact distal pulses.   Severe bradycardia   Pulmonary/Chest: Breath sounds normal. No stridor. No respiratory distress.   Abdominal: Abdomen is soft. He exhibits no distension. There is no abdominal tenderness.   Musculoskeletal:         General: No tenderness or edema.     Neurological: He is alert and oriented to person, place, and time.   Skin: Skin is warm and dry. No rash noted.   Psychiatric: He has a normal mood and affect. Thought content normal.         ED Course   Procedures  Labs Reviewed   CBC W/ AUTO DIFFERENTIAL - Abnormal; Notable for the following components:       Result Value    MCH 31.3 (*)     All other components within normal limits   COMPREHENSIVE METABOLIC PANEL - Abnormal; Notable for the following components:    CO2 21 (*)     Total Bilirubin 2.8 (*)     All other components within normal limits   B-TYPE NATRIURETIC PEPTIDE - Abnormal; Notable for the following components:     (*)     All other components within normal limits   URINALYSIS, REFLEX  TO URINE CULTURE - Abnormal; Notable for the following components:    Appearance, UA Hazy (*)     Leukocytes, UA 3+ (*)     All other components within normal limits    Narrative:     Specimen Source->Urine   URINALYSIS MICROSCOPIC - Abnormal; Notable for the following components:    RBC, UA 12 (*)     WBC,  (*)     All other components within normal limits    Narrative:     Specimen Source->Urine   CULTURE, URINE   TROPONIN I   SARS-COV-2 RDRP GENE    Narrative:     This test utilizes isothermal nucleic acid amplification   technology to detect the SARS-CoV-2 RdRp nucleic acid segment.   The analytical sensitivity (limit of detection) is 125 genome   equivalents/mL.   A POSITIVE result implies infection with the SARS-CoV-2 virus;   the patient is presumed to be contagious.     A NEGATIVE result means that SARS-CoV-2 nucleic acids are not   present above the limit of detection. A NEGATIVE result should be   treated as presumptive. It does not rule out the possibility of   COVID-19 and should not be the sole basis for treatment decisions.   If COVID-19 is strongly suspected based on clinical and exposure   history, re-testing using an alternate molecular assay should be   considered.   This test is only for use under the Food and Drug   Administration s Emergency Use Authorization (EUA).   Commercial kits are provided by [employer].   Performance characteristics of the EUA have been independently   verified by [facility] Department of   Pathology and Laboratory Medicine.   _________________________________________________________________   The authorized Fact Sheet for Healthcare Providers and the authorized Fact   Sheet for Patients of the ID NOW COVID-19 are available on the FDA   website:     https://www.fda.gov/media/108804/download  https://www.fda.gov/media/915522/download          TYPE & SCREEN   ISTAT PROCEDURE   ISTAT CHEM8     EKG Readings: (Independently Interpreted)   Sinus  rhythm with complete heart block, regular, wide complex QRS, rate of 30, complete heart block now present compared to previous     ECG Results          EKG 12-lead (Final result)  Result time 07/05/22 17:11:19    Final result by Interface, Lab In [facility] (07/05/22 17:11:19)                 Narrative:    Test Reason : R07.9,    Vent. Rate : 030 BPM     Atrial Rate : 030 BPM     P-R Int : 000 ms          QRS Dur : 146 ms      QT Int : 546 ms       P-R-T Axes : 043 181 041 degrees     QTc Int : 385 ms    Sinus rhythm with with complete heart block with wide complex escape  Right bundle branch block  Possible Lateral infarct ,age undetermined  Abnormal ECG  When compared with ECG of 21-JUL-2021 08:24,  Significant change has occurred  Confirmed by SANDHYA AYALA MD ([id number]) on 7/5/2022 5:11:11 PM    Referred By: LATONIAERR   SELF           Confirmed By:SANDHYA AYALA MD                            Imaging Results    None          Medications   sodium chloride 0.9% flush 10 mL (has no administration in time range)   atorvastatin tablet 80 mg (has no administration in time range)   ezetimibe tablet 10 mg (has no administration in time range)   aspirin EC tablet 81 mg (has no administration in time range)   sodium chloride 0.9% irrigation (1,000 mLs Irrigation Given 7/5/22 1433)   vancomycin injection (1,000 mg Irrigation Given 7/5/22 1433)   BUPivacaine (PF) 0.25% (2.5 mg/ml) injection (10 mLs Subcutaneous Given 7/5/22 1443)   LIDOcaine HCL 20 mg/ml (2%) injection (10 mLs Subcutaneous Given 7/5/22 1443)   doxycycline tablet 100 mg (has no administration in time range)   aspirin tablet 325 mg (325 mg Oral Given 7/5/22 1014)     Medical Decision Making:   History:   Old Medical Records: I decided to obtain old medical records.  Old Records Summarized: records from clinic visits and records from previous admission(s).  Initial Assessment:   66-year-old male with coronary artery disease status post CABG in 2018, [facility] presents for slow  heart rhythm and chest pain  Patient without hypoxia or respiratory distress  EKG shows complete heart block, rate of 30  Patient is stable based on blood pressure, mental status, clinical stability  Presentation most consistent with complete heart block, unclear etiology  Differentials include ACS, electrolyte derangement, ischemia, medication overdose  Risk factors, quality of symptoms, clinical picture concerning for ACS/ischemia  Patient does have chest pain/tightness  Patient placed immediately on pacer pads out of precaution  Cardiology consulted emergently, will admit patient to CCU for implanted pacemaker    Independently Interpreted Test(s):   I have ordered and independently interpreted EKG Reading(s) - see prior notes  Clinical Tests:   Lab Tests: Ordered and Reviewed  Radiological Study: Ordered and Reviewed  Medical Tests: Ordered and Reviewed  Other:   I have discussed this case with another health care provider.             ED Course as of 07/05/22 1841 Tue Jul 05, 2022   1020 Pulse(!): 29 [OK]   1100 Discussed with cardiology [OK]      ED Course User Index  [OK] Gomez Corado MD             Clinical Impression:   Final diagnoses:  [I44.2] Complete heart block (Primary)  [R07.9] Chest pain          ED Disposition Condition    Admit               Gomez Corado MD  Resident  07/05/22 1841

## 2022-07-05 NOTE — ED TRIAGE NOTES
Pt was at cardiologist this morning for a stress test -- RN saw on the monitor he was in heart block with bradycardia and told cardiologist. Cardiologist told him to come to ER. Pt states that he likely has been going through this for a couple months now (has been dizzy and some chest tightness)

## 2022-07-05 NOTE — ANESTHESIA PREPROCEDURE EVALUATION
[facility]  Anesthesia Pre-Operative Evaluation         Patient Name: Michael Espinoza  YOB: 1955  MRN: [id number]    SUBJECTIVE:     07/05/2022    Procedure(s) (LRB):  INSERTION, CARDIAC PACEMAKER, DUAL CHAMBER (N/A)    Michael Espinoza is a 66 y.o. male here for above procedure    Drips:     Patient Active Problem List   Diagnosis    HLD (hyperlipidemia)    Gilbert's syndrome    Exertional angina    ASD (atrial septal defect)    SOB (shortness of breath) on exertion    Abnormal CT of the chest    Chest pain    Coronary artery disease of native artery of native heart with stable angina pectoris    Pre-operative cardiovascular examination    S/P CABG x 3    Chronic right shoulder pain    Partial nontraumatic tear of rotator cuff, right    Pathological fracture due to osteoporosis    Age-related osteoporosis with current pathological fracture    Localized osteoporosis of [place]    Complete heart block       Review of patient's allergies indicates:  No Known Allergies    No current facility-administered medications on file prior to encounter.     Current Outpatient Medications on File Prior to Encounter   Medication Sig Dispense Refill    aspirin (ECOTRIN) 81 MG EC tablet Take 81 mg by mouth 2 (two) times daily.      atorvastatin (LIPITOR) 80 MG tablet Take 1 tablet (80 mg total) by mouth once daily. 90 tablet 3    ERGOCALCIFEROL, VITAMIN D2, (VITAMIN D ORAL) Take by mouth once daily.      ezetimibe (ZETIA) 10 mg tablet Take 1 tablet (10 mg total) by mouth once daily. 90 tablet 3    nitroGLYCERIN (NITROSTAT) 0.4 MG SL tablet Place 1 tablet (0.4 mg total) under the tongue every 5 (five) minutes as needed for Chest pain. 60 tablet 12    flu vac ts 2013,18yr+,cell,PF, (FLULAVAL) 45 mcg (15 mcg x 3)/0.5 mL Syrg Inject 0.5 mLs into the muscle.      multivit-min/FA/lycopen/lutein (CENTRUM SILVER MEN ORAL) Take 1 tablet by mouth once daily.         Past Surgical History:    Procedure Laterality Date    CYST REMOVAL      TONSILLECTOMY         Social History     Socioeconomic History    Marital status: Single   Tobacco Use    Smoking status: Never Smoker    Smokeless tobacco: Never Used   Substance and Sexual Activity    Alcohol use: Yes     Alcohol/week: 1.0 standard drink     Types: 1 Glasses of wine per week     Comment: 1 drink 2-3 times/weekly    Drug use: No     Social Determinants of Health     Financial Resource Strain: Low Risk     Difficulty of Paying Living Expenses: Not hard at all   Food Insecurity: No Food Insecurity    Worried About Running Out of Food in the Last Year: Never true    Ran Out of Food in the Last Year: Never true   Transportation Needs: No Transportation Needs    Lack of Transportation (Medical): No    Lack of Transportation (Non-Medical): No   Physical Activity: Insufficiently Active    Days of Exercise per Week: 4 days    Minutes of Exercise per Session: 20 min   Stress: No Stress Concern Present    Feeling of Stress : Not at all   Social Connections: Unknown    Frequency of Communication with Friends and Family: Twice a week    Frequency of Social Gatherings with Friends and Family: Once a week    Active Member of Clubs or Organizations: Yes    Attends Club or Organization Meetings: Patient refused    Marital Status: Never    Housing Stability: Low Risk     Unable to Pay for Housing in the Last Year: No    Number of Places Lived in the Last Year: 1    Unstable Housing in the Last Year: No         OBJECTIVE:     Vital Signs Range (Last 24H):  Temp:  [36.7 °C (98.1 °F)-37.1 °C (98.7 °F)] 37.1 °C (98.7 °F)  Pulse:  [29-31] 29  Resp:  [20-27] 20  SpO2:  [97 %-100 %] 100 %  BP: (159-181)/(72-75) 171/74    Significant Labs:  Lab Results   Component Value Date    WBC 7.68 07/05/2022    HGB 16.1 07/05/2022    HCT 47.6 07/05/2022     07/05/2022    CHOL 126 07/05/2022    TRIG 62 07/05/2022    HDL 54 07/05/2022    ALT 33  07/05/2022    AST 34 07/05/2022     07/05/2022    K 4.4 07/05/2022     07/05/2022    CREATININE 1.0 07/05/2022    BUN 18 07/05/2022    CO2 21 (L) 07/05/2022    TSH 1.737 05/05/2021    PSA 0.31 06/24/2022    INR 1.1 05/19/2018    HGBA1C 5.4 05/10/2018       Diagnostic Studies:    EKG:   Results for orders placed or performed in visit on 07/14/21   EKG 12-lead    Collection Time: 07/14/21 10:41 AM    Narrative    Test Reason : I25.118,    Vent. Rate : 069 BPM     Atrial Rate : 069 BPM     P-R Int : 148 ms          QRS Dur : 136 ms      QT Int : 416 ms       P-R-T Axes : 018 119 -17 degrees     QTc Int : 445 ms    Normal sinus rhythm  Right bundle branch block  Left posterior fascicular block   Bifascicular block   Abnormal ECG  When compared with ECG of 11-NOV-2020 09:22,  No significant change was found  Reconfirmed by Ivone Garibay MD ([id number]) on 7/15/2021 11:02:30 AM    Referred By:             Confirmed By:Ivone Garibay MD       2D ECHO:  TTE:  No results found for this or any previous visit.      CHARMAINE:  No results found for this or any previous visit.        Pre-op Assessment    I have reviewed the Patient Summary Reports.     I have reviewed the Nursing Notes. I have reviewed the NPO Status.   I have reviewed the Medications.     Review of Systems  Anesthesia Hx:  No problems with previous Anesthesia  History of prior surgery of interest to airway management or planning: Previous anesthesia: General   Cardiovascular:  Functional Capacity good / => 4 METS        Physical Exam  General: Well nourished, Cooperative, Alert and Oriented    Airway:  Mallampati: I   Mouth Opening: Normal  TM Distance: Normal  Tongue: Normal  Neck ROM: Normal ROM    Dental:  Intact    Chest/Lungs:  Clear to auscultation    Heart:  Rate: Bradycardia  Rhythm: Regular Rhythm  Sounds: Normal    Abdomen:  Normal, Soft, Nontender        Anesthesia Plan  Type of Anesthesia, risks & benefits discussed:    Anesthesia Type: Gen ETT, Gen  Natural Airway, Gen Supraglottic Airway, MAC  Intra-op Monitoring Plan: Standard ASA Monitors  Post Op Pain Control Plan: multimodal analgesia and IV/PO Opioids PRN  Induction:  IV  Airway Plan: Direct and Video, Post-Induction  Informed Consent: Informed consent signed with the Patient and all parties understand the risks and agree with anesthesia plan.  All questions answered. Patient consented to blood products? Yes  ASA Score: 2 Emergent  Day of Surgery Review of History & Physical: H&P Update referred to the surgeon/provider.    Ready For Surgery From Anesthesia Perspective.     .

## 2022-07-05 NOTE — ASSESSMENT & PLAN NOTE
65 yo M with CAD s/p CABG 2018. Undergoing PET stress test today (7/5) for 1 month hx of CONTRERAS, lightheadedness, weakness when complete heart block was noted. Admitted and EP consulted.  - bradycardic to 30's. Hypertensive to 181/74  - Continuing home meds (ASA, Zetia, Atorvastatin)  Following EP recs:  - TTE completed, plan for pacemaker insertion  - no AVNB  - Zoll pad  - remain NPO    TTE 7/5:  · Presence of bradycardia with HR 30s with AV dissociation  · The estimated ejection fraction is 65%.  · The left ventricle is normal in size with normal systolic function.  · Normal left ventricular diastolic function.  · Normal right ventricular size with normal right ventricular systolic function.  · Mild mitral regurgitation.  · Presence of interatrial septal aneurysm.  · The estimated PA systolic pressure is 28 mmHg.  · Normal central venous pressure (3 mmHg).

## 2022-07-05 NOTE — PLAN OF CARE
"      SICU PLAN OF CARE NOTE    Dx: Complete heart block    Shift Events: Pacemaker placed    Neuro: AAO x4, Follows Commands and Moves All Extremities    Vital Signs: BP (!) 145/82   Pulse 66   Temp 98.7 °F (37.1 °C) (Oral)   Resp 19   Ht 5' 7" (1.702 m)   Wt 67.1 kg (148 lb)   SpO2 96%   BMI 23.18 kg/m²     Respiratory: Room Air    Diet: Sips with Meds    Gtts: none    Urine Output: Voids Spontaneously     Labs/Accuchecks: checked per MD order.    Skin: intact. L upper chest pacemaker site covered in silver dressing and sling in place. Heels and sacrum intact. Pt on waffle mattress.        "

## 2022-07-05 NOTE — TRANSFER OF CARE
"Anesthesia Transfer of Care Note    Patient: Michael Espinoza    Procedure(s) Performed: Procedure(s) (LRB):  INSERTION, CARDIAC PACEMAKER, DUAL CHAMBER (N/A)    Patient location: ICU    Anesthesia Type: general    Transport from OR: Transported from OR on 2-3 L/min O2 by NC with adequate spontaneous ventilation    Post pain: adequate analgesia    Post assessment: no apparent anesthetic complications    Post vital signs: stable    Level of consciousness: awake    Nausea/Vomiting: no nausea/vomiting    Complications: none    Transfer of care protocol was followed      Last vitals:   Visit Vitals  BP (!) 171/74 (BP Location: Right arm, Patient Position: Lying)   Pulse (!) 31   Temp 37.1 °C (98.7 °F) (Oral)   Resp (!) 30   Ht 5' 7" (1.702 m)   Wt 67.1 kg (148 lb)   SpO2 96%   BMI 23.18 kg/m²     "

## 2022-07-05 NOTE — SUBJECTIVE & OBJECTIVE
Past Medical History:   Diagnosis Date    Coronary artery disease of native artery of native heart with stable angina pectoris 4/10/2018    Hyperlipidemia        Past Surgical History:   Procedure Laterality Date    CYST REMOVAL      TONSILLECTOMY         Review of patient's allergies indicates:  No Known Allergies    No current facility-administered medications on file prior to encounter.     Current Outpatient Medications on File Prior to Encounter   Medication Sig    aspirin (ECOTRIN) 81 MG EC tablet Take 81 mg by mouth 2 (two) times daily.    atorvastatin (LIPITOR) 80 MG tablet Take 1 tablet (80 mg total) by mouth once daily.    ERGOCALCIFEROL, VITAMIN D2, (VITAMIN D ORAL) Take by mouth once daily.    ezetimibe (ZETIA) 10 mg tablet Take 1 tablet (10 mg total) by mouth once daily.    nitroGLYCERIN (NITROSTAT) 0.4 MG SL tablet Place 1 tablet (0.4 mg total) under the tongue every 5 (five) minutes as needed for Chest pain.    flu vac ts 2013,18yr+,cell,PF, (FLULAVAL) 45 mcg (15 mcg x 3)/0.5 mL Syrg Inject 0.5 mLs into the muscle.    multivit-min/FA/lycopen/lutein (CENTRUM SILVER MEN ORAL) Take 1 tablet by mouth once daily.     Family History       Problem Relation (Age of Onset)    Diabetes Brother    Heart attack Brother    Heart disease Brother    Hyperlipidemia Brother    Hypertension Brother          Tobacco Use    Smoking status: Never Smoker    Smokeless tobacco: Never Used   Substance and Sexual Activity    Alcohol use: Yes     Alcohol/week: 1.0 standard drink     Types: 1 Glasses of wine per week     Comment: 1 drink 2-3 times/weekly    Drug use: No    Sexual activity: Not on file     Review of Systems   Cardiovascular:  Positive for dyspnea on exertion. Negative for irregular heartbeat, leg swelling and orthopnea.   Respiratory:  Positive for shortness of breath.    Neurological:  Positive for light-headedness.   Psychiatric/Behavioral:  The patient is not nervous/anxious.    Objective:     Vital Signs  (Most Recent):  Temp: 98.1 °F (36.7 °C) (07/05/22 0945)  Pulse: (!) 29 (MD isabel, Mickie in room) (07/05/22 1003)  Resp: 20 (07/05/22 0945)  BP: (!) 181/74 (07/05/22 0945)  SpO2: 97 % (07/05/22 0945)   Vital Signs (24h Range):  Temp:  [98.1 °F (36.7 °C)] 98.1 °F (36.7 °C)  Pulse:  [29-31] 29  Resp:  [20] 20  SpO2:  [97 %] 97 %  BP: (181)/(74) 181/74          There is no height or weight on file to calculate BMI.    SpO2: 97 %  O2 Device (Oxygen Therapy): room air    Physical Exam  Constitutional:       General: He is not in acute distress.     Appearance: Normal appearance. He is normal weight. He is not ill-appearing, toxic-appearing or diaphoretic.   HENT:      Head: Normocephalic and atraumatic.      Nose: Nose normal.      Mouth/Throat:      Mouth: Mucous membranes are moist.   Eyes:      Extraocular Movements: Extraocular movements intact.   Cardiovascular:      Rate and Rhythm: Bradycardia present.      Pulses: Normal pulses.      Heart sounds: Normal heart sounds. No murmur heard.  Pulmonary:      Effort: Pulmonary effort is normal. No respiratory distress.      Breath sounds: Normal breath sounds. No wheezing.   Abdominal:      General: Abdomen is flat.   Musculoskeletal:         General: No swelling or tenderness. Normal range of motion.      Cervical back: Normal range of motion.   Skin:     General: Skin is warm.   Neurological:      Mental Status: He is alert and oriented to person, place, and time. Mental status is at baseline.   Psychiatric:         Mood and Affect: Mood normal.         Behavior: Behavior normal.         Thought Content: Thought content normal.       Significant Labs: BMP:   Recent Labs   Lab 07/05/22  0840   GLU 89      K 4.8      CO2 23   BUN 19   CREATININE 0.9   CALCIUM 9.5   , CMP:   Recent Labs   Lab 07/05/22  0840      K 4.8      CO2 23   GLU 89   BUN 19   CREATININE 0.9   CALCIUM 9.5   PROT 6.9   ALBUMIN 4.2   BILITOT 2.6*   ALKPHOS 62   AST 30   ALT 31    ANIONGAP 8   ESTGFRAFRICA >60.0   EGFRNONAA >60.0   , CBC:   Recent Labs   Lab 07/05/22  0840 07/05/22  1016   WBC 6.14  --    HGB 15.3  --    HCT 45.1 47     --    , and INR: No results for input(s): INR, PROTIME in the last 48 hours.

## 2022-07-05 NOTE — NURSING
Pt prepped for Cardiac Nuclear PET Stress test and connected to EKG showing P waves without QRS complexs.  EKGs shown to Dr Bower who states pt is in complete heart block and to bring pt straight to ER.  PET RN Eleonora called ER spoke to Paulette of patient in complete heart block.  Pt transported to ER for evaluation per two RNs.  Pt denies any complaints of CP or SOB at this time, pt states he did cut grass Sunday and been feeling bad since.

## 2022-07-05 NOTE — ED NOTES
Pt hooked up to Zoll and continuous cardiac monitoring, Lior Corado and Jose Francisco in room. Pt denies symptoms, remains in heart block with pulse of 29.

## 2022-07-05 NOTE — ASSESSMENT & PLAN NOTE
Michael Espinoza is a 66 year old male with coronary artery disease status post CABG in 2018, HLD who presented with  1 month hx of fatigue, exertional dyspnea, chest pain relieved by rest; occasional lightheadedness in the past 2 weeks, without any syncope. He presented for his cardiac PET as part of his work up for exertional dyspnea. ECG revealed complete heart block (HR 20-30s), with RBBB for which he was sent to the ED.     Recommendations:  - Remain NPO for now. Was NPO overnight for outpatient PET stress.   - Will tentatively plan for DC pacemaker today if schedule permits.  - STAT TTE.   - Avoid AVN blocking agents  - Zoll pad on chest .  - Please call EP fellow on call at [phone] for further questions/ concerns.

## 2022-07-05 NOTE — H&P
[facility], [place]  H&P  Critical care Cardiology      Michael Espinoza  YOB: 1955  Medical Record Number:  [id number]  Attending Physician:  Kathy Baez MD   Date of Admission: 7/5/2022       Hospital Day:  0  Current Principal Problem:  Complete heart block      History     Cc:  Dyspnea and chest pain on exertion.    HPI  Mr. Michael Espinoza is a 63 y.o. gentleman with history of HLD, Gilbert's syndrome, parenchymal lung disease, ASD, and three vessel CAD s/p CABG x3V LIMA-LAD, SVG to OM1 and SVG to PDA who presented for outpatient cardiac PET stress but was noted to be bradycardic. Patient reports 1 month hx of fatigue, exertional dyspnea, chest pain relieved by rest. He also reports occasional lightheadedness in the past 2 weeks, without any syncope. He presented for outpatient cardiac PET as part of his work up for exertional dyspnea. ECG revealed complete heart block (HR 20-30s) for which he was sent to the ED. Upon arrival to the ED, he was hypertensive. HR 30s. He denies any new medications- not on CCB or BB. He hasn't recently used NTG for chest pain relief. NPO since midnight. Not on any OAC; only taking cobb ASA 81mg. EP was consulted for CHB eval and pacemaker placement.       Medications - Outpatient  Prior to Admission medications    Medication Sig Start Date End Date Taking? Authorizing Provider   aspirin (ECOTRIN) 81 MG EC tablet Take 81 mg by mouth 2 (two) times daily.   Yes Historical Provider   atorvastatin (LIPITOR) 80 MG tablet Take 1 tablet (80 mg total) by mouth once daily. 7/23/21 7/23/22 Yes Pravin Moss MD   ERGOCALCIFEROL, VITAMIN D2, (VITAMIN D ORAL) Take by mouth once daily.   Yes Historical Provider   ezetimibe (ZETIA) 10 mg tablet Take 1 tablet (10 mg total) by mouth once daily. 7/23/21 7/23/22 Yes Pravin Moss MD   nitroGLYCERIN (NITROSTAT) 0.4 MG SL tablet Place 1 tablet (0.4 mg total) under the tongue every 5 (five) minutes as needed for Chest  pain. 6/29/22  Yes Chad Tapia MD   flu vac ts 2013,18yr+,cell,PF, (FLULAVAL) 45 mcg (15 mcg x 3)/0.5 mL Syrg Inject 0.5 mLs into the muscle.    Historical Provider   multivit-min/FA/lycopen/lutein (CENTRUM SILVER MEN ORAL) Take 1 tablet by mouth once daily.    Historical Provider         Medications - Current  Scheduled Meds:   [START ON 7/6/2022] aspirin  81 mg Oral Daily    [START ON 7/6/2022] atorvastatin  80 mg Oral Daily    [START ON 7/6/2022] ezetimibe  10 mg Oral Daily     Continuous Infusions:  PRN Meds:.BUPivacaine (PF) 0.25% (2.5 mg/ml), LIDOcaine HCL 20 mg/ml (2%), sodium chloride 0.9%, sodium chloride 0.9%, vancomycin      Allergies  Review of patient's allergies indicates:  No Known Allergies      Past Medical History  Past Medical History:   Diagnosis Date    Complete heart block 7/5/2022    Coronary artery disease of native artery of native heart with stable angina pectoris 4/10/2018    Hyperlipidemia          Past Surgical History  Past Surgical History:   Procedure Laterality Date    CYST REMOVAL      TONSILLECTOMY           Social History  Social History     Socioeconomic History    Marital status: Single   Tobacco Use    Smoking status: Never Smoker    Smokeless tobacco: Never Used   Substance and Sexual Activity    Alcohol use: Yes     Alcohol/week: 1.0 standard drink     Types: 1 Glasses of wine per week     Comment: 1 drink 2-3 times/weekly    Drug use: No     Social Determinants of Health     Financial Resource Strain: Low Risk     Difficulty of Paying Living Expenses: Not hard at all   Food Insecurity: No Food Insecurity    Worried About Running Out of Food in the Last Year: Never true    Ran Out of Food in the Last Year: Never true   Transportation Needs: No Transportation Needs    Lack of Transportation (Medical): No    Lack of Transportation (Non-Medical): No   Physical Activity: Insufficiently Active    Days of Exercise per Week: 4 days    Minutes of Exercise  per Session: 20 min   Stress: No Stress Concern Present    Feeling of Stress : Not at all   Social Connections: Unknown    Frequency of Communication with Friends and Family: Twice a week    Frequency of Social Gatherings with Friends and Family: Once a week    Active Member of Clubs or Organizations: Yes    Attends Club or Organization Meetings: Patient refused    Marital Status: Never    Housing Stability: Low Risk     Unable to Pay for Housing in the Last Year: No    Number of Places Lived in the Last Year: 1    Unstable Housing in the Last Year: No         ROS   Admits Denies   Constitutional  Chills, diaphoresis, malaise   Eyes  Visual changes   ENMT  Dysphagia, Epistaxis, nasal congestion, hearing loss   Cardiovascular Chest pain  palpitations, edema   Respiratory Dyspnea Cough, wheezing   Gastrointestinal  Nausea, vomiting, constipation, diarrhea, anorexia.     Genitourinary  Dysuria, incontinence   Musculoskeletal  Myalgias, joint pain, joint swelling   Integumentary  Rash, inflammation, burning   Neruo-Psychiatric  Anxiety, insomnia.  Changes in speech, strength, sensation.     Endocrine     Hematologic  Abnormal bruising, bleeding   Immunologic  Inflammation, pain at IV sites.  Pruritis.         Physical Examination         Vital Signs  Vitals  Temp: 98.7 °F (37.1 °C)  Temp src: Oral  Pulse: (!) 31  Heart Rate Source: Monitor, Continuous  Resp: (!) 30  SpO2: 96 %  Pulse Oximetry Type: Continuous  O2 Device (Oxygen Therapy): room air  BP: (!) 171/74  MAP (mmHg): 107  BP Location: Right arm  BP Method: Automatic  Patient Position: Lying          24 Hour VS Range    Temp:  [98.1 °F (36.7 °C)-98.7 °F (37.1 °C)]   Pulse:  [29-31]   Resp:  [20-30]   BP: (159-181)/(72-75)   SpO2:  [96 %-100 %]   No intake or output data in the 24 hours ending 07/05/22 1504      General:  Lying in bed no acute distress  Head: NCAT  Eyes: conjunctivae and lids normal, no scleral icterus, EOMI.  ENMT:  no gingival  bleeding, normal oral mucosa without pallor or cyanosis.   Neck:  JVP normal.  Trachea non-displaced.     Chest:  Normal respiratory effort.  Chest clear to auscultation.  No wheezes, rales, or rhonchi.  Heart:  Bradycardic. No murmurs rubs or gallops.  Peripheral pulses 2+.  Abdomen:  Non-distended, normal bowel sounds, non-tender.  No hepato-jugular reflux.  Extremities:  No edema. Normal capillary refill.    Skin:  Warm and dry.  No cyanosis or pallor.  No ulcers, stasis.  IV sites without tenderness or inflammation.    Neurological / Psychiatric:  Oriented to person, time, and place.  No facial asymmetry, drift.  Fluent without dysarthria.  Mood euthymic, affect normal.         Data       Recent Labs   Lab 07/05/22  0840 07/05/22  1016 07/05/22  1024   WBC 6.14  --  7.68   HGB 15.3  --  16.1   HCT 45.1 47 47.6     --  177        No results for input(s): PROTIME, INR in the last 168 hours.     Recent Labs   Lab 07/05/22  0840 07/05/22  1024    138   K 4.8 4.4    108   CO2 23 21*   BUN 19 18   CREATININE 0.9 1.0   ANIONGAP 8 9   CALCIUM 9.5 9.7        Recent Labs   Lab 07/05/22  0840 07/05/22  1024   PROT 6.9 7.7   ALBUMIN 4.2 4.4   BILITOT 2.6* 2.8*   ALKPHOS 62 65   AST 30 34   ALT 31 33        Recent Labs   Lab 07/05/22  1024 07/05/22  1327   TROPONINI 0.009 <0.006        BNP (pg/mL)   Date Value   07/05/2022 596 (H)     TTE 7/5:  · Presence of bradycardia with HR 30s with AV dissociation  · The estimated ejection fraction is 65%.  · The left ventricle is normal in size with normal systolic function.  · Normal left ventricular diastolic function.  · Normal right ventricular size with normal right ventricular systolic function.  · Mild mitral regurgitation.  · Presence of interatrial septal aneurysm.  · The estimated PA systolic pressure is 28 mmHg.  · Normal central venous pressure (3 mmHg).        Assessment & Plan     65 yo M with CAD s/p CABG 2018. Undergoing PET stress test today (7/5)  for 1 month hx of CONTRERAS, lightheadedness, weakness when complete heart block was noted. Admitted and EP consulted.  - bradycardic to 30's. Hypertensive to 181/74    Complete heart block  - Continuing home meds (ASA, Zetia, Atorvastatin)  Following EP recs:  - echo done,see above.  -permanent pacemaker insertion today.  -will follow-up postop.    HLD (hyperlipidemia)  -Continuing home meds (Zetia, Atorvastatin)    Parenchymal Lung disease  -f/u with pulmonary as outpatient    CAD s/p CABG x 3  -continue current home medication regimen.      Abnormal urinalysis   100+ WBCs, protein. Culture pending  -will follow-up cultures and treat if appropriate.        Signed:  Feroz Rowe M.D.  Page # [phone]  Cardiovascular Fellow  [facility]

## 2022-07-06 ENCOUNTER — PATIENT MESSAGE (OUTPATIENT)
Dept: CARDIOLOGY | Facility: HOSPITAL | Age: 67
End: 2022-07-06
Payer: MEDICARE

## 2022-07-06 VITALS
OXYGEN SATURATION: 96 % | DIASTOLIC BLOOD PRESSURE: 89 MMHG | HEIGHT: 67 IN | TEMPERATURE: 99 F | WEIGHT: 148 LBS | RESPIRATION RATE: 30 BRPM | SYSTOLIC BLOOD PRESSURE: 125 MMHG | BODY MASS INDEX: 23.23 KG/M2 | HEART RATE: 103 BPM

## 2022-07-06 PROBLEM — N39.0 UTI (URINARY TRACT INFECTION): Status: RESOLVED | Noted: 2022-07-05 | Resolved: 2022-07-06

## 2022-07-06 LAB
ALBUMIN SERPL BCP-MCNC: 3.8 G/DL (ref 3.5–5.2)
ALP SERPL-CCNC: 73 U/L (ref 55–135)
ALT SERPL W/O P-5'-P-CCNC: 25 U/L (ref 10–44)
ANION GAP SERPL CALC-SCNC: 9 MMOL/L (ref 8–16)
AST SERPL-CCNC: 31 U/L (ref 10–40)
BASOPHILS # BLD AUTO: 0.03 K/UL (ref 0–0.2)
BASOPHILS NFR BLD: 0.5 % (ref 0–1.9)
BILIRUB SERPL-MCNC: 2.6 MG/DL (ref 0.1–1)
BUN SERPL-MCNC: 11 MG/DL (ref 8–23)
CALCIUM SERPL-MCNC: 8.3 MG/DL (ref 8.7–10.5)
CHLORIDE SERPL-SCNC: 109 MMOL/L (ref 95–110)
CO2 SERPL-SCNC: 20 MMOL/L (ref 23–29)
CREAT SERPL-MCNC: 0.8 MG/DL (ref 0.5–1.4)
DIFFERENTIAL METHOD: ABNORMAL
EOSINOPHIL # BLD AUTO: 0 K/UL (ref 0–0.5)
EOSINOPHIL NFR BLD: 0.6 % (ref 0–8)
ERYTHROCYTE [DISTWIDTH] IN BLOOD BY AUTOMATED COUNT: 13.1 % (ref 11.5–14.5)
EST. GFR  (AFRICAN AMERICAN): >60 ML/MIN/1.73 M^2
EST. GFR  (NON AFRICAN AMERICAN): >60 ML/MIN/1.73 M^2
GLUCOSE SERPL-MCNC: 72 MG/DL (ref 70–110)
HCT VFR BLD AUTO: 42 % (ref 40–54)
HGB BLD-MCNC: 14.2 G/DL (ref 14–18)
IMM GRANULOCYTES # BLD AUTO: 0.02 K/UL (ref 0–0.04)
IMM GRANULOCYTES NFR BLD AUTO: 0.3 % (ref 0–0.5)
LYMPHOCYTES # BLD AUTO: 0.8 K/UL (ref 1–4.8)
LYMPHOCYTES NFR BLD: 12.4 % (ref 18–48)
MAGNESIUM SERPL-MCNC: 1.7 MG/DL (ref 1.6–2.6)
MCH RBC QN AUTO: 31.1 PG (ref 27–31)
MCHC RBC AUTO-ENTMCNC: 33.8 G/DL (ref 32–36)
MCV RBC AUTO: 92 FL (ref 82–98)
MONOCYTES # BLD AUTO: 0.6 K/UL (ref 0.3–1)
MONOCYTES NFR BLD: 8.7 % (ref 4–15)
NEUTROPHILS # BLD AUTO: 5.1 K/UL (ref 1.8–7.7)
NEUTROPHILS NFR BLD: 77.5 % (ref 38–73)
NRBC BLD-RTO: 0 /100 WBC
PHOSPHATE SERPL-MCNC: 2.9 MG/DL (ref 2.7–4.5)
PLATELET # BLD AUTO: 127 K/UL (ref 150–450)
PMV BLD AUTO: 11 FL (ref 9.2–12.9)
POTASSIUM SERPL-SCNC: 4.3 MMOL/L (ref 3.5–5.1)
PROT SERPL-MCNC: 6.5 G/DL (ref 6–8.4)
RBC # BLD AUTO: 4.57 M/UL (ref 4.6–6.2)
SODIUM SERPL-SCNC: 138 MMOL/L (ref 136–145)
WBC # BLD AUTO: 6.53 K/UL (ref 3.9–12.7)

## 2022-07-06 PROCEDURE — 99239 HOSP IP/OBS DSCHRG MGMT >30: CPT | Mod: GC,,, | Performed by: INTERNAL MEDICINE

## 2022-07-06 PROCEDURE — 25000003 PHARM REV CODE 250

## 2022-07-06 PROCEDURE — 83735 ASSAY OF MAGNESIUM: CPT | Performed by: INTERNAL MEDICINE

## 2022-07-06 PROCEDURE — 99024 PR POST-OP FOLLOW-UP VISIT: ICD-10-PCS | Mod: ,,, | Performed by: INTERNAL MEDICINE

## 2022-07-06 PROCEDURE — 99239 PR HOSPITAL DISCHARGE DAY,>30 MIN: ICD-10-PCS | Mod: GC,,, | Performed by: INTERNAL MEDICINE

## 2022-07-06 PROCEDURE — 25000003 PHARM REV CODE 250: Performed by: INTERNAL MEDICINE

## 2022-07-06 PROCEDURE — 85025 COMPLETE CBC W/AUTO DIFF WBC: CPT | Performed by: INTERNAL MEDICINE

## 2022-07-06 PROCEDURE — 63600175 PHARM REV CODE 636 W HCPCS: Performed by: INTERNAL MEDICINE

## 2022-07-06 PROCEDURE — 94761 N-INVAS EAR/PLS OXIMETRY MLT: CPT

## 2022-07-06 PROCEDURE — 99024 POSTOP FOLLOW-UP VISIT: CPT | Mod: ,,, | Performed by: INTERNAL MEDICINE

## 2022-07-06 PROCEDURE — 80053 COMPREHEN METABOLIC PANEL: CPT | Performed by: INTERNAL MEDICINE

## 2022-07-06 PROCEDURE — 84100 ASSAY OF PHOSPHORUS: CPT | Performed by: INTERNAL MEDICINE

## 2022-07-06 PROCEDURE — 1111F PR DISCHARGE MEDS RECONCILED W/ CURRENT OUTPATIENT MED LIST: ICD-10-PCS | Mod: CPTII,GC,, | Performed by: INTERNAL MEDICINE

## 2022-07-06 PROCEDURE — 1111F DSCHRG MED/CURRENT MED MERGE: CPT | Mod: CPTII,GC,, | Performed by: INTERNAL MEDICINE

## 2022-07-06 RX ORDER — DOXYCYCLINE 100 MG/1
100 CAPSULE ORAL EVERY 12 HOURS
Qty: 10 CAPSULE | Refills: 0 | Status: SHIPPED | OUTPATIENT
Start: 2022-07-06 | End: 2022-07-11

## 2022-07-06 RX ORDER — MUPIROCIN 20 MG/G
OINTMENT TOPICAL 2 TIMES DAILY
Status: DISCONTINUED | OUTPATIENT
Start: 2022-07-06 | End: 2022-07-06 | Stop reason: HOSPADM

## 2022-07-06 RX ORDER — DOXYCYCLINE HYCLATE 100 MG
100 TABLET ORAL EVERY 12 HOURS
Qty: 9 TABLET | Refills: 0 | Status: SHIPPED | OUTPATIENT
Start: 2022-07-06 | End: 2022-07-06 | Stop reason: HOSPADM

## 2022-07-06 RX ORDER — DOXYCYCLINE 100 MG/1
100 CAPSULE ORAL EVERY 12 HOURS
Qty: 10 CAPSULE | Refills: 0 | Status: SHIPPED | OUTPATIENT
Start: 2022-07-06 | End: 2022-07-06 | Stop reason: SDUPTHER

## 2022-07-06 RX ORDER — MAGNESIUM SULFATE 1 G/100ML
1 INJECTION INTRAVENOUS ONCE
Status: COMPLETED | OUTPATIENT
Start: 2022-07-06 | End: 2022-07-06

## 2022-07-06 RX ADMIN — MAGNESIUM SULFATE HEPTAHYDRATE 1 G: 500 INJECTION, SOLUTION INTRAMUSCULAR; INTRAVENOUS at 05:07

## 2022-07-06 RX ADMIN — EZETIMIBE 10 MG: 10 TABLET ORAL at 08:07

## 2022-07-06 RX ADMIN — MUPIROCIN: 20 OINTMENT TOPICAL at 09:07

## 2022-07-06 RX ADMIN — ATORVASTATIN CALCIUM 80 MG: 20 TABLET, FILM COATED ORAL at 08:07

## 2022-07-06 RX ADMIN — DOXYCYCLINE HYCLATE 100 MG: 100 TABLET, COATED ORAL at 08:07

## 2022-07-06 RX ADMIN — ASPIRIN 81 MG: 81 TABLET, COATED ORAL at 08:07

## 2022-07-06 NOTE — PROGRESS NOTES
Pt VSS. Afebrile and in NSR w/ permanent pacemaker in DDD setting; back up rate of 60. Site is CDI, pt left arm in sling per orders. No gtts. OOBTC & ambulating to bathroom prn. 1 BM this morning. Pt to be discharged home once meds arrive from pharmacy. All discharge instructions overviewed w/ pt. Questions encouraged and answered. Emotional support provided.

## 2022-07-06 NOTE — ASSESSMENT & PLAN NOTE
Michael Espinoza is a 66 year old male with coronary artery disease status post CABG in 2018, HLD who presented with  1 month hx of fatigue, exertional dyspnea, chest pain relieved by rest; occasional lightheadedness in the past 2 weeks, without any syncope. He presented for his cardiac PET as part of his work up for exertional dyspnea. ECG revealed complete heart block (HR 20-30s), with RBBB for which he was sent to the ED.     Recommendations:  - s/p dual chamber pacemaker placement 07/05. Dressing removed this morning. No hematoma noted.   - NO HEPARIN PRODUCTS  - Doxycycline 100 mg PO BID for 5 days   - Refer to op note for further post-op recs   - F/u in device clinic in 1 week.  - We will sign off

## 2022-07-06 NOTE — DISCHARGE SUMMARY
Interventional Cardiology Discharge Summary  [facility]    Admit Date: 7/5/2022  Discharge Date: 7/6/2022    Admit Provider: Kathy Baez MD  Discharge Provider: Kathy Baez MD    HPI:   Mr. Michael Espinoza is a 63 y.o. gentleman with history of HLD, Gilbert's syndrome, parenchymal lung disease, ASD, and three vessel CAD s/p CABG x3V LIMA-LAD, SVG to OM1 and SVG to PDA    Hospital Course:   Mr Espinoza presented for outpatient cardiac PET stress but was noted to be bradycardic. EKG was notable for complete heart block with rate in the 30s. Patient reports 1 month hx of fatigue, exertional dyspnea, chest pain relieved by rest. He also reports occasional lightheadedness in the past 2 weeks, without any syncope. He was subsequently sent to the ED. Upon arrival to the ED, he was hypertensive. HR 30s. He denies any new medications- not on CCB or BB. He hasn't recently used NTG for chest pain relief. NPO since midnight. Not on any OAC; only taking cobb ASA 81mg. EP was consulted for CHB eval and pacemaker placement.     He underwent successful placement of a dual chamber PPM with Dr. Canales on 7/5/2022. He tolerated the procedure well and had no evidence of hematoma or device dysfunction on POD #1. He was discharged home in stable condition. He will follow up with his outpatient cardiologist Dr. Tapia and Dr. Canales with EP.     Vitals:    07/06/22 0945   BP: (!) 134/91   Pulse: 88   Resp: (!) 36   Temp:      Physical Exam:   Constitutional: no acute distress  HEENT: NCAT, EOMI, no scleral icterus  Cardiovascular: Regular rate and rhythm, no murmurs appreciated. 2+ carotid, radial, DP pulses bilaterally. Aquacel dressing to left chest wall, no swelling or bleeding noted.   Pulmonary: Clear to auscultation bilaterally   Abdomen: nontender, non-distended   Neuro: alert and oriented, no focal deficits  Extremities: warm, no edema   MSK: no deformities  Integument: intact, no  rashes          Medication List      START taking these medications    * doxycycline 100 MG Cap  Commonly known as: VIBRAMYCIN  Take 1 capsule (100 mg total) by mouth every 12 (twelve) hours. for 5 days     * doxycycline 100 MG tablet  Commonly known as: VIBRA-TABS  Take 1 tablet (100 mg total) by mouth every 12 (twelve) hours.         * This list has 2 medication(s) that are the same as other medications prescribed for you. Read the directions carefully, and ask your doctor or other care provider to review them with you.            CONTINUE taking these medications    aspirin 81 MG EC tablet  Commonly known as: ECOTRIN     atorvastatin 80 MG tablet  Commonly known as: LIPITOR  Take 1 tablet (80 mg total) by mouth once daily.     CENTRUM SILVER MEN ORAL     ezetimibe 10 mg tablet  Commonly known as: ZETIA  Take 1 tablet (10 mg total) by mouth once daily.     flu vacc ts13-14(18,up)violetta(PF) 45 mcg (15 mcg x 3)/0.5 mL Syrg  Commonly known as: Flulaval     nitroGLYCERIN 0.4 MG SL tablet  Commonly known as: NITROSTAT  Place 1 tablet (0.4 mg total) under the tongue every 5 (five) minutes as needed for Chest pain.     VITAMIN D ORAL           Where to Get Your Medications      These medications were sent to [facility]  [address] [facility] [place] [address]    Hours: Mon-Fri 7a-7p, Sat-Sun 10a-4p Phone: [phone]   · doxycycline 100 MG tablet     These medications were sent to [id number] - [place] - [address]  AT [address]  [address] [place] [address]    Hours: 24-hours Phone: [phone]   · doxycycline 100 MG Cap           Hay Doran MD  [facility]  Cardiovascular Disease, PGY-IV

## 2022-07-06 NOTE — PLAN OF CARE
Pt AAOx4. VSS on RA. UO 475cc/shift, as well as 3 additional uncounted occurrences. Bmx3. No acute events overnight. POC reviewed with pt and all questions and concerns addressed. Refer to flowsheet for VS and further assessment.

## 2022-07-06 NOTE — BRIEF OP NOTE
: Dr. Alessandro Canales MD  Date of procedure: 7/6/2022  Post-operative Diagnosis: Complete heart block    Procedure Performed: dual chamber permanent pacemaker implant    Description of Procedure: The patient was brought to the EP lab in the fasting state. Prepped and draped in sterile fashion. Safety timeout was performed. Sedation administered by anesthesia staff.  Lidocaine used for local anesthetic. Fluoroscopic guided axillary access utilized. Guide/J Wire and then terumo wire advanced and confirmed in IVC. Incision made. Blunt and electrocautery dissection performed. Pocket made. Second fluoroscopic guided axillary access obtained. Guide/J wire advanced and confirmed in IVC. Peel away sheath advanced over J wire. RV lead advanced into RV. R waves and injury pattern adequate. Lead fixed into place and sutured in place. Second peel away sheath advanced over J wire. RA lead advanced into RA via peel away sheath. After adequate p waves and current of injury detected, the RA lead was fixed into place in the RA appendage. Peel away sheath removed and RA lead sutured into place. Pocket washed using antibiotic solution. Leads connected to generator. Generator placed into pocket, sutured in place, and washed with antibiotic solution. Deep layer closed with interrupted 3-0 suture. Intermediate layer closed with running 3-0 suture. Superficial layer closed with running 4-0 suture. Skin closed with Dermabond. Meplex dressing to be placed after dermabond has dried.     EBL: <10 mL    Specimens Removed: None  Complications: no immediate    1. Ancef 2 grams q8 hours x 2 doses (ordered)  2. Sling to left arm - wear for 48 hours, then only at night for 6 weeks.  3. NO HEPARIN PRODUCTS  4. Keflex 500 mg TID for 5 days at discharge (or after the 2 doses of IV antibiotics if still in the hospital)  5. No lifting left elbow above shoulder height  6. No lifting over 5 pounds  7. No driving for 1 week and for 4 weeks  if patient uses left arm to make turns  8. Dressing will be removed in AM by EP  9. Chest Xray (ordered)  10. Follow up in device clinic in 1 week for device and wound checks.     Dr Castillo, the attending electrophysiologist, was present for the entirety of this procedure.    Lew Herrera MD PGY8

## 2022-07-06 NOTE — ANESTHESIA POSTPROCEDURE EVALUATION
Anesthesia Post Evaluation    Patient: Michael Espinoza    Procedure(s) Performed: Procedure(s) (LRB):  INSERTION, CARDIAC PACEMAKER, DUAL CHAMBER (N/A)    Final Anesthesia Type: general      Patient location during evaluation: ICU  Patient participation: Yes- Able to Participate  Level of consciousness: awake and alert and oriented  Post-procedure vital signs: reviewed and stable  Pain management: adequate  Airway patency: patent    PONV status at discharge: No PONV  Anesthetic complications: no      Cardiovascular status: blood pressure returned to baseline  Respiratory status: unassisted, spontaneous ventilation and room air  Hydration status: euvolemic  Follow-up not needed.          Vitals Value Taken Time   /86 07/06/22 0701   Temp 36.8 °C (98.2 °F) 07/06/22 0300   Pulse 88 07/06/22 0711   Resp 35 07/06/22 0711   SpO2 96 % 07/06/22 0711   Vitals shown include unvalidated device data.      No case tracking events are documented in the log.      Pain/Eduin Score: Pain Rating Post Med Admin: 0 (7/5/2022  3:13 PM)

## 2022-07-06 NOTE — PROGRESS NOTES
[facility] - [facility] Intensive Care  Cardiac Electrophysiology  Progress Note    Admission Date: 7/5/2022  Code Status: Full Code   Attending Physician: Kathy Baez MD   Expected Discharge Date: 7/6/2022  Principal Problem:Complete heart block    Subjective:     Interval History: NAEON. Patient successfully underwent PPM yesterday. A sense V paced. Dressing removed this AM. Has no complaints. States he feels more energetic today.     Review of Systems   Cardiovascular:  Negative for dyspnea on exertion and orthopnea.   Respiratory:  Negative for shortness of breath.    Neurological:  Negative for light-headedness.   Psychiatric/Behavioral:  The patient is not nervous/anxious.    Objective:     Vital Signs (Most Recent):  Temp: 98.5 °F (36.9 °C) (07/06/22 0715)  Pulse: 89 (07/06/22 0830)  Resp: (!) 31 (07/06/22 0830)  BP: (!) 147/88 (07/06/22 0830)  SpO2: 97 % (07/06/22 0830)   Vital Signs (24h Range):  Temp:  [98.2 °F (36.8 °C)-98.7 °F (37.1 °C)] 98.5 °F (36.9 °C)  Pulse:  [29-96] 89  Resp:  [5-38] 31  SpO2:  [93 %-100 %] 97 %  BP: (111-173)/(72-99) 147/88     Weight: 67.1 kg (148 lb)  Body mass index is 23.18 kg/m².     SpO2: 97 %  O2 Device (Oxygen Therapy): room air    Physical Exam  Vitals and nursing note reviewed.   Constitutional:       General: He is not in acute distress.     Appearance: Normal appearance. He is normal weight. He is not ill-appearing, toxic-appearing or diaphoretic.   HENT:      Head: Normocephalic and atraumatic.      Nose: Nose normal.      Mouth/Throat:      Mouth: Mucous membranes are moist.   Eyes:      Extraocular Movements: Extraocular movements intact.   Cardiovascular:      Rate and Rhythm: Normal rate.      Pulses: Normal pulses.      Comments: V paced  Pulmonary:      Effort: Pulmonary effort is normal. No respiratory distress.   Abdominal:      General: Abdomen is flat.   Musculoskeletal:         General: No swelling or tenderness. Normal range of motion.      Cervical  back: Normal range of motion.   Skin:     General: Skin is warm.   Neurological:      Mental Status: He is alert and oriented to person, place, and time. Mental status is at baseline.   Psychiatric:         Mood and Affect: Mood normal.         Behavior: Behavior normal.         Thought Content: Thought content normal.       Significant Labs: BMP:   Recent Labs   Lab 07/05/22  0840 07/05/22  1024 07/06/22  0329   GLU 89 91 72    138 138   K 4.8 4.4 4.3    108 109   CO2 23 21* 20*   BUN 19 18 11   CREATININE 0.9 1.0 0.8   CALCIUM 9.5 9.7 8.3*   MG  --   --  1.7   , CMP:   Recent Labs   Lab 07/05/22  0840 07/05/22  1024 07/06/22  0329    138 138   K 4.8 4.4 4.3    108 109   CO2 23 21* 20*   GLU 89 91 72   BUN 19 18 11   CREATININE 0.9 1.0 0.8   CALCIUM 9.5 9.7 8.3*   PROT 6.9 7.7 6.5   ALBUMIN 4.2 4.4 3.8   BILITOT 2.6* 2.8* 2.6*   ALKPHOS 62 65 73   AST 30 34 31   ALT 31 33 25   ANIONGAP 8 9 9   ESTGFRAFRICA >60.0 >60.0 >60.0   EGFRNONAA >60.0 >60.0 >60.0   , CBC:   Recent Labs   Lab 07/05/22  0840 07/05/22  1016 07/05/22  1024 07/06/22  0329   WBC 6.14  --  7.68 6.53   HGB 15.3  --  16.1 14.2   HCT 45.1   < > 47.6 42.0     --  177 127*    < > = values in this interval not displayed.   , and INR: No results for input(s): INR, PROTIME in the last 48 hours.  Reviewed tele- A sensed, V paced    Assessment and Plan:     * Complete heart block  Michael Espinoza is a 66 year old male with coronary artery disease status post CABG in 2018, HLD who presented with  1 month hx of fatigue, exertional dyspnea, chest pain relieved by rest; occasional lightheadedness in the past 2 weeks, without any syncope. He presented for his cardiac PET as part of his work up for exertional dyspnea. ECG revealed complete heart block (HR 20-30s), with RBBB for which he was sent to the ED.     Recommendations:  - s/p dual chamber pacemaker placement 07/05. Dressing removed this morning. No hematoma noted.   - NO HEPARIN  PRODUCTS  - Doxycycline 100 mg PO BID for 5 days   - Refer to op note for further post-op recs   - F/u in device clinic in 1 week.  - We will sign off          Tamika Ellsworth MD  Cardiac Electrophysiology  [facility] - Surgical Int[facility]

## 2022-07-06 NOTE — PLAN OF CARE
EP POC note:  Evaluated patient doing well no hematoma AS   Sling to left arm - wear for 24 hours, then only at night for 6 weeks.  NO HEPARIN PRODUCTS  Doxycycline 100 mg PO BID for 5 days at discharge  No lifting left elbow above shoulder height  No lifting over 5 pounds  No driving for 1 week and for 4 weeks if patient uses left arm to make turns  Patient may shower in 24 hours, do not let beam of shower hit site directly and no scrubbing in area  Follow up in device clinic in 1 week and with Dr. Canales in 4 months.    Lew Marroquin PGY 8

## 2022-07-06 NOTE — SUBJECTIVE & OBJECTIVE
Interval History: NAEON. Patient successfully underwent PPM yesterday. A sense V paced. Dressing removed this AM. Has no complaints. States he feels more energetic today.     Review of Systems   Cardiovascular:  Negative for dyspnea on exertion and orthopnea.   Respiratory:  Negative for shortness of breath.    Neurological:  Negative for light-headedness.   Psychiatric/Behavioral:  The patient is not nervous/anxious.    Objective:     Vital Signs (Most Recent):  Temp: 98.5 °F (36.9 °C) (07/06/22 0715)  Pulse: 89 (07/06/22 0830)  Resp: (!) 31 (07/06/22 0830)  BP: (!) 147/88 (07/06/22 0830)  SpO2: 97 % (07/06/22 0830)   Vital Signs (24h Range):  Temp:  [98.2 °F (36.8 °C)-98.7 °F (37.1 °C)] 98.5 °F (36.9 °C)  Pulse:  [29-96] 89  Resp:  [5-38] 31  SpO2:  [93 %-100 %] 97 %  BP: (111-173)/(72-99) 147/88     Weight: 67.1 kg (148 lb)  Body mass index is 23.18 kg/m².     SpO2: 97 %  O2 Device (Oxygen Therapy): room air    Physical Exam  Vitals and nursing note reviewed.   Constitutional:       General: He is not in acute distress.     Appearance: Normal appearance. He is normal weight. He is not ill-appearing, toxic-appearing or diaphoretic.   HENT:      Head: Normocephalic and atraumatic.      Nose: Nose normal.      Mouth/Throat:      Mouth: Mucous membranes are moist.   Eyes:      Extraocular Movements: Extraocular movements intact.   Cardiovascular:      Rate and Rhythm: Normal rate.      Pulses: Normal pulses.      Comments: V paced  Pulmonary:      Effort: Pulmonary effort is normal. No respiratory distress.   Abdominal:      General: Abdomen is flat.   Musculoskeletal:         General: No swelling or tenderness. Normal range of motion.      Cervical back: Normal range of motion.   Skin:     General: Skin is warm.   Neurological:      Mental Status: He is alert and oriented to person, place, and time. Mental status is at baseline.   Psychiatric:         Mood and Affect: Mood normal.         Behavior: Behavior normal.          Thought Content: Thought content normal.       Significant Labs: BMP:   Recent Labs   Lab 07/05/22  0840 07/05/22  1024 07/06/22  0329   GLU 89 91 72    138 138   K 4.8 4.4 4.3    108 109   CO2 23 21* 20*   BUN 19 18 11   CREATININE 0.9 1.0 0.8   CALCIUM 9.5 9.7 8.3*   MG  --   --  1.7   , CMP:   Recent Labs   Lab 07/05/22  0840 07/05/22  1024 07/06/22  0329    138 138   K 4.8 4.4 4.3    108 109   CO2 23 21* 20*   GLU 89 91 72   BUN 19 18 11   CREATININE 0.9 1.0 0.8   CALCIUM 9.5 9.7 8.3*   PROT 6.9 7.7 6.5   ALBUMIN 4.2 4.4 3.8   BILITOT 2.6* 2.8* 2.6*   ALKPHOS 62 65 73   AST 30 34 31   ALT 31 33 25   ANIONGAP 8 9 9   ESTGFRAFRICA >60.0 >60.0 >60.0   EGFRNONAA >60.0 >60.0 >60.0   , CBC:   Recent Labs   Lab 07/05/22  0840 07/05/22  1016 07/05/22  1024 07/06/22  0329   WBC 6.14  --  7.68 6.53   HGB 15.3  --  16.1 14.2   HCT 45.1   < > 47.6 42.0     --  177 127*    < > = values in this interval not displayed.   , and INR: No results for input(s): INR, PROTIME in the last 48 hours.  Reviewed tele- A sensed, V paced

## 2022-07-06 NOTE — DISCHARGE INSTRUCTIONS
Sling to left arm - wear for 24 hours, then only at night for 6 weeks.  NO HEPARIN PRODUCTS  Doxycycline 100 mg PO BID for 5 days at discharge  No lifting left elbow above shoulder height  No lifting over 5 pounds  No driving for 1 week and for 4 weeks if patient uses left arm to make turns  Patient may shower in 24 hours, do not let beam of shower hit site directly and no scrubbing in area  Follow up in device clinic in 1 week and with Dr. Canales in 4 months.

## 2022-07-06 NOTE — PLAN OF CARE
CM  Met with the pt to discuss discharge planning patient states that he lives alone in a 1 story house he is not on dialysis and does not take coumadin he does have transportation home   PH[facility]Y [facility] [address] [place]   PCP JACQUELINE CADET LAST APPT 07/01/22  POA/BROTHER [facility] - Surgical Intensive [facility]  Initial Discharge Assessment       Primary Care Provider: Dominguez Hyatt MD    Admission Diagnosis: Complete heart block [I44.2]  Chest pain [R07.9]    Admission Date: 7/5/2022  Expected Discharge Date:     Discharge Barriers Identified: None    Payor: HU[employer] MANAGED MEDICARE / Plan: HU[employer] MEDICARE PPO / Product Type: Medicare Advantage /     Extended Emergency Contact Information  Primary Emergency Contact: Gerard Espinoza   [facility]  Home Phone: [phone]  Mobile Phone: [phone]  Relation: Brother    Discharge Plan A: Home  Discharge Plan B: Home, Home Health      [id number] - [place] - [facility] [address]  AT [facility] [address]  [address] [place] [address]  Phone: [phone] Fax: [phone]    [facility] ([facility]) - [facility] [address]  [address] [facility] [address]  Phone: [phone] Fax: [phone]      Initial Assessment (most recent)     Adult Discharge Assessment - 07/06/22 0912        Discharge Assessment    Assessment Type Discharge Planning Assessment     Confirmed/corrected address, phone number and insurance Yes     Confirmed Demographics Correct on Facesheet     Source of Information patient     When was your last doctors appointment? 07/01/22     Communicated MAGNO with patient/caregiver No     Lives With alone     Do you expect to return to your current living situation? Yes     Do you have help at home or someone to help you manage your care at home? No     Prior to hospitilization cognitive status: Alert/Oriented     Current  cognitive status: Alert/Oriented     Walking or Climbing Stairs Difficulty none     Dressing/Bathing Difficulty none     Home Layout Able to live on 1st floor     Equipment Currently Used at Home none     Readmission within 30 days? No     Patient currently being followed by outpatient case management? No     Do you currently have service(s) that help you manage your care at home? No     Do you take prescription medications? Yes     Do you have prescription coverage? Yes     Do you have any problems affording any of your prescribed medications? No     Is the patient taking medications as prescribed? yes     Who is going to help you get home at discharge? BROTHER RENITA HERNANDEZ      How do you get to doctors appointments? car, drives self;family or friend will provide     Are you on dialysis? No     Do you take coumadin? No     Discharge Plan A Home     Discharge Plan B Home;Home Health     DME Needed Upon Discharge  none     Discharge Plan discussed with: Patient     Discharge Barriers Identified None

## 2022-07-07 ENCOUNTER — PATIENT MESSAGE (OUTPATIENT)
Dept: INTERNAL MEDICINE | Facility: CLINIC | Age: 67
End: 2022-07-07
Payer: MEDICARE

## 2022-07-07 ENCOUNTER — PATIENT MESSAGE (OUTPATIENT)
Dept: CARDIOLOGY | Facility: CLINIC | Age: 67
End: 2022-07-07
Payer: MEDICARE

## 2022-07-07 LAB — BACTERIA UR CULT: ABNORMAL

## 2022-07-07 NOTE — PHYSICIAN QUERY
PT Name: Michael REYEZ MM  MR #: [id number]     DOCUMENTATION CLARIFICATION      CDS/: Cata Rodriguez  RN,BSN            Contact information:  This form is a permanent document in the medical record.    Query Date: July 7, 2022    By submitting this query, we are merely seeking further clarification of documentation to reflect the severity of illness of your patient. Please utilize your independent clinical judgment when addressing the question(s) below.     The Medical Record contains the following:   Indicators   Supporting Clinical Findings Location in Medical Record   X Urinalysis  07/05/22 12:37  Specimen UA: Urine, Clean Catch  Color, UA: Yellow  Appearance, UA: Hazy !  Specific Gravity, UA: 1.015  pH, UA: 5.0  Protein, UA: Negative  Glucose, UA: Negative  Ketones, UA: Negative  Occult Blood UA: Negative  NITRITE UA: Negative  Bilirubin (UA): Negative  Leukocytes, UA: 3+ !  RBC, UA: 12 (H)  WBC, UA: 100 (H)  Bacteria, UA: Rare         Lab 7/5/22        WBC      Subjective/Objective Genitourinary Assessment Findings      Radiology Findings     X Culture  U/A STAPHYLOCOCCUS AUREUS   >100,000cfu/ml   Lab 7/6/22    Medication/Treatment      Other        Provider, please clarify the diagnosis related to the above documentation:  [   ] Urinary Tract Infection, site not specified (please specify associated organism, if known: _____________________)   [   ] Acute cystitis with hematuria (please specify associated organism, if known: ______________________)   [   ] Acute cystitis without hematuria (please specify associated organism, if known: ______________________)   [   ] Chronic cystitis with hematuria (please specify associated organism, if known: ______________________________)   [   ] Chronic cystitis without hematuria (please specify associated organism, if known: ______________________)   [   ] Acute pyelonephritis (please specify associated organism, if known: ______________________)   [   ]  Chronic pyelonephritis (please specify associated organism, if known: ____________________)   [   ] Asymptomatic bacteriuria/Colonization only   [   ] Other diagnosis (please specify): ________________________   [   ] Clinically Undetermined       Present on admission (POA) status:  [   ] Yes (Y)   [   ] No (N)   [   ] Documentation insufficient to determine if condition is POA (U)   [   ]  Clinically Undetermined (W)     Please document in your progress notes daily for the duration of treatment until resolved, and include in your discharge summary.  Form No. [id number]

## 2022-07-11 ENCOUNTER — TELEPHONE (OUTPATIENT)
Dept: ELECTROPHYSIOLOGY | Facility: CLINIC | Age: 67
End: 2022-07-11
Payer: MEDICARE

## 2022-07-11 ENCOUNTER — PATIENT MESSAGE (OUTPATIENT)
Dept: ELECTROPHYSIOLOGY | Facility: CLINIC | Age: 67
End: 2022-07-11
Payer: MEDICARE

## 2022-07-11 NOTE — TELEPHONE ENCOUNTER
Called patient to follow up after his procedure.  No answer, left message. Called to inquire if he is having any symptoms or complications post PM DUAL IMPLANTATION, any questions.  Call back number provided

## 2022-07-11 NOTE — TELEPHONE ENCOUNTER
Spoke to patient regarding post op care. Patient denies any complaints. Wound site dressing dry and intact without redness, swelling, hematoma or drainage. Patient denies any fever or pain. Wearing sling at night.    Patient  has resumed medications and completed the antibiotics.   He will be here Wednesday for wound and device check up.   Has the remote box at home turned on.  Will send post op instructions to patient portal. Patient verbalizes understanding of all post op instructions.

## 2022-07-13 ENCOUNTER — CLINICAL SUPPORT (OUTPATIENT)
Dept: CARDIOLOGY | Facility: HOSPITAL | Age: 67
End: 2022-07-13
Attending: INTERNAL MEDICINE
Payer: MEDICARE

## 2022-07-13 DIAGNOSIS — Z95.0 CARDIAC PACEMAKER IN SITU: ICD-10-CM

## 2022-07-13 DIAGNOSIS — I44.1 2ND DEGREE AV BLOCK: ICD-10-CM

## 2022-07-13 DIAGNOSIS — I44.2 CHB (COMPLETE HEART BLOCK): ICD-10-CM

## 2022-07-13 PROCEDURE — 93280 CARDIAC DEVICE CHECK - IN CLINIC & HOSPITAL: ICD-10-PCS | Mod: 26,,, | Performed by: INTERNAL MEDICINE

## 2022-07-13 PROCEDURE — 93280 PM DEVICE PROGR EVAL DUAL: CPT | Mod: 26,,, | Performed by: INTERNAL MEDICINE

## 2022-07-13 PROCEDURE — 93280 PM DEVICE PROGR EVAL DUAL: CPT

## 2022-07-14 ENCOUNTER — TELEPHONE (OUTPATIENT)
Dept: ELECTROPHYSIOLOGY | Facility: CLINIC | Age: 67
End: 2022-07-14
Payer: MEDICARE

## 2022-07-14 DIAGNOSIS — I44.2 COMPLETE HEART BLOCK: ICD-10-CM

## 2022-07-14 DIAGNOSIS — Z95.0 CARDIAC PACEMAKER IN SITU: Primary | ICD-10-CM

## 2022-07-18 ENCOUNTER — OFFICE VISIT (OUTPATIENT)
Dept: CARDIOLOGY | Facility: CLINIC | Age: 67
End: 2022-07-18
Payer: MEDICARE

## 2022-07-18 VITALS
OXYGEN SATURATION: 96 % | SYSTOLIC BLOOD PRESSURE: 128 MMHG | DIASTOLIC BLOOD PRESSURE: 85 MMHG | HEART RATE: 77 BPM | HEIGHT: 67 IN | WEIGHT: 152.31 LBS | BODY MASS INDEX: 23.91 KG/M2

## 2022-07-18 DIAGNOSIS — Q21.10 ASD (ATRIAL SEPTAL DEFECT): ICD-10-CM

## 2022-07-18 DIAGNOSIS — Z01.810 PRE-OPERATIVE CARDIOVASCULAR EXAMINATION: ICD-10-CM

## 2022-07-18 DIAGNOSIS — E78.00 PURE HYPERCHOLESTEROLEMIA: ICD-10-CM

## 2022-07-18 DIAGNOSIS — I44.2 COMPLETE HEART BLOCK: ICD-10-CM

## 2022-07-18 DIAGNOSIS — I25.118 CORONARY ARTERY DISEASE OF NATIVE ARTERY OF NATIVE HEART WITH STABLE ANGINA PECTORIS: Primary | ICD-10-CM

## 2022-07-18 DIAGNOSIS — Z95.1 S/P CABG X 3: ICD-10-CM

## 2022-07-18 PROCEDURE — 1101F PR PT FALLS ASSESS DOC 0-1 FALLS W/OUT INJ PAST YR: ICD-10-PCS | Mod: CPTII,S$GLB,, | Performed by: INTERNAL MEDICINE

## 2022-07-18 PROCEDURE — 3079F DIAST BP 80-89 MM HG: CPT | Mod: CPTII,S$GLB,, | Performed by: INTERNAL MEDICINE

## 2022-07-18 PROCEDURE — 3288F PR FALLS RISK ASSESSMENT DOCUMENTED: ICD-10-PCS | Mod: CPTII,S$GLB,, | Performed by: INTERNAL MEDICINE

## 2022-07-18 PROCEDURE — 1157F PR ADVANCE CARE PLAN OR EQUIV PRESENT IN MEDICAL RECORD: ICD-10-PCS | Mod: CPTII,S$GLB,, | Performed by: INTERNAL MEDICINE

## 2022-07-18 PROCEDURE — 1101F PT FALLS ASSESS-DOCD LE1/YR: CPT | Mod: CPTII,S$GLB,, | Performed by: INTERNAL MEDICINE

## 2022-07-18 PROCEDURE — 1159F MED LIST DOCD IN RCRD: CPT | Mod: CPTII,S$GLB,, | Performed by: INTERNAL MEDICINE

## 2022-07-18 PROCEDURE — 3288F FALL RISK ASSESSMENT DOCD: CPT | Mod: CPTII,S$GLB,, | Performed by: INTERNAL MEDICINE

## 2022-07-18 PROCEDURE — 3008F BODY MASS INDEX DOCD: CPT | Mod: CPTII,S$GLB,, | Performed by: INTERNAL MEDICINE

## 2022-07-18 PROCEDURE — 99213 PR OFFICE/OUTPT VISIT, EST, LEVL III, 20-29 MIN: ICD-10-PCS | Mod: S$GLB,,, | Performed by: INTERNAL MEDICINE

## 2022-07-18 PROCEDURE — 1159F PR MEDICATION LIST DOCUMENTED IN MEDICAL RECORD: ICD-10-PCS | Mod: CPTII,S$GLB,, | Performed by: INTERNAL MEDICINE

## 2022-07-18 PROCEDURE — 3079F PR MOST RECENT DIASTOLIC BLOOD PRESSURE 80-89 MM HG: ICD-10-PCS | Mod: CPTII,S$GLB,, | Performed by: INTERNAL MEDICINE

## 2022-07-18 PROCEDURE — 99999 PR PBB SHADOW E&M-EST. PATIENT-LVL IV: ICD-10-PCS | Mod: PBBFAC,,, | Performed by: INTERNAL MEDICINE

## 2022-07-18 PROCEDURE — 3074F SYST BP LT 130 MM HG: CPT | Mod: CPTII,S$GLB,, | Performed by: INTERNAL MEDICINE

## 2022-07-18 PROCEDURE — 3008F PR BODY MASS INDEX (BMI) DOCUMENTED: ICD-10-PCS | Mod: CPTII,S$GLB,, | Performed by: INTERNAL MEDICINE

## 2022-07-18 PROCEDURE — 1111F DSCHRG MED/CURRENT MED MERGE: CPT | Mod: CPTII,S$GLB,, | Performed by: INTERNAL MEDICINE

## 2022-07-18 PROCEDURE — 1126F AMNT PAIN NOTED NONE PRSNT: CPT | Mod: CPTII,S$GLB,, | Performed by: INTERNAL MEDICINE

## 2022-07-18 PROCEDURE — 3074F PR MOST RECENT SYSTOLIC BLOOD PRESSURE < 130 MM HG: ICD-10-PCS | Mod: CPTII,S$GLB,, | Performed by: INTERNAL MEDICINE

## 2022-07-18 PROCEDURE — 99999 PR PBB SHADOW E&M-EST. PATIENT-LVL IV: CPT | Mod: PBBFAC,,, | Performed by: INTERNAL MEDICINE

## 2022-07-18 PROCEDURE — 99213 OFFICE O/P EST LOW 20 MIN: CPT | Mod: S$GLB,,, | Performed by: INTERNAL MEDICINE

## 2022-07-18 PROCEDURE — 1126F PR PAIN SEVERITY QUANTIFIED, NO PAIN PRESENT: ICD-10-PCS | Mod: CPTII,S$GLB,, | Performed by: INTERNAL MEDICINE

## 2022-07-18 PROCEDURE — 1111F PR DISCHARGE MEDS RECONCILED W/ CURRENT OUTPATIENT MED LIST: ICD-10-PCS | Mod: CPTII,S$GLB,, | Performed by: INTERNAL MEDICINE

## 2022-07-18 PROCEDURE — 1157F ADVNC CARE PLAN IN RCRD: CPT | Mod: CPTII,S$GLB,, | Performed by: INTERNAL MEDICINE

## 2022-07-18 NOTE — PROGRESS NOTES
"Subjective:    Patient ID:  Michael Espinoza is a 66 y.o. 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 When seen 6/29/22 he had resumed exercise and over the past month following compression fracture of spine, he noted increased  CONTRERAS and on occasion exercise induced precordial "heaviness" relieved with rest. A PET was ordered but on presentation he was found to be in complete heart block and a pacemaker was implanted 7/5/22. He was know to have a bifascicular block. Since pacemaker implant he reports no chest pain or fatigue. Will defer PET  unless those symptoms recur.   Lab Results   Component Value Date     07/06/2022    K 4.3 07/06/2022     07/06/2022    CO2 20 (L) 07/06/2022    BUN 11 07/06/2022    CREATININE 0.8 07/06/2022    GLU 72 07/06/2022    HGBA1C 5.4 05/10/2018    MG 1.7 07/06/2022    AST 31 07/06/2022    ALT 25 07/06/2022    ALBUMIN 3.8 07/06/2022    PROT 6.5 07/06/2022    BILITOT 2.6 (H) 07/06/2022    WBC 6.53 07/06/2022    HGB 14.2 07/06/2022    HCT 42.0 07/06/2022    HCT 47 07/05/2022    MCV 92 07/06/2022     (L) 07/06/2022    INR 1.1 05/19/2018    PSA 0.31 06/24/2022    TSH 1.737 05/05/2021         Lab Results   Component Value Date    CHOL 126 07/05/2022    HDL 54 07/05/2022    TRIG 62 07/05/2022       Lab Results   Component Value Date    LDLCALC 59.6 (L) 07/05/2022       Past Medical History:   Diagnosis Date    Complete heart block 7/5/2022    Coronary artery disease of native artery of native heart with stable angina pectoris 4/10/2018    Hyperlipidemia        Current Outpatient Medications:     aspirin (ECOTRIN) 81 MG EC tablet, Take 81 mg by mouth 2 (two) times daily., Disp: , Rfl:     atorvastatin (LIPITOR) 80 MG tablet, Take 1 tablet (80 mg total) by mouth once daily., Disp: 90 tablet, Rfl: 3    ERGOCALCIFEROL, VITAMIN D2, (VITAMIN D ORAL), " Take by mouth once daily., Disp: , Rfl:     ezetimibe (ZETIA) 10 mg tablet, Take 1 tablet (10 mg total) by mouth once daily., Disp: 90 tablet, Rfl: 3    flu vac ts 2013,18yr+,cell,PF, (FLULAVAL) 45 mcg (15 mcg x 3)/0.5 mL Syrg, Inject 0.5 mLs into the muscle., Disp: , Rfl:     multivit-min/FA/lycopen/lutein (CENTRUM SILVER MEN ORAL), Take 1 tablet by mouth once daily., Disp: , Rfl:     nitroGLYCERIN (NITROSTAT) 0.4 MG SL tablet, Place 1 tablet (0.4 mg total) under the tongue every 5 (five) minutes as needed for Chest pain., Disp: 60 tablet, Rfl: 12          Review of Systems   Constitutional: Negative for decreased appetite, diaphoresis, fever, malaise/fatigue, weight gain and weight loss.   HENT: Negative for congestion, ear discharge, ear pain and nosebleeds.    Eyes: Negative for blurred vision, double vision and visual disturbance.   Cardiovascular: Negative for chest pain, claudication, cyanosis, dyspnea on exertion, irregular heartbeat, leg swelling, near-syncope, orthopnea, palpitations, paroxysmal nocturnal dyspnea and syncope.   Respiratory: Negative for cough, hemoptysis, shortness of breath, sleep disturbances due to breathing, snoring, sputum production and wheezing.    Endocrine: Negative for polydipsia, polyphagia and polyuria.   Hematologic/Lymphatic: Negative for adenopathy and bleeding problem. Does not bruise/bleed easily.   Skin: Negative for color change, nail changes, poor wound healing and rash.   Musculoskeletal: Negative for muscle cramps and muscle weakness.   Gastrointestinal: Negative for abdominal pain, anorexia, change in bowel habit, hematochezia, nausea and vomiting.   Genitourinary: Negative for dysuria, frequency and hematuria.   Neurological: Negative for brief paralysis, difficulty with concentration, excessive daytime sleepiness, dizziness, focal weakness, headaches, light-headedness, seizures, vertigo and weakness.   Psychiatric/Behavioral: Negative for altered mental status  "and depression.   Allergic/Immunologic: Negative for persistent infections.        Objective:/85 (BP Location: Left arm, Patient Position: Sitting, BP Method: Medium (Automatic))   Pulse 77   Ht 5' 7" (1.702 m)   Wt 69.1 kg (152 lb 5.4 oz)   SpO2 96%   BMI 23.86 kg/m²             Physical Exam  Constitutional:       Appearance: He is well-developed.   HENT:      Head: Normocephalic.      Right Ear: External ear normal.      Left Ear: External ear normal.      Nose: Nose normal.   Eyes:      General: No scleral icterus.     Pupils: Pupils are equal, round, and reactive to light.   Neck:      Thyroid: No thyromegaly.      Vascular: No JVD.      Trachea: No tracheal deviation.   Cardiovascular:      Rate and Rhythm: Normal rate and regular rhythm.      Pulses: Intact distal pulses.      Heart sounds: No murmur heard.    No friction rub. No gallop.   Pulmonary:      Effort: Pulmonary effort is normal.      Breath sounds: Normal breath sounds.   Chest:       Abdominal:      General: Bowel sounds are normal. There is no distension.      Tenderness: There is no abdominal tenderness. There is no guarding.   Musculoskeletal:         General: No tenderness. Normal range of motion.      Cervical back: Normal range of motion and neck supple.   Lymphadenopathy:      Comments: Palpation of neck and groin lymph nodes normal   Skin:     General: Skin is dry.      Comments: Palpation of skin normal   Neurological:      Mental Status: He is alert and oriented to person, place, and time.      Cranial Nerves: No cranial nerve deficit.      Motor: No abnormal muscle tone.      Coordination: Coordination normal.   Psychiatric:         Behavior: Behavior normal.         Thought Content: Thought content normal.         Judgment: Judgment normal.           Assessment:       1. Coronary artery disease of native artery of native heart with stable angina pectoris    2. Complete heart block    3. ASD (atrial septal defect)    4. " Pre-operative cardiovascular examination    5. S/P CABG x 3    6. Pure hypercholesterolemia         Plan:       Michael was seen today for establish care and hospital follow up.    Diagnoses and all orders for this visit:    Coronary artery disease of native artery of native heart with stable angina pectoris    Complete heart block    ASD (atrial septal defect)    Pre-operative cardiovascular examination    S/P CABG x 3    Pure hypercholesterolemia

## 2022-07-26 ENCOUNTER — INFUSION (OUTPATIENT)
Dept: INFECTIOUS DISEASES | Facility: HOSPITAL | Age: 67
End: 2022-07-26
Attending: INTERNAL MEDICINE
Payer: MEDICARE

## 2022-07-26 VITALS
SYSTOLIC BLOOD PRESSURE: 145 MMHG | BODY MASS INDEX: 23.76 KG/M2 | HEART RATE: 62 BPM | HEIGHT: 67 IN | WEIGHT: 151.38 LBS | TEMPERATURE: 97 F | DIASTOLIC BLOOD PRESSURE: 81 MMHG

## 2022-07-26 DIAGNOSIS — M80.00XA AGE-RELATED OSTEOPOROSIS WITH CURRENT PATHOLOGICAL FRACTURE, INITIAL ENCOUNTER: ICD-10-CM

## 2022-07-26 DIAGNOSIS — M81.6 LOCALIZED OSTEOPOROSIS OF LEQUESNE: ICD-10-CM

## 2022-07-26 DIAGNOSIS — M80.80XA PATHOLOGICAL FRACTURE DUE TO OSTEOPOROSIS, UNSPECIFIED FRACTURE SITE, UNSPECIFIED OSTEOPOROSIS TYPE, INITIAL ENCOUNTER: Primary | ICD-10-CM

## 2022-07-26 PROCEDURE — 63600175 PHARM REV CODE 636 W HCPCS: Performed by: PHYSICIAN ASSISTANT

## 2022-07-26 PROCEDURE — 96365 THER/PROPH/DIAG IV INF INIT: CPT

## 2022-07-26 PROCEDURE — 25000003 PHARM REV CODE 250: Performed by: PHYSICIAN ASSISTANT

## 2022-07-26 RX ORDER — SODIUM CHLORIDE 0.9 % (FLUSH) 0.9 %
10 SYRINGE (ML) INJECTION
Status: CANCELLED | OUTPATIENT
Start: 2022-07-26

## 2022-07-26 RX ORDER — ZOLEDRONIC ACID 5 MG/100ML
5 INJECTION, SOLUTION INTRAVENOUS
Status: COMPLETED | OUTPATIENT
Start: 2022-07-26 | End: 2022-07-26

## 2022-07-26 RX ORDER — SODIUM CHLORIDE 0.9 % (FLUSH) 0.9 %
10 SYRINGE (ML) INJECTION
Status: DISCONTINUED | OUTPATIENT
Start: 2022-07-26 | End: 2022-07-26 | Stop reason: HOSPADM

## 2022-07-26 RX ORDER — HEPARIN 100 UNIT/ML
500 SYRINGE INTRAVENOUS
Status: CANCELLED | OUTPATIENT
Start: 2022-07-26

## 2022-07-26 RX ORDER — ZOLEDRONIC ACID 5 MG/100ML
5 INJECTION, SOLUTION INTRAVENOUS
Status: CANCELLED | OUTPATIENT
Start: 2022-07-26

## 2022-07-26 RX ADMIN — ZOLEDRONIC ACID 5 MG: 5 INJECTION, SOLUTION INTRAVENOUS at 11:07

## 2022-07-26 RX ADMIN — SODIUM CHLORIDE: 9 INJECTION, SOLUTION INTRAVENOUS at 11:07

## 2022-07-29 ENCOUNTER — TELEPHONE (OUTPATIENT)
Dept: ENDOSCOPY | Facility: HOSPITAL | Age: 67
End: 2022-07-29
Payer: MEDICARE

## 2022-07-29 DIAGNOSIS — Z12.11 SPECIAL SCREENING FOR MALIGNANT NEOPLASMS, COLON: Primary | ICD-10-CM

## 2022-07-29 RX ORDER — SODIUM, POTASSIUM,MAG SULFATES 17.5-3.13G
SOLUTION, RECONSTITUTED, ORAL ORAL
Qty: 1 KIT | Refills: 0 | Status: SHIPPED | OUTPATIENT
Start: 2022-07-29 | End: 2023-08-08

## 2022-07-29 NOTE — TELEPHONE ENCOUNTER
Ma spoke with pt   Pt was given arrival time of 820am for his 920am procedure   Pt states he has instructions

## 2022-07-29 NOTE — TELEPHONE ENCOUNTER
----- Message from Marlee Juan sent at 7/29/2022  1:43 PM CDT -----  Contact: pt  Pt calling for time of arrival for procedure on 8-5       Confirmed patient's contact info below:  Contact Name: Michael Espinoza  Phone Number: [phone]

## 2022-08-05 ENCOUNTER — HOSPITAL ENCOUNTER (OUTPATIENT)
Facility: HOSPITAL | Age: 67
Discharge: HOME | End: 2022-08-05
Attending: STUDENT IN AN ORGANIZED HEALTH CARE EDUCATION/TRAINING PROGRAM | Admitting: STUDENT IN AN ORGANIZED HEALTH CARE EDUCATION/TRAINING PROGRAM
Payer: MEDICARE

## 2022-08-05 ENCOUNTER — ANESTHESIA EVENT (OUTPATIENT)
Dept: ENDOSCOPY | Facility: HOSPITAL | Age: 67
End: 2022-08-05
Payer: MEDICARE

## 2022-08-05 ENCOUNTER — ANESTHESIA (OUTPATIENT)
Dept: ENDOSCOPY | Facility: HOSPITAL | Age: 67
End: 2022-08-05
Payer: MEDICARE

## 2022-08-05 VITALS
SYSTOLIC BLOOD PRESSURE: 135 MMHG | BODY MASS INDEX: 23.23 KG/M2 | WEIGHT: 148 LBS | DIASTOLIC BLOOD PRESSURE: 81 MMHG | TEMPERATURE: 98 F | HEART RATE: 68 BPM | HEIGHT: 67 IN | OXYGEN SATURATION: 99 % | RESPIRATION RATE: 16 BRPM

## 2022-08-05 DIAGNOSIS — Z12.11 COLON CANCER SCREENING: Primary | ICD-10-CM

## 2022-08-05 PROCEDURE — 45380 COLONOSCOPY AND BIOPSY: CPT | Mod: PT,59 | Performed by: STUDENT IN AN ORGANIZED HEALTH CARE EDUCATION/TRAINING PROGRAM

## 2022-08-05 PROCEDURE — 45385 COLONOSCOPY W/LESION REMOVAL: CPT | Mod: PT,,, | Performed by: STUDENT IN AN ORGANIZED HEALTH CARE EDUCATION/TRAINING PROGRAM

## 2022-08-05 PROCEDURE — 27201012 HC FORCEPS, HOT/COLD, DISP: Performed by: STUDENT IN AN ORGANIZED HEALTH CARE EDUCATION/TRAINING PROGRAM

## 2022-08-05 PROCEDURE — 45380 COLONOSCOPY AND BIOPSY: CPT | Mod: PT,59,, | Performed by: STUDENT IN AN ORGANIZED HEALTH CARE EDUCATION/TRAINING PROGRAM

## 2022-08-05 PROCEDURE — 63600175 PHARM REV CODE 636 W HCPCS: Performed by: NURSE ANESTHETIST, CERTIFIED REGISTERED

## 2022-08-05 PROCEDURE — E9220 PRA ENDO ANESTHESIA: HCPCS | Mod: PT,,, | Performed by: NURSE ANESTHETIST, CERTIFIED REGISTERED

## 2022-08-05 PROCEDURE — 88305 TISSUE EXAM BY PATHOLOGIST: ICD-10-PCS | Mod: 26,,, | Performed by: PATHOLOGY

## 2022-08-05 PROCEDURE — 27201089 HC SNARE, DISP (ANY): Performed by: STUDENT IN AN ORGANIZED HEALTH CARE EDUCATION/TRAINING PROGRAM

## 2022-08-05 PROCEDURE — 25000003 PHARM REV CODE 250: Performed by: NURSE ANESTHETIST, CERTIFIED REGISTERED

## 2022-08-05 PROCEDURE — 37000009 HC ANESTHESIA EA ADD 15 MINS: Performed by: STUDENT IN AN ORGANIZED HEALTH CARE EDUCATION/TRAINING PROGRAM

## 2022-08-05 PROCEDURE — 45380 PR COLONOSCOPY,BIOPSY: ICD-10-PCS | Mod: PT,59,, | Performed by: STUDENT IN AN ORGANIZED HEALTH CARE EDUCATION/TRAINING PROGRAM

## 2022-08-05 PROCEDURE — E9220 PRA ENDO ANESTHESIA: ICD-10-PCS | Mod: PT,,, | Performed by: NURSE ANESTHETIST, CERTIFIED REGISTERED

## 2022-08-05 PROCEDURE — 88305 TISSUE EXAM BY PATHOLOGIST: CPT | Mod: 26,,, | Performed by: PATHOLOGY

## 2022-08-05 PROCEDURE — 88305 TISSUE EXAM BY PATHOLOGIST: CPT | Performed by: PATHOLOGY

## 2022-08-05 PROCEDURE — 45385 PR COLONOSCOPY,REMV LESN,SNARE: ICD-10-PCS | Mod: PT,,, | Performed by: STUDENT IN AN ORGANIZED HEALTH CARE EDUCATION/TRAINING PROGRAM

## 2022-08-05 PROCEDURE — 25000003 PHARM REV CODE 250: Performed by: STUDENT IN AN ORGANIZED HEALTH CARE EDUCATION/TRAINING PROGRAM

## 2022-08-05 PROCEDURE — 37000008 HC ANESTHESIA 1ST 15 MINUTES: Performed by: STUDENT IN AN ORGANIZED HEALTH CARE EDUCATION/TRAINING PROGRAM

## 2022-08-05 PROCEDURE — 45385 COLONOSCOPY W/LESION REMOVAL: CPT | Mod: PT | Performed by: STUDENT IN AN ORGANIZED HEALTH CARE EDUCATION/TRAINING PROGRAM

## 2022-08-05 RX ORDER — LIDOCAINE HYDROCHLORIDE 20 MG/ML
INJECTION INTRAVENOUS
Status: DISCONTINUED | OUTPATIENT
Start: 2022-08-05 | End: 2022-08-05

## 2022-08-05 RX ORDER — PROPOFOL 10 MG/ML
VIAL (ML) INTRAVENOUS
Status: DISCONTINUED | OUTPATIENT
Start: 2022-08-05 | End: 2022-08-05

## 2022-08-05 RX ORDER — PROPOFOL 10 MG/ML
VIAL (ML) INTRAVENOUS CONTINUOUS PRN
Status: DISCONTINUED | OUTPATIENT
Start: 2022-08-05 | End: 2022-08-05

## 2022-08-05 RX ORDER — SODIUM CHLORIDE 9 MG/ML
INJECTION, SOLUTION INTRAVENOUS CONTINUOUS
Status: DISCONTINUED | OUTPATIENT
Start: 2022-08-05 | End: 2022-08-05 | Stop reason: HOSPADM

## 2022-08-05 RX ADMIN — PROPOFOL 100 MG: 10 INJECTION, EMULSION INTRAVENOUS at 09:08

## 2022-08-05 RX ADMIN — Medication 150 MCG/KG/MIN: at 09:08

## 2022-08-05 RX ADMIN — LIDOCAINE HYDROCHLORIDE 50 MG: 20 INJECTION, SOLUTION INTRAVENOUS at 09:08

## 2022-08-05 RX ADMIN — SODIUM CHLORIDE: 0.9 INJECTION, SOLUTION INTRAVENOUS at 09:08

## 2022-08-05 NOTE — TRANSFER OF CARE
"Anesthesia Transfer of Care Note    Patient: Michael Espinoza    Procedure(s) Performed: Procedure(s) (LRB):  COLONOSCOPY (N/A)    Patient location: GI    Anesthesia Type: general    Transport from OR: Transported from OR on room air with adequate spontaneous ventilation    Post pain: adequate analgesia    Post assessment: no apparent anesthetic complications and tolerated procedure well    Post vital signs: stable    Level of consciousness: lethargic    Nausea/Vomiting: no nausea/vomiting    Complications: none    Transfer of care protocol was followed      Last vitals:   Visit Vitals  BP 98/61   Pulse 77   Temp 36.7 °C (98 °F)   Resp 16   Ht 5' 7" (1.702 m)   Wt 67.1 kg (148 lb)   SpO2 99%   BMI 23.18 kg/m²     "

## 2022-08-05 NOTE — H&P
Short Stay Endoscopy History and Physical    PCP - Dominguez Hyatt MD  Referring Physician - Dominguez Hyatt MD  [address] [place] [address]    Procedure - Colonoscopy  ASA - per anesthesia  Mallampati - per anesthesia  History of Anesthesia problems - no  Family history Anesthesia problems -  no   Plan of anesthesia - General    HPI  66 y.o. male  Reason for procedure:   Colon cancer screening [Z12.11]    Patient had a cscope 12 years ago which removed three polyps he states. No FH of CRC     ROS:  Constitutional: No fevers, chills, No weight loss  CV: No chest pain  Pulm: No cough, No shortness of breath  GI: see HPI    Medical History:  has a past medical history of Complete heart block (7/5/2022), Coronary artery disease of native artery of native heart with stable angina pectoris (4/10/2018), and Hyperlipidemia.    Surgical History:  has a past surgical history that includes Tonsillectomy; Cyst Removal; and A-V cardiac pacemaker insertion (N/A, 7/5/2022).    Family History: family history includes Diabetes in his brother; Heart attack in his brother; Heart disease in his brother; Hyperlipidemia in his brother; Hypertension in his brother..    Social History:  reports that he has never smoked. He has never used smokeless tobacco. He reports current alcohol use of about 1.0 standard drink of alcohol per week. He reports that he does not use drugs.    Review of patient's allergies indicates:  No Known Allergies    Medications:   Medications Prior to Admission   Medication Sig Dispense Refill Last Dose    aspirin (ECOTRIN) 81 MG EC tablet Take 81 mg by mouth 2 (two) times daily.       atorvastatin (LIPITOR) 80 MG tablet TAKE 1 TABLET EVERY DAY 90 tablet 3     ERGOCALCIFEROL, VITAMIN D2, (VITAMIN D ORAL) Take by mouth once daily.       ezetimibe (ZETIA) 10 mg tablet TAKE 1 TABLET EVERY DAY 90 tablet 3     flu vac ts 2013,18yr+,cell,PF, (FLULAVAL) 45 mcg (15 mcg x 3)/0.5 mL Syrg Inject  0.5 mLs into the muscle.       multivit-min/FA/lycopen/lutein (CENTRUM SILVER MEN ORAL) Take 1 tablet by mouth once daily.       nitroGLYCERIN (NITROSTAT) 0.4 MG SL tablet Place 1 tablet (0.4 mg total) under the tongue every 5 (five) minutes as needed for Chest pain. 60 tablet 12     sodium,potassium,mag sulfates (SUPREP BOWEL PREP KIT) 17.5-3.13-1.6 gram SolR As Directed 1 kit 0        Physical Exam:    Vital Signs: There were no vitals filed for this visit.    General Appearance: Well appearing in no acute distress  Abdomen: Soft, non tender, non distended with normal bowel sounds, no masses    Labs:  Lab Results   Component Value Date    WBC 6.53 07/06/2022    HGB 14.2 07/06/2022    HCT 42.0 07/06/2022     (L) 07/06/2022    CHOL 126 07/05/2022    TRIG 62 07/05/2022    HDL 54 07/05/2022    ALT 25 07/06/2022    AST 31 07/06/2022     07/06/2022    K 4.3 07/06/2022     07/06/2022    CREATININE 0.8 07/06/2022    BUN 11 07/06/2022    CO2 20 (L) 07/06/2022    TSH 1.737 05/05/2021    PSA 0.31 06/24/2022    INR 1.1 05/19/2018    HGBA1C 5.4 05/10/2018       I have explained the risks and benefits of this endoscopic procedure to the patient including but not limited to bleeding, inflammation, infection, perforation, and death.    Assessment/Plan:     1. CRC Surveillance     - Proceed with colonoscopy     Namita Dumont MD  Gastroenterology   [facility]

## 2022-08-05 NOTE — ANESTHESIA PREPROCEDURE EVALUATION
08/05/2022  Michael Espinoza is a 66 y.o., male.  Past Medical History:   Diagnosis Date    Complete heart block 7/5/2022    Coronary artery disease of native artery of native heart with stable angina pectoris 4/10/2018    Hyperlipidemia      Past Surgical History:   Procedure Laterality Date    A-V CARDIAC PACEMAKER INSERTION N/A 7/5/2022    Procedure: INSERTION, CARDIAC PACEMAKER, DUAL CHAMBER;  Surgeon: Alessandro Canales MD;  Location: [facility];  Service: Cardiology;  Laterality: N/A;  CHB, TBD, ANES, ED 6, MB    CYST REMOVAL      TONSILLECTOMY             Pre-op Assessment    I have reviewed the Patient Summary Reports.     I have reviewed the Nursing Notes. I have reviewed the NPO Status.   I have reviewed the Medications.     Review of Systems  Anesthesia Hx:  No problems with previous Anesthesia  History of prior surgery of interest to airway management or planning: Previous anesthesia: General   Social:  Non-Smoker, Social Alcohol Use    Cardiovascular:   CAD  Dysrhythmias  Angina hyperlipidemia Complete heart block Functional Capacity good / => 4 METS        Physical Exam  General: Well nourished, Cooperative, Alert and Oriented    Airway:  Mallampati: I   Mouth Opening: Normal  TM Distance: Normal  Tongue: Normal  Neck ROM: Normal ROM    Dental:  Intact    Chest/Lungs:  Clear to auscultation, Normal Respiratory Rate    Heart:  Rate: Normal  Rhythm: Regular Rhythm  Sounds: Normal    Abdomen:  Normal, Soft, Nontender        Anesthesia Plan  Type of Anesthesia, risks & benefits discussed:    Anesthesia Type: Gen Natural Airway  Intra-op Monitoring Plan: Standard ASA Monitors  Post Op Pain Control Plan: multimodal analgesia  Induction:  IV  Informed Consent: Informed consent signed with the Patient and all parties understand the risks and agree with anesthesia plan.  All questions answered.    ASA Score: 2  Day of Surgery Review of History & Physical: H&P Update referred to the surgeon/provider.    Ready For Surgery From Anesthesia Perspective.     .

## 2022-08-05 NOTE — PROVATION PATIENT INSTRUCTIONS
Discharge Summary/Instructions after an Endoscopic Procedure  Patient Name: Michael Espinoza  Patient MRN: [id number]  Patient YOB: 1955 Friday, August 5, 2022  Namita Dumont MD  Dear patient,  As a result of recent federal legislation (The Federal Cures Act), you may   receive lab or pathology results from your procedure in your [facility]   account before your physician is able to contact you. Your physician or   their representative will relay the results to you with their   recommendations at their soonest availability.  Thank you,  RESTRICTIONS:  During your procedure today, you received medications for sedation.  These   medications may affect your judgment, balance and coordination.  Therefore,   for 24 hours, you have the following restrictions:   - DO NOT drive a car, operate machinery, make legal/financial decisions,   sign important papers or drink alcohol.    ACTIVITY:  Today: no heavy lifting, straining or running due to procedural   sedation/anesthesia.  The following day: return to full activity including work.  DIET:  Eat and drink normally unless instructed otherwise.     TREATMENT FOR COMMON SIDE EFFECTS:  - Mild abdominal pain, nausea, belching, bloating or excessive gas:  rest,   eat lightly and use a heating pad.  - Sore Throat: treat with throat lozenges and/or gargle with warm salt   water.  - Because air was used during the procedure, expelling large amounts of air   from your rectum or belching is normal.  - If a bowel prep was taken, you may not have a bowel movement for 1-3 days.    This is normal.  SYMPTOMS TO WATCH FOR AND REPORT TO YOUR PHYSICIAN:  1. Abdominal pain or bloating, other than gas cramps.  2. Chest pain.  3. Back pain.  4. Signs of infection such as: chills or fever occurring within 24 hours   after the procedure.  5. Rectal bleeding, which would show as bright red, maroon, or black stools.   (A tablespoon of blood from the rectum is not serious, especially if    hemorrhoids are present.)  6. Vomiting.  7. Weakness or dizziness.  GO DIRECTLY TO THE NEAREST EMERGENCY ROOM IF YOU HAVE ANY OF THE FOLLOWING:      Difficulty breathing              Chills and/or fever over 101 F   Persistent vomiting and/or vomiting blood   Severe abdominal pain   Severe chest pain   Black, tarry stools   Bleeding- more than one tablespoon   Any other symptom or condition that you feel may need urgent attention  Your doctor recommends these additional instructions:  If any biopsies were taken, your doctors clinic will contact you in 1 to 2   weeks with any results.  - Discharge patient to home (ambulatory).   - Resume regular diet.   - Continue present medications.   - Await pathology results.   - Repeat colonoscopy in 7 years for surveillance.  For questions, problems or results please call your physician - Namita Dumont MD at Work:  [phone].  [facility] [place], EMERGENCY ROOM PHONE NUMBER: [phone]  IF A COMPLICATION OR EMERGENCY SITUATION ARISES AND YOU ARE UNABLE TO REACH   YOUR PHYSICIAN - GO DIRECTLY TO THE EMERGENCY ROOM.  Namita Dumont MD  8/5/2022 9:34:15 AM  This report has been verified and signed electronically.  Dear patient,  As a result of recent federal legislation (The Federal Cures Act), you may   receive lab or pathology results from your procedure in your [facility]   account before your physician is able to contact you. Your physician or   their representative will relay the results to you with their   recommendations at their soonest availability.  Thank you,  PROVATION

## 2022-08-05 NOTE — ANESTHESIA POSTPROCEDURE EVALUATION
Anesthesia Post Evaluation    Patient: Michael Espinoza    Procedure(s) Performed: Procedure(s) (LRB):  COLONOSCOPY (N/A)    Final Anesthesia Type: general      Patient location during evaluation: GI PACU  Patient participation: Yes- Able to Participate  Level of consciousness: awake and alert and oriented  Post-procedure vital signs: reviewed and stable  Pain management: adequate  Airway patency: patent    PONV status at discharge: No PONV  Anesthetic complications: no      Cardiovascular status: blood pressure returned to baseline  Respiratory status: unassisted, spontaneous ventilation and room air  Hydration status: euvolemic  Follow-up not needed.          Vitals Value Taken Time   /81 08/05/22 1013   Temp 36.7 °C (98 °F) 08/05/22 0942   Pulse 68 08/05/22 1013   Resp 16 08/05/22 1013   SpO2 99 % 08/05/22 1013         Event Time   Out of Recovery 10:14:34         Pain/Eduin Score: Eduin Score: 10 (8/5/2022  9:43 AM)

## 2022-08-05 NOTE — PLAN OF CARE
Pt verbalized an understanding of discharge instructions including diet, s/s to notify MD, and follow up.  Pt refused wheel chair and will ambulate off unit with patient transport.  
poc initiated, verbalized understanding    
normal...

## 2022-08-10 LAB
FINAL PATHOLOGIC DIAGNOSIS: NORMAL
GROSS: NORMAL
Lab: NORMAL

## 2022-10-03 ENCOUNTER — CLINICAL SUPPORT (OUTPATIENT)
Dept: CARDIOLOGY | Facility: HOSPITAL | Age: 67
End: 2022-10-03
Payer: MEDICARE

## 2022-10-03 DIAGNOSIS — Z95.0 PRESENCE OF CARDIAC PACEMAKER: ICD-10-CM

## 2022-10-03 PROCEDURE — 93294 CARDIAC DEVICE CHECK - REMOTE: ICD-10-PCS | Mod: ,,, | Performed by: INTERNAL MEDICINE

## 2022-10-03 PROCEDURE — 93296 REM INTERROG EVL PM/IDS: CPT | Performed by: INTERNAL MEDICINE

## 2022-10-03 PROCEDURE — 93294 REM INTERROG EVL PM/LDLS PM: CPT | Mod: ,,, | Performed by: INTERNAL MEDICINE

## 2022-10-10 ENCOUNTER — IMMUNIZATION (OUTPATIENT)
Dept: INTERNAL MEDICINE | Facility: CLINIC | Age: 67
End: 2022-10-10
Payer: MEDICARE

## 2022-10-10 PROCEDURE — G0008 FLU VACCINE - QUADRIVALENT - ADJUVANTED: ICD-10-PCS | Mod: S$GLB,,, | Performed by: INTERNAL MEDICINE

## 2022-10-10 PROCEDURE — 90694 VACC AIIV4 NO PRSRV 0.5ML IM: CPT | Mod: S$GLB,,, | Performed by: INTERNAL MEDICINE

## 2022-10-10 PROCEDURE — 90694 FLU VACCINE - QUADRIVALENT - ADJUVANTED: ICD-10-PCS | Mod: S$GLB,,, | Performed by: INTERNAL MEDICINE

## 2022-10-10 PROCEDURE — G0008 ADMIN INFLUENZA VIRUS VAC: HCPCS | Mod: S$GLB,,, | Performed by: INTERNAL MEDICINE

## 2022-11-09 NOTE — PROGRESS NOTES
Subjective:    Patient ID:  Michael Espinoza is a 67 y.o. male who presents for follow-up of CHB      67 yoM CAD/CABG, CHB here for PM follow up. He presented for a stress test 7/22 and was found to be in CHB. DC PM implanted 7/5/22. Normal DC PM function with no sustained arrhythmias. 10% AP, >99%. No HF symptoms.     Echo 7/5/22:  · Presence of bradycardia with HR 30s with AV dissociation  · The estimated ejection fraction is 65%.  · The left ventricle is normal in size with normal systolic function.  · Normal left ventricular diastolic function.  · Normal right ventricular size with normal right ventricular systolic function.  · Mild mitral regurgitation.  · Presence of interatrial septal aneurysm.  · The estimated PA systolic pressure is 28 mmHg.  · Normal central venous pressure (3 mmHg).    Past Medical History:  7/5/2022: Complete heart block  4/10/2018: Coronary artery disease of native artery of native heart   with stable angina pectoris  No date: Hyperlipidemia    Past Surgical History:  7/5/2022: A-V CARDIAC PACEMAKER INSERTION; N/A      Comment:  Procedure: INSERTION, CARDIAC PACEMAKER, DUAL CHAMBER;                 Surgeon: Alessandro Canales MD;  Location: [facility] EP [facility];                Service: Cardiology;  Laterality: N/A;  CHB, TBD, ANES,                ED 6, MB  8/5/2022: COLONOSCOPY; N/A      Comment:  Procedure: COLONOSCOPY;  Surgeon: Namita Dumont MD;                 [facility]  Service: Endoscopy;                 Laterality: N/A;  vacc-inst portal-tb  No date: CYST REMOVAL  No date: TONSILLECTOMY    Social History    Socioeconomic History      Marital status: Single    Tobacco Use      Smoking status: Never      Smokeless tobacco: Never    Substance and Sexual Activity      Alcohol use: Yes        Alcohol/week: 1.0 standard drink        Types: 1 Glasses of wine per week        Comment: 1 drink 2-3 times/weekly      Drug use: No    Social Determinants of Health  Financial  Resource Strain: Low Risk       Difficulty of Paying Living Expenses: Not hard at all  Food Insecurity: No Food Insecurity      Worried About Running Out of Food in the Last Year: Never true      Ran Out of Food in the Last Year: Never true  Transportation Needs: No Transportation Needs      Lack of Transportation (Medical): No      Lack of Transportation (Non-Medical): No  Physical Activity: Insufficiently Active      Days of Exercise per Week: 4 days      Minutes of Exercise per Session: 30 min  Stress: No Stress Concern Present      Feeling of Stress : Not at all  Social Connections: Unknown      Frequency of Communication with Friends and Family: More than three times a week      Frequency of Social Gatherings with Friends and Family: Three times a week      Active Member of Clubs or Organizations: Yes      Attends Club or Organization Meetings: More than 4 times per year      Marital Status: Never   Housing Stability: Low Risk       Unable to Pay for Housing in the Last Year: No      Number of Places Lived in the Last Year: 1      Unstable Housing in the Last Year: No    Review of patient's family history indicates:      Current Outpatient Medications:  aspirin (ECOTRIN) 81 MG EC tablet, Take 81 mg by mouth 2 (two) times daily., Disp: , Rfl:   atorvastatin (LIPITOR) 80 MG tablet, TAKE 1 TABLET EVERY DAY, Disp: 90 tablet, Rfl: 3  ERGOCALCIFEROL, VITAMIN D2, (VITAMIN D ORAL), Take by mouth once daily., Disp: , Rfl:   ezetimibe (ZETIA) 10 mg tablet, TAKE 1 TABLET EVERY DAY, Disp: 90 tablet, Rfl: 3  flu vac ts 2013,18yr+,cell,PF, (FLULAVAL) 45 mcg (15 mcg x 3)/0.5 mL Syrg, Inject 0.5 mLs into the muscle., Disp: , Rfl:   multivit-min/FA/lycopen/lutein (CENTRUM SILVER MEN ORAL), Take 1 tablet by mouth once daily., Disp: , Rfl:   nitroGLYCERIN (NITROSTAT) 0.4 MG SL tablet, Place 1 tablet (0.4 mg total) under the tongue every 5 (five) minutes as needed for Chest pain., Disp: 60 tablet, Rfl:  12  sodium,potassium,mag sulfates (SUPREP BOWEL PREP KIT) 17.5-3.13-1.6 gram SolR, As Directed, Disp: 1 kit, Rfl: 0    No current facility-administered medications for this visit.            Review of Systems   Constitutional: Negative.   HENT: Negative.     Eyes: Negative.    Cardiovascular:  Negative for chest pain, dyspnea on exertion, leg swelling, near-syncope, palpitations and syncope.   Respiratory: Negative.  Negative for shortness of breath.    Endocrine: Negative.    Hematologic/Lymphatic: Negative.    Skin: Negative.    Musculoskeletal: Negative.    Gastrointestinal: Negative.    Genitourinary: Negative.    Neurological: Negative.  Negative for dizziness and light-headedness.   Psychiatric/Behavioral: Negative.     Allergic/Immunologic: Negative.       Objective:    Physical Exam  Vitals reviewed.   Constitutional:       General: He is not in acute distress.     Appearance: He is well-developed.   HENT:      Head: Normocephalic and atraumatic.   Eyes:      Pupils: Pupils are equal, round, and reactive to light.   Neck:      Thyroid: No thyromegaly.      Vascular: No JVD.   Cardiovascular:      Rate and Rhythm: Normal rate and regular rhythm.      Chest Wall: PMI is not displaced.      Heart sounds: Normal heart sounds, S1 normal and S2 normal. No murmur heard.    No friction rub. No gallop.   Pulmonary:      Effort: Pulmonary effort is normal. No respiratory distress.      Breath sounds: Normal breath sounds. No wheezing or rales.   Abdominal:      General: Bowel sounds are normal. There is no distension.      Palpations: Abdomen is soft.      Tenderness: There is no abdominal tenderness. There is no guarding or rebound.   Musculoskeletal:         General: No tenderness. Normal range of motion.      Cervical back: Normal range of motion.   Skin:     General: Skin is warm and dry.      Findings: No erythema or rash.   Neurological:      Mental Status: He is alert and oriented to person, place, and time.       Cranial Nerves: No cranial nerve deficit.   Psychiatric:         Behavior: Behavior normal.         Thought Content: Thought content normal.         Judgment: Judgment normal.       Assessment:       1. Complete heart block    2. S/P CABG x 3         Plan:       67 yoM CAD/CABG, CHB s/p DC PM here for follow up. Normal DC PM function with no sustained arrhythmias. I discussed routine device follow up including quarterly to bi-annual device checks for device function as well as yearly follow up in the EP clinic. The patient  was advised to call with any concerns regarding their device. Device clinic follow up as scheduled. RTC 1y

## 2022-11-10 ENCOUNTER — OFFICE VISIT (OUTPATIENT)
Dept: ELECTROPHYSIOLOGY | Facility: CLINIC | Age: 67
End: 2022-11-10
Attending: INTERNAL MEDICINE
Payer: MEDICARE

## 2022-11-10 ENCOUNTER — CLINICAL SUPPORT (OUTPATIENT)
Dept: CARDIOLOGY | Facility: HOSPITAL | Age: 67
End: 2022-11-10
Attending: INTERNAL MEDICINE
Payer: MEDICARE

## 2022-11-10 ENCOUNTER — HOSPITAL ENCOUNTER (OUTPATIENT)
Dept: CARDIOLOGY | Facility: CLINIC | Age: 67
Discharge: HOME | End: 2022-11-10
Attending: INTERNAL MEDICINE
Payer: MEDICARE

## 2022-11-10 VITALS
SYSTOLIC BLOOD PRESSURE: 122 MMHG | BODY MASS INDEX: 24.19 KG/M2 | WEIGHT: 154.13 LBS | HEIGHT: 67 IN | DIASTOLIC BLOOD PRESSURE: 78 MMHG | HEART RATE: 65 BPM

## 2022-11-10 DIAGNOSIS — I44.2 CHB (COMPLETE HEART BLOCK): ICD-10-CM

## 2022-11-10 DIAGNOSIS — I44.2 COMPLETE HEART BLOCK: ICD-10-CM

## 2022-11-10 DIAGNOSIS — Z95.1 S/P CABG X 3: ICD-10-CM

## 2022-11-10 DIAGNOSIS — Z95.0 CARDIAC PACEMAKER IN SITU: ICD-10-CM

## 2022-11-10 DIAGNOSIS — I44.1 2ND DEGREE AV BLOCK: ICD-10-CM

## 2022-11-10 DIAGNOSIS — I44.2 COMPLETE HEART BLOCK: Primary | ICD-10-CM

## 2022-11-10 PROCEDURE — 93010 RHYTHM STRIP: ICD-10-PCS | Mod: S$GLB,,, | Performed by: INTERNAL MEDICINE

## 2022-11-10 PROCEDURE — 1126F AMNT PAIN NOTED NONE PRSNT: CPT | Mod: CPTII,S$GLB,, | Performed by: INTERNAL MEDICINE

## 2022-11-10 PROCEDURE — 3074F SYST BP LT 130 MM HG: CPT | Mod: CPTII,S$GLB,, | Performed by: INTERNAL MEDICINE

## 2022-11-10 PROCEDURE — 3008F BODY MASS INDEX DOCD: CPT | Mod: CPTII,S$GLB,, | Performed by: INTERNAL MEDICINE

## 2022-11-10 PROCEDURE — 3008F PR BODY MASS INDEX (BMI) DOCUMENTED: ICD-10-PCS | Mod: CPTII,S$GLB,, | Performed by: INTERNAL MEDICINE

## 2022-11-10 PROCEDURE — 93005 RHYTHM STRIP: ICD-10-PCS | Mod: S$GLB,,, | Performed by: INTERNAL MEDICINE

## 2022-11-10 PROCEDURE — 99999 PR PBB SHADOW E&M-EST. PATIENT-LVL III: ICD-10-PCS | Mod: PBBFAC,,, | Performed by: INTERNAL MEDICINE

## 2022-11-10 PROCEDURE — 1159F PR MEDICATION LIST DOCUMENTED IN MEDICAL RECORD: ICD-10-PCS | Mod: CPTII,S$GLB,, | Performed by: INTERNAL MEDICINE

## 2022-11-10 PROCEDURE — 3074F PR MOST RECENT SYSTOLIC BLOOD PRESSURE < 130 MM HG: ICD-10-PCS | Mod: CPTII,S$GLB,, | Performed by: INTERNAL MEDICINE

## 2022-11-10 PROCEDURE — 3288F PR FALLS RISK ASSESSMENT DOCUMENTED: ICD-10-PCS | Mod: CPTII,S$GLB,, | Performed by: INTERNAL MEDICINE

## 2022-11-10 PROCEDURE — 93010 ELECTROCARDIOGRAM REPORT: CPT | Mod: S$GLB,,, | Performed by: INTERNAL MEDICINE

## 2022-11-10 PROCEDURE — 93280 CARDIAC DEVICE CHECK - IN CLINIC & HOSPITAL: ICD-10-PCS | Mod: 26,,, | Performed by: INTERNAL MEDICINE

## 2022-11-10 PROCEDURE — 1159F MED LIST DOCD IN RCRD: CPT | Mod: CPTII,S$GLB,, | Performed by: INTERNAL MEDICINE

## 2022-11-10 PROCEDURE — 93005 ELECTROCARDIOGRAM TRACING: CPT | Mod: S$GLB,,, | Performed by: INTERNAL MEDICINE

## 2022-11-10 PROCEDURE — 93283 PRGRMG EVAL IMPLANTABLE DFB: CPT

## 2022-11-10 PROCEDURE — 1157F ADVNC CARE PLAN IN RCRD: CPT | Mod: CPTII,S$GLB,, | Performed by: INTERNAL MEDICINE

## 2022-11-10 PROCEDURE — 99214 OFFICE O/P EST MOD 30 MIN: CPT | Mod: S$GLB,,, | Performed by: INTERNAL MEDICINE

## 2022-11-10 PROCEDURE — 3078F DIAST BP <80 MM HG: CPT | Mod: CPTII,S$GLB,, | Performed by: INTERNAL MEDICINE

## 2022-11-10 PROCEDURE — 99999 PR PBB SHADOW E&M-EST. PATIENT-LVL III: CPT | Mod: PBBFAC,,, | Performed by: INTERNAL MEDICINE

## 2022-11-10 PROCEDURE — 3288F FALL RISK ASSESSMENT DOCD: CPT | Mod: CPTII,S$GLB,, | Performed by: INTERNAL MEDICINE

## 2022-11-10 PROCEDURE — 99214 PR OFFICE/OUTPT VISIT, EST, LEVL IV, 30-39 MIN: ICD-10-PCS | Mod: S$GLB,,, | Performed by: INTERNAL MEDICINE

## 2022-11-10 PROCEDURE — 1101F PR PT FALLS ASSESS DOC 0-1 FALLS W/OUT INJ PAST YR: ICD-10-PCS | Mod: CPTII,S$GLB,, | Performed by: INTERNAL MEDICINE

## 2022-11-10 PROCEDURE — 1126F PR PAIN SEVERITY QUANTIFIED, NO PAIN PRESENT: ICD-10-PCS | Mod: CPTII,S$GLB,, | Performed by: INTERNAL MEDICINE

## 2022-11-10 PROCEDURE — 1101F PT FALLS ASSESS-DOCD LE1/YR: CPT | Mod: CPTII,S$GLB,, | Performed by: INTERNAL MEDICINE

## 2022-11-10 PROCEDURE — 93280 PM DEVICE PROGR EVAL DUAL: CPT | Mod: 26,,, | Performed by: INTERNAL MEDICINE

## 2022-11-10 PROCEDURE — 3078F PR MOST RECENT DIASTOLIC BLOOD PRESSURE < 80 MM HG: ICD-10-PCS | Mod: CPTII,S$GLB,, | Performed by: INTERNAL MEDICINE

## 2022-11-10 PROCEDURE — 1157F PR ADVANCE CARE PLAN OR EQUIV PRESENT IN MEDICAL RECORD: ICD-10-PCS | Mod: CPTII,S$GLB,, | Performed by: INTERNAL MEDICINE

## 2022-11-15 ENCOUNTER — IMMUNIZATION (OUTPATIENT)
Dept: INTERNAL MEDICINE | Facility: CLINIC | Age: 67
End: 2022-11-15
Payer: MEDICARE

## 2022-11-15 DIAGNOSIS — Z23 NEED FOR VACCINATION: Primary | ICD-10-CM

## 2022-11-15 PROCEDURE — 91312 COVID-19, MRNA, LNP-S, BIVALENT BOOSTER, PF, 30 MCG/0.3 ML DOSE: ICD-10-PCS | Mod: S$GLB,,, | Performed by: INTERNAL MEDICINE

## 2022-11-15 PROCEDURE — 0124A COVID-19, MRNA, LNP-S, BIVALENT BOOSTER, PF, 30 MCG/0.3 ML DOSE: CPT | Mod: CV19,PBBFAC | Performed by: INTERNAL MEDICINE

## 2022-11-15 PROCEDURE — 91312 COVID-19, MRNA, LNP-S, BIVALENT BOOSTER, PF, 30 MCG/0.3 ML DOSE: CPT | Mod: S$GLB,,, | Performed by: INTERNAL MEDICINE

## 2022-12-11 ENCOUNTER — PATIENT MESSAGE (OUTPATIENT)
Dept: INTERNAL MEDICINE | Facility: CLINIC | Age: 67
End: 2022-12-11
Payer: MEDICARE

## 2023-01-01 ENCOUNTER — CLINICAL SUPPORT (OUTPATIENT)
Dept: CARDIOLOGY | Facility: HOSPITAL | Age: 68
End: 2023-01-01
Payer: MEDICARE

## 2023-01-01 DIAGNOSIS — Z95.0 PRESENCE OF CARDIAC PACEMAKER: ICD-10-CM

## 2023-01-01 DIAGNOSIS — I44.2 ATRIOVENTRICULAR BLOCK, COMPLETE: ICD-10-CM

## 2023-01-01 PROCEDURE — 93296 REM INTERROG EVL PM/IDS: CPT | Performed by: INTERNAL MEDICINE

## 2023-04-01 ENCOUNTER — CLINICAL SUPPORT (OUTPATIENT)
Dept: CARDIOLOGY | Facility: HOSPITAL | Age: 68
End: 2023-04-01
Payer: MEDICARE

## 2023-04-01 DIAGNOSIS — I44.2 ATRIOVENTRICULAR BLOCK, COMPLETE: ICD-10-CM

## 2023-04-01 DIAGNOSIS — Z95.0 PRESENCE OF CARDIAC PACEMAKER: ICD-10-CM

## 2023-04-01 DIAGNOSIS — I48.91 UNSPECIFIED ATRIAL FIBRILLATION: ICD-10-CM

## 2023-04-01 PROCEDURE — 93294 CARDIAC DEVICE CHECK - REMOTE: ICD-10-PCS | Mod: ,,, | Performed by: INTERNAL MEDICINE

## 2023-04-01 PROCEDURE — 93296 REM INTERROG EVL PM/IDS: CPT | Performed by: INTERNAL MEDICINE

## 2023-04-01 PROCEDURE — 93294 REM INTERROG EVL PM/LDLS PM: CPT | Mod: ,,, | Performed by: INTERNAL MEDICINE

## 2023-06-30 ENCOUNTER — CLINICAL SUPPORT (OUTPATIENT)
Dept: CARDIOLOGY | Facility: HOSPITAL | Age: 68
End: 2023-06-30
Payer: MEDICARE

## 2023-06-30 DIAGNOSIS — Z95.0 PRESENCE OF CARDIAC PACEMAKER: ICD-10-CM

## 2023-06-30 PROCEDURE — 93296 REM INTERROG EVL PM/IDS: CPT | Performed by: INTERNAL MEDICINE

## 2023-08-08 ENCOUNTER — OFFICE VISIT (OUTPATIENT)
Dept: INTERNAL MEDICINE | Facility: CLINIC | Age: 68
End: 2023-08-08
Payer: MEDICARE

## 2023-08-08 VITALS
WEIGHT: 155.44 LBS | DIASTOLIC BLOOD PRESSURE: 78 MMHG | OXYGEN SATURATION: 93 % | SYSTOLIC BLOOD PRESSURE: 130 MMHG | BODY MASS INDEX: 24.4 KG/M2 | HEART RATE: 58 BPM | HEIGHT: 67 IN

## 2023-08-08 DIAGNOSIS — R19.7 DIARRHEA, UNSPECIFIED TYPE: ICD-10-CM

## 2023-08-08 DIAGNOSIS — Z12.5 SCREENING FOR PROSTATE CANCER: ICD-10-CM

## 2023-08-08 DIAGNOSIS — I25.118 CORONARY ARTERY DISEASE OF NATIVE ARTERY OF NATIVE HEART WITH STABLE ANGINA PECTORIS: ICD-10-CM

## 2023-08-08 DIAGNOSIS — I44.2 COMPLETE HEART BLOCK: ICD-10-CM

## 2023-08-08 DIAGNOSIS — R73.09 ELEVATED GLUCOSE LEVEL: ICD-10-CM

## 2023-08-08 DIAGNOSIS — R10.9 ABDOMINAL CRAMPS: ICD-10-CM

## 2023-08-08 DIAGNOSIS — R14.3 INTESTINAL GAS EXCRETION: ICD-10-CM

## 2023-08-08 DIAGNOSIS — Z95.1 S/P CABG X 3: ICD-10-CM

## 2023-08-08 DIAGNOSIS — Z00.00 ROUTINE PHYSICAL EXAMINATION: Primary | ICD-10-CM

## 2023-08-08 PROCEDURE — 1159F PR MEDICATION LIST DOCUMENTED IN MEDICAL RECORD: ICD-10-PCS | Mod: CPTII,S$GLB,, | Performed by: INTERNAL MEDICINE

## 2023-08-08 PROCEDURE — 3075F PR MOST RECENT SYSTOLIC BLOOD PRESS GE 130-139MM HG: ICD-10-PCS | Mod: CPTII,S$GLB,, | Performed by: INTERNAL MEDICINE

## 2023-08-08 PROCEDURE — 3288F FALL RISK ASSESSMENT DOCD: CPT | Mod: CPTII,S$GLB,, | Performed by: INTERNAL MEDICINE

## 2023-08-08 PROCEDURE — 3288F PR FALLS RISK ASSESSMENT DOCUMENTED: ICD-10-PCS | Mod: CPTII,S$GLB,, | Performed by: INTERNAL MEDICINE

## 2023-08-08 PROCEDURE — 1126F AMNT PAIN NOTED NONE PRSNT: CPT | Mod: CPTII,S$GLB,, | Performed by: INTERNAL MEDICINE

## 2023-08-08 PROCEDURE — 3078F PR MOST RECENT DIASTOLIC BLOOD PRESSURE < 80 MM HG: ICD-10-PCS | Mod: CPTII,S$GLB,, | Performed by: INTERNAL MEDICINE

## 2023-08-08 PROCEDURE — 1157F ADVNC CARE PLAN IN RCRD: CPT | Mod: CPTII,S$GLB,, | Performed by: INTERNAL MEDICINE

## 2023-08-08 PROCEDURE — 99999 PR PBB SHADOW E&M-EST. PATIENT-LVL III: CPT | Mod: PBBFAC,,, | Performed by: INTERNAL MEDICINE

## 2023-08-08 PROCEDURE — 1101F PT FALLS ASSESS-DOCD LE1/YR: CPT | Mod: CPTII,S$GLB,, | Performed by: INTERNAL MEDICINE

## 2023-08-08 PROCEDURE — 1101F PR PT FALLS ASSESS DOC 0-1 FALLS W/OUT INJ PAST YR: ICD-10-PCS | Mod: CPTII,S$GLB,, | Performed by: INTERNAL MEDICINE

## 2023-08-08 PROCEDURE — 3008F BODY MASS INDEX DOCD: CPT | Mod: CPTII,S$GLB,, | Performed by: INTERNAL MEDICINE

## 2023-08-08 PROCEDURE — 3075F SYST BP GE 130 - 139MM HG: CPT | Mod: CPTII,S$GLB,, | Performed by: INTERNAL MEDICINE

## 2023-08-08 PROCEDURE — 99397 PER PM REEVAL EST PAT 65+ YR: CPT | Mod: S$GLB,,, | Performed by: INTERNAL MEDICINE

## 2023-08-08 PROCEDURE — 99999 PR PBB SHADOW E&M-EST. PATIENT-LVL III: ICD-10-PCS | Mod: PBBFAC,,, | Performed by: INTERNAL MEDICINE

## 2023-08-08 PROCEDURE — 1160F PR REVIEW ALL MEDS BY PRESCRIBER/CLIN PHARMACIST DOCUMENTED: ICD-10-PCS | Mod: CPTII,S$GLB,, | Performed by: INTERNAL MEDICINE

## 2023-08-08 PROCEDURE — 1160F RVW MEDS BY RX/DR IN RCRD: CPT | Mod: CPTII,S$GLB,, | Performed by: INTERNAL MEDICINE

## 2023-08-08 PROCEDURE — 1126F PR PAIN SEVERITY QUANTIFIED, NO PAIN PRESENT: ICD-10-PCS | Mod: CPTII,S$GLB,, | Performed by: INTERNAL MEDICINE

## 2023-08-08 PROCEDURE — 99397 PR PREVENTIVE VISIT,EST,65 & OVER: ICD-10-PCS | Mod: S$GLB,,, | Performed by: INTERNAL MEDICINE

## 2023-08-08 PROCEDURE — 3008F PR BODY MASS INDEX (BMI) DOCUMENTED: ICD-10-PCS | Mod: CPTII,S$GLB,, | Performed by: INTERNAL MEDICINE

## 2023-08-08 PROCEDURE — 1157F PR ADVANCE CARE PLAN OR EQUIV PRESENT IN MEDICAL RECORD: ICD-10-PCS | Mod: CPTII,S$GLB,, | Performed by: INTERNAL MEDICINE

## 2023-08-08 PROCEDURE — 3078F DIAST BP <80 MM HG: CPT | Mod: CPTII,S$GLB,, | Performed by: INTERNAL MEDICINE

## 2023-08-08 PROCEDURE — 1159F MED LIST DOCD IN RCRD: CPT | Mod: CPTII,S$GLB,, | Performed by: INTERNAL MEDICINE

## 2023-08-08 RX ORDER — SILDENAFIL 50 MG/1
50 TABLET, FILM COATED ORAL DAILY PRN
Qty: 10 TABLET | Refills: 3 | Status: SHIPPED | OUTPATIENT
Start: 2023-08-08 | End: 2023-12-14

## 2023-08-08 NOTE — PROGRESS NOTES
Subjective:       Patient ID: Michael Espinoza is a 67 y.o. male.    Chief Complaint: Annual Exam    HPI: here for Annual Exam, doing ok with pacemaker.   Would like to try Viagra. No urinary issues. Just having some ED issues.     Prior Giardia GI infection treated years.   He has done some traveling, now 30 days of increased cramping, increased gas, foul smelling stool.  Worried about a recurrent GI pathogen or infection  Still seeing Cardiology-still coaching cross-country  Pacemaker doing well.    Would like to do updated blood work      Review of Systems   Constitutional:  Negative for activity change and unexpected weight change.   HENT:  Negative for hearing loss, rhinorrhea and trouble swallowing.    Eyes:  Negative for discharge and visual disturbance.   Respiratory:  Negative for chest tightness and wheezing.    Cardiovascular:  Negative for chest pain and palpitations.   Gastrointestinal:  Negative for blood in stool, constipation, diarrhea and vomiting.   Endocrine: Negative for polydipsia and polyuria.   Genitourinary:  Positive for erectile dysfunction. Negative for difficulty urinating, hematuria and urgency.   Musculoskeletal:  Negative for arthralgias, joint swelling and neck pain.   Neurological:  Negative for weakness and headaches.   Psychiatric/Behavioral:  Negative for confusion and dysphoric mood.            Past Medical History:   Diagnosis Date    Complete heart block 7/5/2022    Coronary artery disease of native artery of native heart with stable angina pectoris 4/10/2018    Hyperlipidemia      Past Surgical History:   Procedure Laterality Date    A-V CARDIAC PACEMAKER INSERTION N/A 7/5/2022    Procedure: INSERTION, CARDIAC PACEMAKER, DUAL CHAMBER;  Surgeon: Alessandro Canales MD;  Location: [facility] EP LAB;  Service: Cardiology;  Laterality: N/A;  CHB, TBD, ANES, ED 6, MB    COLONOSCOPY N/A 8/5/2022    Procedure: COLONOSCOPY;  Surgeon: Namita Dumont MD;  [facility]   Service: Endoscopy;  Laterality: N/A;  vacc-inst portal-tb    CYST REMOVAL      TONSILLECTOMY        Patient Active Problem List   Diagnosis    HLD (hyperlipidemia)    Gilbert's syndrome    Exertional angina    ASD (atrial septal defect)    SOB (shortness of breath) on exertion    Abnormal CT of the chest    Chest pain    Coronary artery disease of native artery of native heart with stable angina pectoris    Pre-operative cardiovascular examination    S/P CABG x 3    Chronic right shoulder pain    Partial nontraumatic tear of rotator cuff, right    Pathological fracture due to osteoporosis    Age-related osteoporosis with current pathological fracture    Localized osteoporosis of [place]    Complete heart block        Objective:      Physical Exam  Constitutional:       General: He is not in acute distress.     Appearance: He is well-developed.   HENT:      Head: Normocephalic and atraumatic.      Right Ear: Tympanic membrane, ear canal and external ear normal.      Left Ear: Tympanic membrane, ear canal and external ear normal.      Mouth/Throat:      Pharynx: No oropharyngeal exudate or posterior oropharyngeal erythema.   Eyes:      General: No scleral icterus.     Conjunctiva/sclera: Conjunctivae normal.      Pupils: Pupils are equal, round, and reactive to light.   Neck:      Thyroid: No thyromegaly.      Comments: No supraclavicular nodes palpated  Cardiovascular:      Rate and Rhythm: Normal rate and regular rhythm.      Pulses: Normal pulses.      Heart sounds: Normal heart sounds. No murmur heard.  Pulmonary:      Effort: Pulmonary effort is normal.      Breath sounds: Normal breath sounds. No wheezing.   Abdominal:      General: Bowel sounds are normal.      Palpations: Abdomen is soft. There is no mass.      Tenderness: There is no abdominal tenderness.   Musculoskeletal:         General: Deformity (upper back kyphosis) present. No tenderness.      Cervical back: Normal range of motion and neck supple.       Right lower leg: No edema.      Left lower leg: No edema.   Lymphadenopathy:      Cervical: No cervical adenopathy.   Skin:     Coloration: Skin is not jaundiced or pale.   Neurological:      General: No focal deficit present.      Mental Status: He is alert and oriented to person, place, and time.   Psychiatric:         Mood and Affect: Mood normal.         Behavior: Behavior normal.         Assessment:       Problem List Items Addressed This Visit          Cardiac/Vascular    Coronary artery disease of native artery of native heart with stable angina pectoris    Relevant Orders    Lipid Panel    CBC Auto Differential    Comprehensive Metabolic Panel    Hemoglobin A1C    TSH    PSA, Screening    S/P CABG x 3    Relevant Orders    Lipid Panel    CBC Auto Differential    Comprehensive Metabolic Panel    Hemoglobin A1C    TSH    PSA, Screening    Complete heart block     Other Visit Diagnoses       Routine physical examination    -  Primary    Relevant Orders    Lipid Panel    CBC Auto Differential    Comprehensive Metabolic Panel    Hemoglobin A1C    TSH    PSA, Screening    Diarrhea, unspecified type        Relevant Orders    Giardia / Cryptosporidum, EIA    CLOSTRIDIUM DIFFICILE    CULTURE, STOOL    Lipid Panel    CBC Auto Differential    Comprehensive Metabolic Panel    Hemoglobin A1C    TSH    PSA, Screening    Intestinal gas excretion        Relevant Orders    Giardia / Cryptosporidum, EIA    CLOSTRIDIUM DIFFICILE    CULTURE, STOOL    Lipid Panel    CBC Auto Differential    Comprehensive Metabolic Panel    Hemoglobin A1C    TSH    PSA, Screening    Abdominal cramps        Relevant Orders    Giardia / Cryptosporidum, EIA    CLOSTRIDIUM DIFFICILE    CULTURE, STOOL    Lipid Panel    CBC Auto Differential    Comprehensive Metabolic Panel    Hemoglobin A1C    TSH    PSA, Screening    Screening for prostate cancer        Relevant Orders    PSA, Screening    Elevated glucose level        Relevant Orders    Hemoglobin  A1C            Plan:         Michael was seen today for annual exam.    Diagnoses and all orders for this visit:    Routine physical examination  -     Lipid Panel; Future  -     CBC Auto Differential; Future  -     Comprehensive Metabolic Panel; Future  -     Hemoglobin A1C; Future  -     TSH; Future  -     PSA, Screening; Future    Coronary artery disease of native artery of native heart with stable angina pectoris  -     Lipid Panel; Future  -     CBC Auto Differential; Future  -     Comprehensive Metabolic Panel; Future  -     Hemoglobin A1C; Future  -     TSH; Future  -     PSA, Screening; Future    S/P CABG x 3  -     Lipid Panel; Future  -     CBC Auto Differential; Future  -     Comprehensive Metabolic Panel; Future  -     Hemoglobin A1C; Future  -     TSH; Future  -     PSA, Screening; Future    Diarrhea, unspecified type  -     Giardia / Cryptosporidum, EIA; Future  -     CLOSTRIDIUM DIFFICILE; Future  -     CULTURE, STOOL; Future  -     Lipid Panel; Future  -     CBC Auto Differential; Future  -     Comprehensive Metabolic Panel; Future  -     Hemoglobin A1C; Future  -     TSH; Future  -     PSA, Screening; Future    Intestinal gas excretion  -     Giardia / Cryptosporidum, EIA; Future  -     CLOSTRIDIUM DIFFICILE; Future  -     CULTURE, STOOL; Future  -     Lipid Panel; Future  -     CBC Auto Differential; Future  -     Comprehensive Metabolic Panel; Future  -     Hemoglobin A1C; Future  -     TSH; Future  -     PSA, Screening; Future    Abdominal cramps  -     Giardia / Cryptosporidum, EIA; Future  -     CLOSTRIDIUM DIFFICILE; Future  -     CULTURE, STOOL; Future  -     Lipid Panel; Future  -     CBC Auto Differential; Future  -     Comprehensive Metabolic Panel; Future  -     Hemoglobin A1C; Future  -     TSH; Future  -     PSA, Screening; Future    Screening for prostate cancer  -     PSA, Screening; Future    Elevated glucose level  -     Hemoglobin A1C; Future    Complete heart  "block  Comments:  Pacemaker in place    Other orders  -     sildenafiL (VIAGRA) 50 MG tablet; Take 1 tablet (50 mg total) by mouth daily as needed for Erectile Dysfunction.           Side effects and drug interactions for sildenafil discussed with patient   Review all studies when available          Portions of this note may have been created with voice recognition software. Occasional "wrong-word" or "sound-a-like" substitutions may have occurred due to the inherent limitations of voice recognition software. Please, read the note carefully and recognize, using context, where substitutions have occurred.  "

## 2023-08-11 ENCOUNTER — LAB VISIT (OUTPATIENT)
Dept: LAB | Facility: HOSPITAL | Age: 68
End: 2023-08-11
Attending: INTERNAL MEDICINE
Payer: MEDICARE

## 2023-08-11 DIAGNOSIS — R10.9 ABDOMINAL CRAMPS: ICD-10-CM

## 2023-08-11 DIAGNOSIS — I25.118 CORONARY ARTERY DISEASE OF NATIVE ARTERY OF NATIVE HEART WITH STABLE ANGINA PECTORIS: ICD-10-CM

## 2023-08-11 DIAGNOSIS — Z95.1 S/P CABG X 3: ICD-10-CM

## 2023-08-11 DIAGNOSIS — R14.3 INTESTINAL GAS EXCRETION: ICD-10-CM

## 2023-08-11 DIAGNOSIS — R73.09 ELEVATED GLUCOSE LEVEL: ICD-10-CM

## 2023-08-11 DIAGNOSIS — Z00.00 ROUTINE PHYSICAL EXAMINATION: ICD-10-CM

## 2023-08-11 DIAGNOSIS — Z12.5 SCREENING FOR PROSTATE CANCER: ICD-10-CM

## 2023-08-11 DIAGNOSIS — R19.7 DIARRHEA, UNSPECIFIED TYPE: ICD-10-CM

## 2023-08-11 LAB
ALBUMIN SERPL BCP-MCNC: 3.7 G/DL (ref 3.5–5.2)
ALP SERPL-CCNC: 70 U/L (ref 55–135)
ALT SERPL W/O P-5'-P-CCNC: 23 U/L (ref 10–44)
ANION GAP SERPL CALC-SCNC: 11 MMOL/L (ref 8–16)
AST SERPL-CCNC: 27 U/L (ref 10–40)
BASOPHILS # BLD AUTO: 0.05 K/UL (ref 0–0.2)
BASOPHILS NFR BLD: 1.1 % (ref 0–1.9)
BILIRUB SERPL-MCNC: 2.1 MG/DL (ref 0.1–1)
BUN SERPL-MCNC: 11 MG/DL (ref 8–23)
CALCIUM SERPL-MCNC: 8.9 MG/DL (ref 8.7–10.5)
CHLORIDE SERPL-SCNC: 109 MMOL/L (ref 95–110)
CHOLEST SERPL-MCNC: 101 MG/DL (ref 120–199)
CHOLEST/HDLC SERPL: 2.2 {RATIO} (ref 2–5)
CO2 SERPL-SCNC: 21 MMOL/L (ref 23–29)
COMPLEXED PSA SERPL-MCNC: 0.46 NG/ML (ref 0–4)
CREAT SERPL-MCNC: 0.8 MG/DL (ref 0.5–1.4)
DIFFERENTIAL METHOD: ABNORMAL
EOSINOPHIL # BLD AUTO: 0.2 K/UL (ref 0–0.5)
EOSINOPHIL NFR BLD: 5.3 % (ref 0–8)
ERYTHROCYTE [DISTWIDTH] IN BLOOD BY AUTOMATED COUNT: 13.6 % (ref 11.5–14.5)
EST. GFR  (NO RACE VARIABLE): >60 ML/MIN/1.73 M^2
ESTIMATED AVG GLUCOSE: 114 MG/DL (ref 68–131)
GLUCOSE SERPL-MCNC: 92 MG/DL (ref 70–110)
HBA1C MFR BLD: 5.6 % (ref 4–5.6)
HCT VFR BLD AUTO: 41.8 % (ref 40–54)
HDLC SERPL-MCNC: 46 MG/DL (ref 40–75)
HDLC SERPL: 45.5 % (ref 20–50)
HGB BLD-MCNC: 13.7 G/DL (ref 14–18)
IMM GRANULOCYTES # BLD AUTO: 0.01 K/UL (ref 0–0.04)
IMM GRANULOCYTES NFR BLD AUTO: 0.2 % (ref 0–0.5)
LDLC SERPL CALC-MCNC: 48 MG/DL (ref 63–159)
LYMPHOCYTES # BLD AUTO: 1.1 K/UL (ref 1–4.8)
LYMPHOCYTES NFR BLD: 23.2 % (ref 18–48)
MCH RBC QN AUTO: 30.6 PG (ref 27–31)
MCHC RBC AUTO-ENTMCNC: 32.8 G/DL (ref 32–36)
MCV RBC AUTO: 93 FL (ref 82–98)
MONOCYTES # BLD AUTO: 0.4 K/UL (ref 0.3–1)
MONOCYTES NFR BLD: 9.6 % (ref 4–15)
NEUTROPHILS # BLD AUTO: 2.8 K/UL (ref 1.8–7.7)
NEUTROPHILS NFR BLD: 60.6 % (ref 38–73)
NONHDLC SERPL-MCNC: 55 MG/DL
NRBC BLD-RTO: 0 /100 WBC
PLATELET # BLD AUTO: 167 K/UL (ref 150–450)
PMV BLD AUTO: 11.2 FL (ref 9.2–12.9)
POTASSIUM SERPL-SCNC: 4.4 MMOL/L (ref 3.5–5.1)
PROT SERPL-MCNC: 6.8 G/DL (ref 6–8.4)
RBC # BLD AUTO: 4.48 M/UL (ref 4.6–6.2)
SODIUM SERPL-SCNC: 141 MMOL/L (ref 136–145)
TRIGL SERPL-MCNC: 35 MG/DL (ref 30–150)
TSH SERPL DL<=0.005 MIU/L-ACNC: 1.9 UIU/ML (ref 0.4–4)
WBC # BLD AUTO: 4.57 K/UL (ref 3.9–12.7)

## 2023-08-11 PROCEDURE — 80053 COMPREHEN METABOLIC PANEL: CPT | Performed by: INTERNAL MEDICINE

## 2023-08-11 PROCEDURE — 36415 COLL VENOUS BLD VENIPUNCTURE: CPT | Performed by: INTERNAL MEDICINE

## 2023-08-11 PROCEDURE — 84443 ASSAY THYROID STIM HORMONE: CPT | Performed by: INTERNAL MEDICINE

## 2023-08-11 PROCEDURE — 84153 ASSAY OF PSA TOTAL: CPT | Performed by: INTERNAL MEDICINE

## 2023-08-11 PROCEDURE — 80061 LIPID PANEL: CPT | Performed by: INTERNAL MEDICINE

## 2023-08-11 PROCEDURE — 85025 COMPLETE CBC W/AUTO DIFF WBC: CPT | Performed by: INTERNAL MEDICINE

## 2023-08-11 PROCEDURE — 83036 HEMOGLOBIN GLYCOSYLATED A1C: CPT | Performed by: INTERNAL MEDICINE

## 2023-08-16 ENCOUNTER — TELEPHONE (OUTPATIENT)
Dept: INTERNAL MEDICINE | Facility: CLINIC | Age: 68
End: 2023-08-16

## 2023-08-16 ENCOUNTER — PATIENT MESSAGE (OUTPATIENT)
Dept: INTERNAL MEDICINE | Facility: CLINIC | Age: 68
End: 2023-08-16
Payer: MEDICARE

## 2023-08-16 DIAGNOSIS — R19.7 DIARRHEA, UNSPECIFIED TYPE: ICD-10-CM

## 2023-08-16 DIAGNOSIS — D64.9 ANEMIA, UNSPECIFIED TYPE: Primary | ICD-10-CM

## 2023-08-16 NOTE — TELEPHONE ENCOUNTER
Can we determine what happened to the Giardia and C.Diff stool tests? I see that I ordered them but they do not seem to have been processed?

## 2023-08-17 NOTE — TELEPHONE ENCOUNTER
Studies in 2-3 weeks. See other message about stool, although I may have sent that to my nurse box.

## 2023-08-17 NOTE — TELEPHONE ENCOUNTER
Spoke with Brandi in Microbiology Lab, she reports that stool specimen was linked to stool culture only    Brandi reports that lab still has patient stool specimen, Giardia and Cdiff linked to specimen at this time to be processed

## 2023-08-24 NOTE — TELEPHONE ENCOUNTER
Please apologize to the patient let him know we try twice to get it added but I suspect he will need to submit a new sample.  Please send me the order of the to that need to be repeated and I will sign that order so we can try to get this right

## 2023-08-24 NOTE — TELEPHONE ENCOUNTER
"Pt inquiring about stool labs     C.Diff and Giardia have been discontinued     Called laboratory; was answered    Lab stated that "linking the labs was improper procedure" despite being advised to do as such last week.       "

## 2023-08-24 NOTE — TELEPHONE ENCOUNTER
Thank you.  I updated the repeat orders.  Remind him that it has to be a very soft or watery stool that is submitted

## 2023-08-31 ENCOUNTER — LAB VISIT (OUTPATIENT)
Dept: LAB | Facility: HOSPITAL | Age: 68
End: 2023-08-31
Attending: INTERNAL MEDICINE
Payer: MEDICARE

## 2023-08-31 DIAGNOSIS — R19.7 DIARRHEA, UNSPECIFIED TYPE: ICD-10-CM

## 2023-08-31 LAB
C DIFF GDH STL QL: NEGATIVE
C DIFF TOX A+B STL QL IA: NEGATIVE

## 2023-08-31 PROCEDURE — 87449 NOS EACH ORGANISM AG IA: CPT | Performed by: INTERNAL MEDICINE

## 2023-08-31 PROCEDURE — 87329 GIARDIA AG IA: CPT | Performed by: INTERNAL MEDICINE

## 2023-09-01 ENCOUNTER — PATIENT MESSAGE (OUTPATIENT)
Dept: INTERNAL MEDICINE | Facility: CLINIC | Age: 68
End: 2023-09-01
Payer: MEDICARE

## 2023-09-01 DIAGNOSIS — K52.9 CHRONIC DIARRHEA: Primary | ICD-10-CM

## 2023-09-01 LAB
CRYPTOSP AG STL QL IA: NEGATIVE
G LAMBLIA AG STL QL IA: NEGATIVE

## 2023-09-06 ENCOUNTER — PATIENT MESSAGE (OUTPATIENT)
Dept: INTERNAL MEDICINE | Facility: CLINIC | Age: 68
End: 2023-09-06
Payer: MEDICARE

## 2023-09-06 DIAGNOSIS — D64.9 ANEMIA, UNSPECIFIED TYPE: Primary | ICD-10-CM

## 2023-09-07 ENCOUNTER — LAB VISIT (OUTPATIENT)
Dept: LAB | Facility: HOSPITAL | Age: 68
End: 2023-09-07
Attending: INTERNAL MEDICINE
Payer: MEDICARE

## 2023-09-07 DIAGNOSIS — D64.9 ANEMIA, UNSPECIFIED TYPE: ICD-10-CM

## 2023-09-07 LAB
BACTERIA #/AREA URNS AUTO: ABNORMAL /HPF
MICROSCOPIC COMMENT: ABNORMAL
RBC #/AREA URNS AUTO: 82 /HPF (ref 0–4)
WBC #/AREA URNS AUTO: >100 /HPF (ref 0–5)
WBC CLUMPS UR QL AUTO: ABNORMAL

## 2023-09-07 PROCEDURE — 81001 URINALYSIS AUTO W/SCOPE: CPT | Performed by: INTERNAL MEDICINE

## 2023-09-08 ENCOUNTER — PATIENT MESSAGE (OUTPATIENT)
Dept: INTERNAL MEDICINE | Facility: CLINIC | Age: 68
End: 2023-09-08
Payer: MEDICARE

## 2023-09-11 ENCOUNTER — PATIENT MESSAGE (OUTPATIENT)
Dept: INTERNAL MEDICINE | Facility: CLINIC | Age: 68
End: 2023-09-11
Payer: MEDICARE

## 2023-09-11 ENCOUNTER — LAB VISIT (OUTPATIENT)
Dept: LAB | Facility: HOSPITAL | Age: 68
End: 2023-09-11
Attending: INTERNAL MEDICINE
Payer: MEDICARE

## 2023-09-11 DIAGNOSIS — R30.0 DYSURIA: ICD-10-CM

## 2023-09-11 DIAGNOSIS — R30.0 DYSURIA: Primary | ICD-10-CM

## 2023-09-11 PROCEDURE — 87186 SC STD MICRODIL/AGAR DIL: CPT | Performed by: INTERNAL MEDICINE

## 2023-09-11 PROCEDURE — 87086 URINE CULTURE/COLONY COUNT: CPT | Performed by: INTERNAL MEDICINE

## 2023-09-11 PROCEDURE — 87088 URINE BACTERIA CULTURE: CPT | Performed by: INTERNAL MEDICINE

## 2023-09-11 PROCEDURE — 87077 CULTURE AEROBIC IDENTIFY: CPT | Performed by: INTERNAL MEDICINE

## 2023-09-13 ENCOUNTER — PATIENT MESSAGE (OUTPATIENT)
Dept: INTERNAL MEDICINE | Facility: CLINIC | Age: 68
End: 2023-09-13
Payer: MEDICARE

## 2023-09-14 ENCOUNTER — LAB VISIT (OUTPATIENT)
Dept: LAB | Facility: HOSPITAL | Age: 68
End: 2023-09-14
Attending: STUDENT IN AN ORGANIZED HEALTH CARE EDUCATION/TRAINING PROGRAM
Payer: MEDICARE

## 2023-09-14 ENCOUNTER — PATIENT MESSAGE (OUTPATIENT)
Dept: INTERNAL MEDICINE | Facility: CLINIC | Age: 68
End: 2023-09-14
Payer: MEDICARE

## 2023-09-14 ENCOUNTER — OFFICE VISIT (OUTPATIENT)
Dept: GASTROENTEROLOGY | Facility: CLINIC | Age: 68
End: 2023-09-14
Payer: MEDICARE

## 2023-09-14 VITALS
HEIGHT: 67 IN | BODY MASS INDEX: 24.22 KG/M2 | WEIGHT: 154.31 LBS | SYSTOLIC BLOOD PRESSURE: 147 MMHG | HEART RATE: 91 BPM | DIASTOLIC BLOOD PRESSURE: 89 MMHG

## 2023-09-14 DIAGNOSIS — K29.00 ACUTE GASTRITIS WITHOUT HEMORRHAGE, UNSPECIFIED GASTRITIS TYPE: ICD-10-CM

## 2023-09-14 DIAGNOSIS — R17 ELEVATED BILIRUBIN: ICD-10-CM

## 2023-09-14 DIAGNOSIS — K52.3 INDETERMINATE COLITIS: ICD-10-CM

## 2023-09-14 DIAGNOSIS — R19.7 DIARRHEA, UNSPECIFIED TYPE: Primary | ICD-10-CM

## 2023-09-14 LAB
BACTERIA UR CULT: ABNORMAL
FERRITIN SERPL-MCNC: 81 NG/ML (ref 20–300)
IRON SERPL-MCNC: 68 UG/DL (ref 45–160)
SATURATED IRON: 20 % (ref 20–50)
TOTAL IRON BINDING CAPACITY: 333 UG/DL (ref 250–450)
TRANSFERRIN SERPL-MCNC: 225 MG/DL (ref 200–375)

## 2023-09-14 PROCEDURE — 99999 PR PBB SHADOW E&M-EST. PATIENT-LVL III: CPT | Mod: PBBFAC,,, | Performed by: STUDENT IN AN ORGANIZED HEALTH CARE EDUCATION/TRAINING PROGRAM

## 2023-09-14 PROCEDURE — 3044F PR MOST RECENT HEMOGLOBIN A1C LEVEL <7.0%: ICD-10-PCS | Mod: CPTII,S$GLB,, | Performed by: STUDENT IN AN ORGANIZED HEALTH CARE EDUCATION/TRAINING PROGRAM

## 2023-09-14 PROCEDURE — 3079F DIAST BP 80-89 MM HG: CPT | Mod: CPTII,S$GLB,, | Performed by: STUDENT IN AN ORGANIZED HEALTH CARE EDUCATION/TRAINING PROGRAM

## 2023-09-14 PROCEDURE — 1159F MED LIST DOCD IN RCRD: CPT | Mod: CPTII,S$GLB,, | Performed by: STUDENT IN AN ORGANIZED HEALTH CARE EDUCATION/TRAINING PROGRAM

## 2023-09-14 PROCEDURE — 3079F PR MOST RECENT DIASTOLIC BLOOD PRESSURE 80-89 MM HG: ICD-10-PCS | Mod: CPTII,S$GLB,, | Performed by: STUDENT IN AN ORGANIZED HEALTH CARE EDUCATION/TRAINING PROGRAM

## 2023-09-14 PROCEDURE — 3288F FALL RISK ASSESSMENT DOCD: CPT | Mod: CPTII,S$GLB,, | Performed by: STUDENT IN AN ORGANIZED HEALTH CARE EDUCATION/TRAINING PROGRAM

## 2023-09-14 PROCEDURE — 1101F PR PT FALLS ASSESS DOC 0-1 FALLS W/OUT INJ PAST YR: ICD-10-PCS | Mod: CPTII,S$GLB,, | Performed by: STUDENT IN AN ORGANIZED HEALTH CARE EDUCATION/TRAINING PROGRAM

## 2023-09-14 PROCEDURE — 86364 TISS TRNSGLTMNASE EA IG CLAS: CPT | Performed by: STUDENT IN AN ORGANIZED HEALTH CARE EDUCATION/TRAINING PROGRAM

## 2023-09-14 PROCEDURE — 3077F SYST BP >= 140 MM HG: CPT | Mod: CPTII,S$GLB,, | Performed by: STUDENT IN AN ORGANIZED HEALTH CARE EDUCATION/TRAINING PROGRAM

## 2023-09-14 PROCEDURE — 3288F PR FALLS RISK ASSESSMENT DOCUMENTED: ICD-10-PCS | Mod: CPTII,S$GLB,, | Performed by: STUDENT IN AN ORGANIZED HEALTH CARE EDUCATION/TRAINING PROGRAM

## 2023-09-14 PROCEDURE — 3008F PR BODY MASS INDEX (BMI) DOCUMENTED: ICD-10-PCS | Mod: CPTII,S$GLB,, | Performed by: STUDENT IN AN ORGANIZED HEALTH CARE EDUCATION/TRAINING PROGRAM

## 2023-09-14 PROCEDURE — 99214 PR OFFICE/OUTPT VISIT, EST, LEVL IV, 30-39 MIN: ICD-10-PCS | Mod: S$GLB,,, | Performed by: STUDENT IN AN ORGANIZED HEALTH CARE EDUCATION/TRAINING PROGRAM

## 2023-09-14 PROCEDURE — 3008F BODY MASS INDEX DOCD: CPT | Mod: CPTII,S$GLB,, | Performed by: STUDENT IN AN ORGANIZED HEALTH CARE EDUCATION/TRAINING PROGRAM

## 2023-09-14 PROCEDURE — 99999 PR PBB SHADOW E&M-EST. PATIENT-LVL III: ICD-10-PCS | Mod: PBBFAC,,, | Performed by: STUDENT IN AN ORGANIZED HEALTH CARE EDUCATION/TRAINING PROGRAM

## 2023-09-14 PROCEDURE — 3077F PR MOST RECENT SYSTOLIC BLOOD PRESSURE >= 140 MM HG: ICD-10-PCS | Mod: CPTII,S$GLB,, | Performed by: STUDENT IN AN ORGANIZED HEALTH CARE EDUCATION/TRAINING PROGRAM

## 2023-09-14 PROCEDURE — 82728 ASSAY OF FERRITIN: CPT | Performed by: STUDENT IN AN ORGANIZED HEALTH CARE EDUCATION/TRAINING PROGRAM

## 2023-09-14 PROCEDURE — 1126F AMNT PAIN NOTED NONE PRSNT: CPT | Mod: CPTII,S$GLB,, | Performed by: STUDENT IN AN ORGANIZED HEALTH CARE EDUCATION/TRAINING PROGRAM

## 2023-09-14 PROCEDURE — 1126F PR PAIN SEVERITY QUANTIFIED, NO PAIN PRESENT: ICD-10-PCS | Mod: CPTII,S$GLB,, | Performed by: STUDENT IN AN ORGANIZED HEALTH CARE EDUCATION/TRAINING PROGRAM

## 2023-09-14 PROCEDURE — 83540 ASSAY OF IRON: CPT | Performed by: STUDENT IN AN ORGANIZED HEALTH CARE EDUCATION/TRAINING PROGRAM

## 2023-09-14 PROCEDURE — 1157F PR ADVANCE CARE PLAN OR EQUIV PRESENT IN MEDICAL RECORD: ICD-10-PCS | Mod: CPTII,S$GLB,, | Performed by: STUDENT IN AN ORGANIZED HEALTH CARE EDUCATION/TRAINING PROGRAM

## 2023-09-14 PROCEDURE — 84466 ASSAY OF TRANSFERRIN: CPT | Performed by: STUDENT IN AN ORGANIZED HEALTH CARE EDUCATION/TRAINING PROGRAM

## 2023-09-14 PROCEDURE — 99214 OFFICE O/P EST MOD 30 MIN: CPT | Mod: S$GLB,,, | Performed by: STUDENT IN AN ORGANIZED HEALTH CARE EDUCATION/TRAINING PROGRAM

## 2023-09-14 PROCEDURE — 1157F ADVNC CARE PLAN IN RCRD: CPT | Mod: CPTII,S$GLB,, | Performed by: STUDENT IN AN ORGANIZED HEALTH CARE EDUCATION/TRAINING PROGRAM

## 2023-09-14 PROCEDURE — 1101F PT FALLS ASSESS-DOCD LE1/YR: CPT | Mod: CPTII,S$GLB,, | Performed by: STUDENT IN AN ORGANIZED HEALTH CARE EDUCATION/TRAINING PROGRAM

## 2023-09-14 PROCEDURE — 3044F HG A1C LEVEL LT 7.0%: CPT | Mod: CPTII,S$GLB,, | Performed by: STUDENT IN AN ORGANIZED HEALTH CARE EDUCATION/TRAINING PROGRAM

## 2023-09-14 PROCEDURE — 1159F PR MEDICATION LIST DOCUMENTED IN MEDICAL RECORD: ICD-10-PCS | Mod: CPTII,S$GLB,, | Performed by: STUDENT IN AN ORGANIZED HEALTH CARE EDUCATION/TRAINING PROGRAM

## 2023-09-14 RX ORDER — DICYCLOMINE HYDROCHLORIDE 10 MG/1
10 CAPSULE ORAL
Qty: 120 CAPSULE | Refills: 0 | Status: SHIPPED | OUTPATIENT
Start: 2023-09-14 | End: 2023-10-14

## 2023-09-14 RX ORDER — SULFAMETHOXAZOLE AND TRIMETHOPRIM 800; 160 MG/1; MG/1
1 TABLET ORAL 2 TIMES DAILY
Qty: 14 TABLET | Refills: 0 | Status: SHIPPED | OUTPATIENT
Start: 2023-09-14 | End: 2023-12-14

## 2023-09-14 NOTE — PROGRESS NOTES
"    [facility] Consultation Note    Reason for Consult:  diarrhea     PCP:   Dominguez Hyatt   No address on file    Referring MD:  Self, Aaarefsoni  No address on file    HPI:  This is a 68 y.o. male here for evaluation of diarrhea. PMHx of Complete Heart Block, CAD, HLD. Last 6 months he has a dramatic change in bowel habits to diarrhea. Previously was having 1-2 day which were normal and formed. No issues with constipation in past or episodes like this. Currently, he is having 1-3 BM in the AM which are loose or watery and explosive. Notices an oily film. Later in the day will have 1 or so more BM. No blood in stool. There is bloating and belching associated. Denied weight loss or appetite changes. No significant heart burn. Feels full sooner than used to though. Last cscope in 2022 removed one small polyp and noted lipoma and hemorrhoids. Lipoma appeared inflamed and biopsies showed surface erosions but otherwise unremarkable. Denied NSAID use. No Fh of CRC, gastric cancer, celiac or IBD. Saw his PCP for this and had stool testing done which was negative for infection. Did some hiking and did not purify water from a clear stream but giardia was negative. Most recent blood work showed Hg 13, CMP normal except for mild stable elevation in bilirubin. He thinks he has [place]. No new meds or sick contacts. Does recently have a urine culture positive for staph which he will be treated for        Objective Findings:    Vital Signs:  BP (!) 147/89 (BP Location: Left arm, Patient Position: Sitting, BP Method: Small (Automatic))   Pulse 91   Ht 5' 7" (1.702 m)   Wt 70 kg (154 lb 5.2 oz)   BMI 24.17 kg/m²   Body mass index is 24.17 kg/m².    Physical Exam:  General Appearance: Well appearing in no acute distress  Head:   Normocephalic, without obvious abnormality  Eyes:    No scleral icterus    Lungs: CTA bilaterally in anterior and posterior fields   Heart:  Regular rate and rhythm, no " murmurs heard  Abdomen: Soft, non tender, non distended with positive bowel sounds in all four quadrants.      Imaging:  Reviewed     Endoscopy:      Cscope 2022   Findings:        The perianal and digital rectal examinations were normal.        Internal hemorrhoids were found during retroflexion.        A 3 mm polyp was found in the ascending colon. The polyp was        sessile. The polyp was removed with a cold snare. Resection was        complete, but the polyp tissue was not retrieved.        There was a lipoma, 15 mm in diameter, in the recto-sigmoid colon at        20cm from anal verge. Biopsies were taken with a cold forceps for        histology as the mucosa overlying appeared inflammed .        The exam was otherwise without abnormality.     Assessment:    Change in bowel habits diarrhea   History of colon polyps   Hemorrhoids      Patient with 6 months of new diarrhea which is watery and oily associated with bloating/belching. Stool tests negative for infection. Check elastase and calpro. Celiac panel and iron panel due to mild anemia which is change from prior. Bentyl PRN. If testing normal, may be a post infectious IBS from a GI illness he perhaps acquired when he drank unpurified water on hiking trip.     Recommendations:    - Calpro and fecal elastase today   - Celiac panel, iron panel due to slight decrease in Hg   - Consider repeat cscope for elevated calpro   - bentyl PRN   - Fiber daily      RTC 3 months     Order summary:  Orders Placed This Encounter    Calprotectin, Stool    Pancreatic elastase, fecal    Celiac Disease Panel    IRON AND TIBC    FERRITIN    dicyclomine (BENTYL) 10 MG capsule         Thank you so much for allowing me to participate in the care of Michael Dumont MD  Gastroenterology   [facility]

## 2023-09-18 LAB
GLIADIN PEPTIDE IGA SER-ACNC: 7.2 U/ML
GLIADIN PEPTIDE IGG SER-ACNC: <0.6 U/ML
IGA SERPL-MCNC: 128 MG/DL (ref 70–400)
TTG IGA SER-ACNC: 0.3 U/ML
TTG IGG SER-ACNC: <0.6 U/ML

## 2023-09-19 ENCOUNTER — LAB VISIT (OUTPATIENT)
Dept: LAB | Facility: HOSPITAL | Age: 68
End: 2023-09-19
Attending: STUDENT IN AN ORGANIZED HEALTH CARE EDUCATION/TRAINING PROGRAM
Payer: MEDICARE

## 2023-09-19 DIAGNOSIS — R17 ELEVATED BILIRUBIN: ICD-10-CM

## 2023-09-19 PROCEDURE — 83993 ASSAY FOR CALPROTECTIN FECAL: CPT | Performed by: STUDENT IN AN ORGANIZED HEALTH CARE EDUCATION/TRAINING PROGRAM

## 2023-09-25 ENCOUNTER — DOCUMENTATION ONLY (OUTPATIENT)
Dept: PHARMACY | Facility: CLINIC | Age: 68
End: 2023-09-25
Payer: MEDICARE

## 2023-09-25 DIAGNOSIS — K58.0 IRRITABLE BOWEL SYNDROME WITH DIARRHEA: Primary | ICD-10-CM

## 2023-09-25 LAB — CALPROTECTIN STL-MCNT: <27.1 MCG/G

## 2023-09-28 ENCOUNTER — CLINICAL SUPPORT (OUTPATIENT)
Dept: CARDIOLOGY | Facility: HOSPITAL | Age: 68
End: 2023-09-28
Payer: MEDICARE

## 2023-09-28 DIAGNOSIS — Z95.0 PRESENCE OF CARDIAC PACEMAKER: ICD-10-CM

## 2023-09-28 PROCEDURE — 93294 CARDIAC DEVICE CHECK - REMOTE: ICD-10-PCS | Mod: ,,, | Performed by: INTERNAL MEDICINE

## 2023-09-28 PROCEDURE — 93294 REM INTERROG EVL PM/LDLS PM: CPT | Mod: ,,, | Performed by: INTERNAL MEDICINE

## 2023-09-28 PROCEDURE — 93296 REM INTERROG EVL PM/IDS: CPT | Performed by: INTERNAL MEDICINE

## 2023-10-15 DIAGNOSIS — I25.10 CORONARY ARTERY DISEASE INVOLVING NATIVE CORONARY ARTERY OF NATIVE HEART WITHOUT ANGINA PECTORIS: ICD-10-CM

## 2023-10-15 DIAGNOSIS — E78.00 PURE HYPERCHOLESTEROLEMIA: ICD-10-CM

## 2023-10-16 RX ORDER — ATORVASTATIN CALCIUM 80 MG/1
TABLET, FILM COATED ORAL
Qty: 90 TABLET | Refills: 10 | Status: SHIPPED | OUTPATIENT
Start: 2023-10-16 | End: 2024-01-22 | Stop reason: SDUPTHER

## 2023-10-16 RX ORDER — EZETIMIBE 10 MG/1
TABLET ORAL
Qty: 90 TABLET | Refills: 10 | Status: SHIPPED | OUTPATIENT
Start: 2023-10-16 | End: 2024-01-22 | Stop reason: SDUPTHER

## 2023-10-23 ENCOUNTER — IMMUNIZATION (OUTPATIENT)
Dept: INTERNAL MEDICINE | Facility: CLINIC | Age: 68
End: 2023-10-23
Payer: MEDICARE

## 2023-10-23 PROCEDURE — 90694 FLU VACCINE - QUADRIVALENT - ADJUVANTED: ICD-10-PCS | Mod: S$GLB,,, | Performed by: INTERNAL MEDICINE

## 2023-10-23 PROCEDURE — G0008 ADMIN INFLUENZA VIRUS VAC: HCPCS | Mod: S$GLB,,, | Performed by: INTERNAL MEDICINE

## 2023-10-23 PROCEDURE — 90694 VACC AIIV4 NO PRSRV 0.5ML IM: CPT | Mod: S$GLB,,, | Performed by: INTERNAL MEDICINE

## 2023-10-23 PROCEDURE — G0008 FLU VACCINE - QUADRIVALENT - ADJUVANTED: ICD-10-PCS | Mod: S$GLB,,, | Performed by: INTERNAL MEDICINE

## 2023-11-20 NOTE — PROGRESS NOTES
Subjective:   Patient ID:  Michael Espinoza is a 68 y.o. male who presents for follow-up of CHB      68 yoM CAD/CABG, CHB here for PM follow up.     11/22: He presented for a stress test 7/22 and was found to be in CHB. DC PM implanted 7/5/22. Normal DC PM function with no sustained arrhythmias. 10% AP, >99%. No HF symptoms.     Interval history: RA 15%,  95%, 2:1 AVB underlying    Echo 7/5/22:  · Presence of bradycardia with HR 30s with AV dissociation  · The estimated ejection fraction is 65%.  · The left ventricle is normal in size with normal systolic function.  · Normal left ventricular diastolic function.  · Normal right ventricular size with normal right ventricular systolic function.  · Mild mitral regurgitation.  · Presence of interatrial septal aneurysm.  · The estimated PA systolic pressure is 28 mmHg.  · Normal central venous pressure (3 mmHg).    Past Medical History:  7/5/2022: Complete heart block  4/10/2018: Coronary artery disease of native artery of native heart   with stable angina pectoris  No date: Hyperlipidemia    Past Surgical History:  7/5/2022: A-V CARDIAC PACEMAKER INSERTION; N/A      Comment:  Procedure: INSERTION, CARDIAC PACEMAKER, DUAL CHAMBER;                 Surgeon: Alessandro Canales MD;  Location: [facility] EP LAB;                Service: Cardiology;  Laterality: N/A;  CHB, TBD, ANES,                ED 6, MB  8/5/2022: COLONOSCOPY; N/A      Comment:  Procedure: COLONOSCOPY;  Surgeon: Namita Dumont MD;                 Location: [facility] ENDO ([address] [place] [address]);  Service: Endoscopy;                 Laterality: N/A;  vacc-inst portal-tb  No date: CYST REMOVAL  No date: TONSILLECTOMY    Social History    Socioeconomic History      Marital status: Single    Tobacco Use      Smoking status: Never      Smokeless tobacco: Never    Substance and Sexual Activity      Alcohol use: Yes        Alcohol/week: 1.0 standard drink of alcohol        Types: 1 Glasses of wine per week        Comment: 1 drink 2-3  times/weekly      Drug use: No    Social Determinants of Health  Financial Resource Strain: Low Risk  (11/14/2023)      Overall Financial Resource Strain (CARDIA)          Difficulty of Paying Living Expenses: Not hard at all  Food Insecurity: No Food Insecurity (11/14/2023)      Hunger Vital Sign          Worried About Running Out of Food in the Last Year: Never true          Ran Out of Food in the Last Year: Never true  Transportation Needs: No Transportation Needs (11/14/2023)      PRAPARE - Transportation          Lack of Transportation (Medical): No          Lack of Transportation (Non-Medical): No  Physical Activity: Sufficiently Active (11/14/2023)      Exercise Vital Sign          Days of Exercise per Week: 5 days          Minutes of Exercise per Session: 40 min  Stress: No Stress Concern Present (11/14/2023)      [employer] [place] [employer] - Occupational Stress Questionnaire          Feeling of Stress : Not at all  Social Connections: Unknown (11/14/2023)      Social Connection and Isolation Panel [NHANES]          Frequency of Communication with Friends and Family: Patient refused          Frequency of Social Gatherings with Friends and Family: Patient refused          Active Member of Clubs or Organizations: Patient refused          Attends Club or Organization Meetings: Patient refused          Marital Status: Never   Housing Stability: Low Risk  (11/14/2023)      Housing Stability Vital Sign          Unable to Pay for Housing in the Last Year: No          Number of Places Lived in the Last Year: 1          Unstable Housing in the Last Year: No    Review of patient's family history indicates:  Problem: Hyperlipidemia      Relation: Brother          Age of Onset: (Not Specified)  Problem: Hypertension      Relation: Brother          Age of Onset: (Not Specified)  Problem: Heart disease      Relation: Brother          Age of Onset: (Not Specified)  Problem: Heart attack      Relation:  Brother          Age of Onset: (Not Specified)  Problem: Diabetes      Relation: Brother          Age of Onset: (Not Specified)    Current Outpatient Medications:  aspirin (ECOTRIN) 81 MG EC tablet, Take 81 mg by mouth 2 (two) times daily., Disp: , Rfl:   atorvastatin (LIPITOR) 80 MG tablet, TAKE 1 TABLET EVERY DAY, Disp: 90 tablet, Rfl: 10  ERGOCALCIFEROL, VITAMIN D2, (VITAMIN D ORAL), Take by mouth once daily., Disp: , Rfl:   ezetimibe (ZETIA) 10 mg tablet, TAKE 1 TABLET EVERY DAY, Disp: 90 tablet, Rfl: 10  multivit-min/FA/lycopen/lutein (CENTRUM SILVER MEN ORAL), Take 1 tablet by mouth once daily., Disp: , Rfl:   sildenafiL (VIAGRA) 50 MG tablet, Take 1 tablet (50 mg total) by mouth daily as needed for Erectile Dysfunction., Disp: 10 tablet, Rfl: 3  sulfamethoxazole-trimethoprim 800-160mg (BACTRIM DS) 800-160 mg Tab, Take 1 tablet by mouth 2 (two) times daily., Disp: 14 tablet, Rfl: 0    No current facility-administered medications for this visit.        Review of Systems   Constitutional: Negative.   HENT: Negative.     Eyes: Negative.    Cardiovascular:  Negative for chest pain, dyspnea on exertion, leg swelling, near-syncope, palpitations and syncope.   Respiratory: Negative.  Negative for shortness of breath.    Endocrine: Negative.    Hematologic/Lymphatic: Negative.    Skin: Negative.    Musculoskeletal: Negative.    Gastrointestinal: Negative.    Genitourinary: Negative.    Neurological: Negative.  Negative for dizziness and light-headedness.   Psychiatric/Behavioral: Negative.     Allergic/Immunologic: Negative.        Objective:   Physical Exam  Vitals reviewed.   Constitutional:       General: He is not in acute distress.     Appearance: He is well-developed.   HENT:      Head: Normocephalic and atraumatic.   Eyes:      Pupils: Pupils are equal, round, and reactive to light.   Neck:      Thyroid: No thyromegaly.      Vascular: No JVD.   Cardiovascular:      Rate and Rhythm: Normal rate and regular  rhythm.      Chest Wall: PMI is not displaced.      Heart sounds: Normal heart sounds, S1 normal and S2 normal. No murmur heard.     No friction rub. No gallop.   Pulmonary:      Effort: Pulmonary effort is normal. No respiratory distress.      Breath sounds: Normal breath sounds. No wheezing or rales.   Abdominal:      General: Bowel sounds are normal. There is no distension.      Palpations: Abdomen is soft.      Tenderness: There is no abdominal tenderness. There is no guarding or rebound.   Musculoskeletal:         General: No tenderness. Normal range of motion.      Cervical back: Normal range of motion.   Skin:     General: Skin is warm and dry.      Findings: No erythema or rash.   Neurological:      Mental Status: He is alert and oriented to person, place, and time.      Cranial Nerves: No cranial nerve deficit.   Psychiatric:         Behavior: Behavior normal.         Thought Content: Thought content normal.         Judgment: Judgment normal.       ECG: AV paced    Assessment:      1. S/P CABG x 3    2. Complete heart block        Plan:   68 yoM here for PM follow up. Normal DC PM function with no sustained arrhythmia. I discussed routine device follow up including quarterly to bi-annual device checks for device function as well as yearly follow up in the EP clinic. The patient  was advised to call with any concerns regarding their device. Device clinic follow up as scheduled. RTC 1y

## 2023-11-21 ENCOUNTER — CLINICAL SUPPORT (OUTPATIENT)
Dept: CARDIOLOGY | Facility: HOSPITAL | Age: 68
End: 2023-11-21
Attending: INTERNAL MEDICINE
Payer: MEDICARE

## 2023-11-21 ENCOUNTER — OFFICE VISIT (OUTPATIENT)
Dept: ELECTROPHYSIOLOGY | Facility: CLINIC | Age: 68
End: 2023-11-21
Payer: MEDICARE

## 2023-11-21 ENCOUNTER — HOSPITAL ENCOUNTER (OUTPATIENT)
Dept: CARDIOLOGY | Facility: CLINIC | Age: 68
Discharge: HOME | End: 2023-11-21
Payer: MEDICARE

## 2023-11-21 VITALS
HEIGHT: 67 IN | BODY MASS INDEX: 24.15 KG/M2 | SYSTOLIC BLOOD PRESSURE: 129 MMHG | HEART RATE: 67 BPM | DIASTOLIC BLOOD PRESSURE: 73 MMHG | WEIGHT: 153.88 LBS

## 2023-11-21 DIAGNOSIS — I44.2 CHB (COMPLETE HEART BLOCK): ICD-10-CM

## 2023-11-21 DIAGNOSIS — I44.1 2ND DEGREE AV BLOCK: ICD-10-CM

## 2023-11-21 DIAGNOSIS — Z95.1 S/P CABG X 3: Primary | ICD-10-CM

## 2023-11-21 DIAGNOSIS — Z95.0 CARDIAC PACEMAKER IN SITU: ICD-10-CM

## 2023-11-21 DIAGNOSIS — I44.2 COMPLETE HEART BLOCK: ICD-10-CM

## 2023-11-21 PROCEDURE — 3074F SYST BP LT 130 MM HG: CPT | Mod: CPTII,S$GLB,, | Performed by: INTERNAL MEDICINE

## 2023-11-21 PROCEDURE — 3008F PR BODY MASS INDEX (BMI) DOCUMENTED: ICD-10-PCS | Mod: CPTII,S$GLB,, | Performed by: INTERNAL MEDICINE

## 2023-11-21 PROCEDURE — 1101F PR PT FALLS ASSESS DOC 0-1 FALLS W/OUT INJ PAST YR: ICD-10-PCS | Mod: CPTII,S$GLB,, | Performed by: INTERNAL MEDICINE

## 2023-11-21 PROCEDURE — 3008F BODY MASS INDEX DOCD: CPT | Mod: CPTII,S$GLB,, | Performed by: INTERNAL MEDICINE

## 2023-11-21 PROCEDURE — 3078F PR MOST RECENT DIASTOLIC BLOOD PRESSURE < 80 MM HG: ICD-10-PCS | Mod: CPTII,S$GLB,, | Performed by: INTERNAL MEDICINE

## 2023-11-21 PROCEDURE — 93280 PM DEVICE PROGR EVAL DUAL: CPT

## 2023-11-21 PROCEDURE — 99999 PR PBB SHADOW E&M-EST. PATIENT-LVL III: ICD-10-PCS | Mod: PBBFAC,,, | Performed by: INTERNAL MEDICINE

## 2023-11-21 PROCEDURE — 1126F AMNT PAIN NOTED NONE PRSNT: CPT | Mod: CPTII,S$GLB,, | Performed by: INTERNAL MEDICINE

## 2023-11-21 PROCEDURE — 3044F HG A1C LEVEL LT 7.0%: CPT | Mod: CPTII,S$GLB,, | Performed by: INTERNAL MEDICINE

## 2023-11-21 PROCEDURE — 1101F PT FALLS ASSESS-DOCD LE1/YR: CPT | Mod: CPTII,S$GLB,, | Performed by: INTERNAL MEDICINE

## 2023-11-21 PROCEDURE — 93005 ELECTROCARDIOGRAM TRACING: CPT | Mod: S$GLB,,, | Performed by: INTERNAL MEDICINE

## 2023-11-21 PROCEDURE — 1159F MED LIST DOCD IN RCRD: CPT | Mod: CPTII,S$GLB,, | Performed by: INTERNAL MEDICINE

## 2023-11-21 PROCEDURE — 93280 PM DEVICE PROGR EVAL DUAL: CPT | Mod: 26,,, | Performed by: INTERNAL MEDICINE

## 2023-11-21 PROCEDURE — 93280 CARDIAC DEVICE CHECK - IN CLINIC & HOSPITAL: ICD-10-PCS | Mod: 26,,, | Performed by: INTERNAL MEDICINE

## 2023-11-21 PROCEDURE — 3288F FALL RISK ASSESSMENT DOCD: CPT | Mod: CPTII,S$GLB,, | Performed by: INTERNAL MEDICINE

## 2023-11-21 PROCEDURE — 1157F PR ADVANCE CARE PLAN OR EQUIV PRESENT IN MEDICAL RECORD: ICD-10-PCS | Mod: CPTII,S$GLB,, | Performed by: INTERNAL MEDICINE

## 2023-11-21 PROCEDURE — 1159F PR MEDICATION LIST DOCUMENTED IN MEDICAL RECORD: ICD-10-PCS | Mod: CPTII,S$GLB,, | Performed by: INTERNAL MEDICINE

## 2023-11-21 PROCEDURE — 99999 PR PBB SHADOW E&M-EST. PATIENT-LVL III: CPT | Mod: PBBFAC,,, | Performed by: INTERNAL MEDICINE

## 2023-11-21 PROCEDURE — 93010 RHYTHM STRIP: ICD-10-PCS | Mod: S$GLB,,, | Performed by: INTERNAL MEDICINE

## 2023-11-21 PROCEDURE — 93005 RHYTHM STRIP: ICD-10-PCS | Mod: S$GLB,,, | Performed by: INTERNAL MEDICINE

## 2023-11-21 PROCEDURE — 1157F ADVNC CARE PLAN IN RCRD: CPT | Mod: CPTII,S$GLB,, | Performed by: INTERNAL MEDICINE

## 2023-11-21 PROCEDURE — 99214 PR OFFICE/OUTPT VISIT, EST, LEVL IV, 30-39 MIN: ICD-10-PCS | Mod: S$GLB,,, | Performed by: INTERNAL MEDICINE

## 2023-11-21 PROCEDURE — 3044F PR MOST RECENT HEMOGLOBIN A1C LEVEL <7.0%: ICD-10-PCS | Mod: CPTII,S$GLB,, | Performed by: INTERNAL MEDICINE

## 2023-11-21 PROCEDURE — 3074F PR MOST RECENT SYSTOLIC BLOOD PRESSURE < 130 MM HG: ICD-10-PCS | Mod: CPTII,S$GLB,, | Performed by: INTERNAL MEDICINE

## 2023-11-21 PROCEDURE — 3288F PR FALLS RISK ASSESSMENT DOCUMENTED: ICD-10-PCS | Mod: CPTII,S$GLB,, | Performed by: INTERNAL MEDICINE

## 2023-11-21 PROCEDURE — 99214 OFFICE O/P EST MOD 30 MIN: CPT | Mod: S$GLB,,, | Performed by: INTERNAL MEDICINE

## 2023-11-21 PROCEDURE — 3078F DIAST BP <80 MM HG: CPT | Mod: CPTII,S$GLB,, | Performed by: INTERNAL MEDICINE

## 2023-11-21 PROCEDURE — 93010 ELECTROCARDIOGRAM REPORT: CPT | Mod: S$GLB,,, | Performed by: INTERNAL MEDICINE

## 2023-11-21 PROCEDURE — 1126F PR PAIN SEVERITY QUANTIFIED, NO PAIN PRESENT: ICD-10-PCS | Mod: CPTII,S$GLB,, | Performed by: INTERNAL MEDICINE

## 2023-11-30 LAB
OHS CV AF BURDEN PERCENT: < 1
OHS CV DC REMOTE DEVICE TYPE: NORMAL
OHS CV RV PACING PERCENT: 95 %

## 2023-12-11 ENCOUNTER — PATIENT MESSAGE (OUTPATIENT)
Dept: INTERNAL MEDICINE | Facility: CLINIC | Age: 68
End: 2023-12-11
Payer: MEDICARE

## 2023-12-11 ENCOUNTER — OFFICE VISIT (OUTPATIENT)
Dept: URGENT CARE | Facility: CLINIC | Age: 68
End: 2023-12-11
Payer: MEDICARE

## 2023-12-11 VITALS
HEART RATE: 102 BPM | WEIGHT: 153 LBS | HEIGHT: 67 IN | RESPIRATION RATE: 18 BRPM | SYSTOLIC BLOOD PRESSURE: 155 MMHG | DIASTOLIC BLOOD PRESSURE: 94 MMHG | OXYGEN SATURATION: 94 % | BODY MASS INDEX: 24.01 KG/M2 | TEMPERATURE: 99 F

## 2023-12-11 DIAGNOSIS — R05.9 COUGH, UNSPECIFIED TYPE: ICD-10-CM

## 2023-12-11 DIAGNOSIS — R19.7 DIARRHEA, UNSPECIFIED TYPE: Primary | ICD-10-CM

## 2023-12-11 DIAGNOSIS — R52 BODY ACHES: ICD-10-CM

## 2023-12-11 DIAGNOSIS — R11.0 NAUSEA: ICD-10-CM

## 2023-12-11 LAB
CTP QC/QA: YES
CTP QC/QA: YES
POC MOLECULAR INFLUENZA A AGN: NEGATIVE
POC MOLECULAR INFLUENZA B AGN: NEGATIVE
SARS-COV-2 AG RESP QL IA.RAPID: NEGATIVE

## 2023-12-11 PROCEDURE — 99213 PR OFFICE/OUTPT VISIT, EST, LEVL III, 20-29 MIN: ICD-10-PCS | Mod: S$GLB,,, | Performed by: NURSE PRACTITIONER

## 2023-12-11 PROCEDURE — 87045 FECES CULTURE AEROBIC BACT: CPT | Performed by: NURSE PRACTITIONER

## 2023-12-11 PROCEDURE — 87811 SARS-COV-2 COVID19 W/OPTIC: CPT | Mod: QW,S$GLB,, | Performed by: NURSE PRACTITIONER

## 2023-12-11 PROCEDURE — 87449 NOS EACH ORGANISM AG IA: CPT | Performed by: NURSE PRACTITIONER

## 2023-12-11 PROCEDURE — 87046 STOOL CULTR AEROBIC BACT EA: CPT | Performed by: NURSE PRACTITIONER

## 2023-12-11 PROCEDURE — 87502 INFLUENZA DNA AMP PROBE: CPT | Mod: QW,S$GLB,, | Performed by: NURSE PRACTITIONER

## 2023-12-11 PROCEDURE — 87427 SHIGA-LIKE TOXIN AG IA: CPT | Performed by: NURSE PRACTITIONER

## 2023-12-11 PROCEDURE — 87811 SARS CORONAVIRUS 2 ANTIGEN POCT, MANUAL READ: ICD-10-PCS | Mod: QW,S$GLB,, | Performed by: NURSE PRACTITIONER

## 2023-12-11 PROCEDURE — 99213 OFFICE O/P EST LOW 20 MIN: CPT | Mod: S$GLB,,, | Performed by: NURSE PRACTITIONER

## 2023-12-11 PROCEDURE — 87502 POCT INFLUENZA A/B MOLECULAR: ICD-10-PCS | Mod: QW,S$GLB,, | Performed by: NURSE PRACTITIONER

## 2023-12-11 RX ORDER — ONDANSETRON 4 MG/1
4 TABLET, ORALLY DISINTEGRATING ORAL EVERY 8 HOURS PRN
Qty: 12 TABLET | Refills: 0 | Status: SHIPPED | OUTPATIENT
Start: 2023-12-11

## 2023-12-11 NOTE — PATIENT INSTRUCTIONS
Drink plenty of electrolytes and fluids.  Avoid diary products, spicy foods, and greasy foods.  [place] diet.  Go to ER if not urinating or noted blood in stool.

## 2023-12-11 NOTE — PROGRESS NOTES
"Subjective:      Patient ID: Michael Espinoza is a 68 y.o. male.    Vitals:  height is 5' 7" (1.702 m) and weight is 69.4 kg (153 lb). His oral temperature is 98.5 °F (36.9 °C). His blood pressure is 155/94 (abnormal) and his pulse is 102. His respiration is 18 and oxygen saturation is 94% (abnormal).     Chief Complaint: Fever    .This is a 68 y.o. male who presents today with a chief complaint of  cough, muscle soreness, chills, fever (100.1) onset 4 days ago   Provider note begins below    Patient has a history urinary issues.  Denies any urinary symptoms.  He has been drinking small diluted Pedialyte Gatorade.  Has urinated once today.  Reports body aches.  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  Patient reports stools are watery.  Denies any vomiting.  Unable to tolerate food because it nausea.    Fever   This is a new problem. The current episode started in the past 7 days. The problem occurs constantly. The problem has been unchanged. The maximum temperature noted was 100 to 100.9 F. The temperature was taken using an oral thermometer. Associated symptoms include congestion, coughing, diarrhea, muscle aches, nausea, sleepiness, urinary pain and wheezing. Pertinent negatives include no abdominal pain, chest pain, ear pain, headaches, rash, sore throat or vomiting. He has tried fluids, acetaminophen and NSAIDs for the symptoms. The treatment provided mild relief.   Risk factors: no contaminated food, no contaminated water, no hx of cancer, no immunosuppression, no occupational exposure, no recent sickness, no recent travel and no sick contacts        Constitution: Positive for fatigue and fever.   HENT:  Positive for congestion. Negative for ear pain and sore throat.    Cardiovascular:  Negative for chest pain.   Respiratory:  Positive for cough and wheezing.    Gastrointestinal:  Positive for nausea and diarrhea. Negative for abdominal pain and vomiting.   Genitourinary:  Positive for dysuria. "   Musculoskeletal:  Positive for muscle ache.   Skin:  Negative for rash.   Neurological:  Negative for headaches.      Objective:     Physical Exam   Constitutional: He is oriented to person, place, and time.   HENT:   Head: Normocephalic and atraumatic.   Cardiovascular: Tachycardia present.   Pulmonary/Chest: Effort normal. No respiratory distress.   Abdominal: Normal appearance. He exhibits no distension and no mass. There is no abdominal tenderness. There is no rebound, no guarding, no left CVA tenderness and no right CVA tenderness. No hernia.   Neurological: He is alert and oriented to person, place, and time.   Skin: Skin is dry.   Psychiatric: His behavior is normal. Mood normal.         Results for orders placed or performed in visit on 12/11/23   SARS Coronavirus 2 Antigen, POCT Manual Read   Result Value Ref Range    SARS Coronavirus 2 Antigen Negative Negative     Acceptable Yes    POCT Influenza A/B MOLECULAR   Result Value Ref Range    POC Molecular Influenza A Ag Negative Negative, Not Reported    POC Molecular Influenza B Ag Negative Negative, Not Reported     Acceptable Yes           Assessment:     1. Diarrhea, unspecified type    2. Cough, unspecified type    3. Body aches    4. Nausea        Plan:   Covid and flu test negative  Zofran for nausea and to hopefully bulk up stool      Stool studies- culture and C diff   Urine culture as he does have a history of urinary issues   Recommend Gatorade Powerade or Pedialyte do not dilute  ED precautions  Drink plenty of electrolytes and fluids.  Avoid diary products, spicy foods, and greasy foods.  [place] diet.  Go to ER if not urinating or noted blood in stool.     Patient will bring urine to lab for drop off      Diarrhea, unspecified type  -     Cancel: CULTURE, STOOL; Future; Expected date: 12/11/2023  -     Cancel: CLOSTRIDIUM DIFFICILE; Future; Expected date: 12/11/2023  -     Cancel: Urine culture; Future; Expected  date: 12/11/2023  -     ondansetron (ZOFRAN-ODT) 4 MG TbDL; Take 1 tablet (4 mg total) by mouth every 8 (eight) hours as needed (nausea).  Dispense: 12 tablet; Refill: 0  -     CULTURE, STOOL  -     Urine culture; Future; Expected date: 12/11/2023  -     CLOSTRIDIUM DIFFICILE; Future; Expected date: 12/11/2023    Cough, unspecified type  -     SARS Coronavirus 2 Antigen, POCT Manual Read  -     POCT Influenza A/B MOLECULAR    Body aches  -     Cancel: Urine culture; Future; Expected date: 12/11/2023  -     Urine culture; Future; Expected date: 12/11/2023    Nausea  -     ondansetron (ZOFRAN-ODT) 4 MG TbDL; Take 1 tablet (4 mg total) by mouth every 8 (eight) hours as needed (nausea).  Dispense: 12 tablet; Refill: 0  -     Urine culture; Future; Expected date: 12/11/2023    Other orders  -     E. coli 0157 antigen                     No

## 2023-12-13 ENCOUNTER — HOSPITAL ENCOUNTER (INPATIENT)
Facility: HOSPITAL | Age: 68
LOS: 7 days | Discharge: HOME HEALTH | DRG: 872 | End: 2023-12-22
Attending: STUDENT IN AN ORGANIZED HEALTH CARE EDUCATION/TRAINING PROGRAM | Admitting: HOSPITALIST
Payer: MEDICARE

## 2023-12-13 DIAGNOSIS — R07.9 CHEST PAIN: ICD-10-CM

## 2023-12-13 DIAGNOSIS — E87.1 HYPONATREMIA: ICD-10-CM

## 2023-12-13 DIAGNOSIS — R11.0 NAUSEA: ICD-10-CM

## 2023-12-13 DIAGNOSIS — N39.0 URINARY TRACT INFECTION WITH HEMATURIA, SITE UNSPECIFIED: ICD-10-CM

## 2023-12-13 DIAGNOSIS — R31.9 URINARY TRACT INFECTION WITH HEMATURIA, SITE UNSPECIFIED: ICD-10-CM

## 2023-12-13 DIAGNOSIS — E87.6 HYPOKALEMIA: ICD-10-CM

## 2023-12-13 DIAGNOSIS — R19.7 DIARRHEA, UNSPECIFIED TYPE: Primary | ICD-10-CM

## 2023-12-13 DIAGNOSIS — E86.0 DEHYDRATION: ICD-10-CM

## 2023-12-13 DIAGNOSIS — A41.9 SEPSIS WITHOUT ACUTE ORGAN DYSFUNCTION: ICD-10-CM

## 2023-12-13 DIAGNOSIS — R78.81 BACTEREMIA: ICD-10-CM

## 2023-12-13 LAB
ALBUMIN SERPL BCP-MCNC: 2.8 G/DL (ref 3.5–5.2)
ALP SERPL-CCNC: 87 U/L (ref 55–135)
ALT SERPL W/O P-5'-P-CCNC: 60 U/L (ref 10–44)
ANION GAP SERPL CALC-SCNC: 12 MMOL/L (ref 8–16)
AST SERPL-CCNC: 90 U/L (ref 10–40)
BASOPHILS # BLD AUTO: 0.03 K/UL (ref 0–0.2)
BASOPHILS NFR BLD: 0.3 % (ref 0–1.9)
BILIRUB SERPL-MCNC: 2 MG/DL (ref 0.1–1)
BUN SERPL-MCNC: 13 MG/DL (ref 8–23)
BUN SERPL-MCNC: 15 MG/DL (ref 6–30)
CALCIUM SERPL-MCNC: 8.8 MG/DL (ref 8.7–10.5)
CHLORIDE SERPL-SCNC: 92 MMOL/L (ref 95–110)
CHLORIDE SERPL-SCNC: 94 MMOL/L (ref 95–110)
CO2 SERPL-SCNC: 23 MMOL/L (ref 23–29)
CREAT SERPL-MCNC: 0.8 MG/DL (ref 0.5–1.4)
CREAT SERPL-MCNC: 0.9 MG/DL (ref 0.5–1.4)
DIFFERENTIAL METHOD: ABNORMAL
E COLI SXT1 STL QL IA: NEGATIVE
E COLI SXT2 STL QL IA: NEGATIVE
EOSINOPHIL # BLD AUTO: 0 K/UL (ref 0–0.5)
EOSINOPHIL NFR BLD: 0 % (ref 0–8)
ERYTHROCYTE [DISTWIDTH] IN BLOOD BY AUTOMATED COUNT: 13.3 % (ref 11.5–14.5)
EST. GFR  (NO RACE VARIABLE): >60 ML/MIN/1.73 M^2
GLUCOSE SERPL-MCNC: 117 MG/DL (ref 70–110)
GLUCOSE SERPL-MCNC: 124 MG/DL (ref 70–110)
HCT VFR BLD AUTO: 40.2 % (ref 40–54)
HCT VFR BLD CALC: 44 %PCV (ref 36–54)
HGB BLD-MCNC: 14.1 G/DL (ref 14–18)
IMM GRANULOCYTES # BLD AUTO: 0.06 K/UL (ref 0–0.04)
IMM GRANULOCYTES NFR BLD AUTO: 0.7 % (ref 0–0.5)
INFLUENZA A, MOLECULAR: NEGATIVE
INFLUENZA B, MOLECULAR: NEGATIVE
LACTATE SERPL-SCNC: 1.4 MMOL/L (ref 0.5–2.2)
LIPASE SERPL-CCNC: 32 U/L (ref 4–60)
LYMPHOCYTES # BLD AUTO: 0.4 K/UL (ref 1–4.8)
LYMPHOCYTES NFR BLD: 4.7 % (ref 18–48)
MAGNESIUM SERPL-MCNC: 2.1 MG/DL (ref 1.6–2.6)
MCH RBC QN AUTO: 30 PG (ref 27–31)
MCHC RBC AUTO-ENTMCNC: 35.1 G/DL (ref 32–36)
MCV RBC AUTO: 86 FL (ref 82–98)
MONOCYTES # BLD AUTO: 0.7 K/UL (ref 0.3–1)
MONOCYTES NFR BLD: 8.6 % (ref 4–15)
NEUTROPHILS # BLD AUTO: 7.4 K/UL (ref 1.8–7.7)
NEUTROPHILS NFR BLD: 85.7 % (ref 38–73)
NRBC BLD-RTO: 0 /100 WBC
PLATELET # BLD AUTO: 118 K/UL (ref 150–450)
PMV BLD AUTO: 10.3 FL (ref 9.2–12.9)
POC IONIZED CALCIUM: 1.01 MMOL/L (ref 1.06–1.42)
POC TCO2 (MEASURED): 24 MMOL/L (ref 23–29)
POTASSIUM BLD-SCNC: 2.8 MMOL/L (ref 3.5–5.1)
POTASSIUM SERPL-SCNC: 2.9 MMOL/L (ref 3.5–5.1)
PROT SERPL-MCNC: 7.2 G/DL (ref 6–8.4)
RBC # BLD AUTO: 4.7 M/UL (ref 4.6–6.2)
SAMPLE: ABNORMAL
SARS-COV-2 RDRP RESP QL NAA+PROBE: NEGATIVE
SODIUM BLD-SCNC: 130 MMOL/L (ref 136–145)
SODIUM SERPL-SCNC: 129 MMOL/L (ref 136–145)
SPECIMEN SOURCE: NORMAL
WBC # BLD AUTO: 8.58 K/UL (ref 3.9–12.7)

## 2023-12-13 PROCEDURE — 87077 CULTURE AEROBIC IDENTIFY: CPT | Performed by: STUDENT IN AN ORGANIZED HEALTH CARE EDUCATION/TRAINING PROGRAM

## 2023-12-13 PROCEDURE — 99285 EMERGENCY DEPT VISIT HI MDM: CPT | Mod: 25

## 2023-12-13 PROCEDURE — 63600175 PHARM REV CODE 636 W HCPCS: Performed by: STUDENT IN AN ORGANIZED HEALTH CARE EDUCATION/TRAINING PROGRAM

## 2023-12-13 PROCEDURE — 83935 ASSAY OF URINE OSMOLALITY: CPT | Performed by: STUDENT IN AN ORGANIZED HEALTH CARE EDUCATION/TRAINING PROGRAM

## 2023-12-13 PROCEDURE — 80053 COMPREHEN METABOLIC PANEL: CPT | Performed by: STUDENT IN AN ORGANIZED HEALTH CARE EDUCATION/TRAINING PROGRAM

## 2023-12-13 PROCEDURE — 96365 THER/PROPH/DIAG IV INF INIT: CPT

## 2023-12-13 PROCEDURE — 87086 URINE CULTURE/COLONY COUNT: CPT | Performed by: STUDENT IN AN ORGANIZED HEALTH CARE EDUCATION/TRAINING PROGRAM

## 2023-12-13 PROCEDURE — 93010 EKG 12-LEAD: ICD-10-PCS | Mod: ,,, | Performed by: INTERNAL MEDICINE

## 2023-12-13 PROCEDURE — 93005 ELECTROCARDIOGRAM TRACING: CPT

## 2023-12-13 PROCEDURE — 96367 TX/PROPH/DG ADDL SEQ IV INF: CPT

## 2023-12-13 PROCEDURE — 87186 SC STD MICRODIL/AGAR DIL: CPT | Performed by: STUDENT IN AN ORGANIZED HEALTH CARE EDUCATION/TRAINING PROGRAM

## 2023-12-13 PROCEDURE — 83735 ASSAY OF MAGNESIUM: CPT | Performed by: STUDENT IN AN ORGANIZED HEALTH CARE EDUCATION/TRAINING PROGRAM

## 2023-12-13 PROCEDURE — 84300 ASSAY OF URINE SODIUM: CPT | Performed by: STUDENT IN AN ORGANIZED HEALTH CARE EDUCATION/TRAINING PROGRAM

## 2023-12-13 PROCEDURE — U0002 COVID-19 LAB TEST NON-CDC: HCPCS | Performed by: STUDENT IN AN ORGANIZED HEALTH CARE EDUCATION/TRAINING PROGRAM

## 2023-12-13 PROCEDURE — 87502 INFLUENZA DNA AMP PROBE: CPT | Performed by: STUDENT IN AN ORGANIZED HEALTH CARE EDUCATION/TRAINING PROGRAM

## 2023-12-13 PROCEDURE — 25000003 PHARM REV CODE 250: Performed by: STUDENT IN AN ORGANIZED HEALTH CARE EDUCATION/TRAINING PROGRAM

## 2023-12-13 PROCEDURE — 96361 HYDRATE IV INFUSION ADD-ON: CPT

## 2023-12-13 PROCEDURE — 96375 TX/PRO/DX INJ NEW DRUG ADDON: CPT

## 2023-12-13 PROCEDURE — 83605 ASSAY OF LACTIC ACID: CPT | Performed by: STUDENT IN AN ORGANIZED HEALTH CARE EDUCATION/TRAINING PROGRAM

## 2023-12-13 PROCEDURE — 81001 URINALYSIS AUTO W/SCOPE: CPT | Performed by: STUDENT IN AN ORGANIZED HEALTH CARE EDUCATION/TRAINING PROGRAM

## 2023-12-13 PROCEDURE — 85025 COMPLETE CBC W/AUTO DIFF WBC: CPT | Performed by: STUDENT IN AN ORGANIZED HEALTH CARE EDUCATION/TRAINING PROGRAM

## 2023-12-13 PROCEDURE — 87088 URINE BACTERIA CULTURE: CPT | Performed by: STUDENT IN AN ORGANIZED HEALTH CARE EDUCATION/TRAINING PROGRAM

## 2023-12-13 PROCEDURE — 93010 ELECTROCARDIOGRAM REPORT: CPT | Mod: ,,, | Performed by: INTERNAL MEDICINE

## 2023-12-13 PROCEDURE — 83690 ASSAY OF LIPASE: CPT | Performed by: STUDENT IN AN ORGANIZED HEALTH CARE EDUCATION/TRAINING PROGRAM

## 2023-12-13 RX ORDER — POTASSIUM CHLORIDE 7.45 MG/ML
10 INJECTION INTRAVENOUS ONCE
Status: COMPLETED | OUTPATIENT
Start: 2023-12-14 | End: 2023-12-14

## 2023-12-13 RX ORDER — FAMOTIDINE 10 MG/ML
20 INJECTION INTRAVENOUS
Status: COMPLETED | OUTPATIENT
Start: 2023-12-13 | End: 2023-12-13

## 2023-12-13 RX ORDER — DICYCLOMINE HYDROCHLORIDE 10 MG/1
20 CAPSULE ORAL
Status: COMPLETED | OUTPATIENT
Start: 2023-12-13 | End: 2023-12-13

## 2023-12-13 RX ORDER — ONDANSETRON 2 MG/ML
4 INJECTION INTRAMUSCULAR; INTRAVENOUS
Status: COMPLETED | OUTPATIENT
Start: 2023-12-13 | End: 2023-12-13

## 2023-12-13 RX ORDER — POTASSIUM CHLORIDE 20 MEQ/1
40 TABLET, EXTENDED RELEASE ORAL ONCE
Status: COMPLETED | OUTPATIENT
Start: 2023-12-14 | End: 2023-12-14

## 2023-12-13 RX ADMIN — ONDANSETRON 4 MG: 2 INJECTION INTRAMUSCULAR; INTRAVENOUS at 10:12

## 2023-12-13 RX ADMIN — FAMOTIDINE 20 MG: 10 INJECTION, SOLUTION INTRAVENOUS at 10:12

## 2023-12-13 RX ADMIN — SODIUM CHLORIDE, POTASSIUM CHLORIDE, SODIUM LACTATE AND CALCIUM CHLORIDE 1000 ML: 600; 310; 30; 20 INJECTION, SOLUTION INTRAVENOUS at 10:12

## 2023-12-13 RX ADMIN — DICYCLOMINE HYDROCHLORIDE 20 MG: 10 CAPSULE ORAL at 10:12

## 2023-12-14 ENCOUNTER — TELEPHONE (OUTPATIENT)
Dept: URGENT CARE | Facility: CLINIC | Age: 68
End: 2023-12-14
Payer: MEDICARE

## 2023-12-14 PROBLEM — E87.6 HYPOKALEMIA: Status: ACTIVE | Noted: 2023-12-14

## 2023-12-14 PROBLEM — E87.1 HYPONATREMIA: Status: ACTIVE | Noted: 2023-12-14

## 2023-12-14 PROBLEM — E83.39 HYPOPHOSPHATEMIA: Status: ACTIVE | Noted: 2023-12-14

## 2023-12-14 PROBLEM — N39.0 COMPLICATED UTI (URINARY TRACT INFECTION): Status: ACTIVE | Noted: 2023-12-14

## 2023-12-14 PROBLEM — A41.9 SEPSIS WITHOUT ACUTE ORGAN DYSFUNCTION: Status: ACTIVE | Noted: 2023-12-14

## 2023-12-14 PROBLEM — D69.6 THROMBOCYTOPENIA: Status: ACTIVE | Noted: 2023-12-14

## 2023-12-14 PROBLEM — R74.01 TRANSAMINITIS: Status: ACTIVE | Noted: 2023-12-14

## 2023-12-14 PROBLEM — R19.7 DIARRHEA: Status: ACTIVE | Noted: 2023-12-14

## 2023-12-14 LAB
ALBUMIN SERPL BCP-MCNC: 2.6 G/DL (ref 3.5–5.2)
ALP SERPL-CCNC: 84 U/L (ref 55–135)
ALT SERPL W/O P-5'-P-CCNC: 55 U/L (ref 10–44)
ANION GAP SERPL CALC-SCNC: 11 MMOL/L (ref 8–16)
AST SERPL-CCNC: 81 U/L (ref 10–40)
BACTERIA #/AREA URNS AUTO: ABNORMAL /HPF
BACTERIA STL CULT: NORMAL
BASOPHILS # BLD AUTO: 0.03 K/UL (ref 0–0.2)
BASOPHILS NFR BLD: 0.3 % (ref 0–1.9)
BILIRUB SERPL-MCNC: 1.5 MG/DL (ref 0.1–1)
BILIRUB UR QL STRIP: NEGATIVE
BUN SERPL-MCNC: 12 MG/DL (ref 8–23)
C DIFF GDH STL QL: NEGATIVE
C DIFF TOX A+B STL QL IA: NEGATIVE
CALCIUM SERPL-MCNC: 8.1 MG/DL (ref 8.7–10.5)
CHLORIDE SERPL-SCNC: 96 MMOL/L (ref 95–110)
CLARITY UR REFRACT.AUTO: ABNORMAL
CO2 SERPL-SCNC: 23 MMOL/L (ref 23–29)
COLOR UR AUTO: YELLOW
CREAT SERPL-MCNC: 0.9 MG/DL (ref 0.5–1.4)
CRP SERPL-MCNC: 206 MG/L (ref 0–8.2)
CRYPTOSP AG STL QL IA: NEGATIVE
DIFFERENTIAL METHOD: ABNORMAL
EOSINOPHIL # BLD AUTO: 0 K/UL (ref 0–0.5)
EOSINOPHIL NFR BLD: 0 % (ref 0–8)
ERYTHROCYTE [DISTWIDTH] IN BLOOD BY AUTOMATED COUNT: 13.2 % (ref 11.5–14.5)
ERYTHROCYTE [SEDIMENTATION RATE] IN BLOOD BY PHOTOMETRIC METHOD: 76 MM/HR (ref 0–23)
EST. GFR  (NO RACE VARIABLE): >60 ML/MIN/1.73 M^2
G LAMBLIA AG STL QL IA: NEGATIVE
GLUCOSE SERPL-MCNC: 118 MG/DL (ref 70–110)
GLUCOSE UR QL STRIP: NEGATIVE
HAV IGM SERPL QL IA: NORMAL
HBV CORE IGM SERPL QL IA: NORMAL
HBV SURFACE AG SERPL QL IA: NORMAL
HCT VFR BLD AUTO: 38.7 % (ref 40–54)
HCV AB SERPL QL IA: NORMAL
HGB BLD-MCNC: 13.2 G/DL (ref 14–18)
HGB UR QL STRIP: ABNORMAL
HYALINE CASTS UR QL AUTO: 0 /LPF
IMM GRANULOCYTES # BLD AUTO: 0.15 K/UL (ref 0–0.04)
IMM GRANULOCYTES NFR BLD AUTO: 1.4 % (ref 0–0.5)
KETONES UR QL STRIP: ABNORMAL
LEUKOCYTE ESTERASE UR QL STRIP: ABNORMAL
LYMPHOCYTES # BLD AUTO: 0.4 K/UL (ref 1–4.8)
LYMPHOCYTES NFR BLD: 4 % (ref 18–48)
MAGNESIUM SERPL-MCNC: 1.8 MG/DL (ref 1.6–2.6)
MCH RBC QN AUTO: 30.1 PG (ref 27–31)
MCHC RBC AUTO-ENTMCNC: 34.1 G/DL (ref 32–36)
MCV RBC AUTO: 88 FL (ref 82–98)
MICROSCOPIC COMMENT: ABNORMAL
MONOCYTES # BLD AUTO: 0.9 K/UL (ref 0.3–1)
MONOCYTES NFR BLD: 8.6 % (ref 4–15)
MRSA ID BY PCR: NEGATIVE
NEUTROPHILS # BLD AUTO: 9.1 K/UL (ref 1.8–7.7)
NEUTROPHILS NFR BLD: 85.7 % (ref 38–73)
NITRITE UR QL STRIP: POSITIVE
NRBC BLD-RTO: 0 /100 WBC
OSMOLALITY UR: 515 MOSM/KG (ref 50–1200)
PH UR STRIP: 6 [PH] (ref 5–8)
PHOSPHATE SERPL-MCNC: 1.5 MG/DL (ref 2.7–4.5)
PLATELET # BLD AUTO: 108 K/UL (ref 150–450)
PMV BLD AUTO: 10.8 FL (ref 9.2–12.9)
POTASSIUM SERPL-SCNC: 3.2 MMOL/L (ref 3.5–5.1)
PROT SERPL-MCNC: 6.4 G/DL (ref 6–8.4)
PROT UR QL STRIP: ABNORMAL
RBC # BLD AUTO: 4.38 M/UL (ref 4.6–6.2)
RBC #/AREA URNS AUTO: 35 /HPF (ref 0–4)
RV AG STL QL IA.RAPID: NEGATIVE
SODIUM SERPL-SCNC: 130 MMOL/L (ref 136–145)
SODIUM UR-SCNC: 22 MMOL/L (ref 20–250)
SP GR UR STRIP: 1.02 (ref 1–1.03)
STAPH AUREUS ID BY PCR: POSITIVE
URN SPEC COLLECT METH UR: ABNORMAL
WBC # BLD AUTO: 10.62 K/UL (ref 3.9–12.7)
WBC #/AREA STL HPF: NORMAL /[HPF]
WBC #/AREA URNS AUTO: >100 /HPF (ref 0–5)

## 2023-12-14 PROCEDURE — 63600175 PHARM REV CODE 636 W HCPCS: Performed by: EMERGENCY MEDICINE

## 2023-12-14 PROCEDURE — 87329 GIARDIA AG IA: CPT

## 2023-12-14 PROCEDURE — 83735 ASSAY OF MAGNESIUM: CPT

## 2023-12-14 PROCEDURE — 25000003 PHARM REV CODE 250

## 2023-12-14 PROCEDURE — 83993 ASSAY FOR CALPROTECTIN FECAL: CPT

## 2023-12-14 PROCEDURE — 63600175 PHARM REV CODE 636 W HCPCS

## 2023-12-14 PROCEDURE — 87209 SMEAR COMPLEX STAIN: CPT

## 2023-12-14 PROCEDURE — 87449 NOS EACH ORGANISM AG IA: CPT

## 2023-12-14 PROCEDURE — G0378 HOSPITAL OBSERVATION PER HR: HCPCS

## 2023-12-14 PROCEDURE — 87040 BLOOD CULTURE FOR BACTERIA: CPT

## 2023-12-14 PROCEDURE — 84100 ASSAY OF PHOSPHORUS: CPT

## 2023-12-14 PROCEDURE — 80053 COMPREHEN METABOLIC PANEL: CPT

## 2023-12-14 PROCEDURE — 82272 OCCULT BLD FECES 1-3 TESTS: CPT

## 2023-12-14 PROCEDURE — 82653 EL-1 FECAL QUANTITATIVE: CPT

## 2023-12-14 PROCEDURE — 82705 FATS/LIPIDS FECES QUAL: CPT

## 2023-12-14 PROCEDURE — 85652 RBC SED RATE AUTOMATED: CPT

## 2023-12-14 PROCEDURE — 36415 COLL VENOUS BLD VENIPUNCTURE: CPT | Performed by: STUDENT IN AN ORGANIZED HEALTH CARE EDUCATION/TRAINING PROGRAM

## 2023-12-14 PROCEDURE — 87186 SC STD MICRODIL/AGAR DIL: CPT

## 2023-12-14 PROCEDURE — 87040 BLOOD CULTURE FOR BACTERIA: CPT | Mod: 59 | Performed by: STUDENT IN AN ORGANIZED HEALTH CARE EDUCATION/TRAINING PROGRAM

## 2023-12-14 PROCEDURE — 89055 LEUKOCYTE ASSESSMENT FECAL: CPT

## 2023-12-14 PROCEDURE — 87150 DNA/RNA AMPLIFIED PROBE: CPT

## 2023-12-14 PROCEDURE — 86140 C-REACTIVE PROTEIN: CPT

## 2023-12-14 PROCEDURE — 87425 ROTAVIRUS AG IA: CPT

## 2023-12-14 PROCEDURE — 85025 COMPLETE CBC W/AUTO DIFF WBC: CPT

## 2023-12-14 PROCEDURE — 25000003 PHARM REV CODE 250: Performed by: HOSPITALIST

## 2023-12-14 PROCEDURE — 25000003 PHARM REV CODE 250: Performed by: EMERGENCY MEDICINE

## 2023-12-14 PROCEDURE — 87077 CULTURE AEROBIC IDENTIFY: CPT

## 2023-12-14 PROCEDURE — 80074 ACUTE HEPATITIS PANEL: CPT

## 2023-12-14 PROCEDURE — 25500020 PHARM REV CODE 255: Performed by: EMERGENCY MEDICINE

## 2023-12-14 PROCEDURE — 87338 HPYLORI STOOL AG IA: CPT

## 2023-12-14 RX ORDER — ACETAMINOPHEN 500 MG
1000 TABLET ORAL EVERY 8 HOURS PRN
Status: DISCONTINUED | OUTPATIENT
Start: 2023-12-14 | End: 2023-12-22 | Stop reason: HOSPADM

## 2023-12-14 RX ORDER — ASPIRIN 81 MG/1
81 TABLET ORAL 2 TIMES DAILY
Status: DISCONTINUED | OUTPATIENT
Start: 2023-12-14 | End: 2023-12-22 | Stop reason: HOSPADM

## 2023-12-14 RX ORDER — SODIUM CHLORIDE 0.9 % (FLUSH) 0.9 %
5 SYRINGE (ML) INJECTION
Status: DISCONTINUED | OUTPATIENT
Start: 2023-12-14 | End: 2023-12-22 | Stop reason: HOSPADM

## 2023-12-14 RX ORDER — CIPROFLOXACIN 500 MG/1
500 TABLET ORAL EVERY 12 HOURS
Status: DISCONTINUED | OUTPATIENT
Start: 2023-12-14 | End: 2023-12-15

## 2023-12-14 RX ORDER — NALOXONE HCL 0.4 MG/ML
0.02 VIAL (ML) INJECTION
Status: DISCONTINUED | OUTPATIENT
Start: 2023-12-14 | End: 2023-12-22 | Stop reason: HOSPADM

## 2023-12-14 RX ORDER — LOPERAMIDE HYDROCHLORIDE 2 MG/1
2 CAPSULE ORAL 4 TIMES DAILY PRN
Status: DISCONTINUED | OUTPATIENT
Start: 2023-12-14 | End: 2023-12-22 | Stop reason: HOSPADM

## 2023-12-14 RX ORDER — MAG HYDROX/ALUMINUM HYD/SIMETH 200-200-20
30 SUSPENSION, ORAL (FINAL DOSE FORM) ORAL 4 TIMES DAILY PRN
Status: DISCONTINUED | OUTPATIENT
Start: 2023-12-14 | End: 2023-12-22 | Stop reason: HOSPADM

## 2023-12-14 RX ORDER — ATORVASTATIN CALCIUM 40 MG/1
80 TABLET, FILM COATED ORAL DAILY
Status: DISCONTINUED | OUTPATIENT
Start: 2023-12-14 | End: 2023-12-22 | Stop reason: HOSPADM

## 2023-12-14 RX ORDER — UBIDECARENONE 75 MG
1 CAPSULE ORAL DAILY
COMMUNITY

## 2023-12-14 RX ORDER — SODIUM CHLORIDE 9 MG/ML
INJECTION, SOLUTION INTRAVENOUS CONTINUOUS
Status: DISCONTINUED | OUTPATIENT
Start: 2023-12-14 | End: 2023-12-15

## 2023-12-14 RX ORDER — CALCIUM CARBONATE 200(500)MG
500 TABLET,CHEWABLE ORAL DAILY PRN
Status: DISCONTINUED | OUTPATIENT
Start: 2023-12-14 | End: 2023-12-22 | Stop reason: HOSPADM

## 2023-12-14 RX ORDER — IBUPROFEN 200 MG
24 TABLET ORAL
Status: DISCONTINUED | OUTPATIENT
Start: 2023-12-14 | End: 2023-12-22 | Stop reason: HOSPADM

## 2023-12-14 RX ORDER — ACETAMINOPHEN 325 MG/1
650 TABLET ORAL EVERY 4 HOURS PRN
Status: DISCONTINUED | OUTPATIENT
Start: 2023-12-14 | End: 2023-12-22 | Stop reason: HOSPADM

## 2023-12-14 RX ORDER — DICYCLOMINE HYDROCHLORIDE 10 MG/1
10 CAPSULE ORAL 4 TIMES DAILY
Status: DISCONTINUED | OUTPATIENT
Start: 2023-12-14 | End: 2023-12-22 | Stop reason: HOSPADM

## 2023-12-14 RX ORDER — DICYCLOMINE HYDROCHLORIDE 10 MG/1
10 CAPSULE ORAL DAILY PRN
COMMUNITY

## 2023-12-14 RX ORDER — IBUPROFEN 200 MG
16 TABLET ORAL
Status: DISCONTINUED | OUTPATIENT
Start: 2023-12-14 | End: 2023-12-22 | Stop reason: HOSPADM

## 2023-12-14 RX ORDER — SODIUM CHLORIDE 9 MG/ML
INJECTION, SOLUTION INTRAVENOUS CONTINUOUS
Status: DISCONTINUED | OUTPATIENT
Start: 2023-12-14 | End: 2023-12-14

## 2023-12-14 RX ORDER — SIMETHICONE 80 MG
1 TABLET,CHEWABLE ORAL 4 TIMES DAILY PRN
Status: DISCONTINUED | OUTPATIENT
Start: 2023-12-14 | End: 2023-12-22 | Stop reason: HOSPADM

## 2023-12-14 RX ORDER — EZETIMIBE 10 MG/1
10 TABLET ORAL DAILY
Status: DISCONTINUED | OUTPATIENT
Start: 2023-12-14 | End: 2023-12-22 | Stop reason: HOSPADM

## 2023-12-14 RX ORDER — GLUCAGON 1 MG
1 KIT INJECTION
Status: DISCONTINUED | OUTPATIENT
Start: 2023-12-14 | End: 2023-12-22 | Stop reason: HOSPADM

## 2023-12-14 RX ORDER — TALC
6 POWDER (GRAM) TOPICAL NIGHTLY PRN
Status: DISCONTINUED | OUTPATIENT
Start: 2023-12-14 | End: 2023-12-22 | Stop reason: HOSPADM

## 2023-12-14 RX ORDER — POTASSIUM CHLORIDE 20 MEQ/1
40 TABLET, EXTENDED RELEASE ORAL ONCE
Status: COMPLETED | OUTPATIENT
Start: 2023-12-14 | End: 2023-12-14

## 2023-12-14 RX ORDER — IPRATROPIUM BROMIDE AND ALBUTEROL SULFATE 2.5; .5 MG/3ML; MG/3ML
3 SOLUTION RESPIRATORY (INHALATION) EVERY 4 HOURS PRN
Status: DISCONTINUED | OUTPATIENT
Start: 2023-12-14 | End: 2023-12-17

## 2023-12-14 RX ORDER — ONDANSETRON 8 MG/1
8 TABLET, ORALLY DISINTEGRATING ORAL EVERY 8 HOURS PRN
Status: DISCONTINUED | OUTPATIENT
Start: 2023-12-14 | End: 2023-12-22 | Stop reason: HOSPADM

## 2023-12-14 RX ADMIN — POTASSIUM CHLORIDE 10 MEQ: 7.46 INJECTION, SOLUTION INTRAVENOUS at 12:12

## 2023-12-14 RX ADMIN — VANCOMYCIN HYDROCHLORIDE 750 MG: 750 INJECTION, POWDER, LYOPHILIZED, FOR SOLUTION INTRAVENOUS at 10:12

## 2023-12-14 RX ADMIN — ANTACID TABLETS 500 MG: 500 TABLET, CHEWABLE ORAL at 05:12

## 2023-12-14 RX ADMIN — CEFTRIAXONE 2 G: 2 INJECTION, POWDER, FOR SOLUTION INTRAMUSCULAR; INTRAVENOUS at 01:12

## 2023-12-14 RX ADMIN — CIPROFLOXACIN 500 MG: 500 TABLET ORAL at 09:12

## 2023-12-14 RX ADMIN — ATORVASTATIN CALCIUM 80 MG: 40 TABLET, FILM COATED ORAL at 09:12

## 2023-12-14 RX ADMIN — DICYCLOMINE HYDROCHLORIDE 10 MG: 10 CAPSULE ORAL at 01:12

## 2023-12-14 RX ADMIN — Medication 6 MG: at 09:12

## 2023-12-14 RX ADMIN — IOHEXOL 75 ML: 350 INJECTION, SOLUTION INTRAVENOUS at 03:12

## 2023-12-14 RX ADMIN — DICYCLOMINE HYDROCHLORIDE 10 MG: 10 CAPSULE ORAL at 09:12

## 2023-12-14 RX ADMIN — DICYCLOMINE HYDROCHLORIDE 10 MG: 10 CAPSULE ORAL at 05:12

## 2023-12-14 RX ADMIN — VANCOMYCIN HYDROCHLORIDE 1250 MG: 1.25 INJECTION, POWDER, LYOPHILIZED, FOR SOLUTION INTRAVENOUS at 06:12

## 2023-12-14 RX ADMIN — LOPERAMIDE HYDROCHLORIDE 2 MG: 2 CAPSULE ORAL at 01:12

## 2023-12-14 RX ADMIN — POTASSIUM CHLORIDE 40 MEQ: 1500 TABLET, EXTENDED RELEASE ORAL at 05:12

## 2023-12-14 RX ADMIN — EZETIMIBE 10 MG: 10 TABLET ORAL at 09:12

## 2023-12-14 RX ADMIN — POTASSIUM CHLORIDE 40 MEQ: 1500 TABLET, EXTENDED RELEASE ORAL at 12:12

## 2023-12-14 RX ADMIN — ASPIRIN 81 MG: 81 TABLET, COATED ORAL at 09:12

## 2023-12-14 RX ADMIN — POTASSIUM PHOSPHATE, MONOBASIC POTASSIUM PHOSPHATE, DIBASIC 30 MMOL: 224; 236 INJECTION, SOLUTION, CONCENTRATE INTRAVENOUS at 07:12

## 2023-12-14 RX ADMIN — SODIUM CHLORIDE: 9 INJECTION, SOLUTION INTRAVENOUS at 06:12

## 2023-12-14 RX ADMIN — SODIUM CHLORIDE 1000 ML: 9 INJECTION, SOLUTION INTRAVENOUS at 12:12

## 2023-12-14 NOTE — PROGRESS NOTES
"Pharmacokinetic Initial Assessment: IV Vancomycin    Assessment/Plan:    Initiate intravenous vancomycin with loading dose of 1250 mg once followed by a maintenance dose of vancomycin 750 mg IV every 12 hours  Desired empiric serum trough concentration is 10 to 20 mcg/mL  Draw vancomycin trough level 60 min prior to fourth dose on 12/15/23 at approximately 18:00 or sooner if clinically indicated  Pharmacy will continue to follow and monitor vancomycin.      Please contact pharmacy at extension [phone] with any questions regarding this assessment.     Thank you for the consult,   Blas Whitman       Patient brief summary:  Michael Espinoza is a 68 y.o. male initiated on antimicrobial therapy with IV Vancomycin for treatment of suspected urinary tract infection    Drug Allergies:   Review of patient's allergies indicates:  No Known Allergies    Actual Body Weight:   69.4 kg    Renal Function:   Estimated Creatinine Clearance: 82.6 mL/min (based on SCr of 0.8 mg/dL).,     Dialysis Method (if applicable):  N/A    CBC (last 72 hours):  Recent Labs   Lab Result Units 12/13/23  2212   WBC K/uL 8.58   Hemoglobin g/dL 14.1   Hematocrit % 40.2   Platelets K/uL 118*   Gran % % 85.7*   Lymph % % 4.7*   Mono % % 8.6   Eosinophil % % 0.0   Basophil % % 0.3   Differential Method  Automated       Metabolic Panel (last 72 hours):  Recent Labs   Lab Result Units 12/13/23 2212 12/13/23  2346   Sodium mmol/L 129*  --    Potassium mmol/L 2.9*  --    Chloride mmol/L 94*  --    CO2 mmol/L 23  --    Glucose mg/dL 117*  --    Glucose, UA   --  Negative   BUN mg/dL 13  --    Creatinine mg/dL 0.8  --    Albumin g/dL 2.8*  --    Total Bilirubin mg/dL 2.0*  --    Alkaline Phosphatase U/L 87  --    AST U/L 90*  --    ALT U/L 60*  --    Magnesium mg/dL 2.1  --        Drug levels (last 3 results):  No results for input(s): "VANCOMYCINRA", "VANCORANDOM", "VANCOMYCINPE", "VANCOPEAK", "VANCOMYCINTR", "VANCOTROUGH" in the last 72 hours.    Microbiologic " No Results:  Microbiology Results (last 7 days)       Procedure Component Value Units Date/Time    Clostridium difficile EIA [[id number]]     Order Status: No result Specimen: Stool     Stool culture [[id number]]     Order Status: Canceled Specimen: Stool     Blood culture [[id number]]     Order Status: Sent Specimen: Blood     Blood culture [[id number]]     Order Status: Sent Specimen: Blood     Urine culture [[id number]] Collected: 12/13/23 2346    Order Status: No result Specimen: Urine Updated: 12/14/23 0058    Influenza A & B by Molecular [[id number]] Collected: 12/13/23 2214    Order Status: Completed Specimen: Nasopharyngeal Swab Updated: 12/13/23 2254     Influenza A, Molecular Negative     Influenza B, Molecular Negative     Flu A & B Source Nasal swab

## 2023-12-14 NOTE — ED NOTES
Assumed care of pt at this time. VSS, RR even and unlabored. Resting in bed comfortably. C/o LLQ abdominal pain at this time. Safety protocols remain intact.  Patient changed into gown and placed on continuous cardiac monitoring, blood pressure cuff and pulse ox.  White board updated.  Patient has visitor at bedside.

## 2023-12-14 NOTE — ASSESSMENT & PLAN NOTE
Patient with known CAD s/p CABG, which is controlled Will continue ASA and Statin and monitor for S/Sx of angina/ACS. Continue to monitor on telemetry.

## 2023-12-14 NOTE — ASSESSMENT & PLAN NOTE
-Chronic condition- reports diagnosed by Dr. Rowe years ago  -T bili elevated on admission, similar to previous  -Monitor CMP

## 2023-12-14 NOTE — ASSESSMENT & PLAN NOTE
Patient has hypokalemia which is Acute and currently uncontrolled. Most recent potassium levels reviewed-   Lab Results   Component Value Date    K 3.2 (L) 12/14/2023   Will continue potassium replacement per protocol and recheck repeat levels after replacement completed. Continue to replace. Pt would prefer IV replacement. Pills hard to swallow and does not tolerate potassium bicarb well.

## 2023-12-14 NOTE — ED PROVIDER NOTES
Source of History  patient and family    Chief Complaint    Multiple Complaints (Diarrhea 14 times a day, chills, fever, cough, N/V, urinary frequency - has not urinated or had BM since noon . Periods of feeling disoriented )      History of Present Illness    Michael Espinoza is a 68 y.o. male presenting with concerns for ongoing diarrhea, up to about 12 times per day for about 5 days, fever, cough it started this morning.  Nausea and heartburn but no vomiting.  Abdominal cramping is present.  Urination and bowel movements unfortunately decreased today.  Poor p.o. tolerance.  Has been trying liquids but very poor PO intake overall.  Saw nurse practitioner couple days ago who sent samples and flu.  Was told to follow-up or presents to ED for continued issues.  Colonoscopy 1 year ago was normal.    Review of Systems    As per HPI and below:  Constitutional symptoms:  Positive fever and chills, generalized weakness and fatigue, malaise  Skin symptoms:  No rash    Respiratory symptoms:  No shortness of breath, positive cough  Cardiovascular symptoms:  No chest pain   Gastrointestinal symptoms:  + abdominal pain, positive nausea, no vomiting, positive diarrhea (nonbloody), positive abdominal cramping left lower quadrant  Genitourinary symptoms:  Frequency of urination  Musculoskeletal symptoms:  No back pain, some myalgias  Neurologic symptoms:  No headache, no numbness or tingling  Endocrine symptoms:  None except as in HPI  Psychiatric symptoms:   None except as in HPI     Past History    As per HPI and below:  Past Medical History:   Diagnosis Date    Complete heart block 7/5/2022    Coronary artery disease of native artery of native heart with stable angina pectoris 4/10/2018    Hyperlipidemia        Past Surgical History:   Procedure Laterality Date    A-V CARDIAC PACEMAKER INSERTION N/A 7/5/2022    Procedure: INSERTION, CARDIAC PACEMAKER, DUAL CHAMBER;  Surgeon: Alessandro Canales MD;  Location: [facility];   Service: Cardiology;  Laterality: N/A;  CHB, TBD, ANES, ED 6, MB    COLONOSCOPY N/A 8/5/2022    Procedure: COLONOSCOPY;  Surgeon: Namita Dumont MD;  Location: [facility] ([address] [place] [address]);  Service: Endoscopy;  Laterality: N/A;  vacc-inst portal-tb    CYST REMOVAL      TONSILLECTOMY         Social History     Socioeconomic History    Marital status: Single   Tobacco Use    Smoking status: Never     Passive exposure: Never    Smokeless tobacco: Never   Substance and Sexual Activity    Alcohol use: Yes     Alcohol/week: 1.0 standard drink of alcohol     Types: 1 Glasses of wine per week     Comment: 1 drink 2-3 times/weekly    Drug use: No     Social Determinants of Health     Financial Resource Strain: Low Risk  (11/14/2023)    Overall Financial Resource Strain (CARDIA)     Difficulty of Paying Living Expenses: Not hard at all   Food Insecurity: No Food Insecurity (11/14/2023)    Hunger Vital Sign     Worried About Running Out of Food in the Last Year: Never true     Ran Out of Food in the Last Year: Never true   Transportation Needs: No Transportation Needs (11/14/2023)    PRAPARE - Transportation     Lack of Transportation (Medical): No     Lack of Transportation (Non-Medical): No   Physical Activity: Sufficiently Active (11/14/2023)    Exercise Vital Sign     Days of Exercise per Week: 5 days     Minutes of Exercise per Session: 40 min   Stress: No Stress Concern Present (11/14/2023)    [employer] [place] - Occupational Stress Questionnaire     Feeling of Stress : Not at all   Social Connections: Unknown (11/14/2023)    Social Connection and Isolation Panel [NHANES]     Frequency of Communication with Friends and Family: Patient refused     Frequency of Social Gatherings with Friends and Family: Patient refused     Active Member of Clubs or Organizations: Patient refused     Attends Club or Organization Meetings: Patient refused     Marital Status: Never    Housing Stability: Low Risk   (11/14/2023)    Housing Stability Vital Sign     Unable to Pay for Housing in the Last Year: No     Number of Places Lived in the Last Year: 1     Unstable Housing in the Last Year: No       Family History   Problem Relation Age of Onset    Hyperlipidemia Brother     Hypertension Brother     Heart disease Brother     Heart attack Brother     Diabetes Brother        Review of patient's allergies indicates:  No Known Allergies    No current facility-administered medications on file prior to encounter.     Current Outpatient Medications on File Prior to Encounter   Medication Sig Dispense Refill    aspirin (ECOTRIN) 81 MG EC tablet Take 81 mg by mouth 2 (two) times daily.      atorvastatin (LIPITOR) 80 MG tablet TAKE 1 TABLET EVERY DAY 90 tablet 10    ERGOCALCIFEROL, VITAMIN D2, (VITAMIN D ORAL) Take by mouth once daily.      ezetimibe (ZETIA) 10 mg tablet TAKE 1 TABLET EVERY DAY 90 tablet 10    multivit-min/FA/lycopen/lutein (CENTRUM SILVER MEN ORAL) Take 1 tablet by mouth once daily.      ondansetron (ZOFRAN-ODT) 4 MG TbDL Take 1 tablet (4 mg total) by mouth every 8 (eight) hours as needed (nausea). 12 tablet 0    sildenafiL (VIAGRA) 50 MG tablet Take 1 tablet (50 mg total) by mouth daily as needed for Erectile Dysfunction. (Patient not taking: Reported on 12/11/2023) 10 tablet 3    sulfamethoxazole-trimethoprim 800-160mg (BACTRIM DS) 800-160 mg Tab Take 1 tablet by mouth 2 (two) times daily. (Patient not taking: Reported on 12/11/2023) 14 tablet 0       Physical Exam    Reviewed nursing notes.  Vitals:    12/13/23 2048 12/13/23 2202 12/13/23 2241   BP: (!) 168/99 (!) 141/79 139/81   BP Location:  Right arm Right arm   Pulse: 110 95 95   Resp: (!) 28 20 18   Temp: (!) 100.4 °F (38 °C)  100.1 °F (37.8 °C)   TempSrc: Oral  Oral   SpO2: 96% (!) 94% 95%   Weight: 69.4 kg (153 lb)       General:  Alert, no acute distress.  Pleasant.    Skin:  Warm, dry, intact.  No rash.  Head:  Normocephalic, atraumatic.    Neck:   Supple.   Eye:  Normal conjunctiva.   Ears, nose, mouth and throat:  Oral mucosa moist.   Cardiovascular:  Regular rate and rhythm, Normal peripheral perfusion, No edema.    Respiratory:  Lungs are clear to auscultation, respirations are non-labored, breath sounds are equal.    Gastrointestinal:  Soft, mildly tender in the left lower quadrant.  Nondistended.  Back:  Nontender.  Neurological:  Alert and oriented to person, place, time, and situation.   Psychiatric:  Cooperative, appropriate mood & affect.       Initial MDM and Data Review    68 y.o. male presenting for evaluation of concern for diarrhea and abdominal cramping.  Recently started with cough.  Having significant amount of dehydration and inability to tolerate p.o..    Differential includes:  Viral syndrome, diverticulitis, electrolyte disturbances or YESICA, dehydration    Work-up includes:  Electrolytes/CMP, CBC, COVID/flu, urinalysis, CT abdomen pelvis    Interventions include:  IV fluids, Bentyl, Pepcid, Zofran    The patient has significant medical comorbidities that influence decision making for this acute process, such as: CAD sp cabg    I decided to obtain the patient's medical records and review relevant documentation from clinic records.  Pertinent information is noted.  Reviewed - neg for flu, reviewed stool studies no e/o + culture at this time    Medications   lactated ringers bolus 1,000 mL (1,000 mLs Intravenous New Bag 12/13/23 2211)   dicyclomine capsule 20 mg (20 mg Oral Given 12/13/23 2239)   famotidine (PF) injection 20 mg (20 mg Intravenous Given 12/13/23 2239)   ondansetron injection 4 mg (4 mg Intravenous Given 12/13/23 2239)       Results and ED Course    Labs Reviewed   CBC W/ AUTO DIFFERENTIAL - Abnormal; Notable for the following components:       Result Value    Platelets 118 (*)     Immature Granulocytes 0.7 (*)     Immature Grans (Abs) 0.06 (*)     Lymph # 0.4 (*)     Gran % 85.7 (*)     Lymph % 4.7 (*)     All other components  within normal limits   INFLUENZA A & B BY MOLECULAR   COMPREHENSIVE METABOLIC PANEL   LIPASE   URINALYSIS, REFLEX TO URINE CULTURE   LACTIC ACID, PLASMA   MAGNESIUM   SARS-COV-2 RNA AMPLIFICATION, QUAL   ISTAT CHEM8       Imaging Results    None                  EKG interpreted by myself    EKG  Performed: 2243  Paced.    Impression and Plan    68 y.o. male with findings of abd pain and diarrhea based on the work up in the emergency department as above.    Important lab/imaging findings include: reviewed as available    All tests, treatment options and disposition options were discussed with the patient.  The decision was made to observe the patient in the ED while awaiting further work up.    The patient was signed out to oncoming attending in stable condition and all further questions/concerns by patient and/or family were addressed.    Pending remainder of labs, CT and disposition.           Final diagnoses:  [R11.0] Nausea                 Rodo Nava, DO  12/13/23 0370

## 2023-12-14 NOTE — ASSESSMENT & PLAN NOTE
68M with acute onset of multiple episodes diarrhea with only mild LLQ abdominal cramping early in course. Mild nausea. No abdominal pain, emesis, and is tolerating PO. Having fevers, chills, polydipsia. Also urinary infectious symptoms. Previously seen by GI Dr. 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 Work up unremarkable.  Previous stool cultures without growth.Giardia negative in the past. Recent stool culture without growth and E coli antigen negative.   -Multiple electrolyte derangements on admission  -Tachycardic, febrile, and tachypneic   -S/p 2L IVF bolus in ED, continue IVFs today  -Check complete stool studies  -C-scope 8/2022 Lipoma in the recto-sigmoid colon. Biopsied and found to have surface erosions  -CT pending  -GI consulted, appreciate recommendations  -PRN bentyl for cramping, prn Zofran/compazine for nausea  -ON Vanc for sepsis/UTI and will add ciprofloxacin 500 mg Q12h x5 days for diarrhea  for now pending culture results

## 2023-12-14 NOTE — H&P
[facility] - Emergency Dept  Hospital Medicine  History & Physical    Patient Name: Michael Espinoza  MRN: [id number]  Patient Class: OP- Observation  Admission Date: 12/13/2023  Attending Physician: Mukesh Golden MD   Primary Care Provider: Dominguez Hyatt MD         Patient information was obtained from patient, past medical records, and ER records.     Subjective:     Principal Problem:Sepsis without acute organ dysfunction    Chief Complaint:   Chief Complaint   Patient presents with    Multiple Complaints     Diarrhea 14 times a day, chills, fever, cough, N/V, urinary frequency - has not urinated or had BM since noon . Periods of feeling disoriented         HPI: Michael Espinoza is a 68 y.o. male with PMHx of CAD s/p CABG x3, HLD, complete heart block s/p dual chamber pacemaker. He presents to [facility] [place] ED 12/13 with diarrhea for the last 6 days. On 12/8 he developed left lower quadrant cramping followed by multiple episodes of loose stool. The next two days he had about 10-13 episodes diarrhea daily. Watery-brown. No blood. By Monday he continued to have diarrhea but then also developed mild nausea, but was able to keep down bland foods (jello, applesauce, banana, eggs) without emesis. He also began having urinary frequency, urgency, and dysuria and though his right kidney was mildly painful. Denies hematuria. He began to keep extremely dehydrated, started having fevers up to 102F Monday-Tuesday. He endorses polydipsia, myalgias, and hallucinations when he closes his eyes. He also reports shaking chills that would last for 20 minutes at a time that he could not control. Wednesday diarrhea finally stopped and he also stopped urinating but reports he did spontaneously urinate in ED once he got IV fluids.     He does have history of diarrhea and saw GI Dr. Feliz in September for this and thought to be maybe post infectious IBS but patient states he had completely symptom free period for months since then. He saw urgent  care for symptoms 3 days ago and reports taking Zofran 4 times from them as well as a medication from GI (dicyclomine?) 10 times over 2 days. He reports normal colonoscopy one year ago (lipoma biopsied with surface erosions). No rectal pain. No more abdominal/flank pain. No strange foods or travel. He denies falls, chest pain, palpitations, shortness of breath.     Per chart review: 12/11 stool culture without significant growth to date, E.coli shiga toxin 1 and 2 negative. Urine culture + staph aureus. A previous urine culture was also positive for staph aures in September and pt reports completing bactrim course.    In the ED, febrile to 100.4F, , RR 28. BP sable. On RA. Labs with WBC 8.58k, platelets 118k.  Na 129, K 2.9, Cl 94, BUN/Cr without YESICA, albumin 2.8, T bili 2.0 (chronically elevated), AST/ALT elevated 90/60. UA 3+ leukocytes, nitrite positive, 3+ occult blood, 1+ ketones, 1+ protein, 35 RBC, >100 WBC< many bacteria. LA 1.4. lipase and mag wnl. Covid and flu negative. CT abdomen pending. Given ceftriaxone, dicyclomine, famotidine, Zofran, potassium po and IV,  1L LR bolus and 1L NS bolus. Admitted to .     Past Medical History:   Diagnosis Date    Complete heart block 7/5/2022    Coronary artery disease of native artery of native heart with stable angina pectoris 4/10/2018    Hyperlipidemia        Past Surgical History:   Procedure Laterality Date    A-V CARDIAC PACEMAKER INSERTION N/A 7/5/2022    Procedure: INSERTION, CARDIAC PACEMAKER, DUAL CHAMBER;  Surgeon: Alessandro Canales MD;  Location: [facility] EP LAB;  Service: Cardiology;  Laterality: N/A;  CHB, TBD, ANES, ED 6, MB    COLONOSCOPY N/A 8/5/2022    Procedure: COLONOSCOPY;  Surgeon: Namita Dumont MD;  Location: [facility] ENDO ([address] [place] [address]);  Service: Endoscopy;  Laterality: N/A;  vacc-inst portal-tb    CYST REMOVAL      TONSILLECTOMY         Review of patient's allergies indicates:  No Known Allergies    No current facility-administered  medications on file prior to encounter.     Current Outpatient Medications on File Prior to Encounter   Medication Sig    aspirin (ECOTRIN) 81 MG EC tablet Take 81 mg by mouth 2 (two) times daily.    atorvastatin (LIPITOR) 80 MG tablet TAKE 1 TABLET EVERY DAY    ERGOCALCIFEROL, VITAMIN D2, (VITAMIN D ORAL) Take by mouth once daily.    ezetimibe (ZETIA) 10 mg tablet TAKE 1 TABLET EVERY DAY    multivit-min/FA/lycopen/lutein (CENTRUM SILVER MEN ORAL) Take 1 tablet by mouth once daily.    ondansetron (ZOFRAN-ODT) 4 MG TbDL Take 1 tablet (4 mg total) by mouth every 8 (eight) hours as needed (nausea).    sildenafiL (VIAGRA) 50 MG tablet Take 1 tablet (50 mg total) by mouth daily as needed for Erectile Dysfunction. (Patient not taking: Reported on 12/11/2023)    sulfamethoxazole-trimethoprim 800-160mg (BACTRIM DS) 800-160 mg Tab Take 1 tablet by mouth 2 (two) times daily. (Patient not taking: Reported on 12/11/2023)     Family History       Problem Relation (Age of Onset)    Diabetes Brother    Heart attack Brother    Heart disease Brother    Hyperlipidemia Brother    Hypertension Brother          Tobacco Use    Smoking status: Never     Passive exposure: Never    Smokeless tobacco: Never   Substance and Sexual Activity    Alcohol use: Yes     Alcohol/week: 1.0 standard drink of alcohol     Types: 1 Glasses of wine per week     Comment: 1 drink 2-3 times/weekly    Drug use: No    Sexual activity: Not on file     Review of Systems   Constitutional:  Positive for activity change, chills, fatigue and fever.   HENT:  Negative for trouble swallowing.    Eyes:  Negative for photophobia and visual disturbance.   Respiratory:  Negative for cough and shortness of breath.    Cardiovascular:  Negative for chest pain and palpitations.   Gastrointestinal:  Positive for abdominal pain, diarrhea and nausea. Negative for blood in stool, constipation and vomiting.   Endocrine: Positive for polydipsia.   Genitourinary:  Positive for  dysuria, flank pain, frequency and urgency. Negative for hematuria.   Musculoskeletal:  Negative for gait problem and joint swelling.   Skin:  Negative for rash and wound.   Neurological:  Positive for weakness. Negative for dizziness and light-headedness.   Psychiatric/Behavioral:  Positive for hallucinations. The patient is not nervous/anxious.      Objective:     Vital Signs (Most Recent):  Temp: 98.3 °F (36.8 °C) (12/14/23 0623)  Pulse: (!) 113 (12/14/23 0623)  Resp: 20 (12/14/23 0623)  BP: 127/82 (12/14/23 0623)  SpO2: (!) 94 % (12/14/23 0623) Vital Signs (24h Range):  Temp:  [98.3 °F (36.8 °C)-100.4 °F (38 °C)] 98.3 °F (36.8 °C)  Pulse:  [] 113  Resp:  [17-28] 20  SpO2:  [94 %-96 %] 94 %  BP: (117-168)/(79-99) 127/82     Weight: 69.4 kg (153 lb)  Body mass index is 23.96 kg/m².     Physical Exam  Vitals and nursing note reviewed.   Constitutional:       General: He is not in acute distress.  HENT:      Head: Normocephalic and atraumatic.      Nose: Nose normal.      Mouth/Throat:      Pharynx: No oropharyngeal exudate.   Eyes:      Extraocular Movements: Extraocular movements intact.      Conjunctiva/sclera: Conjunctivae normal.   Cardiovascular:      Rate and Rhythm: Regular rhythm. Tachycardia present.      Heart sounds: Normal heart sounds.   Pulmonary:      Effort: Pulmonary effort is normal. No respiratory distress.      Breath sounds: Normal breath sounds.   Abdominal:      General: Bowel sounds are normal. There is no distension.      Palpations: Abdomen is soft.      Tenderness: There is no abdominal tenderness. There is no guarding or rebound.   Musculoskeletal:         General: Normal range of motion.      Cervical back: Normal range of motion and neck supple.      Right lower leg: No edema.      Left lower leg: No edema.   Skin:     General: Skin is warm and dry.      Capillary Refill: Capillary refill takes less than 2 seconds.   Neurological:      General: No focal deficit present.       "Mental Status: He is alert and oriented to person, place, and time.   Psychiatric:         Mood and Affect: Mood normal.         Behavior: Behavior normal.         Thought Content: Thought content normal.         Judgment: Judgment normal.                Significant Labs: All pertinent labs within the past 24 hours have been reviewed.  Blood Culture: No results for input(s): "LABBLOO" in the last 48 hours.  CBC:   Recent Labs   Lab 12/13/23 2212 12/13/23  2224 12/14/23  0552   WBC 8.58  --  10.62   HGB 14.1  --  13.2*   HCT 40.2 44 38.7*   *  --  108*     CMP:   Recent Labs   Lab 12/13/23 2212   *   K 2.9*   CL 94*   CO2 23   *   BUN 13   CREATININE 0.8   CALCIUM 8.8   PROT 7.2   ALBUMIN 2.8*   BILITOT 2.0*   ALKPHOS 87   AST 90*   ALT 60*   ANIONGAP 12     Lactic Acid:   Recent Labs   Lab 12/13/23 2212   LACTATE 1.4     Lipase:   Recent Labs   Lab 12/13/23 2212   LIPASE 32       Magnesium:   Recent Labs   Lab 12/13/23 2212   MG 2.1     TSH:   Recent Labs   Lab 08/11/23  0739   TSH 1.896       Urine Studies:   Recent Labs   Lab 12/13/23  2346   COLORU Yellow   APPEARANCEUA Hazy*   PHUR 6.0   SPECGRAV 1.020   PROTEINUA 1+*   GLUCUA Negative   KETONESU 1+*   BILIRUBINUA Negative   OCCULTUA 3+*   NITRITE Positive*   LEUKOCYTESUR 3+*   RBCUA 35*   WBCUA >100*   BACTERIA Many*   HYALINECASTS 0       Significant Imaging: I have reviewed all pertinent imaging results/findings within the past 24 hours.  Assessment/Plan:     * Sepsis without acute organ dysfunction  This patient does have evidence of infective focus  My overall impression is sepsis.  Source: Urinary Tract and Abdominal  Antibiotics given-   Antibiotics (72h ago, onward)      Start     Stop Route Frequency Ordered    12/14/23 1900  vancomycin 750 mg in dextrose 5 % (D5W) 250 mL IVPB (Vial-Mate)         -- IV Every 12 hours (non-standard times) 12/14/23 0547    12/14/23 0900  ciprofloxacin HCl tablet 500 mg         12/19/23 0859 Oral " Every 12 hours 12/14/23 0613    12/14/23 0700  vancomycin 1,250 mg in dextrose 5 % (D5W) 250 mL IVPB (Vial-Mate)         -- IV Once 12/14/23 0546    12/14/23 0623  vancomycin - pharmacy to dose  (vancomycin IVPB (PEDS and ADULTS))        See Hyperspace for full Linked Orders Report.    -- IV pharmacy to manage frequency 12/14/23 0523          Latest lactate reviewed-  Recent Labs   Lab 12/13/23 2212   LACTATE 1.4     Organ dysfunction indicated by  None- mild delirium (possible form electrolyte derangement and fevers) Mild transaminitis, mild thrombocytopenia    Fluid challenge Not needed - patient is not hypotensive      Post- resuscitation assessment No - Post resuscitation assessment not needed     Will Not start Pressors- Levophed for MAP of 65  Source control achieved by: Vancomycin ordered for staph aureus UTI on previous cultures, Ciprofloxacin ordered for possible infectious (vs other) diarrhea.  Blood, urine, stool cultures and studies pending.     Transaminitis  -Mildly elevated, possible 2/2 dehydration/infection  -NO RUQ pain. T bili chronically elevated  -Check acute hepatitis panel   -Daily CMP    Diarrhea  68M with acute onset of multiple episodes diarrhea with only mild LLQ abdominal cramping early in course. Mild nausea. No abdominal pain, emesis, and is tolerating PO. Having fevers, chills, polydipsia. Also urinary infectious symptoms. Previously seen by GI Dr. 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 Work up unremarkable.  Previous stool cultures without growth.Giardia negative in the past. Recent stool culture without growth and E coli antigen negative.   -Multiple electrolyte derangements on admission  -Tachycardic, febrile, and tachypneic   -S/p 2L IVF bolus in ED, continue IVFs today  -Check complete stool studies  -C-scope 8/2022 Lipoma in the recto-sigmoid colon. Biopsied and found to have surface erosions  -CT pending  -GI consulted, appreciate  recommendations  -PRN bentyl for cramping, prn Zofran/compazine for nausea  -ON Vanc for sepsis/UTI and will add ciprofloxacin 500 mg Q12h x5 days for diarrhea  for now pending culture results    Complicated UTI (urinary tract infection)  Presenting with dysuria/frequency/urgency/maybe mild flank pain/fever/chills (also with diarrheal illness).  -3/4 SIRs on admit for fever, tachycardia tachypnea  -UA infectious, nitrite positive, 3+ leukocytes >100 WBC and many bacteria  -Previous urine cultures with staph aureus  -S/p ceftriaxone in ED  -Will cover with vancomycin   -Follow up urine cultures  -Follow up CT  -Monitor I/Os    Hypokalemia  Patient has hypokalemia which is Acute and currently uncontrolled. Most recent potassium levels reviewed-   Lab Results   Component Value Date    K 2.9 (L) 12/13/2023   Will continue potassium replacement per protocol and recheck repeat levels after replacement completed. Continue to replace. Pt would prefer IV replacement. Pills hard to swallow and does not tolerate potassium bicarb well.    Hyponatremia  Patient has hyponatremia which is uncontrolled,We will aim to correct the sodium by 4-6mEq in 24 hours. We will monitor sodium Daily. The hyponatremia is due to Dehydration/hypovolemia. We will obtain the following studies: Urine sodium, urine osmolality, serum osmolality, or TSH, T4. We will treat the hyponatremia with IV fluids as follows: 2/p 2 L IVF in ED, continuous fluids at rate 100 ml/hr ordered. The patient's sodium results have been reviewed and are listed below.  Recent Labs   Lab 12/13/23  2212   *       Complete heart block  -s/p dual chamber pacemaker placement  7/5/2022      Coronary artery disease of native artery of native heart with stable angina pectoris  Patient with known CAD s/p CABG, which is controlled Will continue ASA and Statin and monitor for S/Sx of angina/ACS. Continue to monitor on telemetry.     Gilbert's syndrome  -Chronic condition- reports  diagnosed by Dr. Rowe years ago  -T bili elevated on admission, similar to previous  -Monitor CMP      HLD (hyperlipidemia)  -Continue home statin      VTE Risk Mitigation (From admission, onward)           Ordered     IP VTE LOW RISK PATIENT  Once         12/14/23 0526     Place sequential compression device  Until discontinued         12/14/23 0526                         On 12/14/2023, patient should be placed in hospital observation services under my care in collaboration with Ava Da Silva DO.      Pharmacokinetic Initial Assessment: IV Vancomycin    Assessment/Plan:    Initiate intravenous vancomycin with loading dose of 1250 mg once followed by a maintenance dose of vancomycin 750 mg IV every 12 hours  Desired empiric serum trough concentration is 10 to 20 mcg/mL  Draw vancomycin trough level 60 min prior to fourth dose on 12/15/23 at approximately 18:00 or sooner if clinically indicated  Pharmacy will continue to follow and monitor vancomycin.      Please contact pharmacy at extension [phone] with any questions regarding this assessment.     Thank you for the consult,   Blas Whitman       Patient brief summary:  Michael Espinoza is a 68 y.o. male initiated on antimicrobial therapy with IV Vancomycin for treatment of suspected urinary tract infection    Drug Allergies:   Review of patient's allergies indicates:  No Known Allergies    Actual Body Weight:   69.4 kg    Renal Function:   Estimated Creatinine Clearance: 82.6 mL/min (based on SCr of 0.8 mg/dL).,     Dialysis Method (if applicable):  N/A    CBC (last 72 hours):  Recent Labs   Lab Result Units 12/13/23 2212   WBC K/uL 8.58   Hemoglobin g/dL 14.1   Hematocrit % 40.2   Platelets K/uL 118*   Gran % % 85.7*   Lymph % % 4.7*   Mono % % 8.6   Eosinophil % % 0.0   Basophil % % 0.3   Differential Method  Automated       Metabolic Panel (last 72 hours):  Recent Labs   Lab Result Units 12/13/23 2212 12/13/23  2346   Sodium mmol/L 129*  --    Potassium mmol/L 2.9*   "--    Chloride mmol/L 94*  --    CO2 mmol/L 23  --    Glucose mg/dL 117*  --    Glucose, UA   --  Negative   BUN mg/dL 13  --    Creatinine mg/dL 0.8  --    Albumin g/dL 2.8*  --    Total Bilirubin mg/dL 2.0*  --    Alkaline Phosphatase U/L 87  --    AST U/L 90*  --    ALT U/L 60*  --    Magnesium mg/dL 2.1  --        Drug levels (last 3 results):  No results for input(s): "VANCOMYCINRA", "VANCORANDOM", "VANCOMYCINPE", "VANCOPEAK", "VANCOMYCINTR", "VANCOTROUGH" in the last 72 hours.    Microbiologic Results:  Microbiology Results (last 7 days)       Procedure Component Value Units Date/Time    Clostridium difficile EIA [[id number]]     Order Status: No result Specimen: Stool     Stool culture [[id number]]     Order Status: Canceled Specimen: Stool     Blood culture [[id number]]     Order Status: Sent Specimen: Blood     Blood culture [[id number]]     Order Status: Sent Specimen: Blood     Urine culture [[id number]] Collected: 12/13/23 2346    Order Status: No result Specimen: Urine Updated: 12/14/23 0058    Influenza A & B by Molecular [[id number]] Collected: 12/13/23 2214    Order Status: Completed Specimen: Nasopharyngeal Swab Updated: 12/13/23 2254     Influenza A, Molecular Negative     Influenza B, Molecular Negative     Flu A & B Source Nasal swab              Adalgisa Mccartney PA-C  Department of Hospital Medicine  [facility] - Emergency Dept          "

## 2023-12-14 NOTE — ASSESSMENT & PLAN NOTE
-Mildly elevated, possible 2/2 dehydration/infection  -NO RUQ pain. T bili chronically elevated  -Check acute hepatitis panel   -Daily CMP

## 2023-12-14 NOTE — ED NOTES
I-STAT Chem-8+ Results:   Value Reference Range   Sodium 130 136-145 mmol/L   Potassium  2.8 3.5-5.1 mmol/L   Chloride 92  mmol/L   Ionized Calcium 1.01 1.06-1.42 mmol/L   CO2 (measured) 24 23-29 mmol/L   Glucose 124  mg/dL   BUN 15 6-30 mg/dL   Creatinine 0.9 0.5-1.4 mg/dL   Hematocrit 44 36-54%

## 2023-12-14 NOTE — ASSESSMENT & PLAN NOTE
Presenting with dysuria/frequency/urgency/maybe mild flank pain/fever/chills (also with diarrheal illness).  -3/4 SIRs on admit for fever, tachycardia tachypnea  -UA infectious, nitrite positive, 3+ leukocytes >100 WBC and many bacteria  -Previous urine cultures with staph aureus  -S/p ceftriaxone in ED  -Will cover with vancomycin   -Follow up urine cultures  -Follow up CT  -Monitor I/Os

## 2023-12-14 NOTE — PROGRESS NOTES
[facility] - Emergency Dept  [facility]  Progress Note    Patient Name: Michael Espinoza  MRN: [id number]  Patient Class: OP- Observation   Admission Date: 12/13/2023  Length of Stay: 0 days  Attending Physician: Mukesh Golden MD  Primary Care Provider: Dominguez Hyatt MD        Subjective:     Principal Problem:Sepsis without acute organ dysfunction        HPI:  Michael Espinoza is a 68 y.o. male with PMHx of CAD s/p CABG x3, HLD, complete heart block s/p dual chamber pacemaker. He presents to [facility] ED 12/13 with diarrhea for the last 6 days. On 12/8 he developed left lower quadrant cramping followed by multiple episodes of loose stool. The next two days he had about 10-13 episodes diarrhea daily. Watery-brown. No blood. By Monday he continued to have diarrhea but then also developed mild nausea, but was able to keep down bland foods (jello, applesauce, banana, eggs) without emesis. He also began having urinary frequency, urgency, and dysuria and though his right kidney was mildly painful. Denies hematuria. He began to keep extremely dehydrated, started having fevers up to 102F Monday-Tuesday. He endorses polydipsia, myalgias, and hallucinations when he closes his eyes. He also reports shaking chills that would last for 20 minutes at a time that he could not control. Wednesday diarrhea finally stopped and he also stopped urinating but reports he did spontaneously urinate in ED once he got IV fluids.     He does have history of diarrhea and saw GI Dr. Feliz in September for this and thought to be maybe post infectious IBS but patient states he had completely symptom free period for months since then. He saw urgent care for symptoms 3 days ago and reports taking Zofran 4 times from them as well as a medication from GI (dicyclomine?) 10 times over 2 days. He reports normal colonoscopy one year ago (lipoma biopsied with surface erosions). No rectal pain. No more abdominal/flank pain. No strange foods or travel. He  denies falls, chest pain, palpitations, shortness of breath.     Per chart review: 12/11 stool culture without significant growth to date, E.coli shiga toxin 1 and 2 negative. Urine culture + staph aureus. A previous urine culture was also positive for staph aures in September and pt reports completing bactrim course.    In the ED, febrile to 100.4F, , RR 28. BP sable. On RA. Labs with WBC 8.58k, platelets 118k.  Na 129, K 2.9, Cl 94, BUN/Cr without YESICA, albumin 2.8, T bili 2.0 (chronically elevated), AST/ALT elevated 90/60. UA 3+ leukocytes, nitrite positive, 3+ occult blood, 1+ ketones, 1+ protein, 35 RBC, >100 WBC< many bacteria. LA 1.4. lipase and mag wnl. Covid and flu negative. CT abdomen pending. Given ceftriaxone, dicyclomine, famotidine, Zofran, potassium po and IV,  1L LR bolus and 1L NS bolus. Admitted to .     Overview/Hospital Course:  12/14 K replaced. BCX 2 and stool studies pending. CT abdomen -  Intraluminal fluid throughout the small and large bowel with adjacent mesenteric fat stranding and free fluid, as may be seen in the setting of a nonspecific infectious or noninfectious inflammatory enterocolitis. Nonobstructive right nephrolithiasis. loperamide PRN. continue ciprofloxacin and vancomycin . denies abdominal pain. advanced to soft diet         Review of Systems:   Pain scale:   Constitutional:  fever,  chills, headache, vision loss, hearing loss, weight loss, Generalized weakness, falls, loss of smell, loss of taste, poor appetite,  sore throat  Respiratory: cough, shortness of breath.   Cardiovascular: chest pain, dizziness, palpitations, orthopnea, swelling of feet, syncope  Gastrointestinal: nausea, vomiting, abdominal pain, diarrhea -improved , black stool,  blood in stool, change in bowel habits, constipation  Genitourinary: hematuria, dysuria, urgency, frequency,  flank pain- resolved   Integument/Breast: rash,  pruritis  Hematologic/Lymphatic: easy bruising,  "lymphadenopathy  Musculoskeletal: arthralgias , myalgias, back pain, neck pain, knee pain  Neurological: confusion, seizures, tremors, slurred speech  Behavioral/Psych:  depression, anxiety, auditory or visual hallucinations     OBJECTIVE:     Physical Exam:  Body mass index is 23.96 kg/m².    Constitutional: Appears well-developed and well-nourished.   Head: Normocephalic and atraumatic.   Neck: Normal range of motion. Neck supple.   Cardiovascular: Normal heart rate.  Regular heart rhythm.  Pulmonary/Chest: Effort normal.   Abdominal: No distension.  No tenderness  Musculoskeletal: Normal range of motion. No edema.   Neurological: Alert and oriented to person, place, and time.   Skin: Skin is warm and dry.   Psychiatric: Normal mood and affect. Behavior is normal.                  Vital Signs  Temp: 98.4 °F (36.9 °C) (12/14/23 1424)  Pulse: 93 (12/14/23 1424)  Resp: (Abnormal) 22 (12/14/23 1424)  BP: 109/69 (12/14/23 1424)  SpO2: 96 % (12/14/23 1424)     24 Hour VS Range    Temp:  [98.3 °F (36.8 °C)-100.4 °F (38 °C)]   Pulse:  []   Resp:  [17-28]   BP: (109-168)/(69-99)   SpO2:  [94 %-96 %]   No intake or output data in the 24 hours ending 12/14/23 1755      I/O This Shift:  No intake/output data recorded.    Wt Readings from Last 3 Encounters:   12/13/23 69.4 kg (153 lb)   12/11/23 69.4 kg (153 lb)   11/21/23 69.8 kg (153 lb 14.1 oz)       I have personally reviewed the vitals and recorded Intake/Output     Laboratory/Diagnostic Data:    CBC/Anemia Labs: Coags:    Recent Labs   Lab 12/13/23 2212 12/13/23 2224 12/14/23  0552   WBC 8.58  --  10.62   HGB 14.1  --  13.2*   HCT 40.2 44 38.7*   *  --  108*   MCV 86  --  88   RDW 13.3  --  13.2    No results for input(s): "PT", "INR", "APTT" in the last 168 hours.     Chemistries: ABG:   Recent Labs   Lab 12/13/23  2212 12/14/23  0552   * 130*   K 2.9* 3.2*   CL 94* 96   CO2 23 23   BUN 13 12   CREATININE 0.8 0.9   CALCIUM 8.8 8.1*   PROT 7.2 6.4 " "  BILITOT 2.0* 1.5*   ALKPHOS 87 84   ALT 60* 55*   AST 90* 81*   MG 2.1 1.8   PHOS  --  1.5*    No results for input(s): "PH", "PCO2", "PO2", "HCO3", "POCSATURATED", "BE" in the last 168 hours.     POCT Glucose: HbA1c:    No results for input(s): "POCTGLUCOSE" in the last 168 hours. Hemoglobin A1C   Date Value Ref Range Status   08/11/2023 5.6 4.0 - 5.6 % Final     Comment:     ADA Screening Guidelines:  5.7-6.4%  Consistent with prediabetes  >or=6.5%  Consistent with diabetes    High levels of fetal hemoglobin interfere with the HbA1C  assay. Heterozygous hemoglobin variants (HbS, HgC, etc)do  not significantly interfere with this assay.   However, presence of multiple variants may affect accuracy.     05/10/2018 5.4 4.0 - 5.6 % Final     Comment:     According to ADA guidelines, hemoglobin A1c <7.0% represents  optimal control in non-pregnant diabetic patients. Different  metrics may apply to specific patient populations.   Standards of Medical Care in Diabetes-2016.  For the purpose of screening for the presence of diabetes:  <5.7%     Consistent with the absence of diabetes  5.7-6.4%  Consistent with increasing risk for diabetes   (prediabetes)  >or=6.5%  Consistent with diabetes  Currently, no consensus exists for use of hemoglobin A1c  for diagnosis of diabetes for children.  This Hemoglobin A1c assay has significant interference with fetal   hemoglobin   (HbF). The results are invalid for patients with abnormal amounts of   HbF,   including those with known Hereditary Persistence   of Fetal Hemoglobin. Heterozygous hemoglobin variants (HbAS, HbAC,   HbAD, HbAE, HbA2) do not significantly interfere with this assay;   however, presence of multiple variants in a sample may impact the %   interference.          Cardiac Enzymes: Ejection Fractions:    No results for input(s): "CPK", "CPKMB", "MB", "TROPONINI" in the last 72 hours. EF   Date Value Ref Range Status   07/05/2022 65 % Final     Nuc Stress EF   Date " "Value Ref Range Status   07/21/2021 56 % Final     Nuc Rest EF   Date Value Ref Range Status   07/21/2021 58  Final          Recent Labs   Lab 12/13/23  2346   COLORU Yellow   APPEARANCEUA Hazy*   PHUR 6.0   SPECGRAV 1.020   PROTEINUA 1+*   GLUCUA Negative   KETONESU 1+*   BILIRUBINUA Negative   OCCULTUA 3+*   NITRITE Positive*   LEUKOCYTESUR 3+*   RBCUA 35*   WBCUA >100*   BACTERIA Many*   HYALINECASTS 0       Lactate (Lactic Acid) (mmol/L)   Date Value   12/13/2023 1.4     BNP (pg/mL)   Date Value   07/05/2022 596 (H)     CRP (mg/L)   Date Value   12/14/2023 206.0 (H)     Sed Rate (mm/Hr)   Date Value   12/14/2023 76 (H)     No results found for: "DDIMER"  Ferritin (ng/mL)   Date Value   09/14/2023 81   03/21/2018 35     No results found for: "LDH"  Troponin I (ng/mL)   Date Value   07/05/2022 <0.006   07/05/2022 0.009     Results for orders placed or performed in visit on 07/01/21   Vitamin D   Result Value Ref Range    Vit D, 25-Hydroxy 31 30 - 96 ng/mL   Results for orders placed or performed in visit on 05/05/21   Vitamin D   Result Value Ref Range    Vit D, 25-Hydroxy 38 30 - 96 ng/mL   Results for orders placed or performed in visit on 03/21/18   Vitamin D   Result Value Ref Range    Vit D, 25-Hydroxy 29 (L) 30 - 96 ng/mL     SARS-CoV-2 RNA, Amplification, Qual (no units)   Date Value   12/13/2023 Negative     POC Rapid COVID (no units)   Date Value   07/05/2022 Negative   12/22/2020 Negative       Microbiology labs for the last week  Microbiology Results (last 7 days)       Procedure Component Value Units Date/Time    Blood culture [[id number]] Collected: 12/14/23 0552    Order Status: Completed Specimen: Blood from Peripheral, Forearm, Right Updated: 12/14/23 1315     Blood Culture, Routine No Growth to date    Blood culture [[id number]] Collected: 12/14/23 0552    Order Status: Completed Specimen: Blood from Peripheral, Forearm, Right Updated: 12/14/23 1315     Blood Culture, Routine No Growth to date    " Clostridium difficile EIA [[id number]] Collected: 12/14/23 0609    Order Status: Completed Specimen: Stool Updated: 12/14/23 1255     C. diff Antigen Negative     C difficile Toxins A+B, EIA Negative     Comment: Testing not recommended for children <24 months old.       Stool culture [[id number]]     Order Status: Canceled Specimen: Stool     Urine culture [[id number]] Collected: 12/13/23 2346    Order Status: No result Specimen: Urine Updated: 12/14/23 0058    Influenza A & B by Molecular [[id number]] Collected: 12/13/23 2214    Order Status: Completed Specimen: Nasopharyngeal Swab Updated: 12/13/23 2254     Influenza A, Molecular Negative     Influenza B, Molecular Negative     Flu A & B Source Nasal swab            Reviewed and noted in plan where applicable- Please see chart for full lab data.    Lines/Drains:       Peripheral IV - Single Lumen 12/13/23 2210 20 G Left Antecubital (Active)   Site Assessment Clean;Dry;Intact 12/13/23 2211   Extremity Assessment Distal to IV No abnormal discoloration;No redness;No swelling 12/13/23 2211   Dressing Status Clean;Dry;Intact 12/13/23 2211   Dressing Intervention First dressing 12/13/23 2211   Number of days: 0       Imaging  ECG Results              EKG 12-lead (Final result)  Result time 12/14/23 14:17:37      Final result by Interface, Lab In [facility] (12/14/23 14:17:37)               Narrative:    Test Reason : R11.0,    Vent. Rate : 103 BPM     Atrial Rate : 103 BPM     P-R Int : 170 ms          QRS Dur : 176 ms      QT Int : 410 ms       P-R-T Axes : 037 158 -07 degrees     QTc Int : 537 ms    Atrial-sensed ventricular-paced rhythm  Biventricular pacemaker detected  Abnormal ECG  When compared with ECG of 21-NOV-2023 08:01,  Vent. rate has increased BY  36 BPM  Confirmed by Ivone Garibay MD ([id number]) on 12/14/2023 2:17:29 PM    Referred By: [email or web address]   SELF           Confirmed By:Ivone Garibay MD                                  Results for orders placed during the  hospital encounter of 07/05/22    Echo    Interpretation Summary  · Presence of bradycardia with HR 30s with AV dissociation  · The estimated ejection fraction is 65%.  · The left ventricle is normal in size with normal systolic function.  · Normal left ventricular diastolic function.  · Normal right ventricular size with normal right ventricular systolic function.  · Mild mitral regurgitation.  · Presence of interatrial septal aneurysm.  · The estimated PA systolic pressure is 28 mmHg.  · Normal central venous pressure (3 mmHg).      CT Abdomen Pelvis With IV Contrast NO Oral Contrast  Narrative: EXAMINATION:  CT ABDOMEN PELVIS WITH IV CONTRAST    CLINICAL HISTORY:  Abdominal abscess/infection suspected;LLQ abdominal pain;    TECHNIQUE:  Low dose axial images, sagittal and coronal reformations were obtained from the lung bases to the pubic symphysis following the IV administration of 75 mL of Omnipaque 350.Oral contrast was not given.    COMPARISON:  Ultrasound abdomen 11/01/2023    FINDINGS:  Comment: Motion compromised examination.    Lower thorax:    Heart: Cardiomegaly.  Cardiac pacing leads.  Severe calcific atherosclerosis of multiple coronary arteries.    Lung Bases: Small bilateral pleural effusions, larger on the right.  Bibasal linear opacities, likely subsegmental atelectasis versus scarring.    Liver: Normal in size and attenuation, with no focal hepatic lesions.    Gallbladder: No calcified gallstones.  Gallbladder is not distended.  No gallbladder wall thickening or pericholecystic fluid collection.    Bile Ducts: No intra or extrahepatic biliary duct dilatation.    Pancreas: No mass or peripancreatic fat stranding.    Spleen: Normal in size.  No focal lesion.    Adrenals: Unremarkable.    Kidneys/ Ureters: Normal in size and location. Normal enhancement.  1.3 cm right lower pole nonobstructive nephrolithiasis.  No hydronephrosis or hydroureter.  Multiple cortical hypodensities which are too small to  characterize likely renal cysts.  Nonspecific mild bilateral perinephric stranding.    Bladder: No evidence of wall thickening.    Reproductive organs: Prostate appears enlarged, measuring 4.6 cm with dystrophic calcification.    Gl/Mesentery/peritoneum and retroperitoneum: Small hiatal hernia.  Stomach is unremarkable.  Small and large bowel are normal in caliber with no evidence of obstruction.  Liquid stool throughout the small and large bowel with mild mesenteric soft tissue stranding and trace of free fluid, as may be seen in a nonspecific infectious versus noninfectious inflammatory enterocolitis.    Abdominal wall: Left fat containing inguinal hernia.    Vasculature: Moderate calcific atherosclerosis.  No aneurysm.    Bones: Degenerative changes.  Similar appearing compression fractures of T12 and L1 when compared to radiograph of 04/22/2021 and MRI of 04/05/2021, allowing for differences in technique/modality.  No acute fracture.  No suspicious lytic or sclerotic lesion.  Impression: 1. Intraluminal fluid throughout the small and large bowel with adjacent mesenteric fat stranding and free fluid, as may be seen in the setting of a nonspecific infectious or noninfectious inflammatory enterocolitis.  2. Nonobstructive right nephrolithiasis.  3. Small bilateral pleural effusions.  4. Additional details of chronic and incidental findings, as provided in the body of report.    Electronically signed by resident: Katherine Hidalgo  Date:    12/14/2023  Time:    06:04    Electronically signed by: Lalo Puri  Date:    12/14/2023  Time:    08:54      Labs, Imaging, EKG and Diagnostic results from 12/14/2023 were reviewed.    Medications:  Medication list was reviewed and changes noted under Assessment/Plan.  No current facility-administered medications on file prior to encounter.     Current Outpatient Medications on File Prior to Encounter   Medication Sig Dispense Refill    aspirin (ECOTRIN) 81 MG EC tablet Take 81  mg by mouth 2 (two) times daily. (Breakfast & Afternoon)      atorvastatin (LIPITOR) 80 MG tablet TAKE 1 TABLET EVERY DAY (Patient taking differently: Take 80 mg by mouth every evening.) 90 tablet 10    calcium carbonate (TUMS ORAL) Take 2 tablets by mouth daily as needed (Heartburn).      coenzyme Q10 (CO Q-10) 300 mg Cap Take 1 capsule by mouth once daily.      dicyclomine (BENTYL) 10 MG capsule Take 10 mg by mouth daily as needed (Abdominal pain).      ERGOCALCIFEROL, VITAMIN D2, (VITAMIN D ORAL) Take 1 capsule by mouth Mon, Wed, Fri, Sat & Sun      ezetimibe (ZETIA) 10 mg tablet TAKE 1 TABLET EVERY DAY (Patient taking differently: Take 10 mg by mouth once daily.) 90 tablet 10    multivit-min/FA/lycopen/lutein (CENTRUM SILVER MEN ORAL) Take 1 tablet by mouth every Mon, Wed, Fri.      ondansetron (ZOFRAN-ODT) 4 MG TbDL Take 1 tablet (4 mg total) by mouth every 8 (eight) hours as needed (nausea). 12 tablet 0    [DISCONTINUED] sildenafiL (VIAGRA) 50 MG tablet Take 1 tablet (50 mg total) by mouth daily as needed for Erectile Dysfunction. (Patient not taking: Reported on 12/11/2023) 10 tablet 3    [DISCONTINUED] sulfamethoxazole-trimethoprim 800-160mg (BACTRIM DS) 800-160 mg Tab Take 1 tablet by mouth 2 (two) times daily. (Patient not taking: Reported on 12/11/2023) 14 tablet 0     Scheduled Medications:  aspirin, 81 mg, Oral, BID  atorvastatin, 80 mg, Oral, Daily  ciprofloxacin HCl, 500 mg, Oral, Q12H  dicyclomine, 10 mg, Oral, QID  ezetimibe, 10 mg, Oral, Daily  vancomycin (VANCOCIN) IV (PEDS and ADULTS), 750 mg, Intravenous, Q12H      PRN: acetaminophen, acetaminophen, albuterol-ipratropium, aluminum-magnesium hydroxide-simethicone, calcium carbonate, dextrose 10%, dextrose 10%, glucagon (human recombinant), glucose, glucose, loperamide, melatonin, naloxone, ondansetron, simethicone, sodium chloride 0.9%, Pharmacy to dose Vancomycin consult **AND** vancomycin - pharmacy to dose  Infusions:    sodium chloride 0.9%        Estimated Creatinine Clearance: 73.4 mL/min (based on SCr of 0.9 mg/dL).           Assessment/Plan:      * Sepsis without acute organ dysfunction  This patient does have evidence of infective focus  My overall impression is sepsis.  Source: Urinary Tract and Abdominal  Antibiotics given-   Antibiotics (72h ago, onward)      Start     Stop Route Frequency Ordered    12/14/23 1900  vancomycin 750 mg in dextrose 5 % (D5W) 250 mL IVPB (Vial-Mate)         -- IV Every 12 hours (non-standard times) 12/14/23 0547    12/14/23 0900  ciprofloxacin HCl tablet 500 mg         12/19/23 0859 Oral Every 12 hours 12/14/23 0613    12/14/23 0700  vancomycin 1,250 mg in dextrose 5 % (D5W) 250 mL IVPB (Vial-Mate)         -- IV Once 12/14/23 0546    12/14/23 0623  vancomycin - pharmacy to dose  (vancomycin IVPB (PEDS and ADULTS))        See Hyperspace for full Linked Orders Report.    -- IV pharmacy to manage frequency 12/14/23 0523          Latest lactate reviewed-  Recent Labs   Lab 12/13/23  2212   LACTATE 1.4       Organ dysfunction indicated by  None- mild delirium (possible form electrolyte derangement and fevers) Mild transaminitis, mild thrombocytopenia    Fluid challenge Not needed - patient is not hypotensive      Post- resuscitation assessment No - Post resuscitation assessment not needed     Will Not start Pressors- Levophed for MAP of 65  Source control achieved by: Vancomycin ordered for staph aureus UTI on previous cultures, Ciprofloxacin ordered for possible infectious (vs other) diarrhea.  Blood, urine, stool cultures and studies pending.     Thrombocytopenia  Patient was found to have thrombocytopenia, the likely etiology is secondary to sepsis/infection?, will monitor the platelets Daily. Will transfuse if platelet count is <20k. Hold DVT prophylaxis if platelets are <50k. The patient's platelet results have been reviewed and are listed below.  Recent Labs   Lab 12/14/23  0552   *          Hypophosphatemia  Patient has Abnormal Phosphorus: hypophosphatemia. Will continue to monitor electrolytes closely. Will replace the affected electrolytes and repeat labs to be done after interventions completed. The patient's phosphorus results have been reviewed and are listed below.  Recent Labs   Lab 12/14/23  0552   PHOS 1.5*        Transaminitis  -Mildly elevated, possible 2/2 dehydration/infection  -NO RUQ pain. T bili chronically elevated  -Check acute hepatitis panel   -Daily CMP    12/14 Patients liver enzymes  elevated.   Recent Labs     12/13/23  2212 12/14/23  0552   BILITOT 2.0* 1.5*   AST 90* 81*   ALT 60* 55*   ALKPHOS 87 84    . Liver enzymes  stable . monitor       Diarrhea  68M with acute onset of multiple episodes diarrhea with only mild LLQ abdominal cramping early in course. Mild nausea. No abdominal pain, emesis, and is tolerating PO. Having fevers, chills, polydipsia. Also urinary infectious symptoms. Previously seen by GI Dr. 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 Work up unremarkable.  Previous stool cultures without growth.Giardia negative in the past. Recent stool culture without growth and E coli antigen negative.   -Multiple electrolyte derangements on admission  -Tachycardic, febrile, and tachypneic   -S/p 2L IVF bolus in ED, continue IVFs today  -Check complete stool studies  -C-scope 8/2022 Lipoma in the recto-sigmoid colon. Biopsied and found to have surface erosions  -CT pending  -GI consulted, appreciate recommendations  -PRN bentyl for cramping, prn Zofran/compazine for nausea  -ON Vanc for sepsis/UTI and will add ciprofloxacin 500 mg Q12h x5 days for diarrhea  for now pending culture results  12/14  CT abdomen -  Intraluminal fluid throughout the small and large bowel with adjacent mesenteric fat stranding and free fluid, as may be seen in the setting of a nonspecific infectious or noninfectious inflammatory enterocolitis.  Nonobstructive right nephrolithiasis. loperamide PRN.     Complicated UTI (urinary tract infection)  Presenting with dysuria/frequency/urgency/maybe mild flank pain/fever/chills (also with diarrheal illness).  -3/4 SIRs on admit for fever, tachycardia tachypnea  -UA infectious, nitrite positive, 3+ leukocytes >100 WBC and many bacteria  -Previous urine cultures with staph aureus  -S/p ceftriaxone in ED  -Will cover with vancomycin   -Follow up urine cultures  -Follow up CT  -Monitor I/Os  12/14. BCX 2 and stool studies pending.continue ciprofloxacin and vancomycin     Hypokalemia  Patient has hypokalemia which is Acute and currently uncontrolled. Most recent potassium levels reviewed-   Lab Results   Component Value Date    K 3.2 (L) 12/14/2023   Will continue potassium replacement per protocol and recheck repeat levels after replacement completed. Continue to replace. Pt would prefer IV replacement. Pills hard to swallow and does not tolerate potassium bicarb well.    Hyponatremia  Patient has hyponatremia which is uncontrolled,We will aim to correct the sodium by 4-6mEq in 24 hours. We will monitor sodium Daily. The hyponatremia is due to Dehydration/hypovolemia. We will obtain the following studies: Urine sodium, urine osmolality, serum osmolality, or TSH, T4. We will treat the hyponatremia with IV fluids as follows: 2/p 2 L IVF in ED, continuous fluids at rate 100 ml/hr ordered. The patient's sodium results have been reviewed and are listed below.  Recent Labs   Lab 12/14/23  0552   *         Complete heart block  -s/p dual chamber pacemaker placement  7/5/2022      Coronary artery disease of native artery of native heart with stable angina pectoris  Patient with known CAD s/p CABG, which is controlled Will continue ASA and Statin and monitor for S/Sx of angina/ACS. Continue to monitor on telemetry.     Gilbert's syndrome  -Chronic condition- reports diagnosed by Dr. Rowe years ago  -T bili elevated on  admission, similar to previous  -Monitor CMP      HLD (hyperlipidemia)  -Continue home statin      VTE Risk Mitigation (From admission, onward)           Ordered     IP VTE LOW RISK PATIENT  Once         12/14/23 0526     Place sequential compression device  Until discontinued         12/14/23 0526                    Discharge Planning   MAGNO: 12/16/2023     Code Status: Full Code   Is the patient medically ready for discharge?: (No Documentation)    Reason for patient still in hospital (select all that apply): Treatment                     Mukesh Golden MD  Department of Hospital Medicine   [facility] - Emergency Dept

## 2023-12-14 NOTE — SUBJECTIVE & OBJECTIVE
Past Medical History:   Diagnosis Date    Complete heart block 7/5/2022    Coronary artery disease of native artery of native heart with stable angina pectoris 4/10/2018    Hyperlipidemia        Past Surgical History:   Procedure Laterality Date    A-V CARDIAC PACEMAKER INSERTION N/A 7/5/2022    Procedure: INSERTION, CARDIAC PACEMAKER, DUAL CHAMBER;  Surgeon: Alessandro Canales MD;  Location: [facility] EP LAB;  Service: Cardiology;  Laterality: N/A;  CHB, TBD, ANES, ED 6, MB    COLONOSCOPY N/A 8/5/2022    Procedure: COLONOSCOPY;  Surgeon: Namita Dumont MD;  Location: [facility] ENDO (4TH FLR);  Service: Endoscopy;  Laterality: N/A;  vacc-inst portal-tb    CYST REMOVAL      TONSILLECTOMY         Review of patient's allergies indicates:  No Known Allergies    No current facility-administered medications on file prior to encounter.     Current Outpatient Medications on File Prior to Encounter   Medication Sig    aspirin (ECOTRIN) 81 MG EC tablet Take 81 mg by mouth 2 (two) times daily.    atorvastatin (LIPITOR) 80 MG tablet TAKE 1 TABLET EVERY DAY    ERGOCALCIFEROL, VITAMIN D2, (VITAMIN D ORAL) Take by mouth once daily.    ezetimibe (ZETIA) 10 mg tablet TAKE 1 TABLET EVERY DAY    multivit-min/FA/lycopen/lutein (CENTRUM SILVER MEN ORAL) Take 1 tablet by mouth once daily.    ondansetron (ZOFRAN-ODT) 4 MG TbDL Take 1 tablet (4 mg total) by mouth every 8 (eight) hours as needed (nausea).    sildenafiL (VIAGRA) 50 MG tablet Take 1 tablet (50 mg total) by mouth daily as needed for Erectile Dysfunction. (Patient not taking: Reported on 12/11/2023)    sulfamethoxazole-trimethoprim 800-160mg (BACTRIM DS) 800-160 mg Tab Take 1 tablet by mouth 2 (two) times daily. (Patient not taking: Reported on 12/11/2023)     Family History       Problem Relation (Age of Onset)    Diabetes Brother    Heart attack Brother    Heart disease Brother    Hyperlipidemia Brother    Hypertension Brother          Tobacco Use    Smoking status: Never      Passive exposure: Never    Smokeless tobacco: Never   Substance and Sexual Activity    Alcohol use: Yes     Alcohol/week: 1.0 standard drink of alcohol     Types: 1 Glasses of wine per week     Comment: 1 drink 2-3 times/weekly    Drug use: No    Sexual activity: Not on file     Review of Systems   Constitutional:  Positive for activity change, chills, fatigue and fever.   HENT:  Negative for trouble swallowing.    Eyes:  Negative for photophobia and visual disturbance.   Respiratory:  Negative for cough and shortness of breath.    Cardiovascular:  Negative for chest pain and palpitations.   Gastrointestinal:  Positive for abdominal pain, diarrhea and nausea. Negative for blood in stool, constipation and vomiting.   Endocrine: Positive for polydipsia.   Genitourinary:  Positive for dysuria, flank pain, frequency and urgency. Negative for hematuria.   Musculoskeletal:  Negative for gait problem and joint swelling.   Skin:  Negative for rash and wound.   Neurological:  Positive for weakness. Negative for dizziness and light-headedness.   Psychiatric/Behavioral:  Positive for hallucinations. The patient is not nervous/anxious.      Objective:     Vital Signs (Most Recent):  Temp: 98.3 °F (36.8 °C) (12/14/23 0623)  Pulse: (!) 113 (12/14/23 0623)  Resp: 20 (12/14/23 0623)  BP: 127/82 (12/14/23 0623)  SpO2: (!) 94 % (12/14/23 0623) Vital Signs (24h Range):  Temp:  [98.3 °F (36.8 °C)-100.4 °F (38 °C)] 98.3 °F (36.8 °C)  Pulse:  [] 113  Resp:  [17-28] 20  SpO2:  [94 %-96 %] 94 %  BP: (117-168)/(79-99) 127/82     Weight: 69.4 kg (153 lb)  Body mass index is 23.96 kg/m².     Physical Exam  Vitals and nursing note reviewed.   Constitutional:       General: He is not in acute distress.  HENT:      Head: Normocephalic and atraumatic.      Nose: Nose normal.      Mouth/Throat:      Pharynx: No oropharyngeal exudate.   Eyes:      Extraocular Movements: Extraocular movements intact.      Conjunctiva/sclera: Conjunctivae  "normal.   Cardiovascular:      Rate and Rhythm: Regular rhythm. Tachycardia present.      Heart sounds: Normal heart sounds.   Pulmonary:      Effort: Pulmonary effort is normal. No respiratory distress.      Breath sounds: Normal breath sounds.   Abdominal:      General: Bowel sounds are normal. There is no distension.      Palpations: Abdomen is soft.      Tenderness: There is no abdominal tenderness. There is no guarding or rebound.   Musculoskeletal:         General: Normal range of motion.      Cervical back: Normal range of motion and neck supple.      Right lower leg: No edema.      Left lower leg: No edema.   Skin:     General: Skin is warm and dry.      Capillary Refill: Capillary refill takes less than 2 seconds.   Neurological:      General: No focal deficit present.      Mental Status: He is alert and oriented to person, place, and time.   Psychiatric:         Mood and Affect: Mood normal.         Behavior: Behavior normal.         Thought Content: Thought content normal.         Judgment: Judgment normal.                Significant Labs: All pertinent labs within the past 24 hours have been reviewed.  Blood Culture: No results for input(s): "LABBLOO" in the last 48 hours.  CBC:   Recent Labs   Lab 12/13/23 2212 12/13/23 2224 12/14/23  0552   WBC 8.58  --  10.62   HGB 14.1  --  13.2*   HCT 40.2 44 38.7*   *  --  108*     CMP:   Recent Labs   Lab 12/13/23 2212   *   K 2.9*   CL 94*   CO2 23   *   BUN 13   CREATININE 0.8   CALCIUM 8.8   PROT 7.2   ALBUMIN 2.8*   BILITOT 2.0*   ALKPHOS 87   AST 90*   ALT 60*   ANIONGAP 12     Lactic Acid:   Recent Labs   Lab 12/13/23 2212   LACTATE 1.4     Lipase:   Recent Labs   Lab 12/13/23 2212   LIPASE 32       Magnesium:   Recent Labs   Lab 12/13/23 2212   MG 2.1     TSH:   Recent Labs   Lab 08/11/23  0739   TSH 1.896       Urine Studies:   Recent Labs   Lab 12/13/23  2346   COLORU Yellow   APPEARANCEUA Hazy*   PHUR 6.0   SPECGRAV 1.020 "   PROTEINUA 1+*   GLUCUA Negative   KETONESU 1+*   BILIRUBINUA Negative   OCCULTUA 3+*   NITRITE Positive*   LEUKOCYTESUR 3+*   RBCUA 35*   WBCUA >100*   BACTERIA Many*   HYALINECASTS 0       Significant Imaging: I have reviewed all pertinent imaging results/findings within the past 24 hours.

## 2023-12-14 NOTE — ASSESSMENT & PLAN NOTE
Patient has hypokalemia which is Acute and currently uncontrolled. Most recent potassium levels reviewed-   Lab Results   Component Value Date    K 2.9 (L) 12/13/2023   Will continue potassium replacement per protocol and recheck repeat levels after replacement completed. Continue to replace. Pt would prefer IV replacement. Pills hard to swallow and does not tolerate potassium bicarb well.

## 2023-12-14 NOTE — ASSESSMENT & PLAN NOTE
This patient does have evidence of infective focus  My overall impression is sepsis.  Source: Urinary Tract and Abdominal  Antibiotics given-   Antibiotics (72h ago, onward)      Start     Stop Route Frequency Ordered    12/14/23 1900  vancomycin 750 mg in dextrose 5 % (D5W) 250 mL IVPB (Vial-Mate)         -- IV Every 12 hours (non-standard times) 12/14/23 0547    12/14/23 0900  ciprofloxacin HCl tablet 500 mg         12/19/23 0859 Oral Every 12 hours 12/14/23 0613    12/14/23 0700  vancomycin 1,250 mg in dextrose 5 % (D5W) 250 mL IVPB (Vial-Mate)         -- IV Once 12/14/23 0546    12/14/23 0623  vancomycin - pharmacy to dose  (vancomycin IVPB (PEDS and ADULTS))        See Hyperspace for full Linked Orders Report.    -- IV pharmacy to manage frequency 12/14/23 0523          Latest lactate reviewed-  Recent Labs   Lab 12/13/23  2212   LACTATE 1.4     Organ dysfunction indicated by  None- mild delirium (possible form electrolyte derangement and fevers) Mild transaminitis, mild thrombocytopenia    Fluid challenge Not needed - patient is not hypotensive      Post- resuscitation assessment No - Post resuscitation assessment not needed     Will Not start Pressors- Levophed for MAP of 65  Source control achieved by: Vancomycin ordered for staph aureus UTI on previous cultures, Ciprofloxacin ordered for possible infectious (vs other) diarrhea.  Blood, urine, stool cultures and studies pending.

## 2023-12-14 NOTE — ASSESSMENT & PLAN NOTE
Patient was found to have thrombocytopenia, the likely etiology is secondary to sepsis/infection?, will monitor the platelets Daily. Will transfuse if platelet count is <20k. Hold DVT prophylaxis if platelets are <50k. The patient's platelet results have been reviewed and are listed below.  Recent Labs   Lab 12/14/23  0552   *

## 2023-12-14 NOTE — ED NOTES
LOC: The patient is awake, alert and aware of environment with an appropriate affect, the patient is oriented x 3 and speaking appropriately.  APPEARANCE: Patient resting comfortably and in no acute distress, patient is clean and well groomed, patient's clothing is properly fastened.  SKIN: The skin is warm and dry, color consistent with ethnicity, patient has normal skin turgor and moist mucus membranes, skin intact, no breakdown or bruising noted.  MUSCULOSKELETAL: Patient moving all extremities spontaneously, no obvious swelling or deformities noted.  RESPIRATORY: Airway is open and patent, respirations are spontaneous, patient has a normal effort and rate, no accessory muscle use noted,   CARDIAC: Patient has a normal rate and regular rhythm, no periphreal edema noted, capillary refill < 3 seconds.  ABDOMEN: Soft and non tender to palpation, no distention noted, normoactive bowel sounds present in all four quadrants.  NEUROLOGIC:  facial expression is symmetrical, patient moving all extremities spontaneously, normal sensation in all extremities when touched with a finger.  Follows all commands appropriately.

## 2023-12-14 NOTE — PROVIDER PROGRESS NOTES - EMERGENCY DEPT.
Encounter Date: 12/13/2023    ED Physician Progress Notes        Patient resting in bed no acute distress no further episodes of diarrhea here.  Patient febrile and tachycardic likely combination of diarrheal illness, UTI and dehydration.  Labs reviewed patient found to have a UTI mild dehydrated with sodium 129 potassium 2.9.  Replete electrolytes given IV fluids antibiotics.  Awaiting CT scan but patient admitted to Internal Medicine.

## 2023-12-14 NOTE — ASSESSMENT & PLAN NOTE
Patient has Abnormal Phosphorus: hypophosphatemia. Will continue to monitor electrolytes closely. Will replace the affected electrolytes and repeat labs to be done after interventions completed. The patient's phosphorus results have been reviewed and are listed below.  Recent Labs   Lab 12/14/23  0552   PHOS 1.5*

## 2023-12-14 NOTE — ASSESSMENT & PLAN NOTE
68M with acute onset of multiple episodes diarrhea with only mild LLQ abdominal cramping early in course. Mild nausea. No abdominal pain, emesis, and is tolerating PO. Having fevers, chills, polydipsia. Also urinary infectious symptoms. Previously seen by GI Dr. 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 Work up unremarkable.  Previous stool cultures without growth.Giardia negative in the past. Recent stool culture without growth and E coli antigen negative.   -Multiple electrolyte derangements on admission  -Tachycardic, febrile, and tachypneic   -S/p 2L IVF bolus in ED, continue IVFs today  -Check complete stool studies  -C-scope 8/2022 Lipoma in the recto-sigmoid colon. Biopsied and found to have surface erosions  -CT pending  -GI consulted, appreciate recommendations  -PRN bentyl for cramping, prn Zofran/compazine for nausea  -ON Vanc for sepsis/UTI and will add ciprofloxacin 500 mg Q12h x5 days for diarrhea  for now pending culture results  12/14  CT abdomen -  Intraluminal fluid throughout the small and large bowel with adjacent mesenteric fat stranding and free fluid, as may be seen in the setting of a nonspecific infectious or noninfectious inflammatory enterocolitis. Nonobstructive right nephrolithiasis. loperamide PRN.

## 2023-12-14 NOTE — HOSPITAL COURSE
Micahel Espinoza is a 68 y.o. male with PMHx of CAD s/p CABG x3, HLD, complete heart block s/p dual chamber pacemaker. He presents to [facility] ED 12/13 with diarrhea for the last 6 days. On 12/8 he developed left lower quadrant cramping followed by multiple episodes of loose stool. The next two days he had about 10-13 episodes diarrhea daily. Watery-brown. No blood. By Monday he continued to have diarrhea but then also developed mild nausea, but was able to keep down bland foods (jello, applesauce, banana, eggs) without emesis. He also began having urinary frequency, urgency, and dysuria and though his right kidney was mildly painful. Denies hematuria. He began to keep extremely dehydrated, started having fevers up to 102F Monday-Tuesday. He endorses polydipsia, myalgias, and hallucinations when he closes his eyes. He also reports shaking chills that would last for 20 minutes at a time that he could not control. Wednesday diarrhea finally stopped and he also stopped urinating but reports he did spontaneously urinate in ED once he got IV fluids.      He does have history of diarrhea and saw GI Dr. Feliz in September for this and thought to be maybe post infectious IBS but patient states he had completely symptom free period for months since then. He saw urgent care for symptoms 3 days ago and reports taking Zofran 4 times from them as well as a medication from GI (dicyclomine?) 10 times over 2 days. He reports normal colonoscopy one year ago (lipoma biopsied with surface erosions). No rectal pain. No more abdominal/flank pain. No strange foods or travel. He denies falls, chest pain, palpitations, shortness of breath.     Per chart review: 12/11 stool culture without significant growth to date, E.coli shiga toxin 1 and 2 negative. Urine culture + staph aureus. A previous urine culture was also positive for staph aures in September and pt reports completing bactrim course.     In the ED, febrile to 100.4F, , RR 28. BP  sable. On RA. Labs with WBC 8.58k, platelets 118k.  Na 129, K 2.9, Cl 94, BUN/Cr without YESICA, albumin 2.8, T bili 2.0 (chronically elevated), AST/ALT elevated 90/60. UA 3+ leukocytes, nitrite positive, 3+ occult blood, 1+ ketones, 1+ protein, 35 RBC, >100 WBC< many bacteria. LA 1.4. lipase and mag wnl. Covid and flu negative. CT abdomen pending. Given ceftriaxone, dicyclomine, famotidine, Zofran, potassium po and IV,  1L LR bolus and 1L NS bolus. Admitted to . 12/14 K replaced. BCX 2 and stool studies pending. CT abdomen -  Intraluminal fluid throughout the small and large bowel with adjacent mesenteric fat stranding and free fluid, as may be seen in the setting of a nonspecific infectious or noninfectious inflammatory enterocolitis. Nonobstructive right nephrolithiasis. loperamide PRN. continue ciprofloxacin and vancomycin . denies abdominal pain. advanced to soft diet   12/15 K and P replaced. BCX 2 from 12/14 with staph aureus. repeated BCX 2.  UC with STAPHYLOCOCCUS AUREUS > 100,000 cfu/ml  Susceptibility pending. echo ordered. tolerating soft diet. CHARMAINE ordered as with pacemaker . 1.3 cm right lower pole nonobstructive nephrolithiasis/ staph aureus on UC ( positive for MSSA urine culture from 9/2023). started on cefazolin. vancomycin discontinued   12/16 BC from 12/15 staph aureus. repeat BCX 2 TTE today-     Left Ventricle: The left ventricle is normal in size. Normal wall thickness. Regional wall motion abnormalities present. See diagram for wall motion findings. There is mildly reduced systolic function with a visually estimated ejection fraction of 40 - 45%. Ejection fraction by visual approximation is 43%. There is normal diastolic function.    Right Ventricle: Normal right ventricular cavity size. Wall thickness is normal. Right ventricle wall motion  is normal. Systolic function is normal.    Left Atrium: Left atrium is severely dilated. 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    Aortic  Valve: The aortic valve is a trileaflet valve. There is mild aortic valve sclerosis. There is mild stenosis. Aortic valve area by VTI is 1.74 cm². Aortic valve peak velocity is 1.65 m/s. Mean gradient is 7 mmHg. The dimensionless index is 0.46. There is trace aortic regurgitation.    Mitral Valve: There is mild bileaflet sclerosis.    Tricuspid Valve: There is mild regurgitation.    Pulmonary Artery: The estimated pulmonary artery systolic pressure is 35 mmHg.    IVC/SVC: Normal venous pressure at 3 mmHg.    CHARMAINE scheduled. K and P replaced  12/18 BC x2 12/18 with staph aureus.  CHARMAINE postponed to tomorow. MRI spine WWO contrast   12/19 CHARMAINE today. fecal elastase decreased 94. r/o exocrine pancreatic insufficiency . ativan PRN for sedation with MRI spine. difficulty with CHARMAINE and  advancing the US probe  today. CT chest and Neck wo contrast to r/o obstruction. if CT normal, plan for CHARMAINE on 12/22 with intubation  12/20 BCx 2 12/17 NGTD. CT neck -No evidence of significant esophageal abnormality     Per ID: CHARMAINE aborted, recommending CT for further evaluation of esophagus. CT chest without evidence of obstruction. Repeated CHARMAINE today, without concerns for endocarditis. MRI spine without infectious concerns, without fluid collection or note of osteo like changes.            Recommendations:  Continue IV Cefazolin 2g IV q8hr   Will continue for 4 weeks from negative culture for complicated bacteremia   Will need ID follow up, ID will schedule.   Plan reviewed with ID staff. ID will sign off. See OPAT below

## 2023-12-14 NOTE — ASSESSMENT & PLAN NOTE
Presenting with dysuria/frequency/urgency/maybe mild flank pain/fever/chills (also with diarrheal illness).  -3/4 SIRs on admit for fever, tachycardia tachypnea  -UA infectious, nitrite positive, 3+ leukocytes >100 WBC and many bacteria  -Previous urine cultures with staph aureus  -S/p ceftriaxone in ED  -Will cover with vancomycin   -Follow up urine cultures  -Follow up CT  -Monitor I/Os  12/14. BCX 2 and stool studies pending.continue ciprofloxacin and vancomycin

## 2023-12-14 NOTE — HPI
Michael Espinoza is a 68 y.o. male with PMHx of CAD s/p CABG x3, HLD, complete heart block s/p dual chamber pacemaker. He presents to [facility] ED 12/13 with diarrhea for the last 6 days. On 12/8 he developed left lower quadrant cramping followed by multiple episodes of loose stool. The next two days he had about 10-13 episodes diarrhea daily. Watery-brown. No blood. By Monday he continued to have diarrhea but then also developed mild nausea, but was able to keep down bland foods (jello, applesauce, banana, eggs) without emesis. He also began having urinary frequency, urgency, and dysuria and though his right kidney was mildly painful. Denies hematuria. He began to keep extremely dehydrated, started having fevers up to 102F Monday-Tuesday. He endorses polydipsia, myalgias, and hallucinations when he closes his eyes. He also reports shaking chills that would last for 20 minutes at a time that he could not control. Wednesday diarrhea finally stopped and he also stopped urinating but reports he did spontaneously urinate in ED once he got IV fluids.     He does have history of diarrhea and saw GI Dr. Feliz in September for this and thought to be maybe post infectious IBS but patient states he had completely symptom free period for months since then. He saw urgent care for symptoms 3 days ago and reports taking Zofran 4 times from them as well as a medication from GI (dicyclomine?) 10 times over 2 days. He reports normal colonoscopy one year ago (lipoma biopsied with surface erosions). No rectal pain. No more abdominal/flank pain. No strange foods or travel. He denies falls, chest pain, palpitations, shortness of breath.     Per chart review: 12/11 stool culture without significant growth to date, E.coli shiga toxin 1 and 2 negative. Urine culture + staph aureus. A previous urine culture was also positive for staph aures in September and pt reports completing bactrim course.    In the ED, febrile to 100.4F, , RR 28. BP  sable. On RA. Labs with WBC 8.58k, platelets 118k.  Na 129, K 2.9, Cl 94, BUN/Cr without YESICA, albumin 2.8, T bili 2.0 (chronically elevated), AST/ALT elevated 90/60. UA 3+ leukocytes, nitrite positive, 3+ occult blood, 1+ ketones, 1+ protein, 35 RBC, >100 WBC< many bacteria. LA 1.4. lipase and mag wnl. Covid and flu negative. CT abdomen pending. Given ceftriaxone, dicyclomine, famotidine, Zofran, potassium po and IV,  1L LR bolus and 1L NS bolus. Admitted to .

## 2023-12-14 NOTE — ASSESSMENT & PLAN NOTE
Patient has hyponatremia which is uncontrolled,We will aim to correct the sodium by 4-6mEq in 24 hours. We will monitor sodium Daily. The hyponatremia is due to Dehydration/hypovolemia. We will obtain the following studies: Urine sodium, urine osmolality, serum osmolality, or TSH, T4. We will treat the hyponatremia with IV fluids as follows: 2/p 2 L IVF in ED, continuous fluids at rate 100 ml/hr ordered. The patient's sodium results have been reviewed and are listed below.  Recent Labs   Lab 12/13/23  2212   *

## 2023-12-14 NOTE — PHARMACY MED REC
"  Admission Medication History     The home medication history was taken by Vernon Patricio.    You may go to "Admission" then "Reconcile Home Medications" tabs to review and/or act upon these items.     The home medication list has been updated by the Pharmacy department.   Please read ALL comments highlighted in yellow.   Please address this information as you see fit.    Feel free to contact us if you have any questions or require assistance.      The medications listed below were removed from the home medication list. Please reorder if appropriate:  Patient reports no longer taking the following medication(s):  SILDENAFIL 50 MG  SULFAMETHOXAZOLE-TRIMETHOPRIM 800-160 MG    Medications listed below were obtained from: Patient  Current Outpatient Medications on File Prior to Encounter   Medication Sig    aspirin (ECOTRIN) 81 MG EC tablet   Take 81 mg by mouth 2 (two) times daily. (Breakfast & Afternoon)    atorvastatin (LIPITOR) 80 MG tablet   Take 80 mg by mouth every evening.      calcium carbonate (TUMS ORAL)   Take 2 tablets by mouth daily as needed (Heartburn).    coenzyme Q10 (CO Q-10) 300 mg Cap   Take 1 capsule by mouth once daily.    dicyclomine (BENTYL) 10 MG capsule   Take 10 mg by mouth daily as needed (Abdominal pain).    ERGOCALCIFEROL, VITAMIN D2, (VITAMIN D ORAL) Take 1 capsule by mouth Mon, Wed, Fri, Sat & Sun      ezetimibe (ZETIA) 10 mg tablet   Take 10 mg by mouth once daily.    multivit-min/FA/lycopen/lutein (CENTRUM SILVER MEN ORAL)   Take 1 tablet by mouth every Mon, Wed, Fri.    ondansetron (ZOFRAN-ODT) 4 MG TbDL Take 1 tablet (4 mg total) by mouth every 8 (eight) hours as needed (nausea).               Vernon Patricio  EXT [id number]                .          "

## 2023-12-14 NOTE — ASSESSMENT & PLAN NOTE
-Mildly elevated, possible 2/2 dehydration/infection  -NO RUQ pain. T bili chronically elevated  -Check acute hepatitis panel   -Daily CMP    12/14 Patients liver enzymes  elevated.   Recent Labs     12/13/23 2212 12/14/23  0552   BILITOT 2.0* 1.5*   AST 90* 81*   ALT 60* 55*   ALKPHOS 87 84    . Liver enzymes  stable . monitor

## 2023-12-14 NOTE — ED TRIAGE NOTES
Michael Espinoza, a 68 y.o. male presents to the ED w/ complaint of fever and diarrhea since Friday.  Patient went to  Monday and was told to come to ED if symptoms persisted.  Patient states fever has stayed above 100.4 but has not taken tyenol today.  Endorses pain upon urination and chills.  Patient also states that he has not urinated or had BM since 12 today.  Patient also states he has had decreased appetite. Hx triple bypass and pacemaker    Triage note:  Chief Complaint   Patient presents with    Multiple Complaints     Diarrhea 14 times a day, chills, fever, cough, N/V, urinary frequency - has not urinated or had BM since noon . Periods of feeling disoriented      Review of patient's allergies indicates:  No Known Allergies  Past Medical History:   Diagnosis Date    Complete heart block 7/5/2022    Coronary artery disease of native artery of native heart with stable angina pectoris 4/10/2018    Hyperlipidemia

## 2023-12-14 NOTE — AI DETERIORATION ALERT
"RAPID RESPONSE NURSE AI ALERT       AI alert received.    Chart Reviewed: 12/14/2023, 6:02 AM    MRN: [id number]  Bed: ED 18/18    Dx: <principal problem not specified>    Michael Espinoza has a past medical history of Complete heart block, Coronary artery disease of native artery of native heart with stable angina pectoris, and Hyperlipidemia.    Last VS: /86 (BP Location: Right arm, Patient Position: Lying)   Pulse 104   Temp 99 °F (37.2 °C) (Oral)   Resp 17   Wt 69.4 kg (153 lb)   SpO2 (!) 94%   BMI 23.96 kg/m²     24H Vital Sign Range:  Temp:  [99 °F (37.2 °C)-100.4 °F (38 °C)]   Pulse:  []   Resp:  [17-28]   BP: (117-168)/(79-99)   SpO2:  [94 %-96 %]     Level of Consciousness (AVPU): alert    Recent Labs     12/13/23  2212 12/13/23  2224   WBC 8.58  --    HGB 14.1  --    HCT 40.2 44   *  --        Recent Labs     12/13/23  2212   *   K 2.9*   CL 94*   CO2 23   BUN 13   CREATININE 0.8   *   MG 2.1        No results for input(s): "PH", "PCO2", "PO2", "HCO3", "POCSATURATED", "BE" in the last 72 hours.     OXYGEN:             MEWS score:      Bedside RNRuma  contacted. No concerns verbalized at this time. Instructed to call [phone] for further concerns or assistance.    Na Green RN        "

## 2023-12-14 NOTE — ASSESSMENT & PLAN NOTE
Patient has hyponatremia which is uncontrolled,We will aim to correct the sodium by 4-6mEq in 24 hours. We will monitor sodium Daily. The hyponatremia is due to Dehydration/hypovolemia. We will obtain the following studies: Urine sodium, urine osmolality, serum osmolality, or TSH, T4. We will treat the hyponatremia with IV fluids as follows: 2/p 2 L IVF in ED, continuous fluids at rate 100 ml/hr ordered. The patient's sodium results have been reviewed and are listed below.  Recent Labs   Lab 12/14/23  0552   *

## 2023-12-15 PROBLEM — N20.0 RIGHT KIDNEY STONE: Status: ACTIVE | Noted: 2023-12-15

## 2023-12-15 PROBLEM — R78.81 BACTEREMIA: Status: ACTIVE | Noted: 2023-12-15

## 2023-12-15 LAB
ALBUMIN SERPL BCP-MCNC: 2.1 G/DL (ref 3.5–5.2)
ALBUMIN SERPL BCP-MCNC: 2.2 G/DL (ref 3.5–5.2)
ALP SERPL-CCNC: 69 U/L (ref 55–135)
ALT SERPL W/O P-5'-P-CCNC: 55 U/L (ref 10–44)
ANION GAP SERPL CALC-SCNC: 10 MMOL/L (ref 8–16)
ANION GAP SERPL CALC-SCNC: 8 MMOL/L (ref 8–16)
AST SERPL-CCNC: 73 U/L (ref 10–40)
BASOPHILS # BLD AUTO: 0.03 K/UL (ref 0–0.2)
BASOPHILS NFR BLD: 0.3 % (ref 0–1.9)
BILIRUB SERPL-MCNC: 1.8 MG/DL (ref 0.1–1)
BUN SERPL-MCNC: 12 MG/DL (ref 8–23)
BUN SERPL-MCNC: 13 MG/DL (ref 8–23)
CALCIUM SERPL-MCNC: 7.6 MG/DL (ref 8.7–10.5)
CALCIUM SERPL-MCNC: 7.8 MG/DL (ref 8.7–10.5)
CHLORIDE SERPL-SCNC: 101 MMOL/L (ref 95–110)
CHLORIDE SERPL-SCNC: 104 MMOL/L (ref 95–110)
CO2 SERPL-SCNC: 21 MMOL/L (ref 23–29)
CO2 SERPL-SCNC: 23 MMOL/L (ref 23–29)
CREAT SERPL-MCNC: 0.7 MG/DL (ref 0.5–1.4)
CREAT SERPL-MCNC: 0.8 MG/DL (ref 0.5–1.4)
DIFFERENTIAL METHOD: ABNORMAL
EOSINOPHIL # BLD AUTO: 0.1 K/UL (ref 0–0.5)
EOSINOPHIL NFR BLD: 0.6 % (ref 0–8)
ERYTHROCYTE [DISTWIDTH] IN BLOOD BY AUTOMATED COUNT: 13.7 % (ref 11.5–14.5)
EST. GFR  (NO RACE VARIABLE): >60 ML/MIN/1.73 M^2
EST. GFR  (NO RACE VARIABLE): >60 ML/MIN/1.73 M^2
FAT STL QL: NORMAL
GLUCOSE SERPL-MCNC: 94 MG/DL (ref 70–110)
GLUCOSE SERPL-MCNC: 98 MG/DL (ref 70–110)
HCT VFR BLD AUTO: 35.3 % (ref 40–54)
HGB BLD-MCNC: 12.4 G/DL (ref 14–18)
IMM GRANULOCYTES # BLD AUTO: 0.06 K/UL (ref 0–0.04)
IMM GRANULOCYTES NFR BLD AUTO: 0.6 % (ref 0–0.5)
LYMPHOCYTES # BLD AUTO: 0.6 K/UL (ref 1–4.8)
LYMPHOCYTES NFR BLD: 6.1 % (ref 18–48)
MAGNESIUM SERPL-MCNC: 2.1 MG/DL (ref 1.6–2.6)
MCH RBC QN AUTO: 30.3 PG (ref 27–31)
MCHC RBC AUTO-ENTMCNC: 35.1 G/DL (ref 32–36)
MCV RBC AUTO: 86 FL (ref 82–98)
MONOCYTES # BLD AUTO: 1 K/UL (ref 0.3–1)
MONOCYTES NFR BLD: 10.9 % (ref 4–15)
NEUTRAL FAT STL QL: NORMAL
NEUTROPHILS # BLD AUTO: 7.7 K/UL (ref 1.8–7.7)
NEUTROPHILS NFR BLD: 81.5 % (ref 38–73)
NRBC BLD-RTO: 0 /100 WBC
OB PNL STL: NEGATIVE
OSMOLALITY SERPL: 273 MOSM/KG (ref 280–300)
PHOSPHATE SERPL-MCNC: 2.1 MG/DL (ref 2.7–4.5)
PHOSPHATE SERPL-MCNC: 2.6 MG/DL (ref 2.7–4.5)
PLATELET # BLD AUTO: 116 K/UL (ref 150–450)
PMV BLD AUTO: 11.4 FL (ref 9.2–12.9)
POTASSIUM SERPL-SCNC: 3.1 MMOL/L (ref 3.5–5.1)
POTASSIUM SERPL-SCNC: 3.2 MMOL/L (ref 3.5–5.1)
PROT SERPL-MCNC: 5.4 G/DL (ref 6–8.4)
RBC # BLD AUTO: 4.09 M/UL (ref 4.6–6.2)
SODIUM SERPL-SCNC: 133 MMOL/L (ref 136–145)
SODIUM SERPL-SCNC: 134 MMOL/L (ref 136–145)
TSH SERPL DL<=0.005 MIU/L-ACNC: 1.38 UIU/ML (ref 0.4–4)
WBC # BLD AUTO: 9.49 K/UL (ref 3.9–12.7)

## 2023-12-15 PROCEDURE — 84100 ASSAY OF PHOSPHORUS: CPT

## 2023-12-15 PROCEDURE — 63600175 PHARM REV CODE 636 W HCPCS

## 2023-12-15 PROCEDURE — 36415 COLL VENOUS BLD VENIPUNCTURE: CPT | Performed by: HOSPITALIST

## 2023-12-15 PROCEDURE — 83735 ASSAY OF MAGNESIUM: CPT

## 2023-12-15 PROCEDURE — 84443 ASSAY THYROID STIM HORMONE: CPT

## 2023-12-15 PROCEDURE — 83930 ASSAY OF BLOOD OSMOLALITY: CPT

## 2023-12-15 PROCEDURE — 25000003 PHARM REV CODE 250

## 2023-12-15 PROCEDURE — 80053 COMPREHEN METABOLIC PANEL: CPT

## 2023-12-15 PROCEDURE — 80069 RENAL FUNCTION PANEL: CPT | Performed by: HOSPITALIST

## 2023-12-15 PROCEDURE — 63600175 PHARM REV CODE 636 W HCPCS: Performed by: REGISTERED NURSE

## 2023-12-15 PROCEDURE — 36415 COLL VENOUS BLD VENIPUNCTURE: CPT

## 2023-12-15 PROCEDURE — 63600175 PHARM REV CODE 636 W HCPCS: Performed by: HOSPITALIST

## 2023-12-15 PROCEDURE — 85025 COMPLETE CBC W/AUTO DIFF WBC: CPT

## 2023-12-15 PROCEDURE — 25000003 PHARM REV CODE 250: Performed by: REGISTERED NURSE

## 2023-12-15 PROCEDURE — 25000003 PHARM REV CODE 250: Performed by: HOSPITALIST

## 2023-12-15 PROCEDURE — 21400001 HC TELEMETRY ROOM

## 2023-12-15 RX ORDER — POTASSIUM CHLORIDE 7.45 MG/ML
10 INJECTION INTRAVENOUS
Status: COMPLETED | OUTPATIENT
Start: 2023-12-15 | End: 2023-12-15

## 2023-12-15 RX ADMIN — DICYCLOMINE HYDROCHLORIDE 10 MG: 10 CAPSULE ORAL at 04:12

## 2023-12-15 RX ADMIN — POTASSIUM CHLORIDE 10 MEQ: 7.46 INJECTION, SOLUTION INTRAVENOUS at 09:12

## 2023-12-15 RX ADMIN — VANCOMYCIN HYDROCHLORIDE 750 MG: 750 INJECTION, POWDER, LYOPHILIZED, FOR SOLUTION INTRAVENOUS at 11:12

## 2023-12-15 RX ADMIN — ATORVASTATIN CALCIUM 80 MG: 40 TABLET, FILM COATED ORAL at 09:12

## 2023-12-15 RX ADMIN — POTASSIUM CHLORIDE 10 MEQ: 7.46 INJECTION, SOLUTION INTRAVENOUS at 02:12

## 2023-12-15 RX ADMIN — SODIUM CHLORIDE: 9 INJECTION, SOLUTION INTRAVENOUS at 05:12

## 2023-12-15 RX ADMIN — ASPIRIN 81 MG: 81 TABLET, COATED ORAL at 09:12

## 2023-12-15 RX ADMIN — POTASSIUM CHLORIDE 10 MEQ: 7.46 INJECTION, SOLUTION INTRAVENOUS at 10:12

## 2023-12-15 RX ADMIN — LOPERAMIDE HYDROCHLORIDE 2 MG: 2 CAPSULE ORAL at 02:12

## 2023-12-15 RX ADMIN — CEFAZOLIN 2 G: 2 INJECTION, POWDER, FOR SOLUTION INTRAMUSCULAR; INTRAVENOUS at 09:12

## 2023-12-15 RX ADMIN — EZETIMIBE 10 MG: 10 TABLET ORAL at 09:12

## 2023-12-15 RX ADMIN — CIPROFLOXACIN 500 MG: 500 TABLET ORAL at 09:12

## 2023-12-15 RX ADMIN — CEFAZOLIN 2 G: 2 INJECTION, POWDER, FOR SOLUTION INTRAMUSCULAR; INTRAVENOUS at 01:12

## 2023-12-15 RX ADMIN — DICYCLOMINE HYDROCHLORIDE 10 MG: 10 CAPSULE ORAL at 12:12

## 2023-12-15 RX ADMIN — DICYCLOMINE HYDROCHLORIDE 10 MG: 10 CAPSULE ORAL at 09:12

## 2023-12-15 RX ADMIN — POTASSIUM CHLORIDE 10 MEQ: 7.46 INJECTION, SOLUTION INTRAVENOUS at 11:12

## 2023-12-15 RX ADMIN — SODIUM PHOSPHATE, MONOBASIC, MONOHYDRATE AND SODIUM PHOSPHATE, DIBASIC, ANHYDROUS 39.99 MMOL: 142; 276 INJECTION, SOLUTION INTRAVENOUS at 09:12

## 2023-12-15 NOTE — HPI
Michael Espinoza is a 68 y.o. male with PMHx of CAD s/p CABG x3, HLD, complete heart block s/p dual chamber pacemaker. He [place] to [facility] [place] 12[place]13 with diarrhea for the last 6 days. On 12/8 he developed left lower quadrant cramping followed by multiple episodes of loose stool.  He also began having urinary frequency, urgency, and dysuria and though his right kidney was mildly painful. Denies hematuria.     On assessment, patient is afebrile, VSS. Patient reports dysuria about one week ago that has since resolved. Patient denies flank pain, N/V, fevers, chills, dysuria, frequency, urgency, hematuria.   WBC 9.5 Urine culture 12/13 with Staph aureus. Blood culture 12/14 with staph aureus. Repeat blood cultures no growth.   Cr 0.7  at baseline.  CTRSS with 1.3 cm in right lower pole stone. No hydronephrosis. No left urolithiasis or hydronephrosis.

## 2023-12-15 NOTE — ASSESSMENT & PLAN NOTE
"Patient has Abnormal Phosphorus: hypophosphatemia. Will continue to monitor electrolytes closely. Will replace the affected electrolytes and repeat labs to be done after interventions completed. The patient's phosphorus results have been reviewed and are listed below.  No results for input(s): "PHOS" in the last 24 hours.     "

## 2023-12-15 NOTE — ASSESSMENT & PLAN NOTE
This patient does have evidence of infective focus  My overall impression is sepsis.  Source: Urinary Tract and Abdominal  Antibiotics given-   Antibiotics (72h ago, onward)      Start     Stop Route Frequency Ordered    12/14/23 1900  vancomycin 750 mg in dextrose 5 % (D5W) 250 mL IVPB (Vial-Mate)         -- IV Every 12 hours (non-standard times) 12/14/23 0547    12/14/23 0900  ciprofloxacin HCl tablet 500 mg         12/19/23 0859 Oral Every 12 hours 12/14/23 0613    12/14/23 0623  vancomycin - pharmacy to dose  (vancomycin IVPB (PEDS and ADULTS))        See Hyperspace for full Linked Orders Report.    -- IV pharmacy to manage frequency 12/14/23 0523          Latest lactate reviewed-  Recent Labs   Lab 12/13/23  2212   LACTATE 1.4     Organ dysfunction indicated by  None- mild delirium (possible form electrolyte derangement and fevers) Mild transaminitis, mild thrombocytopenia    Fluid challenge Not needed - patient is not hypotensive      Post- resuscitation assessment No - Post resuscitation assessment not needed     Will Not start Pressors- Levophed for MAP of 65  Source control achieved by: Vancomycin ordered for staph aureus UTI on previous cultures, Ciprofloxacin ordered for possible infectious (vs other) diarrhea.  Blood, urine, stool cultures and studies pending.

## 2023-12-15 NOTE — ASSESSMENT & PLAN NOTE
67 yo male with PMH of CAD s/p CABG x3, HLD, heart block s/p dual chamber pacemaker who presents to [facility] ED with cc of persistant diarrhea, fever, dysuria, right flank pain. Was found to have staph aureus bacteremia, as well as + urine culture. Pt with pacemaker.     Since admission, pt was febrile, without leukocytosis. Blood culture + staph aureus, MRSA negative on PCR. Repeats ordered. Urine culture + staph aureus, pending. Stool studies negative. Flu swab negative. CT AP noting 1.3 cm right nonobstructing kidney stone, as well as prostate enlargement.     To note, micro hx significant for + MSSA urine culture from 9/2023. Pt states it was found on routine physical. Denies dysuria at that time, but states his urine was forthy. Pt states he followed with his PCP and took bactrim as prescribed. Unclear source of MSSA bacteriuria, but suspect stone in right kidney is source of recurrent infection.       Pt has received vanc, ceftriaxone, and cipro. ID was consulted d/t staph aures bacteremia.     Recommendations:  - stop vanc  - start cefazolin 2g IV q8hr   - consider CHARMAINE as pt with pacemaker   - consider urology eval for stone mgmt and enlarged prostate   - repeat blood cultures tomorrow AM as well.   - will follow culture data and tailor as needed. Will anticipate lengthy IV antibiotic course.   - Pt seen and plan reviewed with ID staff. Discussed plan with primary team. ID will follow.

## 2023-12-15 NOTE — HPI
Mr. Espinoza is a 69 yo male with PMH of CAD s/p CABG x3, HLD, heart block s/p dual chamber pacemaker who [place] [facility] [place] with cc of persistant diarrhea x6 days. Pt reported this started on 12/8, and diarrhea persisted. Pt started with dysuria, right flank pain, fevers, and presented to ED.     Since admission, pt was febrile, without leukocytosis. Blood culture + staph aureus, MRSA negative on PCR. Repeats ordered. Urine culture + staph aureus, pending. Stool studies negative. Flu swab negative.     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 Reports he is very active, and routinely walks long distances. Pt denies recent wounds, injuries. Denies swollen joints, knees. Denies hardware other than pacemaker that was placed three years ago. Currently, denies fever, chills. Reports he is tolerating small amts of food again. Denies NVD. Denies flank pain, dysuria. States he is feeling better.     To note, micro hx significant for + MSSA urine culture from 9/2023. Pt states it was found on routine physical. Denies dysuria at that time, but states his urine was forthy. Pt states he followed with his PCP and took bactrim as prescribed.     Pt has received vanc, ceftriaxone, and cipro. ID was consulted d/t staph aures bacteremia.

## 2023-12-15 NOTE — ASSESSMENT & PLAN NOTE
12/15 BCX 2 from 12/14 with staph aureus. repeated BCX 2.    CHARMAINE ordered as with pacemaker . 1.3 cm right lower pole nonobstructive nephrolithiasis/ staph aureus on UC ( positive for MSSA urine culture from 9/2023). started on cefazolin. vancomycin discontinued   12/18 BC from 12/16 staph aureus.   12/20 BCx 2 12/17 NGTD.  difficulty with CHARMAINE and  advancing the US probe  today. CT chest and Neck wo contrast to r/o obstruction. if CT normal, plan for CHARMAINE on 12/22 with intubation

## 2023-12-15 NOTE — SUBJECTIVE & OBJECTIVE
Past Medical History:   Diagnosis Date    Complete heart block 7/5/2022    Coronary artery disease of native artery of native heart with stable angina pectoris 4/10/2018    Hyperlipidemia        Past Surgical History:   Procedure Laterality Date    A-V CARDIAC PACEMAKER INSERTION N/A 7/5/2022    Procedure: INSERTION, CARDIAC PACEMAKER, DUAL CHAMBER;  Surgeon: Alessandro Canales MD;  Location: [facility] EP LAB;  Service: Cardiology;  Laterality: N/A;  CHB, TBD, ANES, ED 6, MB    COLONOSCOPY N/A 8/5/2022    Procedure: COLONOSCOPY;  Surgeon: Namita Dumont MD;  Location: [facility] ENDO (4TH FLR);  Service: Endoscopy;  Laterality: N/A;  vacc-inst portal-tb    CYST REMOVAL      TONSILLECTOMY         Review of patient's allergies indicates:  No Known Allergies    Medications:  Medications Prior to Admission   Medication Sig    aspirin (ECOTRIN) 81 MG EC tablet Take 81 mg by mouth 2 (two) times daily. (Breakfast & Afternoon)    atorvastatin (LIPITOR) 80 MG tablet TAKE 1 TABLET EVERY DAY (Patient taking differently: Take 80 mg by mouth every evening.)    calcium carbonate (TUMS ORAL) Take 2 tablets by mouth daily as needed (Heartburn).    coenzyme Q10 (CO Q-10) 300 mg Cap Take 1 capsule by mouth once daily.    dicyclomine (BENTYL) 10 MG capsule Take 10 mg by mouth daily as needed (Abdominal pain).    ERGOCALCIFEROL, VITAMIN D2, (VITAMIN D ORAL) Take 1 capsule by mouth Mon, Wed, Fri, Sat & Sun    ezetimibe (ZETIA) 10 mg tablet TAKE 1 TABLET EVERY DAY (Patient taking differently: Take 10 mg by mouth once daily.)    multivit-min/FA/lycopen/lutein (CENTRUM SILVER MEN ORAL) Take 1 tablet by mouth every Mon, Wed, Fri.    ondansetron (ZOFRAN-ODT) 4 MG TbDL Take 1 tablet (4 mg total) by mouth every 8 (eight) hours as needed (nausea).     Antibiotics (From admission, onward)      Start     Stop Route Frequency Ordered    12/15/23 1145  ceFAZolin 2 g in dextrose 5 % in water (D5W) 50 mL IVPB (MB+)         -- IV Every 8 hours (non-standard  times) 12/15/23 1037    12/14/23 1900  vancomycin 750 mg in dextrose 5 % (D5W) 250 mL IVPB (Vial-Mate)         -- IV Every 12 hours (non-standard times) 12/14/23 0547    12/14/23 0623  vancomycin - pharmacy to dose  (vancomycin IVPB (PEDS and ADULTS))        See [facility]ic[facility] for full Linked Orders Report.    -- IV pharmacy to manage frequency 12/14/23 0523          Antifungals (From admission, onward)      None          Antivirals (From admission, onward)      None             Immunization History   Administered Date(s) Administered    COVID-19, MRNA, LN-S, PF (Pfizer) (Gray Cap) 05/19/2022    COVID-19, MRNA, LN-S, PF (Pfizer) (Purple Cap) 02/15/2021, 03/08/2021, 10/09/2021    COVID-19, mRNA, LNP-S, bivalent booster, PF (PFIZER OMICRON) 11/15/2022    Influenza 10/04/2018, 10/15/2019    Influenza (FLUAD) - Quadrivalent - Adjuvanted - PF *Preferred* (65+) 10/24/2020, 10/10/2022, 10/23/2023    Pneumococcal Conjugate - 13 Valent 11/11/2020    Tdap 08/22/2019    Zoster Recombinant 08/08/2023, 11/21/2023       Family History       Problem Relation (Age of Onset)    Diabetes Brother    Heart attack Brother    Heart disease Brother    Hyperlipidemia Brother    Hypertension Brother          Social History     Socioeconomic History    Marital status: Single   Tobacco Use    Smoking status: Never     Passive exposure: Never    Smokeless tobacco: Never   Substance and Sexual Activity    Alcohol use: Yes     Alcohol/week: 1.0 standard drink of alcohol     Types: 1 Glasses of wine per week     Comment: 1 drink 2-3 times/weekly    Drug use: No     Social Determinants of Health     Financial Resource Strain: Low Risk  (11/14/2023)    Overall Financial Resource Strain (CARDIA)     Difficulty of Paying Living Expenses: Not hard at all   Food Insecurity: No Food Insecurity (11/14/2023)    Hunger Vital Sign     Worried About Running Out of Food in the Last Year: Never true     Ran Out of Food in the Last Year: Never true   Transportation  Needs: No Transportation Needs (11/14/2023)    PRAPARE - Transportation     Lack of Transportation (Medical): No     Lack of Transportation (Non-Medical): No   Physical Activity: Sufficiently Active (11/14/2023)    Exercise Vital Sign     Days of Exercise per Week: 5 days     Minutes of Exercise per Session: 40 min   Stress: No Stress Concern Present (11/14/2023)    [employer] [place] [employer] - Occupational Stress Questionnaire     Feeling of Stress : Not at all   Social Connections: Unknown (11/14/2023)    Social Connection and Isolation Panel [NHANES]     Frequency of Communication with Friends and Family: Patient declined     Frequency of Social Gatherings with Friends and Family: Patient declined     Active Member of Clubs or Organizations: Patient declined     Attends Club or Organization Meetings: Patient declined     Marital Status: Never    Housing Stability: Low Risk  (11/14/2023)    Housing Stability Vital Sign     Unable to Pay for Housing in the Last Year: No     Number of Places Lived in the Last Year: 1     Unstable Housing in the Last Year: No     Review of Systems   Constitutional:  Negative for chills, diaphoresis, fatigue and fever.   HENT: Negative.     Respiratory:  Negative for cough and shortness of breath.    Cardiovascular:  Negative for chest pain, palpitations and leg swelling.   Gastrointestinal:  Negative for diarrhea, nausea and vomiting.   Genitourinary:  Negative for dysuria and flank pain.     Objective:     Vital Signs (Most Recent):  Temp: 97.7 °F (36.5 °C) (12/15/23 1117)  Pulse: (!) 113 (12/15/23 1123)  Resp: 16 (12/15/23 1117)  BP: 137/76 (12/15/23 1117)  SpO2: 95 % (12/15/23 1117) Vital Signs (24h Range):  Temp:  [97.5 °F (36.4 °C)-98.4 °F (36.9 °C)] 97.7 °F (36.5 °C)  Pulse:  [] 113  Resp:  [16-22] 16  SpO2:  [93 %-96 %] 95 %  BP: (103-137)/(63-77) 137/76     Weight: 76 kg (167 lb 8.8 oz)  Body mass index is 26.24 kg/m².    Estimated Creatinine  Clearance: 94.4 mL/min (based on SCr of 0.7 mg/dL).     Physical Exam  Vitals reviewed.   Constitutional:       General: He is not in acute distress.     Appearance: Normal appearance. He is not ill-appearing.   HENT:      Head: Normocephalic.      Nose: Nose normal.      Mouth/Throat:      Mouth: Mucous membranes are moist.      Pharynx: Oropharynx is clear.   Eyes:      General:         Right eye: No discharge.         Left eye: No discharge.      Conjunctiva/sclera: Conjunctivae normal.   Cardiovascular:      Rate and Rhythm: Normal rate and regular rhythm.      Pulses: Normal pulses.      Heart sounds: Normal heart sounds. No murmur heard.  Pulmonary:      Effort: Pulmonary effort is normal. No respiratory distress.      Breath sounds: Normal breath sounds. No stridor.   Abdominal:      General: Abdomen is flat. There is no distension.      Palpations: Abdomen is soft.      Tenderness: There is no abdominal tenderness.   Musculoskeletal:         General: No swelling or tenderness. Normal range of motion.      Cervical back: Normal range of motion.   Skin:     General: Skin is warm.      Findings: No erythema, lesion or rash.   Neurological:      General: No focal deficit present.      Mental Status: He is alert and oriented to person, place, and time.      Motor: No weakness.      Gait: Gait normal.   Psychiatric:         Mood and Affect: Mood normal.         Behavior: Behavior normal.          Significant Labs: All pertinent labs within the past 24 hours have been reviewed.    Significant Imaging: I have reviewed all pertinent imaging results/findings within the past 24 hours.

## 2023-12-15 NOTE — ASSESSMENT & PLAN NOTE
Michael Espinoza is a 68 y.o. M with right lower pole stone with urine and blood cultures with Staph aureus.     -- Stone is non obstructing. No hydronephrosis. Patient is currently asymptomatic. Cr is at baseline  --No indication for acute urologic intervention  -- Antibiotics per ID  -- Rest of care per primary  -- Will arrange outpatient urologic follow up for stone treatment

## 2023-12-15 NOTE — ASSESSMENT & PLAN NOTE
-Mildly elevated, possible 2/2 dehydration/infection  -NO RUQ pain. T bili chronically elevated  -Check acute hepatitis panel   -Daily CMP    12/14 Patients liver enzymes  elevated.   Recent Labs     12/17/23  0908 12/18/23  0538 12/19/23  0702 12/20/23  0328   BILITOT 1.8* 1.2* 1.5* 1.2*   * 125* 130* 120*   * 82* 89* 74*   ALKPHOS 107 96 104 105    . Liver enzymes  trending up. sono liver with doppler -Normal Doppler evaluation of the liver.Cholelithiasis.  No sonographic evidence for acute cholecystitis.

## 2023-12-15 NOTE — SUBJECTIVE & OBJECTIVE
Past Medical History:   Diagnosis Date    Complete heart block 7/5/2022    Coronary artery disease of native artery of native heart with stable angina pectoris 4/10/2018    Hyperlipidemia        Past Surgical History:   Procedure Laterality Date    A-V CARDIAC PACEMAKER INSERTION N/A 7/5/2022    Procedure: INSERTION, CARDIAC PACEMAKER, DUAL CHAMBER;  Surgeon: Alessandro Canales MD;  Location: [facility] EP LAB;  Service: Cardiology;  Laterality: N/A;  CHB, TBD, ANES, ED 6, MB    COLONOSCOPY N/A 8/5/2022    Procedure: COLONOSCOPY;  Surgeon: Namita Dumont MD;  [facility]  Service: Endoscopy;  Laterality: N/A;  vacc-inst portal-tb    CYST REMOVAL      TONSILLECTOMY         Review of patient's allergies indicates:  No Known Allergies    Family History       Problem Relation (Age of Onset)    Diabetes Brother    Heart attack Brother    Heart disease Brother    Hyperlipidemia Brother    Hypertension Brother            Tobacco Use    Smoking status: Never     Passive exposure: Never    Smokeless tobacco: Never   Substance and Sexual Activity    Alcohol use: Yes     Alcohol/week: 1.0 standard drink of alcohol     Types: 1 Glasses of wine per week     Comment: 1 drink 2-3 times/weekly    Drug use: No    Sexual activity: Not on file       Review of Systems   Constitutional:  Negative for appetite change, chills and fever.   HENT:  Negative for congestion and sore throat.    Respiratory:  Negative for cough and wheezing.    Gastrointestinal:  Positive for diarrhea. Negative for abdominal pain, nausea and vomiting.   Genitourinary:  Negative for dysuria, flank pain, hematuria and urgency.   Neurological:  Negative for headaches.   Psychiatric/Behavioral:  Negative for agitation.        Objective:     Temp:  [97.5 °F (36.4 °C)-98.3 °F (36.8 °C)] 97.7 °F (36.5 °C)  Pulse:  [] 113  Resp:  [16-20] 16  SpO2:  [93 %-96 %] 95 %  BP: (103-137)/(63-77) 137/76  Weight: 76 kg (167 lb 8.8 oz)  Body mass index is  26.24 kg/m².    Date 12/15/23 0700 - 12/16/23 0659   Shift 6602-8147 3228-9337 8651-5009 24 Hour Total   INTAKE   P.O. 120   120   Shift Total(mL/kg) 120(1.6)   120(1.6)   OUTPUT   Shift Total(mL/kg)       Weight (kg) 76 76 76 76          Drains       None                    Physical Exam  Constitutional:       General: He is not in acute distress.  HENT:      Head: Normocephalic.   Cardiovascular:      Rate and Rhythm: Normal rate.   Pulmonary:      Effort: Pulmonary effort is normal.   Abdominal:      General: Abdomen is flat. There is no distension.      Palpations: Abdomen is soft.      Tenderness: There is no abdominal tenderness. There is no right CVA tenderness or left CVA tenderness.   Musculoskeletal:         General: Normal range of motion.   Skin:     General: Skin is warm and dry.   Neurological:      Mental Status: He is alert.      Coordination: Coordination normal.   Psychiatric:         Behavior: Behavior normal.          Significant Labs:    BMP:  Recent Labs   Lab 12/13/23 2212 12/14/23  0552 12/15/23  0649   * 130* 133*   K 2.9* 3.2* 3.1*   CL 94* 96 104   CO2 23 23 21*   BUN 13 12 12   CREATININE 0.8 0.9 0.7   CALCIUM 8.8 8.1* 7.6*       CBC:  Recent Labs   Lab 12/13/23 2212 12/13/23 2224 12/14/23  0552 12/15/23  0649   WBC 8.58  --  10.62 9.49   HGB 14.1  --  13.2* 12.4*   HCT 40.2 44 38.7* 35.3*   *  --  108* 116*       All pertinent labs results from the past 24 hours have been reviewed.    Significant Imaging:  All pertinent imaging results/findings from the past 24 hours have been reviewed.

## 2023-12-15 NOTE — PROGRESS NOTES
[facility] - Emergency Dept  [facility] Medicine  Progress Note    Patient Name: Michael Espinoza  MRN: [id number]  Patient Class: IP- Inpatient   Admission Date: 12/13/2023  Length of Stay: 0 days  Attending Physician: Mukesh Golden MD  Primary Care Provider: Dominguez Hyatt MD        Subjective:     Principal Problem:Sepsis without acute organ dysfunction        HPI:  Michael Espinoza is a 68 y.o. male with PMHx of CAD s/p CABG x3, HLD, complete heart block s/p dual chamber pacemaker. He presents to [facility] ED 12/13 with diarrhea for the last 6 days. On 12/8 he developed left lower quadrant cramping followed by multiple episodes of loose stool. The next two days he had about 10-13 episodes diarrhea daily. Watery-brown. No blood. By Monday he continued to have diarrhea but then also developed mild nausea, but was able to keep down bland foods (jello, applesauce, banana, eggs) without emesis. He also began having urinary frequency, urgency, and dysuria and though his right kidney was mildly painful. Denies hematuria. He began to keep extremely dehydrated, started having fevers up to 102F Monday-Tuesday. He endorses polydipsia, myalgias, and hallucinations when he closes his eyes. He also reports shaking chills that would last for 20 minutes at a time that he could not control. Wednesday diarrhea finally stopped and he also stopped urinating but reports he did spontaneously urinate in ED once he got IV fluids.     He does have history of diarrhea and saw GI Dr. Feliz in September for this and thought to be maybe post infectious IBS but patient states he had completely symptom free period for months since then. He saw urgent care for symptoms 3 days ago and reports taking Zofran 4 times from them as well as a medication from GI (dicyclomine?) 10 times over 2 days. He reports normal colonoscopy one year ago (lipoma biopsied with surface erosions). No rectal pain. No more abdominal/flank pain. No strange foods or travel. He  denies falls, chest pain, palpitations, shortness of breath.     Per chart review: 12/11 stool culture without significant growth to date, E.coli shiga toxin 1 and 2 negative. Urine culture + staph aureus. A previous urine culture was also positive for staph aures in September and pt reports completing bactrim course.    In the ED, febrile to 100.4F, , RR 28. BP sable. On RA. Labs with WBC 8.58k, platelets 118k.  Na 129, K 2.9, Cl 94, BUN/Cr without YESICA, albumin 2.8, T bili 2.0 (chronically elevated), AST/ALT elevated 90/60. UA 3+ leukocytes, nitrite positive, 3+ occult blood, 1+ ketones, 1+ protein, 35 RBC, >100 WBC< many bacteria. LA 1.4. lipase and mag wnl. Covid and flu negative. CT abdomen pending. Given ceftriaxone, dicyclomine, famotidine, Zofran, potassium po and IV,  1L LR bolus and 1L NS bolus. Admitted to .     Overview/Hospital Course:  12/14 K replaced. BCX 2 and stool studies pending. CT abdomen -  Intraluminal fluid throughout the small and large bowel with adjacent mesenteric fat stranding and free fluid, as may be seen in the setting of a nonspecific infectious or noninfectious inflammatory enterocolitis. Nonobstructive right nephrolithiasis. loperamide PRN. continue ciprofloxacin and vancomycin . denies abdominal pain. advanced to soft diet   12/15 K and P replaced. BCX 2 from 12/14 with staph aureus. repeated BCX 2.  UC with STAPHYLOCOCCUS AUREUS > 100,000 cfu/ml  Susceptibility pending. echo ordered. tolerating soft diet. CHARMAINE ordered as with pacemaker . 1.3 cm right lower pole nonobstructive nephrolithiasis/ staph aureus on UC ( positive for MSSA urine culture from 9/2023). started on cefazolin. vancomycin discontinued         Review of Systems:   Pain scale:   Constitutional:  fever,  chills, headache, vision loss, hearing loss, weight loss, Generalized weakness, falls, loss of smell, loss of taste, poor appetite,  sore throat  Respiratory: cough, shortness of breath.   Cardiovascular:  chest pain, dizziness, palpitations, orthopnea, swelling of feet, syncope  Gastrointestinal: nausea, vomiting, abdominal pain, diarrhea -improved , black stool,  blood in stool, change in bowel habits, constipation  Genitourinary: hematuria, dysuria, urgency, frequency,  flank pain- resolved   Integument/Breast: rash,  pruritis  Hematologic/Lymphatic: easy bruising, lymphadenopathy  Musculoskeletal: arthralgias , myalgias, back pain, neck pain, knee pain  Neurological: confusion, seizures, tremors, slurred speech  Behavioral/Psych:  depression, anxiety, auditory or visual hallucinations     OBJECTIVE:     Physical Exam:  Body mass index is 26.24 kg/m².    Constitutional: Appears well-developed and well-nourished.   Head: Normocephalic and atraumatic.   Neck: Normal range of motion. Neck supple.   Cardiovascular: Normal heart rate.  Regular heart rhythm.  Pulmonary/Chest: Effort normal.   Abdominal: No distension.  No tenderness  Musculoskeletal: Normal range of motion. No edema.   Neurological: Alert and oriented to person, place, and time.   Skin: Skin is warm and dry.   Psychiatric: Normal mood and affect. Behavior is normal.                  Vital Signs  Temp: 97.7 °F (36.5 °C) (12/15/23 1551)  Pulse: 89 (12/15/23 1551)  Resp: 16 (12/15/23 1551)  BP: 113/71 (12/15/23 1551)  SpO2: (Abnormal) 93 % (12/15/23 1551)     24 Hour VS Range    Temp:  [97.5 °F (36.4 °C)-98.3 °F (36.8 °C)]   Pulse:  []   Resp:  [16-20]   BP: (103-137)/(63-77)   SpO2:  [93 %-96 %]     Intake/Output Summary (Last 24 hours) at 12/15/2023 1829  Last data filed at 12/15/2023 0908  Gross per 24 hour   Intake 240 ml   Output no documentation   Net 240 ml         I/O This Shift:  I/O this shift:  In: 120 [P.O.:120]  Out: -     Wt Readings from Last 3 Encounters:   12/14/23 76 kg (167 lb 8.8 oz)   12/11/23 69.4 kg (153 lb)   11/21/23 69.8 kg (153 lb 14.1 oz)       I have personally reviewed the vitals and recorded Intake/Output  "    Laboratory/Diagnostic Data:    CBC/Anemia Labs: Coags:    Recent Labs   Lab 12/13/23 2212 12/13/23  2224 12/14/23  0552 12/14/23  0608 12/15/23  0649   WBC 8.58  --  10.62  --  9.49   HGB 14.1  --  13.2*  --  12.4*   HCT 40.2 44 38.7*  --  35.3*   *  --  108*  --  116*   MCV 86  --  88  --  86   RDW 13.3  --  13.2  --  13.7   OCCULTBLOOD  --   --   --  Negative  --     No results for input(s): "PT", "INR", "APTT" in the last 168 hours.     Chemistries: ABG:   Recent Labs   Lab 12/13/23 2212 12/14/23  0552 12/15/23  0649 12/15/23  1706   * 130* 133* 134*   K 2.9* 3.2* 3.1* 3.2*   CL 94* 96 104 101   CO2 23 23 21* 23   BUN 13 12 12 13   CREATININE 0.8 0.9 0.7 0.8   CALCIUM 8.8 8.1* 7.6* 7.8*   PROT 7.2 6.4 5.4*  --    BILITOT 2.0* 1.5* 1.8*  --    ALKPHOS 87 84 69  --    ALT 60* 55* 55*  --    AST 90* 81* 73*  --    MG 2.1 1.8 2.1  --    PHOS  --  1.5* 2.1* 2.6*    No results for input(s): "PH", "PCO2", "PO2", "HCO3", "POCSATURATED", "BE" in the last 168 hours.     POCT Glucose: HbA1c:    No results for input(s): "POCTGLUCOSE" in the last 168 hours. Hemoglobin A1C   Date Value Ref Range Status   08/11/2023 5.6 4.0 - 5.6 % Final     Comment:     ADA Screening Guidelines:  5.7-6.4%  Consistent with prediabetes  >or=6.5%  Consistent with diabetes    High levels of fetal hemoglobin interfere with the HbA1C  assay. Heterozygous hemoglobin variants (HbS, HgC, etc)do  not significantly interfere with this assay.   However, presence of multiple variants may affect accuracy.     05/10/2018 5.4 4.0 - 5.6 % Final     Comment:     According to ADA guidelines, hemoglobin A1c <7.0% represents  optimal control in non-pregnant diabetic patients. Different  metrics may apply to specific patient populations.   Standards of Medical Care in Diabetes-2016.  For the purpose of screening for the presence of diabetes:  <5.7%     Consistent with the absence of diabetes  5.7-6.4%  Consistent with increasing risk for diabetes " "  (prediabetes)  >or=6.5%  Consistent with diabetes  Currently, no consensus exists for use of hemoglobin A1c  for diagnosis of diabetes for children.  This Hemoglobin A1c assay has significant interference with fetal   hemoglobin   (HbF). The results are invalid for patients with abnormal amounts of   HbF,   including those with known Hereditary Persistence   of Fetal Hemoglobin. Heterozygous hemoglobin variants (HbAS, HbAC,   HbAD, HbAE, HbA2) do not significantly interfere with this assay;   however, presence of multiple variants in a sample may impact the %   interference.          Cardiac Enzymes: Ejection Fractions:    No results for input(s): "CPK", "CPKMB", "MB", "TROPONINI" in the last 72 hours. EF   Date Value Ref Range Status   07/05/2022 65 % Final     Nuc Stress EF   Date Value Ref Range Status   07/21/2021 56 % Final     Nuc Rest EF   Date Value Ref Range Status   07/21/2021 58  Final          Recent Labs   Lab 12/13/23  2346   COLORU Yellow   APPEARANCEUA Hazy*   PHUR 6.0   SPECGRAV 1.020   PROTEINUA 1+*   GLUCUA Negative   KETONESU 1+*   BILIRUBINUA Negative   OCCULTUA 3+*   NITRITE Positive*   LEUKOCYTESUR 3+*   RBCUA 35*   WBCUA >100*   BACTERIA Many*   HYALINECASTS 0       Lactate (Lactic Acid) (mmol/L)   Date Value   12/13/2023 1.4     BNP (pg/mL)   Date Value   07/05/2022 596 (H)     CRP (mg/L)   Date Value   12/14/2023 206.0 (H)     Sed Rate (mm/Hr)   Date Value   12/14/2023 76 (H)     No results found for: "DDIMER"  Ferritin (ng/mL)   Date Value   09/14/2023 81   03/21/2018 35     No results found for: "LDH"  Troponin I (ng/mL)   Date Value   07/05/2022 <0.006   07/05/2022 0.009     Results for orders placed or performed in visit on 07/01/21   Vitamin D   Result Value Ref Range    Vit D, 25-Hydroxy 31 30 - 96 ng/mL   Results for orders placed or performed in visit on 05/05/21   Vitamin D   Result Value Ref Range    Vit D, 25-Hydroxy 38 30 - 96 ng/mL   Results for orders placed or performed in " visit on 03/21/18   Vitamin D   Result Value Ref Range    Vit D, 25-Hydroxy 29 (L) 30 - 96 ng/mL     SARS-CoV-2 RNA, Amplification, Qual (no units)   Date Value   12/13/2023 Negative     POC Rapid COVID (no units)   Date Value   07/05/2022 Negative   12/22/2020 Negative       Microbiology labs for the last week  Microbiology Results (last 7 days)       Procedure Component Value Units Date/Time    Blood culture [[id number]] Collected: 12/14/23 2310    Order Status: Completed Specimen: Blood Updated: 12/15/23 1726     Blood Culture, Routine Gram stain aer bottle: Gram positive cocci in clusters resembling Staph      Positive results previously called 12/15/2023  17:26    Blood culture [[id number]] Collected: 12/14/23 2310    Order Status: Completed Specimen: Blood Updated: 12/15/23 1558     Blood Culture, Routine Gram stain aer bottle: Gram positive cocci in clusters resembling Staph      Results called to and read back by: Krupa Salas RN 12/15/2023  15:52    Blood culture [[id number]]  (Abnormal) Collected: 12/14/23 0552    Order Status: Completed Specimen: Blood from Peripheral, Forearm, Right Updated: 12/15/23 0846     Blood Culture, Routine Gram stain aer bottle: Gram positive cocci in clusters resembling Staph      Positive results previously called 12/14/2023      STAPHYLOCOCCUS AUREUS  For susceptibility see order #[id number]      Blood culture [[id number]]  (Abnormal) Collected: 12/14/23 0552    Order Status: Completed Specimen: Blood from Peripheral, Forearm, Right Updated: 12/15/23 0844     Blood Culture, Routine Gram stain aer bottle: Gram positive cocci in clusters resembling Staph      Results called to and read back by:Benita Mercedes RN 12/14/2023  20:43      STAPHYLOCOCCUS AUREUS  Susceptibility pending      MRSA/SA Rapid ID by PCR from Blood culture [[id number]]  (Abnormal) Collected: 12/14/23 0552    Order Status: Completed Updated: 12/14/23 2150     Staph aureus ID by PCR Positive     Methicillin  Resistant ID by PCR Negative    Urine culture [[id number]]  (Abnormal) Collected: 12/13/23 2346    Order Status: Completed Specimen: Urine Updated: 12/14/23 2054     Urine Culture, Routine STAPHYLOCOCCUS AUREUS  > 100,000 cfu/ml  Susceptibility pending      Narrative:      Specimen Source->Urine    Clostridium difficile EIA [[id number]] Collected: 12/14/23 0609    Order Status: Completed Specimen: Stool Updated: 12/14/23 1255     C. diff Antigen Negative     C difficile Toxins A+B, EIA Negative     Comment: Testing not recommended for children <24 months old.       Stool culture [[id number]]     Order Status: Canceled Specimen: Stool     Influenza A & B by Molecular [[id number]] Collected: 12/13/23 2214    Order Status: Completed Specimen: Nasopharyngeal Swab Updated: 12/13/23 2254     Influenza A, Molecular Negative     Influenza B, Molecular Negative     Flu A & B Source Nasal swab            Reviewed and noted in plan where applicable- Please see chart for full lab data.    Lines/Drains:       Peripheral IV - Single Lumen 12/13/23 2210 20 G Left Antecubital (Active)   Site Assessment Clean;Dry;Intact 12/13/23 2211   Extremity Assessment Distal to IV No abnormal discoloration;No redness;No swelling 12/13/23 2211   Dressing Status Clean;Dry;Intact 12/13/23 2211   Dressing Intervention First dressing 12/13/23 2211   Number of days: 0       Imaging  ECG Results              EKG 12-lead (Final result)  Result time 12/14/23 14:17:37      Final result by Interface, Lab In [facility] (12/14/23 14:17:37)               Narrative:    Test Reason : R11.0,    Vent. Rate : 103 BPM     Atrial Rate : 103 BPM     P-R Int : 170 ms          QRS Dur : 176 ms      QT Int : 410 ms       P-R-T Axes : 037 158 -07 degrees     QTc Int : 537 ms    Atrial-sensed ventricular-paced rhythm  Biventricular pacemaker detected  Abnormal ECG  When compared with ECG of 21-NOV-2023 08:01,  Vent. rate has increased BY  36 BPM  Confirmed by Lani BROOKS,  Ivone ([id number]) on 12/14/2023 2:17:29 PM    Referred By: [email or web address]   SELF           Confirmed By:Ivone Garibay MD                                  Results for orders placed during the hospital encounter of 07/05/22    Echo    Interpretation Summary  · Presence of bradycardia with HR 30s with AV dissociation  · The estimated ejection fraction is 65%.  · The left ventricle is normal in size with normal systolic function.  · Normal left ventricular diastolic function.  · Normal right ventricular size with normal right ventricular systolic function.  · Mild mitral regurgitation.  · Presence of interatrial septal aneurysm.  · The estimated PA systolic pressure is 28 mmHg.  · Normal central venous pressure (3 mmHg).      CT Abdomen Pelvis With IV Contrast NO Oral Contrast  Narrative: EXAMINATION:  CT ABDOMEN PELVIS WITH IV CONTRAST    CLINICAL HISTORY:  Abdominal abscess/infection suspected;LLQ abdominal pain;    TECHNIQUE:  Low dose axial images, sagittal and coronal reformations were obtained from the lung bases to the pubic symphysis following the IV administration of 75 mL of Omnipaque 350.Oral contrast was not given.    COMPARISON:  Ultrasound abdomen 11/01/2023    FINDINGS:  Comment: Motion compromised examination.    Lower thorax:    Heart: Cardiomegaly.  Cardiac pacing leads.  Severe calcific atherosclerosis of multiple coronary arteries.    Lung Bases: Small bilateral pleural effusions, larger on the right.  Bibasal linear opacities, likely subsegmental atelectasis versus scarring.    Liver: Normal in size and attenuation, with no focal hepatic lesions.    Gallbladder: No calcified gallstones.  Gallbladder is not distended.  No gallbladder wall thickening or pericholecystic fluid collection.    Bile Ducts: No intra or extrahepatic biliary duct dilatation.    Pancreas: No mass or peripancreatic fat stranding.    Spleen: Normal in size.  No focal lesion.    Adrenals: Unremarkable.    Kidneys/ Ureters: Normal in size  and location. Normal enhancement.  1.3 cm right lower pole nonobstructive nephrolithiasis.  No hydronephrosis or hydroureter.  Multiple cortical hypodensities which are too small to characterize likely renal cysts.  Nonspecific mild bilateral perinephric stranding.    Bladder: No evidence of wall thickening.    Reproductive organs: Prostate appears enlarged, measuring 4.6 cm with dystrophic calcification.    Gl/Mesentery/peritoneum and retroperitoneum: Small hiatal hernia.  Stomach is unremarkable.  Small and large bowel are normal in caliber with no evidence of obstruction.  Liquid stool throughout the small and large bowel with mild mesenteric soft tissue stranding and trace of free fluid, as may be seen in a nonspecific infectious versus noninfectious inflammatory enterocolitis.    Abdominal wall: Left fat containing inguinal hernia.    Vasculature: Moderate calcific atherosclerosis.  No aneurysm.    Bones: Degenerative changes.  Similar appearing compression fractures of T12 and L1 when compared to radiograph of 04/22/2021 and MRI of 04/05/2021, allowing for differences in technique/modality.  No acute fracture.  No suspicious lytic or sclerotic lesion.  Impression: 1. Intraluminal fluid throughout the small and large bowel with adjacent mesenteric fat stranding and free fluid, as may be seen in the setting of a nonspecific infectious or noninfectious inflammatory enterocolitis.  2. Nonobstructive right nephrolithiasis.  3. Small bilateral pleural effusions.  4. Additional details of chronic and incidental findings, as provided in the body of report.    Electronically signed by resident: Katherine Hidalgo  Date:    12/14/2023  Time:    06:04    Electronically signed by: Lalo Puri  Date:    12/14/2023  Time:    08:54      Labs, Imaging, EKG and Diagnostic results from 12/15/2023 were reviewed.    Medications:  Medication list was reviewed and changes noted under Assessment/Plan.  No current  facility-administered medications on file prior to encounter.     Current Outpatient Medications on File Prior to Encounter   Medication Sig Dispense Refill    aspirin (ECOTRIN) 81 MG EC tablet Take 81 mg by mouth 2 (two) times daily. (Breakfast & Afternoon)      atorvastatin (LIPITOR) 80 MG tablet TAKE 1 TABLET EVERY DAY (Patient taking differently: Take 80 mg by mouth every evening.) 90 tablet 10    calcium carbonate (TUMS ORAL) Take 2 tablets by mouth daily as needed (Heartburn).      coenzyme Q10 (CO Q-10) 300 mg Cap Take 1 capsule by mouth once daily.      dicyclomine (BENTYL) 10 MG capsule Take 10 mg by mouth daily as needed (Abdominal pain).      ERGOCALCIFEROL, VITAMIN D2, (VITAMIN D ORAL) Take 1 capsule by mouth Mon, Wed, Fri, Sat & Sun      ezetimibe (ZETIA) 10 mg tablet TAKE 1 TABLET EVERY DAY (Patient taking differently: Take 10 mg by mouth once daily.) 90 tablet 10    multivit-min/FA/lycopen/lutein (CENTRUM SILVER MEN ORAL) Take 1 tablet by mouth every Mon, Wed, Fri.      ondansetron (ZOFRAN-ODT) 4 MG TbDL Take 1 tablet (4 mg total) by mouth every 8 (eight) hours as needed (nausea). 12 tablet 0     Scheduled Medications:  aspirin, 81 mg, Oral, BID  atorvastatin, 80 mg, Oral, Daily  ceFAZolin (ANCEF) IVPB, 2 g, Intravenous, Q8H  dicyclomine, 10 mg, Oral, QID  ezetimibe, 10 mg, Oral, Daily      PRN: acetaminophen, acetaminophen, albuterol-ipratropium, aluminum-magnesium hydroxide-simethicone, calcium carbonate, dextrose 10%, dextrose 10%, glucagon (human recombinant), glucose, glucose, loperamide, melatonin, naloxone, ondansetron, simethicone, sodium chloride 0.9%  Infusions:       Estimated Creatinine Clearance: 82.6 mL/min (based on SCr of 0.8 mg/dL).           Assessment/Plan:      * Sepsis without acute organ dysfunction  This patient does have evidence of infective focus  My overall impression is sepsis.  Source: Urinary Tract and Abdominal  Antibiotics given-   Antibiotics (72h ago, onward)       Start     Stop Route Frequency Ordered    12/14/23 1900  vancomycin 750 mg in dextrose 5 % (D5W) 250 mL IVPB (Vial-Mate)         -- IV Every 12 hours (non-standard times) 12/14/23 0547    12/14/23 0900  ciprofloxacin HCl tablet 500 mg         12/19/23 0859 Oral Every 12 hours 12/14/23 0613    12/14/23 0623  vancomycin - pharmacy to dose  (vancomycin IVPB (PEDS and ADULTS))        See Hyperspace for full Linked Orders Report.    -- IV pharmacy to manage frequency 12/14/23 0523          Latest lactate reviewed-  Recent Labs   Lab 12/13/23  2212   LACTATE 1.4     Organ dysfunction indicated by  None- mild delirium (possible form electrolyte derangement and fevers) Mild transaminitis, mild thrombocytopenia    Fluid challenge Not needed - patient is not hypotensive      Post- resuscitation assessment No - Post resuscitation assessment not needed     Will Not start Pressors- Levophed for MAP of 65  Source control achieved by: Vancomycin ordered for staph aureus UTI on previous cultures, Ciprofloxacin ordered for possible infectious (vs other) diarrhea.  Blood, urine, stool cultures and studies pending.     Right kidney stone  12/15 s/p urology eval - stone is non obstructing. -No indication for acute urologic intervention.  outpatient urologic follow up for stone treatment            Bacteremia  12/15 BCX 2 from 12/14 with staph aureus. repeated BCX 2.    CHARMAINE ordered as with pacemaker . 1.3 cm right lower pole nonobstructive nephrolithiasis/ staph aureus on UC ( positive for MSSA urine culture from 9/2023). started on cefazolin. vancomycin discontinued       Thrombocytopenia  Patient was found to have thrombocytopenia, the likely etiology is secondary to sepsis/infection?, will monitor the platelets Daily. Will transfuse if platelet count is <20k. Hold DVT prophylaxis if platelets are <50k. The patient's platelet results have been reviewed and are listed below.  Recent Labs   Lab 12/15/23  0649   *            Hypophosphatemia  Patient has Abnormal Phosphorus: hypophosphatemia. Will continue to monitor electrolytes closely. Will replace the affected electrolytes and repeat labs to be done after interventions completed. The patient's phosphorus results have been reviewed and are listed below.  Recent Labs   Lab 12/15/23  0649   PHOS 2.1*        Transaminitis  -Mildly elevated, possible 2/2 dehydration/infection  -NO RUQ pain. T bili chronically elevated  -Check acute hepatitis panel   -Daily CMP    12/14 Patients liver enzymes  elevated.   Recent Labs     12/13/23  2212 12/14/23  0552 12/15/23  0649   BILITOT 2.0* 1.5* 1.8*   AST 90* 81* 73*   ALT 60* 55* 55*   ALKPHOS 87 84 69      . Liver enzymes  stable . monitor       Diarrhea  68M with acute onset of multiple episodes diarrhea with only mild LLQ abdominal cramping early in course. Mild nausea. No abdominal pain, emesis, and is tolerating PO. Having fevers, chills, polydipsia. Also urinary infectious symptoms. Previously seen by GI Dr. 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 Work up unremarkable.  Previous stool cultures without growth.Giardia negative in the past. Recent stool culture without growth and E coli antigen negative.   -Multiple electrolyte derangements on admission  -Tachycardic, febrile, and tachypneic   -S/p 2L IVF bolus in ED, continue IVFs today  -Check complete stool studies  -C-scope 8/2022 Lipoma in the recto-sigmoid colon. Biopsied and found to have surface erosions  -CT pending  -GI consulted, appreciate recommendations  -PRN bentyl for cramping, prn Zofran/compazine for nausea  -ON Vanc for sepsis/UTI and will add ciprofloxacin 500 mg Q12h x5 days for diarrhea  for now pending culture results  12/14  CT abdomen -  Intraluminal fluid throughout the small and large bowel with adjacent mesenteric fat stranding and free fluid, as may be seen in the setting of a nonspecific infectious or noninfectious  inflammatory enterocolitis. Nonobstructive right nephrolithiasis. loperamide PRN.   12/15 C diff  negative.     Complicated UTI (urinary tract infection)  Presenting with dysuria/frequency/urgency/maybe mild flank pain/fever/chills (also with diarrheal illness).  -3/4 SIRs on admit for fever, tachycardia tachypnea  -UA infectious, nitrite positive, 3+ leukocytes >100 WBC and many bacteria  -Previous urine cultures with staph aureus  -S/p ceftriaxone in ED  -Will cover with vancomycin   -Follow up urine cultures  -Follow up CT  -Monitor I/Os  12/14. BCX 2 and stool studies pending.continue ciprofloxacin and vancomycin   12/15  UC with STAPHYLOCOCCUS AUREUS > 100,000 cfu/ml  Susceptibility pending. echo ordered. tolerating soft diet. 1.3 cm right lower pole nonobstructive nephrolithiasis/ staph aureus on UC ( positive for MSSA urine culture from 9/2023). started on cefazolin    Hypokalemia  Patient has hypokalemia which is Acute and currently uncontrolled. Most recent potassium levels reviewed-   Lab Results   Component Value Date    K 3.1 (L) 12/15/2023   Will continue potassium replacement per protocol and recheck repeat levels after replacement completed. Continue to replace. Pt would prefer IV replacement. Pills hard to swallow and does not tolerate potassium bicarb well.    Hyponatremia  Patient has hyponatremia which is uncontrolled,We will aim to correct the sodium by 4-6mEq in 24 hours. We will monitor sodium Daily. The hyponatremia is due to Dehydration/hypovolemia. We will obtain the following studies: Urine sodium, urine osmolality, serum osmolality, or TSH, T4. We will treat the hyponatremia with IV fluids as follows: 2/p 2 L IVF in ED, continuous fluids at rate 100 ml/hr ordered. The patient's sodium results have been reviewed and are listed below.  Recent Labs   Lab 12/15/23  0649   *   improving.tolerating soft diet.    Complete heart block  -s/p dual chamber pacemaker placement   7/5/2022      Coronary artery disease of native artery of native heart with stable angina pectoris  Patient with known CAD s/p CABG, which is controlled Will continue ASA and Statin and monitor for S/Sx of angina/ACS. Continue to monitor on telemetry.     Gilbert's syndrome  -Chronic condition- reports diagnosed by Dr. Rowe years ago  -T bili elevated on admission, similar to previous  -Monitor CMP      HLD (hyperlipidemia)  -Continue home statin      VTE Risk Mitigation (From admission, onward)           Ordered     IP VTE LOW RISK PATIENT  Once         12/14/23 0526     Place sequential compression device  Until discontinued         12/14/23 0526                    Discharge Planning   MAGNO: 12/17/2023     Code Status: Full Code   Is the patient medically ready for discharge?: No    Reason for patient still in hospital (select all that apply): Treatment                     Mukesh Golden MD  Department of Hospital Medicine   [facility] - Emergency Dept

## 2023-12-15 NOTE — ASSESSMENT & PLAN NOTE
68M with acute onset of multiple episodes diarrhea with only mild LLQ abdominal cramping early in course. Mild nausea. No abdominal pain, emesis, and is tolerating PO. Having fevers, chills, polydipsia. Also urinary infectious symptoms. Previously seen by GI Dr. 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 Work up unremarkable.  Previous stool cultures without growth.Giardia negative in the past. Recent stool culture without growth and E coli antigen negative.   -Multiple electrolyte derangements on admission  -Tachycardic, febrile, and tachypneic   -S/p 2L IVF bolus in ED, continue IVFs today  -Check complete stool studies  -C-scope 8/2022 Lipoma in the recto-sigmoid colon. Biopsied and found to have surface erosions  -CT pending  -GI consulted, appreciate recommendations  -PRN bentyl for cramping, prn Zofran/compazine for nausea  -ON Vanc for sepsis/UTI and will add ciprofloxacin 500 mg Q12h x5 days for diarrhea  for now pending culture results  12/14  CT abdomen -  Intraluminal fluid throughout the small and large bowel with adjacent mesenteric fat stranding and free fluid, as may be seen in the setting of a nonspecific infectious or noninfectious inflammatory enterocolitis. Nonobstructive right nephrolithiasis. loperamide PRN.   12/15 C diff  and stool culture negative.

## 2023-12-15 NOTE — CARE UPDATE
RAPID RESPONSE NURSE ROUND       Rounding completed with charge RNChandu for decreased O2  reports 94% on RA, supplemental O2 removed once transferred from the ED. Pt asymptomatic on room air. No additional concerns verbalized at this time. Instructed to call [phone] for further concerns or assistance.

## 2023-12-15 NOTE — PROGRESS NOTES
Pharmacokinetic Assessment Follow Up: IV Vancomycin    Therapy with vancomycin complete and/or consult discontinued by provider. Pharmacy will sign off, please re-consult as needed.    Erich Winslow, PharmD, BCPS

## 2023-12-15 NOTE — PLAN OF CARE
Problem: Nutrition Impaired (Sepsis/Septic Shock)  Goal: Optimal Nutrition Intake  Outcome: Ongoing, Progressing     Problem: Infection Progression (Sepsis/Septic Shock)  Goal: Absence of Infection Signs and Symptoms  Outcome: Ongoing, Progressing     Problem: Glycemic Control Impaired (Sepsis/Septic Shock)  Goal: Blood Glucose Level Within Desired Range  Outcome: Ongoing, Progressing     Problem: Bleeding (Sepsis/Septic Shock)  Goal: Absence of Bleeding  Outcome: Ongoing, Progressing

## 2023-12-15 NOTE — ASSESSMENT & PLAN NOTE
Presenting with dysuria/frequency/urgency/maybe mild flank pain/fever/chills (also with diarrheal illness).  -3/4 SIRs on admit for fever, tachycardia tachypnea  -UA infectious, nitrite positive, 3+ leukocytes >100 WBC and many bacteria  -Previous urine cultures with staph aureus  -S/p ceftriaxone in ED  -Will cover with vancomycin   -Follow up urine cultures  -Follow up CT  -Monitor I/Os  12/14. BCX 2 and stool studies pending.continue ciprofloxacin and vancomycin   12/15  UC with STAPHYLOCOCCUS AUREUS > 100,000 cfu/ml  Susceptibility pending. echo ordered. tolerating soft diet. 1.3 cm right lower pole nonobstructive nephrolithiasis/ staph aureus on UC ( positive for MSSA urine culture from 9/2023). started on cefazolin

## 2023-12-15 NOTE — NURSING
Nurses Note -- 4 Eyes      12/14/2023   9:31 PM      Skin assessed during: Admit      [x] No Altered Skin Integrity Present    [x]Prevention Measures Documented      [] Yes- Altered Skin Integrity Present or Discovered   [] LDA Added if Not in Epic (Describe Wound)   [] New Altered Skin Integrity was Present on Admit and Documented in LDA   [] Wound Image Taken    Wound Care Consulted? No    Attending Nurse:  Viktor Yeung RN/Staff Member:   Chandu

## 2023-12-15 NOTE — CONSULTS
HPI:    Patient ID: Maureen Alves is a 77year old female.     HPI  Has noted the BS getting lower lately   In the 90s   Lowest was 75     Off metformin since 12/5 and the #s have been creeping up   130-140 range   No jittereiness w the low BS    Has be [facility] Surg  Urology  Consult Note    Patient Name: Michael Espinoza  MRN: [id number]  Admission Date: 12/13/2023  Hospital Length of Stay: 0   Code Status: Full Code   Attending Provider: Mukesh Golden MD   Consulting Provider: SILVINO MORGAN MD  Primary Care Physician: Dominguez Hyatt MD  Principal Problem:Sepsis without acute organ dysfunction    Inpatient consult to Urology  Consult performed by: Silvino Morgan MD  Consult ordered by: Mukesh Golden MD  Reason for consult: Right kidney stone          Subjective:     HPI:  Michael Espinoza is a 68 y.o. male with PMHx of CAD s/p CABG x3, HLD, complete heart block s/p dual chamber pacemaker. He presents to [facility] ED 12/13 with diarrhea for the last 6 days. On 12/8 he developed left lower quadrant cramping followed by multiple episodes of loose stool.  He also began having urinary frequency, urgency, and dysuria and though his right kidney was mildly painful. Denies hematuria.     On assessment, patient is afebrile, VSS. Patient reports dysuria about one week ago that has since resolved. Patient denies flank pain, N/V, fevers, chills, dysuria, frequency, urgency, hematuria.   WBC 9.5 Urine culture 12/13 with Staph aureus. Blood culture 12/14 with staph aureus. Repeat blood cultures no growth.   Cr 0.7  at baseline.  CTRSS with 1.3 cm in right lower pole stone. No hydronephrosis. No left urolithiasis or hydronephrosis.     Past Medical History:   Diagnosis Date    Complete heart block 7/5/2022    Coronary artery disease of native artery of native heart with stable angina pectoris 4/10/2018    Hyperlipidemia        Past Surgical History:   Procedure Laterality Date    A-V CARDIAC PACEMAKER INSERTION N/A 7/5/2022    Procedure: INSERTION, CARDIAC PACEMAKER, DUAL CHAMBER;  Surgeon: Alessandro Canales MD;  Location: [facility];  Service: Cardiology;  Laterality: N/A;  CHB, TBD, ANES, ED 6, MB    COLONOSCOPY N/A 8/5/2022    Procedure: COLONOSCOPY;  Surgeon:  Externally Cream Apply 1 Application topically daily. (Patient taking differently: Apply 1 Application topically daily as needed.) Disp: 45 g Rfl: 0   Multiple Vitamin (MULTI-VITAMIN) Oral Tab Take 1 Tab by mouth daily.  Disp:  Rfl:    PEG 3350 Oral Powd Pa Namita Dumont MD;  Location: [facility] ([address] [place] [address]);  Service: Endoscopy;  Laterality: N/A;  vacc-inst portal-tb    CYST REMOVAL      TONSILLECTOMY         Review of patient's allergies indicates:  No Known Allergies    Family History       Problem Relation (Age of Onset)    Diabetes Brother    Heart attack Brother    Heart disease Brother    Hyperlipidemia Brother    Hypertension Brother            Tobacco Use    Smoking status: Never     Passive exposure: Never    Smokeless tobacco: Never   Substance and Sexual Activity    Alcohol use: Yes     Alcohol/week: 1.0 standard drink of alcohol     Types: 1 Glasses of wine per week     Comment: 1 drink 2-3 times/weekly    Drug use: No    Sexual activity: Not on file       Review of Systems   Constitutional:  Negative for appetite change, chills and fever.   HENT:  Negative for congestion and sore throat.    Respiratory:  Negative for cough and wheezing.    Gastrointestinal:  Positive for diarrhea. Negative for abdominal pain, nausea and vomiting.   Genitourinary:  Negative for dysuria, flank pain, hematuria and urgency.   Neurological:  Negative for headaches.   Psychiatric/Behavioral:  Negative for agitation.        Objective:     Temp:  [97.5 °F (36.4 °C)-98.3 °F (36.8 °C)] 97.7 °F (36.5 °C)  Pulse:  [] 113  Resp:  [16-20] 16  SpO2:  [93 %-96 %] 95 %  BP: (103-137)/(63-77) 137/76  Weight: 76 kg (167 lb 8.8 oz)  Body mass index is 26.24 kg/m².    Date 12/15/23 0700 - 12/16/23 0659   Shift 7982-4235 7470-9277 0485-7907 24 Hour Total   INTAKE   P.O. 120   120   Shift Total(mL/kg) 120(1.6)   120(1.6)   OUTPUT   Shift Total(mL/kg)       Weight (kg) 76 76 76 76          Drains       None                    Physical Exam  Constitutional:       General: He is not in acute distress.  HENT:      Head: Normocephalic.   Cardiovascular:      Rate and Rhythm: Normal rate.   Pulmonary:      Effort: Pulmonary effort is normal.   Abdominal:      General: Abdomen is flat. There is  no distension.      Palpations: Abdomen is soft.      Tenderness: There is no abdominal tenderness. There is no right CVA tenderness or left CVA tenderness.   Musculoskeletal:         General: Normal range of motion.   Skin:     General: Skin is warm and dry.   Neurological:      Mental Status: He is alert.      Coordination: Coordination normal.   Psychiatric:         Behavior: Behavior normal.          Significant Labs:    BMP:  Recent Labs   Lab 12/13/23 2212 12/14/23  0552 12/15/23  0649   * 130* 133*   K 2.9* 3.2* 3.1*   CL 94* 96 104   CO2 23 23 21*   BUN 13 12 12   CREATININE 0.8 0.9 0.7   CALCIUM 8.8 8.1* 7.6*       CBC:  Recent Labs   Lab 12/13/23  2212 12/13/23  2224 12/14/23  0552 12/15/23  0649   WBC 8.58  --  10.62 9.49   HGB 14.1  --  13.2* 12.4*   HCT 40.2 44 38.7* 35.3*   *  --  108* 116*       All pertinent labs results from the past 24 hours have been reviewed.    Significant Imaging:  All pertinent imaging results/findings from the past 24 hours have been reviewed.                    Assessment and Plan:     Right kidney stone  Michael Espinoza is a 68 y.o. M with right lower pole stone with urine and blood cultures with Staph aureus.     -- Stone is non obstructing. No hydronephrosis. Patient is currently asymptomatic. Cr is at baseline  --No indication for acute urologic intervention  -- Antibiotics per ID  -- Rest of care per primary  -- Will arrange outpatient urologic follow up for stone treatment          VTE Risk Mitigation (From admission, onward)           Ordered     IP VTE LOW RISK PATIENT  Once         12/14/23 0526     Place sequential compression device  Until discontinued         12/14/23 0526                    Thank you for your consult. I will sign off. Please contact us if you have any additional questions.    ANATOLIY FOSTER MD  Urology  [facility]   use status (hcc)  (primary encounter diagnosis)  Essential hypertension, benign    No orders of the defined types were placed in this encounter.       Meds This Visit:  No prescriptions requested or ordered in this encounter    Imaging & Referrals:  None

## 2023-12-16 LAB
ALBUMIN SERPL BCP-MCNC: 2.2 G/DL (ref 3.5–5.2)
ALBUMIN SERPL BCP-MCNC: 2.4 G/DL (ref 3.5–5.2)
ALP SERPL-CCNC: 86 U/L (ref 55–135)
ALT SERPL W/O P-5'-P-CCNC: 116 U/L (ref 10–44)
ANION GAP SERPL CALC-SCNC: 11 MMOL/L (ref 8–16)
ANION GAP SERPL CALC-SCNC: 6 MMOL/L (ref 8–16)
ASCENDING AORTA: 3 CM
AST SERPL-CCNC: 167 U/L (ref 10–40)
AV INDEX (PROSTH): 0.46
AV MEAN GRADIENT: 7 MMHG
AV PEAK GRADIENT: 11 MMHG
AV VALVE AREA BY VELOCITY RATIO: 1.98 CM²
AV VALVE AREA: 1.74 CM²
AV VELOCITY RATIO: 0.52
BACTERIA BLD CULT: ABNORMAL
BACTERIA UR CULT: ABNORMAL
BASOPHILS # BLD AUTO: 0.03 K/UL (ref 0–0.2)
BASOPHILS NFR BLD: 0.3 % (ref 0–1.9)
BILIRUB SERPL-MCNC: 1.7 MG/DL (ref 0.1–1)
BSA FOR ECHO PROCEDURE: 1.89 M2
BUN SERPL-MCNC: 11 MG/DL (ref 8–23)
BUN SERPL-MCNC: 12 MG/DL (ref 8–23)
CALCIUM SERPL-MCNC: 7.8 MG/DL (ref 8.7–10.5)
CALCIUM SERPL-MCNC: 8.4 MG/DL (ref 8.7–10.5)
CHLORIDE SERPL-SCNC: 103 MMOL/L (ref 95–110)
CHLORIDE SERPL-SCNC: 103 MMOL/L (ref 95–110)
CO2 SERPL-SCNC: 22 MMOL/L (ref 23–29)
CO2 SERPL-SCNC: 24 MMOL/L (ref 23–29)
CREAT SERPL-MCNC: 0.8 MG/DL (ref 0.5–1.4)
CREAT SERPL-MCNC: 0.8 MG/DL (ref 0.5–1.4)
CV ECHO LV RWT: 0.37 CM
DIFFERENTIAL METHOD: ABNORMAL
DOP CALC AO PEAK VEL: 1.65 M/S
DOP CALC AO VTI: 29.53 CM
DOP CALC LVOT AREA: 3.8 CM2
DOP CALC LVOT DIAMETER: 2.2 CM
DOP CALC LVOT PEAK VEL: 0.86 M/S
DOP CALC LVOT STROKE VOLUME: 51.25 CM3
DOP CALCLVOT PEAK VEL VTI: 13.49 CM
E WAVE DECELERATION TIME: 108.15 MSEC
E/A RATIO: 0.82
E/E' RATIO: 6.45 M/S
ECHO LV POSTERIOR WALL: 0.88 CM (ref 0.6–1.1)
EJECTION FRACTION: 43 %
EOSINOPHIL # BLD AUTO: 0.2 K/UL (ref 0–0.5)
EOSINOPHIL NFR BLD: 1.9 % (ref 0–8)
ERYTHROCYTE [DISTWIDTH] IN BLOOD BY AUTOMATED COUNT: 13.8 % (ref 11.5–14.5)
EST. GFR  (NO RACE VARIABLE): >60 ML/MIN/1.73 M^2
EST. GFR  (NO RACE VARIABLE): >60 ML/MIN/1.73 M^2
FRACTIONAL SHORTENING: 38 % (ref 28–44)
GLUCOSE SERPL-MCNC: 119 MG/DL (ref 70–110)
GLUCOSE SERPL-MCNC: 99 MG/DL (ref 70–110)
HCT VFR BLD AUTO: 35.8 % (ref 40–54)
HGB BLD-MCNC: 12.9 G/DL (ref 14–18)
IMM GRANULOCYTES # BLD AUTO: 0.06 K/UL (ref 0–0.04)
IMM GRANULOCYTES NFR BLD AUTO: 0.6 % (ref 0–0.5)
INTERVENTRICULAR SEPTUM: 0.84 CM (ref 0.6–1.1)
IVRT: 105.61 MSEC
LA MAJOR: 5.74 CM
LA MINOR: 6.25 CM
LA WIDTH: 4.56 CM
LEFT ATRIUM SIZE: 4.89 CM
LEFT ATRIUM VOLUME INDEX MOD: 30.6 ML/M2
LEFT ATRIUM VOLUME INDEX: 60.7 ML/M2
LEFT ATRIUM VOLUME MOD: 57.2 CM3
LEFT ATRIUM VOLUME: 113.42 CM3
LEFT INTERNAL DIMENSION IN SYSTOLE: 2.96 CM (ref 2.1–4)
LEFT VENTRICLE DIASTOLIC VOLUME INDEX: 55.96 ML/M2
LEFT VENTRICLE DIASTOLIC VOLUME: 104.64 ML
LEFT VENTRICLE MASS INDEX: 73 G/M2
LEFT VENTRICLE SYSTOLIC VOLUME INDEX: 18.1 ML/M2
LEFT VENTRICLE SYSTOLIC VOLUME: 33.78 ML
LEFT VENTRICULAR INTERNAL DIMENSION IN DIASTOLE: 4.74 CM (ref 3.5–6)
LEFT VENTRICULAR MASS: 136.29 G
LV LATERAL E/E' RATIO: 4.73 M/S
LV SEPTAL E/E' RATIO: 10.14 M/S
LYMPHOCYTES # BLD AUTO: 0.8 K/UL (ref 1–4.8)
LYMPHOCYTES NFR BLD: 8.2 % (ref 18–48)
MAGNESIUM SERPL-MCNC: 2.1 MG/DL (ref 1.6–2.6)
MCH RBC QN AUTO: 31.2 PG (ref 27–31)
MCHC RBC AUTO-ENTMCNC: 36 G/DL (ref 32–36)
MCV RBC AUTO: 87 FL (ref 82–98)
MONOCYTES # BLD AUTO: 0.7 K/UL (ref 0.3–1)
MONOCYTES NFR BLD: 7.2 % (ref 4–15)
MV PEAK A VEL: 0.87 M/S
MV PEAK E VEL: 0.71 M/S
MV STENOSIS PRESSURE HALF TIME: 31.36 MS
MV VALVE AREA P 1/2 METHOD: 7.02 CM2
NEUTROPHILS # BLD AUTO: 7.6 K/UL (ref 1.8–7.7)
NEUTROPHILS NFR BLD: 81.8 % (ref 38–73)
NRBC BLD-RTO: 0 /100 WBC
O+P STL MICRO: NORMAL
PHOSPHATE SERPL-MCNC: 1.8 MG/DL (ref 2.7–4.5)
PHOSPHATE SERPL-MCNC: 2.1 MG/DL (ref 2.7–4.5)
PISA TR MAX VEL: 2.84 M/S
PLATELET # BLD AUTO: 150 K/UL (ref 150–450)
PMV BLD AUTO: 10.5 FL (ref 9.2–12.9)
POTASSIUM SERPL-SCNC: 3.1 MMOL/L (ref 3.5–5.1)
POTASSIUM SERPL-SCNC: 3.4 MMOL/L (ref 3.5–5.1)
PROT SERPL-MCNC: 5.7 G/DL (ref 6–8.4)
RA MAJOR: 4.49 CM
RA PRESSURE ESTIMATED: 3 MMHG
RA WIDTH: 4.54 CM
RBC # BLD AUTO: 4.14 M/UL (ref 4.6–6.2)
RIGHT VENTRICULAR END-DIASTOLIC DIMENSION: 3.41 CM
RV TB RVSP: 6 MMHG
SINUS: 3.1 CM
SODIUM SERPL-SCNC: 133 MMOL/L (ref 136–145)
SODIUM SERPL-SCNC: 136 MMOL/L (ref 136–145)
STJ: 2.45 CM
TDI LATERAL: 0.15 M/S
TDI SEPTAL: 0.07 M/S
TDI: 0.11 M/S
TR MAX PG: 32 MMHG
TRICUSPID ANNULAR PLANE SYSTOLIC EXCURSION: 1.1 CM
TV REST PULMONARY ARTERY PRESSURE: 35 MMHG
WBC # BLD AUTO: 9.34 K/UL (ref 3.9–12.7)
Z-SCORE OF LEFT VENTRICULAR DIMENSION IN END DIASTOLE: -0.95
Z-SCORE OF LEFT VENTRICULAR DIMENSION IN END SYSTOLE: -0.65

## 2023-12-16 PROCEDURE — 63600175 PHARM REV CODE 636 W HCPCS: Performed by: REGISTERED NURSE

## 2023-12-16 PROCEDURE — 85025 COMPLETE CBC W/AUTO DIFF WBC: CPT

## 2023-12-16 PROCEDURE — 63600175 PHARM REV CODE 636 W HCPCS: Performed by: HOSPITALIST

## 2023-12-16 PROCEDURE — 80053 COMPREHEN METABOLIC PANEL: CPT

## 2023-12-16 PROCEDURE — 25000003 PHARM REV CODE 250: Performed by: REGISTERED NURSE

## 2023-12-16 PROCEDURE — 21400001 HC TELEMETRY ROOM

## 2023-12-16 PROCEDURE — 84100 ASSAY OF PHOSPHORUS: CPT

## 2023-12-16 PROCEDURE — 25000003 PHARM REV CODE 250

## 2023-12-16 PROCEDURE — 25000003 PHARM REV CODE 250: Performed by: HOSPITALIST

## 2023-12-16 PROCEDURE — 36415 COLL VENOUS BLD VENIPUNCTURE: CPT | Performed by: HOSPITALIST

## 2023-12-16 PROCEDURE — 83735 ASSAY OF MAGNESIUM: CPT

## 2023-12-16 PROCEDURE — 80069 RENAL FUNCTION PANEL: CPT | Performed by: HOSPITALIST

## 2023-12-16 PROCEDURE — 87040 BLOOD CULTURE FOR BACTERIA: CPT | Performed by: HOSPITALIST

## 2023-12-16 RX ORDER — POTASSIUM CHLORIDE 20 MEQ/1
40 TABLET, EXTENDED RELEASE ORAL ONCE
Status: COMPLETED | OUTPATIENT
Start: 2023-12-16 | End: 2023-12-16

## 2023-12-16 RX ORDER — SODIUM,POTASSIUM PHOSPHATES 280-250MG
2 POWDER IN PACKET (EA) ORAL
Status: COMPLETED | OUTPATIENT
Start: 2023-12-16 | End: 2023-12-16

## 2023-12-16 RX ORDER — ENOXAPARIN SODIUM 100 MG/ML
40 INJECTION SUBCUTANEOUS EVERY 24 HOURS
Status: DISCONTINUED | OUTPATIENT
Start: 2023-12-16 | End: 2023-12-22 | Stop reason: HOSPADM

## 2023-12-16 RX ADMIN — POTASSIUM & SODIUM PHOSPHATES POWDER PACK 280-160-250 MG 2 PACKET: 280-160-250 PACK at 09:12

## 2023-12-16 RX ADMIN — SIMETHICONE 80 MG: 80 TABLET, CHEWABLE ORAL at 09:12

## 2023-12-16 RX ADMIN — DICYCLOMINE HYDROCHLORIDE 10 MG: 10 CAPSULE ORAL at 09:12

## 2023-12-16 RX ADMIN — ENOXAPARIN SODIUM 40 MG: 40 INJECTION SUBCUTANEOUS at 04:12

## 2023-12-16 RX ADMIN — DICYCLOMINE HYDROCHLORIDE 10 MG: 10 CAPSULE ORAL at 01:12

## 2023-12-16 RX ADMIN — CEFAZOLIN 2 G: 2 INJECTION, POWDER, FOR SOLUTION INTRAMUSCULAR; INTRAVENOUS at 09:12

## 2023-12-16 RX ADMIN — DICYCLOMINE HYDROCHLORIDE 10 MG: 10 CAPSULE ORAL at 04:12

## 2023-12-16 RX ADMIN — POTASSIUM CHLORIDE 40 MEQ: 1500 TABLET, EXTENDED RELEASE ORAL at 11:12

## 2023-12-16 RX ADMIN — ATORVASTATIN CALCIUM 80 MG: 40 TABLET, FILM COATED ORAL at 09:12

## 2023-12-16 RX ADMIN — POTASSIUM CHLORIDE 40 MEQ: 1500 TABLET, EXTENDED RELEASE ORAL at 09:12

## 2023-12-16 RX ADMIN — CEFAZOLIN 2 G: 2 INJECTION, POWDER, FOR SOLUTION INTRAMUSCULAR; INTRAVENOUS at 05:12

## 2023-12-16 RX ADMIN — CEFAZOLIN 2 G: 2 INJECTION, POWDER, FOR SOLUTION INTRAMUSCULAR; INTRAVENOUS at 01:12

## 2023-12-16 RX ADMIN — POTASSIUM & SODIUM PHOSPHATES POWDER PACK 280-160-250 MG 2 PACKET: 280-160-250 PACK at 04:12

## 2023-12-16 RX ADMIN — EZETIMIBE 10 MG: 10 TABLET ORAL at 09:12

## 2023-12-16 RX ADMIN — ASPIRIN 81 MG: 81 TABLET, COATED ORAL at 09:12

## 2023-12-16 RX ADMIN — POTASSIUM & SODIUM PHOSPHATES POWDER PACK 280-160-250 MG 2 PACKET: 280-160-250 PACK at 11:12

## 2023-12-16 NOTE — ASSESSMENT & PLAN NOTE
12/15 s/p urology eval - stone is non obstructing. -No indication for acute urologic intervention.  outpatient urologic follow up for stone treatment

## 2023-12-16 NOTE — PLAN OF CARE
Problem: Adult Inpatient Plan of Care  Goal: Plan of Care Review  Outcome: Ongoing, Progressing  Flowsheets (Taken 12/16/2023 1232)  Plan of Care Reviewed With: patient  Goal: Patient-Specific Goal (Individualized)  Outcome: Ongoing, Progressing  Flowsheets (Taken 12/16/2023 1232)  Anxieties, Fears or Concerns: none  Individualized Care Needs: none  Goal: Absence of Hospital-Acquired Illness or Injury  Outcome: Ongoing, Progressing  Intervention: Identify and Manage Fall Risk  Flowsheets (Taken 12/16/2023 1232)  Safety Promotion/Fall Prevention:   assistive device/personal item within reach   side rails raised x 2   medications reviewed  Intervention: Prevent Skin Injury  Flowsheets (Taken 12/16/2023 1232)  Body Position: position changed independently  Skin Protection: adhesive use limited  Intervention: Prevent and Manage VTE (Venous Thromboembolism) Risk  Flowsheets (Taken 12/16/2023 1232)  Activity Management: Ambulated in room - L4  VTE Prevention/Management:   ambulation promoted   bleeding precations maintained   bleeding risk assessed   ROM (active) performed  Range of Motion: active ROM (range of motion) encouraged  Goal: Optimal Comfort and Wellbeing  Outcome: Ongoing, Progressing  Intervention: Monitor Pain and Promote Comfort  Flowsheets (Taken 12/16/2023 1232)  Pain Management Interventions: pain management plan reviewed with patient/caregiver  Goal: Readiness for Transition of Care  Outcome: Ongoing, Progressing     Problem: Infection  Goal: Absence of Infection Signs and Symptoms  Outcome: Ongoing, Progressing  Intervention: Prevent or Manage Infection  Flowsheets (Taken 12/16/2023 1232)  Infection Management: aseptic technique maintained  Isolation Precautions:   protective   precautions maintained     Problem: Adjustment to Illness (Sepsis/Septic Shock)  Goal: Optimal Coping  Outcome: Ongoing, Progressing  Intervention: Optimize Psychosocial Adjustment to Illness  Flowsheets (Taken 12/16/2023  1232)  Supportive Measures: active listening utilized  Family/Support System Care: involvement promoted     Problem: Glycemic Control Impaired (Sepsis/Septic Shock)  Goal: Blood Glucose Level Within Desired Range  Outcome: Ongoing, Progressing  Intervention: Optimize Glycemic Control  Flowsheets (Taken 12/16/2023 1232)  Glycemic Management: blood glucose monitored     Problem: Infection Progression (Sepsis/Septic Shock)  Goal: Absence of Infection Signs and Symptoms  Outcome: Ongoing, Progressing     Problem: Nutrition Impaired (Sepsis/Septic Shock)  Goal: Optimal Nutrition Intake  Outcome: Ongoing, Progressing

## 2023-12-16 NOTE — PLAN OF CARE
[facility] - Med Surg  Initial Discharge Assessment       Primary Care Provider: Dominguez Hyatt MD    Admission Diagnosis: Dehydration [E86.0]  Hypokalemia [E87.6]  Hyponatremia [E87.1]  Nausea [R11.0]  Chest pain [R07.9]  Urinary tract infection with hematuria, site unspecified [N39.0, R31.9]  Diarrhea, unspecified type [R19.7]    Admission Date: 12/13/2023  Expected Discharge Date: 12/19/2023    Transition of Care Barriers: None    Payor: HU[employer] MANAGED MEDICARE / Plan: HU[employer] MEDICARE PPO / Product Type: Medicare Advantage /     Extended Emergency Contact Information  Primary Emergency Contact: Gerard Espinoza  Address: [facility]           [place] [address] [address]  Home Phone: [phone]  Mobile Phone: [phone]  Relation: Brother    Discharge Plan A: Home  Discharge Plan B: [id number] - [place] - [address]  [address] [facility] [address]  [address] [place] [address]  Phone: [phone] Fax: [phone]    [facility] - [facility] [address]  [address] [address]  Phone: [phone] Fax: [phone]      Initial Assessment (most recent)       Adult Discharge Assessment - 12/16/23 1414          Discharge Assessment    Assessment Type Discharge Planning Assessment     Confirmed/corrected address, phone number and insurance Yes     Confirmed Demographics Correct on Facesheet     Source of Information patient     Does patient/caregiver understand observation status Yes     Communicated MAGNO with patient/caregiver Yes     Reason For Admission Fever and chills     People in Home alone     Facility Arrived From: Home     Do you expect to return to your current living situation? Yes     Do you have help at home or someone to help you manage your care at home? No     Prior to hospitilization cognitive status: Alert/Oriented     Current cognitive status: Alert/Oriented      Walking or Climbing Stairs Difficulty no     Dressing/Bathing Difficulty no     Home Accessibility wheelchair accessible     Home Layout Able to live on 1st floor     Equipment Currently Used at Home none     Readmission within 30 days? No     Patient currently being followed by outpatient case management? No     Do you currently have service(s) that help you manage your care at home? No     Do you take prescription medications? Yes     Do you have prescription coverage? Yes     Do you have any problems affording any of your prescribed medications? No     Is the patient taking medications as prescribed? yes     Who is going to help you get home at discharge? Brother or friend     How do you get to doctors appointments? car, drives self     Are you on dialysis? No     Do you take coumadin? No     Discharge Plan A Home     Discharge Plan B Home Health     DME Needed Upon Discharge  none     Discharge Plan discussed with: Patient     Transition of Care Barriers None        Physical Activity    On average, how many days per week do you engage in moderate to strenuous exercise (like a brisk walk)? Patient declined     On average, how many minutes do you engage in exercise at this level? Patient declined        Financial Resource Strain    How hard is it for you to pay for the very basics like food, housing, medical care, and heating? Not hard at all        Housing Stability    In the last 12 months, was there a time when you were not able to pay the mortgage or rent on time? No     In the last 12 months, how many places have you lived? 1     In the last 12 months, was there a time when you did not have a steady place to sleep or slept in a shelter (including now)? No        Transportation Needs    In the past 12 months, has lack of transportation kept you from medical appointments or from getting medications? No     In the past 12 months, has lack of transportation kept you from meetings, work, or from getting things  needed for daily living? No        Food Insecurity    Within the past 12 months, you worried that your food would run out before you got the money to buy more. Never true     Within the past 12 months, the food you bought just didn't last and you didn't have money to get more. Never true        Stress    Do you feel stress - tense, restless, nervous, or anxious, or unable to sleep at night because your mind is troubled all the time - these days? Only a little        Social Connections    In a typical week, how many times do you talk on the phone with family, friends, or neighbors? Once a week     How often do you get together with friends or relatives? Once a week     How often do you attend Roman Catholic or Jainism services? Patient declined     Do you belong to any clubs or organizations such as Roman Catholic groups, unions, fraternal or athletic groups, or school groups? Patient declined     How often do you attend meetings of the clubs or organizations you belong to? Patient declined     Are you , , , , never , or living with a partner? Never         Alcohol Use    Q1: How often do you have a drink containing alcohol? Never     Q2: How many drinks containing alcohol do you have on a typical day when you are drinking? Patient does not drink                     Discharge Plan A and Plan B have been determined by review of patient's clinical status, future medical and therapeutic needs, and coverage/benefits for post-acute care in coordination with multidisciplinary team members.

## 2023-12-16 NOTE — PROGRESS NOTES
Pt was served soft diet today and reported 2 episodes of diarrhea, imodium administered and was helpful. Iv hydration and meds administered as ordered, Isolation discontinued as labs were negative.VSS. Pt denies pain or discomfort.

## 2023-12-16 NOTE — PROGRESS NOTES
[facility] - Emergency Dept  [facility] Medicine  Progress Note    Patient Name: Michael Espinoza  MRN: [id number]  Patient Class: IP- Inpatient   Admission Date: 12/13/2023  Length of Stay: 1 days  Attending Physician: Mukesh Golden MD  Primary Care Provider: Dominguez Hyatt MD        Subjective:     Principal Problem:Sepsis without acute organ dysfunction        HPI:  Michael Espinoza is a 68 y.o. male with PMHx of CAD s/p CABG x3, HLD, complete heart block s/p dual chamber pacemaker. [facility] 12[facility]13 with diarrhea for the last 6 days. On 12/8 he developed left lower quadrant cramping followed by multiple episodes of loose stool. The next two days he had about 10-13 episodes diarrhea daily. Watery-brown. No blood. By Monday he continued to have diarrhea but then also developed mild nausea, but was able to keep down bland foods (jello, applesauce, banana, eggs) without emesis. He also began having urinary frequency, urgency, and dysuria and though his right kidney was mildly painful. Denies hematuria. He began to keep extremely dehydrated, started having fevers up to 102F Monday-Tuesday. He endorses polydipsia, myalgias, and hallucinations when he closes his eyes. He also reports shaking chills that would last for 20 minutes at a time that he could not control. Wednesday diarrhea finally stopped and he also stopped urinating but reports he did spontaneously urinate in ED once he got IV fluids.     He does have history of diarrhea and saw GI Dr. Feliz in September for this and thought to be maybe post infectious IBS but patient states he had completely symptom free period for months since then. He saw urgent care for symptoms 3 days ago and reports taking Zofran 4 times from them as well as a medication from GI (dicyclomine?) 10 times over 2 days. He reports normal colonoscopy one year ago (lipoma biopsied with surface erosions). No rectal pain. No more abdominal/flank pain. No strange foods or travel. He  denies falls, chest pain, palpitations, shortness of breath.     Per chart review: 12/11 stool culture without significant growth to date, E.coli shiga toxin 1 and 2 negative. Urine culture + staph aureus. A previous urine culture was also positive for staph aures in September and pt reports completing bactrim course.    In the ED, febrile to 100.4F, , RR 28. BP sable. On RA. Labs with WBC 8.58k, platelets 118k.  Na 129, K 2.9, Cl 94, BUN/Cr without YESICA, albumin 2.8, T bili 2.0 (chronically elevated), AST/ALT elevated 90/60. UA 3+ leukocytes, nitrite positive, 3+ occult blood, 1+ ketones, 1+ protein, 35 RBC, >100 WBC< many bacteria. LA 1.4. lipase and mag wnl. Covid and flu negative. CT abdomen pending. Given ceftriaxone, dicyclomine, famotidine, Zofran, potassium po and IV,  1L LR bolus and 1L NS bolus. Admitted to .     Overview/Hospital Course:  12/14 K replaced. BCX 2 and stool studies pending. CT abdomen -  Intraluminal fluid throughout the small and large bowel with adjacent mesenteric fat stranding and free fluid, as may be seen in the setting of a nonspecific infectious or noninfectious inflammatory enterocolitis. Nonobstructive right nephrolithiasis. loperamide PRN. continue ciprofloxacin and vancomycin . denies abdominal pain. advanced to soft diet   12/15 K and P replaced. BCX 2 from 12/14 with staph aureus. repeated BCX 2.  UC with STAPHYLOCOCCUS AUREUS > 100,000 cfu/ml  Susceptibility pending. echo ordered. tolerating soft diet. CHARMAINE ordered as with pacemaker . 1.3 cm right lower pole nonobstructive nephrolithiasis/ staph aureus on UC ( positive for MSSA urine culture from 9/2023). started on cefazolin. vancomycin discontinued   12/16 BC from 12/15 staph aureus. repeat BCX 2 TTE today-     Left Ventricle: The left ventricle is normal in size. Normal wall thickness. Regional wall motion abnormalities present. See diagram for wall motion findings. There is mildly reduced systolic function with a  visually estimated ejection fraction of 40 - 45%. Ejection fraction by visual approximation is 43%. There is normal diastolic function.    Right Ventricle: Normal right ventricular cavity size. Wall thickness is normal. Right ventricle wall motion  is normal. Systolic function is normal.    Left Atrium: Left atrium is severely dilated. 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    Aortic Valve: The aortic valve is a trileaflet valve. There is mild aortic valve sclerosis. There is mild stenosis. Aortic valve area by VTI is 1.74 cm². Aortic valve peak velocity is 1.65 m/s. Mean gradient is 7 mmHg. The dimensionless index is 0.46. There is trace aortic regurgitation.    Mitral Valve: There is mild bileaflet sclerosis.    Tricuspid Valve: There is mild regurgitation.    Pulmonary Artery: The estimated pulmonary artery systolic pressure is 35 mmHg.    IVC/SVC: Normal venous pressure at 3 mmHg.    CHARMAINE scheduled. K and P replaced        Review of Systems:   Pain scale:   Constitutional:  fever,  chills, headache, vision loss, hearing loss, weight loss, Generalized weakness, falls, loss of smell, loss of taste, poor appetite,  sore throat  Respiratory: cough, shortness of breath.   Cardiovascular: chest pain, dizziness, palpitations, orthopnea, swelling of feet, syncope  Gastrointestinal: nausea, vomiting, abdominal pain, diarrhea -improved , black stool,  blood in stool, change in bowel habits, constipation  Genitourinary: hematuria, dysuria, urgency, frequency,  flank pain- resolved   Integument/Breast: rash,  pruritis  Hematologic/Lymphatic: easy bruising, lymphadenopathy  Musculoskeletal: arthralgias , myalgias, back pain, neck pain, knee pain  Neurological: confusion, seizures, tremors, slurred speech  Behavioral/Psych:  depression, anxiety, auditory or visual hallucinations     OBJECTIVE:     Physical Exam:  Body mass index is 26.16 kg/m².    Constitutional: Appears well-developed and well-nourished.   Head:  "Normocephalic and atraumatic.   Neck: Normal range of motion. Neck supple.   Cardiovascular: Normal heart rate.  Regular heart rhythm.  Pulmonary/Chest: Effort normal.   Abdominal: No distension.  No tenderness  Musculoskeletal: Normal range of motion. No edema.   Neurological: Alert and oriented to person, place, and time.   Skin: Skin is warm and dry.   Psychiatric: Normal mood and affect. Behavior is normal.                  Vital Signs  Temp: 97.1 °F (36.2 °C) (12/16/23 1042)  Pulse: 92 (12/16/23 1134)  Resp: 16 (12/16/23 1042)  BP: 113/66 (12/16/23 1042)  SpO2: (Abnormal) 93 % (12/16/23 1042)     24 Hour VS Range    Temp:  [96.4 °F (35.8 °C)-98 °F (36.7 °C)]   Pulse:  []   Resp:  [16-18]   BP: (107-122)/(57-79)   SpO2:  [93 %-98 %]     Intake/Output Summary (Last 24 hours) at 12/16/2023 1400  Last data filed at 12/16/2023 0845  Gross per 24 hour   Intake 240 ml   Output no documentation   Net 240 ml         I/O This Shift:  I/O this shift:  In: 120 [P.O.:120]  Out: -     Wt Readings from Last 3 Encounters:   12/16/23 75.8 kg (167 lb)   12/11/23 69.4 kg (153 lb)   11/21/23 69.8 kg (153 lb 14.1 oz)       I have personally reviewed the vitals and recorded Intake/Output     Laboratory/Diagnostic Data:    CBC/Anemia Labs: Coags:    Recent Labs   Lab 12/14/23  0552 12/14/23  0608 12/15/23  0649 12/16/23  0341   WBC 10.62  --  9.49 9.34   HGB 13.2*  --  12.4* 12.9*   HCT 38.7*  --  35.3* 35.8*   *  --  116* 150   MCV 88  --  86 87   RDW 13.2  --  13.7 13.8   OCCULTBLOOD  --  Negative  --   --     No results for input(s): "PT", "INR", "APTT" in the last 168 hours.     Chemistries: ABG:   Recent Labs   Lab 12/14/23  0552 12/15/23  0649 12/15/23  1706 12/16/23  0341   * 133* 134* 133*   K 3.2* 3.1* 3.2* 3.1*   CL 96 104 101 103   CO2 23 21* 23 24   BUN 12 12 13 11   CREATININE 0.9 0.7 0.8 0.8   CALCIUM 8.1* 7.6* 7.8* 7.8*   PROT 6.4 5.4*  --  5.7*   BILITOT 1.5* 1.8*  --  1.7*   ALKPHOS 84 69  --  86 " "  ALT 55* 55*  --  116*   AST 81* 73*  --  167*   MG 1.8 2.1  --  2.1   PHOS 1.5* 2.1* 2.6* 2.1*    No results for input(s): "PH", "PCO2", "PO2", "HCO3", "POCSATURATED", "BE" in the last 168 hours.     POCT Glucose: HbA1c:    No results for input(s): "POCTGLUCOSE" in the last 168 hours. Hemoglobin A1C   Date Value Ref Range Status   08/11/2023 5.6 4.0 - 5.6 % Final     Comment:     ADA Screening Guidelines:  5.7-6.4%  Consistent with prediabetes  >or=6.5%  Consistent with diabetes    High levels of fetal hemoglobin interfere with the HbA1C  assay. Heterozygous hemoglobin variants (HbS, HgC, etc)do  not significantly interfere with this assay.   However, presence of multiple variants may affect accuracy.     05/10/2018 5.4 4.0 - 5.6 % Final     Comment:     According to ADA guidelines, hemoglobin A1c <7.0% represents  optimal control in non-pregnant diabetic patients. Different  metrics may apply to specific patient populations.   Standards of Medical Care in Diabetes-2016.  For the purpose of screening for the presence of diabetes:  <5.7%     Consistent with the absence of diabetes  5.7-6.4%  Consistent with increasing risk for diabetes   (prediabetes)  >or=6.5%  Consistent with diabetes  Currently, no consensus exists for use of hemoglobin A1c  for diagnosis of diabetes for children.  This Hemoglobin A1c assay has significant interference with fetal   hemoglobin   (HbF). The results are invalid for patients with abnormal amounts of   HbF,   including those with known Hereditary Persistence   of Fetal Hemoglobin. Heterozygous hemoglobin variants (HbAS, HbAC,   HbAD, HbAE, HbA2) do not significantly interfere with this assay;   however, presence of multiple variants in a sample may impact the %   interference.          Cardiac Enzymes: Ejection Fractions:    No results for input(s): "CPK", "CPKMB", "MB", "TROPONINI" in the last 72 hours. EF   Date Value Ref Range Status   12/16/2023 43 % Final   07/05/2022 65 % Final " "         No results for input(s): "COLORU", "APPEARANCEUA", "PHUR", "SPECGRAV", "PROTEINUA", "GLUCUA", "KETONESU", "BILIRUBINUA", "OCCULTUA", "NITRITE", "UROBILINOGEN", "LEUKOCYTESUR", "RBCUA", "WBCUA", "BACTERIA", "SQUAMEPITHEL", "HYALINECASTS" in the last 48 hours.    Invalid input(s): "WRIGHTSUR"      Lactate (Lactic Acid) (mmol/L)   Date Value   12/13/2023 1.4     BNP (pg/mL)   Date Value   07/05/2022 596 (H)     CRP (mg/L)   Date Value   12/14/2023 206.0 (H)     Sed Rate (mm/Hr)   Date Value   12/14/2023 76 (H)     No results found for: "DDIMER"  Ferritin (ng/mL)   Date Value   09/14/2023 81   03/21/2018 35     No results found for: "LDH"  Troponin I (ng/mL)   Date Value   07/05/2022 <0.006   07/05/2022 0.009     Results for orders placed or performed in visit on 07/01/21   Vitamin D   Result Value Ref Range    Vit D, 25-Hydroxy 31 30 - 96 ng/mL   Results for orders placed or performed in visit on 05/05/21   Vitamin D   Result Value Ref Range    Vit D, 25-Hydroxy 38 30 - 96 ng/mL   Results for orders placed or performed in visit on 03/21/18   Vitamin D   Result Value Ref Range    Vit D, 25-Hydroxy 29 (L) 30 - 96 ng/mL     SARS-CoV-2 RNA, Amplification, Qual (no units)   Date Value   12/13/2023 Negative     POC Rapid COVID (no units)   Date Value   07/05/2022 Negative   12/22/2020 Negative       Microbiology labs for the last week  Microbiology Results (last 7 days)       Procedure Component Value Units Date/Time    Blood culture [[id number]] Collected: 12/16/23 0341    Order Status: Completed Specimen: Blood Updated: 12/16/23 1145     Blood Culture, Routine No Growth to date    Blood culture [[id number]] Collected: 12/16/23 0341    Order Status: Completed Specimen: Blood Updated: 12/16/23 1145     Blood Culture, Routine No Growth to date    Blood culture [[id number]]  (Abnormal) Collected: 12/14/23 0552    Order Status: Completed Specimen: Blood from Peripheral, Forearm, Right Updated: 12/16/23 1026     Blood " Culture, Routine Gram stain aer bottle: Gram positive cocci in clusters resembling Staph      Positive results previously called 12/14/2023      STAPHYLOCOCCUS AUREUS  For susceptibility see order #[id number]      Blood culture [[id number]]  (Abnormal)  (Susceptibility) Collected: 12/14/23 0552    Order Status: Completed Specimen: Blood from Peripheral, Forearm, Right Updated: 12/16/23 1025     Blood Culture, Routine Gram stain aer bottle: Gram positive cocci in clusters resembling Staph      Results called to and read back by:Benita Mercedes RN 12/14/2023  20:43      STAPHYLOCOCCUS AUREUS    Blood culture [[id number]]  (Abnormal) Collected: 12/14/23 2310    Order Status: Completed Specimen: Blood Updated: 12/16/23 0828     Blood Culture, Routine Gram stain aer bottle: Gram positive cocci in clusters resembling Staph      Positive results previously called 12/15/2023  17:26      STAPHYLOCOCCUS AUREUS  For susceptibility see order #[id number]      Blood culture [[id number]]  (Abnormal) Collected: 12/14/23 2310    Order Status: Completed Specimen: Blood Updated: 12/16/23 0827     Blood Culture, Routine Gram stain aer bottle: Gram positive cocci in clusters resembling Staph      Results called to and read back by: Krupa Salas RN 12/15/2023  15:52      STAPHYLOCOCCUS AUREUS  For susceptibility see order #[id number]      MRSA/SA Rapid ID by PCR from Blood culture [[id number]]  (Abnormal) Collected: 12/14/23 0552    Order Status: Completed Updated: 12/14/23 2150     Staph aureus ID by PCR Positive     Methicillin Resistant ID by PCR Negative    Urine culture [[id number]]  (Abnormal) Collected: 12/13/23 2346    Order Status: Completed Specimen: Urine Updated: 12/14/23 2054     Urine Culture, Routine STAPHYLOCOCCUS AUREUS  > 100,000 cfu/ml  Susceptibility pending      Narrative:      Specimen Source->Urine    Clostridium difficile EIA [[id number]] Collected: 12/14/23 0609    Order Status: Completed Specimen: Stool  Updated: 12/14/23 1255     C. diff Antigen Negative     C difficile Toxins A+B, EIA Negative     Comment: Testing not recommended for children <24 months old.       Stool culture [[id number]]     Order Status: Canceled Specimen: Stool     Influenza A & B by Molecular [[id number]] Collected: 12/13/23 2214    Order Status: Completed Specimen: Nasopharyngeal Swab Updated: 12/13/23 2254     Influenza A, Molecular Negative     Influenza B, Molecular Negative     Flu A & B Source Nasal swab            Reviewed and noted in plan where applicable- Please see chart for full lab data.    Lines/Drains:       Peripheral IV - Single Lumen 12/13/23 2210 20 G Left Antecubital (Active)   Site Assessment Clean;Dry;Intact 12/13/23 2211   Extremity Assessment Distal to IV No abnormal discoloration;No redness;No swelling 12/13/23 2211   Dressing Status Clean;Dry;Intact 12/13/23 2211   Dressing Intervention First dressing 12/13/23 2211   Number of days: 0       Imaging  ECG Results              EKG 12-lead (Final result)  Result time 12/14/23 14:17:37      Final result by Interface, Lab [facility] (12/14/23 14:17:37)               Narrative:    Test Reason : R11.0,    Vent. Rate : 103 BPM     Atrial Rate : 103 BPM     P-R Int : 170 ms          QRS Dur : 176 ms      QT Int : 410 ms       P-R-T Axes : 037 158 -07 degrees     QTc Int : 537 ms    Atrial-sensed ventricular-paced rhythm  Biventricular pacemaker detected  Abnormal ECG  When compared with ECG of 21-NOV-2023 08:01,  Vent. rate has increased BY  36 BPM  Confirmed by Ivone Garibay MD ([id number]) on 12/14/2023 2:17:29 PM    Referred By: [email or web address]   SELF           Confirmed By:Ivone Garibay MD                                  Results for orders placed during the hospital encounter of 07/05/22    Echo    Interpretation Summary  · Presence of bradycardia with HR 30s with AV dissociation  · The estimated ejection fraction is 65%.  · The left ventricle is normal in size with normal systolic  function.  · Normal left ventricular diastolic function.  · Normal right ventricular size with normal right ventricular systolic function.  · Mild mitral regurgitation.  · Presence of interatrial septal aneurysm.  · The estimated PA systolic pressure is 28 mmHg.  · Normal central venous pressure (3 mmHg).      Echo    Left Ventricle: The left ventricle is normal in size. Normal wall   thickness. Regional wall motion abnormalities present. See diagram for   wall motion findings. There is mildly reduced systolic function with a   visually estimated ejection fraction of 40 - 45%. Ejection fraction by   visual approximation is 43%. There is normal diastolic function.    Right Ventricle: Normal right ventricular cavity size. Wall thickness   is normal. Right ventricle wall motion  is normal. Systolic function is   normal.    Left Atrium: Left atrium is severely dilated. 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    Aortic Valve: The aortic valve is a trileaflet valve. There is mild   aortic valve sclerosis. There is mild stenosis. Aortic valve area by VTI   is 1.74 cm². Aortic valve peak velocity is 1.65 m/s. Mean gradient is 7   mmHg. The dimensionless index is 0.46. There is trace aortic   regurgitation.    Mitral Valve: There is mild bileaflet sclerosis.    Tricuspid Valve: There is mild regurgitation.    Pulmonary Artery: The estimated pulmonary artery systolic pressure is   35 mmHg.    IVC/SVC: Normal venous pressure at 3 mmHg.      Labs, Imaging, EKG and Diagnostic results from 12/16/2023 were reviewed.    Medications:  Medication list was reviewed and changes noted under Assessment/Plan.  No current facility-administered medications on file prior to encounter.     Current Outpatient Medications on File Prior to Encounter   Medication Sig Dispense Refill    aspirin (ECOTRIN) 81 MG EC tablet Take 81 mg by mouth 2 (two) times daily. (Breakfast & Afternoon)      atorvastatin (LIPITOR) 80 MG tablet TAKE 1  TABLET EVERY DAY (Patient taking differently: Take 80 mg by mouth every evening.) 90 tablet 10    calcium carbonate (TUMS ORAL) Take 2 tablets by mouth daily as needed (Heartburn).      coenzyme Q10 (CO Q-10) 300 mg Cap Take 1 capsule by mouth once daily.      dicyclomine (BENTYL) 10 MG capsule Take 10 mg by mouth daily as needed (Abdominal pain).      ERGOCALCIFEROL, VITAMIN D2, (VITAMIN D ORAL) Take 1 capsule by mouth Mon, Wed, Fri, Sat & Sun      ezetimibe (ZETIA) 10 mg tablet TAKE 1 TABLET EVERY DAY (Patient taking differently: Take 10 mg by mouth once daily.) 90 tablet 10    multivit-min/FA/lycopen/lutein (CENTRUM SILVER MEN ORAL) Take 1 tablet by mouth every Mon, Wed, Fri.      ondansetron (ZOFRAN-ODT) 4 MG TbDL Take 1 tablet (4 mg total) by mouth every 8 (eight) hours as needed (nausea). 12 tablet 0     Scheduled Medications:  aspirin, 81 mg, Oral, BID  atorvastatin, 80 mg, Oral, Daily  ceFAZolin (ANCEF) IVPB, 2 g, Intravenous, Q8H  dicyclomine, 10 mg, Oral, QID  enoxparin, 40 mg, Subcutaneous, Daily  ezetimibe, 10 mg, Oral, Daily  potassium, sodium phosphates, 2 packet, Oral, QID (WM & HS)      PRN: acetaminophen, acetaminophen, albuterol-ipratropium, aluminum-magnesium hydroxide-simethicone, calcium carbonate, dextrose 10%, dextrose 10%, glucagon (human recombinant), glucose, glucose, loperamide, melatonin, naloxone, ondansetron, simethicone, sodium chloride 0.9%  Infusions:       Estimated Creatinine Clearance: 82.6 mL/min (based on SCr of 0.8 mg/dL).           Assessment/Plan:      * Sepsis without acute organ dysfunction  This patient does have evidence of infective focus  My overall impression is sepsis.  Source: Urinary Tract and Abdominal  Antibiotics given-   Antibiotics (72h ago, onward)      Start     Stop Route Frequency Ordered    12/14/23 1900  vancomycin 750 mg in dextrose 5 % (D5W) 250 mL IVPB (Vial-Mate)         -- IV Every 12 hours (non-standard times) 12/14/23 0547    12/14/23 0900   ciprofloxacin HCl tablet 500 mg         12/19/23 0859 Oral Every 12 hours 12/14/23 0613    12/14/23 0623  vancomycin - pharmacy to dose  (vancomycin IVPB (PEDS and ADULTS))        See Hyperspace for full Linked Orders Report.    -- IV pharmacy to manage frequency 12/14/23 0523          Latest lactate reviewed-  Recent Labs   Lab 12/13/23  2212   LACTATE 1.4     Organ dysfunction indicated by  None- mild delirium (possible form electrolyte derangement and fevers) Mild transaminitis, mild thrombocytopenia    Fluid challenge Not needed - patient is not hypotensive      Post- resuscitation assessment No - Post resuscitation assessment not needed     Will Not start Pressors- Levophed for MAP of 65  Source control achieved by: Vancomycin ordered for staph aureus UTI on previous cultures, Ciprofloxacin ordered for possible infectious (vs other) diarrhea.  Blood, urine, stool cultures and studies pending.     Right kidney stone  12/15 s/p urology eval - stone is non obstructing. -No indication for acute urologic intervention.  outpatient urologic follow up for stone treatment            Bacteremia  12/15 BCX 2 from 12/14 with staph aureus. repeated BCX 2.    CHARMAINE ordered as with pacemaker . 1.3 cm right lower pole nonobstructive nephrolithiasis/ staph aureus on UC ( positive for MSSA urine culture from 9/2023). started on cefazolin. vancomycin discontinued   12/16 BC from 12/15 staph aureus. repeat BCX 2 TTE today. CHARMAINE scheduled.    Thrombocytopenia  Patient was found to have thrombocytopenia, the likely etiology is secondary to sepsis/infection?, will monitor the platelets Daily. Will transfuse if platelet count is <20k. Hold DVT prophylaxis if platelets are <50k. The patient's platelet results have been reviewed and are listed below.  Recent Labs   Lab 12/16/23  0341      resolved     Hypophosphatemia  Patient has Abnormal Phosphorus: hypophosphatemia. Will continue to monitor electrolytes closely. Will replace the  affected electrolytes and repeat labs to be done after interventions completed. The patient's phosphorus results have been reviewed and are listed below.  Recent Labs   Lab 12/16/23  0341   PHOS 2.1*        Transaminitis  -Mildly elevated, possible 2/2 dehydration/infection  -NO RUQ pain. T bili chronically elevated  -Check acute hepatitis panel   -Daily CMP    12/14 Patients liver enzymes  elevated.   Recent Labs     12/13/23  2212 12/14/23  0552 12/15/23  0649 12/16/23  0341   BILITOT 2.0* 1.5* 1.8* 1.7*   AST 90* 81* 73* 167*   ALT 60* 55* 55* 116*   ALKPHOS 87 84 69 86    . Liver enzymes  trending up. sono liver with doppler. monitor       Diarrhea  68M with acute onset of multiple episodes diarrhea with only mild LLQ abdominal cramping early in course. Mild nausea. No abdominal pain, emesis, and is tolerating PO. Having fevers, chills, polydipsia. Also urinary infectious symptoms. Previously seen by GI Dr. 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 Work up unremarkable.  Previous stool cultures without growth.Giardia negative in the past. Recent stool culture without growth and E coli antigen negative.   -Multiple electrolyte derangements on admission  -Tachycardic, febrile, and tachypneic   -S/p 2L IVF bolus in ED, continue IVFs today  -Check complete stool studies  -C-scope 8/2022 Lipoma in the recto-sigmoid colon. Biopsied and found to have surface erosions  -CT pending  -GI consulted, appreciate recommendations  -PRN bentyl for cramping, prn Zofran/compazine for nausea  -ON Vanc for sepsis/UTI and will add ciprofloxacin 500 mg Q12h x5 days for diarrhea  for now pending culture results  12/14  CT abdomen -  Intraluminal fluid throughout the small and large bowel with adjacent mesenteric fat stranding and free fluid, as may be seen in the setting of a nonspecific infectious or noninfectious inflammatory enterocolitis. Nonobstructive right nephrolithiasis. loperamide PRN.    12/15 C diff  and stool culture negative.     Complicated UTI (urinary tract infection)  Presenting with dysuria/frequency/urgency/maybe mild flank pain/fever/chills (also with diarrheal illness).  -3/4 SIRs on admit for fever, tachycardia tachypnea  -UA infectious, nitrite positive, 3+ leukocytes >100 WBC and many bacteria  -Previous urine cultures with staph aureus  -S/p ceftriaxone in ED  -Will cover with vancomycin   -Follow up urine cultures  -Follow up CT  -Monitor I/Os  12/14. BCX 2 and stool studies pending.continue ciprofloxacin and vancomycin   12/15  UC with STAPHYLOCOCCUS AUREUS > 100,000 cfu/ml  Susceptibility pending. echo ordered. tolerating soft diet. 1.3 cm right lower pole nonobstructive nephrolithiasis/ staph aureus on UC ( positive for MSSA urine culture from 9/2023). started on cefazolin    Hypokalemia  Patient has hypokalemia which is Acute and currently uncontrolled. Most recent potassium levels reviewed-   Lab Results   Component Value Date    K 3.1 (L) 12/16/2023   Will continue potassium replacement per protocol and recheck repeat levels after replacement completed. Continue to replace. Pt would prefer IV replacement. Pills hard to swallow and does not tolerate potassium bicarb well.    Hyponatremia  Patient has hyponatremia which is uncontrolled,We will aim to correct the sodium by 4-6mEq in 24 hours. We will monitor sodium Daily. The hyponatremia is due to Dehydration/hypovolemia. We will obtain the following studies: Urine sodium, urine osmolality, serum osmolality, or TSH, T4. We will treat the hyponatremia with IV fluids as follows: 2/p 2 L IVF in ED, continuous fluids at rate 100 ml/hr ordered. The patient's sodium results have been reviewed and are listed below.  Recent Labs   Lab 12/16/23  0341   *   improving.tolerating soft diet.    Complete heart block  -s/p dual chamber pacemaker placement  7/5/2022      Coronary artery disease of native artery of native heart with  stable angina pectoris  Patient with known CAD s/p CABG, which is controlled Will continue ASA and Statin and monitor for S/Sx of angina/ACS. Continue to monitor on telemetry.     Gilbert's syndrome  -Chronic condition- reports diagnosed by Dr. Rowe years ago  -T bili elevated on admission, similar to previous  -Monitor CMP      HLD (hyperlipidemia)  -Continue home statin      VTE Risk Mitigation (From admission, onward)           Ordered     enoxaparin injection 40 mg  Daily         12/16/23 0900     IP VTE HIGH RISK PATIENT  Once         12/16/23 0900     Place sequential compression device  Until discontinued         12/14/23 0526                    Discharge Planning   MAGNO: 12/19/2023     Code Status: Full Code   Is the patient medically ready for discharge?: No    Reason for patient still in hospital (select all that apply): Treatment                     Mukesh Golden MD  Department of Hospital Medicine   [facility] - Emergency Dept

## 2023-12-17 LAB
ALBUMIN SERPL BCP-MCNC: 2.3 G/DL (ref 3.5–5.2)
ALBUMIN SERPL BCP-MCNC: 2.7 G/DL (ref 3.5–5.2)
ALP SERPL-CCNC: 107 U/L (ref 55–135)
ALT SERPL W/O P-5'-P-CCNC: 102 U/L (ref 10–44)
ANION GAP SERPL CALC-SCNC: 10 MMOL/L (ref 8–16)
ANION GAP SERPL CALC-SCNC: 12 MMOL/L (ref 8–16)
AST SERPL-CCNC: 161 U/L (ref 10–40)
BACTERIA BLD CULT: ABNORMAL
BASOPHILS # BLD AUTO: 0.05 K/UL (ref 0–0.2)
BASOPHILS NFR BLD: 0.4 % (ref 0–1.9)
BILIRUB SERPL-MCNC: 1.8 MG/DL (ref 0.1–1)
BUN SERPL-MCNC: 14 MG/DL (ref 8–23)
BUN SERPL-MCNC: 8 MG/DL (ref 8–23)
CALCIUM SERPL-MCNC: 8.3 MG/DL (ref 8.7–10.5)
CALCIUM SERPL-MCNC: 8.7 MG/DL (ref 8.7–10.5)
CHLORIDE SERPL-SCNC: 104 MMOL/L (ref 95–110)
CHLORIDE SERPL-SCNC: 106 MMOL/L (ref 95–110)
CO2 SERPL-SCNC: 20 MMOL/L (ref 23–29)
CO2 SERPL-SCNC: 21 MMOL/L (ref 23–29)
CREAT SERPL-MCNC: 0.8 MG/DL (ref 0.5–1.4)
CREAT SERPL-MCNC: 0.9 MG/DL (ref 0.5–1.4)
DIFFERENTIAL METHOD: ABNORMAL
EOSINOPHIL # BLD AUTO: 0.2 K/UL (ref 0–0.5)
EOSINOPHIL NFR BLD: 1.9 % (ref 0–8)
ERYTHROCYTE [DISTWIDTH] IN BLOOD BY AUTOMATED COUNT: 14.3 % (ref 11.5–14.5)
EST. GFR  (NO RACE VARIABLE): >60 ML/MIN/1.73 M^2
EST. GFR  (NO RACE VARIABLE): >60 ML/MIN/1.73 M^2
GLUCOSE SERPL-MCNC: 101 MG/DL (ref 70–110)
GLUCOSE SERPL-MCNC: 117 MG/DL (ref 70–110)
HCT VFR BLD AUTO: 41.7 % (ref 40–54)
HGB BLD-MCNC: 14.5 G/DL (ref 14–18)
IMM GRANULOCYTES # BLD AUTO: 0.1 K/UL (ref 0–0.04)
IMM GRANULOCYTES NFR BLD AUTO: 0.9 % (ref 0–0.5)
LYMPHOCYTES # BLD AUTO: 0.9 K/UL (ref 1–4.8)
LYMPHOCYTES NFR BLD: 8 % (ref 18–48)
MAGNESIUM SERPL-MCNC: 2.2 MG/DL (ref 1.6–2.6)
MCH RBC QN AUTO: 29.8 PG (ref 27–31)
MCHC RBC AUTO-ENTMCNC: 34.8 G/DL (ref 32–36)
MCV RBC AUTO: 86 FL (ref 82–98)
MONOCYTES # BLD AUTO: 0.6 K/UL (ref 0.3–1)
MONOCYTES NFR BLD: 5.7 % (ref 4–15)
NEUTROPHILS # BLD AUTO: 9.4 K/UL (ref 1.8–7.7)
NEUTROPHILS NFR BLD: 83.1 % (ref 38–73)
NRBC BLD-RTO: 0 /100 WBC
PHOSPHATE SERPL-MCNC: 2.3 MG/DL (ref 2.7–4.5)
PLATELET # BLD AUTO: 227 K/UL (ref 150–450)
PMV BLD AUTO: 10.2 FL (ref 9.2–12.9)
POTASSIUM SERPL-SCNC: 3.2 MMOL/L (ref 3.5–5.1)
POTASSIUM SERPL-SCNC: 4.1 MMOL/L (ref 3.5–5.1)
PROT SERPL-MCNC: 7.2 G/DL (ref 6–8.4)
RBC # BLD AUTO: 4.87 M/UL (ref 4.6–6.2)
SODIUM SERPL-SCNC: 136 MMOL/L (ref 136–145)
SODIUM SERPL-SCNC: 137 MMOL/L (ref 136–145)
WBC # BLD AUTO: 11.3 K/UL (ref 3.9–12.7)

## 2023-12-17 PROCEDURE — 25000003 PHARM REV CODE 250: Performed by: HOSPITALIST

## 2023-12-17 PROCEDURE — 94761 N-INVAS EAR/PLS OXIMETRY MLT: CPT

## 2023-12-17 PROCEDURE — 36415 COLL VENOUS BLD VENIPUNCTURE: CPT | Performed by: HOSPITALIST

## 2023-12-17 PROCEDURE — 99233 PR SUBSEQUENT HOSPITAL CARE,LEVL III: ICD-10-PCS | Mod: ,,, | Performed by: PHYSICIAN ASSISTANT

## 2023-12-17 PROCEDURE — 25000003 PHARM REV CODE 250: Performed by: REGISTERED NURSE

## 2023-12-17 PROCEDURE — 21400001 HC TELEMETRY ROOM

## 2023-12-17 PROCEDURE — 63600175 PHARM REV CODE 636 W HCPCS: Performed by: REGISTERED NURSE

## 2023-12-17 PROCEDURE — 83735 ASSAY OF MAGNESIUM: CPT | Performed by: HOSPITALIST

## 2023-12-17 PROCEDURE — 25000003 PHARM REV CODE 250

## 2023-12-17 PROCEDURE — 80069 RENAL FUNCTION PANEL: CPT | Performed by: HOSPITALIST

## 2023-12-17 PROCEDURE — 85025 COMPLETE CBC W/AUTO DIFF WBC: CPT | Performed by: HOSPITALIST

## 2023-12-17 PROCEDURE — 80053 COMPREHEN METABOLIC PANEL: CPT | Performed by: HOSPITALIST

## 2023-12-17 PROCEDURE — 63600175 PHARM REV CODE 636 W HCPCS: Performed by: HOSPITALIST

## 2023-12-17 PROCEDURE — 87040 BLOOD CULTURE FOR BACTERIA: CPT | Mod: 59 | Performed by: HOSPITALIST

## 2023-12-17 PROCEDURE — 99233 SBSQ HOSP IP/OBS HIGH 50: CPT | Mod: ,,, | Performed by: PHYSICIAN ASSISTANT

## 2023-12-17 RX ORDER — POTASSIUM CHLORIDE 20 MEQ/1
40 TABLET, EXTENDED RELEASE ORAL ONCE
Status: COMPLETED | OUTPATIENT
Start: 2023-12-17 | End: 2023-12-17

## 2023-12-17 RX ADMIN — DICYCLOMINE HYDROCHLORIDE 10 MG: 10 CAPSULE ORAL at 09:12

## 2023-12-17 RX ADMIN — EZETIMIBE 10 MG: 10 TABLET ORAL at 09:12

## 2023-12-17 RX ADMIN — ASPIRIN 81 MG: 81 TABLET, COATED ORAL at 09:12

## 2023-12-17 RX ADMIN — ATORVASTATIN CALCIUM 80 MG: 40 TABLET, FILM COATED ORAL at 09:12

## 2023-12-17 RX ADMIN — DICYCLOMINE HYDROCHLORIDE 10 MG: 10 CAPSULE ORAL at 01:12

## 2023-12-17 RX ADMIN — ENOXAPARIN SODIUM 40 MG: 40 INJECTION SUBCUTANEOUS at 04:12

## 2023-12-17 RX ADMIN — DICYCLOMINE HYDROCHLORIDE 10 MG: 10 CAPSULE ORAL at 04:12

## 2023-12-17 RX ADMIN — ASPIRIN 81 MG: 81 TABLET, COATED ORAL at 08:12

## 2023-12-17 RX ADMIN — CEFAZOLIN 2 G: 2 INJECTION, POWDER, FOR SOLUTION INTRAMUSCULAR; INTRAVENOUS at 02:12

## 2023-12-17 RX ADMIN — CEFAZOLIN 2 G: 2 INJECTION, POWDER, FOR SOLUTION INTRAMUSCULAR; INTRAVENOUS at 09:12

## 2023-12-17 RX ADMIN — POTASSIUM CHLORIDE 40 MEQ: 1500 TABLET, EXTENDED RELEASE ORAL at 03:12

## 2023-12-17 RX ADMIN — DICYCLOMINE HYDROCHLORIDE 10 MG: 10 CAPSULE ORAL at 08:12

## 2023-12-17 RX ADMIN — CEFAZOLIN 2 G: 2 INJECTION, POWDER, FOR SOLUTION INTRAMUSCULAR; INTRAVENOUS at 05:12

## 2023-12-17 RX ADMIN — POTASSIUM CHLORIDE 40 MEQ: 1500 TABLET, EXTENDED RELEASE ORAL at 04:12

## 2023-12-17 NOTE — PROGRESS NOTES
[facility] - Med Surg  Infectious Disease  Progress Note    Patient Name: Michael Espinoza  MRN: [id number]  Admission Date: 12/13/2023  Length of Stay: 2 days  Attending Physician: Mukesh Golden MD  Primary Care Provider: Dominguez Hyatt MD    Isolation Status: No active isolations  Assessment/Plan:      ID  Bacteremia      68 year old male with history of CAD s/p CABG x3, HLD, heart block s/p pacemaker admitted with MSSA bacteremia and MSSA UTI.     CT A/P reviewed and notable for nonobstructing renal stone, enlarged prostate. CHARMAINE is scheduled for tomorrow. Patient is on IV Cefazolin. Blood cultures remain persistently positive (12/14 - 12/16). Afebrile and HDS. Symptomatically improved. Of note, his urine culture in September was also positive for MSSA.      Recommendations:  Continue IV Cefazolin   Repeat blood cultures x 2 obtained today  CHARMAINE tomorrow to assess for lead/valve vegetations   Urology evaluated the patient. No acute urologic intervention planned.  Anticipate prolonged course of IV antibiotics for complicated bacteremia  ID will follow up tomorrow.        Thank you for the consult. Please secure chat for any questions.  Adalgisa Mercer PA-C    Subjective:     Principal Problem:Sepsis without acute organ dysfunction    HPI: Mr. Espinoza is a 69 yo male with PMH of CAD s/p CABG x3, HLD, heart block s/p dual chamber pacemaker who presents to [facility] ED with cc of persistant diarrhea x6 days. Pt reported this started on 12/8, and diarrhea persisted. Pt started with dysuria, right flank pain, fevers, and presented to ED.     Since admission, pt was febrile, without leukocytosis. Blood culture + staph aureus, MRSA negative on PCR. Repeats ordered. Urine culture + staph aureus, pending. Stool studies negative. Flu swab negative.     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 Reports he is very active, and routinely walks long distances. Pt denies recent wounds, injuries. Denies  swollen joints, knees. Denies hardware other than pacemaker that was placed three years ago. Currently, denies fever, chills. Reports he is tolerating small amts of food again. Denies NVD. Denies flank pain, dysuria. States he is feeling better.     To note, micro hx significant for + MSSA urine culture from 9/2023. Pt states it was found on routine physical. Denies dysuria at that time, but states his urine was forthy. Pt states he followed with his PCP and took bactrim as prescribed.     Pt has received vanc, ceftriaxone, and cipro. ID was consulted d/t staph aures bacteremia.     Interval History: NAEON. Afebrile. Patient feels improved. Nausea and vomiting resolved. Still with loose stools but improved. Denies joint pain or other complaints. Informed patient of persistently positive blood cultures. CHARMAINE scheduled for tomorrow. He is tolerating Cefazolin well.      Review of Systems   Constitutional:  Negative for appetite change, chills, diaphoresis, fatigue and fever.   Eyes:  Negative for visual disturbance.   Respiratory:  Negative for cough and shortness of breath.    Cardiovascular:  Negative for chest pain and leg swelling.   Gastrointestinal:  Positive for diarrhea (loose stools). Negative for abdominal pain, constipation, nausea and vomiting.   Genitourinary:  Negative for difficulty urinating, dysuria, frequency and hematuria.   Musculoskeletal:  Negative for arthralgias, back pain and joint swelling.   Skin:  Negative for color change, rash and wound.   Neurological:  Negative for dizziness, weakness, numbness and headaches.   Psychiatric/Behavioral:  Negative for agitation and confusion. The patient is not nervous/anxious.    All other systems reviewed and are negative.    Objective:     Vital Signs (Most Recent):  Temp: 97.4 °F (36.3 °C) (12/17/23 1104)  Pulse: 88 (12/17/23 1104)  Resp: 16 (12/17/23 1104)  BP: 113/70 (12/17/23 1104)  SpO2: (!) 93 % (12/17/23 1104) Vital Signs (24h Range):  Temp:   [97.2 °F (36.2 °C)-98.7 °F (37.1 °C)] 97.4 °F (36.3 °C)  Pulse:  [77-97] 88  Resp:  [16-18] 16  SpO2:  [93 %-96 %] 93 %  BP: (113-129)/(69-79) 113/70     Weight: 75.8 kg (167 lb)  Body mass index is 26.16 kg/m².    Estimated Creatinine Clearance: 82.6 mL/min (based on SCr of 0.8 mg/dL).     Physical Exam  Constitutional:       General: He is not in acute distress.     Appearance: Normal appearance. He is well-developed. He is not ill-appearing or diaphoretic.   HENT:      Head: Normocephalic and atraumatic.      Right Ear: External ear normal.      Left Ear: External ear normal.      Nose: Nose normal.   Eyes:      General: No scleral icterus.        Right eye: No discharge.         Left eye: No discharge.      Extraocular Movements: Extraocular movements intact.      Conjunctiva/sclera: Conjunctivae normal.   Cardiovascular:      Rate and Rhythm: Normal rate and regular rhythm.   Pulmonary:      Effort: Pulmonary effort is normal. No respiratory distress.      Breath sounds: No stridor.   Abdominal:      General: There is no distension.      Palpations: Abdomen is soft.   Musculoskeletal:         General: No swelling or tenderness.      Comments: No joint or midline spine tenderness   Skin:     General: Skin is dry.      Coloration: Skin is not jaundiced or pale.      Findings: No erythema or rash.   Neurological:      General: No focal deficit present.      Mental Status: He is alert and oriented to person, place, and time. Mental status is at baseline.   Psychiatric:         Mood and Affect: Mood normal.         Behavior: Behavior normal.         Thought Content: Thought content normal.         Judgment: Judgment normal.          Significant Labs: CBC:   Recent Labs   Lab 12/16/23  0341 12/17/23  0908   WBC 9.34 11.30   HGB 12.9* 14.5   HCT 35.8* 41.7    227     CMP:   Recent Labs   Lab 12/16/23  0341 12/16/23  1901 12/17/23  0908   * 136 136   K 3.1* 3.4* 3.2*    103 104   CO2 24 22* 20*   GLU  99 119* 117*   BUN 11 12 8   CREATININE 0.8 0.8 0.8   CALCIUM 7.8* 8.4* 8.7   PROT 5.7*  --  7.2   ALBUMIN 2.2* 2.4* 2.7*   BILITOT 1.7*  --  1.8*   ALKPHOS 86  --  107   *  --  161*   *  --  102*   ANIONGAP 6* 11 12     Microbiology Results (last 7 days)       Procedure Component Value Units Date/Time    Blood culture [[id number]]  (Abnormal) Collected: 12/14/23 2310    Order Status: Completed Specimen: Blood Updated: 12/17/23 0902     Blood Culture, Routine Gram stain aer bottle: Gram positive cocci in clusters resembling Staph      Positive results previously called 12/15/2023  17:26      STAPHYLOCOCCUS AUREUS  For susceptibility see order #[id number]      Blood culture [[id number]]  (Abnormal) Collected: 12/14/23 2310    Order Status: Completed Specimen: Blood Updated: 12/17/23 0902     Blood Culture, Routine Gram stain aer bottle: Gram positive cocci in clusters resembling Staph      Results called to and read back by: Krupa Salas RN 12/15/2023  15:52      STAPHYLOCOCCUS AUREUS  For susceptibility see order #[id number]      Blood culture [[id number]] Collected: 12/17/23 0458    Order Status: Sent Specimen: Blood Updated: 12/17/23 0539    Blood culture [[id number]] Collected: 12/17/23 0458    Order Status: Sent Specimen: Blood Updated: 12/17/23 0539    Blood culture [[id number]] Collected: 12/16/23 0341    Order Status: Completed Specimen: Blood Updated: 12/17/23 0353     Blood Culture, Routine Gram stain aer bottle: Gram positive cocci in clusters resembling Staph      Positive results previously called 12/17/2023    Blood culture [[id number]] Collected: 12/16/23 0341    Order Status: Completed Specimen: Blood Updated: 12/17/23 0215     Blood Culture, Routine Gram stain aer bottle: Gram positive cocci in clusters resembling Staph      Results called to and read back by: Lei Marmolejo 12/17/2023  02:14    Urine culture [[id number]]  (Abnormal)  (Susceptibility) Collected: 12/13/23 2341     Order Status: Completed Specimen: Urine Updated: 12/16/23 2048     Urine Culture, Routine STAPHYLOCOCCUS AUREUS  > 100,000 cfu/ml      Narrative:      Specimen Source->Urine    Blood culture [[id number]]  (Abnormal) Collected: 12/14/23 0552    Order Status: Completed Specimen: Blood from Peripheral, Forearm, Right Updated: 12/16/23 1026     Blood Culture, Routine Gram stain aer bottle: Gram positive cocci in clusters resembling Staph      Positive results previously called 12/14/2023      STAPHYLOCOCCUS AUREUS  For susceptibility see order #[id number]      Blood culture [[id number]]  (Abnormal)  (Susceptibility) Collected: 12/14/23 0552    Order Status: Completed Specimen: Blood from Peripheral, Forearm, Right Updated: 12/16/23 1025     Blood Culture, Routine Gram stain aer bottle: Gram positive cocci in clusters resembling Staph      Results called to and read back by:Benita Mercedes RN 12/14/2023  20:43      STAPHYLOCOCCUS AUREUS    MRSA/SA Rapid ID by PCR from Blood culture [[id number]]  (Abnormal) Collected: 12/14/23 0552    Order Status: Completed Updated: 12/14/23 2150     Staph aureus ID by PCR Positive     Methicillin Resistant ID by PCR Negative    Clostridium difficile EIA [[id number]] Collected: 12/14/23 0609    Order Status: Completed Specimen: Stool Updated: 12/14/23 1255     C. diff Antigen Negative     C difficile Toxins A+B, EIA Negative     Comment: Testing not recommended for children <24 months old.       Stool culture [[id number]]     Order Status: Canceled Specimen: Stool     Influenza A & B by Molecular [[id number]] Collected: 12/13/23 2214    Order Status: Completed Specimen: Nasopharyngeal Swab Updated: 12/13/23 2254     Influenza A, Molecular Negative     Influenza B, Molecular Negative     Flu A & B Source Nasal swab          Recent Lab Results         12/17/23  0908   12/16/23  1901        Albumin 2.7   2.4                         Anion Gap 12   11                Baso  # 0.05         Basophil % 0.4         BILIRUBIN TOTAL 1.8  Comment: For infants and newborns, interpretation of results should be based  on gestational age, weight and in agreement with clinical  observations.    Premature Infant recommended reference ranges:  Up to 24 hours.............<8.0 mg/dL  Up to 48 hours............<12.0 mg/dL  3-5 days..................<15.0 mg/dL  6-29 days.................<15.0 mg/dL           BUN 8   12       Calcium 8.7   8.4       Chloride 104   103       CO2 20   22       Creatinine 0.8   0.8       Differential Method Automated         eGFR >60.0   >60.0       Eos # 0.2         Eosinophil % 1.9         Glucose 117   119       Gran # (ANC) 9.4         Gran % 83.1         Hematocrit 41.7         Hemoglobin 14.5         Immature Grans (Abs) 0.10  Comment: Mild elevation in immature granulocytes is non specific and   can be seen in a variety of conditions including stress response,   acute inflammation, trauma and pregnancy. Correlation with other   laboratory and clinical findings is essential.           Immature Granulocytes 0.9         Lymph # 0.9         Lymph % 8.0         Magnesium  2.2         MCH 29.8         MCHC 34.8         MCV 86         Mono # 0.6         Mono % 5.7         MPV 10.2         nRBC 0         Phosphorus Level   1.8       Platelet Count 227         Potassium 3.2   3.4       PROTEIN TOTAL 7.2         RBC 4.87         RDW 14.3         Sodium 136   136       WBC 11.30                 Significant Imaging:     Imaging Results              CT Abdomen Pelvis With IV Contrast NO Oral Contrast (Final result)  Result time 12/14/23 08:54:04      Final result by Lalo Puri MD (12/14/23 08:54:04)                   Impression:      1. Intraluminal fluid throughout the small and large bowel with adjacent mesenteric fat stranding and free fluid, as may be seen in the setting of a nonspecific infectious or noninfectious inflammatory enterocolitis.  2. Nonobstructive  right nephrolithiasis.  3. Small bilateral pleural effusions.  4. Additional details of chronic and incidental findings, as provided in the body of report.    Electronically signed by resident: Katherine Hidalgo  Date:    12/14/2023  Time:    06:04    Electronically signed by: Lalo Puri  Date:    12/14/2023  Time:    08:54               Narrative:    EXAMINATION:  CT ABDOMEN PELVIS WITH IV CONTRAST    CLINICAL HISTORY:  Abdominal abscess/infection suspected;LLQ abdominal pain;    TECHNIQUE:  Low dose axial images, sagittal and coronal reformations were obtained from the lung bases to the pubic symphysis following the IV administration of 75 mL of Omnipaque 350.Oral contrast was not given.    COMPARISON:  Ultrasound abdomen 11/01/2023    FINDINGS:  Comment: Motion compromised examination.    Lower thorax:    Heart: Cardiomegaly.  Cardiac pacing leads.  Severe calcific atherosclerosis of multiple coronary arteries.    Lung Bases: Small bilateral pleural effusions, larger on the right.  Bibasal linear opacities, likely subsegmental atelectasis versus scarring.    Liver: Normal in size and attenuation, with no focal hepatic lesions.    Gallbladder: No calcified gallstones.  Gallbladder is not distended.  No gallbladder wall thickening or pericholecystic fluid collection.    Bile Ducts: No intra or extrahepatic biliary duct dilatation.    Pancreas: No mass or peripancreatic fat stranding.    Spleen: Normal in size.  No focal lesion.    Adrenals: Unremarkable.    Kidneys/ Ureters: Normal in size and location. Normal enhancement.  1.3 cm right lower pole nonobstructive nephrolithiasis.  No hydronephrosis or hydroureter.  Multiple cortical hypodensities which are too small to characterize likely renal cysts.  Nonspecific mild bilateral perinephric stranding.    Bladder: No evidence of wall thickening.    Reproductive organs: Prostate appears enlarged, measuring 4.6 cm with dystrophic  calcification.    Gl/Mesentery/peritoneum and retroperitoneum: Small hiatal hernia.  Stomach is unremarkable.  Small and large bowel are normal in caliber with no evidence of obstruction.  Liquid stool throughout the small and large bowel with mild mesenteric soft tissue stranding and trace of free fluid, as may be seen in a nonspecific infectious versus noninfectious inflammatory enterocolitis.    Abdominal wall: Left fat containing inguinal hernia.    Vasculature: Moderate calcific atherosclerosis.  No aneurysm.    Bones: Degenerative changes.  Similar appearing compression fractures of T12 and L1 when compared to radiograph of 04/22/2021 and MRI of 04/05/2021, allowing for differences in technique/modality.  No acute fracture.  No suspicious lytic or sclerotic lesion.

## 2023-12-17 NOTE — PLAN OF CARE
Continues with IV ABT , and electrolyte replacement as needed  Problem: Adult Inpatient Plan of Care  Goal: Plan of Care Review  Outcome: Ongoing, Progressing  Flowsheets (Taken 12/17/2023 1748)  Plan of Care Reviewed With: patient  Goal: Patient-Specific Goal (Individualized)  Outcome: Ongoing, Progressing  Flowsheets (Taken 12/17/2023 1748)  Anxieties, Fears or Concerns: when he will get to go home  Individualized Care Needs: IV Antibiotic therapy  Goal: Absence of Hospital-Acquired Illness or Injury  Outcome: Ongoing, Progressing  Intervention: Identify and Manage Fall Risk  Flowsheets (Taken 12/17/2023 1748)  Safety Promotion/Fall Prevention:   assistive device/personal item within reach   side rails raised x 2   nonskid shoes/socks when out of bed  Intervention: Prevent Skin Injury  Flowsheets (Taken 12/17/2023 1748)  Body Position: position changed independently  Skin Protection:   adhesive use limited   tubing/devices free from skin contact  Intervention: Prevent and Manage VTE (Venous Thromboembolism) Risk  Flowsheets (Taken 12/17/2023 1748)  Activity Management:   Ambulated -L4   Ambulated to bathroom - L4   Ambulated in room - L4  VTE Prevention/Management:   ambulation promoted   bleeding risk assessed   bleeding precations maintained   fluids promoted  Range of Motion: active ROM (range of motion) encouraged  Intervention: Prevent Infection  Flowsheets (Taken 12/17/2023 1748)  Infection Prevention:   environmental surveillance performed   equipment surfaces disinfected   hand hygiene promoted   rest/sleep promoted   single patient room provided  Goal: Optimal Comfort and Wellbeing  Outcome: Ongoing, Progressing  Intervention: Monitor Pain and Promote Comfort  Flowsheets (Taken 12/17/2023 1748)  Pain Management Interventions: medication offered  Goal: Readiness for Transition of Care  Outcome: Ongoing, Progressing     Problem: Infection  Goal: Absence of Infection Signs and Symptoms  Outcome: Ongoing,  Progressing     Problem: Nutrition Impaired (Sepsis/Septic Shock)  Goal: Optimal Nutrition Intake  Outcome: Ongoing, Progressing     Problem: Fall Injury Risk  Goal: Absence of Fall and Fall-Related Injury  Outcome: Ongoing, Progressing  Intervention: Identify and Manage Contributors  Flowsheets (Taken 12/17/2023 1748)  Self-Care Promotion: independence encouraged  Intervention: Promote Injury-Free Environment  Flowsheets (Taken 12/17/2023 1748)  Safety Promotion/Fall Prevention:   assistive device/personal item within reach   side rails raised x 2   nonskid shoes/socks when out of bed

## 2023-12-17 NOTE — NURSING
0821 Pt sitting up in bed A&O x4 resp even and unlabored, vitals done, communication board updated, flushed both IV lines, left AC leaking removed , bed in low locked position call light in reach    0911 Patient sitting up on couch, scheduled meds given , reports had a bowel movement, noted watery stool with a little grainy ness in bottom of toilet, green in color, Dr. Golden in room, patient will be advanced to regular cardiac diet and then NPO tonight after midnight for CHARMAINE tomorrow, communication board updated    1005 Pt sitting up in bed denies any needs at this time

## 2023-12-17 NOTE — ASSESSMENT & PLAN NOTE
68 year old male with history of CAD s/p CABG x3, HLD, heart block s/p pacemaker admitted with MSSA bacteremia and MSSA UTI.     CT A/P reviewed and notable for nonobstructing renal stone, enlarged prostate. CHARMAINE is scheduled for tomorrow. Patient is on IV Cefazolin. Blood cultures remain persistently positive (12/14 - 12/16). Afebrile and HDS. Symptomatically improved. Of note, his urine culture in September was also positive for MSSA.      Recommendations:  Continue IV Cefazolin   Repeat blood cultures x 2 obtained today  CHARMAINE tomorrow to assess for lead/valve vegetations   Urology evaluated the patient. No acute urologic intervention planned.  Anticipate prolonged course of IV antibiotics for complicated bacteremia  ID will follow up tomorrow.

## 2023-12-17 NOTE — SUBJECTIVE & OBJECTIVE
Interval History: NAEON. Afebrile. Patient feels improved. Nausea and vomiting resolved. Still with loose stools but improved. Denies joint pain or other complaints. Informed patient of persistently positive blood cultures. CHARMAINE scheduled for tomorrow. He is tolerating Cefazolin well.      Review of Systems   Constitutional:  Negative for appetite change, chills, diaphoresis, fatigue and fever.   Eyes:  Negative for visual disturbance.   Respiratory:  Negative for cough and shortness of breath.    Cardiovascular:  Negative for chest pain and leg swelling.   Gastrointestinal:  Positive for diarrhea (loose stools). Negative for abdominal pain, constipation, nausea and vomiting.   Genitourinary:  Negative for difficulty urinating, dysuria, frequency and hematuria.   Musculoskeletal:  Negative for arthralgias, back pain and joint swelling.   Skin:  Negative for color change, rash and wound.   Neurological:  Negative for dizziness, weakness, numbness and headaches.   Psychiatric/Behavioral:  Negative for agitation and confusion. The patient is not nervous/anxious.    All other systems reviewed and are negative.    Objective:     Vital Signs (Most Recent):  Temp: 97.4 °F (36.3 °C) (12/17/23 1104)  Pulse: 88 (12/17/23 1104)  Resp: 16 (12/17/23 1104)  BP: 113/70 (12/17/23 1104)  SpO2: (!) 93 % (12/17/23 1104) Vital Signs (24h Range):  Temp:  [97.2 °F (36.2 °C)-98.7 °F (37.1 °C)] 97.4 °F (36.3 °C)  Pulse:  [77-97] 88  Resp:  [16-18] 16  SpO2:  [93 %-96 %] 93 %  BP: (113-129)/(69-79) 113/70     Weight: 75.8 kg (167 lb)  Body mass index is 26.16 kg/m².    Estimated Creatinine Clearance: 82.6 mL/min (based on SCr of 0.8 mg/dL).     Physical Exam  Constitutional:       General: He is not in acute distress.     Appearance: Normal appearance. He is well-developed. He is not ill-appearing or diaphoretic.   HENT:      Head: Normocephalic and atraumatic.      Right Ear: External ear normal.      Left Ear: External ear normal.       Nose: Nose normal.   Eyes:      General: No scleral icterus.        Right eye: No discharge.         Left eye: No discharge.      Extraocular Movements: Extraocular movements intact.      Conjunctiva/sclera: Conjunctivae normal.   Cardiovascular:      Rate and Rhythm: Normal rate and regular rhythm.   Pulmonary:      Effort: Pulmonary effort is normal. No respiratory distress.      Breath sounds: No stridor.   Abdominal:      General: There is no distension.      Palpations: Abdomen is soft.   Musculoskeletal:         General: No swelling or tenderness.      Comments: No joint or midline spine tenderness   Skin:     General: Skin is dry.      Coloration: Skin is not jaundiced or pale.      Findings: No erythema or rash.   Neurological:      General: No focal deficit present.      Mental Status: He is alert and oriented to person, place, and time. Mental status is at baseline.   Psychiatric:         Mood and Affect: Mood normal.         Behavior: Behavior normal.         Thought Content: Thought content normal.         Judgment: Judgment normal.          Significant Labs: CBC:   Recent Labs   Lab 12/16/23  0341 12/17/23  0908   WBC 9.34 11.30   HGB 12.9* 14.5   HCT 35.8* 41.7    227     CMP:   Recent Labs   Lab 12/16/23  0341 12/16/23  1901 12/17/23  0908   * 136 136   K 3.1* 3.4* 3.2*    103 104   CO2 24 22* 20*   GLU 99 119* 117*   BUN 11 12 8   CREATININE 0.8 0.8 0.8   CALCIUM 7.8* 8.4* 8.7   PROT 5.7*  --  7.2   ALBUMIN 2.2* 2.4* 2.7*   BILITOT 1.7*  --  1.8*   ALKPHOS 86  --  107   *  --  161*   *  --  102*   ANIONGAP 6* 11 12     Microbiology Results (last 7 days)       Procedure Component Value Units Date/Time    Blood culture [[id number]]  (Abnormal) Collected: 12/14/23 2310    Order Status: Completed Specimen: Blood Updated: 12/17/23 0902     Blood Culture, Routine Gram stain aer bottle: Gram positive cocci in clusters resembling Staph      Positive results previously called  12/15/2023  17:26      STAPHYLOCOCCUS AUREUS  For susceptibility see order #[id number]      Blood culture [[id number]]  (Abnormal) Collected: 12/14/23 2310    Order Status: Completed Specimen: Blood Updated: 12/17/23 0902     Blood Culture, Routine Gram stain aer bottle: Gram positive cocci in clusters resembling Staph      Results called to and read back by: Krupa Salas RN 12/15/2023  15:52      STAPHYLOCOCCUS AUREUS  For susceptibility see order #[id number]      Blood culture [[id number]] Collected: 12/17/23 0458    Order Status: Sent Specimen: Blood Updated: 12/17/23 0539    Blood culture [[id number]] Collected: 12/17/23 0458    Order Status: Sent Specimen: Blood Updated: 12/17/23 0539    Blood culture [[id number]] Collected: 12/16/23 0341    Order Status: Completed Specimen: Blood Updated: 12/17/23 0353     Blood Culture, Routine Gram stain aer bottle: Gram positive cocci in clusters resembling Staph      Positive results previously called 12/17/2023    Blood culture [[id number]] Collected: 12/16/23 0341    Order Status: Completed Specimen: Blood Updated: 12/17/23 0215     Blood Culture, Routine Gram stain aer bottle: Gram positive cocci in clusters resembling Staph      Results called to and read back by: Lei Marmolejo 12/17/2023  02:14    Urine culture [[id number]]  (Abnormal)  (Susceptibility) Collected: 12/13/23 2346    Order Status: Completed Specimen: Urine Updated: 12/16/23 2048     Urine Culture, Routine STAPHYLOCOCCUS AUREUS  > 100,000 cfu/ml      Narrative:      Specimen Source->Urine    Blood culture [[id number]]  (Abnormal) Collected: 12/14/23 0552    Order Status: Completed Specimen: Blood from Peripheral, Forearm, Right Updated: 12/16/23 1026     Blood Culture, Routine Gram stain aer bottle: Gram positive cocci in clusters resembling Staph      Positive results previously called 12/14/2023      STAPHYLOCOCCUS AUREUS  For susceptibility see order #[id number]      Blood culture [[id number]]   (Abnormal)  (Susceptibility) Collected: 12/14/23 0552    Order Status: Completed Specimen: Blood from Peripheral, Forearm, Right Updated: 12/16/23 1025     Blood Culture, Routine Gram stain aer bottle: Gram positive cocci in clusters resembling Staph      Results called to and read back by:Benita Mercedes RN 12/14/2023  20:43      STAPHYLOCOCCUS AUREUS    MRSA/SA Rapid ID by PCR from Blood culture [[id number]]  (Abnormal) Collected: 12/14/23 0552    Order Status: Completed Updated: 12/14/23 2150     Staph aureus ID by PCR Positive     Methicillin Resistant ID by PCR Negative    Clostridium difficile EIA [[id number]] Collected: 12/14/23 0609    Order Status: Completed Specimen: Stool Updated: 12/14/23 1255     C. diff Antigen Negative     C difficile Toxins A+B, EIA Negative     Comment: Testing not recommended for children <24 months old.       Stool culture [[id number]]     Order Status: Canceled Specimen: Stool     Influenza A & B by Molecular [[id number]] Collected: 12/13/23 2214    Order Status: Completed Specimen: Nasopharyngeal Swab Updated: 12/13/23 2254     Influenza A, Molecular Negative     Influenza B, Molecular Negative     Flu A & B Source Nasal swab          Recent Lab Results         12/17/23  0908   12/16/23  1901        Albumin 2.7   2.4                         Anion Gap 12   11                Baso # 0.05         Basophil % 0.4         BILIRUBIN TOTAL 1.8  Comment: For infants and newborns, interpretation of results should be based  on gestational age, weight and in agreement with clinical  observations.    Premature Infant recommended reference ranges:  Up to 24 hours.............<8.0 mg/dL  Up to 48 hours............<12.0 mg/dL  3-5 days..................<15.0 mg/dL  6-29 days.................<15.0 mg/dL           BUN 8   12       Calcium 8.7   8.4       Chloride 104   103       CO2 20   22       Creatinine 0.8   0.8       Differential Method Automated         eGFR >60.0    >60.0       Eos # 0.2         Eosinophil % 1.9         Glucose 117   119       Gran # (ANC) 9.4         Gran % 83.1         Hematocrit 41.7         Hemoglobin 14.5         Immature Grans (Abs) 0.10  Comment: Mild elevation in immature granulocytes is non specific and   can be seen in a variety of conditions including stress response,   acute inflammation, trauma and pregnancy. Correlation with other   laboratory and clinical findings is essential.           Immature Granulocytes 0.9         Lymph # 0.9         Lymph % 8.0         Magnesium  2.2         MCH 29.8         MCHC 34.8         MCV 86         Mono # 0.6         Mono % 5.7         MPV 10.2         nRBC 0         Phosphorus Level   1.8       Platelet Count 227         Potassium 3.2   3.4       PROTEIN TOTAL 7.2         RBC 4.87         RDW 14.3         Sodium 136   136       WBC 11.30                 Significant Imaging:     Imaging Results              CT Abdomen Pelvis With IV Contrast NO Oral Contrast (Final result)  Result time 12/14/23 08:54:04      Final result by Lalo Puri MD (12/14/23 08:54:04)                   Impression:      1. Intraluminal fluid throughout the small and large bowel with adjacent mesenteric fat stranding and free fluid, as may be seen in the setting of a nonspecific infectious or noninfectious inflammatory enterocolitis.  2. Nonobstructive right nephrolithiasis.  3. Small bilateral pleural effusions.  4. Additional details of chronic and incidental findings, as provided in the body of report.    Electronically signed by resident: Katherine Hidalgo  Date:    12/14/2023  Time:    06:04    Electronically signed by: Lalo Puri  Date:    12/14/2023  Time:    08:54               Narrative:    EXAMINATION:  CT ABDOMEN PELVIS WITH IV CONTRAST    CLINICAL HISTORY:  Abdominal abscess/infection suspected;LLQ abdominal pain;    TECHNIQUE:  Low dose axial images, sagittal and coronal reformations were obtained from the lung bases to  the pubic symphysis following the IV administration of 75 mL of Omnipaque 350.Oral contrast was not given.    COMPARISON:  Ultrasound abdomen 11/01/2023    FINDINGS:  Comment: Motion compromised examination.    Lower thorax:    Heart: Cardiomegaly.  Cardiac pacing leads.  Severe calcific atherosclerosis of multiple coronary arteries.    Lung Bases: Small bilateral pleural effusions, larger on the right.  Bibasal linear opacities, likely subsegmental atelectasis versus scarring.    Liver: Normal in size and attenuation, with no focal hepatic lesions.    Gallbladder: No calcified gallstones.  Gallbladder is not distended.  No gallbladder wall thickening or pericholecystic fluid collection.    Bile Ducts: No intra or extrahepatic biliary duct dilatation.    Pancreas: No mass or peripancreatic fat stranding.    Spleen: Normal in size.  No focal lesion.    Adrenals: Unremarkable.    Kidneys/ Ureters: Normal in size and location. Normal enhancement.  1.3 cm right lower pole nonobstructive nephrolithiasis.  No hydronephrosis or hydroureter.  Multiple cortical hypodensities which are too small to characterize likely renal cysts.  Nonspecific mild bilateral perinephric stranding.    Bladder: No evidence of wall thickening.    Reproductive organs: Prostate appears enlarged, measuring 4.6 cm with dystrophic calcification.    Gl/Mesentery/peritoneum and retroperitoneum: Small hiatal hernia.  Stomach is unremarkable.  Small and large bowel are normal in caliber with no evidence of obstruction.  Liquid stool throughout the small and large bowel with mild mesenteric soft tissue stranding and trace of free fluid, as may be seen in a nonspecific infectious versus noninfectious inflammatory enterocolitis.    Abdominal wall: Left fat containing inguinal hernia.    Vasculature: Moderate calcific atherosclerosis.  No aneurysm.    Bones: Degenerative changes.  Similar appearing compression fractures of T12 and L1 when compared to  radiograph of 04/22/2021 and MRI of 04/05/2021, allowing for differences in technique/modality.  No acute fracture.  No suspicious lytic or sclerotic lesion.

## 2023-12-17 NOTE — PROGRESS NOTES
[facility] - Emergency Dept  [facility] Medicine  Progress Note    Patient Name: Michael Espinoza  MRN: [id number]  Patient Class: IP- Inpatient   Admission Date: 12/13/2023  Length of Stay: 2 days  Attending Physician: Mukesh Golden MD  Primary Care Provider: Dominguez Hyatt MD        Subjective:     Principal Problem:Sepsis without acute organ dysfunction        HPI:  Michael Espinoza is a 68 y.o. male with PMHx of CAD s/p CABG x3, HLD, complete heart block s/p dual chamber pacemaker. [place] [facility] [place] [facility] [place]13 with diarrhea for the last 6 days. On 12/8 he developed left lower quadrant cramping followed by multiple episodes of loose stool. The next two days he had about 10-13 episodes diarrhea daily. Watery-brown. No blood. By Monday he continued to have diarrhea but then also developed mild nausea, but was able to keep down bland foods (jello, applesauce, banana, eggs) without emesis. He also began having urinary frequency, urgency, and dysuria and though his right kidney was mildly painful. Denies hematuria. He began to keep extremely dehydrated, started having fevers up to 102F Monday-Tuesday. He endorses polydipsia, myalgias, and hallucinations when he closes his eyes. He also reports shaking chills that would last for 20 minutes at a time that he could not control. Wednesday diarrhea finally stopped and he also stopped urinating but reports he did spontaneously urinate in ED once he got IV fluids.     He does have history of diarrhea and saw GI Dr. Feliz in September for this and thought to be maybe post infectious IBS but patient states he had completely symptom free period for months since then. He saw urgent care for symptoms 3 days ago and reports taking Zofran 4 times from them as well as a medication from GI (dicyclomine?) 10 times over 2 days. He reports normal colonoscopy one year ago (lipoma biopsied with surface erosions). No rectal pain. No more abdominal/flank pain. No strange foods or travel. He  denies falls, chest pain, palpitations, shortness of breath.     Per chart review: 12/11 stool culture without significant growth to date, E.coli shiga toxin 1 and 2 negative. Urine culture + staph aureus. A previous urine culture was also positive for staph aures in September and pt reports completing bactrim course.    In the ED, febrile to 100.4F, , RR 28. BP sable. On RA. Labs with WBC 8.58k, platelets 118k.  Na 129, K 2.9, Cl 94, BUN/Cr without YESICA, albumin 2.8, T bili 2.0 (chronically elevated), AST/ALT elevated 90/60. UA 3+ leukocytes, nitrite positive, 3+ occult blood, 1+ ketones, 1+ protein, 35 RBC, >100 WBC< many bacteria. LA 1.4. lipase and mag wnl. Covid and flu negative. CT abdomen pending. Given ceftriaxone, dicyclomine, famotidine, Zofran, potassium po and IV,  1L LR bolus and 1L NS bolus. Admitted to .     Overview/Hospital Course:  12/14 K replaced. BCX 2 and stool studies pending. CT abdomen -  Intraluminal fluid throughout the small and large bowel with adjacent mesenteric fat stranding and free fluid, as may be seen in the setting of a nonspecific infectious or noninfectious inflammatory enterocolitis. Nonobstructive right nephrolithiasis. loperamide PRN. continue ciprofloxacin and vancomycin . denies abdominal pain. advanced to soft diet   12/15 K and P replaced. BCX 2 from 12/14 with staph aureus. repeated BCX 2.  UC with STAPHYLOCOCCUS AUREUS > 100,000 cfu/ml  Susceptibility pending. echo ordered. tolerating soft diet. CHARMAINE ordered as with pacemaker . 1.3 cm right lower pole nonobstructive nephrolithiasis/ staph aureus on UC ( positive for MSSA urine culture from 9/2023). started on cefazolin. vancomycin discontinued   12/16 BC from 12/15 staph aureus. repeat BCX 2 TTE today-     Left Ventricle: The left ventricle is normal in size. Normal wall thickness. Regional wall motion abnormalities present. See diagram for wall motion findings. There is mildly reduced systolic function with a  visually estimated ejection fraction of 40 - 45%. Ejection fraction by visual approximation is 43%. There is normal diastolic function.    Right Ventricle: Normal right ventricular cavity size. Wall thickness is normal. Right ventricle wall motion  is normal. Systolic function is normal.    Left Atrium: Left atrium is severely dilated. 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    Aortic Valve: The aortic valve is a trileaflet valve. There is mild aortic valve sclerosis. There is mild stenosis. Aortic valve area by VTI is 1.74 cm². Aortic valve peak velocity is 1.65 m/s. Mean gradient is 7 mmHg. The dimensionless index is 0.46. There is trace aortic regurgitation.    Mitral Valve: There is mild bileaflet sclerosis.    Tricuspid Valve: There is mild regurgitation.    Pulmonary Artery: The estimated pulmonary artery systolic pressure is 35 mmHg.    IVC/SVC: Normal venous pressure at 3 mmHg.    CHARMAINE scheduled. K and P replaced  12/17 CHARMAINE tomorrow         Review of Systems:   Pain scale:   Constitutional:  fever,  chills, headache, vision loss, hearing loss, weight loss, Generalized weakness, falls, loss of smell, loss of taste, poor appetite,  sore throat  Respiratory: cough, shortness of breath.   Cardiovascular: chest pain, dizziness, palpitations, orthopnea, swelling of feet, syncope  Gastrointestinal: nausea, vomiting, abdominal pain, diarrhea -improved , black stool,  blood in stool, change in bowel habits, constipation  Genitourinary: hematuria, dysuria, urgency, frequency,  flank pain- resolved   Integument/Breast: rash,  pruritis  Hematologic/Lymphatic: easy bruising, lymphadenopathy  Musculoskeletal: arthralgias , myalgias, back pain, neck pain, knee pain  Neurological: confusion, seizures, tremors, slurred speech  Behavioral/Psych:  depression, anxiety, auditory or visual hallucinations     OBJECTIVE:     Physical Exam:  Body mass index is 26.16 kg/m².    Constitutional: Appears well-developed and  "well-nourished.   Head: Normocephalic and atraumatic.   Neck: Normal range of motion. Neck supple.   Cardiovascular: Normal heart rate.  Regular heart rhythm.  Pulmonary/Chest: Effort normal.   Abdominal: No distension.  No tenderness  Musculoskeletal: Normal range of motion. No edema.   Neurological: Alert and oriented to person, place, and time.   Skin: Skin is warm and dry.   Psychiatric: Normal mood and affect. Behavior is normal.                  Vital Signs  Temp: 97.4 °F (36.3 °C) (12/17/23 1104)  Pulse: 88 (12/17/23 1104)  Resp: 16 (12/17/23 1104)  BP: 113/70 (12/17/23 1104)  SpO2: (Abnormal) 93 % (12/17/23 1104)     24 Hour VS Range    Temp:  [97.2 °F (36.2 °C)-98.7 °F (37.1 °C)]   Pulse:  [77-97]   Resp:  [16-18]   BP: (113-129)/(69-79)   SpO2:  [93 %-96 %]     Intake/Output Summary (Last 24 hours) at 12/17/2023 1408  Last data filed at 12/17/2023 0937  Gross per 24 hour   Intake 237 ml   Output no documentation   Net 237 ml         I/O This Shift:  I/O this shift:  In: 237 [P.O.:237]  Out: -     Wt Readings from Last 3 Encounters:   12/16/23 75.8 kg (167 lb)   12/11/23 69.4 kg (153 lb)   11/21/23 69.8 kg (153 lb 14.1 oz)       I have personally reviewed the vitals and recorded Intake/Output     Laboratory/Diagnostic Data:    CBC/Anemia Labs: Coags:    Recent Labs   Lab 12/14/23  0608 12/15/23  0649 12/16/23  0341 12/17/23  0908   WBC  --  9.49 9.34 11.30   HGB  --  12.4* 12.9* 14.5   HCT  --  35.3* 35.8* 41.7   PLT  --  116* 150 227   MCV  --  86 87 86   RDW  --  13.7 13.8 14.3   OCCULTBLOOD Negative  --   --   --     No results for input(s): "PT", "INR", "APTT" in the last 168 hours.     Chemistries: ABG:   Recent Labs   Lab 12/15/23  0649 12/15/23  1706 12/16/23  0341 12/16/23  1901 12/17/23  0908   * 134* 133* 136 136   K 3.1* 3.2* 3.1* 3.4* 3.2*    101 103 103 104   CO2 21* 23 24 22* 20*   BUN 12 13 11 12 8   CREATININE 0.7 0.8 0.8 0.8 0.8   CALCIUM 7.6* 7.8* 7.8* 8.4* 8.7   PROT 5.4*  --  " "5.7*  --  7.2   BILITOT 1.8*  --  1.7*  --  1.8*   ALKPHOS 69  --  86  --  107   ALT 55*  --  116*  --  102*   AST 73*  --  167*  --  161*   MG 2.1  --  2.1  --  2.2   PHOS 2.1* 2.6* 2.1* 1.8*  --     No results for input(s): "PH", "PCO2", "PO2", "HCO3", "POCSATURATED", "BE" in the last 168 hours.     POCT Glucose: HbA1c:    No results for input(s): "POCTGLUCOSE" in the last 168 hours. Hemoglobin A1C   Date Value Ref Range Status   08/11/2023 5.6 4.0 - 5.6 % Final     Comment:     ADA Screening Guidelines:  5.7-6.4%  Consistent with prediabetes  >or=6.5%  Consistent with diabetes    High levels of fetal hemoglobin interfere with the HbA1C  assay. Heterozygous hemoglobin variants (HbS, HgC, etc)do  not significantly interfere with this assay.   However, presence of multiple variants may affect accuracy.     05/10/2018 5.4 4.0 - 5.6 % Final     Comment:     According to ADA guidelines, hemoglobin A1c <7.0% represents  optimal control in non-pregnant diabetic patients. Different  metrics may apply to specific patient populations.   Standards of Medical Care in Diabetes-2016.  For the purpose of screening for the presence of diabetes:  <5.7%     Consistent with the absence of diabetes  5.7-6.4%  Consistent with increasing risk for diabetes   (prediabetes)  >or=6.5%  Consistent with diabetes  Currently, no consensus exists for use of hemoglobin A1c  for diagnosis of diabetes for children.  This Hemoglobin A1c assay has significant interference with fetal   hemoglobin   (HbF). The results are invalid for patients with abnormal amounts of   HbF,   including those with known Hereditary Persistence   of Fetal Hemoglobin. Heterozygous hemoglobin variants (HbAS, HbAC,   HbAD, HbAE, HbA2) do not significantly interfere with this assay;   however, presence of multiple variants in a sample may impact the %   interference.          Cardiac Enzymes: Ejection Fractions:    No results for input(s): "CPK", "CPKMB", "MB", "TROPONINI" " "in the last 72 hours. EF   Date Value Ref Range Status   12/16/2023 43 % Final   07/05/2022 65 % Final          No results for input(s): "COLORU", "APPEARANCEUA", "PHUR", "SPECGRAV", "PROTEINUA", "GLUCUA", "KETONESU", "BILIRUBINUA", "OCCULTUA", "NITRITE", "UROBILINOGEN", "LEUKOCYTESUR", "RBCUA", "WBCUA", "BACTERIA", "SQUAMEPITHEL", "HYALINECASTS" in the last 48 hours.    Invalid input(s): "WRIGHTSUR"      Lactate (Lactic Acid) (mmol/L)   Date Value   12/13/2023 1.4     BNP (pg/mL)   Date Value   07/05/2022 596 (H)     CRP (mg/L)   Date Value   12/14/2023 206.0 (H)     Sed Rate (mm/Hr)   Date Value   12/14/2023 76 (H)     No results found for: "DDIMER"  Ferritin (ng/mL)   Date Value   09/14/2023 81   03/21/2018 35     No results found for: "LDH"  Troponin I (ng/mL)   Date Value   07/05/2022 <0.006   07/05/2022 0.009     Results for orders placed or performed in visit on 07/01/21   Vitamin D   Result Value Ref Range    Vit D, 25-Hydroxy 31 30 - 96 ng/mL   Results for orders placed or performed in visit on 05/05/21   Vitamin D   Result Value Ref Range    Vit D, 25-Hydroxy 38 30 - 96 ng/mL   Results for orders placed or performed in visit on 03/21/18   Vitamin D   Result Value Ref Range    Vit D, 25-Hydroxy 29 (L) 30 - 96 ng/mL     SARS-CoV-2 RNA, Amplification, Qual (no units)   Date Value   12/13/2023 Negative     POC Rapid COVID (no units)   Date Value   07/05/2022 Negative   12/22/2020 Negative       Microbiology labs for the last week  Microbiology Results (last 7 days)       Procedure Component Value Units Date/Time    Blood culture [[id number]] Collected: 12/17/23 0458    Order Status: Completed Specimen: Blood Updated: 12/17/23 1315     Blood Culture, Routine No Growth to date    Blood culture [[id number]] Collected: 12/17/23 0458    Order Status: Completed Specimen: Blood Updated: 12/17/23 1315     Blood Culture, Routine No Growth to date    Blood culture [[id number]]  (Abnormal) Collected: 12/14/23 2310    " Order Status: Completed Specimen: Blood Updated: 12/17/23 0902     Blood Culture, Routine Gram stain aer bottle: Gram positive cocci in clusters resembling Staph      Positive results previously called 12/15/2023  17:26      STAPHYLOCOCCUS AUREUS  For susceptibility see order #[id number]      Blood culture [[id number]]  (Abnormal) Collected: 12/14/23 2310    Order Status: Completed Specimen: Blood Updated: 12/17/23 0902     Blood Culture, Routine Gram stain aer bottle: Gram positive cocci in clusters resembling Staph      Results called to and read back by: Krupa Salas RN 12/15/2023  15:52      STAPHYLOCOCCUS AUREUS  For susceptibility see order #[id number]      Blood culture [[id number]] Collected: 12/16/23 0341    Order Status: Completed Specimen: Blood Updated: 12/17/23 0353     Blood Culture, Routine Gram stain aer bottle: Gram positive cocci in clusters resembling Staph      Positive results previously called 12/17/2023    Blood culture [[id number]] Collected: 12/16/23 0341    Order Status: Completed Specimen: Blood Updated: 12/17/23 0215     Blood Culture, Routine Gram stain aer bottle: Gram positive cocci in clusters resembling Staph      Results called to and read back by: Lei Marmolejo 12/17/2023  02:14    Urine culture [[id number]]  (Abnormal)  (Susceptibility) Collected: 12/13/23 2346    Order Status: Completed Specimen: Urine Updated: 12/16/23 2048     Urine Culture, Routine STAPHYLOCOCCUS AUREUS  > 100,000 cfu/ml      Narrative:      Specimen Source->Urine    Blood culture [[id number]]  (Abnormal) Collected: 12/14/23 0552    Order Status: Completed Specimen: Blood from Peripheral, Forearm, Right Updated: 12/16/23 1026     Blood Culture, Routine Gram stain aer bottle: Gram positive cocci in clusters resembling Staph      Positive results previously called 12/14/2023      STAPHYLOCOCCUS AUREUS  For susceptibility see order #[id number]      Blood culture [[id number]]  (Abnormal)  (Susceptibility)  Collected: 12/14/23 0552    Order Status: Completed Specimen: Blood from Peripheral, Forearm, Right Updated: 12/16/23 1025     Blood Culture, Routine Gram stain aer bottle: Gram positive cocci in clusters resembling Staph      Results called to and read back by:Benita Mercedes RN 12/14/2023  20:43      STAPHYLOCOCCUS AUREUS    MRSA/SA Rapid ID by PCR from Blood culture [[id number]]  (Abnormal) Collected: 12/14/23 0552    Order Status: Completed Updated: 12/14/23 2150     Staph aureus ID by PCR Positive     Methicillin Resistant ID by PCR Negative    Clostridium difficile EIA [[id number]] Collected: 12/14/23 0609    Order Status: Completed Specimen: Stool Updated: 12/14/23 1255     C. diff Antigen Negative     C difficile Toxins A+B, EIA Negative     Comment: Testing not recommended for children <24 months old.       Stool culture [[id number]]     Order Status: Canceled Specimen: Stool     Influenza A & B by Molecular [[id number]] Collected: 12/13/23 2214    Order Status: Completed Specimen: Nasopharyngeal Swab Updated: 12/13/23 2254     Influenza A, Molecular Negative     Influenza B, Molecular Negative     Flu A & B Source Nasal swab            Reviewed and noted in plan where applicable- Please see chart for full lab data.    Lines/Drains:       Peripheral IV - Single Lumen 12/13/23 2210 20 G Left Antecubital (Active)   Site Assessment Clean;Dry;Intact 12/13/23 2211   Extremity Assessment Distal to IV No abnormal discoloration;No redness;No swelling 12/13/23 2211   Dressing Status Clean;Dry;Intact 12/13/23 2211   Dressing Intervention First dressing 12/13/23 2211   Number of days: 0       Imaging  ECG Results              EKG 12-lead (Final result)  Result time 12/14/23 14:17:37      Final result by Interface, Lab In [facility] (12/14/23 14:17:37)               Narrative:    Test Reason : R11.0,    Vent. Rate : 103 BPM     Atrial Rate : 103 BPM     P-R Int : 170 ms          QRS Dur : 176 ms      QT Int : 410 ms        P-R-T Axes : 037 158 -07 degrees     QTc Int : 537 ms    Atrial-sensed ventricular-paced rhythm  Biventricular pacemaker detected  Abnormal ECG  When compared with ECG of 21-NOV-2023 08:01,  Vent. rate has increased BY  36 BPM  Confirmed by Ivone Garibay MD ([id number]) on 12/14/2023 2:17:29 PM    Referred By: [email or web address]   SELF           Confirmed By:Ivone Garibay MD                                  Results for orders placed during the hospital encounter of 07/05/22    Echo    Interpretation Summary  · Presence of bradycardia with HR 30s with AV dissociation  · The estimated ejection fraction is 65%.  · The left ventricle is normal in size with normal systolic function.  · Normal left ventricular diastolic function.  · Normal right ventricular size with normal right ventricular systolic function.  · Mild mitral regurgitation.  · Presence of interatrial septal aneurysm.  · The estimated PA systolic pressure is 28 mmHg.  · Normal central venous pressure (3 mmHg).      US Liver with Doppler (xpd)  Narrative: EXAMINATION:  US LIVER WITH DOPPLER    CLINICAL HISTORY:  transaminitis;    TECHNIQUE:  Duplex scan of the liver was performed using B-mode/gray scale imaging and Doppler spectral analysis and color flow.    COMPARISON:  CT abdomen pelvis 12/14/2023    Abdominal ultrasound 11/01/2013    FINDINGS:  Pancreas is obscured by overlying bowel gas.    The Liver is normal in size measuring 15.4 cm.  The liver demonstrates homogeneous echotexture. No focal hepatic lesions are seen.    There is no evidence of ascites.    The gallbladder is not distended.  Wall thickness measures 2.6 mm, within normal limits.  Layering sludge and punctate stones noted.    The common duct is not dilated, measuring 2.0 mm.  No dilated intrahepatic radicles are seen.    The spleen is normal in size measuring 11.3 x 3.5 cm with a homogeneous echotexture.    The main, right, and left branches of the portal vein are patent and demonstrate hepatopetal  flow.    The middle hepatic vein, right hepatic vein, left hepatic vein, SMV, and IVC are patent with proper directional flow. The main hepatic artery is patent. The umbilical vein is not patent.  Impression: 1. Normal Doppler evaluation of the liver.  2. Cholelithiasis.  No sonographic evidence for acute cholecystitis.    Electronically signed by resident: Nancy Palmer  Date:    12/16/2023  Time:    20:01    Electronically signed by: Zen Nielsen MD  Date:    12/17/2023  Time:    08:44      Labs, Imaging, EKG and Diagnostic results from 12/17/2023 were reviewed.    Medications:  Medication list was reviewed and changes noted under Assessment/Plan.  No current facility-administered medications on file prior to encounter.     Current Outpatient Medications on File Prior to Encounter   Medication Sig Dispense Refill    aspirin (ECOTRIN) 81 MG EC tablet Take 81 mg by mouth 2 (two) times daily. (Breakfast & Afternoon)      atorvastatin (LIPITOR) 80 MG tablet TAKE 1 TABLET EVERY DAY (Patient taking differently: Take 80 mg by mouth every evening.) 90 tablet 10    calcium carbonate (TUMS ORAL) Take 2 tablets by mouth daily as needed (Heartburn).      coenzyme Q10 (CO Q-10) 300 mg Cap Take 1 capsule by mouth once daily.      dicyclomine (BENTYL) 10 MG capsule Take 10 mg by mouth daily as needed (Abdominal pain).      ERGOCALCIFEROL, VITAMIN D2, (VITAMIN D ORAL) Take 1 capsule by mouth Mon, Wed, Fri, Sat & Sun      ezetimibe (ZETIA) 10 mg tablet TAKE 1 TABLET EVERY DAY (Patient taking differently: Take 10 mg by mouth once daily.) 90 tablet 10    multivit-min/FA/lycopen/lutein (CENTRUM SILVER MEN ORAL) Take 1 tablet by mouth every Mon, Wed, Fri.      ondansetron (ZOFRAN-ODT) 4 MG TbDL Take 1 tablet (4 mg total) by mouth every 8 (eight) hours as needed (nausea). 12 tablet 0     Scheduled Medications:  aspirin, 81 mg, Oral, BID  atorvastatin, 80 mg, Oral, Daily  ceFAZolin (ANCEF) IVPB, 2 g, Intravenous, Q8H  dicyclomine, 10  mg, Oral, QID  enoxparin, 40 mg, Subcutaneous, Daily  ezetimibe, 10 mg, Oral, Daily      PRN: acetaminophen, acetaminophen, aluminum-magnesium hydroxide-simethicone, calcium carbonate, dextrose 10%, dextrose 10%, glucagon (human recombinant), glucose, glucose, loperamide, melatonin, naloxone, ondansetron, simethicone, sodium chloride 0.9%  Infusions:       Estimated Creatinine Clearance: 82.6 mL/min (based on SCr of 0.8 mg/dL).           Assessment/Plan:      * Sepsis without acute organ dysfunction  This patient does have evidence of infective focus  My overall impression is sepsis.  Source: Urinary Tract and Abdominal  Antibiotics given-   Antibiotics (72h ago, onward)      Start     Stop Route Frequency Ordered    12/14/23 1900  vancomycin 750 mg in dextrose 5 % (D5W) 250 mL IVPB (Vial-Mate)         -- IV Every 12 hours (non-standard times) 12/14/23 0547    12/14/23 0900  ciprofloxacin HCl tablet 500 mg         12/19/23 0859 Oral Every 12 hours 12/14/23 0613    12/14/23 0623  vancomycin - pharmacy to dose  (vancomycin IVPB (PEDS and ADULTS))        See Hyperspace for full Linked Orders Report.    -- IV pharmacy to manage frequency 12/14/23 0523          Latest lactate reviewed-  Recent Labs   Lab 12/13/23  2212   LACTATE 1.4     Organ dysfunction indicated by  None- mild delirium (possible form electrolyte derangement and fevers) Mild transaminitis, mild thrombocytopenia    Fluid challenge Not needed - patient is not hypotensive      Post- resuscitation assessment No - Post resuscitation assessment not needed     Will Not start Pressors- Levophed for MAP of 65  Source control achieved by: Vancomycin ordered for staph aureus UTI on previous cultures, Ciprofloxacin ordered for possible infectious (vs other) diarrhea.  Blood, urine, stool cultures and studies pending.     Right kidney stone  12/15 s/p urology eval - stone is non obstructing. -No indication for acute urologic intervention.  outpatient urologic follow  up for stone treatment            Bacteremia  12/15 BCX 2 from 12/14 with staph aureus. repeated BCX 2.    CHARMAINE ordered as with pacemaker . 1.3 cm right lower pole nonobstructive nephrolithiasis/ staph aureus on UC ( positive for MSSA urine culture from 9/2023). started on cefazolin. vancomycin discontinued   12/16 BC from 12/15 staph aureus. repeat BCX 2 TTE today. CHARMAINE scheduled.    Thrombocytopenia  Patient was found to have thrombocytopenia, the likely etiology is secondary to sepsis/infection?, will monitor the platelets Daily. Will transfuse if platelet count is <20k. Hold DVT prophylaxis if platelets are <50k. The patient's platelet results have been reviewed and are listed below.  Recent Labs   Lab 12/16/23  0341      resolved     Hypophosphatemia  Patient has Abnormal Phosphorus: hypophosphatemia. Will continue to monitor electrolytes closely. Will replace the affected electrolytes and repeat labs to be done after interventions completed. The patient's phosphorus results have been reviewed and are listed below.  Recent Labs   Lab 12/16/23  0341   PHOS 2.1*        Transaminitis  -Mildly elevated, possible 2/2 dehydration/infection  -NO RUQ pain. T bili chronically elevated  -Check acute hepatitis panel   -Daily CMP    12/14 Patients liver enzymes  elevated.   Recent Labs     12/13/23  2212 12/14/23  0552 12/15/23  0649 12/16/23  0341   BILITOT 2.0* 1.5* 1.8* 1.7*   AST 90* 81* 73* 167*   ALT 60* 55* 55* 116*   ALKPHOS 87 84 69 86    . Liver enzymes  trending up. sono liver with doppler. monitor       Diarrhea  68M with acute onset of multiple episodes diarrhea with only mild LLQ abdominal cramping early in course. Mild nausea. No abdominal pain, emesis, and is tolerating PO. Having fevers, chills, polydipsia. Also urinary infectious symptoms. Previously seen by GI Dr. 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 Work up unremarkable.  Previous stool cultures without  growth.Giardia negative in the past. Recent stool culture without growth and E coli antigen negative.   -Multiple electrolyte derangements on admission  -Tachycardic, febrile, and tachypneic   -S/p 2L IVF bolus in ED, continue IVFs today  -Check complete stool studies  -C-scope 8/2022 Lipoma in the recto-sigmoid colon. Biopsied and found to have surface erosions  -CT pending  -GI consulted, appreciate recommendations  -PRN bentyl for cramping, prn Zofran/compazine for nausea  -ON Vanc for sepsis/UTI and will add ciprofloxacin 500 mg Q12h x5 days for diarrhea  for now pending culture results  12/14  CT abdomen -  Intraluminal fluid throughout the small and large bowel with adjacent mesenteric fat stranding and free fluid, as may be seen in the setting of a nonspecific infectious or noninfectious inflammatory enterocolitis. Nonobstructive right nephrolithiasis. loperamide PRN.   12/15 C diff  and stool culture negative.     Complicated UTI (urinary tract infection)  Presenting with dysuria/frequency/urgency/maybe mild flank pain/fever/chills (also with diarrheal illness).  -3/4 SIRs on admit for fever, tachycardia tachypnea  -UA infectious, nitrite positive, 3+ leukocytes >100 WBC and many bacteria  -Previous urine cultures with staph aureus  -S/p ceftriaxone in ED  -Will cover with vancomycin   -Follow up urine cultures  -Follow up CT  -Monitor I/Os  12/14. BCX 2 and stool studies pending.continue ciprofloxacin and vancomycin   12/15  UC with STAPHYLOCOCCUS AUREUS > 100,000 cfu/ml  Susceptibility pending. echo ordered. tolerating soft diet. 1.3 cm right lower pole nonobstructive nephrolithiasis/ staph aureus on UC ( positive for MSSA urine culture from 9/2023). started on cefazolin    Hypokalemia  Patient has hypokalemia which is Acute and currently uncontrolled. Most recent potassium levels reviewed-   Lab Results   Component Value Date    K 3.1 (L) 12/16/2023   Will continue potassium replacement per protocol and  recheck repeat levels after replacement completed. Continue to replace. Pt would prefer IV replacement. Pills hard to swallow and does not tolerate potassium bicarb well.    Hyponatremia  Patient has hyponatremia which is uncontrolled,We will aim to correct the sodium by 4-6mEq in 24 hours. We will monitor sodium Daily. The hyponatremia is due to Dehydration/hypovolemia. We will obtain the following studies: Urine sodium, urine osmolality, serum osmolality, or TSH, T4. We will treat the hyponatremia with IV fluids as follows: 2/p 2 L IVF in ED, continuous fluids at rate 100 ml/hr ordered. The patient's sodium results have been reviewed and are listed below.  Recent Labs   Lab 12/16/23  1901      improving.tolerating soft diet.    Complete heart block  -s/p dual chamber pacemaker placement  7/5/2022      Coronary artery disease of native artery of native heart with stable angina pectoris  Patient with known CAD s/p CABG, which is controlled Will continue ASA and Statin and monitor for S/Sx of angina/ACS. Continue to monitor on telemetry.     Gilbert's syndrome  -Chronic condition- reports diagnosed by Dr. Rowe years ago  -T bili elevated on admission, similar to previous  -Monitor CMP      HLD (hyperlipidemia)  -Continue home statin      VTE Risk Mitigation (From admission, onward)           Ordered     enoxaparin injection 40 mg  Daily         12/16/23 0900     IP VTE HIGH RISK PATIENT  Once         12/16/23 0900     Place sequential compression device  Until discontinued         12/14/23 0526                    Discharge Planning   MAGNO: 12/19/2023     Code Status: Full Code   Is the patient medically ready for discharge?: No    Reason for patient still in hospital (select all that apply): Treatment  Discharge Plan A: Home                  Mukesh Golden MD  Department of Hospital Medicine   [facility] - Emergency Dept

## 2023-12-18 ENCOUNTER — ANESTHESIA EVENT (OUTPATIENT)
Dept: MEDSURG UNIT | Facility: HOSPITAL | Age: 68
DRG: 872 | End: 2023-12-18
Payer: MEDICARE

## 2023-12-18 LAB
ALBUMIN SERPL BCP-MCNC: 2.3 G/DL (ref 3.5–5.2)
ALP SERPL-CCNC: 96 U/L (ref 55–135)
ALT SERPL W/O P-5'-P-CCNC: 82 U/L (ref 10–44)
ANION GAP SERPL CALC-SCNC: 7 MMOL/L (ref 8–16)
AST SERPL-CCNC: 125 U/L (ref 10–40)
BASOPHILS # BLD AUTO: 0.04 K/UL (ref 0–0.2)
BASOPHILS NFR BLD: 0.4 % (ref 0–1.9)
BILIRUB SERPL-MCNC: 1.2 MG/DL (ref 0.1–1)
BUN SERPL-MCNC: 11 MG/DL (ref 8–23)
CALCIUM SERPL-MCNC: 8.2 MG/DL (ref 8.7–10.5)
CHLORIDE SERPL-SCNC: 104 MMOL/L (ref 95–110)
CO2 SERPL-SCNC: 25 MMOL/L (ref 23–29)
CREAT SERPL-MCNC: 0.8 MG/DL (ref 0.5–1.4)
DIFFERENTIAL METHOD: ABNORMAL
ELASTASE 1, FECAL: 94 MCG/G
EOSINOPHIL # BLD AUTO: 0.3 K/UL (ref 0–0.5)
EOSINOPHIL NFR BLD: 3.4 % (ref 0–8)
ERYTHROCYTE [DISTWIDTH] IN BLOOD BY AUTOMATED COUNT: 14.4 % (ref 11.5–14.5)
EST. GFR  (NO RACE VARIABLE): >60 ML/MIN/1.73 M^2
GLUCOSE SERPL-MCNC: 96 MG/DL (ref 70–110)
HCT VFR BLD AUTO: 35.5 % (ref 40–54)
HGB BLD-MCNC: 11.9 G/DL (ref 14–18)
IMM GRANULOCYTES # BLD AUTO: 0.1 K/UL (ref 0–0.04)
IMM GRANULOCYTES NFR BLD AUTO: 1.1 % (ref 0–0.5)
LYMPHOCYTES # BLD AUTO: 1 K/UL (ref 1–4.8)
LYMPHOCYTES NFR BLD: 11.1 % (ref 18–48)
MAGNESIUM SERPL-MCNC: 2 MG/DL (ref 1.6–2.6)
MCH RBC QN AUTO: 29.7 PG (ref 27–31)
MCHC RBC AUTO-ENTMCNC: 33.5 G/DL (ref 32–36)
MCV RBC AUTO: 89 FL (ref 82–98)
MONOCYTES # BLD AUTO: 0.7 K/UL (ref 0.3–1)
MONOCYTES NFR BLD: 7.4 % (ref 4–15)
NEUTROPHILS # BLD AUTO: 7 K/UL (ref 1.8–7.7)
NEUTROPHILS NFR BLD: 76.6 % (ref 38–73)
NRBC BLD-RTO: 0 /100 WBC
PHOSPHATE SERPL-MCNC: 2.1 MG/DL (ref 2.7–4.5)
PLATELET # BLD AUTO: 263 K/UL (ref 150–450)
PMV BLD AUTO: 10.3 FL (ref 9.2–12.9)
POTASSIUM SERPL-SCNC: 4.1 MMOL/L (ref 3.5–5.1)
PROT SERPL-MCNC: 6.1 G/DL (ref 6–8.4)
RBC # BLD AUTO: 4.01 M/UL (ref 4.6–6.2)
SODIUM SERPL-SCNC: 136 MMOL/L (ref 136–145)
WBC # BLD AUTO: 9.18 K/UL (ref 3.9–12.7)

## 2023-12-18 PROCEDURE — 21400001 HC TELEMETRY ROOM

## 2023-12-18 PROCEDURE — 36415 COLL VENOUS BLD VENIPUNCTURE: CPT | Performed by: HOSPITALIST

## 2023-12-18 PROCEDURE — 25000003 PHARM REV CODE 250

## 2023-12-18 PROCEDURE — 25000003 PHARM REV CODE 250: Performed by: REGISTERED NURSE

## 2023-12-18 PROCEDURE — 63600175 PHARM REV CODE 636 W HCPCS: Performed by: HOSPITALIST

## 2023-12-18 PROCEDURE — 99233 SBSQ HOSP IP/OBS HIGH 50: CPT | Mod: ,,, | Performed by: REGISTERED NURSE

## 2023-12-18 PROCEDURE — 63600175 PHARM REV CODE 636 W HCPCS: Performed by: REGISTERED NURSE

## 2023-12-18 PROCEDURE — 85025 COMPLETE CBC W/AUTO DIFF WBC: CPT | Performed by: HOSPITALIST

## 2023-12-18 PROCEDURE — 83735 ASSAY OF MAGNESIUM: CPT | Performed by: HOSPITALIST

## 2023-12-18 PROCEDURE — 80053 COMPREHEN METABOLIC PANEL: CPT | Performed by: HOSPITALIST

## 2023-12-18 PROCEDURE — 99233 PR SUBSEQUENT HOSPITAL CARE,LEVL III: ICD-10-PCS | Mod: ,,, | Performed by: REGISTERED NURSE

## 2023-12-18 PROCEDURE — 25000003 PHARM REV CODE 250: Performed by: HOSPITALIST

## 2023-12-18 PROCEDURE — 84100 ASSAY OF PHOSPHORUS: CPT | Performed by: HOSPITALIST

## 2023-12-18 RX ADMIN — LOPERAMIDE HYDROCHLORIDE 2 MG: 2 CAPSULE ORAL at 05:12

## 2023-12-18 RX ADMIN — ASPIRIN 81 MG: 81 TABLET, COATED ORAL at 08:12

## 2023-12-18 RX ADMIN — ENOXAPARIN SODIUM 40 MG: 40 INJECTION SUBCUTANEOUS at 04:12

## 2023-12-18 RX ADMIN — DICYCLOMINE HYDROCHLORIDE 10 MG: 10 CAPSULE ORAL at 08:12

## 2023-12-18 RX ADMIN — DICYCLOMINE HYDROCHLORIDE 10 MG: 10 CAPSULE ORAL at 12:12

## 2023-12-18 RX ADMIN — CEFAZOLIN 2 G: 2 INJECTION, POWDER, FOR SOLUTION INTRAMUSCULAR; INTRAVENOUS at 08:12

## 2023-12-18 RX ADMIN — CEFAZOLIN 2 G: 2 INJECTION, POWDER, FOR SOLUTION INTRAMUSCULAR; INTRAVENOUS at 02:12

## 2023-12-18 RX ADMIN — EZETIMIBE 10 MG: 10 TABLET ORAL at 08:12

## 2023-12-18 RX ADMIN — CEFAZOLIN 2 G: 2 INJECTION, POWDER, FOR SOLUTION INTRAMUSCULAR; INTRAVENOUS at 04:12

## 2023-12-18 RX ADMIN — ATORVASTATIN CALCIUM 80 MG: 40 TABLET, FILM COATED ORAL at 08:12

## 2023-12-18 RX ADMIN — DICYCLOMINE HYDROCHLORIDE 10 MG: 10 CAPSULE ORAL at 04:12

## 2023-12-18 NOTE — ANESTHESIA PREPROCEDURE EVALUATION
[facility]  Anesthesia Pre-Operative Evaluation         Patient Name: Michael Espinoza  YOB: 1955  MRN: [id number]    SUBJECTIVE:     Pre-operative evaluation for Procedure(s) (LRB):  Transesophageal echo (CHARMAINE) intra-procedure log documentation (N/A)     12/18/2023    Michael Espinoza is a 68 y.o. male w/ a significant PMHx of CAD s/p CABG x3, HLD, heart block s/p dual chamber pacemaker, ASD, CONTRERAS and exertional angina who is admitted for MSSA bacteremia and MSSA UTI.    Patient now presents for the above procedure(s).    TTE (12/16/23):    Left Ventricle: The left ventricle is normal in size. Normal wall thickness. Regional wall motion abnormalities present. See diagram for wall motion findings. There is mildly reduced systolic function with a visually estimated ejection fraction of 40 - 45%. Ejection fraction by visual approximation is 43%. There is normal diastolic function.    Right Ventricle: Normal right ventricular cavity size. Wall thickness is normal. Right ventricle wall motion  is normal. Systolic function is normal.    Left Atrium: Left atrium is severely dilated. 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    Aortic Valve: The aortic valve is a trileaflet valve. There is mild aortic valve sclerosis. There is mild stenosis. Aortic valve area by VTI is 1.74 cm². Aortic valve peak velocity is 1.65 m/s. Mean gradient is 7 mmHg. The dimensionless index is 0.46. There is trace aortic regurgitation.    Mitral Valve: There is mild bileaflet sclerosis.    Tricuspid Valve: There is mild regurgitation.    Pulmonary Artery: The estimated pulmonary artery systolic pressure is 35 mmHg.    IVC/SVC: Normal venous pressure at 3 mmHg.      LDA:        Peripheral IV - Single Lumen 12/14/23 2206 20 G Anterior;Left Wrist (Active)   Site Assessment Clean;Dry;Intact;No redness;No  swelling;No warmth;No drainage 12/17/23 1600   Extremity Assessment Distal to IV No abnormal discoloration;No redness;No swelling;No warmth 12/16/23 0800   Line Status Flushed;Saline locked 12/17/23 1600   Dressing Status Clean;Dry;Intact 12/17/23 1600   Dressing Intervention Integrity maintained 12/17/23 1600   Number of days: 3            Peripheral IV - Single Lumen 12/17/23 2126 20 G Left Forearm (Active)   Site Assessment Clean;Dry;Intact;No redness;No swelling 12/17/23 2126   Extremity Assessment Distal to IV No abnormal discoloration;No redness;No swelling;No warmth 12/17/23 2126   Line Status Blood return noted 12/17/23 2126   Dressing Status Clean;Dry;Intact 12/17/23 2126   Dressing Intervention First dressing 12/17/23 2126   Number of days: 0       Prev airway (12/17/19):         Induction:  Intravenous    Mask Ventilation:  Easy mask    Attempts:  2    Attempted By:  Resident anesthesiologist    Method of Intubation:  Direct    Blade:  Brittney 3    Laryngeal View Grade: Grade III - only epiglottis visible      Attempted By (2nd Attempt):  Staff anesthesiologist    Method of Intubation (2nd Attempt):  Direct    Blade (2nd Attempt):  Brittney 3    Laryngeal View Grade (2nd Attempt): Grade IIa - cords partially seen      Difficult Airway Encountered?: No      Airway Device:  Oral endotracheal tube    Airway Device Size:  7.5    Style/Cuff Inflation:  Cuffed (inflated to minimal occlusive pressure)    Inflation Amount (mL):  8    Tube secured:  24    Secured at:  The lips    Placement Verified By:  Capnometry    Complicating Factors:  Anterior larynx and small mouth    Findings Post-Intubation:  BS equal bilateral  Notes:      Unable to visualize cords with Johnson #2 with cricoid pressure.  Subsequently, Mac #3 used by staff anesthesiologist with success in obtaining Grade IIb view.          Drips: None documented.      Patient Active Problem List   Diagnosis    HLD (hyperlipidemia)    Gilbert's syndrome     Exertional angina    ASD (atrial septal defect)    SOB (shortness of breath) on exertion    Abnormal CT of the chest    Chest pain    Coronary artery disease of native artery of native heart with stable angina pectoris    Pre-operative cardiovascular examination    S/P CABG x 3    Chronic right shoulder pain    Partial nontraumatic tear of rotator cuff, right    Pathological fracture due to osteoporosis    Age-related osteoporosis with current pathological fracture    Localized osteoporosis of [place]    Complete heart block    Hyponatremia    Hypokalemia    Complicated UTI (urinary tract infection)    Diarrhea    Sepsis without acute organ dysfunction    Transaminitis    Hypophosphatemia    Thrombocytopenia    Bacteremia    Right kidney stone       Review of patient's allergies indicates:  No Known Allergies    Current Inpatient Medications:   aspirin  81 mg Oral BID    atorvastatin  80 mg Oral Daily    ceFAZolin (ANCEF) IVPB  2 g Intravenous Q8H    dicyclomine  10 mg Oral QID    enoxparin  40 mg Subcutaneous Daily    ezetimibe  10 mg Oral Daily       No current facility-administered medications on file prior to encounter.     Current Outpatient Medications on File Prior to Encounter   Medication Sig Dispense Refill    aspirin (ECOTRIN) 81 MG EC tablet Take 81 mg by mouth 2 (two) times daily. (Breakfast & Afternoon)      atorvastatin (LIPITOR) 80 MG tablet TAKE 1 TABLET EVERY DAY (Patient taking differently: Take 80 mg by mouth every evening.) 90 tablet 10    calcium carbonate (TUMS ORAL) Take 2 tablets by mouth daily as needed (Heartburn).      coenzyme Q10 (CO Q-10) 300 mg Cap Take 1 capsule by mouth once daily.      dicyclomine (BENTYL) 10 MG capsule Take 10 mg by mouth daily as needed (Abdominal pain).      ERGOCALCIFEROL, VITAMIN D2, (VITAMIN D ORAL) Take 1 capsule by mouth Mon, Wed, Fri, Sat & Sun      ezetimibe (ZETIA) 10 mg tablet TAKE 1 TABLET EVERY DAY (Patient taking differently: Take 10 mg by mouth  once daily.) 90 tablet 10    multivit-min/FA/lycopen/lutein (CENTRUM SILVER MEN ORAL) Take 1 tablet by mouth every Mon, Wed, Fri.      ondansetron (ZOFRAN-ODT) 4 MG TbDL Take 1 tablet (4 mg total) by mouth every 8 (eight) hours as needed (nausea). 12 tablet 0       Past Surgical History:   Procedure Laterality Date    A-V CARDIAC PACEMAKER INSERTION N/A 7/5/2022    Procedure: INSERTION, CARDIAC PACEMAKER, DUAL CHAMBER;  Surgeon: Alessandro Canales MD;  Location: [facility] EP LAB;  Service: Cardiology;  Laterality: N/A;  CHB, TBD, ANES, ED 6, MB    COLONOSCOPY N/A 8/5/2022    Procedure: COLONOSCOPY;  Surgeon: Namita Dumont MD;  Location: [facility] ENDO ([address] [place] [address]);  Service: Endoscopy;  Laterality: N/A;  vacc-inst portal-tb    CYST REMOVAL      TONSILLECTOMY         Social History     Socioeconomic History    Marital status: Single   Tobacco Use    Smoking status: Never     Passive exposure: Never    Smokeless tobacco: Never   Substance and Sexual Activity    Alcohol use: Yes     Alcohol/week: 1.0 standard drink of alcohol     Types: 1 Glasses of wine per week     Comment: 1 drink 2-3 times/weekly    Drug use: No     Social Determinants of Health     Financial Resource Strain: Low Risk  (12/16/2023)    Overall Financial Resource Strain (CARDIA)     Difficulty of Paying Living Expenses: Not hard at all   Food Insecurity: No Food Insecurity (12/16/2023)    Hunger Vital Sign     Worried About Running Out of Food in the Last Year: Never true     Ran Out of Food in the Last Year: Never true   Transportation Needs: No Transportation Needs (12/16/2023)    PRAPARE - Transportation     Lack of Transportation (Medical): No     Lack of Transportation (Non-Medical): No   Physical Activity: Patient Declined (12/16/2023)    Exercise Vital Sign     Days of Exercise per Week: Patient declined     Minutes of Exercise per Session: Patient declined   Stress: No Stress Concern Present (12/16/2023)    [employer] -  Occupational Stress Questionnaire     Feeling of Stress : Only a little   Social Connections: Unknown (12/16/2023)    Social Connection and Isolation Panel [NHANES]     Frequency of Communication with Friends and Family: Once a week     Frequency of Social Gatherings with Friends and Family: Once a week     Attends Temple Services: Patient declined     Active Member of Clubs or Organizations: Patient declined     Attends Club or Organization Meetings: Patient declined     Marital Status: Never    Housing Stability: Low Risk  (12/16/2023)    Housing Stability Vital Sign     Unable to Pay for Housing in the Last Year: No     Number of Places Lived in the Last Year: 1     Unstable Housing in the Last Year: No       OBJECTIVE:     Vital Signs Range (Last 24H):  Temp:  [36.2 °C (97.1 °F)-36.7 °C (98.1 °F)]   Pulse:  [70-90]   Resp:  [16-20]   BP: (113-136)/(70-83)   SpO2:  [92 %-97 %]       Significant Labs:  Lab Results   Component Value Date    WBC 11.30 12/17/2023    HGB 14.5 12/17/2023    HCT 41.7 12/17/2023     12/17/2023    CHOL 101 (L) 08/11/2023    TRIG 35 08/11/2023    HDL 46 08/11/2023    ALT 82 (H) 12/18/2023     (H) 12/18/2023     12/18/2023    K 4.1 12/18/2023     12/18/2023    CREATININE 0.8 12/18/2023    BUN 11 12/18/2023    CO2 25 12/18/2023    TSH 1.376 12/15/2023    PSA 0.46 08/11/2023    INR 1.1 05/19/2018    HGBA1C 5.6 08/11/2023       Diagnostic Studies: No relevant studies.    EKG:   Results for orders placed or performed during the hospital encounter of 12/13/23   EKG 12-lead    Collection Time: 12/13/23  9:38 PM    Narrative    Test Reason : R11.0,    Vent. Rate : 103 BPM     Atrial Rate : 103 BPM     P-R Int : 170 ms          QRS Dur : 176 ms      QT Int : 410 ms       P-R-T Axes : 037 158 -07 degrees     QTc Int : 537 ms    Atrial-sensed ventricular-paced rhythm  Biventricular pacemaker detected  Abnormal ECG  When compared with ECG of 21-NOV-2023  08:01,  Vent. rate has increased BY  36 BPM  Confirmed by Ivone Garibay MD ([id number]) on 12/14/2023 2:17:29 PM    Referred By: [email or web address]   SELF           Confirmed By:Ivone Garibay MD       2D ECHO:  TTE:  Results for orders placed or performed during the hospital encounter of 12/13/23   Echo   Result Value Ref Range    RA Width 4.54 cm    LA volume (mod) 57.20 cm3    Left Atrium Major Axis 5.74 cm    Left Atrium Minor Axis 6.25 cm    RA Major Axis 4.49 cm    LV Diastolic Volume 104.64 mL    LV Systolic Volume 33.78 mL    MV Peak A Larry 0.87 m/s    MV stenosis pressure 1/2 time 31.36 ms    TR Max Larry 2.84 m/s    MV Peak E Larry 0.71 m/s    Ao VTI 29.53 cm    Ao peak larry 1.65 m/s    LVOT peak VTI 13.49 cm    LVOT peak larry 0.86 m/s    LVOT diameter 2.20 cm    IVRT 105.61 msec    E wave deceleration time 108.15 msec    AV mean gradient 7 mmHg    TAPSE 1.10 cm    RVDD 3.41 cm    LA size 4.89 cm    Ascending aorta 3.00 cm    STJ 2.45 cm    Sinus 3.10 cm    LVIDs 2.96 2.1 - 4.0 cm    Posterior Wall 0.88 0.6 - 1.1 cm    IVS 0.84 0.6 - 1.1 cm    LVIDd 4.74 3.5 - 6.0 cm    TDI LATERAL 0.15 m/s    LA WIDTH 4.56 cm    TDI SEPTAL 0.07 m/s    LV LATERAL E/E' RATIO 4.73 m/s    LV SEPTAL E/E' RATIO 10.14 m/s    FS 38 28 - 44 %    LA volume 113.42 cm3    LV mass 136.29 g    ZLVIDD -0.95     ZLVIDS -0.65     Left Ventricle Relative Wall Thickness 0.37 cm    AV valve area 1.74 cm²    AV Velocity Ratio 0.52     AV index (prosthetic) 0.46     MV valve area p 1/2 method 7.02 cm2    E/A ratio 0.82     Mean e' 0.11 m/s    LVOT area 3.8 cm2    LVOT stroke volume 51.25 cm3    AV peak gradient 11 mmHg    E/E' ratio 6.45 m/s    LV Systolic Volume Index 18.1 mL/m2    LV Diastolic Volume Index 55.96 mL/m2    LA Volume Index 60.7 mL/m2    LV Mass Index 73 g/m2    Triscuspid Valve Regurgitation Peak Gradient 32 mmHg    LA Volume Index (Mod) 30.6 mL/m2    BRYAN by Velocity Ratio 1.98 cm²    BSA 1.89 m2    EF 43 %    TV resting pulmonary artery pressure 35  mmHg    RV TB RVSP 6 mmHg    Est. RA pres 3 mmHg    Narrative      Left Ventricle: The left ventricle is normal in size. Normal wall   thickness. Regional wall motion abnormalities present. See diagram for   wall motion findings. There is mildly reduced systolic function with a   visually estimated ejection fraction of 40 - 45%. Ejection fraction by   visual approximation is 43%. There is normal diastolic function.    Right Ventricle: Normal right ventricular cavity size. Wall thickness   is normal. Right ventricle wall motion  is normal. Systolic function is   normal.    Left Atrium: Left atrium is severely dilated. 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    Aortic Valve: The aortic valve is a trileaflet valve. There is mild   aortic valve sclerosis. There is mild stenosis. Aortic valve area by VTI   is 1.74 cm². Aortic valve peak velocity is 1.65 m/s. Mean gradient is 7   mmHg. The dimensionless index is 0.46. There is trace aortic   regurgitation.    Mitral Valve: There is mild bileaflet sclerosis.    Tricuspid Valve: There is mild regurgitation.    Pulmonary Artery: The estimated pulmonary artery systolic pressure is   35 mmHg.    IVC/SVC: Normal venous pressure at 3 mmHg.         CHARMAINE:  No results found for this or any previous visit.    ASSESSMENT/PLAN:           Pre-op Assessment    I have reviewed the Patient Summary Reports.     I have reviewed the Nursing Notes.    I have reviewed the Medications.     Review of Systems  Anesthesia Hx:  No problems with previous Anesthesia   History of prior surgery of interest to airway management or planning: heart surgery.         Denies Family Hx of Anesthesia complications.    Denies Personal Hx of Anesthesia complications.                    Social:  Non-Smoker, Alcohol Use       Hematology/Oncology:       -- Denies Anemia:                                  Cardiovascular:        CAD   CABG/stent Dysrhythmias  Angina, with exertion  Denies CHF.     hyperlipidemia                             Pulmonary:    Denies COPD.  Denies Asthma.  Shortness of breath                  Renal/:   Denies Chronic Renal Disease. renal calculi               Hepatic/GI:      Denies GERD.             Musculoskeletal:  Denies Arthritis.               Neurological:    Denies CVA.    Denies Seizures.                                Endocrine:  Denies Diabetes.           Psych:  Denies Psychiatric History.                  Physical Exam  General: Well nourished, Cooperative, Alert and Oriented    Airway:  Mallampati: II   Mouth Opening: Normal  TM Distance: Normal  Tongue: Normal  Neck ROM: Normal ROM    Dental:  Intact  All permanent implants      Anesthesia Plan  Type of Anesthesia, risks & benefits discussed:    Anesthesia Type: Gen Natural Airway, MAC  Intra-op Monitoring Plan: Standard ASA Monitors and CHARMAINE  Post Op Pain Control Plan: multimodal analgesia and IV/PO Opioids PRN  Induction:  IV  Airway Plan: Direct and Video, Post-Induction  Informed Consent: Informed consent signed with the Patient and all parties understand the risks and agree with anesthesia plan.  All questions answered.   ASA Score: 3  Day of Surgery Review of History & Physical: H&P Update referred to the surgeon/provider.    Ready For Surgery From Anesthesia Perspective.     .

## 2023-12-18 NOTE — PROGRESS NOTES
[facility]  Infectious Disease  Progress Note    Patient Name: Michael Espinoza  MRN: [id number]  Admission Date: 12/13/2023  Length of Stay: 3 days  Attending Physician: Mukesh Golden MD  Primary Care Provider: Dominguez Hyatt MD    Isolation Status: No active isolations  Assessment/Plan:      Renal/  Complicated UTI (urinary tract infection)  See AP above.     ID  Bacteremia      68 year old male with history of CAD s/p CABG x3, HLD, heart block s/p pacemaker admitted with MSSA bacteremia and MSSA UTI.     CT A/P reviewed and notable for nonobstructing renal stone, enlarged prostate. Awaiting CHARMAINE, planned for tomorrow. Patient is on IV Cefazolin. Blood cultures persistently positive (12/14 - 12/16), but repeats on 12/17 no growth thus far. Afebrile and HDS. Symptomatically improved. Of note, his urine culture in September was also positive for MSSA.      Recommendations:  Continue IV Cefazolin   CHARMAINE tomorrow to assess for lead/valve vegetations   Consider MRI of spine d/t cf compression fracture of T12/L1 in the setting of MSSA UTI  Urology evaluated the patient. No acute urologic intervention planned. Planning outpatient follow up.  Anticipate prolonged course of IV antibiotics for complicated bacteremia  ID will follow up tomorrow.        Thank you for your consult. I will follow-up with patient. Please contact us if you have any additional questions.    Emelyn Renee NP  Infectious Disease  [facility]    Subjective:     Principal Problem:Sepsis without acute organ dysfunction    HPI: Mr. Espinoza is a 69 yo male with PMH of CAD s/p CABG x3, HLD, heart block s/p dual chamber pacemaker who [place] [facility] [place] of persistant diarrhea x6 days. Pt reported this started on 12/8, and diarrhea persisted. Pt started with dysuria, right flank pain, fevers, and presented to ED.     Since admission, pt was febrile, without leukocytosis. Blood culture + staph aureus, MRSA negative on PCR. Repeats  ordered. Urine culture + staph aureus, pending. Stool studies negative. Flu swab negative.     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 Reports he is very active, and routinely walks long distances. Pt denies recent wounds, injuries. Denies swollen joints, knees. Denies hardware other than pacemaker that was placed three years ago. Currently, denies fever, chills. Reports he is tolerating small amts of food again. Denies NVD. Denies flank pain, dysuria. States he is feeling better.     To note, micro hx significant for + MSSA urine culture from 9/2023. Pt states it was found on routine physical. Denies dysuria at that time, but states his urine was forthy. Pt states he followed with his PCP and took bactrim as prescribed.     Pt has received vanc, ceftriaxone, and cipro. ID was consulted d/t staph aures bacteremia.     Interval History:     Afebrile. HDS. Blood cultures positive from 12/14-12/16, MSSA. Repeats on 12/17 NGTD.     Review of Systems   Constitutional:  Negative for appetite change, chills, diaphoresis, fatigue and fever.   Eyes:  Negative for visual disturbance.   Respiratory:  Negative for cough and shortness of breath.    Cardiovascular:  Negative for chest pain and leg swelling.   Gastrointestinal:  Positive for diarrhea (loose stools). Negative for abdominal pain, constipation, nausea and vomiting.   Genitourinary:  Negative for difficulty urinating, dysuria, frequency and hematuria.   Musculoskeletal:  Negative for arthralgias, back pain and joint swelling.   Skin:  Negative for color change, rash and wound.   Neurological:  Negative for dizziness, weakness, numbness and headaches.   Psychiatric/Behavioral:  Negative for agitation and confusion. The patient is not nervous/anxious.    All other systems reviewed and are negative.    Objective:     Vital Signs (Most Recent):  Temp: 98.3 °F (36.8 °C) (12/18/23 1149)  Pulse: 90 (12/18/23 1149)  Resp: 18 (12/18/23  1149)  BP: 115/72 (12/18/23 1149)  SpO2: 95 % (12/18/23 1149) Vital Signs (24h Range):  Temp:  [97.1 °F (36.2 °C)-98.3 °F (36.8 °C)] 98.3 °F (36.8 °C)  Pulse:  [70-90] 90  Resp:  [16-20] 18  SpO2:  [92 %-97 %] 95 %  BP: (115-136)/(71-83) 115/72     Weight: 75.8 kg (167 lb)  Body mass index is 26.16 kg/m².    Estimated Creatinine Clearance: 82.6 mL/min (based on SCr of 0.8 mg/dL).     Physical Exam  Vitals reviewed.   Constitutional:       General: He is not in acute distress.     Appearance: Normal appearance. He is well-developed. He is not ill-appearing or diaphoretic.   HENT:      Head: Normocephalic and atraumatic.      Right Ear: External ear normal.      Left Ear: External ear normal.      Nose: Nose normal.   Eyes:      General: No scleral icterus.        Right eye: No discharge.         Left eye: No discharge.      Extraocular Movements: Extraocular movements intact.      Conjunctiva/sclera: Conjunctivae normal.   Cardiovascular:      Rate and Rhythm: Normal rate and regular rhythm.   Pulmonary:      Effort: Pulmonary effort is normal. No respiratory distress.      Breath sounds: No stridor.   Abdominal:      General: There is no distension.      Palpations: Abdomen is soft.   Musculoskeletal:         General: No swelling or tenderness.      Comments: No joint or midline spine tenderness   Skin:     General: Skin is dry.      Coloration: Skin is not jaundiced or pale.      Findings: No erythema or rash.   Neurological:      General: No focal deficit present.      Mental Status: He is alert and oriented to person, place, and time. Mental status is at baseline.      Motor: No weakness.      Gait: Gait normal.   Psychiatric:         Mood and Affect: Mood normal.         Behavior: Behavior normal.         Thought Content: Thought content normal.         Judgment: Judgment normal.          Significant Labs: CBC:   Recent Labs   Lab 12/17/23  0908 12/18/23  0538   WBC 11.30 9.18   HGB 14.5 11.9*   HCT 41.7 35.5*     263       CMP:   Recent Labs   Lab 12/17/23  0908 12/17/23 1956 12/18/23  0538    137 136   K 3.2* 4.1 4.1    106 104   CO2 20* 21* 25   * 101 96   BUN 8 14 11   CREATININE 0.8 0.9 0.8   CALCIUM 8.7 8.3* 8.2*   PROT 7.2  --  6.1   ALBUMIN 2.7* 2.3* 2.3*   BILITOT 1.8*  --  1.2*   ALKPHOS 107  --  96   *  --  125*   *  --  82*   ANIONGAP 12 10 7*       Microbiology Results (last 7 days)       Procedure Component Value Units Date/Time    Blood culture [[id number]]  (Abnormal) Collected: 12/16/23 0341    Order Status: Completed Specimen: Blood Updated: 12/18/23 0842     Blood Culture, Routine Gram stain aer bottle: Gram positive cocci in clusters resembling Staph      Results called to and read back by: Lei Marmolejo 12/17/2023  02:14      STAPHYLOCOCCUS AUREUS  For susceptibility see order #[id number]      Blood culture [[id number]]  (Abnormal) Collected: 12/16/23 0341    Order Status: Completed Specimen: Blood Updated: 12/18/23 0841     Blood Culture, Routine Gram stain aer bottle: Gram positive cocci in clusters resembling Staph      Positive results previously called 12/17/2023      STAPHYLOCOCCUS AUREUS  For susceptibility see order #[id number]      Blood culture [[id number]] Collected: 12/17/23 0458    Order Status: Completed Specimen: Blood Updated: 12/18/23 0613     Blood Culture, Routine No Growth to date      No Growth to date    Blood culture [[id number]] Collected: 12/17/23 0458    Order Status: Completed Specimen: Blood Updated: 12/18/23 0613     Blood Culture, Routine No Growth to date      No Growth to date    Blood culture [[id number]]  (Abnormal) Collected: 12/14/23 2310    Order Status: Completed Specimen: Blood Updated: 12/17/23 0902     Blood Culture, Routine Gram stain aer bottle: Gram positive cocci in clusters resembling Staph      Positive results previously called 12/15/2023  17:26      STAPHYLOCOCCUS AUREUS  For susceptibility see order  #[id number]      Blood culture [[id number]]  (Abnormal) Collected: 12/14/23 2310    Order Status: Completed Specimen: Blood Updated: 12/17/23 0902     Blood Culture, Routine Gram stain aer bottle: Gram positive cocci in clusters resembling Staph      Results called to and read back by: Krupa Salas RN 12/15/2023  15:52      STAPHYLOCOCCUS AUREUS  For susceptibility see order #[id number]      Urine culture [[id number]]  (Abnormal)  (Susceptibility) Collected: 12/13/23 2346    Order Status: Completed Specimen: Urine Updated: 12/16/23 2048     Urine Culture, Routine STAPHYLOCOCCUS AUREUS  > 100,000 cfu/ml      Narrative:      Specimen Source->Urine    Blood culture [[id number]]  (Abnormal) Collected: 12/14/23 0552    Order Status: Completed Specimen: Blood from Peripheral, Forearm, Right Updated: 12/16/23 1026     Blood Culture, Routine Gram stain aer bottle: Gram positive cocci in clusters resembling Staph      Positive results previously called 12/14/2023      STAPHYLOCOCCUS AUREUS  For susceptibility see order #[id number]      Blood culture [[id number]]  (Abnormal)  (Susceptibility) Collected: 12/14/23 0552    Order Status: Completed Specimen: Blood from Peripheral, Forearm, Right Updated: 12/16/23 1025     Blood Culture, Routine Gram stain aer bottle: Gram positive cocci in clusters resembling Staph      Results called to and read back by:Benita Mercedes RN 12/14/2023  20:43      STAPHYLOCOCCUS AUREUS    MRSA/SA Rapid ID by PCR from Blood culture [[id number]]  (Abnormal) Collected: 12/14/23 0552    Order Status: Completed Updated: 12/14/23 2150     Staph aureus ID by PCR Positive     Methicillin Resistant ID by PCR Negative    Clostridium difficile EIA [[id number]] Collected: 12/14/23 0609    Order Status: Completed Specimen: Stool Updated: 12/14/23 1255     C. diff Antigen Negative     C difficile Toxins A+B, EIA Negative     Comment: Testing not recommended for children <24 months old.       Stool culture  [[id number]]     Order Status: Canceled Specimen: Stool     Influenza A & B by Molecular [[id number]] Collected: 12/13/23 2214    Order Status: Completed Specimen: Nasopharyngeal Swab Updated: 12/13/23 2254     Influenza A, Molecular Negative     Influenza B, Molecular Negative     Flu A & B Source Nasal swab          Recent Lab Results         12/18/23  0538   12/17/23 1956        Albumin 2.3   2.3       ALP 96         ALT 82         Anion Gap 7   10                Baso # 0.04         Basophil % 0.4         BILIRUBIN TOTAL 1.2  Comment: For infants and newborns, interpretation of results should be based  on gestational age, weight and in agreement with clinical  observations.    Premature Infant recommended reference ranges:  Up to 24 hours.............<8.0 mg/dL  Up to 48 hours............<12.0 mg/dL  3-5 days..................<15.0 mg/dL  6-29 days.................<15.0 mg/dL           BUN 11   14       Calcium 8.2   8.3       Chloride 104   106       CO2 25   21       Creatinine 0.8   0.9       Differential Method Automated         eGFR >60.0   >60.0       Eos # 0.3         Eosinophil % 3.4         Glucose 96   101       Gran # (ANC) 7.0         Gran % 76.6         Hematocrit 35.5         Hemoglobin 11.9         Immature Grans (Abs) 0.10  Comment: Mild elevation in immature granulocytes is non specific and   can be seen in a variety of conditions including stress response,   acute inflammation, trauma and pregnancy. Correlation with other   laboratory and clinical findings is essential.           Immature Granulocytes 1.1         Lymph # 1.0         Lymph % 11.1         Magnesium  2.0         MCH 29.7         MCHC 33.5         MCV 89         Mono # 0.7         Mono % 7.4         MPV 10.3         nRBC 0         Phosphorus Level 2.1   2.3       Platelet Count 263         Potassium 4.1   4.1       PROTEIN TOTAL 6.1         RBC 4.01         RDW 14.4         Sodium 136   137       WBC 9.18                  Significant Imaging:     Imaging Results              CT Abdomen Pelvis With IV Contrast NO Oral Contrast (Final result)  Result time 12/14/23 08:54:04      Final result by Lalo Puri MD (12/14/23 08:54:04)                   Impression:      1. Intraluminal fluid throughout the small and large bowel with adjacent mesenteric fat stranding and free fluid, as may be seen in the setting of a nonspecific infectious or noninfectious inflammatory enterocolitis.  2. Nonobstructive right nephrolithiasis.  3. Small bilateral pleural effusions.  4. Additional details of chronic and incidental findings, as provided in the body of report.    Electronically signed by resident: Katherine Hidalgo  Date:    12/14/2023  Time:    06:04    Electronically signed by: Lalo Puri  Date:    12/14/2023  Time:    08:54               Narrative:    EXAMINATION:  CT ABDOMEN PELVIS WITH IV CONTRAST    CLINICAL HISTORY:  Abdominal abscess/infection suspected;LLQ abdominal pain;    TECHNIQUE:  Low dose axial images, sagittal and coronal reformations were obtained from the lung bases to the pubic symphysis following the IV administration of 75 mL of Omnipaque 350.Oral contrast was not given.    COMPARISON:  Ultrasound abdomen 11/01/2023    FINDINGS:  Comment: Motion compromised examination.    Lower thorax:    Heart: Cardiomegaly.  Cardiac pacing leads.  Severe calcific atherosclerosis of multiple coronary arteries.    Lung Bases: Small bilateral pleural effusions, larger on the right.  Bibasal linear opacities, likely subsegmental atelectasis versus scarring.    Liver: Normal in size and attenuation, with no focal hepatic lesions.    Gallbladder: No calcified gallstones.  Gallbladder is not distended.  No gallbladder wall thickening or pericholecystic fluid collection.    Bile Ducts: No intra or extrahepatic biliary duct dilatation.    Pancreas: No mass or peripancreatic fat stranding.    Spleen: Normal in size.  No focal  lesion.    Adrenals: Unremarkable.    Kidneys/ Ureters: Normal in size and location. Normal enhancement.  1.3 cm right lower pole nonobstructive nephrolithiasis.  No hydronephrosis or hydroureter.  Multiple cortical hypodensities which are too small to characterize likely renal cysts.  Nonspecific mild bilateral perinephric stranding.    Bladder: No evidence of wall thickening.    Reproductive organs: Prostate appears enlarged, measuring 4.6 cm with dystrophic calcification.    Gl/Mesentery/peritoneum and retroperitoneum: Small hiatal hernia.  Stomach is unremarkable.  Small and large bowel are normal in caliber with no evidence of obstruction.  Liquid stool throughout the small and large bowel with mild mesenteric soft tissue stranding and trace of free fluid, as may be seen in a nonspecific infectious versus noninfectious inflammatory enterocolitis.    Abdominal wall: Left fat containing inguinal hernia.    Vasculature: Moderate calcific atherosclerosis.  No aneurysm.    Bones: Degenerative changes.  Similar appearing compression fractures of T12 and L1 when compared to radiograph of 04/22/2021 and MRI of 04/05/2021, allowing for differences in technique/modality.  No acute fracture.  No suspicious lytic or sclerotic lesion.

## 2023-12-18 NOTE — SUBJECTIVE & OBJECTIVE
Interval History:     Afebrile. HDS. Blood cultures positive from 12/14-12/16, MSSA. Repeats on 12/17 NGTD.     Review of Systems   Constitutional:  Negative for appetite change, chills, diaphoresis, fatigue and fever.   Eyes:  Negative for visual disturbance.   Respiratory:  Negative for cough and shortness of breath.    Cardiovascular:  Negative for chest pain and leg swelling.   Gastrointestinal:  Positive for diarrhea (loose stools). Negative for abdominal pain, constipation, nausea and vomiting.   Genitourinary:  Negative for difficulty urinating, dysuria, frequency and hematuria.   Musculoskeletal:  Negative for arthralgias, back pain and joint swelling.   Skin:  Negative for color change, rash and wound.   Neurological:  Negative for dizziness, weakness, numbness and headaches.   Psychiatric/Behavioral:  Negative for agitation and confusion. The patient is not nervous/anxious.    All other systems reviewed and are negative.    Objective:     Vital Signs (Most Recent):  Temp: 98.3 °F (36.8 °C) (12/18/23 1149)  Pulse: 90 (12/18/23 1149)  Resp: 18 (12/18/23 1149)  BP: 115/72 (12/18/23 1149)  SpO2: 95 % (12/18/23 1149) Vital Signs (24h Range):  Temp:  [97.1 °F (36.2 °C)-98.3 °F (36.8 °C)] 98.3 °F (36.8 °C)  Pulse:  [70-90] 90  Resp:  [16-20] 18  SpO2:  [92 %-97 %] 95 %  BP: (115-136)/(71-83) 115/72     Weight: 75.8 kg (167 lb)  Body mass index is 26.16 kg/m².    Estimated Creatinine Clearance: 82.6 mL/min (based on SCr of 0.8 mg/dL).     Physical Exam  Vitals reviewed.   Constitutional:       General: He is not in acute distress.     Appearance: Normal appearance. He is well-developed. He is not ill-appearing or diaphoretic.   HENT:      Head: Normocephalic and atraumatic.      Right Ear: External ear normal.      Left Ear: External ear normal.      Nose: Nose normal.   Eyes:      General: No scleral icterus.        Right eye: No discharge.         Left eye: No discharge.      Extraocular Movements: Extraocular  movements intact.      Conjunctiva/sclera: Conjunctivae normal.   Cardiovascular:      Rate and Rhythm: Normal rate and regular rhythm.   Pulmonary:      Effort: Pulmonary effort is normal. No respiratory distress.      Breath sounds: No stridor.   Abdominal:      General: There is no distension.      Palpations: Abdomen is soft.   Musculoskeletal:         General: No swelling or tenderness.      Comments: No joint or midline spine tenderness   Skin:     General: Skin is dry.      Coloration: Skin is not jaundiced or pale.      Findings: No erythema or rash.   Neurological:      General: No focal deficit present.      Mental Status: He is alert and oriented to person, place, and time. Mental status is at baseline.      Motor: No weakness.      Gait: Gait normal.   Psychiatric:         Mood and Affect: Mood normal.         Behavior: Behavior normal.         Thought Content: Thought content normal.         Judgment: Judgment normal.          Significant Labs: CBC:   Recent Labs   Lab 12/17/23  0908 12/18/23  0538   WBC 11.30 9.18   HGB 14.5 11.9*   HCT 41.7 35.5*    263       CMP:   Recent Labs   Lab 12/17/23  0908 12/17/23 1956 12/18/23  0538    137 136   K 3.2* 4.1 4.1    106 104   CO2 20* 21* 25   * 101 96   BUN 8 14 11   CREATININE 0.8 0.9 0.8   CALCIUM 8.7 8.3* 8.2*   PROT 7.2  --  6.1   ALBUMIN 2.7* 2.3* 2.3*   BILITOT 1.8*  --  1.2*   ALKPHOS 107  --  96   *  --  125*   *  --  82*   ANIONGAP 12 10 7*       Microbiology Results (last 7 days)       Procedure Component Value Units Date/Time    Blood culture [[id number]]  (Abnormal) Collected: 12/16/23 0341    Order Status: Completed Specimen: Blood Updated: 12/18/23 0842     Blood Culture, Routine Gram stain aer bottle: Gram positive cocci in clusters resembling Staph      Results called to and read back by: Lei Marmolejo 12/17/2023  02:14      STAPHYLOCOCCUS AUREUS  For susceptibility see order #[id number]      Blood  culture [[id number]]  (Abnormal) Collected: 12/16/23 0341    Order Status: Completed Specimen: Blood Updated: 12/18/23 0841     Blood Culture, Routine Gram stain aer bottle: Gram positive cocci in clusters resembling Staph      Positive results previously called 12/17/2023      STAPHYLOCOCCUS AUREUS  For susceptibility see order #[id number]      Blood culture [[id number]] Collected: 12/17/23 0458    Order Status: Completed Specimen: Blood Updated: 12/18/23 0613     Blood Culture, Routine No Growth to date      No Growth to date    Blood culture [[id number]] Collected: 12/17/23 0458    Order Status: Completed Specimen: Blood Updated: 12/18/23 0613     Blood Culture, Routine No Growth to date      No Growth to date    Blood culture [[id number]]  (Abnormal) Collected: 12/14/23 2310    Order Status: Completed Specimen: Blood Updated: 12/17/23 0902     Blood Culture, Routine Gram stain aer bottle: Gram positive cocci in clusters resembling Staph      Positive results previously called 12/15/2023  17:26      STAPHYLOCOCCUS AUREUS  For susceptibility see order #[id number]      Blood culture [[id number]]  (Abnormal) Collected: 12/14/23 2310    Order Status: Completed Specimen: Blood Updated: 12/17/23 0902     Blood Culture, Routine Gram stain aer bottle: Gram positive cocci in clusters resembling Staph      Results called to and read back by: Krupa Salas RN 12/15/2023  15:52      STAPHYLOCOCCUS AUREUS  For susceptibility see order #[id number]      Urine culture [[id number]]  (Abnormal)  (Susceptibility) Collected: 12/13/23 2346    Order Status: Completed Specimen: Urine Updated: 12/16/23 2048     Urine Culture, Routine STAPHYLOCOCCUS AUREUS  > 100,000 cfu/ml      Narrative:      Specimen Source->Urine    Blood culture [[id number]]  (Abnormal) Collected: 12/14/23 0552    Order Status: Completed Specimen: Blood from Peripheral, Forearm, Right Updated: 12/16/23 1026     Blood Culture, Routine Gram stain aer bottle: Gram  positive cocci in clusters resembling Staph      Positive results previously called 12/14/2023      STAPHYLOCOCCUS AUREUS  For susceptibility see order #[id number]      Blood culture [[id number]]  (Abnormal)  (Susceptibility) Collected: 12/14/23 0552    Order Status: Completed Specimen: Blood from Peripheral, Forearm, Right Updated: 12/16/23 1025     Blood Culture, Routine Gram stain aer bottle: Gram positive cocci in clusters resembling Staph      Results called to and read back by:Benita Mercedes RN 12/14/2023  20:43      STAPHYLOCOCCUS AUREUS    MRSA/SA Rapid ID by PCR from Blood culture [[id number]]  (Abnormal) Collected: 12/14/23 0552    Order Status: Completed Updated: 12/14/23 2150     Staph aureus ID by PCR Positive     Methicillin Resistant ID by PCR Negative    Clostridium difficile EIA [[id number]] Collected: 12/14/23 0609    Order Status: Completed Specimen: Stool Updated: 12/14/23 1255     C. diff Antigen Negative     C difficile Toxins A+B, EIA Negative     Comment: Testing not recommended for children <24 months old.       Stool culture [[id number]]     Order Status: Canceled Specimen: Stool     Influenza A & B by Molecular [[id number]] Collected: 12/13/23 2214    Order Status: Completed Specimen: Nasopharyngeal Swab Updated: 12/13/23 2254     Influenza A, Molecular Negative     Influenza B, Molecular Negative     Flu A & B Source Nasal swab          Recent Lab Results         12/18/23 0538 12/17/23 1956        Albumin 2.3   2.3       ALP 96         ALT 82         Anion Gap 7   10                Baso # 0.04         Basophil % 0.4         BILIRUBIN TOTAL 1.2  Comment: For infants and newborns, interpretation of results should be based  on gestational age, weight and in agreement with clinical  observations.    Premature Infant recommended reference ranges:  Up to 24 hours.............<8.0 mg/dL  Up to 48 hours............<12.0 mg/dL  3-5 days..................<15.0 mg/dL  6-29  days.................<15.0 mg/dL           BUN 11   14       Calcium 8.2   8.3       Chloride 104   106       CO2 25   21       Creatinine 0.8   0.9       Differential Method Automated         eGFR >60.0   >60.0       Eos # 0.3         Eosinophil % 3.4         Glucose 96   101       Gran # (ANC) 7.0         Gran % 76.6         Hematocrit 35.5         Hemoglobin 11.9         Immature Grans (Abs) 0.10  Comment: Mild elevation in immature granulocytes is non specific and   can be seen in a variety of conditions including stress response,   acute inflammation, trauma and pregnancy. Correlation with other   laboratory and clinical findings is essential.           Immature Granulocytes 1.1         Lymph # 1.0         Lymph % 11.1         Magnesium  2.0         MCH 29.7         MCHC 33.5         MCV 89         Mono # 0.7         Mono % 7.4         MPV 10.3         nRBC 0         Phosphorus Level 2.1   2.3       Platelet Count 263         Potassium 4.1   4.1       PROTEIN TOTAL 6.1         RBC 4.01         RDW 14.4         Sodium 136   137       WBC 9.18                 Significant Imaging:     Imaging Results              CT Abdomen Pelvis With IV Contrast NO Oral Contrast (Final result)  Result time 12/14/23 08:54:04      Final result by Lalo Puri MD (12/14/23 08:54:04)                   Impression:      1. Intraluminal fluid throughout the small and large bowel with adjacent mesenteric fat stranding and free fluid, as may be seen in the setting of a nonspecific infectious or noninfectious inflammatory enterocolitis.  2. Nonobstructive right nephrolithiasis.  3. Small bilateral pleural effusions.  4. Additional details of chronic and incidental findings, as provided in the body of report.    Electronically signed by resident: Katherine Hidalgo  Date:    12/14/2023  Time:    06:04    Electronically signed by: Lalo Puri  Date:    12/14/2023  Time:    08:54               Narrative:    EXAMINATION:  CT ABDOMEN  PELVIS WITH IV CONTRAST    CLINICAL HISTORY:  Abdominal abscess/infection suspected;LLQ abdominal pain;    TECHNIQUE:  Low dose axial images, sagittal and coronal reformations were obtained from the lung bases to the pubic symphysis following the IV administration of 75 mL of Omnipaque 350.Oral contrast was not given.    COMPARISON:  Ultrasound abdomen 11/01/2023    FINDINGS:  Comment: Motion compromised examination.    Lower thorax:    Heart: Cardiomegaly.  Cardiac pacing leads.  Severe calcific atherosclerosis of multiple coronary arteries.    Lung Bases: Small bilateral pleural effusions, larger on the right.  Bibasal linear opacities, likely subsegmental atelectasis versus scarring.    Liver: Normal in size and attenuation, with no focal hepatic lesions.    Gallbladder: No calcified gallstones.  Gallbladder is not distended.  No gallbladder wall thickening or pericholecystic fluid collection.    Bile Ducts: No intra or extrahepatic biliary duct dilatation.    Pancreas: No mass or peripancreatic fat stranding.    Spleen: Normal in size.  No focal lesion.    Adrenals: Unremarkable.    Kidneys/ Ureters: Normal in size and location. Normal enhancement.  1.3 cm right lower pole nonobstructive nephrolithiasis.  No hydronephrosis or hydroureter.  Multiple cortical hypodensities which are too small to characterize likely renal cysts.  Nonspecific mild bilateral perinephric stranding.    Bladder: No evidence of wall thickening.    Reproductive organs: Prostate appears enlarged, measuring 4.6 cm with dystrophic calcification.    Gl/Mesentery/peritoneum and retroperitoneum: Small hiatal hernia.  Stomach is unremarkable.  Small and large bowel are normal in caliber with no evidence of obstruction.  Liquid stool throughout the small and large bowel with mild mesenteric soft tissue stranding and trace of free fluid, as may be seen in a nonspecific infectious versus noninfectious inflammatory enterocolitis.    Abdominal wall:  Left fat containing inguinal hernia.    Vasculature: Moderate calcific atherosclerosis.  No aneurysm.    Bones: Degenerative changes.  Similar appearing compression fractures of T12 and L1 when compared to radiograph of 04/22/2021 and MRI of 04/05/2021, allowing for differences in technique/modality.  No acute fracture.  No suspicious lytic or sclerotic lesion.

## 2023-12-18 NOTE — PROGRESS NOTES
[facility] - Emergency Dept  [facility]  Progress Note    Patient Name: Michael Espinoza  MRN: [id number]  Patient Class: IP- Inpatient   Admission Date: 12/13/2023  Length of Stay: 3 days  Attending Physician: Mukesh Golden MD  Primary Care Provider: Dominguez Hyatt MD        Subjective:     Principal Problem:Sepsis without acute organ dysfunction        HPI:  Michael Espinoza is a 68 y.o. male with PMHx of CAD s/p CABG x3, HLD, complete heart block s/p dual chamber pacemaker. He presents to [facility] ED 12/13 with diarrhea for the last 6 days. On 12/8 he developed left lower quadrant cramping followed by multiple episodes of loose stool. The next two days he had about 10-13 episodes diarrhea daily. Watery-brown. No blood. By Monday he continued to have diarrhea but then also developed mild nausea, but was able to keep down bland foods (jello, applesauce, banana, eggs) without emesis. He also began having urinary frequency, urgency, and dysuria and though his right kidney was mildly painful. Denies hematuria. He began to keep extremely dehydrated, started having fevers up to 102F Monday-Tuesday. He endorses polydipsia, myalgias, and hallucinations when he closes his eyes. He also reports shaking chills that would last for 20 minutes at a time that he could not control. Wednesday diarrhea finally stopped and he also stopped urinating but reports he did spontaneously urinate in ED once he got IV fluids.     He does have history of diarrhea and saw GI Dr. Feliz in September for this and thought to be maybe post infectious IBS but patient states he had completely symptom free period for months since then. He saw urgent care for symptoms 3 days ago and reports taking Zofran 4 times from them as well as a medication from GI (dicyclomine?) 10 times over 2 days. He reports normal colonoscopy one year ago (lipoma biopsied with surface erosions). No rectal pain. No more abdominal/flank pain. No strange foods or travel. He  denies falls, chest pain, palpitations, shortness of breath.     Per chart review: 12/11 stool culture without significant growth to date, E.coli shiga toxin 1 and 2 negative. Urine culture + staph aureus. A previous urine culture was also positive for staph aures in September and pt reports completing bactrim course.    In the ED, febrile to 100.4F, , RR 28. BP sable. On RA. Labs with WBC 8.58k, platelets 118k.  Na 129, K 2.9, Cl 94, BUN/Cr without YESICA, albumin 2.8, T bili 2.0 (chronically elevated), AST/ALT elevated 90/60. UA 3+ leukocytes, nitrite positive, 3+ occult blood, 1+ ketones, 1+ protein, 35 RBC, >100 WBC< many bacteria. LA 1.4. lipase and mag wnl. Covid and flu negative. CT abdomen pending. Given ceftriaxone, dicyclomine, famotidine, Zofran, potassium po and IV,  1L LR bolus and 1L NS bolus. Admitted to .     Overview/Hospital Course:  12/14 K replaced. BCX 2 and stool studies pending. CT abdomen -  Intraluminal fluid throughout the small and large bowel with adjacent mesenteric fat stranding and free fluid, as may be seen in the setting of a nonspecific infectious or noninfectious inflammatory enterocolitis. Nonobstructive right nephrolithiasis. loperamide PRN. continue ciprofloxacin and vancomycin . denies abdominal pain. advanced to soft diet   12/15 K and P replaced. BCX 2 from 12/14 with staph aureus. repeated BCX 2.  UC with STAPHYLOCOCCUS AUREUS > 100,000 cfu/ml  Susceptibility pending. echo ordered. tolerating soft diet. CHARMAINE ordered as with pacemaker . 1.3 cm right lower pole nonobstructive nephrolithiasis/ staph aureus on UC ( positive for MSSA urine culture from 9/2023). started on cefazolin. vancomycin discontinued   12/16 BC from 12/15 staph aureus. repeat BCX 2 TTE today-     Left Ventricle: The left ventricle is normal in size. Normal wall thickness. Regional wall motion abnormalities present. See diagram for wall motion findings. There is mildly reduced systolic function with a  visually estimated ejection fraction of 40 - 45%. Ejection fraction by visual approximation is 43%. There is normal diastolic function.    Right Ventricle: Normal right ventricular cavity size. Wall thickness is normal. Right ventricle wall motion  is normal. Systolic function is normal.    Left Atrium: Left atrium is severely dilated. 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    Aortic Valve: The aortic valve is a trileaflet valve. There is mild aortic valve sclerosis. There is mild stenosis. Aortic valve area by VTI is 1.74 cm². Aortic valve peak velocity is 1.65 m/s. Mean gradient is 7 mmHg. The dimensionless index is 0.46. There is trace aortic regurgitation.    Mitral Valve: There is mild bileaflet sclerosis.    Tricuspid Valve: There is mild regurgitation.    Pulmonary Artery: The estimated pulmonary artery systolic pressure is 35 mmHg.    IVC/SVC: Normal venous pressure at 3 mmHg.    CHARMAINE scheduled. K and P replaced  12/18 BC x2 12/18 with staph aureus.  CHARMAINE postponed to tomorow         Review of Systems:   Pain scale:   Constitutional:  fever,  chills, headache, vision loss, hearing loss, weight loss, Generalized weakness, falls, loss of smell, loss of taste, poor appetite,  sore throat  Respiratory: cough, shortness of breath.   Cardiovascular: chest pain, dizziness, palpitations, orthopnea, swelling of feet, syncope  Gastrointestinal: nausea, vomiting, abdominal pain, diarrhea -improved , black stool,  blood in stool, change in bowel habits, constipation  Genitourinary: hematuria, dysuria, urgency, frequency,  flank pain- resolved   Integument/Breast: rash,  pruritis  Hematologic/Lymphatic: easy bruising, lymphadenopathy  Musculoskeletal: arthralgias , myalgias, back pain, neck pain, knee pain  Neurological: confusion, seizures, tremors, slurred speech  Behavioral/Psych:  depression, anxiety, auditory or visual hallucinations     OBJECTIVE:     Physical Exam:  Body mass index is 26.16  "kg/m².    Constitutional: Appears well-developed and well-nourished.   Head: Normocephalic and atraumatic.   Neck: Normal range of motion. Neck supple.   Cardiovascular: Normal heart rate.  Regular heart rhythm.  Pulmonary/Chest: Effort normal.   Abdominal: No distension.  No tenderness  Musculoskeletal: Normal range of motion. No edema.   Neurological: Alert and oriented to person, place, and time.   Skin: Skin is warm and dry.   Psychiatric: Normal mood and affect. Behavior is normal.                  Vital Signs  Temp: 98.3 °F (36.8 °C) (12/18/23 1149)  Pulse: 90 (12/18/23 1149)  Resp: 18 (12/18/23 1149)  BP: 115/72 (12/18/23 1149)  SpO2: 95 % (12/18/23 1149)     24 Hour VS Range    Temp:  [97.1 °F (36.2 °C)-98.3 °F (36.8 °C)]   Pulse:  [70-90]   Resp:  [16-20]   BP: (115-136)/(71-83)   SpO2:  [92 %-97 %]   No intake or output data in the 24 hours ending 12/18/23 1426        I/O This Shift:  No intake/output data recorded.    Wt Readings from Last 3 Encounters:   12/16/23 75.8 kg (167 lb)   12/11/23 69.4 kg (153 lb)   11/21/23 69.8 kg (153 lb 14.1 oz)       I have personally reviewed the vitals and recorded Intake/Output     Laboratory/Diagnostic Data:    CBC/Anemia Labs: Coags:    Recent Labs   Lab 12/14/23  0608 12/15/23  0649 12/16/23  0341 12/17/23  0908 12/18/23  0538   WBC  --    < > 9.34 11.30 9.18   HGB  --    < > 12.9* 14.5 11.9*   HCT  --    < > 35.8* 41.7 35.5*   PLT  --    < > 150 227 263   MCV  --    < > 87 86 89   RDW  --    < > 13.8 14.3 14.4   OCCULTBLOOD Negative  --   --   --   --     < > = values in this interval not displayed.    No results for input(s): "PT", "INR", "APTT" in the last 168 hours.     Chemistries: ABG:   Recent Labs   Lab 12/16/23  0341 12/16/23  1901 12/17/23  0908 12/17/23 1956 12/18/23  0538   * 136 136 137 136   K 3.1* 3.4* 3.2* 4.1 4.1    103 104 106 104   CO2 24 22* 20* 21* 25   BUN 11 12 8 14 11   CREATININE 0.8 0.8 0.8 0.9 0.8   CALCIUM 7.8* 8.4* 8.7 8.3* " "8.2*   PROT 5.7*  --  7.2  --  6.1   BILITOT 1.7*  --  1.8*  --  1.2*   ALKPHOS 86  --  107  --  96   *  --  102*  --  82*   *  --  161*  --  125*   MG 2.1  --  2.2  --  2.0   PHOS 2.1* 1.8*  --  2.3* 2.1*    No results for input(s): "PH", "PCO2", "PO2", "HCO3", "POCSATURATED", "BE" in the last 168 hours.     POCT Glucose: HbA1c:    No results for input(s): "POCTGLUCOSE" in the last 168 hours. Hemoglobin A1C   Date Value Ref Range Status   08/11/2023 5.6 4.0 - 5.6 % Final     Comment:     ADA Screening Guidelines:  5.7-6.4%  Consistent with prediabetes  >or=6.5%  Consistent with diabetes    High levels of fetal hemoglobin interfere with the HbA1C  assay. Heterozygous hemoglobin variants (HbS, HgC, etc)do  not significantly interfere with this assay.   However, presence of multiple variants may affect accuracy.     05/10/2018 5.4 4.0 - 5.6 % Final     Comment:     According to ADA guidelines, hemoglobin A1c <7.0% represents  optimal control in non-pregnant diabetic patients. Different  metrics may apply to specific patient populations.   Standards of Medical Care in Diabetes-2016.  For the purpose of screening for the presence of diabetes:  <5.7%     Consistent with the absence of diabetes  5.7-6.4%  Consistent with increasing risk for diabetes   (prediabetes)  >or=6.5%  Consistent with diabetes  Currently, no consensus exists for use of hemoglobin A1c  for diagnosis of diabetes for children.  This Hemoglobin A1c assay has significant interference with fetal   hemoglobin   (HbF). The results are invalid for patients with abnormal amounts of   HbF,   including those with known Hereditary Persistence   of Fetal Hemoglobin. Heterozygous hemoglobin variants (HbAS, HbAC,   HbAD, HbAE, HbA2) do not significantly interfere with this assay;   however, presence of multiple variants in a sample may impact the %   interference.          Cardiac Enzymes: Ejection Fractions:    No results for input(s): "CPK", " ""CPKMB", "MB", "TROPONINI" in the last 72 hours. EF   Date Value Ref Range Status   12/16/2023 43 % Final   07/05/2022 65 % Final          No results for input(s): "COLORU", "APPEARANCEUA", "PHUR", "SPECGRAV", "PROTEINUA", "GLUCUA", "KETONESU", "BILIRUBINUA", "OCCULTUA", "NITRITE", "UROBILINOGEN", "LEUKOCYTESUR", "RBCUA", "WBCUA", "BACTERIA", "SQUAMEPITHEL", "HYALINECASTS" in the last 48 hours.    Invalid input(s): "WRIGHTSUR"      Lactate (Lactic Acid) (mmol/L)   Date Value   12/13/2023 1.4     BNP (pg/mL)   Date Value   07/05/2022 596 (H)     CRP (mg/L)   Date Value   12/14/2023 206.0 (H)     Sed Rate (mm/Hr)   Date Value   12/14/2023 76 (H)     No results found for: "DDIMER"  Ferritin (ng/mL)   Date Value   09/14/2023 81   03/21/2018 35     No results found for: "LDH"  Troponin I (ng/mL)   Date Value   07/05/2022 <0.006   07/05/2022 0.009     Results for orders placed or performed in visit on 07/01/21   Vitamin D   Result Value Ref Range    Vit D, 25-Hydroxy 31 30 - 96 ng/mL   Results for orders placed or performed in visit on 05/05/21   Vitamin D   Result Value Ref Range    Vit D, 25-Hydroxy 38 30 - 96 ng/mL   Results for orders placed or performed in visit on 03/21/18   Vitamin D   Result Value Ref Range    Vit D, 25-Hydroxy 29 (L) 30 - 96 ng/mL     SARS-CoV-2 RNA, Amplification, Qual (no units)   Date Value   12/13/2023 Negative     POC Rapid COVID (no units)   Date Value   07/05/2022 Negative   12/22/2020 Negative       Microbiology labs for the last week  Microbiology Results (last 7 days)       Procedure Component Value Units Date/Time    Blood culture [[id number]]  (Abnormal) Collected: 12/16/23 0341    Order Status: Completed Specimen: Blood Updated: 12/18/23 0842     Blood Culture, Routine Gram stain aer bottle: Gram positive cocci in clusters resembling Staph      Results called to and read back by: Lei Marmolejo 12/17/2023  02:14      STAPHYLOCOCCUS AUREUS  For susceptibility see order " #[id number]      Blood culture [[id number]]  (Abnormal) Collected: 12/16/23 0341    Order Status: Completed Specimen: Blood Updated: 12/18/23 0841     Blood Culture, Routine Gram stain aer bottle: Gram positive cocci in clusters resembling Staph      Positive results previously called 12/17/2023      STAPHYLOCOCCUS AUREUS  For susceptibility see order #[id number]      Blood culture [[id number]] Collected: 12/17/23 0458    Order Status: Completed Specimen: Blood Updated: 12/18/23 0613     Blood Culture, Routine No Growth to date      No Growth to date    Blood culture [[id number]] Collected: 12/17/23 0458    Order Status: Completed Specimen: Blood Updated: 12/18/23 0613     Blood Culture, Routine No Growth to date      No Growth to date    Blood culture [[id number]]  (Abnormal) Collected: 12/14/23 2310    Order Status: Completed Specimen: Blood Updated: 12/17/23 0902     Blood Culture, Routine Gram stain aer bottle: Gram positive cocci in clusters resembling Staph      Positive results previously called 12/15/2023  17:26      STAPHYLOCOCCUS AUREUS  For susceptibility see order #[id number]      Blood culture [[id number]]  (Abnormal) Collected: 12/14/23 2310    Order Status: Completed Specimen: Blood Updated: 12/17/23 0902     Blood Culture, Routine Gram stain aer bottle: Gram positive cocci in clusters resembling Staph      Results called to and read back by: Krupa Salas RN 12/15/2023  15:52      STAPHYLOCOCCUS AUREUS  For susceptibility see order #[id number]      Urine culture [[id number]]  (Abnormal)  (Susceptibility) Collected: 12/13/23 2346    Order Status: Completed Specimen: Urine Updated: 12/16/23 2048     Urine Culture, Routine STAPHYLOCOCCUS AUREUS  > 100,000 cfu/ml      Narrative:      Specimen Source->Urine    Blood culture [[id number]]  (Abnormal) Collected: 12/14/23 0586    Order Status: Completed Specimen: Blood from Peripheral, Forearm, Right Updated: 12/16/23 1026     Blood Culture, Routine Gram  stain aer bottle: Gram positive cocci in clusters resembling Staph      Positive results previously called 12/14/2023      STAPHYLOCOCCUS AUREUS  For susceptibility see order #[id number]      Blood culture [[id number]]  (Abnormal)  (Susceptibility) Collected: 12/14/23 0552    Order Status: Completed Specimen: Blood from Peripheral, Forearm, Right Updated: 12/16/23 1025     Blood Culture, Routine Gram stain aer bottle: Gram positive cocci in clusters resembling Staph      Results called to and read back by:Benita Mercedes RN 12/14/2023  20:43      STAPHYLOCOCCUS AUREUS    MRSA/SA Rapid ID by PCR from Blood culture [[id number]]  (Abnormal) Collected: 12/14/23 0552    Order Status: Completed Updated: 12/14/23 2150     Staph aureus ID by PCR Positive     Methicillin Resistant ID by PCR Negative    Clostridium difficile EIA [[id number]] Collected: 12/14/23 0609    Order Status: Completed Specimen: Stool Updated: 12/14/23 1255     C. diff Antigen Negative     C difficile Toxins A+B, EIA Negative     Comment: Testing not recommended for children <24 months old.       Stool culture [[id number]]     Order Status: Canceled Specimen: Stool     Influenza A & B by Molecular [[id number]] Collected: 12/13/23 2214    Order Status: Completed Specimen: Nasopharyngeal Swab Updated: 12/13/23 2254     Influenza A, Molecular Negative     Influenza B, Molecular Negative     Flu A & B Source Nasal swab            Reviewed and noted in plan where applicable- Please see chart for full lab data.    Lines/Drains:       Peripheral IV - Single Lumen 12/13/23 2210 20 G Left Antecubital (Active)   Site Assessment Clean;Dry;Intact 12/13/23 2211   Extremity Assessment Distal to IV No abnormal discoloration;No redness;No swelling 12/13/23 2211   Dressing Status Clean;Dry;Intact 12/13/23 2211   Dressing Intervention First dressing 12/13/23 2211   Number of days: 0       Imaging  ECG Results              EKG 12-lead (Final result)  Result time 12/14/23  14:17:37      Final result by Interface, Lab In [facility] (12/14/23 14:17:37)               Narrative:    Test Reason : R11.0,    Vent. Rate : 103 BPM     Atrial Rate : 103 BPM     P-R Int : 170 ms          QRS Dur : 176 ms      QT Int : 410 ms       P-R-T Axes : 037 158 -07 degrees     QTc Int : 537 ms    Atrial-sensed ventricular-paced rhythm  Biventricular pacemaker detected  Abnormal ECG  When compared with ECG of 21-NOV-2023 08:01,  Vent. rate has increased BY  36 BPM  Confirmed by Ivone Garibay MD ([id number]) on 12/14/2023 2:17:29 PM    Referred By: [email or web address]   SELF           Confirmed By:Ivone Garibay MD                                  Results for orders placed during the hospital encounter of 07/05/22    Echo    Interpretation Summary  · Presence of bradycardia with HR 30s with AV dissociation  · The estimated ejection fraction is 65%.  · The left ventricle is normal in size with normal systolic function.  · Normal left ventricular diastolic function.  · Normal right ventricular size with normal right ventricular systolic function.  · Mild mitral regurgitation.  · Presence of interatrial septal aneurysm.  · The estimated PA systolic pressure is 28 mmHg.  · Normal central venous pressure (3 mmHg).      US Liver with Doppler (xpd)  Narrative: EXAMINATION:  US LIVER WITH DOPPLER    CLINICAL HISTORY:  transaminitis;    TECHNIQUE:  Duplex scan of the liver was performed using B-mode/gray scale imaging and Doppler spectral analysis and color flow.    COMPARISON:  CT abdomen pelvis 12/14/2023    Abdominal ultrasound 11/01/2013    FINDINGS:  Pancreas is obscured by overlying bowel gas.    The Liver is normal in size measuring 15.4 cm.  The liver demonstrates homogeneous echotexture. No focal hepatic lesions are seen.    There is no evidence of ascites.    The gallbladder is not distended.  Wall thickness measures 2.6 mm, within normal limits.  Layering sludge and punctate stones noted.    The common duct is not dilated,  measuring 2.0 mm.  No dilated intrahepatic radicles are seen.    The spleen is normal in size measuring 11.3 x 3.5 cm with a homogeneous echotexture.    The main, right, and left branches of the portal vein are patent and demonstrate hepatopetal flow.    The middle hepatic vein, right hepatic vein, left hepatic vein, SMV, and IVC are patent with proper directional flow. The main hepatic artery is patent. The umbilical vein is not patent.  Impression: 1. Normal Doppler evaluation of the liver.  2. Cholelithiasis.  No sonographic evidence for acute cholecystitis.    Electronically signed by resident: Nancy Palmer  Date:    12/16/2023  Time:    20:01    Electronically signed by: Zen Nielsen MD  Date:    12/17/2023  Time:    08:44      Labs, Imaging, EKG and Diagnostic results from 12/18/2023 were reviewed.    Medications:  Medication list was reviewed and changes noted under Assessment/Plan.  No current facility-administered medications on file prior to encounter.     Current Outpatient Medications on File Prior to Encounter   Medication Sig Dispense Refill    aspirin (ECOTRIN) 81 MG EC tablet Take 81 mg by mouth 2 (two) times daily. (Breakfast & Afternoon)      atorvastatin (LIPITOR) 80 MG tablet TAKE 1 TABLET EVERY DAY (Patient taking differently: Take 80 mg by mouth every evening.) 90 tablet 10    calcium carbonate (TUMS ORAL) Take 2 tablets by mouth daily as needed (Heartburn).      coenzyme Q10 (CO Q-10) 300 mg Cap Take 1 capsule by mouth once daily.      dicyclomine (BENTYL) 10 MG capsule Take 10 mg by mouth daily as needed (Abdominal pain).      ERGOCALCIFEROL, VITAMIN D2, (VITAMIN D ORAL) Take 1 capsule by mouth Mon, Wed, Fri, Sat & Sun      ezetimibe (ZETIA) 10 mg tablet TAKE 1 TABLET EVERY DAY (Patient taking differently: Take 10 mg by mouth once daily.) 90 tablet 10    multivit-min/FA/lycopen/lutein (CENTRUM SILVER MEN ORAL) Take 1 tablet by mouth every Mon, Wed, Fri.      ondansetron (ZOFRAN-ODT) 4 MG  TbDL Take 1 tablet (4 mg total) by mouth every 8 (eight) hours as needed (nausea). 12 tablet 0     Scheduled Medications:  aspirin, 81 mg, Oral, BID  atorvastatin, 80 mg, Oral, Daily  ceFAZolin (ANCEF) IVPB, 2 g, Intravenous, Q8H  dicyclomine, 10 mg, Oral, QID  enoxparin, 40 mg, Subcutaneous, Daily  ezetimibe, 10 mg, Oral, Daily      PRN: acetaminophen, acetaminophen, aluminum-magnesium hydroxide-simethicone, calcium carbonate, dextrose 10%, dextrose 10%, glucagon (human recombinant), glucose, glucose, loperamide, melatonin, naloxone, ondansetron, simethicone, sodium chloride 0.9%  Infusions:       Estimated Creatinine Clearance: 82.6 mL/min (based on SCr of 0.8 mg/dL).           Assessment/Plan:      * Sepsis without acute organ dysfunction  This patient does have evidence of infective focus  My overall impression is sepsis.  Source: Urinary Tract and Abdominal  Antibiotics given-   Antibiotics (72h ago, onward)      Start     Stop Route Frequency Ordered    12/14/23 1900  vancomycin 750 mg in dextrose 5 % (D5W) 250 mL IVPB (Vial-Mate)         -- IV Every 12 hours (non-standard times) 12/14/23 0547    12/14/23 0900  ciprofloxacin HCl tablet 500 mg         12/19/23 0859 Oral Every 12 hours 12/14/23 0613    12/14/23 0623  vancomycin - pharmacy to dose  (vancomycin IVPB (PEDS and ADULTS))        See Hyperspace for full Linked Orders Report.    -- IV pharmacy to manage frequency 12/14/23 0523          Latest lactate reviewed-  Recent Labs   Lab 12/13/23  2212   LACTATE 1.4     Organ dysfunction indicated by  None- mild delirium (possible form electrolyte derangement and fevers) Mild transaminitis, mild thrombocytopenia    Fluid challenge Not needed - patient is not hypotensive      Post- resuscitation assessment No - Post resuscitation assessment not needed     Will Not start Pressors- Levophed for MAP of 65  Source control achieved by: Vancomycin ordered for staph aureus UTI on previous cultures, Ciprofloxacin ordered  for possible infectious (vs other) diarrhea.  Blood, urine, stool cultures and studies pending.     Right kidney stone  12/15 s/p urology eval - stone is non obstructing. -No indication for acute urologic intervention.  outpatient urologic follow up for stone treatment            Bacteremia  12/15 BCX 2 from 12/14 with staph aureus. repeated BCX 2.    CHARMAINE ordered as with pacemaker . 1.3 cm right lower pole nonobstructive nephrolithiasis/ staph aureus on UC ( positive for MSSA urine culture from 9/2023). started on cefazolin. vancomycin discontinued   12/18 BC from 12/16 staph aureus. CHARMAINE tomorrow      Thrombocytopenia  Patient was found to have thrombocytopenia, the likely etiology is secondary to sepsis/infection?, will monitor the platelets Daily. Will transfuse if platelet count is <20k. Hold DVT prophylaxis if platelets are <50k. The patient's platelet results have been reviewed and are listed below.  Recent Labs   Lab 12/16/23  0341      resolved     Hypophosphatemia  Patient has Abnormal Phosphorus: hypophosphatemia. Will continue to monitor electrolytes closely. Will replace the affected electrolytes and repeat labs to be done after interventions completed. The patient's phosphorus results have been reviewed and are listed below.  Recent Labs   Lab 12/17/23  1956   PHOS 2.3*        Transaminitis  -Mildly elevated, possible 2/2 dehydration/infection  -NO RUQ pain. T bili chronically elevated  -Check acute hepatitis panel   -Daily CMP    12/14 Patients liver enzymes  elevated.   Recent Labs     12/16/23  0341 12/17/23  0908 12/18/23  0538   BILITOT 1.7* 1.8* 1.2*   * 161* 125*   * 102* 82*   ALKPHOS 86 107 96    . Liver enzymes  trending up. sono liver with doppler -Normal Doppler evaluation of the liver.Cholelithiasis.  No sonographic evidence for acute cholecystitis.       Diarrhea  68M with acute onset of multiple episodes diarrhea with only mild LLQ abdominal cramping early in course.  Mild nausea. No abdominal pain, emesis, and is tolerating PO. Having fevers, chills, polydipsia. Also urinary infectious symptoms. Previously seen by GI Dr. 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 Work up unremarkable.  Previous stool cultures without growth.Giardia negative in the past. Recent stool culture without growth and E coli antigen negative.   -Multiple electrolyte derangements on admission  -Tachycardic, febrile, and tachypneic   -S/p 2L IVF bolus in ED, continue IVFs today  -Check complete stool studies  -C-scope 8/2022 Lipoma in the recto-sigmoid colon. Biopsied and found to have surface erosions  -CT pending  -GI consulted, appreciate recommendations  -PRN bentyl for cramping, prn Zofran/compazine for nausea  -ON Vanc for sepsis/UTI and will add ciprofloxacin 500 mg Q12h x5 days for diarrhea  for now pending culture results  12/14  CT abdomen -  Intraluminal fluid throughout the small and large bowel with adjacent mesenteric fat stranding and free fluid, as may be seen in the setting of a nonspecific infectious or noninfectious inflammatory enterocolitis. Nonobstructive right nephrolithiasis. loperamide PRN.   12/15 C diff  and stool culture negative.     Complicated UTI (urinary tract infection)  Presenting with dysuria/frequency/urgency/maybe mild flank pain/fever/chills (also with diarrheal illness).  -3/4 SIRs on admit for fever, tachycardia tachypnea  -UA infectious, nitrite positive, 3+ leukocytes >100 WBC and many bacteria  -Previous urine cultures with staph aureus  -S/p ceftriaxone in ED  -Will cover with vancomycin   -Follow up urine cultures  -Follow up CT  -Monitor I/Os  12/14. BCX 2 and stool studies pending.continue ciprofloxacin and vancomycin   12/15  UC with STAPHYLOCOCCUS AUREUS > 100,000 cfu/ml  Susceptibility pending. echo ordered. tolerating soft diet. 1.3 cm right lower pole nonobstructive nephrolithiasis/ staph aureus on UC ( positive for MSSA  urine culture from 9/2023). started on cefazolin    Hypokalemia  Patient has hypokalemia which is Acute and currently uncontrolled. Most recent potassium levels reviewed-   Lab Results   Component Value Date    K 3.1 (L) 12/16/2023   Will continue potassium replacement per protocol and recheck repeat levels after replacement completed. Continue to replace. Pt would prefer IV replacement. Pills hard to swallow and does not tolerate potassium bicarb well.    Hyponatremia  Patient has hyponatremia which is uncontrolled,We will aim to correct the sodium by 4-6mEq in 24 hours. We will monitor sodium Daily. The hyponatremia is due to Dehydration/hypovolemia. We will obtain the following studies: Urine sodium, urine osmolality, serum osmolality, or TSH, T4. We will treat the hyponatremia with IV fluids as follows: 2/p 2 L IVF in ED, continuous fluids at rate 100 ml/hr ordered. The patient's sodium results have been reviewed and are listed below.  Recent Labs   Lab 12/18/23  0538      improving.tolerating soft diet.    Complete heart block  -s/p dual chamber pacemaker placement  7/5/2022      Coronary artery disease of native artery of native heart with stable angina pectoris  Patient with known CAD s/p CABG, which is controlled Will continue ASA and Statin and monitor for S/Sx of angina/ACS. Continue to monitor on telemetry.     Gilbert's syndrome  -Chronic condition- reports diagnosed by Dr. Rowe years ago  -T bili elevated on admission, similar to previous  -Monitor CMP      HLD (hyperlipidemia)  -Continue home statin      VTE Risk Mitigation (From admission, onward)           Ordered     enoxaparin injection 40 mg  Daily         12/16/23 0900     IP VTE HIGH RISK PATIENT  Once         12/16/23 0900     Place sequential compression device  Until discontinued         12/14/23 0526                    Discharge Planning   MAGNO: 12/21/2023     Code Status: Full Code   Is the patient medically ready for discharge?:  No    Reason for patient still in hospital (select all that apply): Treatment  Discharge Plan A: Home                  Mukesh Golden MD  Department of Hospital Medicine   [facility] - Emergency Dept

## 2023-12-18 NOTE — ASSESSMENT & PLAN NOTE
68 year old male with history of CAD s/p CABG x3, HLD, heart block s/p pacemaker admitted with MSSA bacteremia and MSSA UTI.     CT A/P reviewed and notable for nonobstructing renal stone, enlarged prostate. Awaiting CHARMAINE, planned for tomorrow. Patient is on IV Cefazolin. Blood cultures persistently positive (12/14 - 12/16), but repeats on 12/17 no growth thus far. Afebrile and HDS. Symptomatically improved. Of note, his urine culture in September was also positive for MSSA.      Recommendations:  Continue IV Cefazolin   CHARMAINE tomorrow to assess for lead/valve vegetations   Urology evaluated the patient. No acute urologic intervention planned. Planning outpatient follow up.  Anticipate prolonged course of IV antibiotics for complicated bacteremia  ID will follow up tomorrow.

## 2023-12-19 ENCOUNTER — ANESTHESIA (OUTPATIENT)
Dept: MEDSURG UNIT | Facility: HOSPITAL | Age: 68
DRG: 872 | End: 2023-12-19
Payer: MEDICARE

## 2023-12-19 PROBLEM — E83.39 HYPOPHOSPHATEMIA: Status: RESOLVED | Noted: 2023-12-14 | Resolved: 2023-12-19

## 2023-12-19 LAB
ALBUMIN SERPL BCP-MCNC: 2.7 G/DL (ref 3.5–5.2)
ALP SERPL-CCNC: 104 U/L (ref 55–135)
ALT SERPL W/O P-5'-P-CCNC: 89 U/L (ref 10–44)
ANION GAP SERPL CALC-SCNC: 7 MMOL/L (ref 8–16)
AST SERPL-CCNC: 130 U/L (ref 10–40)
BACTERIA BLD CULT: ABNORMAL
BASOPHILS # BLD AUTO: 0.07 K/UL (ref 0–0.2)
BASOPHILS NFR BLD: 0.8 % (ref 0–1.9)
BILIRUB SERPL-MCNC: 1.5 MG/DL (ref 0.1–1)
BUN SERPL-MCNC: 9 MG/DL (ref 8–23)
CALCIUM SERPL-MCNC: 8.9 MG/DL (ref 8.7–10.5)
CALPROTECTIN STL-MCNT: <27.1 MCG/G
CHLORIDE SERPL-SCNC: 105 MMOL/L (ref 95–110)
CO2 SERPL-SCNC: 26 MMOL/L (ref 23–29)
CREAT SERPL-MCNC: 0.8 MG/DL (ref 0.5–1.4)
DIFFERENTIAL METHOD: ABNORMAL
EOSINOPHIL # BLD AUTO: 0.3 K/UL (ref 0–0.5)
EOSINOPHIL NFR BLD: 3.4 % (ref 0–8)
ERYTHROCYTE [DISTWIDTH] IN BLOOD BY AUTOMATED COUNT: 14.6 % (ref 11.5–14.5)
EST. GFR  (NO RACE VARIABLE): >60 ML/MIN/1.73 M^2
GLUCOSE SERPL-MCNC: 96 MG/DL (ref 70–110)
HCT VFR BLD AUTO: 39.8 % (ref 40–54)
HGB BLD-MCNC: 13.4 G/DL (ref 14–18)
IMM GRANULOCYTES # BLD AUTO: 0.16 K/UL (ref 0–0.04)
IMM GRANULOCYTES NFR BLD AUTO: 1.8 % (ref 0–0.5)
LYMPHOCYTES # BLD AUTO: 1 K/UL (ref 1–4.8)
LYMPHOCYTES NFR BLD: 11.7 % (ref 18–48)
MAGNESIUM SERPL-MCNC: 2.1 MG/DL (ref 1.6–2.6)
MCH RBC QN AUTO: 29.7 PG (ref 27–31)
MCHC RBC AUTO-ENTMCNC: 33.7 G/DL (ref 32–36)
MCV RBC AUTO: 88 FL (ref 82–98)
MONOCYTES # BLD AUTO: 0.5 K/UL (ref 0.3–1)
MONOCYTES NFR BLD: 6.1 % (ref 4–15)
NEUTROPHILS # BLD AUTO: 6.8 K/UL (ref 1.8–7.7)
NEUTROPHILS NFR BLD: 76.2 % (ref 38–73)
NRBC BLD-RTO: 0 /100 WBC
PLATELET # BLD AUTO: 363 K/UL (ref 150–450)
PMV BLD AUTO: 9.7 FL (ref 9.2–12.9)
POTASSIUM SERPL-SCNC: 4 MMOL/L (ref 3.5–5.1)
PROT SERPL-MCNC: 7.3 G/DL (ref 6–8.4)
RBC # BLD AUTO: 4.51 M/UL (ref 4.6–6.2)
SODIUM SERPL-SCNC: 138 MMOL/L (ref 136–145)
WBC # BLD AUTO: 8.86 K/UL (ref 3.9–12.7)

## 2023-12-19 PROCEDURE — D9220A PRA ANESTHESIA: ICD-10-PCS | Mod: ANES,,, | Performed by: ANESTHESIOLOGY

## 2023-12-19 PROCEDURE — 99499 NO LOS: ICD-10-PCS | Mod: ,,, | Performed by: INTERNAL MEDICINE

## 2023-12-19 PROCEDURE — 80053 COMPREHEN METABOLIC PANEL: CPT | Performed by: HOSPITALIST

## 2023-12-19 PROCEDURE — 63600175 PHARM REV CODE 636 W HCPCS: Performed by: NURSE ANESTHETIST, CERTIFIED REGISTERED

## 2023-12-19 PROCEDURE — D9220A PRA ANESTHESIA: Mod: CRNA,,, | Performed by: NURSE ANESTHETIST, CERTIFIED REGISTERED

## 2023-12-19 PROCEDURE — 37000008 HC ANESTHESIA 1ST 15 MINUTES

## 2023-12-19 PROCEDURE — 85025 COMPLETE CBC W/AUTO DIFF WBC: CPT | Performed by: HOSPITALIST

## 2023-12-19 PROCEDURE — 25000003 PHARM REV CODE 250: Performed by: NURSE ANESTHETIST, CERTIFIED REGISTERED

## 2023-12-19 PROCEDURE — D9220A PRA ANESTHESIA: ICD-10-PCS | Mod: CRNA,,, | Performed by: NURSE ANESTHETIST, CERTIFIED REGISTERED

## 2023-12-19 PROCEDURE — 25000003 PHARM REV CODE 250

## 2023-12-19 PROCEDURE — 37000009 HC ANESTHESIA EA ADD 15 MINS

## 2023-12-19 PROCEDURE — 83735 ASSAY OF MAGNESIUM: CPT | Performed by: HOSPITALIST

## 2023-12-19 PROCEDURE — 63600175 PHARM REV CODE 636 W HCPCS: Performed by: HOSPITALIST

## 2023-12-19 PROCEDURE — 25000003 PHARM REV CODE 250: Performed by: REGISTERED NURSE

## 2023-12-19 PROCEDURE — 63600175 PHARM REV CODE 636 W HCPCS: Performed by: REGISTERED NURSE

## 2023-12-19 PROCEDURE — 87040 BLOOD CULTURE FOR BACTERIA: CPT | Performed by: HOSPITALIST

## 2023-12-19 PROCEDURE — 25000003 PHARM REV CODE 250: Performed by: HOSPITALIST

## 2023-12-19 PROCEDURE — 99499 UNLISTED E&M SERVICE: CPT | Mod: ,,, | Performed by: INTERNAL MEDICINE

## 2023-12-19 PROCEDURE — 99233 PR SUBSEQUENT HOSPITAL CARE,LEVL III: ICD-10-PCS | Mod: ,,, | Performed by: REGISTERED NURSE

## 2023-12-19 PROCEDURE — 21400001 HC TELEMETRY ROOM

## 2023-12-19 PROCEDURE — D9220A PRA ANESTHESIA: Mod: ANES,,, | Performed by: ANESTHESIOLOGY

## 2023-12-19 PROCEDURE — 99233 SBSQ HOSP IP/OBS HIGH 50: CPT | Mod: ,,, | Performed by: REGISTERED NURSE

## 2023-12-19 RX ORDER — SODIUM CHLORIDE 0.9 % (FLUSH) 0.9 %
10 SYRINGE (ML) INJECTION
Status: DISCONTINUED | OUTPATIENT
Start: 2023-12-19 | End: 2023-12-22 | Stop reason: HOSPADM

## 2023-12-19 RX ORDER — LORAZEPAM 0.5 MG/1
0.5 TABLET ORAL
Status: COMPLETED | OUTPATIENT
Start: 2023-12-19 | End: 2023-12-20

## 2023-12-19 RX ORDER — FENTANYL CITRATE 50 UG/ML
INJECTION, SOLUTION INTRAMUSCULAR; INTRAVENOUS
Status: DISCONTINUED | OUTPATIENT
Start: 2023-12-19 | End: 2023-12-19

## 2023-12-19 RX ORDER — PROPOFOL 10 MG/ML
VIAL (ML) INTRAVENOUS
Status: DISCONTINUED | OUTPATIENT
Start: 2023-12-19 | End: 2023-12-19

## 2023-12-19 RX ORDER — MIDAZOLAM HYDROCHLORIDE 1 MG/ML
INJECTION, SOLUTION INTRAMUSCULAR; INTRAVENOUS
Status: DISCONTINUED | OUTPATIENT
Start: 2023-12-19 | End: 2023-12-19

## 2023-12-19 RX ORDER — LIDOCAINE HYDROCHLORIDE 20 MG/ML
INJECTION INTRAVENOUS
Status: DISCONTINUED | OUTPATIENT
Start: 2023-12-19 | End: 2023-12-19

## 2023-12-19 RX ADMIN — ATORVASTATIN CALCIUM 80 MG: 40 TABLET, FILM COATED ORAL at 08:12

## 2023-12-19 RX ADMIN — CEFAZOLIN 2 G: 2 INJECTION, POWDER, FOR SOLUTION INTRAMUSCULAR; INTRAVENOUS at 09:12

## 2023-12-19 RX ADMIN — CEFAZOLIN 2 G: 2 INJECTION, POWDER, FOR SOLUTION INTRAMUSCULAR; INTRAVENOUS at 04:12

## 2023-12-19 RX ADMIN — DICYCLOMINE HYDROCHLORIDE 10 MG: 10 CAPSULE ORAL at 09:12

## 2023-12-19 RX ADMIN — ASPIRIN 81 MG: 81 TABLET, COATED ORAL at 09:12

## 2023-12-19 RX ADMIN — ENOXAPARIN SODIUM 40 MG: 40 INJECTION SUBCUTANEOUS at 04:12

## 2023-12-19 RX ADMIN — FENTANYL CITRATE 25 MCG: 0.05 INJECTION, SOLUTION INTRAMUSCULAR; INTRAVENOUS at 02:12

## 2023-12-19 RX ADMIN — EZETIMIBE 10 MG: 10 TABLET ORAL at 08:12

## 2023-12-19 RX ADMIN — DICYCLOMINE HYDROCHLORIDE 10 MG: 10 CAPSULE ORAL at 08:12

## 2023-12-19 RX ADMIN — ASPIRIN 81 MG: 81 TABLET, COATED ORAL at 08:12

## 2023-12-19 RX ADMIN — LOPERAMIDE HYDROCHLORIDE 2 MG: 2 CAPSULE ORAL at 09:12

## 2023-12-19 RX ADMIN — LIDOCAINE HYDROCHLORIDE 100 MG: 20 INJECTION INTRAVENOUS at 02:12

## 2023-12-19 RX ADMIN — MIDAZOLAM HYDROCHLORIDE 2 MG: 1 INJECTION, SOLUTION INTRAMUSCULAR; INTRAVENOUS at 01:12

## 2023-12-19 RX ADMIN — PROPOFOL 30 MG: 10 INJECTION, EMULSION INTRAVENOUS at 02:12

## 2023-12-19 RX ADMIN — DICYCLOMINE HYDROCHLORIDE 10 MG: 10 CAPSULE ORAL at 04:12

## 2023-12-19 RX ADMIN — SODIUM CHLORIDE: 0.9 INJECTION, SOLUTION INTRAVENOUS at 01:12

## 2023-12-19 RX ADMIN — PROPOFOL 50 MG: 10 INJECTION, EMULSION INTRAVENOUS at 02:12

## 2023-12-19 NOTE — PROGRESS NOTES
Patient admitted to recovery see [facility] for complete assessment pacu bcg's maintained safety measures verified patient instructed on pain scale and patient verbalized understanding. Asked patient if wanted me to update any family and he said no one is here at this time.

## 2023-12-19 NOTE — NURSING
CHARMAINE procedure aborted as staff physician unable to pass CHARMAINE probe down patient's esophagus. Patient rescheduled for next available which is Friday 12/22 at 0900. Imaging of neck required prior to procedure. Orders to be placed by Dr. Lucia.

## 2023-12-19 NOTE — PROGRESS NOTES
[facility]  Infectious Disease  Progress Note    Patient Name: Michael Espinoza  MRN: [id number]  Admission Date: 12/13/2023  Length of Stay: 4 days  Attending Physician: Mukesh Golden MD  Primary Care Provider: Dominguez Hyatt MD    Isolation Status: No active isolations  Assessment/Plan:      Renal/  Complicated UTI (urinary tract infection)  See AP above.     ID  Bacteremia      68 year old male with history of CAD s/p CABG x3, HLD, heart block s/p pacemaker admitted with MSSA bacteremia and MSSA UTI.     CT A/P reviewed and notable for nonobstructing renal stone, enlarged prostate. Awaiting CHARMAINE, planned for tomorrow. Patient is on IV Cefazolin. Blood cultures persistently positive (12/14 - 12/16), but repeats on 12/17 no growth thus far. Afebrile and HDS. Symptomatically improved. Of note, his urine culture in September was also positive for MSSA.      Recommendations:  Continue IV Cefazolin   Awaiting CHARMAINE tomorrow today.   Awaiting MRI of spine. Pt requesting sedation.   Urology evaluated the patient. No acute urologic intervention planned. Planning outpatient follow up.  Anticipate prolonged course of IV antibiotics for complicated bacteremia  Plan reviewed with ID staff. ID will follow up tomorrow.            Thank you for your consult. I will follow-up with patient. Please contact us if you have any additional questions.    Emelyn Renee NP  Infectious Disease  [facility]    Subjective:     Principal Problem:Sepsis without acute organ dysfunction    HPI: Mr. Espinoza is a 67 yo male with PMH of CAD s/p CABG x3, HLD, heart block s/p dual chamber pacemaker who [place] [facility] [place] with cc of persistant diarrhea x6 days. Pt reported this started on 12/8, and diarrhea persisted. Pt started with dysuria, right flank pain, fevers, and presented to ED.     Since admission, pt was febrile, without leukocytosis. Blood culture + staph aureus, MRSA negative on PCR. Repeats ordered. Urine culture +  staph aureus, pending. Stool studies negative. Flu swab negative.     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 Reports he is very active, and routinely walks long distances. Pt denies recent wounds, injuries. Denies swollen joints, knees. Denies hardware other than pacemaker that was placed three years ago. Currently, denies fever, chills. Reports he is tolerating small amts of food again. Denies NVD. Denies flank pain, dysuria. States he is feeling better.     To note, micro hx significant for + MSSA urine culture from 9/2023. Pt states it was found on routine physical. Denies dysuria at that time, but states his urine was forthy. Pt states he followed with his PCP and took bactrim as prescribed.     Pt has received vanc, ceftriaxone, and cipro. ID was consulted d/t staph aures bacteremia.     Interval History:     Afebrile. HDS. Blood cultures positive from 12/14-12/16, MSSA. Repeats on 12/17 NGTD.     Review of Systems   Constitutional:  Negative for appetite change, chills, diaphoresis, fatigue and fever.   Eyes:  Negative for visual disturbance.   Respiratory:  Negative for cough and shortness of breath.    Cardiovascular:  Negative for chest pain and leg swelling.   Gastrointestinal:  Positive for diarrhea (loose stools). Negative for abdominal pain, constipation, nausea and vomiting.   Genitourinary:  Negative for difficulty urinating, dysuria, frequency and hematuria.   Musculoskeletal:  Negative for arthralgias, back pain and joint swelling.   Skin:  Negative for color change, rash and wound.   Neurological:  Negative for dizziness, weakness, numbness and headaches.   Psychiatric/Behavioral:  Negative for agitation and confusion. The patient is not nervous/anxious.    All other systems reviewed and are negative.    Objective:     Vital Signs (Most Recent):  Temp: 98 °F (36.7 °C) (12/19/23 1046)  Pulse: 101 (12/19/23 1107)  Resp: 18 (12/19/23 1046)  BP: 120/77 (12/19/23  1046)  SpO2: 97 % (12/19/23 1046) Vital Signs (24h Range):  Temp:  [97.1 °F (36.2 °C)-98.2 °F (36.8 °C)] 98 °F (36.7 °C)  Pulse:  [] 101  Resp:  [16-18] 18  SpO2:  [94 %-99 %] 97 %  BP: (114-136)/(65-79) 120/77     Weight: 75.8 kg (167 lb)  Body mass index is 26.16 kg/m².    Estimated Creatinine Clearance: 82.6 mL/min (based on SCr of 0.8 mg/dL).     Physical Exam  Vitals reviewed.   Constitutional:       General: He is not in acute distress.     Appearance: Normal appearance. He is well-developed. He is not ill-appearing or diaphoretic.   HENT:      Head: Normocephalic and atraumatic.      Right Ear: External ear normal.      Left Ear: External ear normal.      Nose: Nose normal.   Eyes:      General: No scleral icterus.        Right eye: No discharge.         Left eye: No discharge.      Extraocular Movements: Extraocular movements intact.      Conjunctiva/sclera: Conjunctivae normal.   Cardiovascular:      Rate and Rhythm: Normal rate and regular rhythm.   Pulmonary:      Effort: Pulmonary effort is normal. No respiratory distress.      Breath sounds: No stridor.   Abdominal:      General: There is no distension.      Palpations: Abdomen is soft.   Musculoskeletal:         General: No swelling or tenderness.      Comments: No joint or midline spine tenderness   Skin:     General: Skin is dry.      Coloration: Skin is not jaundiced or pale.      Findings: No erythema or rash.   Neurological:      General: No focal deficit present.      Mental Status: He is alert and oriented to person, place, and time. Mental status is at baseline.      Motor: No weakness.      Gait: Gait normal.   Psychiatric:         Mood and Affect: Mood normal.         Behavior: Behavior normal.         Thought Content: Thought content normal.         Judgment: Judgment normal.          Significant Labs: CBC:   Recent Labs   Lab 12/18/23  0538 12/19/23  0702   WBC 9.18 8.86   HGB 11.9* 13.4*   HCT 35.5* 39.8*    363       CMP:    Recent Labs   Lab 12/17/23 1956 12/18/23  0538 12/19/23 0702    136 138   K 4.1 4.1 4.0    104 105   CO2 21* 25 26    96 96   BUN 14 11 9   CREATININE 0.9 0.8 0.8   CALCIUM 8.3* 8.2* 8.9   PROT  --  6.1 7.3   ALBUMIN 2.3* 2.3* 2.7*   BILITOT  --  1.2* 1.5*   ALKPHOS  --  96 104   AST  --  125* 130*   ALT  --  82* 89*   ANIONGAP 10 7* 7*       Microbiology Results (last 7 days)       Procedure Component Value Units Date/Time    Blood culture [[id number]]  (Abnormal) Collected: 12/16/23 0341    Order Status: Completed Specimen: Blood Updated: 12/19/23 1113     Blood Culture, Routine Gram stain aer bottle: Gram positive cocci in clusters resembling Staph      Positive results previously called 12/17/2023      STAPHYLOCOCCUS AUREUS  For susceptibility see order #[id number]      Blood culture [[id number]]  (Abnormal) Collected: 12/16/23 0341    Order Status: Completed Specimen: Blood Updated: 12/19/23 1112     Blood Culture, Routine Gram stain aer bottle: Gram positive cocci in clusters resembling Staph      Results called to and read back by: Lei Marmolejo 12/17/2023  02:14      STAPHYLOCOCCUS AUREUS  For susceptibility see order #[id number]      Blood culture [[id number]] Collected: 12/19/23 0702    Order Status: Sent Specimen: Blood Updated: 12/19/23 0728    Narrative:      Rt wrist    Blood culture [[id number]] Collected: 12/19/23 0702    Order Status: Sent Specimen: Blood Updated: 12/19/23 0728    Narrative:      Rt AC    Blood culture [[id number]] Collected: 12/17/23 0458    Order Status: Completed Specimen: Blood Updated: 12/19/23 0613     Blood Culture, Routine No Growth to date      No Growth to date      No Growth to date    Blood culture [[id number]] Collected: 12/17/23 0458    Order Status: Completed Specimen: Blood Updated: 12/19/23 0613     Blood Culture, Routine No Growth to date      No Growth to date      No Growth to date    Blood culture [[id number]]  (Abnormal) Collected:  12/14/23 2310    Order Status: Completed Specimen: Blood Updated: 12/17/23 0902     Blood Culture, Routine Gram stain aer bottle: Gram positive cocci in clusters resembling Staph      Positive results previously called 12/15/2023  17:26      STAPHYLOCOCCUS AUREUS  For susceptibility see order #[id number]      Blood culture [[id number]]  (Abnormal) Collected: 12/14/23 2310    Order Status: Completed Specimen: Blood Updated: 12/17/23 0902     Blood Culture, Routine Gram stain aer bottle: Gram positive cocci in clusters resembling Staph      Results called to and read back by: Krupa Salas RN 12/15/2023  15:52      STAPHYLOCOCCUS AUREUS  For susceptibility see order #[id number]      Urine culture [[id number]]  (Abnormal)  (Susceptibility) Collected: 12/13/23 2346    Order Status: Completed Specimen: Urine Updated: 12/16/23 2048     Urine Culture, Routine STAPHYLOCOCCUS AUREUS  > 100,000 cfu/ml      Narrative:      Specimen Source->Urine    Blood culture [[id number]]  (Abnormal) Collected: 12/14/23 0552    Order Status: Completed Specimen: Blood from Peripheral, Forearm, Right Updated: 12/16/23 1026     Blood Culture, Routine Gram stain aer bottle: Gram positive cocci in clusters resembling Staph      Positive results previously called 12/14/2023      STAPHYLOCOCCUS AUREUS  For susceptibility see order #[id number]      Blood culture [[id number]]  (Abnormal)  (Susceptibility) Collected: 12/14/23 0552    Order Status: Completed Specimen: Blood from Peripheral, Forearm, Right Updated: 12/16/23 1025     Blood Culture, Routine Gram stain aer bottle: Gram positive cocci in clusters resembling Staph      Results called to and read back by:Benita Mercedes RN 12/14/2023  20:43      STAPHYLOCOCCUS AUREUS    MRSA/SA Rapid ID by PCR from Blood culture [[id number]]  (Abnormal) Collected: 12/14/23 0552    Order Status: Completed Updated: 12/14/23 2150     Staph aureus ID by PCR Positive     Methicillin Resistant ID by PCR Negative     Clostridium difficile EIA [[id number]] Collected: 12/14/23 0609    Order Status: Completed Specimen: Stool Updated: 12/14/23 1255     C. diff Antigen Negative     C difficile Toxins A+B, EIA Negative     Comment: Testing not recommended for children <24 months old.       Stool culture [[id number]]     Order Status: Canceled Specimen: Stool     Influenza A & B by Molecular [[id number]] Collected: 12/13/23 2214    Order Status: Completed Specimen: Nasopharyngeal Swab Updated: 12/13/23 2254     Influenza A, Molecular Negative     Influenza B, Molecular Negative     Flu A & B Source Nasal swab          Recent Lab Results         12/19/23  0702        Albumin 2.7              ALT 89       Anion Gap 7              Baso # 0.07       Basophil % 0.8       BILIRUBIN TOTAL 1.5  Comment: For infants and newborns, interpretation of results should be based  on gestational age, weight and in agreement with clinical  observations.    Premature Infant recommended reference ranges:  Up to 24 hours.............<8.0 mg/dL  Up to 48 hours............<12.0 mg/dL  3-5 days..................<15.0 mg/dL  6-29 days.................<15.0 mg/dL         BUN 9       Calcium 8.9       Chloride 105       CO2 26       Creatinine 0.8       Differential Method Automated       eGFR >60.0       Eos # 0.3       Eosinophil % 3.4       Glucose 96       Gran # (ANC) 6.8       Gran % 76.2       Hematocrit 39.8       Hemoglobin 13.4       Immature Grans (Abs) 0.16  Comment: Mild elevation in immature granulocytes is non specific and   can be seen in a variety of conditions including stress response,   acute inflammation, trauma and pregnancy. Correlation with other   laboratory and clinical findings is essential.         Immature Granulocytes 1.8       Lymph # 1.0       Lymph % 11.7       Magnesium  2.1       MCH 29.7       MCHC 33.7       MCV 88       Mono # 0.5       Mono % 6.1       MPV 9.7       nRBC 0       Platelet Count 363        Potassium 4.0       PROTEIN TOTAL 7.3       RBC 4.51       RDW 14.6       Sodium 138       WBC 8.86               Significant Imaging:     Imaging Results              CT Abdomen Pelvis With IV Contrast NO Oral Contrast (Final result)  Result time 12/14/23 08:54:04      Final result by Lalo Puri MD (12/14/23 08:54:04)                   Impression:      1. Intraluminal fluid throughout the small and large bowel with adjacent mesenteric fat stranding and free fluid, as may be seen in the setting of a nonspecific infectious or noninfectious inflammatory enterocolitis.  2. Nonobstructive right nephrolithiasis.  3. Small bilateral pleural effusions.  4. Additional details of chronic and incidental findings, as provided in the body of report.    Electronically signed by resident: Katherine Hidalgo  Date:    12/14/2023  Time:    06:04    Electronically signed by: Lalo Puri  Date:    12/14/2023  Time:    08:54               Narrative:    EXAMINATION:  CT ABDOMEN PELVIS WITH IV CONTRAST    CLINICAL HISTORY:  Abdominal abscess/infection suspected;LLQ abdominal pain;    TECHNIQUE:  Low dose axial images, sagittal and coronal reformations were obtained from the lung bases to the pubic symphysis following the IV administration of 75 mL of Omnipaque 350.Oral contrast was not given.    COMPARISON:  Ultrasound abdomen 11/01/2023    FINDINGS:  Comment: Motion compromised examination.    Lower thorax:    Heart: Cardiomegaly.  Cardiac pacing leads.  Severe calcific atherosclerosis of multiple coronary arteries.    Lung Bases: Small bilateral pleural effusions, larger on the right.  Bibasal linear opacities, likely subsegmental atelectasis versus scarring.    Liver: Normal in size and attenuation, with no focal hepatic lesions.    Gallbladder: No calcified gallstones.  Gallbladder is not distended.  No gallbladder wall thickening or pericholecystic fluid collection.    Bile Ducts: No intra or extrahepatic biliary duct  dilatation.    Pancreas: No mass or peripancreatic fat stranding.    Spleen: Normal in size.  No focal lesion.    Adrenals: Unremarkable.    Kidneys/ Ureters: Normal in size and location. Normal enhancement.  1.3 cm right lower pole nonobstructive nephrolithiasis.  No hydronephrosis or hydroureter.  Multiple cortical hypodensities which are too small to characterize likely renal cysts.  Nonspecific mild bilateral perinephric stranding.    Bladder: No evidence of wall thickening.    Reproductive organs: Prostate appears enlarged, measuring 4.6 cm with dystrophic calcification.    Gl/Mesentery/peritoneum and retroperitoneum: Small hiatal hernia.  Stomach is unremarkable.  Small and large bowel are normal in caliber with no evidence of obstruction.  Liquid stool throughout the small and large bowel with mild mesenteric soft tissue stranding and trace of free fluid, as may be seen in a nonspecific infectious versus noninfectious inflammatory enterocolitis.    Abdominal wall: Left fat containing inguinal hernia.    Vasculature: Moderate calcific atherosclerosis.  No aneurysm.    Bones: Degenerative changes.  Similar appearing compression fractures of T12 and L1 when compared to radiograph of 04/22/2021 and MRI of 04/05/2021, allowing for differences in technique/modality.  No acute fracture.  No suspicious lytic or sclerotic lesion.

## 2023-12-19 NOTE — ASSESSMENT & PLAN NOTE
68 year old male with history of CAD s/p CABG x3, HLD, heart block s/p pacemaker admitted with MSSA bacteremia and MSSA UTI.     CT A/P reviewed and notable for nonobstructing renal stone, enlarged prostate. Awaiting CHARMAINE, planned for tomorrow. Patient is on IV Cefazolin. Blood cultures persistently positive (12/14 - 12/16), but repeats on 12/17 no growth thus far. Afebrile and HDS. Symptomatically improved. Of note, his urine culture in September was also positive for MSSA.      Recommendations:  Continue IV Cefazolin   Awaiting CHARMAINE tomorrow today.   Awaiting MRI of spine. Pt requesting sedation.   Urology evaluated the patient. No acute urologic intervention planned. Planning outpatient follow up.  Anticipate prolonged course of IV antibiotics for complicated bacteremia  Plan reviewed with ID staff. ID will follow up tomorrow.

## 2023-12-19 NOTE — NURSING TRANSFER
Nursing Transfer Note      12/19/2023   3:58 PM    Nurse giving handoff:rosa kiran   Nurse receiving handoff:Eric floor nurse     Reason patient is being transferred: see epic d/c criteria met     Transfer To: 629    Transfer via stretcher    Transfer with cardiac monitoring and verified able to see patient on monitor     Transported by rosa rn     Transfer Vital Signs:  Blood Pressure:see epic   Heart Rate:see epic   O2:room air   Temperature:see epic   Respirations:see epic     Telemetry: yes   Order for Tele Monitor? Yes    Additional Lines: none     4eyes on Skin: yes in ep recovery     Medicines sent: none     Any special needs or follow-up needed: d/c criteria met     Patient belongings transferred with patient: yes     Chart send with patient: yes     Notified: reported to floor nurse     Patient reassessed at: see epic  (date, time)  1  Upon arrival to floor: to room no complaints no distress noted.

## 2023-12-19 NOTE — SUBJECTIVE & OBJECTIVE
Interval History:     Afebrile. HDS. Blood cultures positive from 12/14-12/16, MSSA. Repeats on 12/17 NGTD.     Review of Systems   Constitutional:  Negative for appetite change, chills, diaphoresis, fatigue and fever.   Eyes:  Negative for visual disturbance.   Respiratory:  Negative for cough and shortness of breath.    Cardiovascular:  Negative for chest pain and leg swelling.   Gastrointestinal:  Positive for diarrhea (loose stools). Negative for abdominal pain, constipation, nausea and vomiting.   Genitourinary:  Negative for difficulty urinating, dysuria, frequency and hematuria.   Musculoskeletal:  Negative for arthralgias, back pain and joint swelling.   Skin:  Negative for color change, rash and wound.   Neurological:  Negative for dizziness, weakness, numbness and headaches.   Psychiatric/Behavioral:  Negative for agitation and confusion. The patient is not nervous/anxious.    All other systems reviewed and are negative.    Objective:     Vital Signs (Most Recent):  Temp: 98 °F (36.7 °C) (12/19/23 1046)  Pulse: 101 (12/19/23 1107)  Resp: 18 (12/19/23 1046)  BP: 120/77 (12/19/23 1046)  SpO2: 97 % (12/19/23 1046) Vital Signs (24h Range):  Temp:  [97.1 °F (36.2 °C)-98.2 °F (36.8 °C)] 98 °F (36.7 °C)  Pulse:  [] 101  Resp:  [16-18] 18  SpO2:  [94 %-99 %] 97 %  BP: (114-136)/(65-79) 120/77     Weight: 75.8 kg (167 lb)  Body mass index is 26.16 kg/m².    Estimated Creatinine Clearance: 82.6 mL/min (based on SCr of 0.8 mg/dL).     Physical Exam  Vitals reviewed.   Constitutional:       General: He is not in acute distress.     Appearance: Normal appearance. He is well-developed. He is not ill-appearing or diaphoretic.   HENT:      Head: Normocephalic and atraumatic.      Right Ear: External ear normal.      Left Ear: External ear normal.      Nose: Nose normal.   Eyes:      General: No scleral icterus.        Right eye: No discharge.         Left eye: No discharge.      Extraocular Movements: Extraocular  movements intact.      Conjunctiva/sclera: Conjunctivae normal.   Cardiovascular:      Rate and Rhythm: Normal rate and regular rhythm.   Pulmonary:      Effort: Pulmonary effort is normal. No respiratory distress.      Breath sounds: No stridor.   Abdominal:      General: There is no distension.      Palpations: Abdomen is soft.   Musculoskeletal:         General: No swelling or tenderness.      Comments: No joint or midline spine tenderness   Skin:     General: Skin is dry.      Coloration: Skin is not jaundiced or pale.      Findings: No erythema or rash.   Neurological:      General: No focal deficit present.      Mental Status: He is alert and oriented to person, place, and time. Mental status is at baseline.      Motor: No weakness.      Gait: Gait normal.   Psychiatric:         Mood and Affect: Mood normal.         Behavior: Behavior normal.         Thought Content: Thought content normal.         Judgment: Judgment normal.          Significant Labs: CBC:   Recent Labs   Lab 12/18/23  0538 12/19/23  0702   WBC 9.18 8.86   HGB 11.9* 13.4*   HCT 35.5* 39.8*    363       CMP:   Recent Labs   Lab 12/17/23 1956 12/18/23  0538 12/19/23  0702    136 138   K 4.1 4.1 4.0    104 105   CO2 21* 25 26    96 96   BUN 14 11 9   CREATININE 0.9 0.8 0.8   CALCIUM 8.3* 8.2* 8.9   PROT  --  6.1 7.3   ALBUMIN 2.3* 2.3* 2.7*   BILITOT  --  1.2* 1.5*   ALKPHOS  --  96 104   AST  --  125* 130*   ALT  --  82* 89*   ANIONGAP 10 7* 7*       Microbiology Results (last 7 days)       Procedure Component Value Units Date/Time    Blood culture [[id number]]  (Abnormal) Collected: 12/16/23 0341    Order Status: Completed Specimen: Blood Updated: 12/19/23 1113     Blood Culture, Routine Gram stain aer bottle: Gram positive cocci in clusters resembling Staph      Positive results previously called 12/17/2023      STAPHYLOCOCCUS AUREUS  For susceptibility see order #[id number]      Blood culture [[id number]]   (Abnormal) Collected: 12/16/23 0341    Order Status: Completed Specimen: Blood Updated: 12/19/23 1112     Blood Culture, Routine Gram stain aer bottle: Gram positive cocci in clusters resembling Staph      Results called to and read back by: Lei Marmolejo 12/17/2023  02:14      STAPHYLOCOCCUS AUREUS  For susceptibility see order #[id number]      Blood culture [[id number]] Collected: 12/19/23 0702    Order Status: Sent Specimen: Blood Updated: 12/19/23 0728    Narrative:      Rt wrist    Blood culture [[id number]] Collected: 12/19/23 0702    Order Status: Sent Specimen: Blood Updated: 12/19/23 0728    Narrative:      Rt AC    Blood culture [[id number]] Collected: 12/17/23 0458    Order Status: Completed Specimen: Blood Updated: 12/19/23 0613     Blood Culture, Routine No Growth to date      No Growth to date      No Growth to date    Blood culture [[id number]] Collected: 12/17/23 0458    Order Status: Completed Specimen: Blood Updated: 12/19/23 0613     Blood Culture, Routine No Growth to date      No Growth to date      No Growth to date    Blood culture [[id number]]  (Abnormal) Collected: 12/14/23 2310    Order Status: Completed Specimen: Blood Updated: 12/17/23 0902     Blood Culture, Routine Gram stain aer bottle: Gram positive cocci in clusters resembling Staph      Positive results previously called 12/15/2023  17:26      STAPHYLOCOCCUS AUREUS  For susceptibility see order #[id number]      Blood culture [[id number]]  (Abnormal) Collected: 12/14/23 2310    Order Status: Completed Specimen: Blood Updated: 12/17/23 0902     Blood Culture, Routine Gram stain aer bottle: Gram positive cocci in clusters resembling Staph      Results called to and read back by: Krupa Salas RN 12/15/2023  15:52      STAPHYLOCOCCUS AUREUS  For susceptibility see order #[id number]      Urine culture [[id number]]  (Abnormal)  (Susceptibility) Collected: 12/13/23 2346    Order Status: Completed Specimen: Urine Updated: 12/16/23 2048      Urine Culture, Routine STAPHYLOCOCCUS AUREUS  > 100,000 cfu/ml      Narrative:      Specimen Source->Urine    Blood culture [[id number]]  (Abnormal) Collected: 12/14/23 0552    Order Status: Completed Specimen: Blood from Peripheral, Forearm, Right Updated: 12/16/23 1026     Blood Culture, Routine Gram stain aer bottle: Gram positive cocci in clusters resembling Staph      Positive results previously called 12/14/2023      STAPHYLOCOCCUS AUREUS  For susceptibility see order #[id number]      Blood culture [[id number]]  (Abnormal)  (Susceptibility) Collected: 12/14/23 0552    Order Status: Completed Specimen: Blood from Peripheral, Forearm, Right Updated: 12/16/23 1025     Blood Culture, Routine Gram stain aer bottle: Gram positive cocci in clusters resembling Staph      Results called to and read back by:Benita Mercedes RN 12/14/2023  20:43      STAPHYLOCOCCUS AUREUS    MRSA/SA Rapid ID by PCR from Blood culture [[id number]]  (Abnormal) Collected: 12/14/23 0552    Order Status: Completed Updated: 12/14/23 2150     Staph aureus ID by PCR Positive     Methicillin Resistant ID by PCR Negative    Clostridium difficile EIA [[id number]] Collected: 12/14/23 0609    Order Status: Completed Specimen: Stool Updated: 12/14/23 1255     C. diff Antigen Negative     C difficile Toxins A+B, EIA Negative     Comment: Testing not recommended for children <24 months old.       Stool culture [[id number]]     Order Status: Canceled Specimen: Stool     Influenza A & B by Molecular [[id number]] Collected: 12/13/23 2214    Order Status: Completed Specimen: Nasopharyngeal Swab Updated: 12/13/23 2254     Influenza A, Molecular Negative     Influenza B, Molecular Negative     Flu A & B Source Nasal swab          Recent Lab Results         12/19/23  0702        Albumin 2.7              ALT 89       Anion Gap 7              Baso # 0.07       Basophil % 0.8       BILIRUBIN TOTAL 1.5  Comment: For infants and newborns,  interpretation of results should be based  on gestational age, weight and in agreement with clinical  observations.    Premature Infant recommended reference ranges:  Up to 24 hours.............<8.0 mg/dL  Up to 48 hours............<12.0 mg/dL  3-5 days..................<15.0 mg/dL  6-29 days.................<15.0 mg/dL         BUN 9       Calcium 8.9       Chloride 105       CO2 26       Creatinine 0.8       Differential Method Automated       eGFR >60.0       Eos # 0.3       Eosinophil % 3.4       Glucose 96       Gran # (ANC) 6.8       Gran % 76.2       Hematocrit 39.8       Hemoglobin 13.4       Immature Grans (Abs) 0.16  Comment: Mild elevation in immature granulocytes is non specific and   can be seen in a variety of conditions including stress response,   acute inflammation, trauma and pregnancy. Correlation with other   laboratory and clinical findings is essential.         Immature Granulocytes 1.8       Lymph # 1.0       Lymph % 11.7       Magnesium  2.1       MCH 29.7       MCHC 33.7       MCV 88       Mono # 0.5       Mono % 6.1       MPV 9.7       nRBC 0       Platelet Count 363       Potassium 4.0       PROTEIN TOTAL 7.3       RBC 4.51       RDW 14.6       Sodium 138       WBC 8.86               Significant Imaging:     Imaging Results              CT Abdomen Pelvis With IV Contrast NO Oral Contrast (Final result)  Result time 12/14/23 08:54:04      Final result by Lalo Puri MD (12/14/23 08:54:04)                   Impression:      1. Intraluminal fluid throughout the small and large bowel with adjacent mesenteric fat stranding and free fluid, as may be seen in the setting of a nonspecific infectious or noninfectious inflammatory enterocolitis.  2. Nonobstructive right nephrolithiasis.  3. Small bilateral pleural effusions.  4. Additional details of chronic and incidental findings, as provided in the body of report.    Electronically signed by resident: Katherine  Gwen  Date:    12/14/2023  Time:    06:04    Electronically signed by: Lalo Puri  Date:    12/14/2023  Time:    08:54               Narrative:    EXAMINATION:  CT ABDOMEN PELVIS WITH IV CONTRAST    CLINICAL HISTORY:  Abdominal abscess/infection suspected;LLQ abdominal pain;    TECHNIQUE:  Low dose axial images, sagittal and coronal reformations were obtained from the lung bases to the pubic symphysis following the IV administration of 75 mL of Omnipaque 350.Oral contrast was not given.    COMPARISON:  Ultrasound abdomen 11/01/2023    FINDINGS:  Comment: Motion compromised examination.    Lower thorax:    Heart: Cardiomegaly.  Cardiac pacing leads.  Severe calcific atherosclerosis of multiple coronary arteries.    Lung Bases: Small bilateral pleural effusions, larger on the right.  Bibasal linear opacities, likely subsegmental atelectasis versus scarring.    Liver: Normal in size and attenuation, with no focal hepatic lesions.    Gallbladder: No calcified gallstones.  Gallbladder is not distended.  No gallbladder wall thickening or pericholecystic fluid collection.    Bile Ducts: No intra or extrahepatic biliary duct dilatation.    Pancreas: No mass or peripancreatic fat stranding.    Spleen: Normal in size.  No focal lesion.    Adrenals: Unremarkable.    Kidneys/ Ureters: Normal in size and location. Normal enhancement.  1.3 cm right lower pole nonobstructive nephrolithiasis.  No hydronephrosis or hydroureter.  Multiple cortical hypodensities which are too small to characterize likely renal cysts.  Nonspecific mild bilateral perinephric stranding.    Bladder: No evidence of wall thickening.    Reproductive organs: Prostate appears enlarged, measuring 4.6 cm with dystrophic calcification.    Gl/Mesentery/peritoneum and retroperitoneum: Small hiatal hernia.  Stomach is unremarkable.  Small and large bowel are normal in caliber with no evidence of obstruction.  Liquid stool throughout the small and large bowel  with mild mesenteric soft tissue stranding and trace of free fluid, as may be seen in a nonspecific infectious versus noninfectious inflammatory enterocolitis.    Abdominal wall: Left fat containing inguinal hernia.    Vasculature: Moderate calcific atherosclerosis.  No aneurysm.    Bones: Degenerative changes.  Similar appearing compression fractures of T12 and L1 when compared to radiograph of 04/22/2021 and MRI of 04/05/2021, allowing for differences in technique/modality.  No acute fracture.  No suspicious lytic or sclerotic lesion.

## 2023-12-19 NOTE — PROGRESS NOTES
[facility] - Emergency Dept  [facility] Medicine  Progress Note    Patient Name: Michael Espinoza  MRN: [id number]  Patient Class: IP- Inpatient   Admission Date: 12/13/2023  Length of Stay: 4 days  Attending Physician: Mukesh Golden MD  Primary Care Provider: Dominguez Hyatt MD        Subjective:     Principal Problem:Sepsis without acute organ dysfunction        HPI:  Michael Espinoza is a 68 y.o. male with PMHx of CAD s/p CABG x3, HLD, complete heart block s/p dual chamber pacemaker. [place] [facility] [place] with diarrhea for the last 6 days. On 12/8 he developed left lower quadrant cramping followed by multiple episodes of loose stool. The next two days he had about 10-13 episodes diarrhea daily. Watery-brown. No blood. By Monday he continued to have diarrhea but then also developed mild nausea, but was able to keep down bland foods (jello, applesauce, banana, eggs) without emesis. He also began having urinary frequency, urgency, and dysuria and though his right kidney was mildly painful. Denies hematuria. He began to keep extremely dehydrated, started having fevers up to 102F Monday-Tuesday. He endorses polydipsia, myalgias, and hallucinations when he closes his eyes. He also reports shaking chills that would last for 20 minutes at a time that he could not control. Wednesday diarrhea finally stopped and he also stopped urinating but reports he did spontaneously urinate in ED once he got IV fluids.     He does have history of diarrhea and saw GI Dr. Feliz in September for this and thought to be maybe post infectious IBS but patient states he had completely symptom free period for months since then. He saw urgent care for symptoms 3 days ago and reports taking Zofran 4 times from them as well as a medication from GI (dicyclomine?) 10 times over 2 days. He reports normal colonoscopy one year ago (lipoma biopsied with surface erosions). No rectal pain. No more abdominal/flank pain. No strange foods or travel. He  denies falls, chest pain, palpitations, shortness of breath.     Per chart review: 12/11 stool culture without significant growth to date, E.coli shiga toxin 1 and 2 negative. Urine culture + staph aureus. A previous urine culture was also positive for staph aures in September and pt reports completing bactrim course.    In the ED, febrile to 100.4F, , RR 28. BP sable. On RA. Labs with WBC 8.58k, platelets 118k.  Na 129, K 2.9, Cl 94, BUN/Cr without YESICA, albumin 2.8, T bili 2.0 (chronically elevated), AST/ALT elevated 90/60. UA 3+ leukocytes, nitrite positive, 3+ occult blood, 1+ ketones, 1+ protein, 35 RBC, >100 WBC< many bacteria. LA 1.4. lipase and mag wnl. Covid and flu negative. CT abdomen pending. Given ceftriaxone, dicyclomine, famotidine, Zofran, potassium po and IV,  1L LR bolus and 1L NS bolus. Admitted to .     Overview/Hospital Course:  12/14 K replaced. BCX 2 and stool studies pending. CT abdomen -  Intraluminal fluid throughout the small and large bowel with adjacent mesenteric fat stranding and free fluid, as may be seen in the setting of a nonspecific infectious or noninfectious inflammatory enterocolitis. Nonobstructive right nephrolithiasis. loperamide PRN. continue ciprofloxacin and vancomycin . denies abdominal pain. advanced to soft diet   12/15 K and P replaced. BCX 2 from 12/14 with staph aureus. repeated BCX 2.  UC with STAPHYLOCOCCUS AUREUS > 100,000 cfu/ml  Susceptibility pending. echo ordered. tolerating soft diet. CHARMAINE ordered as with pacemaker . 1.3 cm right lower pole nonobstructive nephrolithiasis/ staph aureus on UC ( positive for MSSA urine culture from 9/2023). started on cefazolin. vancomycin discontinued   12/16 BC from 12/15 staph aureus. repeat BCX 2 TTE today-     Left Ventricle: The left ventricle is normal in size. Normal wall thickness. Regional wall motion abnormalities present. See diagram for wall motion findings. There is mildly reduced systolic function with a  visually estimated ejection fraction of 40 - 45%. Ejection fraction by visual approximation is 43%. There is normal diastolic function.    Right Ventricle: Normal right ventricular cavity size. Wall thickness is normal. Right ventricle wall motion  is normal. Systolic function is normal.    Left Atrium: Left atrium is severely dilated. 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    Aortic Valve: The aortic valve is a trileaflet valve. There is mild aortic valve sclerosis. There is mild stenosis. Aortic valve area by VTI is 1.74 cm². Aortic valve peak velocity is 1.65 m/s. Mean gradient is 7 mmHg. The dimensionless index is 0.46. There is trace aortic regurgitation.    Mitral Valve: There is mild bileaflet sclerosis.    Tricuspid Valve: There is mild regurgitation.    Pulmonary Artery: The estimated pulmonary artery systolic pressure is 35 mmHg.    IVC/SVC: Normal venous pressure at 3 mmHg.    CHARMAINE scheduled. K and P replaced  12/18 BC x2 12/18 with staph aureus.  CHARMAINE postponed to tomorow. MRI spine WWO contrast   12/19 CHARMAINE today. fecal elastase decreased 94. r/o exocrine pancreatic insufficiency . ativan PRN for sedation with MRI spine. difficulty with CHARMAINE and  advancing the US probe  today. CT chest and Neck wo contrast to r/o obstruction. CT normal, plan for CHARMAINE on 12/22 with intubation        Review of Systems:   Pain scale:   Constitutional:  fever,  chills, headache, vision loss, hearing loss, weight loss, Generalized weakness, falls, loss of smell, loss of taste, poor appetite,  sore throat  Respiratory: cough, shortness of breath.   Cardiovascular: chest pain, dizziness, palpitations, orthopnea, swelling of feet, syncope  Gastrointestinal: nausea, vomiting, abdominal pain, diarrhea -improved , black stool,  blood in stool, change in bowel habits, constipation  Genitourinary: hematuria, dysuria, urgency, frequency,  flank pain- resolved   Integument/Breast: rash,  pruritis  Hematologic/Lymphatic: easy  bruising, lymphadenopathy  Musculoskeletal: arthralgias , myalgias, back pain, neck pain, knee pain  Neurological: confusion, seizures, tremors, slurred speech  Behavioral/Psych:  depression, anxiety, auditory or visual hallucinations     OBJECTIVE:     Physical Exam:  Body mass index is 26.16 kg/m².    Constitutional: Appears well-developed and well-nourished.   Head: Normocephalic and atraumatic.   Neck: Normal range of motion. Neck supple.   Cardiovascular: Normal heart rate.  Regular heart rhythm.  Pulmonary/Chest: Effort normal.   Abdominal: No distension.  No tenderness  Musculoskeletal: Normal range of motion. No edema.   Neurological: Alert and oriented to person, place, and time.   Skin: Skin is warm and dry.   Psychiatric: Normal mood and affect. Behavior is normal.                  Vital Signs  Temp: 97.7 °F (36.5 °C) (12/19/23 1613)  Pulse: 81 (12/19/23 1613)  Resp: 19 (12/19/23 1613)  BP: 132/86 (12/19/23 1613)  SpO2: (Abnormal) 94 % (12/19/23 1613)     24 Hour VS Range    Temp:  [97.1 °F (36.2 °C)-98.2 °F (36.8 °C)]   Pulse:  []   Resp:  [16-20]   BP: (117-136)/(65-87)   SpO2:  [94 %-100 %]     Intake/Output Summary (Last 24 hours) at 12/19/2023 1640  Last data filed at 12/19/2023 1446  Gross per 24 hour   Intake 400 ml   Output no documentation   Net 400 ml           I/O This Shift:  I/O this shift:  In: 400 [IV Piggyback:400]  Out: -     Wt Readings from Last 3 Encounters:   12/19/23 75.8 kg (167 lb)   12/11/23 69.4 kg (153 lb)   11/21/23 69.8 kg (153 lb 14.1 oz)       I have personally reviewed the vitals and recorded Intake/Output     Laboratory/Diagnostic Data:    CBC/Anemia Labs: Coags:    Recent Labs   Lab 12/14/23  0608 12/15/23  0649 12/17/23  0908 12/18/23  0538 12/19/23  0702   WBC  --    < > 11.30 9.18 8.86   HGB  --    < > 14.5 11.9* 13.4*   HCT  --    < > 41.7 35.5* 39.8*   PLT  --    < > 227 263 363   MCV  --    < > 86 89 88   RDW  --    < > 14.3 14.4 14.6*   OCCULTBLOOD Negative   "--   --   --   --     < > = values in this interval not displayed.    No results for input(s): "PT", "INR", "APTT" in the last 168 hours.     Chemistries: ABG:   Recent Labs   Lab 12/16/23  1901 12/17/23  0908 12/17/23 1956 12/18/23  0538 12/19/23  0702    136 137 136 138   K 3.4* 3.2* 4.1 4.1 4.0    104 106 104 105   CO2 22* 20* 21* 25 26   BUN 12 8 14 11 9   CREATININE 0.8 0.8 0.9 0.8 0.8   CALCIUM 8.4* 8.7 8.3* 8.2* 8.9   PROT  --  7.2  --  6.1 7.3   BILITOT  --  1.8*  --  1.2* 1.5*   ALKPHOS  --  107  --  96 104   ALT  --  102*  --  82* 89*   AST  --  161*  --  125* 130*   MG  --  2.2  --  2.0 2.1   PHOS 1.8*  --  2.3* 2.1*  --     No results for input(s): "PH", "PCO2", "PO2", "HCO3", "POCSATURATED", "BE" in the last 168 hours.     POCT Glucose: HbA1c:    No results for input(s): "POCTGLUCOSE" in the last 168 hours. Hemoglobin A1C   Date Value Ref Range Status   08/11/2023 5.6 4.0 - 5.6 % Final     Comment:     ADA Screening Guidelines:  5.7-6.4%  Consistent with prediabetes  >or=6.5%  Consistent with diabetes    High levels of fetal hemoglobin interfere with the HbA1C  assay. Heterozygous hemoglobin variants (HbS, HgC, etc)do  not significantly interfere with this assay.   However, presence of multiple variants may affect accuracy.     05/10/2018 5.4 4.0 - 5.6 % Final     Comment:     According to ADA guidelines, hemoglobin A1c <7.0% represents  optimal control in non-pregnant diabetic patients. Different  metrics may apply to specific patient populations.   Standards of Medical Care in Diabetes-2016.  For the purpose of screening for the presence of diabetes:  <5.7%     Consistent with the absence of diabetes  5.7-6.4%  Consistent with increasing risk for diabetes   (prediabetes)  >or=6.5%  Consistent with diabetes  Currently, no consensus exists for use of hemoglobin A1c  for diagnosis of diabetes for children.  This Hemoglobin A1c assay has significant interference with fetal   hemoglobin " "  (HbF). The results are invalid for patients with abnormal amounts of   HbF,   including those with known Hereditary Persistence   of Fetal Hemoglobin. Heterozygous hemoglobin variants (HbAS, HbAC,   HbAD, HbAE, HbA2) do not significantly interfere with this assay;   however, presence of multiple variants in a sample may impact the %   interference.          Cardiac Enzymes: Ejection Fractions:    No results for input(s): "CPK", "CPKMB", "MB", "TROPONINI" in the last 72 hours. EF   Date Value Ref Range Status   12/16/2023 43 % Final   07/05/2022 65 % Final          No results for input(s): "COLORU", "APPEARANCEUA", "PHUR", "SPECGRAV", "PROTEINUA", "GLUCUA", "KETONESU", "BILIRUBINUA", "OCCULTUA", "NITRITE", "UROBILINOGEN", "LEUKOCYTESUR", "RBCUA", "WBCUA", "BACTERIA", "SQUAMEPITHEL", "HYALINECASTS" in the last 48 hours.    Invalid input(s): "WRIGHTSUR"      Lactate (Lactic Acid) (mmol/L)   Date Value   12/13/2023 1.4     BNP (pg/mL)   Date Value   07/05/2022 596 (H)     CRP (mg/L)   Date Value   12/14/2023 206.0 (H)     Sed Rate (mm/Hr)   Date Value   12/14/2023 76 (H)     No results found for: "DDIMER"  Ferritin (ng/mL)   Date Value   09/14/2023 81   03/21/2018 35     No results found for: "LDH"  Troponin I (ng/mL)   Date Value   07/05/2022 <0.006   07/05/2022 0.009     Results for orders placed or performed in visit on 07/01/21   Vitamin D   Result Value Ref Range    Vit D, 25-Hydroxy 31 30 - 96 ng/mL   Results for orders placed or performed in visit on 05/05/21   Vitamin D   Result Value Ref Range    Vit D, 25-Hydroxy 38 30 - 96 ng/mL   Results for orders placed or performed in visit on 03/21/18   Vitamin D   Result Value Ref Range    Vit D, 25-Hydroxy 29 (L) 30 - 96 ng/mL     SARS-CoV-2 RNA, Amplification, Qual (no units)   Date Value   12/13/2023 Negative     POC Rapid COVID (no units)   Date Value   07/05/2022 Negative   12/22/2020 Negative       Microbiology labs for the last week  Microbiology Results (last " 7 days)       Procedure Component Value Units Date/Time    Blood culture [[id number]] Collected: 12/19/23 0702    Order Status: Completed Specimen: Blood Updated: 12/19/23 1515     Blood Culture, Routine No Growth to date    Narrative:      Rt wrist    Blood culture [[id number]] Collected: 12/19/23 0702    Order Status: Completed Specimen: Blood Updated: 12/19/23 1515     Blood Culture, Routine No Growth to date    Narrative:      Rt AC    Blood culture [[id number]]  (Abnormal) Collected: 12/16/23 0341    Order Status: Completed Specimen: Blood Updated: 12/19/23 1113     Blood Culture, Routine Gram stain aer bottle: Gram positive cocci in clusters resembling Staph      Positive results previously called 12/17/2023      STAPHYLOCOCCUS AUREUS  For susceptibility see order #[id number]      Blood culture [[id number]]  (Abnormal) Collected: 12/16/23 0341    Order Status: Completed Specimen: Blood Updated: 12/19/23 1112     Blood Culture, Routine Gram stain aer bottle: Gram positive cocci in clusters resembling Staph      Results called to and read back by: Lei Marmolejo 12/17/2023  02:14      STAPHYLOCOCCUS AUREUS  For susceptibility see order #[id number]      Blood culture [[id number]] Collected: 12/17/23 0458    Order Status: Completed Specimen: Blood Updated: 12/19/23 0613     Blood Culture, Routine No Growth to date      No Growth to date      No Growth to date    Blood culture [[id number]] Collected: 12/17/23 0458    Order Status: Completed Specimen: Blood Updated: 12/19/23 0613     Blood Culture, Routine No Growth to date      No Growth to date      No Growth to date    Blood culture [[id number]]  (Abnormal) Collected: 12/14/23 2310    Order Status: Completed Specimen: Blood Updated: 12/17/23 0902     Blood Culture, Routine Gram stain aer bottle: Gram positive cocci in clusters resembling Staph      Positive results previously called 12/15/2023  17:26      STAPHYLOCOCCUS AUREUS  For susceptibility see order  #[id number]      Blood culture [[id number]]  (Abnormal) Collected: 12/14/23 2310    Order Status: Completed Specimen: Blood Updated: 12/17/23 0902     Blood Culture, Routine Gram stain aer bottle: Gram positive cocci in clusters resembling Staph      Results called to and read back by: Krupa Salas RN 12/15/2023  15:52      STAPHYLOCOCCUS AUREUS  For susceptibility see order #[id number]      Urine culture [[id number]]  (Abnormal)  (Susceptibility) Collected: 12/13/23 2346    Order Status: Completed Specimen: Urine Updated: 12/16/23 2048     Urine Culture, Routine STAPHYLOCOCCUS AUREUS  > 100,000 cfu/ml      Narrative:      Specimen Source->Urine    Blood culture [[id number]]  (Abnormal) Collected: 12/14/23 0552    Order Status: Completed Specimen: Blood from Peripheral, Forearm, Right Updated: 12/16/23 1026     Blood Culture, Routine Gram stain aer bottle: Gram positive cocci in clusters resembling Staph      Positive results previously called 12/14/2023      STAPHYLOCOCCUS AUREUS  For susceptibility see order #[id number]      Blood culture [[id number]]  (Abnormal)  (Susceptibility) Collected: 12/14/23 0552    Order Status: Completed Specimen: Blood from Peripheral, Forearm, Right Updated: 12/16/23 1025     Blood Culture, Routine Gram stain aer bottle: Gram positive cocci in clusters resembling Staph      Results called to and read back by:Benita Mercedes RN 12/14/2023  20:43      STAPHYLOCOCCUS AUREUS    MRSA/SA Rapid ID by PCR from Blood culture [[id number]]  (Abnormal) Collected: 12/14/23 0552    Order Status: Completed Updated: 12/14/23 2150     Staph aureus ID by PCR Positive     Methicillin Resistant ID by PCR Negative    Clostridium difficile EIA [[id number]] Collected: 12/14/23 0609    Order Status: Completed Specimen: Stool Updated: 12/14/23 1255     C. diff Antigen Negative     C difficile Toxins A+B, EIA Negative     Comment: Testing not recommended for children <24 months old.       Stool culture  [[id number]]     Order Status: Canceled Specimen: Stool     Influenza A & B by Molecular [[id number]] Collected: 12/13/23 2214    Order Status: Completed Specimen: Nasopharyngeal Swab Updated: 12/13/23 2254     Influenza A, Molecular Negative     Influenza B, Molecular Negative     Flu A & B Source Nasal swab            Reviewed and noted in plan where applicable- Please see chart for full lab data.    Lines/Drains:       Peripheral IV - Single Lumen 12/13/23 2210 20 G Left Antecubital (Active)   Site Assessment Clean;Dry;Intact 12/13/23 2211   Extremity Assessment Distal to IV No abnormal discoloration;No redness;No swelling 12/13/23 2211   Dressing Status Clean;Dry;Intact 12/13/23 2211   Dressing Intervention First dressing 12/13/23 2211   Number of days: 0       Imaging  ECG Results              EKG 12-lead (Final result)  Result time 12/14/23 14:17:37      Final result by Interface, Lab In [facility] (12/14/23 14:17:37)               Narrative:    Test Reason : R11.0,    Vent. Rate : 103 BPM     Atrial Rate : 103 BPM     P-R Int : 170 ms          QRS Dur : 176 ms      QT Int : 410 ms       P-R-T Axes : 037 158 -07 degrees     QTc Int : 537 ms    Atrial-sensed ventricular-paced rhythm  Biventricular pacemaker detected  Abnormal ECG  When compared with ECG of 21-NOV-2023 08:01,  Vent. rate has increased BY  36 BPM  Confirmed by Ivone Garibay MD ([id number]) on 12/14/2023 2:17:29 PM    Referred By: [email or web address]   SELF           Confirmed By:Ivone Garibay MD                                  Results for orders placed during the hospital encounter of 07/05/22    Echo    Interpretation Summary  · Presence of bradycardia with HR 30s with AV dissociation  · The estimated ejection fraction is 65%.  · The left ventricle is normal in size with normal systolic function.  · Normal left ventricular diastolic function.  · Normal right ventricular size with normal right ventricular systolic function.  · Mild mitral regurgitation.  · Presence  of interatrial septal aneurysm.  · The estimated PA systolic pressure is 28 mmHg.  · Normal central venous pressure (3 mmHg).      Intra-Procedure Documentation  Aborted invasive cardiology procedure- see Procedure Log link for details.      Labs, Imaging, EKG and Diagnostic results from 12/19/2023 were reviewed.    Medications:  Medication list was reviewed and changes noted under Assessment/Plan.  No current facility-administered medications on file prior to encounter.     Current Outpatient Medications on File Prior to Encounter   Medication Sig Dispense Refill    aspirin (ECOTRIN) 81 MG EC tablet Take 81 mg by mouth 2 (two) times daily. (Breakfast & Afternoon)      atorvastatin (LIPITOR) 80 MG tablet TAKE 1 TABLET EVERY DAY (Patient taking differently: Take 80 mg by mouth every evening.) 90 tablet 10    calcium carbonate (TUMS ORAL) Take 2 tablets by mouth daily as needed (Heartburn).      coenzyme Q10 (CO Q-10) 300 mg Cap Take 1 capsule by mouth once daily.      dicyclomine (BENTYL) 10 MG capsule Take 10 mg by mouth daily as needed (Abdominal pain).      ERGOCALCIFEROL, VITAMIN D2, (VITAMIN D ORAL) Take 1 capsule by mouth Mon, Wed, Fri, Sat & Sun      ezetimibe (ZETIA) 10 mg tablet TAKE 1 TABLET EVERY DAY (Patient taking differently: Take 10 mg by mouth once daily.) 90 tablet 10    multivit-min/FA/lycopen/lutein (CENTRUM SILVER MEN ORAL) Take 1 tablet by mouth every Mon, Wed, Fri.      ondansetron (ZOFRAN-ODT) 4 MG TbDL Take 1 tablet (4 mg total) by mouth every 8 (eight) hours as needed (nausea). 12 tablet 0     Scheduled Medications:  aspirin, 81 mg, Oral, BID  atorvastatin, 80 mg, Oral, Daily  ceFAZolin (ANCEF) IVPB, 2 g, Intravenous, Q8H  dicyclomine, 10 mg, Oral, QID  enoxparin, 40 mg, Subcutaneous, Daily  ezetimibe, 10 mg, Oral, Daily      PRN: acetaminophen, acetaminophen, aluminum-magnesium hydroxide-simethicone, calcium carbonate, dextrose 10%, dextrose 10%, glucagon (human recombinant), glucose,  glucose, loperamide, melatonin, naloxone, ondansetron, simethicone, sodium chloride 0.9%, sodium chloride 0.9%  Infusions:       Estimated Creatinine Clearance: 82.6 mL/min (based on SCr of 0.8 mg/dL).           Assessment/Plan:      * Sepsis without acute organ dysfunction  This patient does have evidence of infective focus  My overall impression is sepsis.  Source: Urinary Tract and Abdominal  Antibiotics given-   Antibiotics (72h ago, onward)      Start     Stop Route Frequency Ordered    12/14/23 1900  vancomycin 750 mg in dextrose 5 % (D5W) 250 mL IVPB (Vial-Mate)         -- IV Every 12 hours (non-standard times) 12/14/23 0547    12/14/23 0900  ciprofloxacin HCl tablet 500 mg         12/19/23 0859 Oral Every 12 hours 12/14/23 0613    12/14/23 0623  vancomycin - pharmacy to dose  (vancomycin IVPB (PEDS and ADULTS))        See Hyperspace for full Linked Orders Report.    -- IV pharmacy to manage frequency 12/14/23 0523          Latest lactate reviewed-  Recent Labs   Lab 12/13/23  2212   LACTATE 1.4     Organ dysfunction indicated by  None- mild delirium (possible form electrolyte derangement and fevers) Mild transaminitis, mild thrombocytopenia    Fluid challenge Not needed - patient is not hypotensive      Post- resuscitation assessment No - Post resuscitation assessment not needed     Will Not start Pressors- Levophed for MAP of 65  Source control achieved by: Vancomycin ordered for staph aureus UTI on previous cultures, Ciprofloxacin ordered for possible infectious (vs other) diarrhea.  Blood, urine, stool cultures and studies pending.     Right kidney stone  12/15 s/p urology eval - stone is non obstructing. -No indication for acute urologic intervention.  outpatient urologic follow up for stone treatment            Bacteremia  12/15 BCX 2 from 12/14 with staph aureus. repeated BCX 2.    CHARMAINE ordered as with pacemaker . 1.3 cm right lower pole nonobstructive nephrolithiasis/ staph aureus on UC ( positive for  MSSA urine culture from 9/2023). started on cefazolin. vancomycin discontinued   12/18 BC from 12/16 staph aureus. CHARMAINE tomorrow      Thrombocytopenia  Patient was found to have thrombocytopenia, the likely etiology is secondary to sepsis/infection?, will monitor the platelets Daily. Will transfuse if platelet count is <20k. Hold DVT prophylaxis if platelets are <50k. The patient's platelet results have been reviewed and are listed below.  Recent Labs   Lab 12/16/23  0341      resolved     Transaminitis  -Mildly elevated, possible 2/2 dehydration/infection  -NO RUQ pain. T bili chronically elevated  -Check acute hepatitis panel   -Daily CMP    12/14 Patients liver enzymes  elevated.   Recent Labs     12/17/23  0908 12/18/23  0538 12/19/23  0702   BILITOT 1.8* 1.2* 1.5*   * 125* 130*   * 82* 89*   ALKPHOS 107 96 104    . Liver enzymes  trending up. sono liver with doppler -Normal Doppler evaluation of the liver.Cholelithiasis.  No sonographic evidence for acute cholecystitis.       Diarrhea  68M with acute onset of multiple episodes diarrhea with only mild LLQ abdominal cramping early in course. Mild nausea. No abdominal pain, emesis, and is tolerating PO. Having fevers, chills, polydipsia. Also urinary infectious symptoms. Previously seen by GI Dr. 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 Work up unremarkable.  Previous stool cultures without growth.Giardia negative in the past. Recent stool culture without growth and E coli antigen negative.   -Multiple electrolyte derangements on admission  -Tachycardic, febrile, and tachypneic   -S/p 2L IVF bolus in ED, continue IVFs today  -Check complete stool studies  -C-scope 8/2022 Lipoma in the recto-sigmoid colon. Biopsied and found to have surface erosions  -CT pending  -GI consulted, appreciate recommendations  -PRN bentyl for cramping, prn Zofran/compazine for nausea  -ON Vanc for sepsis/UTI and will add  ciprofloxacin 500 mg Q12h x5 days for diarrhea  for now pending culture results  12/14  CT abdomen -  Intraluminal fluid throughout the small and large bowel with adjacent mesenteric fat stranding and free fluid, as may be seen in the setting of a nonspecific infectious or noninfectious inflammatory enterocolitis. Nonobstructive right nephrolithiasis. loperamide PRN.   12/15 C diff  and stool culture negative.     Complicated UTI (urinary tract infection)  Presenting with dysuria/frequency/urgency/maybe mild flank pain/fever/chills (also with diarrheal illness).  -3/4 SIRs on admit for fever, tachycardia tachypnea  -UA infectious, nitrite positive, 3+ leukocytes >100 WBC and many bacteria  -Previous urine cultures with staph aureus  -S/p ceftriaxone in ED  -Will cover with vancomycin   -Follow up urine cultures  -Follow up CT  -Monitor I/Os  12/14. BCX 2 and stool studies pending.continue ciprofloxacin and vancomycin   12/15  UC with STAPHYLOCOCCUS AUREUS > 100,000 cfu/ml  Susceptibility pending. echo ordered. tolerating soft diet. 1.3 cm right lower pole nonobstructive nephrolithiasis/ staph aureus on UC ( positive for MSSA urine culture from 9/2023). started on cefazolin    Hypokalemia   resolved     Hyponatremia  Patient has hyponatremia which is uncontrolled,We will aim to correct the sodium by 4-6mEq in 24 hours. We will monitor sodium Daily. The hyponatremia is due to Dehydration/hypovolemia. We will obtain the following studies: Urine sodium, urine osmolality, serum osmolality, or TSH, T4. We will treat the hyponatremia with IV fluids as follows: 2/p 2 L IVF in ED, continuous fluids at rate 100 ml/hr ordered. The patient's sodium results have been reviewed and are listed below.  Recent Labs   Lab 12/19/23  0702      improving.tolerating soft diet.    Complete heart block  -s/p dual chamber pacemaker placement  7/5/2022      Coronary artery disease of native artery of native heart with stable angina  pectoris  Patient with known CAD s/p CABG, which is controlled Will continue ASA and Statin and monitor for S/Sx of angina/ACS. Continue to monitor on telemetry.     Gilbert's syndrome  -Chronic condition- reports diagnosed by Dr. Rowe years ago  -T bili elevated on admission, similar to previous  -Monitor CMP      HLD (hyperlipidemia)  -Continue home statin      VTE Risk Mitigation (From admission, onward)           Ordered     enoxaparin injection 40 mg  Daily         12/16/23 0900     IP VTE HIGH RISK PATIENT  Once         12/16/23 0900     Place sequential compression device  Until discontinued         12/14/23 0526                    Discharge Planning   MAGNO: 12/21/2023     Code Status: Full Code   Is the patient medically ready for discharge?: No    Reason for patient still in hospital (select all that apply): Treatment  Discharge Plan A: Home                  Mukesh Golden MD  Department of Hospital Medicine   [facility] - Emergency Dept

## 2023-12-19 NOTE — TRANSFER OF CARE
"Anesthesia Transfer of Care Note    Patient: Michael Espinoza    Procedure(s) Performed: Procedure(s) (LRB):  Transesophageal echo (CHARMAINE) intra-procedure log documentation (N/A)    Patient location: Cath Lab    Anesthesia Type: general    Transport from OR: Transported from OR on 6-10 L/min O2 by face mask with adequate spontaneous ventilation    Post pain: adequate analgesia    Post assessment: no apparent anesthetic complications    Post vital signs: stable    Level of consciousness: awake    Nausea/Vomiting: no nausea/vomiting    Complications: none    Transfer of care protocol was followed      Last vitals: Visit Vitals  /87   Pulse 101   Temp 36.7 °C (98 °F) (Oral)   Resp 18   Ht 5' 7" (1.702 m)   Wt 75.8 kg (167 lb)   SpO2 97%   BMI 26.16 kg/m²     "

## 2023-12-19 NOTE — PLAN OF CARE
CHARMAINE aborted due to clinical instability (hypoxia) during procedure. Additionally, we had significant difficulties advancing the US probe. Recommend CT to further evaluate for esophageal obstruction.      Rescheduled repeat CHARMAINE for Friday (12/22) with likely intubation pending CT results. Please make NPO on Thursday night

## 2023-12-19 NOTE — CONSULTS
[facility] [place]  CHARMAINE Consult      Michael Espinoza  YOB: 1955  Medical Record Number:  [id number]  Attending Physician:  Mukesh Golden MD   Current Principal Problem:  Sepsis without acute organ dysfunction    History     Cc: mssa    HPI  68 year old male with history of CAD s/p CABG x3, HLD, heart block s/p pacemaker admitted with MSSA bacteremia and MSSA UTI.      CT A/P reviewed and notable for nonobstructing renal stone, enlarged prostate. Awaiting CHARMAINE, planned for tomorrow. Patient is on IV Cefazolin. Blood cultures persistently positive (12/14 - 12/16), but repeats on 12/17 no growth thus far. Afebrile and HDS. Symptomatically improved. Of note, his urine culture in September was also positive for MSSA.    Consult for CHARMAINE for endocarditis eval        Recommendations:  Continue IV Cefazolin   Awaiting CHARMAINE tomorrow today.   Awaiting MRI of spine. Pt requesting sedation.   Urology evaluated the patient. No acute urologic intervention planned. Planning outpatient follow up.  Anticipate prolonged course of IV antibiotics for complicated bacteremia  Plan reviewed with ID staff. ID will follow up tomorrow.    Dysphagia or odynophagia:  No  Liver Disease, esophageal disease, or known varices:  No  Upper GI Bleeding: No  Snoring:  No  Sleep Apnea:  No  Prior neck surgery or radiation:  No  History of anesthetic difficulties:  No  Family history of anesthetic difficulties:  No  Last oral intake: yesterday before midnight  Able to move neck in all directions:  Yes    Medications - Outpatient  Prior to Admission medications    Medication Sig Start Date End Date Taking? Authorizing Provider   aspirin (ECOTRIN) 81 MG EC tablet Take 81 mg by mouth 2 (two) times daily. (Breakfast & Afternoon)   Yes Provider, Historical   atorvastatin (LIPITOR) 80 MG tablet TAKE 1 TABLET EVERY DAY  Patient taking differently: Take 80 mg by mouth every evening. 10/16/23  Yes Chad Tapia MD   calcium  carbonate (TUMS ORAL) Take 2 tablets by mouth daily as needed (Heartburn).   Yes Provider, Historical   coenzyme Q10 (CO Q-10) 300 mg Cap Take 1 capsule by mouth once daily.   Yes Provider, Historical   dicyclomine (BENTYL) 10 MG capsule Take 10 mg by mouth daily as needed (Abdominal pain).   Yes Provider, Historical   ERGOCALCIFEROL, VITAMIN D2, (VITAMIN D ORAL) Take 1 capsule by mouth Mon, Wed, Fri, Sat & Sun   Yes Provider, Historical   ezetimibe (ZETIA) 10 mg tablet TAKE 1 TABLET EVERY DAY  Patient taking differently: Take 10 mg by mouth once daily. 10/16/23  Yes Chad Tapia MD   multivit-min/FA/lycopen/lutein (CENTRUM SILVER MEN ORAL) Take 1 tablet by mouth every Mon, Wed, Fri.   Yes Provider, Historical   ondansetron (ZOFRAN-ODT) 4 MG TbDL Take 1 tablet (4 mg total) by mouth every 8 (eight) hours as needed (nausea). 12/11/23   Chen Alexander NP       Medications - Current  Scheduled Meds:   aspirin  81 mg Oral BID    atorvastatin  80 mg Oral Daily    ceFAZolin (ANCEF) IVPB  2 g Intravenous Q8H    dicyclomine  10 mg Oral QID    enoxparin  40 mg Subcutaneous Daily    ezetimibe  10 mg Oral Daily     Continuous Infusions:    Allergies  Review of patient's allergies indicates:  No Known Allergies  Past Medical History  Past Medical History:   Diagnosis Date    Complete heart block 7/5/2022    Coronary artery disease of native artery of native heart with stable angina pectoris 4/10/2018    Hyperlipidemia      Past Surgical History  Past Surgical History:   Procedure Laterality Date    A-V CARDIAC PACEMAKER INSERTION N/A 7/5/2022    Procedure: INSERTION, CARDIAC PACEMAKER, DUAL CHAMBER;  Surgeon: Alessandro Canales MD;  Location: [facility] EP LAB;  Service: Cardiology;  Laterality: N/A;  CHB, TBD, ANES, ED 6, MB    COLONOSCOPY N/A 8/5/2022    Procedure: COLONOSCOPY;  Surgeon: Namita Dumont MD;  Location: [facility] ENDO ([address] [place] [address]);  Service: Endoscopy;  Laterality: N/A;  vacc-inst portal-tb    CYST REMOVAL       "TONSILLECTOMY       ROS  10 point ROS performed and negative except as stated in HPI     Physical Examination   Vital Signs  Vitals  Temp: 98 °F (36.7 °C)  Temp Source: Oral  Pulse: 101  Heart Rate Source: Monitor  Resp: 18  SpO2: 97 %  Pulse Oximetry Type: Intermittent  Flow (L/min): 2  BP: 120/77  MAP (mmHg): 93  BP Location: Left arm  BP Method: Automatic  Patient Position: Lying    24 Hour VS Range  Temp:  [97.1 °F (36.2 °C)-98.2 °F (36.8 °C)]   Pulse:  []   Resp:  [16-18]   BP: (114-136)/(65-79)   SpO2:  [94 %-99 %]   No intake or output data in the 24 hours ending 12/19/23 1348      Physical Exam  HENT:      Head: Normocephalic and atraumatic.   Eyes:      Conjunctiva/sclera: Conjunctivae normal.   Cardiovascular:      Rate and Rhythm: Normal rate and regular rhythm.      Heart sounds: Normal heart sounds.   Pulmonary:      Effort: Pulmonary effort is normal. No respiratory distress.      Breath sounds: Normal breath sounds. No wheezing.   Abdominal:      General: There is no distension.      Palpations: Abdomen is soft.      Tenderness: There is no abdominal tenderness.   Musculoskeletal:      Cervical back: Normal range of motion.   Neurological:      Mental Status: He is alert and oriented to person, place, and time.   Psychiatric:         Mood and Affect: Affect normal.         Data     Recent Labs   Lab 12/15/23  0649 12/16/23  0341 12/17/23  0908 12/18/23  0538 12/19/23  0702   WBC 9.49 9.34 11.30 9.18 8.86   HGB 12.4* 12.9* 14.5 11.9* 13.4*   HCT 35.3* 35.8* 41.7 35.5* 39.8*   * 150 227 263 363      No results for input(s): "PROTIME", "INR" in the last 168 hours.   Recent Labs   Lab 12/16/23  1901 12/17/23  0908 12/17/23  1956 12/18/23  0538 12/19/23  0702    136 137 136 138   K 3.4* 3.2* 4.1 4.1 4.0    104 106 104 105   CO2 22* 20* 21* 25 26   BUN 12 8 14 11 9   CREATININE 0.8 0.8 0.9 0.8 0.8   ANIONGAP 11 12 10 7* 7*   CALCIUM 8.4* 8.7 8.3* 8.2* 8.9      Assessment & Plan "     CHARMAIEN for endocarditis evaluation   -No absolute contraindications of esophageal stricture, tumor, perforation, laceration,or diverticulum and/or active GI bleed  -The risks, benefits & alternatives of the procedure were explained to the patient.   -The risks of transesophageal echo include but are not limited to:  Dental trauma, esophageal trauma/perforation, bleeding, laryngospasm/brochospasm, aspiration, sore throat/hoarseness, & dislodgement of the endotracheal tube/nasogastric tube (where applicable).    -The risks of ANES monitored sedation include hypotension, respiratory depression, arrhythmias, bronchospasm, & death.    -Informed consent was obtained. The patient is agreeable to proceed with the procedure and all questions and concerns addressed.    Case discussed with an attending in echocardiography lab.    Zuhair , MD  [facility]   Cardiovascular Disease PGY-V

## 2023-12-20 ENCOUNTER — DOCUMENTATION ONLY (OUTPATIENT)
Dept: ELECTROPHYSIOLOGY | Facility: CLINIC | Age: 68
End: 2023-12-20
Payer: MEDICARE

## 2023-12-20 LAB
ALBUMIN SERPL BCP-MCNC: 2.6 G/DL (ref 3.5–5.2)
ALP SERPL-CCNC: 105 U/L (ref 55–135)
ALT SERPL W/O P-5'-P-CCNC: 74 U/L (ref 10–44)
ANION GAP SERPL CALC-SCNC: 10 MMOL/L (ref 8–16)
AST SERPL-CCNC: 120 U/L (ref 10–40)
BASOPHILS # BLD AUTO: 0.06 K/UL (ref 0–0.2)
BASOPHILS NFR BLD: 0.7 % (ref 0–1.9)
BILIRUB SERPL-MCNC: 1.2 MG/DL (ref 0.1–1)
BUN SERPL-MCNC: 11 MG/DL (ref 8–23)
CALCIUM SERPL-MCNC: 8.7 MG/DL (ref 8.7–10.5)
CHLORIDE SERPL-SCNC: 105 MMOL/L (ref 95–110)
CO2 SERPL-SCNC: 22 MMOL/L (ref 23–29)
CREAT SERPL-MCNC: 0.7 MG/DL (ref 0.5–1.4)
DIFFERENTIAL METHOD: ABNORMAL
EOSINOPHIL # BLD AUTO: 0.2 K/UL (ref 0–0.5)
EOSINOPHIL NFR BLD: 2.2 % (ref 0–8)
ERYTHROCYTE [DISTWIDTH] IN BLOOD BY AUTOMATED COUNT: 14.2 % (ref 11.5–14.5)
EST. GFR  (NO RACE VARIABLE): >60 ML/MIN/1.73 M^2
GLUCOSE SERPL-MCNC: 84 MG/DL (ref 70–110)
HCT VFR BLD AUTO: 38.6 % (ref 40–54)
HGB BLD-MCNC: 12.7 G/DL (ref 14–18)
IMM GRANULOCYTES # BLD AUTO: 0.13 K/UL (ref 0–0.04)
IMM GRANULOCYTES NFR BLD AUTO: 1.4 % (ref 0–0.5)
LYMPHOCYTES # BLD AUTO: 1.1 K/UL (ref 1–4.8)
LYMPHOCYTES NFR BLD: 11.8 % (ref 18–48)
MAGNESIUM SERPL-MCNC: 2 MG/DL (ref 1.6–2.6)
MCH RBC QN AUTO: 29.3 PG (ref 27–31)
MCHC RBC AUTO-ENTMCNC: 32.9 G/DL (ref 32–36)
MCV RBC AUTO: 89 FL (ref 82–98)
MONOCYTES # BLD AUTO: 0.7 K/UL (ref 0.3–1)
MONOCYTES NFR BLD: 7.2 % (ref 4–15)
NEUTROPHILS # BLD AUTO: 6.9 K/UL (ref 1.8–7.7)
NEUTROPHILS NFR BLD: 76.7 % (ref 38–73)
NRBC BLD-RTO: 0 /100 WBC
PLATELET # BLD AUTO: 379 K/UL (ref 150–450)
PMV BLD AUTO: 9.6 FL (ref 9.2–12.9)
POTASSIUM SERPL-SCNC: 4.2 MMOL/L (ref 3.5–5.1)
PROT SERPL-MCNC: 7 G/DL (ref 6–8.4)
RBC # BLD AUTO: 4.34 M/UL (ref 4.6–6.2)
SODIUM SERPL-SCNC: 137 MMOL/L (ref 136–145)
WBC # BLD AUTO: 9.02 K/UL (ref 3.9–12.7)

## 2023-12-20 PROCEDURE — 63600175 PHARM REV CODE 636 W HCPCS: Performed by: REGISTERED NURSE

## 2023-12-20 PROCEDURE — 80053 COMPREHEN METABOLIC PANEL: CPT | Performed by: HOSPITALIST

## 2023-12-20 PROCEDURE — 85025 COMPLETE CBC W/AUTO DIFF WBC: CPT | Performed by: HOSPITALIST

## 2023-12-20 PROCEDURE — 25000003 PHARM REV CODE 250

## 2023-12-20 PROCEDURE — 25500020 PHARM REV CODE 255: Performed by: HOSPITALIST

## 2023-12-20 PROCEDURE — 63600175 PHARM REV CODE 636 W HCPCS: Performed by: HOSPITALIST

## 2023-12-20 PROCEDURE — A9585 GADOBUTROL INJECTION: HCPCS | Performed by: HOSPITALIST

## 2023-12-20 PROCEDURE — 25000003 PHARM REV CODE 250: Performed by: REGISTERED NURSE

## 2023-12-20 PROCEDURE — 25000003 PHARM REV CODE 250: Performed by: HOSPITALIST

## 2023-12-20 PROCEDURE — 21400001 HC TELEMETRY ROOM

## 2023-12-20 PROCEDURE — 83735 ASSAY OF MAGNESIUM: CPT | Performed by: HOSPITALIST

## 2023-12-20 PROCEDURE — 36415 COLL VENOUS BLD VENIPUNCTURE: CPT | Performed by: HOSPITALIST

## 2023-12-20 RX ORDER — GADOBUTROL 604.72 MG/ML
8 INJECTION INTRAVENOUS
Status: COMPLETED | OUTPATIENT
Start: 2023-12-20 | End: 2023-12-20

## 2023-12-20 RX ADMIN — ASPIRIN 81 MG: 81 TABLET, COATED ORAL at 08:12

## 2023-12-20 RX ADMIN — EZETIMIBE 10 MG: 10 TABLET ORAL at 08:12

## 2023-12-20 RX ADMIN — DICYCLOMINE HYDROCHLORIDE 10 MG: 10 CAPSULE ORAL at 05:12

## 2023-12-20 RX ADMIN — DICYCLOMINE HYDROCHLORIDE 10 MG: 10 CAPSULE ORAL at 01:12

## 2023-12-20 RX ADMIN — DICYCLOMINE HYDROCHLORIDE 10 MG: 10 CAPSULE ORAL at 08:12

## 2023-12-20 RX ADMIN — CEFAZOLIN 2 G: 2 INJECTION, POWDER, FOR SOLUTION INTRAMUSCULAR; INTRAVENOUS at 05:12

## 2023-12-20 RX ADMIN — CEFAZOLIN 2 G: 2 INJECTION, POWDER, FOR SOLUTION INTRAMUSCULAR; INTRAVENOUS at 08:12

## 2023-12-20 RX ADMIN — LORAZEPAM 0.5 MG: 0.5 TABLET ORAL at 09:12

## 2023-12-20 RX ADMIN — ATORVASTATIN CALCIUM 80 MG: 40 TABLET, FILM COATED ORAL at 08:12

## 2023-12-20 RX ADMIN — LOPERAMIDE HYDROCHLORIDE 2 MG: 2 CAPSULE ORAL at 08:12

## 2023-12-20 RX ADMIN — CEFAZOLIN 2 G: 2 INJECTION, POWDER, FOR SOLUTION INTRAMUSCULAR; INTRAVENOUS at 01:12

## 2023-12-20 RX ADMIN — GADOBUTROL 8 ML: 604.72 INJECTION INTRAVENOUS at 12:12

## 2023-12-20 RX ADMIN — ENOXAPARIN SODIUM 40 MG: 40 INJECTION SUBCUTANEOUS at 05:12

## 2023-12-20 NOTE — CARE UPDATE
Pt completed CXR and Rep placed pacemaker in safe mode. Pt unsure if he can lat flat in MRI for 50 mins. Pt adjusted with pillows for comfort and exam begun. War room notifed pt off tele but on bedside monitor.

## 2023-12-20 NOTE — PROGRESS NOTES
Received a call from Pravin in the MRI Department in relation to this Inpatient needing to have a MRI and has a [employer] Pacemaker.  Please see information below as it relates to patient's Device being MRI compatible/conditional.  To meet protocol the Pacemaker/ICD must have been implanted no less than 6 weeks prior to scheduled date of Scan.  ICD/PPM and leads must be from same .  MRI informed ordering MD must input a Cardiac Device Check in clinic and hospital order, patient must have an xray within 6 months or less prior to MRI reprogramming.  Chest xray to be reviewed by Radiologist.    MRI scheduled on today @ ~ 10:00 am.  [employer] Rep Yoselin KEVIN notified.  Confirmation received from Rep.

## 2023-12-20 NOTE — PLAN OF CARE
[facility]  Med Surg  Discharge Reassessment    Primary Care Provider: Dominguez Hyatt MD    Expected Discharge Date: 12/23/2023      Patient remains inpatient due to continued need for medical management. Patient receiving IV antibiotics due to recent increase in temperature. Will continue to follow for post acute needs. Discharge Plan A and Plan B have been determined by review of patient's clinical status, future medical and therapeutic needs, and coverage/benefits for post-acute care in coordination with multidisciplinary team members.  Reassessment (most recent)       Discharge Reassessment - 12/20/23 1539          Discharge Reassessment    Assessment Type Discharge Planning Reassessment (P)      Did the patient's condition or plan change since previous assessment? Yes (P)      Discharge Plan discussed with: Patient (P)      Communicated MAGNO with patient/caregiver Date not available/Unable to determine (P)      Discharge Plan A Home with family (P)      Discharge Plan B Home (P)      DME Needed Upon Discharge  none (P)      Transition of Care Barriers None (P)      Why the patient remains in the hospital Requires continued medical care (P)         Post-Acute Status    Discharge Delays None known at this time (P)                      RALPH Ryan  Case Management  [phone]

## 2023-12-20 NOTE — CARE UPDATE
Pt completed MI and placed back in normal functioning mode by Rep. Tele placed back on and war room confirmed signal. Pt left with escort for room.

## 2023-12-20 NOTE — PLAN OF CARE
Pt not seen, plan of care below:      68 year old male with history of CAD s/p CABG x3, HLD, heart block s/p pacemaker admitted with MSSA bacteremia and MSSA UTI.      CHARMAINE aborted, recommending CT for further evaluation of esophagus. CT chest without evidence of obstruction. Noting lesions on spine, recommending MRI which is pending.     Blood cultures remain negative. Continues on cefazolin IV. Afebrile. HDS.      Recommendations:  Continue IV Cefazolin   CHARMAINE rescheduled for Friday.   Awaiting MRI spine.   Plan reviewed with ID staff. ID will follow.

## 2023-12-20 NOTE — PROGRESS NOTES
[facility] - Emergency Dept  [facility] Medicine  Progress Note    Patient Name: Michael Espinoza  MRN: [id number]  Patient Class: IP- Inpatient   Admission Date: 12/13/2023  Length of Stay: 5 days  Attending Physician: Mukesh Golden MD  Primary Care Provider: Dominguez Hyatt MD        Subjective:     Principal Problem:Sepsis without acute organ dysfunction        HPI:  Michael Espinoza is a 68 y.o. male with PMHx of CAD s/p CABG x3, HLD, complete heart block s/p dual chamber pacemaker. He presents to [facility] ED 12/13 with diarrhea for the last 6 days. On 12/8 he developed left lower quadrant cramping followed by multiple episodes of loose stool. The next two days he had about 10-13 episodes diarrhea daily. Watery-brown. No blood. By Monday he continued to have diarrhea but then also developed mild nausea, but was able to keep down bland foods (jello, applesauce, banana, eggs) without emesis. He also began having urinary frequency, urgency, and dysuria and though his right kidney was mildly painful. Denies hematuria. He began to keep extremely dehydrated, started having fevers up to 102F Monday-Tuesday. He endorses polydipsia, myalgias, and hallucinations when he closes his eyes. He also reports shaking chills that would last for 20 minutes at a time that he could not control. Wednesday diarrhea finally stopped and he also stopped urinating but reports he did spontaneously urinate in ED once he got IV fluids.     He does have history of diarrhea and saw GI Dr. Feliz in September for this and thought to be maybe post infectious IBS but patient states he had completely symptom free period for months since then. He saw urgent care for symptoms 3 days ago and reports taking Zofran 4 times from them as well as a medication from GI (dicyclomine?) 10 times over 2 days. He reports normal colonoscopy one year ago (lipoma biopsied with surface erosions). No rectal pain. No more abdominal/flank pain. No strange foods or travel. He  denies falls, chest pain, palpitations, shortness of breath.     Per chart review: 12/11 stool culture without significant growth to date, E.coli shiga toxin 1 and 2 negative. Urine culture + staph aureus. A previous urine culture was also positive for staph aures in September and pt reports completing bactrim course.    In the ED, febrile to 100.4F, , RR 28. BP sable. On RA. Labs with WBC 8.58k, platelets 118k.  Na 129, K 2.9, Cl 94, BUN/Cr without YESICA, albumin 2.8, T bili 2.0 (chronically elevated), AST/ALT elevated 90/60. UA 3+ leukocytes, nitrite positive, 3+ occult blood, 1+ ketones, 1+ protein, 35 RBC, >100 WBC< many bacteria. LA 1.4. lipase and mag wnl. Covid and flu negative. CT abdomen pending. Given ceftriaxone, dicyclomine, famotidine, Zofran, potassium po and IV,  1L LR bolus and 1L NS bolus. Admitted to .     Overview/Hospital Course:  12/14 K replaced. BCX 2 and stool studies pending. CT abdomen -  Intraluminal fluid throughout the small and large bowel with adjacent mesenteric fat stranding and free fluid, as may be seen in the setting of a nonspecific infectious or noninfectious inflammatory enterocolitis. Nonobstructive right nephrolithiasis. loperamide PRN. continue ciprofloxacin and vancomycin . denies abdominal pain. advanced to soft diet   12/15 K and P replaced. BCX 2 from 12/14 with staph aureus. repeated BCX 2.  UC with STAPHYLOCOCCUS AUREUS > 100,000 cfu/ml  Susceptibility pending. echo ordered. tolerating soft diet. CHARMAINE ordered as with pacemaker . 1.3 cm right lower pole nonobstructive nephrolithiasis/ staph aureus on UC ( positive for MSSA urine culture from 9/2023). started on cefazolin. vancomycin discontinued   12/16 BC from 12/15 staph aureus. repeat BCX 2 TTE today-     Left Ventricle: The left ventricle is normal in size. Normal wall thickness. Regional wall motion abnormalities present. See diagram for wall motion findings. There is mildly reduced systolic function with a  visually estimated ejection fraction of 40 - 45%. Ejection fraction by visual approximation is 43%. There is normal diastolic function.    Right Ventricle: Normal right ventricular cavity size. Wall thickness is normal. Right ventricle wall motion  is normal. Systolic function is normal.    Left Atrium: Left atrium is severely dilated. 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    Aortic Valve: The aortic valve is a trileaflet valve. There is mild aortic valve sclerosis. There is mild stenosis. Aortic valve area by VTI is 1.74 cm². Aortic valve peak velocity is 1.65 m/s. Mean gradient is 7 mmHg. The dimensionless index is 0.46. There is trace aortic regurgitation.    Mitral Valve: There is mild bileaflet sclerosis.    Tricuspid Valve: There is mild regurgitation.    Pulmonary Artery: The estimated pulmonary artery systolic pressure is 35 mmHg.    IVC/SVC: Normal venous pressure at 3 mmHg.    CHARMAINE scheduled. K and P replaced  12/18 BC x2 12/18 with staph aureus.  CHARMAINE postponed to tomorow. MRI spine WWO contrast   12/19 CHARMAINE today. fecal elastase decreased 94. r/o exocrine pancreatic insufficiency . ativan PRN for sedation with MRI spine. difficulty with CHARMAINE and  advancing the US probe  today. CT chest and Neck wo contrast to r/o obstruction. if CT normal, plan for CHARMAINE on 12/22 with intubation  12/20 BCx 2 12/17 NGTD. CT neck -No evidence of significant esophageal abnormality         Review of Systems:   Pain scale:   Constitutional:  fever,  chills, headache, vision loss, hearing loss, weight loss, Generalized weakness, falls, loss of smell, loss of taste, poor appetite,  sore throat  Respiratory: cough, shortness of breath.   Cardiovascular: chest pain, dizziness, palpitations, orthopnea, swelling of feet, syncope  Gastrointestinal: nausea, vomiting, abdominal pain, diarrhea -improved , black stool,  blood in stool, change in bowel habits, constipation  Genitourinary: hematuria, dysuria, urgency, frequency,   flank pain- resolved   Integument/Breast: rash,  pruritis  Hematologic/Lymphatic: easy bruising, lymphadenopathy  Musculoskeletal: arthralgias , myalgias, back pain, neck pain, knee pain  Neurological: confusion, seizures, tremors, slurred speech  Behavioral/Psych:  depression, anxiety, auditory or visual hallucinations     OBJECTIVE:     Physical Exam:  Body mass index is 26.16 kg/m².    Constitutional: Appears well-developed and well-nourished.   Head: Normocephalic and atraumatic.   Neck: Normal range of motion. Neck supple.   Cardiovascular: Normal heart rate.  Regular heart rhythm.  Pulmonary/Chest: Effort normal.   Abdominal: No distension.  No tenderness  Musculoskeletal: Normal range of motion. No edema.   Neurological: Alert and oriented to person, place, and time.   Skin: Skin is warm and dry.   Psychiatric: Normal mood and affect. Behavior is normal.                  Vital Signs  Temp: 97.5 °F (36.4 °C) (12/20/23 0757)  Pulse: 110 (12/20/23 1200)  Resp: 16 (12/20/23 0757)  BP: 124/87 (12/20/23 0757)  SpO2: (Abnormal) 93 % (12/20/23 0757)     24 Hour VS Range    Temp:  [97.5 °F (36.4 °C)-98.3 °F (36.8 °C)]   Pulse:  []   Resp:  [16-20]   BP: (117-132)/(71-87)   SpO2:  [93 %-100 %]     Intake/Output Summary (Last 24 hours) at 12/20/2023 1334  Last data filed at 12/20/2023 1255  Gross per 24 hour   Intake 822 ml   Output no documentation   Net 822 ml           I/O This Shift:  I/O this shift:  In: 422 [P.O.:422]  Out: -     Wt Readings from Last 3 Encounters:   12/19/23 75.8 kg (167 lb)   12/11/23 69.4 kg (153 lb)   11/21/23 69.8 kg (153 lb 14.1 oz)       I have personally reviewed the vitals and recorded Intake/Output     Laboratory/Diagnostic Data:    CBC/Anemia Labs: Coags:    Recent Labs   Lab 12/14/23  0608 12/15/23  0649 12/18/23  0538 12/19/23  0702 12/20/23  0328   WBC  --    < > 9.18 8.86 9.02   HGB  --    < > 11.9* 13.4* 12.7*   HCT  --    < > 35.5* 39.8* 38.6*   PLT  --    < > 263 363 379  "  MCV  --    < > 89 88 89   RDW  --    < > 14.4 14.6* 14.2   OCCULTBLOOD Negative  --   --   --   --     < > = values in this interval not displayed.    No results for input(s): "PT", "INR", "APTT" in the last 168 hours.     Chemistries: ABG:   Recent Labs   Lab 12/16/23  1901 12/17/23  0908 12/17/23  1956 12/18/23  0538 12/19/23  0702 12/20/23  0328      < > 137 136 138 137   K 3.4*   < > 4.1 4.1 4.0 4.2      < > 106 104 105 105   CO2 22*   < > 21* 25 26 22*   BUN 12   < > 14 11 9 11   CREATININE 0.8   < > 0.9 0.8 0.8 0.7   CALCIUM 8.4*   < > 8.3* 8.2* 8.9 8.7   PROT  --    < >  --  6.1 7.3 7.0   BILITOT  --    < >  --  1.2* 1.5* 1.2*   ALKPHOS  --    < >  --  96 104 105   ALT  --    < >  --  82* 89* 74*   AST  --    < >  --  125* 130* 120*   MG  --    < >  --  2.0 2.1 2.0   PHOS 1.8*  --  2.3* 2.1*  --   --     < > = values in this interval not displayed.    No results for input(s): "PH", "PCO2", "PO2", "HCO3", "POCSATURATED", "BE" in the last 168 hours.     POCT Glucose: HbA1c:    No results for input(s): "POCTGLUCOSE" in the last 168 hours. Hemoglobin A1C   Date Value Ref Range Status   08/11/2023 5.6 4.0 - 5.6 % Final     Comment:     ADA Screening Guidelines:  5.7-6.4%  Consistent with prediabetes  >or=6.5%  Consistent with diabetes    High levels of fetal hemoglobin interfere with the HbA1C  assay. Heterozygous hemoglobin variants (HbS, HgC, etc)do  not significantly interfere with this assay.   However, presence of multiple variants may affect accuracy.     05/10/2018 5.4 4.0 - 5.6 % Final     Comment:     According to ADA guidelines, hemoglobin A1c <7.0% represents  optimal control in non-pregnant diabetic patients. Different  metrics may apply to specific patient populations.   Standards of Medical Care in Diabetes-2016.  For the purpose of screening for the presence of diabetes:  <5.7%     Consistent with the absence of diabetes  5.7-6.4%  Consistent with increasing risk for diabetes " "  (prediabetes)  >or=6.5%  Consistent with diabetes  Currently, no consensus exists for use of hemoglobin A1c  for diagnosis of diabetes for children.  This Hemoglobin A1c assay has significant interference with fetal   hemoglobin   (HbF). The results are invalid for patients with abnormal amounts of   HbF,   including those with known Hereditary Persistence   of Fetal Hemoglobin. Heterozygous hemoglobin variants (HbAS, HbAC,   HbAD, HbAE, HbA2) do not significantly interfere with this assay;   however, presence of multiple variants in a sample may impact the %   interference.          Cardiac Enzymes: Ejection Fractions:    No results for input(s): "CPK", "CPKMB", "MB", "TROPONINI" in the last 72 hours. EF   Date Value Ref Range Status   12/16/2023 43 % Final   07/05/2022 65 % Final          No results for input(s): "COLORU", "APPEARANCEUA", "PHUR", "SPECGRAV", "PROTEINUA", "GLUCUA", "KETONESU", "BILIRUBINUA", "OCCULTUA", "NITRITE", "UROBILINOGEN", "LEUKOCYTESUR", "RBCUA", "WBCUA", "BACTERIA", "SQUAMEPITHEL", "HYALINECASTS" in the last 48 hours.    Invalid input(s): "WRIGHTSUR"      Lactate (Lactic Acid) (mmol/L)   Date Value   12/13/2023 1.4     BNP (pg/mL)   Date Value   07/05/2022 596 (H)     CRP (mg/L)   Date Value   12/14/2023 206.0 (H)     Sed Rate (mm/Hr)   Date Value   12/14/2023 76 (H)     No results found for: "DDIMER"  Ferritin (ng/mL)   Date Value   09/14/2023 81   03/21/2018 35     No results found for: "LDH"  Troponin I (ng/mL)   Date Value   07/05/2022 <0.006   07/05/2022 0.009     Results for orders placed or performed in visit on 07/01/21   Vitamin D   Result Value Ref Range    Vit D, 25-Hydroxy 31 30 - 96 ng/mL   Results for orders placed or performed in visit on 05/05/21   Vitamin D   Result Value Ref Range    Vit D, 25-Hydroxy 38 30 - 96 ng/mL   Results for orders placed or performed in visit on 03/21/18   Vitamin D   Result Value Ref Range    Vit D, 25-Hydroxy 29 (L) 30 - 96 ng/mL "     SARS-CoV-2 RNA, Amplification, Qual (no units)   Date Value   12/13/2023 Negative     POC Rapid COVID (no units)   Date Value   07/05/2022 Negative   12/22/2020 Negative       Microbiology labs for the last week  Microbiology Results (last 7 days)       Procedure Component Value Units Date/Time    Blood culture [[id number]] Collected: 12/19/23 0702    Order Status: Completed Specimen: Blood Updated: 12/20/23 1012     Blood Culture, Routine No Growth to date      No Growth to date    Narrative:      Rt wrist    Blood culture [[id number]] Collected: 12/19/23 0702    Order Status: Completed Specimen: Blood Updated: 12/20/23 1012     Blood Culture, Routine No Growth to date      No Growth to date    Narrative:      Rt AC    Blood culture [[id number]] Collected: 12/17/23 0458    Order Status: Completed Specimen: Blood Updated: 12/20/23 0612     Blood Culture, Routine No Growth to date      No Growth to date      No Growth to date      No Growth to date    Blood culture [[id number]] Collected: 12/17/23 0458    Order Status: Completed Specimen: Blood Updated: 12/20/23 0612     Blood Culture, Routine No Growth to date      No Growth to date      No Growth to date      No Growth to date    Blood culture [[id number]]  (Abnormal) Collected: 12/16/23 0341    Order Status: Completed Specimen: Blood Updated: 12/19/23 1113     Blood Culture, Routine Gram stain aer bottle: Gram positive cocci in clusters resembling Staph      Positive results previously called 12/17/2023      STAPHYLOCOCCUS AUREUS  For susceptibility see order #[id number]      Blood culture [[id number]]  (Abnormal) Collected: 12/16/23 0341    Order Status: Completed Specimen: Blood Updated: 12/19/23 1112     Blood Culture, Routine Gram stain aer bottle: Gram positive cocci in clusters resembling Staph      Results called to and read back by: Lei Marmolejo 12/17/2023  02:14      STAPHYLOCOCCUS AUREUS  For susceptibility see order #[id number]      Blood  culture [[id number]]  (Abnormal) Collected: 12/14/23 2310    Order Status: Completed Specimen: Blood Updated: 12/17/23 0902     Blood Culture, Routine Gram stain aer bottle: Gram positive cocci in clusters resembling Staph      Positive results previously called 12/15/2023  17:26      STAPHYLOCOCCUS AUREUS  For susceptibility see order #[id number]      Blood culture [[id number]]  (Abnormal) Collected: 12/14/23 2310    Order Status: Completed Specimen: Blood Updated: 12/17/23 0902     Blood Culture, Routine Gram stain aer bottle: Gram positive cocci in clusters resembling Staph      Results called to and read back by: Krupa Salas RN 12/15/2023  15:52      STAPHYLOCOCCUS AUREUS  For susceptibility see order #[id number]      Urine culture [[id number]]  (Abnormal)  (Susceptibility) Collected: 12/13/23 2346    Order Status: Completed Specimen: Urine Updated: 12/16/23 2048     Urine Culture, Routine STAPHYLOCOCCUS AUREUS  > 100,000 cfu/ml      Narrative:      Specimen Source->Urine    Blood culture [[id number]]  (Abnormal) Collected: 12/14/23 0552    Order Status: Completed Specimen: Blood from Peripheral, Forearm, Right Updated: 12/16/23 1026     Blood Culture, Routine Gram stain aer bottle: Gram positive cocci in clusters resembling Staph      Positive results previously called 12/14/2023      STAPHYLOCOCCUS AUREUS  For susceptibility see order #[id number]      Blood culture [[id number]]  (Abnormal)  (Susceptibility) Collected: 12/14/23 0552    Order Status: Completed Specimen: Blood from Peripheral, Forearm, Right Updated: 12/16/23 1025     Blood Culture, Routine Gram stain aer bottle: Gram positive cocci in clusters resembling Staph      Results called to and read back by:Benita Mercedes RN 12/14/2023  20:43      STAPHYLOCOCCUS AUREUS    MRSA/SA Rapid ID by PCR from Blood culture [[id number]]  (Abnormal) Collected: 12/14/23 0552    Order Status: Completed Updated: 12/14/23 2150     Staph aureus ID by PCR Positive      Methicillin Resistant ID by PCR Negative    Clostridium difficile EIA [[id number]] Collected: 12/14/23 0609    Order Status: Completed Specimen: Stool Updated: 12/14/23 1255     C. diff Antigen Negative     C difficile Toxins A+B, EIA Negative     Comment: Testing not recommended for children <24 months old.       Stool culture [[id number]]     Order Status: Canceled Specimen: Stool     Influenza A & B by Molecular [[id number]] Collected: 12/13/23 2214    Order Status: Completed Specimen: Nasopharyngeal Swab Updated: 12/13/23 2254     Influenza A, Molecular Negative     Influenza B, Molecular Negative     Flu A & B Source Nasal swab            Reviewed and noted in plan where applicable- Please see chart for full lab data.    Lines/Drains:       Peripheral IV - Single Lumen 12/13/23 2210 20 G Left Antecubital (Active)   Site Assessment Clean;Dry;Intact 12/13/23 2211   Extremity Assessment Distal to IV No abnormal discoloration;No redness;No swelling 12/13/23 2211   Dressing Status Clean;Dry;Intact 12/13/23 2211   Dressing Intervention First dressing 12/13/23 2211   Number of days: 0       Imaging  ECG Results              EKG 12-lead (Final result)  Result time 12/14/23 14:17:37      Final result by Interface, Lab In [facility] (12/14/23 14:17:37)               Narrative:    Test Reason : R11.0,    Vent. Rate : 103 BPM     Atrial Rate : 103 BPM     P-R Int : 170 ms          QRS Dur : 176 ms      QT Int : 410 ms       P-R-T Axes : 037 158 -07 degrees     QTc Int : 537 ms    Atrial-sensed ventricular-paced rhythm  Biventricular pacemaker detected  Abnormal ECG  When compared with ECG of 21-NOV-2023 08:01,  Vent. rate has increased BY  36 BPM  Confirmed by Lani BROOKS, Ivone ([id number]) on 12/14/2023 2:17:29 PM    Referred By: [email or web address]   SELF           Confirmed By:Ivone Garibay MD                                  Results for orders placed during the hospital encounter of 07/05/22    Echo    Interpretation Summary  · Presence  of bradycardia with HR 30s with AV dissociation  · The estimated ejection fraction is 65%.  · The left ventricle is normal in size with normal systolic function.  · Normal left ventricular diastolic function.  · Normal right ventricular size with normal right ventricular systolic function.  · Mild mitral regurgitation.  · Presence of interatrial septal aneurysm.  · The estimated PA systolic pressure is 28 mmHg.  · Normal central venous pressure (3 mmHg).      CT Soft Tissue Neck WO Contrast  Narrative: EXAMINATION:  CT SOFT TISSUE NECK WITHOUT CONTRAST    CLINICAL HISTORY:  difficulty with CHARMAINE/r/o esophageal obstruction;    TECHNIQUE:  Axial CT images obtained throughout the region of the neck without intravenous contrast. Axial, sagittal and coronal reconstructions were performed.    COMPARISON:  CT abdomen pelvis 12/14/2023, chest radiograph 07/05/2022, 12/20/2023    FINDINGS:  No abnormal soft tissue mass is identified within the neck noting lack of intravenous contrast limits evaluation.  There is no evidence of pathologic lymph node enlargement.    The soft tissues of the nasopharynx, oropharynx, hypopharynx and larynx are within normal limits. The parotid, submandibular, and thyroid glands demonstrate nothing unusual.    Left chest wall pacemaker with wires partially imaged.  Visualized portion of the aortic arch is normal in caliber.  Mild calcific atherosclerosis of the carotid bifurcations and aortic arch.  Limited intracranial evaluation is within normal limits.  The mastoid air cells are essentially clear.  Small mucosal retention cyst in the right maxillary sinus.    Visualized portion of the esophagus demonstrates no focal abnormal wall thickening.  Lung apices demonstrate no focal consolidation. Prior sternotomy.  There are minor osseous degenerative changes, but no lytic or blastic lesions are evident.  There is exaggerated cervical lordosis and thoracic kyphosis.  Impression: No evidence of  significant esophageal abnormality or other findings on this noncontrast CT neck to explain reported difficulty with transesophageal echocardiogram.    Exaggerated cervical lordosis and thoracic kyphosis is noted.    Electronically signed by resident: Kedar Martinez  Date:    12/20/2023  Time:    10:58    Electronically signed by: Arthur Johnson MD  Date:    12/20/2023  Time:    12:10  X-Ray Chest 1 View  Narrative: EXAMINATION:  XR CHEST 1 VIEW    CLINICAL HISTORY:  MRI pending;    TECHNIQUE:  Single frontal view of the chest was performed.    COMPARISON:  No 07/05/2022 ne    FINDINGS:  Postoperative changes in the thorax and pacemaker identified as before.  The heart is not enlarged.  The lungs are clear.  No pleural effusion  Impression: See above    Electronically signed by: Kedar Porras MD  Date:    12/20/2023  Time:    11:26      Labs, Imaging, EKG and Diagnostic results from 12/20/2023 were reviewed.    Medications:  Medication list was reviewed and changes noted under Assessment/Plan.  No current facility-administered medications on file prior to encounter.     Current Outpatient Medications on File Prior to Encounter   Medication Sig Dispense Refill    aspirin (ECOTRIN) 81 MG EC tablet Take 81 mg by mouth 2 (two) times daily. (Breakfast & Afternoon)      atorvastatin (LIPITOR) 80 MG tablet TAKE 1 TABLET EVERY DAY (Patient taking differently: Take 80 mg by mouth every evening.) 90 tablet 10    calcium carbonate (TUMS ORAL) Take 2 tablets by mouth daily as needed (Heartburn).      coenzyme Q10 (CO Q-10) 300 mg Cap Take 1 capsule by mouth once daily.      dicyclomine (BENTYL) 10 MG capsule Take 10 mg by mouth daily as needed (Abdominal pain).      ERGOCALCIFEROL, VITAMIN D2, (VITAMIN D ORAL) Take 1 capsule by mouth Mon, Wed, Fri, Sat & Sun      ezetimibe (ZETIA) 10 mg tablet TAKE 1 TABLET EVERY DAY (Patient taking differently: Take 10 mg by mouth once daily.) 90 tablet 10    multivit-min/FA/lycopen/lutein  (CENTRUM SILVER MEN ORAL) Take 1 tablet by mouth every Mon, Wed, Fri.      ondansetron (ZOFRAN-ODT) 4 MG TbDL Take 1 tablet (4 mg total) by mouth every 8 (eight) hours as needed (nausea). 12 tablet 0     Scheduled Medications:  aspirin, 81 mg, Oral, BID  atorvastatin, 80 mg, Oral, Daily  ceFAZolin (ANCEF) IVPB, 2 g, Intravenous, Q8H  dicyclomine, 10 mg, Oral, QID  enoxparin, 40 mg, Subcutaneous, Daily  ezetimibe, 10 mg, Oral, Daily      PRN: acetaminophen, acetaminophen, aluminum-magnesium hydroxide-simethicone, calcium carbonate, dextrose 10%, dextrose 10%, glucagon (human recombinant), glucose, glucose, loperamide, melatonin, naloxone, ondansetron, simethicone, sodium chloride 0.9%, sodium chloride 0.9%  Infusions:       Estimated Creatinine Clearance: 94.4 mL/min (based on SCr of 0.7 mg/dL).           Assessment/Plan:      * Sepsis without acute organ dysfunction  This patient does have evidence of infective focus  My overall impression is sepsis.  Source: Urinary Tract and Abdominal  Antibiotics given-   Antibiotics (72h ago, onward)      Start     Stop Route Frequency Ordered    12/14/23 1900  vancomycin 750 mg in dextrose 5 % (D5W) 250 mL IVPB (Vial-Mate)         -- IV Every 12 hours (non-standard times) 12/14/23 0547    12/14/23 0900  ciprofloxacin HCl tablet 500 mg         12/19/23 0859 Oral Every 12 hours 12/14/23 0613    12/14/23 0623  vancomycin - pharmacy to dose  (vancomycin IVPB (PEDS and ADULTS))        See Hyperspace for full Linked Orders Report.    -- IV pharmacy to manage frequency 12/14/23 0523          Latest lactate reviewed-  Recent Labs   Lab 12/13/23  2212   LACTATE 1.4     Organ dysfunction indicated by  None- mild delirium (possible form electrolyte derangement and fevers) Mild transaminitis, mild thrombocytopenia    Fluid challenge Not needed - patient is not hypotensive      Post- resuscitation assessment No - Post resuscitation assessment not needed     Will Not start Pressors- Levophed  for MAP of 65  Source control achieved by: Vancomycin ordered for staph aureus UTI on previous cultures, Ciprofloxacin ordered for possible infectious (vs other) diarrhea.  Blood, urine, stool cultures and studies pending.     Right kidney stone  12/15 s/p urology eval - stone is non obstructing. -No indication for acute urologic intervention.  outpatient urologic follow up for stone treatment            Bacteremia  12/15 BCX 2 from 12/14 with staph aureus. repeated BCX 2.    CHARMAINE ordered as with pacemaker . 1.3 cm right lower pole nonobstructive nephrolithiasis/ staph aureus on UC ( positive for MSSA urine culture from 9/2023). started on cefazolin. vancomycin discontinued   12/18 BC from 12/16 staph aureus.   12/20 BCx 2 12/17 NGTD.  difficulty with CHARMAINE and  advancing the US probe  today. CT chest and Neck wo contrast to r/o obstruction. if CT normal, plan for CHARMAINE on 12/22 with intubation      Thrombocytopenia    resolved     Transaminitis  -Mildly elevated, possible 2/2 dehydration/infection  -NO RUQ pain. T bili chronically elevated  -Check acute hepatitis panel   -Daily CMP    12/14 Patients liver enzymes  elevated.   Recent Labs     12/17/23  0908 12/18/23  0538 12/19/23  0702 12/20/23  0328   BILITOT 1.8* 1.2* 1.5* 1.2*   * 125* 130* 120*   * 82* 89* 74*   ALKPHOS 107 96 104 105    . Liver enzymes  trending up. sono liver with doppler -Normal Doppler evaluation of the liver.Cholelithiasis.  No sonographic evidence for acute cholecystitis.       Diarrhea  68M with acute onset of multiple episodes diarrhea with only mild LLQ abdominal cramping early in course. Mild nausea. No abdominal pain, emesis, and is tolerating PO. Having fevers, chills, polydipsia. Also urinary infectious symptoms. Previously seen by GI Dr. 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 Work up unremarkable.  Previous stool cultures without growth.Giardia negative in the past. Recent stool  culture without growth and E coli antigen negative.   -Multiple electrolyte derangements on admission  -Tachycardic, febrile, and tachypneic   -S/p 2L IVF bolus in ED, continue IVFs today  -Check complete stool studies  -C-scope 8/2022 Lipoma in the recto-sigmoid colon. Biopsied and found to have surface erosions  -CT pending  -GI consulted, appreciate recommendations  -PRN bentyl for cramping, prn Zofran/compazine for nausea  -ON Vanc for sepsis/UTI and will add ciprofloxacin 500 mg Q12h x5 days for diarrhea  for now pending culture results  12/14  CT abdomen -  Intraluminal fluid throughout the small and large bowel with adjacent mesenteric fat stranding and free fluid, as may be seen in the setting of a nonspecific infectious or noninfectious inflammatory enterocolitis. Nonobstructive right nephrolithiasis. loperamide PRN.   12/15 C diff  and stool culture negative.     Complicated UTI (urinary tract infection)  Presenting with dysuria/frequency/urgency/maybe mild flank pain/fever/chills (also with diarrheal illness).  -3/4 SIRs on admit for fever, tachycardia tachypnea  -UA infectious, nitrite positive, 3+ leukocytes >100 WBC and many bacteria  -Previous urine cultures with staph aureus  -S/p ceftriaxone in ED  -Will cover with vancomycin   -Follow up urine cultures  -Follow up CT  -Monitor I/Os  12/14. BCX 2 and stool studies pending.continue ciprofloxacin and vancomycin   12/15  UC with STAPHYLOCOCCUS AUREUS > 100,000 cfu/ml  Susceptibility pending. echo ordered. tolerating soft diet. 1.3 cm right lower pole nonobstructive nephrolithiasis/ staph aureus on UC ( positive for MSSA urine culture from 9/2023). started on cefazolin    Hypokalemia   resolved     Hyponatremia  resolved     Complete heart block  -s/p dual chamber pacemaker placement  7/5/2022      Coronary artery disease of native artery of native heart with stable angina pectoris  Patient with known CAD s/p CABG, which is controlled Will continue ASA  and Statin and monitor for S/Sx of angina/ACS. Continue to monitor on telemetry.     Gilbert's syndrome  -Chronic condition- reports diagnosed by Dr. Rowe years ago  -T bili elevated on admission, similar to previous  -Monitor CMP      HLD (hyperlipidemia)  -Continue home statin      VTE Risk Mitigation (From admission, onward)           Ordered     enoxaparin injection 40 mg  Daily         12/16/23 0900     IP VTE HIGH RISK PATIENT  Once         12/16/23 0900     Place sequential compression device  Until discontinued         12/14/23 0526                    Discharge Planning   MAGNO: 12/23/2023     Code Status: Full Code   Is the patient medically ready for discharge?: No    Reason for patient still in hospital (select all that apply): Treatment  Discharge Plan A: Home                  Mukesh Golden MD  Department of Hospital Medicine   [facility] - Emergency Dept

## 2023-12-20 NOTE — PLAN OF CARE
Problem: Adult Inpatient Plan of Care  Goal: Plan of Care Review  Outcome: Ongoing, Progressing  Flowsheets (Taken 12/20/2023 0620)  Plan of Care Reviewed With: patient  Goal: Patient-Specific Goal (Individualized)  Outcome: Ongoing, Progressing  Flowsheets (Taken 12/20/2023 0620)  Anxieties, Fears or Concerns: none  Individualized Care Needs: IV antibiotics, procedures still pending  Goal: Absence of Hospital-Acquired Illness or Injury  Outcome: Ongoing, Progressing  Intervention: Identify and Manage Fall Risk  Flowsheets (Taken 12/20/2023 0620)  Safety Promotion/Fall Prevention:   assistive device/personal item within reach   bed alarm refused   lighting adjusted   medications reviewed  Intervention: Prevent Skin Injury  Flowsheets (Taken 12/20/2023 0620)  Body Position: position changed independently  Skin Protection: adhesive use limited  Intervention: Prevent and Manage VTE (Venous Thromboembolism) Risk  Flowsheets (Taken 12/20/2023 0620)  Activity Management:   Ambulated -L4   Arm raise - L1   Rolling - L1   Standing - L3  VTE Prevention/Management: ambulation promoted  Range of Motion: active ROM (range of motion) encouraged  Intervention: Prevent Infection  Flowsheets (Taken 12/20/2023 0620)  Infection Prevention:   environmental surveillance performed   hand hygiene promoted   rest/sleep promoted  Goal: Optimal Comfort and Wellbeing  Outcome: Ongoing, Progressing  Intervention: Monitor Pain and Promote Comfort  Flowsheets (Taken 12/20/2023 0620)  Pain Management Interventions:   quiet environment facilitated   relaxation techniques promoted     Problem: Infection  Goal: Absence of Infection Signs and Symptoms  Outcome: Ongoing, Progressing  Intervention: Prevent or Manage Infection  Flowsheets (Taken 12/20/2023 0620)  Infection Management: aseptic technique maintained     Problem: Adjustment to Illness (Sepsis/Septic Shock)  Goal: Optimal Coping  Outcome: Ongoing, Progressing  Intervention: Optimize  Psychosocial Adjustment to Illness  Flowsheets (Taken 12/20/2023 0620)  Supportive Measures:   active listening utilized   self-care encouraged  Family/Support System Care:   involvement promoted   self-care encouraged     Problem: Bleeding (Sepsis/Septic Shock)  Goal: Absence of Bleeding  Outcome: Ongoing, Progressing  Intervention: Monitor and Manage Bleeding  Flowsheets (Taken 12/20/2023 0620)  Bleeding Precautions: blood pressure closely monitored     Problem: Infection Progression (Sepsis/Septic Shock)  Goal: Absence of Infection Signs and Symptoms  Outcome: Ongoing, Progressing  Intervention: Initiate Sepsis Management  Flowsheets (Taken 12/20/2023 0620)  Infection Prevention:   environmental surveillance performed   hand hygiene promoted   rest/sleep promoted  Infection Management: aseptic technique maintained  Intervention: Promote Recovery  Flowsheets (Taken 12/20/2023 0620)  Sleep/Rest Enhancement:   awakenings minimized   noise level reduced   relaxation techniques promoted   regular sleep/rest pattern promoted   room darkened  Activity Management:   Ambulated -L4   Arm raise - L1   Rolling - L1   Standing - L3     Problem: Nutrition Impaired (Sepsis/Septic Shock)  Goal: Optimal Nutrition Intake  Outcome: Ongoing, Progressing     Problem: Fall Injury Risk  Goal: Absence of Fall and Fall-Related Injury  Outcome: Ongoing, Progressing  Intervention: Identify and Manage Contributors  Flowsheets (Taken 12/20/2023 0620)  Self-Care Promotion: independence encouraged  Medication Review/Management: medications reviewed  Intervention: Promote Injury-Free Environment  Flowsheets (Taken 12/20/2023 0620)  Safety Promotion/Fall Prevention:   assistive device/personal item within reach   bed alarm refused   lighting adjusted   medications reviewed     Problem: Adult Inpatient Plan of Care  Goal: Readiness for Transition of Care  Outcome: Ongoing, Not Progressing

## 2023-12-21 ENCOUNTER — ANESTHESIA EVENT (OUTPATIENT)
Dept: MEDSURG UNIT | Facility: HOSPITAL | Age: 68
DRG: 872 | End: 2023-12-21
Payer: MEDICARE

## 2023-12-21 LAB
ALBUMIN SERPL BCP-MCNC: 2.6 G/DL (ref 3.5–5.2)
ALP SERPL-CCNC: 94 U/L (ref 55–135)
ALT SERPL W/O P-5'-P-CCNC: 59 U/L (ref 10–44)
ANION GAP SERPL CALC-SCNC: 8 MMOL/L (ref 8–16)
AST SERPL-CCNC: 96 U/L (ref 10–40)
BASOPHILS # BLD AUTO: 0.04 K/UL (ref 0–0.2)
BASOPHILS NFR BLD: 0.5 % (ref 0–1.9)
BILIRUB SERPL-MCNC: 1.2 MG/DL (ref 0.1–1)
BUN SERPL-MCNC: 12 MG/DL (ref 8–23)
CALCIUM SERPL-MCNC: 8.7 MG/DL (ref 8.7–10.5)
CHLORIDE SERPL-SCNC: 104 MMOL/L (ref 95–110)
CO2 SERPL-SCNC: 25 MMOL/L (ref 23–29)
CREAT SERPL-MCNC: 0.8 MG/DL (ref 0.5–1.4)
DIFFERENTIAL METHOD: ABNORMAL
EOSINOPHIL # BLD AUTO: 0.2 K/UL (ref 0–0.5)
EOSINOPHIL NFR BLD: 2.4 % (ref 0–8)
ERYTHROCYTE [DISTWIDTH] IN BLOOD BY AUTOMATED COUNT: 14.2 % (ref 11.5–14.5)
EST. GFR  (NO RACE VARIABLE): >60 ML/MIN/1.73 M^2
GLUCOSE SERPL-MCNC: 90 MG/DL (ref 70–110)
HCT VFR BLD AUTO: 37.1 % (ref 40–54)
HGB BLD-MCNC: 12.2 G/DL (ref 14–18)
IMM GRANULOCYTES # BLD AUTO: 0.17 K/UL (ref 0–0.04)
IMM GRANULOCYTES NFR BLD AUTO: 2.1 % (ref 0–0.5)
LYMPHOCYTES # BLD AUTO: 1.2 K/UL (ref 1–4.8)
LYMPHOCYTES NFR BLD: 14.5 % (ref 18–48)
MAGNESIUM SERPL-MCNC: 2 MG/DL (ref 1.6–2.6)
MCH RBC QN AUTO: 29.3 PG (ref 27–31)
MCHC RBC AUTO-ENTMCNC: 32.9 G/DL (ref 32–36)
MCV RBC AUTO: 89 FL (ref 82–98)
MONOCYTES # BLD AUTO: 0.7 K/UL (ref 0.3–1)
MONOCYTES NFR BLD: 8.8 % (ref 4–15)
NEUTROPHILS # BLD AUTO: 5.7 K/UL (ref 1.8–7.7)
NEUTROPHILS NFR BLD: 71.7 % (ref 38–73)
NRBC BLD-RTO: 0 /100 WBC
PLATELET # BLD AUTO: 378 K/UL (ref 150–450)
PMV BLD AUTO: 9.4 FL (ref 9.2–12.9)
POTASSIUM SERPL-SCNC: 4.4 MMOL/L (ref 3.5–5.1)
PROT SERPL-MCNC: 6.7 G/DL (ref 6–8.4)
RBC # BLD AUTO: 4.16 M/UL (ref 4.6–6.2)
SODIUM SERPL-SCNC: 137 MMOL/L (ref 136–145)
WBC # BLD AUTO: 7.99 K/UL (ref 3.9–12.7)

## 2023-12-21 PROCEDURE — 83735 ASSAY OF MAGNESIUM: CPT | Performed by: HOSPITALIST

## 2023-12-21 PROCEDURE — 63600175 PHARM REV CODE 636 W HCPCS: Performed by: HOSPITALIST

## 2023-12-21 PROCEDURE — 63600175 PHARM REV CODE 636 W HCPCS: Performed by: REGISTERED NURSE

## 2023-12-21 PROCEDURE — 25000003 PHARM REV CODE 250: Performed by: REGISTERED NURSE

## 2023-12-21 PROCEDURE — 36415 COLL VENOUS BLD VENIPUNCTURE: CPT | Performed by: HOSPITALIST

## 2023-12-21 PROCEDURE — 80053 COMPREHEN METABOLIC PANEL: CPT | Performed by: HOSPITALIST

## 2023-12-21 PROCEDURE — 25000003 PHARM REV CODE 250

## 2023-12-21 PROCEDURE — 85025 COMPLETE CBC W/AUTO DIFF WBC: CPT | Performed by: HOSPITALIST

## 2023-12-21 PROCEDURE — 21400001 HC TELEMETRY ROOM

## 2023-12-21 RX ADMIN — CEFAZOLIN 2 G: 2 INJECTION, POWDER, FOR SOLUTION INTRAMUSCULAR; INTRAVENOUS at 09:12

## 2023-12-21 RX ADMIN — CEFAZOLIN 2 G: 2 INJECTION, POWDER, FOR SOLUTION INTRAMUSCULAR; INTRAVENOUS at 05:12

## 2023-12-21 RX ADMIN — DICYCLOMINE HYDROCHLORIDE 10 MG: 10 CAPSULE ORAL at 08:12

## 2023-12-21 RX ADMIN — ASPIRIN 81 MG: 81 TABLET, COATED ORAL at 09:12

## 2023-12-21 RX ADMIN — EZETIMIBE 10 MG: 10 TABLET ORAL at 09:12

## 2023-12-21 RX ADMIN — DICYCLOMINE HYDROCHLORIDE 10 MG: 10 CAPSULE ORAL at 03:12

## 2023-12-21 RX ADMIN — ATORVASTATIN CALCIUM 80 MG: 40 TABLET, FILM COATED ORAL at 09:12

## 2023-12-21 RX ADMIN — ASPIRIN 81 MG: 81 TABLET, COATED ORAL at 08:12

## 2023-12-21 RX ADMIN — ENOXAPARIN SODIUM 40 MG: 40 INJECTION SUBCUTANEOUS at 05:12

## 2023-12-21 RX ADMIN — DICYCLOMINE HYDROCHLORIDE 10 MG: 10 CAPSULE ORAL at 09:12

## 2023-12-21 RX ADMIN — CEFAZOLIN 2 G: 2 INJECTION, POWDER, FOR SOLUTION INTRAMUSCULAR; INTRAVENOUS at 03:12

## 2023-12-21 RX ADMIN — Medication 6 MG: at 08:12

## 2023-12-21 NOTE — ASSESSMENT & PLAN NOTE
68 year old male with history of CAD s/p CABG x3, HLD, heart block s/p pacemaker admitted with MSSA bacteremia and MSSA UTI.     CT A/P reviewed and notable for nonobstructing renal stone, enlarged prostate. Patient is on IV Cefazolin. Blood cultures persistently positive (12/14 - 12/16), but repeats on 12/17 no growth thus far. Afebrile and HDS. Symptomatically improved. Of note, his urine culture in September was also positive for MSSA.    CHARMAINE aborted, recommending CT for further evaluation of esophagus. CT chest without evidence of obstruction. Planning to re attempt on Friday. MRI spine without infectious concerns, without fluid collection or note of osteo like changes.         Recommendations:  Continue IV Cefazolin   Awaiting CHARMAINE tomorrow   Anticipate prolonged course of IV antibiotics for complicated bacteremia  Plan reviewed with ID staff. ID will follow.

## 2023-12-21 NOTE — SUBJECTIVE & OBJECTIVE
Interval History:     Afebrile. HDS. Blood cultures positive from 12/14-12/16, MSSA. Repeats on 12/17 and 12/19 NGTD.     Review of Systems   Constitutional:  Negative for appetite change, chills, diaphoresis, fatigue and fever.   Eyes:  Negative for visual disturbance.   Respiratory:  Negative for cough and shortness of breath.    Cardiovascular:  Negative for chest pain and leg swelling.   Gastrointestinal:  Negative for abdominal pain, constipation, diarrhea (loose stools), nausea and vomiting.   Genitourinary:  Negative for difficulty urinating, dysuria, frequency and hematuria.   Musculoskeletal:  Negative for arthralgias, back pain and joint swelling.   Skin:  Negative for color change, rash and wound.   Neurological:  Negative for dizziness, weakness, numbness and headaches.   Psychiatric/Behavioral:  Negative for agitation and confusion. The patient is not nervous/anxious.    All other systems reviewed and are negative.    Objective:     Vital Signs (Most Recent):  Temp: 98.2 °F (36.8 °C) (12/21/23 0714)  Pulse: 88 (12/21/23 0714)  Resp: 18 (12/21/23 0714)  BP: 112/74 (12/21/23 0714)  SpO2: 96 % (12/21/23 0525) Vital Signs (24h Range):  Temp:  [96.3 °F (35.7 °C)-98.2 °F (36.8 °C)] 98.2 °F (36.8 °C)  Pulse:  [] 88  Resp:  [16-20] 18  SpO2:  [92 %-96 %] 96 %  BP: (109-129)/(68-80) 112/74     Weight: 75.8 kg (167 lb)  Body mass index is 26.16 kg/m².    Estimated Creatinine Clearance: 82.6 mL/min (based on SCr of 0.8 mg/dL).     Physical Exam  Vitals reviewed.   Constitutional:       General: He is not in acute distress.     Appearance: Normal appearance. He is well-developed. He is not ill-appearing or diaphoretic.   HENT:      Head: Normocephalic and atraumatic.      Right Ear: External ear normal.      Left Ear: External ear normal.      Nose: Nose normal.   Eyes:      General: No scleral icterus.        Right eye: No discharge.         Left eye: No discharge.      Extraocular Movements: Extraocular  movements intact.      Conjunctiva/sclera: Conjunctivae normal.   Cardiovascular:      Rate and Rhythm: Normal rate and regular rhythm.   Pulmonary:      Effort: Pulmonary effort is normal. No respiratory distress.      Breath sounds: No stridor.   Abdominal:      General: There is no distension.      Palpations: Abdomen is soft.   Musculoskeletal:         General: No swelling or tenderness.      Comments: No joint or midline spine tenderness   Skin:     General: Skin is dry.      Coloration: Skin is not jaundiced or pale.      Findings: No erythema or rash.   Neurological:      General: No focal deficit present.      Mental Status: He is alert and oriented to person, place, and time. Mental status is at baseline.      Motor: No weakness.      Gait: Gait normal.   Psychiatric:         Mood and Affect: Mood normal.         Behavior: Behavior normal.         Thought Content: Thought content normal.         Judgment: Judgment normal.          Significant Labs: CBC:   Recent Labs   Lab 12/20/23 0328 12/21/23 0257   WBC 9.02 7.99   HGB 12.7* 12.2*   HCT 38.6* 37.1*    378       CMP:   Recent Labs   Lab 12/20/23 0328 12/21/23 0257    137   K 4.2 4.4    104   CO2 22* 25   GLU 84 90   BUN 11 12   CREATININE 0.7 0.8   CALCIUM 8.7 8.7   PROT 7.0 6.7   ALBUMIN 2.6* 2.6*   BILITOT 1.2* 1.2*   ALKPHOS 105 94   * 96*   ALT 74* 59*   ANIONGAP 10 8       Microbiology Results (last 7 days)       Procedure Component Value Units Date/Time    Blood culture [[id number]] Collected: 12/17/23 0458    Order Status: Completed Specimen: Blood Updated: 12/21/23 0612     Blood Culture, Routine No Growth to date      No Growth to date      No Growth to date      No Growth to date      No Growth to date    Blood culture [[id number]] Collected: 12/17/23 0458    Order Status: Completed Specimen: Blood Updated: 12/21/23 0612     Blood Culture, Routine No Growth to date      No Growth to date      No Growth to date       No Growth to date      No Growth to date    Blood culture [[id number]] Collected: 12/19/23 0702    Order Status: Completed Specimen: Blood Updated: 12/20/23 1012     Blood Culture, Routine No Growth to date      No Growth to date    Narrative:      Rt wrist    Blood culture [[id number]] Collected: 12/19/23 0702    Order Status: Completed Specimen: Blood Updated: 12/20/23 1012     Blood Culture, Routine No Growth to date      No Growth to date    Narrative:      Rt AC    Blood culture [[id number]]  (Abnormal) Collected: 12/16/23 0341    Order Status: Completed Specimen: Blood Updated: 12/19/23 1113     Blood Culture, Routine Gram stain aer bottle: Gram positive cocci in clusters resembling Staph      Positive results previously called 12/17/2023      STAPHYLOCOCCUS AUREUS  For susceptibility see order #[id number]      Blood culture [[id number]]  (Abnormal) Collected: 12/16/23 0341    Order Status: Completed Specimen: Blood Updated: 12/19/23 1112     Blood Culture, Routine Gram stain aer bottle: Gram positive cocci in clusters resembling Staph      Results called to and read back by: Lei Marmolejo 12/17/2023  02:14      STAPHYLOCOCCUS AUREUS  For susceptibility see order #[id number]      Blood culture [[id number]]  (Abnormal) Collected: 12/14/23 2310    Order Status: Completed Specimen: Blood Updated: 12/17/23 0902     Blood Culture, Routine Gram stain aer bottle: Gram positive cocci in clusters resembling Staph      Positive results previously called 12/15/2023  17:26      STAPHYLOCOCCUS AUREUS  For susceptibility see order #[id number]      Blood culture [[id number]]  (Abnormal) Collected: 12/14/23 2310    Order Status: Completed Specimen: Blood Updated: 12/17/23 0902     Blood Culture, Routine Gram stain aer bottle: Gram positive cocci in clusters resembling Staph      Results called to and read back by: Krupa Salas RN 12/15/2023  15:52      STAPHYLOCOCCUS AUREUS  For susceptibility see order #[id number]       Urine culture [[id number]]  (Abnormal)  (Susceptibility) Collected: 12/13/23 2346    Order Status: Completed Specimen: Urine Updated: 12/16/23 2048     Urine Culture, Routine STAPHYLOCOCCUS AUREUS  > 100,000 cfu/ml      Narrative:      Specimen Source->Urine    Blood culture [[id number]]  (Abnormal) Collected: 12/14/23 0552    Order Status: Completed Specimen: Blood from Peripheral, Forearm, Right Updated: 12/16/23 1026     Blood Culture, Routine Gram stain aer bottle: Gram positive cocci in clusters resembling Staph      Positive results previously called 12/14/2023      STAPHYLOCOCCUS AUREUS  For susceptibility see order #[id number]      Blood culture [[id number]]  (Abnormal)  (Susceptibility) Collected: 12/14/23 0552    Order Status: Completed Specimen: Blood from Peripheral, Forearm, Right Updated: 12/16/23 1025     Blood Culture, Routine Gram stain aer bottle: Gram positive cocci in clusters resembling Staph      Results called to and read back by:Benita Mercedes RN 12/14/2023  20:43      STAPHYLOCOCCUS AUREUS    MRSA/SA Rapid ID by PCR from Blood culture [[id number]]  (Abnormal) Collected: 12/14/23 0552    Order Status: Completed Updated: 12/14/23 2150     Staph aureus ID by PCR Positive     Methicillin Resistant ID by PCR Negative    Clostridium difficile EIA [[id number]] Collected: 12/14/23 0609    Order Status: Completed Specimen: Stool Updated: 12/14/23 1255     C. diff Antigen Negative     C difficile Toxins A+B, EIA Negative     Comment: Testing not recommended for children <24 months old.             Recent Lab Results         12/21/23  0257        Albumin 2.6       ALP 94       ALT 59       Anion Gap 8       AST 96       Baso # 0.04       Basophil % 0.5       BILIRUBIN TOTAL 1.2  Comment: For infants and newborns, interpretation of results should be based  on gestational age, weight and in agreement with clinical  observations.    Premature Infant recommended reference ranges:  Up to 24  hours.............<8.0 mg/dL  Up to 48 hours............<12.0 mg/dL  3-5 days..................<15.0 mg/dL  6-29 days.................<15.0 mg/dL         BUN 12       Calcium 8.7       Chloride 104       CO2 25       Creatinine 0.8       Differential Method Automated       eGFR >60.0       Eos # 0.2       Eosinophil % 2.4       Glucose 90       Gran # (ANC) 5.7       Gran % 71.7       Hematocrit 37.1       Hemoglobin 12.2       Immature Grans (Abs) 0.17  Comment: Mild elevation in immature granulocytes is non specific and   can be seen in a variety of conditions including stress response,   acute inflammation, trauma and pregnancy. Correlation with other   laboratory and clinical findings is essential.         Immature Granulocytes 2.1       Lymph # 1.2       Lymph % 14.5       Magnesium  2.0       MCH 29.3       MCHC 32.9       MCV 89       Mono # 0.7       Mono % 8.8       MPV 9.4       nRBC 0       Platelet Count 378       Potassium 4.4       PROTEIN TOTAL 6.7       RBC 4.16       RDW 14.2       Sodium 137       WBC 7.99               Significant Imaging:     Imaging Results              CT Abdomen Pelvis With IV Contrast NO Oral Contrast (Final result)  Result time 12/14/23 08:54:04      Final result by Lalo Puri MD (12/14/23 08:54:04)                   Impression:      1. Intraluminal fluid throughout the small and large bowel with adjacent mesenteric fat stranding and free fluid, as may be seen in the setting of a nonspecific infectious or noninfectious inflammatory enterocolitis.  2. Nonobstructive right nephrolithiasis.  3. Small bilateral pleural effusions.  4. Additional details of chronic and incidental findings, as provided in the body of report.    Electronically signed by resident: Katherine Hidalgo  Date:    12/14/2023  Time:    06:04    Electronically signed by: Lalo Puri  Date:    12/14/2023  Time:    08:54               Narrative:    EXAMINATION:  CT ABDOMEN PELVIS WITH IV  CONTRAST    CLINICAL HISTORY:  Abdominal abscess/infection suspected;LLQ abdominal pain;    TECHNIQUE:  Low dose axial images, sagittal and coronal reformations were obtained from the lung bases to the pubic symphysis following the IV administration of 75 mL of Omnipaque 350.Oral contrast was not given.    COMPARISON:  Ultrasound abdomen 11/01/2023    FINDINGS:  Comment: Motion compromised examination.    Lower thorax:    Heart: Cardiomegaly.  Cardiac pacing leads.  Severe calcific atherosclerosis of multiple coronary arteries.    Lung Bases: Small bilateral pleural effusions, larger on the right.  Bibasal linear opacities, likely subsegmental atelectasis versus scarring.    Liver: Normal in size and attenuation, with no focal hepatic lesions.    Gallbladder: No calcified gallstones.  Gallbladder is not distended.  No gallbladder wall thickening or pericholecystic fluid collection.    Bile Ducts: No intra or extrahepatic biliary duct dilatation.    Pancreas: No mass or peripancreatic fat stranding.    Spleen: Normal in size.  No focal lesion.    Adrenals: Unremarkable.    Kidneys/ Ureters: Normal in size and location. Normal enhancement.  1.3 cm right lower pole nonobstructive nephrolithiasis.  No hydronephrosis or hydroureter.  Multiple cortical hypodensities which are too small to characterize likely renal cysts.  Nonspecific mild bilateral perinephric stranding.    Bladder: No evidence of wall thickening.    Reproductive organs: Prostate appears enlarged, measuring 4.6 cm with dystrophic calcification.    Gl/Mesentery/peritoneum and retroperitoneum: Small hiatal hernia.  Stomach is unremarkable.  Small and large bowel are normal in caliber with no evidence of obstruction.  Liquid stool throughout the small and large bowel with mild mesenteric soft tissue stranding and trace of free fluid, as may be seen in a nonspecific infectious versus noninfectious inflammatory enterocolitis.    Abdominal wall: Left fat  containing inguinal hernia.    Vasculature: Moderate calcific atherosclerosis.  No aneurysm.    Bones: Degenerative changes.  Similar appearing compression fractures of T12 and L1 when compared to radiograph of 04/22/2021 and MRI of 04/05/2021, allowing for differences in technique/modality.  No acute fracture.  No suspicious lytic or sclerotic lesion.

## 2023-12-21 NOTE — PROGRESS NOTES
[facility]  Infectious Disease  Progress Note    Patient Name: Micahel Espinoza  MRN: [id number]  Admission Date: 12/13/2023  Length of Stay: 6 days  Attending Physician: Teo Thomson MD  Primary Care Provider: Dominguez Hyatt MD    Isolation Status: No active isolations  Assessment/Plan:      Renal/  Complicated UTI (urinary tract infection)  See AP above.     ID  Bacteremia  68 year old male with history of CAD s/p CABG x3, HLD, heart block s/p pacemaker admitted with MSSA bacteremia and MSSA UTI.     CT A/P reviewed and notable for nonobstructing renal stone, enlarged prostate. Patient is on IV Cefazolin. Blood cultures persistently positive (12/14 - 12/16), but repeats on 12/17 no growth thus far. Afebrile and HDS. Symptomatically improved. Of note, his urine culture in September was also positive for MSSA.    CHARMAINE aborted, recommending CT for further evaluation of esophagus. CT chest without evidence of obstruction. Planning to re attempt on Friday. MRI spine without infectious concerns, without fluid collection or note of osteo like changes.         Recommendations:  Continue IV Cefazolin   Awaiting CHARMAINE tomorrow   Anticipate prolonged course of IV antibiotics for complicated bacteremia  Plan reviewed with ID staff. ID will follow.             Thank you for your consult. I will follow-up with patient. Please contact us if you have any additional questions.    Emelyn Renee NP  Infectious Disease  [facility]    Subjective:     Principal Problem:Sepsis without acute organ dysfunction    HPI: Mr. Espinoza is a 67 yo male with PMH of CAD s/p CABG x3, HLD, heart block s/p dual chamber pacemaker who presents to [facility] ED with cc of persistant diarrhea x6 days. Pt reported this started on 12/8, and diarrhea persisted. Pt started with dysuria, right flank pain, fevers, and presented to ED.     Since admission, pt was febrile, without leukocytosis. Blood culture + staph aureus, MRSA negative on PCR.  Repeats ordered. Urine culture + staph aureus, pending. Stool studies negative. Flu swab negative.     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 Reports he is very active, and routinely walks long distances. Pt denies recent wounds, injuries. Denies swollen joints, knees. Denies hardware other than pacemaker that was placed three years ago. Currently, denies fever, chills. Reports he is tolerating small amts of food again. Denies NVD. Denies flank pain, dysuria. States he is feeling better.     To note, micro hx significant for + MSSA urine culture from 9/2023. Pt states it was found on routine physical. Denies dysuria at that time, but states his urine was forthy. Pt states he followed with his PCP and took bactrim as prescribed.     Pt has received vanc, ceftriaxone, and cipro. ID was consulted d/t staph aures bacteremia.     Interval History:     Afebrile. HDS. Blood cultures positive from 12/14-12/16, MSSA. Repeats on 12/17 and 12/19 NGTD.     Review of Systems   Constitutional:  Negative for appetite change, chills, diaphoresis, fatigue and fever.   Eyes:  Negative for visual disturbance.   Respiratory:  Negative for cough and shortness of breath.    Cardiovascular:  Negative for chest pain and leg swelling.   Gastrointestinal:  Negative for abdominal pain, constipation, diarrhea (loose stools), nausea and vomiting.   Genitourinary:  Negative for difficulty urinating, dysuria, frequency and hematuria.   Musculoskeletal:  Negative for arthralgias, back pain and joint swelling.   Skin:  Negative for color change, rash and wound.   Neurological:  Negative for dizziness, weakness, numbness and headaches.   Psychiatric/Behavioral:  Negative for agitation and confusion. The patient is not nervous/anxious.    All other systems reviewed and are negative.    Objective:     Vital Signs (Most Recent):  Temp: 98.2 °F (36.8 °C) (12/21/23 0714)  Pulse: 88 (12/21/23 0714)  Resp: 18 (12/21/23  0714)  BP: 112/74 (12/21/23 0714)  SpO2: 96 % (12/21/23 0525) Vital Signs (24h Range):  Temp:  [96.3 °F (35.7 °C)-98.2 °F (36.8 °C)] 98.2 °F (36.8 °C)  Pulse:  [] 88  Resp:  [16-20] 18  SpO2:  [92 %-96 %] 96 %  BP: (109-129)/(68-80) 112/74     Weight: 75.8 kg (167 lb)  Body mass index is 26.16 kg/m².    Estimated Creatinine Clearance: 82.6 mL/min (based on SCr of 0.8 mg/dL).     Physical Exam  Vitals reviewed.   Constitutional:       General: He is not in acute distress.     Appearance: Normal appearance. He is well-developed. He is not ill-appearing or diaphoretic.   HENT:      Head: Normocephalic and atraumatic.      Right Ear: External ear normal.      Left Ear: External ear normal.      Nose: Nose normal.   Eyes:      General: No scleral icterus.        Right eye: No discharge.         Left eye: No discharge.      Extraocular Movements: Extraocular movements intact.      Conjunctiva/sclera: Conjunctivae normal.   Cardiovascular:      Rate and Rhythm: Normal rate and regular rhythm.   Pulmonary:      Effort: Pulmonary effort is normal. No respiratory distress.      Breath sounds: No stridor.   Abdominal:      General: There is no distension.      Palpations: Abdomen is soft.   Musculoskeletal:         General: No swelling or tenderness.      Comments: No joint or midline spine tenderness   Skin:     General: Skin is dry.      Coloration: Skin is not jaundiced or pale.      Findings: No erythema or rash.   Neurological:      General: No focal deficit present.      Mental Status: He is alert and oriented to person, place, and time. Mental status is at baseline.      Motor: No weakness.      Gait: Gait normal.   Psychiatric:         Mood and Affect: Mood normal.         Behavior: Behavior normal.         Thought Content: Thought content normal.         Judgment: Judgment normal.          Significant Labs: CBC:   Recent Labs   Lab 12/20/23  0328 12/21/23  0257   WBC 9.02 7.99   HGB 12.7* 12.2*   HCT 38.6* 37.1*     378       CMP:   Recent Labs   Lab 12/20/23  0328 12/21/23  0257    137   K 4.2 4.4    104   CO2 22* 25   GLU 84 90   BUN 11 12   CREATININE 0.7 0.8   CALCIUM 8.7 8.7   PROT 7.0 6.7   ALBUMIN 2.6* 2.6*   BILITOT 1.2* 1.2*   ALKPHOS 105 94   * 96*   ALT 74* 59*   ANIONGAP 10 8       Microbiology Results (last 7 days)       Procedure Component Value Units Date/Time    Blood culture [[id number]] Collected: 12/17/23 0458    Order Status: Completed Specimen: Blood Updated: 12/21/23 0612     Blood Culture, Routine No Growth to date      No Growth to date      No Growth to date      No Growth to date      No Growth to date    Blood culture [[id number]] Collected: 12/17/23 0458    Order Status: Completed Specimen: Blood Updated: 12/21/23 0612     Blood Culture, Routine No Growth to date      No Growth to date      No Growth to date      No Growth to date      No Growth to date    Blood culture [[id number]] Collected: 12/19/23 0702    Order Status: Completed Specimen: Blood Updated: 12/20/23 1012     Blood Culture, Routine No Growth to date      No Growth to date    Narrative:      Rt wrist    Blood culture [[id number]] Collected: 12/19/23 0702    Order Status: Completed Specimen: Blood Updated: 12/20/23 1012     Blood Culture, Routine No Growth to date      No Growth to date    Narrative:      Rt AC    Blood culture [[id number]]  (Abnormal) Collected: 12/16/23 0341    Order Status: Completed Specimen: Blood Updated: 12/19/23 1113     Blood Culture, Routine Gram stain aer bottle: Gram positive cocci in clusters resembling Staph      Positive results previously called 12/17/2023      STAPHYLOCOCCUS AUREUS  For susceptibility see order #[id number]      Blood culture [[id number]]  (Abnormal) Collected: 12/16/23 0341    Order Status: Completed Specimen: Blood Updated: 12/19/23 1112     Blood Culture, Routine Gram stain aer bottle: Gram positive cocci in clusters resembling Staph      Results called  to and read back by: Lei Marmolejo 12/17/2023  02:14      STAPHYLOCOCCUS AUREUS  For susceptibility see order #[id number]      Blood culture [[id number]]  (Abnormal) Collected: 12/14/23 2310    Order Status: Completed Specimen: Blood Updated: 12/17/23 0902     Blood Culture, Routine Gram stain aer bottle: Gram positive cocci in clusters resembling Staph      Positive results previously called 12/15/2023  17:26      STAPHYLOCOCCUS AUREUS  For susceptibility see order #[id number]      Blood culture [[id number]]  (Abnormal) Collected: 12/14/23 2310    Order Status: Completed Specimen: Blood Updated: 12/17/23 0902     Blood Culture, Routine Gram stain aer bottle: Gram positive cocci in clusters resembling Staph      Results called to and read back by: Krupa Salas RN 12/15/2023  15:52      STAPHYLOCOCCUS AUREUS  For susceptibility see order #[id number]      Urine culture [[id number]]  (Abnormal)  (Susceptibility) Collected: 12/13/23 2346    Order Status: Completed Specimen: Urine Updated: 12/16/23 2048     Urine Culture, Routine STAPHYLOCOCCUS AUREUS  > 100,000 cfu/ml      Narrative:      Specimen Source->Urine    Blood culture [[id number]]  (Abnormal) Collected: 12/14/23 0552    Order Status: Completed Specimen: Blood from Peripheral, Forearm, Right Updated: 12/16/23 1026     Blood Culture, Routine Gram stain aer bottle: Gram positive cocci in clusters resembling Staph      Positive results previously called 12/14/2023      STAPHYLOCOCCUS AUREUS  For susceptibility see order #[id number]      Blood culture [[id number]]  (Abnormal)  (Susceptibility) Collected: 12/14/23 0552    Order Status: Completed Specimen: Blood from Peripheral, Forearm, Right Updated: 12/16/23 1025     Blood Culture, Routine Gram stain aer bottle: Gram positive cocci in clusters resembling Staph      Results called to and read back by:Benita Mercedes RN 12/14/2023  20:43      STAPHYLOCOCCUS AUREUS    MRSA/SA Rapid ID by PCR from Blood culture  [[id number]]  (Abnormal) Collected: 12/14/23 0552    Order Status: Completed Updated: 12/14/23 2150     Staph aureus ID by PCR Positive     Methicillin Resistant ID by PCR Negative    Clostridium difficile EIA [[id number]] Collected: 12/14/23 0609    Order Status: Completed Specimen: Stool Updated: 12/14/23 1255     C. diff Antigen Negative     C difficile Toxins A+B, EIA Negative     Comment: Testing not recommended for children <24 months old.             Recent Lab Results         12/21/23  0257        Albumin 2.6       ALP 94       ALT 59       Anion Gap 8       AST 96       Baso # 0.04       Basophil % 0.5       BILIRUBIN TOTAL 1.2  Comment: For infants and newborns, interpretation of results should be based  on gestational age, weight and in agreement with clinical  observations.    Premature Infant recommended reference ranges:  Up to 24 hours.............<8.0 mg/dL  Up to 48 hours............<12.0 mg/dL  3-5 days..................<15.0 mg/dL  6-29 days.................<15.0 mg/dL         BUN 12       Calcium 8.7       Chloride 104       CO2 25       Creatinine 0.8       Differential Method Automated       eGFR >60.0       Eos # 0.2       Eosinophil % 2.4       Glucose 90       Gran # (ANC) 5.7       Gran % 71.7       Hematocrit 37.1       Hemoglobin 12.2       Immature Grans (Abs) 0.17  Comment: Mild elevation in immature granulocytes is non specific and   can be seen in a variety of conditions including stress response,   acute inflammation, trauma and pregnancy. Correlation with other   laboratory and clinical findings is essential.         Immature Granulocytes 2.1       Lymph # 1.2       Lymph % 14.5       Magnesium  2.0       MCH 29.3       MCHC 32.9       MCV 89       Mono # 0.7       Mono % 8.8       MPV 9.4       nRBC 0       Platelet Count 378       Potassium 4.4       PROTEIN TOTAL 6.7       RBC 4.16       RDW 14.2       Sodium 137       WBC 7.99               Significant Imaging:     Imaging  Results              CT Abdomen Pelvis With IV Contrast NO Oral Contrast (Final result)  Result time 12/14/23 08:54:04      Final result by Lalo Puri MD (12/14/23 08:54:04)                   Impression:      1. Intraluminal fluid throughout the small and large bowel with adjacent mesenteric fat stranding and free fluid, as may be seen in the setting of a nonspecific infectious or noninfectious inflammatory enterocolitis.  2. Nonobstructive right nephrolithiasis.  3. Small bilateral pleural effusions.  4. Additional details of chronic and incidental findings, as provided in the body of report.    Electronically signed by resident: Katherine Hidalgo  Date:    12/14/2023  Time:    06:04    Electronically signed by: Lalo Puri  Date:    12/14/2023  Time:    08:54               Narrative:    EXAMINATION:  CT ABDOMEN PELVIS WITH IV CONTRAST    CLINICAL HISTORY:  Abdominal abscess/infection suspected;LLQ abdominal pain;    TECHNIQUE:  Low dose axial images, sagittal and coronal reformations were obtained from the lung bases to the pubic symphysis following the IV administration of 75 mL of Omnipaque 350.Oral contrast was not given.    COMPARISON:  Ultrasound abdomen 11/01/2023    FINDINGS:  Comment: Motion compromised examination.    Lower thorax:    Heart: Cardiomegaly.  Cardiac pacing leads.  Severe calcific atherosclerosis of multiple coronary arteries.    Lung Bases: Small bilateral pleural effusions, larger on the right.  Bibasal linear opacities, likely subsegmental atelectasis versus scarring.    Liver: Normal in size and attenuation, with no focal hepatic lesions.    Gallbladder: No calcified gallstones.  Gallbladder is not distended.  No gallbladder wall thickening or pericholecystic fluid collection.    Bile Ducts: No intra or extrahepatic biliary duct dilatation.    Pancreas: No mass or peripancreatic fat stranding.    Spleen: Normal in size.  No focal lesion.    Adrenals: Unremarkable.    Kidneys/  Ureters: Normal in size and location. Normal enhancement.  1.3 cm right lower pole nonobstructive nephrolithiasis.  No hydronephrosis or hydroureter.  Multiple cortical hypodensities which are too small to characterize likely renal cysts.  Nonspecific mild bilateral perinephric stranding.    Bladder: No evidence of wall thickening.    Reproductive organs: Prostate appears enlarged, measuring 4.6 cm with dystrophic calcification.    Gl/Mesentery/peritoneum and retroperitoneum: Small hiatal hernia.  Stomach is unremarkable.  Small and large bowel are normal in caliber with no evidence of obstruction.  Liquid stool throughout the small and large bowel with mild mesenteric soft tissue stranding and trace of free fluid, as may be seen in a nonspecific infectious versus noninfectious inflammatory enterocolitis.    Abdominal wall: Left fat containing inguinal hernia.    Vasculature: Moderate calcific atherosclerosis.  No aneurysm.    Bones: Degenerative changes.  Similar appearing compression fractures of T12 and L1 when compared to radiograph of 04/22/2021 and MRI of 04/05/2021, allowing for differences in technique/modality.  No acute fracture.  No suspicious lytic or sclerotic lesion.

## 2023-12-21 NOTE — PLAN OF CARE
Problem: Adult Inpatient Plan of Care  Goal: Plan of Care Review  Outcome: Ongoing, Progressing  Goal: Patient-Specific Goal (Individualized)  Outcome: Ongoing, Progressing  Goal: Absence of Hospital-Acquired Illness or Injury  Outcome: Ongoing, Progressing  Goal: Optimal Comfort and Wellbeing  Outcome: Ongoing, Progressing  Goal: Readiness for Transition of Care  Outcome: Ongoing, Progressing     Problem: Infection  Goal: Absence of Infection Signs and Symptoms  Outcome: Ongoing, Progressing     Problem: Adjustment to Illness (Sepsis/Septic Shock)  Goal: Optimal Coping  Outcome: Ongoing, Progressing   Patient AAO X4.VSS.NAD. IV antibiotics infusing per mar. No complaints of pain. CT and MRI completed today. Safety maintained. Will continue to monitor.

## 2023-12-21 NOTE — PROGRESS NOTES
[facility] Medicine  Progress Note    Patient Name: Michael Espinoza  MRN: [id number]  Patient Class: IP- Inpatient   Admission Date: 12/13/2023  Length of Stay: 6 days  Attending Physician: Teo Thomson MD  Primary Care Provider: Dominguez Hyatt MD        Subjective:     Principal Problem:Sepsis without acute organ dysfunction        HPI:  Michael Espinoza is a 68 y.o. male with PMHx of CAD s/p CABG x3, HLD, complete heart block s/p dual chamber pacemaker. He presents to [facility] ED 12/13 with diarrhea for the last 6 days. On 12/8 he developed left lower quadrant cramping followed by multiple episodes of loose stool. The next two days he had about 10-13 episodes diarrhea daily. Watery-brown. No blood. By Monday he continued to have diarrhea but then also developed mild nausea, but was able to keep down bland foods (jello, applesauce, banana, eggs) without emesis. He also began having urinary frequency, urgency, and dysuria and though his right kidney was mildly painful. Denies hematuria. He began to keep extremely dehydrated, started having fevers up to 102F Monday-Tuesday. He endorses polydipsia, myalgias, and hallucinations when he closes his eyes. He also reports shaking chills that would last for 20 minutes at a time that he could not control. Wednesday diarrhea finally stopped and he also stopped urinating but reports he did spontaneously urinate in ED once he got IV fluids.     He does have history of diarrhea and saw GI Dr. Feliz in September for this and thought to be maybe post infectious IBS but patient states he had completely symptom free period for months since then. He saw urgent care for symptoms 3 days ago and reports taking Zofran 4 times from them as well as a medication from GI (dicyclomine?) 10 times over 2 days. He reports normal colonoscopy one year ago (lipoma biopsied with surface erosions). No rectal pain. No more abdominal/flank pain. No strange foods or travel. He denies  falls, chest pain, palpitations, shortness of breath.     Per chart review: 12/11 stool culture without significant growth to date, E.coli shiga toxin 1 and 2 negative. Urine culture + staph aureus. A previous urine culture was also positive for staph aures in September and pt reports completing bactrim course.    In the ED, febrile to 100.4F, , RR 28. BP sable. On RA. Labs with WBC 8.58k, platelets 118k.  Na 129, K 2.9, Cl 94, BUN/Cr without YESICA, albumin 2.8, T bili 2.0 (chronically elevated), AST/ALT elevated 90/60. UA 3+ leukocytes, nitrite positive, 3+ occult blood, 1+ ketones, 1+ protein, 35 RBC, >100 WBC< many bacteria. LA 1.4. lipase and mag wnl. Covid and flu negative. CT abdomen pending. Given ceftriaxone, dicyclomine, famotidine, Zofran, potassium po and IV,  1L LR bolus and 1L NS bolus. Admitted to .     Overview/Hospital Course:  12/14 K replaced. BCX 2 and stool studies pending. CT abdomen -  Intraluminal fluid throughout the small and large bowel with adjacent mesenteric fat stranding and free fluid, as may be seen in the setting of a nonspecific infectious or noninfectious inflammatory enterocolitis. Nonobstructive right nephrolithiasis. loperamide PRN. continue ciprofloxacin and vancomycin . denies abdominal pain. advanced to soft diet   12/15 K and P replaced. BCX 2 from 12/14 with staph aureus. repeated BCX 2.  UC with STAPHYLOCOCCUS AUREUS > 100,000 cfu/ml  Susceptibility pending. echo ordered. tolerating soft diet. CHARMAINE ordered as with pacemaker . 1.3 cm right lower pole nonobstructive nephrolithiasis/ staph aureus on UC ( positive for MSSA urine culture from 9/2023). started on cefazolin. vancomycin discontinued   12/16 BC from 12/15 staph aureus. repeat BCX 2 TTE today-     Left Ventricle: The left ventricle is normal in size. Normal wall thickness. Regional wall motion abnormalities present. See diagram for wall motion findings. There is mildly reduced systolic function with a visually  estimated ejection fraction of 40 - 45%. Ejection fraction by visual approximation is 43%. There is normal diastolic function.    Right Ventricle: Normal right ventricular cavity size. Wall thickness is normal. Right ventricle wall motion  is normal. Systolic function is normal.    Left Atrium: Left atrium is severely dilated. 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    Aortic Valve: The aortic valve is a trileaflet valve. There is mild aortic valve sclerosis. There is mild stenosis. Aortic valve area by VTI is 1.74 cm². Aortic valve peak velocity is 1.65 m/s. Mean gradient is 7 mmHg. The dimensionless index is 0.46. There is trace aortic regurgitation.    Mitral Valve: There is mild bileaflet sclerosis.    Tricuspid Valve: There is mild regurgitation.    Pulmonary Artery: The estimated pulmonary artery systolic pressure is 35 mmHg.    IVC/SVC: Normal venous pressure at 3 mmHg.    CHARMAINE scheduled. K and P replaced  12/18 BC x2 12/18 with staph aureus.  CHARMAINE postponed to tomorow. MRI spine WWO contrast   12/19 CHARMAINE today. fecal elastase decreased 94. r/o exocrine pancreatic insufficiency . ativan PRN for sedation with MRI spine. difficulty with CHARMAINE and  advancing the US probe  today. CT chest and Neck wo contrast to r/o obstruction. if CT normal, plan for CHARMAINE on 12/22 with intubation  12/20 BCx 2 12/17 NGTD. CT neck -No evidence of significant esophageal abnormality     Interval History: For CHARMAINE on Friday- possible dc home after that, BC 12/19-NGTD. Will need PICC.    Review of Systems  Objective:     Vital Signs (Most Recent):  Temp: 98.2 °F (36.8 °C) (12/21/23 0714)  Pulse: 88 (12/21/23 0714)  Resp: 18 (12/21/23 0714)  BP: 112/74 (12/21/23 0714)  SpO2: 96 % (12/21/23 0525) Vital Signs (24h Range):  Temp:  [96.3 °F (35.7 °C)-98.2 °F (36.8 °C)] 98.2 °F (36.8 °C)  Pulse:  [] 88  Resp:  [16-20] 18  SpO2:  [92 %-96 %] 96 %  BP: (109-129)/(68-80) 112/74     Weight: 75.8 kg (167 lb)  Body mass index is 26.16  kg/m².    Intake/Output Summary (Last 24 hours) at 12/21/2023 0934  Last data filed at 12/20/2023 1255  Gross per 24 hour   Intake 122 ml   Output --   Net 122 ml         Physical Exam  Vitals reviewed.   Constitutional:       General: He is not in acute distress.     Appearance: Normal appearance. He is well-developed. He is not ill-appearing or diaphoretic.   HENT:      Head: Normocephalic and atraumatic.      Right Ear: External ear normal.      Left Ear: External ear normal.      Nose: Nose normal.   Eyes:      General: No scleral icterus.        Right eye: No discharge.         Left eye: No discharge.      Extraocular Movements: Extraocular movements intact.      Conjunctiva/sclera: Conjunctivae normal.   Cardiovascular:      Rate and Rhythm: Normal rate and regular rhythm.   Pulmonary:      Effort: Pulmonary effort is normal. No respiratory distress.      Breath sounds: No stridor.   Abdominal:      General: There is no distension.      Palpations: Abdomen is soft.   Musculoskeletal:         General: No swelling or tenderness.      Comments: No joint or midline spine tenderness   Skin:     General: Skin is dry.      Coloration: Skin is not jaundiced or pale.      Findings: No erythema or rash.   Neurological:      General: No focal deficit present.      Mental Status: He is alert and oriented to person, place, and time. Mental status is at baseline.      Motor: No weakness.      Gait: Gait normal.   Psychiatric:         Mood and Affect: Mood normal.         Behavior: Behavior normal.         Thought Content: Thought content normal.         Judgment: Judgment normal.             Significant Labs: All pertinent labs within the past 24 hours have been reviewed.  BMP:   Recent Labs   Lab 12/21/23  0257   GLU 90      K 4.4      CO2 25   BUN 12   CREATININE 0.8   CALCIUM 8.7   MG 2.0       Significant Imaging: I have reviewed all pertinent imaging results/findings within the past 24  hours.    Assessment/Plan:      * Sepsis without acute organ dysfunction  This patient does have evidence of infective focus  My overall impression is sepsis.  Source: Urinary Tract and Abdominal  Antibiotics given-   Antibiotics (72h ago, onward)      Start     Stop Route Frequency Ordered    12/14/23 1900  vancomycin 750 mg in dextrose 5 % (D5W) 250 mL IVPB (Vial-Mate)         -- IV Every 12 hours (non-standard times) 12/14/23 0547    12/14/23 0900  ciprofloxacin HCl tablet 500 mg         12/19/23 0859 Oral Every 12 hours 12/14/23 0613    12/14/23 0623  vancomycin - pharmacy to dose  (vancomycin IVPB (PEDS and ADULTS))        See Hyperspace for full Linked Orders Report.    -- IV pharmacy to manage frequency 12/14/23 0523          Latest lactate reviewed-  Recent Labs   Lab 12/13/23  2212   LACTATE 1.4     Organ dysfunction indicated by  None- mild delirium (possible form electrolyte derangement and fevers) Mild transaminitis, mild thrombocytopenia    Fluid challenge Not needed - patient is not hypotensive      Post- resuscitation assessment No - Post resuscitation assessment not needed     Will Not start Pressors- Levophed for MAP of 65  Source control achieved by: Vancomycin ordered for staph aureus UTI on previous cultures, Ciprofloxacin ordered for possible infectious (vs other) diarrhea.  Blood, urine, stool cultures and studies pending.     Right kidney stone  12/15 s/p urology eval - stone is non obstructing. -No indication for acute urologic intervention.  outpatient urologic follow up for stone treatment            Bacteremia  12/15 BCX 2 from 12/14 with staph aureus. repeated BCX 2.    CHARMAINE ordered as with pacemaker . 1.3 cm right lower pole nonobstructive nephrolithiasis/ staph aureus on UC ( positive for MSSA urine culture from 9/2023). started on cefazolin. vancomycin discontinued   12/18 BC from 12/16 staph aureus.   12/20 BCx 2 12/17 NGTD.  difficulty with CHARMAINE and  advancing the US probe  today. CT  chest and Neck wo contrast to r/o obstruction. if CT normal, plan for CHARMAINE on 12/22 with intubation      Thrombocytopenia    resolved     Transaminitis  -Mildly elevated, possible 2/2 dehydration/infection  -NO RUQ pain. T bili chronically elevated  -Check acute hepatitis panel   -Daily CMP    12/14 Patients liver enzymes  elevated.   Recent Labs     12/17/23  0908 12/18/23  0538 12/19/23  0702 12/20/23  0328   BILITOT 1.8* 1.2* 1.5* 1.2*   * 125* 130* 120*   * 82* 89* 74*   ALKPHOS 107 96 104 105    . Liver enzymes  trending up. sono liver with doppler -Normal Doppler evaluation of the liver.Cholelithiasis.  No sonographic evidence for acute cholecystitis.       Diarrhea  68M with acute onset of multiple episodes diarrhea with only mild LLQ abdominal cramping early in course. Mild nausea. No abdominal pain, emesis, and is tolerating PO. Having fevers, chills, polydipsia. Also urinary infectious symptoms. Previously seen by GI Dr. 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 Work up unremarkable.  Previous stool cultures without growth.Giardia negative in the past. Recent stool culture without growth and E coli antigen negative.   -Multiple electrolyte derangements on admission  -Tachycardic, febrile, and tachypneic   -S/p 2L IVF bolus in ED, continue IVFs today  -Check complete stool studies  -C-scope 8/2022 Lipoma in the recto-sigmoid colon. Biopsied and found to have surface erosions  -CT pending  -GI consulted, appreciate recommendations  -PRN bentyl for cramping, prn Zofran/compazine for nausea  -ON Vanc for sepsis/UTI and will add ciprofloxacin 500 mg Q12h x5 days for diarrhea  for now pending culture results  12/14  CT abdomen -  Intraluminal fluid throughout the small and large bowel with adjacent mesenteric fat stranding and free fluid, as may be seen in the setting of a nonspecific infectious or noninfectious inflammatory enterocolitis. Nonobstructive right  nephrolithiasis. loperamide PRN.   12/15 C diff  and stool culture negative.     Complicated UTI (urinary tract infection)  Presenting with dysuria/frequency/urgency/maybe mild flank pain/fever/chills (also with diarrheal illness).  -3/4 SIRs on admit for fever, tachycardia tachypnea  -UA infectious, nitrite positive, 3+ leukocytes >100 WBC and many bacteria  -Previous urine cultures with staph aureus  -S/p ceftriaxone in ED  -Will cover with vancomycin   -Follow up urine cultures  -Follow up CT  -Monitor I/Os  12/14. BCX 2 and stool studies pending.continue ciprofloxacin and vancomycin   12/15  UC with STAPHYLOCOCCUS AUREUS > 100,000 cfu/ml  Susceptibility pending. echo ordered. tolerating soft diet. 1.3 cm right lower pole nonobstructive nephrolithiasis/ staph aureus on UC ( positive for MSSA urine culture from 9/2023). started on cefazolin    Hypokalemia   resolved     Hyponatremia  resolved     Complete heart block  -s/p dual chamber pacemaker placement  7/5/2022      Coronary artery disease of native artery of native heart with stable angina pectoris  Patient with known CAD s/p CABG, which is controlled Will continue ASA and Statin and monitor for S/Sx of angina/ACS. Continue to monitor on telemetry.     Gilbert's syndrome  -Chronic condition- reports diagnosed by Dr. Rowe years ago  -T bili elevated on admission, similar to previous  -Monitor CMP      HLD (hyperlipidemia)  -Continue home statin      VTE Risk Mitigation (From admission, onward)           Ordered     enoxaparin injection 40 mg  Daily         12/16/23 0900     IP VTE HIGH RISK PATIENT  Once         12/16/23 0900     Place sequential compression device  Until discontinued         12/14/23 0526                    Discharge Planning   MAGNO: 12/23/2023     Code Status: Full Code   Is the patient medically ready for discharge?: No    Reason for patient still in hospital (select all that apply): Patient unstable  Discharge Plan A: Home with family    Discharge Delays: None known at this time              Teo Thomson MD  Department of Hospital Medicine   [facility]

## 2023-12-21 NOTE — ANESTHESIA POSTPROCEDURE EVALUATION
Anesthesia Post Evaluation    Patient: Michael Espinoza    Procedure(s) Performed: Procedure(s) (LRB):  Transesophageal echo (CHARMAINE) intra-procedure log documentation (N/A)    Final Anesthesia Type: general (Natural airway)      Patient location during evaluation: PACU  Patient participation: Yes- Able to Participate  Level of consciousness: awake and alert  Post-procedure vital signs: reviewed and stable  Pain management: adequate  Airway patency: patent    PONV status at discharge: No PONV  Anesthetic complications: no    Eryn-operative Events Comments: Cardiology with difficulty placing CHARMAINE probe.  Requesting intubation for next CHARMAINE.  Cardiovascular status: hemodynamically stable  Respiratory status: unassisted  Hydration status: euvolemic  Follow-up not needed.              Vitals Value Taken Time   /79 12/20/23 1934   Temp 35.7 °C (96.3 °F) 12/20/23 1934   Pulse 92 12/20/23 1934   Resp 20 12/20/23 1934   SpO2 95 % 12/20/23 1934         No case tracking events are documented in the log.      Pain/Eduin Score: Pain Rating Prior to Med Admin: 0 (12/20/2023  6:05 PM)  Eduin Score: 10 (12/19/2023  2:45 PM)

## 2023-12-21 NOTE — PLAN OF CARE
Problem: Adult Inpatient Plan of Care  Goal: Plan of Care Review  Outcome: Ongoing, Progressing  Flowsheets (Taken 12/21/2023 0550)  Plan of Care Reviewed With: patient  Goal: Patient-Specific Goal (Individualized)  Outcome: Ongoing, Progressing  Flowsheets (Taken 12/21/2023 0550)  Anxieties, Fears or Concerns: none  Individualized Care Needs: IV antibiotics  Goal: Absence of Hospital-Acquired Illness or Injury  Outcome: Ongoing, Progressing  Intervention: Identify and Manage Fall Risk  Flowsheets (Taken 12/21/2023 0550)  Safety Promotion/Fall Prevention:   assistive device/personal item within reach   bed alarm refused   lighting adjusted   medications reviewed   instructed to call staff for mobility  Intervention: Prevent Skin Injury  Flowsheets (Taken 12/21/2023 0550)  Body Position: position changed independently  Skin Protection: adhesive use limited  Intervention: Prevent and Manage VTE (Venous Thromboembolism) Risk  Flowsheets (Taken 12/21/2023 0550)  Activity Management:   Arm raise - L1   Rolling - L1  VTE Prevention/Management: ambulation promoted  Range of Motion: active ROM (range of motion) encouraged  Intervention: Prevent Infection  Flowsheets (Taken 12/21/2023 0550)  Infection Prevention:   environmental surveillance performed   hand hygiene promoted   rest/sleep promoted  Goal: Optimal Comfort and Wellbeing  Outcome: Ongoing, Progressing  Intervention: Monitor Pain and Promote Comfort  Flowsheets (Taken 12/21/2023 0550)  Pain Management Interventions:   care clustered   quiet environment facilitated   relaxation techniques promoted   breathing exercises utilized     Problem: Infection  Goal: Absence of Infection Signs and Symptoms  Outcome: Ongoing, Progressing  Intervention: Prevent or Manage Infection  Flowsheets (Taken 12/21/2023 0550)  Infection Management: aseptic technique maintained     Problem: Adjustment to Illness (Sepsis/Septic Shock)  Goal: Optimal Coping  Outcome: Ongoing,  Progressing  Intervention: Optimize Psychosocial Adjustment to Illness  Flowsheets (Taken 12/21/2023 0550)  Supportive Measures:   active listening utilized   decision-making supported   positive reinforcement provided   relaxation techniques promoted   self-care encouraged   self-reflection promoted   self-responsibility promoted   verbalization of feelings encouraged  Family/Support System Care: self-care encouraged     Problem: Infection Progression (Sepsis/Septic Shock)  Goal: Absence of Infection Signs and Symptoms  Outcome: Ongoing, Progressing  Intervention: Initiate Sepsis Management  Flowsheets (Taken 12/21/2023 0550)  Infection Prevention:   environmental surveillance performed   hand hygiene promoted   rest/sleep promoted  Infection Management: aseptic technique maintained     Problem: Fall Injury Risk  Goal: Absence of Fall and Fall-Related Injury  Outcome: Ongoing, Progressing  Intervention: Identify and Manage Contributors  Flowsheets (Taken 12/21/2023 0550)  Self-Care Promotion: independence encouraged  Medication Review/Management: medications reviewed  Intervention: Promote Injury-Free Environment  Flowsheets (Taken 12/21/2023 0550)  Safety Promotion/Fall Prevention:   assistive device/personal item within reach   bed alarm refused   lighting adjusted   medications reviewed   instructed to call staff for mobility     Problem: Adult Inpatient Plan of Care  Goal: Readiness for Transition of Care  Outcome: Ongoing, Not Progressing    No significant changes on our shift.

## 2023-12-21 NOTE — SUBJECTIVE & OBJECTIVE
Interval History: For CHARMAINE on Friday- possible dc home after that, BC 12/19-NGTD. Will need PICC.    Review of Systems  Objective:     Vital Signs (Most Recent):  Temp: 98.2 °F (36.8 °C) (12/21/23 0714)  Pulse: 88 (12/21/23 0714)  Resp: 18 (12/21/23 0714)  BP: 112/74 (12/21/23 0714)  SpO2: 96 % (12/21/23 0525) Vital Signs (24h Range):  Temp:  [96.3 °F (35.7 °C)-98.2 °F (36.8 °C)] 98.2 °F (36.8 °C)  Pulse:  [] 88  Resp:  [16-20] 18  SpO2:  [92 %-96 %] 96 %  BP: (109-129)/(68-80) 112/74     Weight: 75.8 kg (167 lb)  Body mass index is 26.16 kg/m².    Intake/Output Summary (Last 24 hours) at 12/21/2023 0934  Last data filed at 12/20/2023 1255  Gross per 24 hour   Intake 122 ml   Output --   Net 122 ml         Physical Exam  Vitals reviewed.   Constitutional:       General: He is not in acute distress.     Appearance: Normal appearance. He is well-developed. He is not ill-appearing or diaphoretic.   HENT:      Head: Normocephalic and atraumatic.      Right Ear: External ear normal.      Left Ear: External ear normal.      Nose: Nose normal.   Eyes:      General: No scleral icterus.        Right eye: No discharge.         Left eye: No discharge.      Extraocular Movements: Extraocular movements intact.      Conjunctiva/sclera: Conjunctivae normal.   Cardiovascular:      Rate and Rhythm: Normal rate and regular rhythm.   Pulmonary:      Effort: Pulmonary effort is normal. No respiratory distress.      Breath sounds: No stridor.   Abdominal:      General: There is no distension.      Palpations: Abdomen is soft.   Musculoskeletal:         General: No swelling or tenderness.      Comments: No joint or midline spine tenderness   Skin:     General: Skin is dry.      Coloration: Skin is not jaundiced or pale.      Findings: No erythema or rash.   Neurological:      General: No focal deficit present.      Mental Status: He is alert and oriented to person, place, and time. Mental status is at baseline.      Motor: No  weakness.      Gait: Gait normal.   Psychiatric:         Mood and Affect: Mood normal.         Behavior: Behavior normal.         Thought Content: Thought content normal.         Judgment: Judgment normal.             Significant Labs: All pertinent labs within the past 24 hours have been reviewed.  BMP:   Recent Labs   Lab 12/21/23  0257   GLU 90      K 4.4      CO2 25   BUN 12   CREATININE 0.8   CALCIUM 8.7   MG 2.0       Significant Imaging: I have reviewed all pertinent imaging results/findings within the past 24 hours.

## 2023-12-21 NOTE — ANESTHESIA PREPROCEDURE EVALUATION
[facility]  Anesthesia Pre-Operative Evaluation         Patient Name: Michael Espinoza  YOB: 1955  MRN: [id number]    SUBJECTIVE:     Pre-operative evaluation for Procedure(s) (LRB):  Transesophageal echo (CHARMAINE) intra-procedure log documentation (N/A)     12/21/2023    Michael Espinoza is a 68 y.o. male w/ a significant PMHx of CAD s/p CABG x3, HLD, heart block s/p dual chamber pacemaker, ASD, CONTRERAS and exertional angina who is admitted for MSSA bacteremia and MSSA UTI.    Patient now presents for the above procedure(s).    TTE (12/16/23):    Left Ventricle: The left ventricle is normal in size. Normal wall thickness. Regional wall motion abnormalities present. See diagram for wall motion findings. There is mildly reduced systolic function with a visually estimated ejection fraction of 40 - 45%. Ejection fraction by visual approximation is 43%. There is normal diastolic function.    Right Ventricle: Normal right ventricular cavity size. Wall thickness is normal. Right ventricle wall motion  is normal. Systolic function is normal.    Left Atrium: Left atrium is severely dilated. 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    Aortic Valve: The aortic valve is a trileaflet valve. There is mild aortic valve sclerosis. There is mild stenosis. Aortic valve area by VTI is 1.74 cm². Aortic valve peak velocity is 1.65 m/s. Mean gradient is 7 mmHg. The dimensionless index is 0.46. There is trace aortic regurgitation.    Mitral Valve: There is mild bileaflet sclerosis.    Tricuspid Valve: There is mild regurgitation.    Pulmonary Artery: The estimated pulmonary artery systolic pressure is 35 mmHg.    IVC/SVC: Normal venous pressure at 3 mmHg.      LDA:        Peripheral IV - Single Lumen 12/14/23 2206 20 G Anterior;Left Wrist (Active)   Site Assessment Clean;Dry;Intact;No redness;No  swelling;No warmth;No drainage 12/17/23 1600   Extremity Assessment Distal to IV No abnormal discoloration;No redness;No swelling;No warmth 12/16/23 0800   Line Status Flushed;Saline locked 12/17/23 1600   Dressing Status Clean;Dry;Intact 12/17/23 1600   Dressing Intervention Integrity maintained 12/17/23 1600   Number of days: 3            Peripheral IV - Single Lumen 12/17/23 2126 20 G Left Forearm (Active)   Site Assessment Clean;Dry;Intact;No redness;No swelling 12/17/23 2126   Extremity Assessment Distal to IV No abnormal discoloration;No redness;No swelling;No warmth 12/17/23 2126   Line Status Blood return noted 12/17/23 2126   Dressing Status Clean;Dry;Intact 12/17/23 2126   Dressing Intervention First dressing 12/17/23 2126   Number of days: 0       Prev airway (12/17/19):         Induction:  Intravenous    Mask Ventilation:  Easy mask    Attempts:  2    Attempted By:  Resident anesthesiologist    Method of Intubation:  Direct    Blade:  Brittney 3    Laryngeal View Grade: Grade III - only epiglottis visible      Attempted By (2nd Attempt):  Staff anesthesiologist    Method of Intubation (2nd Attempt):  Direct    Blade (2nd Attempt):  Brittney 3    Laryngeal View Grade (2nd Attempt): Grade IIa - cords partially seen      Difficult Airway Encountered?: No      Airway Device:  Oral endotracheal tube    Airway Device Size:  7.5    Style/Cuff Inflation:  Cuffed (inflated to minimal occlusive pressure)    Inflation Amount (mL):  8    Tube secured:  24    Secured at:  The lips    Placement Verified By:  Capnometry    Complicating Factors:  Anterior larynx and small mouth    Findings Post-Intubation:  BS equal bilateral  Notes:      Unable to visualize cords with Johnson #2 with cricoid pressure.  Subsequently, Mac #3 used by staff anesthesiologist with success in obtaining Grade IIb view.          Drips: None documented.      Patient Active Problem List   Diagnosis    HLD (hyperlipidemia)    Gilbert's syndrome     Exertional angina    ASD (atrial septal defect)    SOB (shortness of breath) on exertion    Abnormal CT of the chest    Chest pain    Coronary artery disease of native artery of native heart with stable angina pectoris    Pre-operative cardiovascular examination    S/P CABG x 3    Chronic right shoulder pain    Partial nontraumatic tear of rotator cuff, right    Pathological fracture due to osteoporosis    Age-related osteoporosis with current pathological fracture    Localized osteoporosis of [place]    Complete heart block    Hyponatremia    Hypokalemia    Complicated UTI (urinary tract infection)    Diarrhea    Sepsis without acute organ dysfunction    Transaminitis    Thrombocytopenia    Bacteremia    Right kidney stone       Review of patient's allergies indicates:  No Known Allergies    Current Inpatient Medications:   aspirin  81 mg Oral BID    atorvastatin  80 mg Oral Daily    ceFAZolin (ANCEF) IVPB  2 g Intravenous Q8H    dicyclomine  10 mg Oral QID    enoxparin  40 mg Subcutaneous Daily    ezetimibe  10 mg Oral Daily       No current facility-administered medications on file prior to encounter.     Current Outpatient Medications on File Prior to Encounter   Medication Sig Dispense Refill    aspirin (ECOTRIN) 81 MG EC tablet Take 81 mg by mouth 2 (two) times daily. (Breakfast & Afternoon)      atorvastatin (LIPITOR) 80 MG tablet TAKE 1 TABLET EVERY DAY (Patient taking differently: Take 80 mg by mouth every evening.) 90 tablet 10    calcium carbonate (TUMS ORAL) Take 2 tablets by mouth daily as needed (Heartburn).      coenzyme Q10 (CO Q-10) 300 mg Cap Take 1 capsule by mouth once daily.      dicyclomine (BENTYL) 10 MG capsule Take 10 mg by mouth daily as needed (Abdominal pain).      ERGOCALCIFEROL, VITAMIN D2, (VITAMIN D ORAL) Take 1 capsule by mouth Mon, Wed, Fri, Sat & Sun      ezetimibe (ZETIA) 10 mg tablet TAKE 1 TABLET EVERY DAY (Patient taking differently: Take 10 mg by mouth once daily.) 90  tablet 10    multivit-min/FA/lycopen/lutein (CENTRUM SILVER MEN ORAL) Take 1 tablet by mouth every Mon, Wed, Fri.      ondansetron (ZOFRAN-ODT) 4 MG TbDL Take 1 tablet (4 mg total) by mouth every 8 (eight) hours as needed (nausea). 12 tablet 0       Past Surgical History:   Procedure Laterality Date    A-V CARDIAC PACEMAKER INSERTION N/A 7/5/2022    Procedure: INSERTION, CARDIAC PACEMAKER, DUAL CHAMBER;  Surgeon: Alessandro Canales MD;  Location: [facility] EP LAB;  Service: Cardiology;  Laterality: N/A;  CHB, TBD, ANES, ED 6, MB    COLONOSCOPY N/A 8/5/2022    Procedure: COLONOSCOPY;  Surgeon: Namita Dumont MD;  Location: [facility] ENDO ([address] [place] [address]);  Service: Endoscopy;  Laterality: N/A;  vacc-inst portal-tb    CYST REMOVAL      TONSILLECTOMY         Social History     Socioeconomic History    Marital status: Single   Tobacco Use    Smoking status: Never     Passive exposure: Never    Smokeless tobacco: Never   Substance and Sexual Activity    Alcohol use: Yes     Alcohol/week: 1.0 standard drink of alcohol     Types: 1 Glasses of wine per week     Comment: 1 drink 2-3 times/weekly    Drug use: No     Social Determinants of Health     Financial Resource Strain: Low Risk  (12/16/2023)    Overall Financial Resource Strain (CARDIA)     Difficulty of Paying Living Expenses: Not hard at all   Food Insecurity: No Food Insecurity (12/16/2023)    Hunger Vital Sign     Worried About Running Out of Food in the Last Year: Never true     Ran Out of Food in the Last Year: Never true   Transportation Needs: No Transportation Needs (12/16/2023)    PRAPARE - Transportation     Lack of Transportation (Medical): No     Lack of Transportation (Non-Medical): No   Physical Activity: Patient Declined (12/16/2023)    Exercise Vital Sign     Days of Exercise per Week: Patient declined     Minutes of Exercise per Session: Patient declined   Stress: No Stress Concern Present (12/16/2023)    [employer] [place] [employer] - Occupational  Stress Questionnaire     Feeling of Stress : Only a little   Social Connections: Unknown (12/16/2023)    Social Connection and Isolation Panel [NHANES]     Frequency of Communication with Friends and Family: Once a week     Frequency of Social Gatherings with Friends and Family: Once a week     Attends Confucianism Services: Patient declined     Active Member of Clubs or Organizations: Patient declined     Attends Club or Organization Meetings: Patient declined     Marital Status: Never    Housing Stability: Low Risk  (12/16/2023)    Housing Stability Vital Sign     Unable to Pay for Housing in the Last Year: No     Number of Places Lived in the Last Year: 1     Unstable Housing in the Last Year: No       OBJECTIVE:     Vital Signs Range (Last 24H):  Temp:  [35.7 °C (96.3 °F)-36.8 °C (98.2 °F)]   Pulse:  []   Resp:  [16-20]   BP: (109-129)/(68-80)   SpO2:  [92 %-96 %]       Significant Labs:  Lab Results   Component Value Date    WBC 7.99 12/21/2023    HGB 12.2 (L) 12/21/2023    HCT 37.1 (L) 12/21/2023     12/21/2023    CHOL 101 (L) 08/11/2023    TRIG 35 08/11/2023    HDL 46 08/11/2023    ALT 59 (H) 12/21/2023    AST 96 (H) 12/21/2023     12/21/2023    K 4.4 12/21/2023     12/21/2023    CREATININE 0.8 12/21/2023    BUN 12 12/21/2023    CO2 25 12/21/2023    TSH 1.376 12/15/2023    PSA 0.46 08/11/2023    INR 1.1 05/19/2018    HGBA1C 5.6 08/11/2023       Diagnostic Studies: No relevant studies.    EKG:   Results for orders placed or performed during the hospital encounter of 12/13/23   EKG 12-lead    Collection Time: 12/13/23  9:38 PM    Narrative    Test Reason : R11.0,    Vent. Rate : 103 BPM     Atrial Rate : 103 BPM     P-R Int : 170 ms          QRS Dur : 176 ms      QT Int : 410 ms       P-R-T Axes : 037 158 -07 degrees     QTc Int : 537 ms    Atrial-sensed ventricular-paced rhythm  Biventricular pacemaker detected  Abnormal ECG  When compared with ECG of 21-NOV-2023 08:01,  Vent. rate  has increased BY  36 BPM  Confirmed by Ivone Garibay MD ([id number]) on 12/14/2023 2:17:29 PM    Referred By: [email or web address]   SELF           Confirmed By:Ivone Garibay MD       2D ECHO:  TTE:  Results for orders placed or performed during the hospital encounter of 12/13/23   Echo   Result Value Ref Range    RA Width 4.54 cm    LA volume (mod) 57.20 cm3    Left Atrium Major Axis 5.74 cm    Left Atrium Minor Axis 6.25 cm    RA Major Axis 4.49 cm    LV Diastolic Volume 104.64 mL    LV Systolic Volume 33.78 mL    MV Peak A Larry 0.87 m/s    MV stenosis pressure 1/2 time 31.36 ms    TR Max Larry 2.84 m/s    MV Peak E Larry 0.71 m/s    Ao VTI 29.53 cm    Ao peak larry 1.65 m/s    LVOT peak VTI 13.49 cm    LVOT peak larry 0.86 m/s    LVOT diameter 2.20 cm    IVRT 105.61 msec    E wave deceleration time 108.15 msec    AV mean gradient 7 mmHg    TAPSE 1.10 cm    RVDD 3.41 cm    LA size 4.89 cm    Ascending aorta 3.00 cm    STJ 2.45 cm    Sinus 3.10 cm    LVIDs 2.96 2.1 - 4.0 cm    Posterior Wall 0.88 0.6 - 1.1 cm    IVS 0.84 0.6 - 1.1 cm    LVIDd 4.74 3.5 - 6.0 cm    TDI LATERAL 0.15 m/s    LA WIDTH 4.56 cm    TDI SEPTAL 0.07 m/s    LV LATERAL E/E' RATIO 4.73 m/s    LV SEPTAL E/E' RATIO 10.14 m/s    FS 38 28 - 44 %    LA volume 113.42 cm3    LV mass 136.29 g    ZLVIDD -0.95     ZLVIDS -0.65     Left Ventricle Relative Wall Thickness 0.37 cm    AV valve area 1.74 cm²    AV Velocity Ratio 0.52     AV index (prosthetic) 0.46     MV valve area p 1/2 method 7.02 cm2    E/A ratio 0.82     Mean e' 0.11 m/s    LVOT area 3.8 cm2    LVOT stroke volume 51.25 cm3    AV peak gradient 11 mmHg    E/E' ratio 6.45 m/s    LV Systolic Volume Index 18.1 mL/m2    LV Diastolic Volume Index 55.96 mL/m2    LA Volume Index 60.7 mL/m2    LV Mass Index 73 g/m2    Triscuspid Valve Regurgitation Peak Gradient 32 mmHg    LA Volume Index (Mod) 30.6 mL/m2    BRYAN by Velocity Ratio 1.98 cm²    BSA 1.89 m2    EF 43 %    TV resting pulmonary artery pressure 35 mmHg    RV TB RVSP 6  mmHg    Est. RA pres 3 mmHg    Narrative      Left Ventricle: The left ventricle is normal in size. Normal wall   thickness. Regional wall motion abnormalities present. See diagram for   wall motion findings. There is mildly reduced systolic function with a   visually estimated ejection fraction of 40 - 45%. Ejection fraction by   visual approximation is 43%. There is normal diastolic function.    Right Ventricle: Normal right ventricular cavity size. Wall thickness   is normal. Right ventricle wall motion  is normal. Systolic function is   normal.    Left Atrium: Left atrium is severely dilated. 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    Aortic Valve: The aortic valve is a trileaflet valve. There is mild   aortic valve sclerosis. There is mild stenosis. Aortic valve area by VTI   is 1.74 cm². Aortic valve peak velocity is 1.65 m/s. Mean gradient is 7   mmHg. The dimensionless index is 0.46. There is trace aortic   regurgitation.    Mitral Valve: There is mild bileaflet sclerosis.    Tricuspid Valve: There is mild regurgitation.    Pulmonary Artery: The estimated pulmonary artery systolic pressure is   35 mmHg.    IVC/SVC: Normal venous pressure at 3 mmHg.         CHARMAINE:  No results found for this or any previous visit.    ASSESSMENT/PLAN:           Pre-op Assessment    I have reviewed the Patient Summary Reports.     I have reviewed the Nursing Notes.    I have reviewed the Medications.     Review of Systems  Anesthesia Hx:  No problems with previous Anesthesia   History of prior surgery of interest to airway management or planning: heart surgery.         Denies Family Hx of Anesthesia complications.    Denies Personal Hx of Anesthesia complications.                    Social:  Non-Smoker, Alcohol Use       Hematology/Oncology:       -- Denies Anemia:                                  Cardiovascular:        CAD   CABG/stent Dysrhythmias  Angina, with exertion  Denies CHF.    hyperlipidemia                              Pulmonary:    Denies COPD.  Denies Asthma.  Shortness of breath                  Renal/:   Denies Chronic Renal Disease. renal calculi               Hepatic/GI:      Denies GERD.             Musculoskeletal:  Denies Arthritis.               Neurological:    Denies CVA.    Denies Seizures.                                Endocrine:  Denies Diabetes.           Psych:  Denies Psychiatric History.                  Physical Exam  General: Well nourished, Cooperative, Alert and Oriented    Airway:  Mallampati: II   Mouth Opening: Normal  TM Distance: Normal  Tongue: Normal  Neck ROM: Normal ROM    Dental:  Intact  All permanent implants      Anesthesia Plan  Type of Anesthesia, risks & benefits discussed:    Anesthesia Type: Gen ETT  Intra-op Monitoring Plan: Standard ASA Monitors and CHARMAINE  Post Op Pain Control Plan: multimodal analgesia and IV/PO Opioids PRN  Induction:  IV  Airway Plan: Direct and Video, Post-Induction  Informed Consent: Informed consent signed with the Patient and all parties understand the risks and agree with anesthesia plan.  All questions answered.   ASA Score: 3  Day of Surgery Review of History & Physical: H&P Update referred to the surgeon/provider.  Anesthesia Plan Notes: Cardiology requesting intubation    Ready For Surgery From Anesthesia Perspective.     .

## 2023-12-22 ENCOUNTER — ANESTHESIA (OUTPATIENT)
Dept: MEDSURG UNIT | Facility: HOSPITAL | Age: 68
DRG: 872 | End: 2023-12-22
Payer: MEDICARE

## 2023-12-22 VITALS
TEMPERATURE: 97 F | HEIGHT: 67 IN | SYSTOLIC BLOOD PRESSURE: 116 MMHG | RESPIRATION RATE: 16 BRPM | WEIGHT: 167 LBS | DIASTOLIC BLOOD PRESSURE: 68 MMHG | OXYGEN SATURATION: 95 % | BODY MASS INDEX: 26.21 KG/M2 | HEART RATE: 85 BPM

## 2023-12-22 LAB
ALBUMIN SERPL BCP-MCNC: 2.7 G/DL (ref 3.5–5.2)
ALP SERPL-CCNC: 94 U/L (ref 55–135)
ALT SERPL W/O P-5'-P-CCNC: 50 U/L (ref 10–44)
ANION GAP SERPL CALC-SCNC: 10 MMOL/L (ref 8–16)
AST SERPL-CCNC: 79 U/L (ref 10–40)
BACTERIA BLD CULT: NORMAL
BACTERIA BLD CULT: NORMAL
BASOPHILS # BLD AUTO: 0.06 K/UL (ref 0–0.2)
BASOPHILS NFR BLD: 0.8 % (ref 0–1.9)
BILIRUB SERPL-MCNC: 1 MG/DL (ref 0.1–1)
BSA FOR ECHO PROCEDURE: 1.89 M2
BUN SERPL-MCNC: 14 MG/DL (ref 8–23)
CALCIUM SERPL-MCNC: 8.6 MG/DL (ref 8.7–10.5)
CHLORIDE SERPL-SCNC: 102 MMOL/L (ref 95–110)
CO2 SERPL-SCNC: 25 MMOL/L (ref 23–29)
CREAT SERPL-MCNC: 0.9 MG/DL (ref 0.5–1.4)
DIFFERENTIAL METHOD: ABNORMAL
EOSINOPHIL # BLD AUTO: 0.2 K/UL (ref 0–0.5)
EOSINOPHIL NFR BLD: 2.3 % (ref 0–8)
ERYTHROCYTE [DISTWIDTH] IN BLOOD BY AUTOMATED COUNT: 14 % (ref 11.5–14.5)
EST. GFR  (NO RACE VARIABLE): >60 ML/MIN/1.73 M^2
GLUCOSE SERPL-MCNC: 89 MG/DL (ref 70–110)
H PYLORI AG STL QL IA: NOT DETECTED
HCT VFR BLD AUTO: 36.6 % (ref 40–54)
HGB BLD-MCNC: 12.5 G/DL (ref 14–18)
IMM GRANULOCYTES # BLD AUTO: 0.1 K/UL (ref 0–0.04)
IMM GRANULOCYTES NFR BLD AUTO: 1.4 % (ref 0–0.5)
LYMPHOCYTES # BLD AUTO: 0.9 K/UL (ref 1–4.8)
LYMPHOCYTES NFR BLD: 12.8 % (ref 18–48)
MAGNESIUM SERPL-MCNC: 2 MG/DL (ref 1.6–2.6)
MCH RBC QN AUTO: 30 PG (ref 27–31)
MCHC RBC AUTO-ENTMCNC: 34.2 G/DL (ref 32–36)
MCV RBC AUTO: 88 FL (ref 82–98)
MONOCYTES # BLD AUTO: 0.6 K/UL (ref 0.3–1)
MONOCYTES NFR BLD: 7.8 % (ref 4–15)
NEUTROPHILS # BLD AUTO: 5.5 K/UL (ref 1.8–7.7)
NEUTROPHILS NFR BLD: 74.9 % (ref 38–73)
NRBC BLD-RTO: 0 /100 WBC
PLATELET # BLD AUTO: 363 K/UL (ref 150–450)
PMV BLD AUTO: 9.2 FL (ref 9.2–12.9)
POTASSIUM SERPL-SCNC: 4.2 MMOL/L (ref 3.5–5.1)
PROT SERPL-MCNC: 6.6 G/DL (ref 6–8.4)
RBC # BLD AUTO: 4.17 M/UL (ref 4.6–6.2)
SODIUM SERPL-SCNC: 137 MMOL/L (ref 136–145)
WBC # BLD AUTO: 7.32 K/UL (ref 3.9–12.7)

## 2023-12-22 PROCEDURE — D9220A PRA ANESTHESIA: Mod: ANES,,, | Performed by: ANESTHESIOLOGY

## 2023-12-22 PROCEDURE — 63600175 PHARM REV CODE 636 W HCPCS: Performed by: REGISTERED NURSE

## 2023-12-22 PROCEDURE — 37000008 HC ANESTHESIA 1ST 15 MINUTES

## 2023-12-22 PROCEDURE — 25000003 PHARM REV CODE 250

## 2023-12-22 PROCEDURE — 25000003 PHARM REV CODE 250: Performed by: REGISTERED NURSE

## 2023-12-22 PROCEDURE — D9220A PRA ANESTHESIA: ICD-10-PCS | Mod: ANES,,, | Performed by: ANESTHESIOLOGY

## 2023-12-22 PROCEDURE — 25000003 PHARM REV CODE 250: Performed by: NURSE ANESTHETIST, CERTIFIED REGISTERED

## 2023-12-22 PROCEDURE — 37000009 HC ANESTHESIA EA ADD 15 MINS

## 2023-12-22 PROCEDURE — C1751 CATH, INF, PER/CENT/MIDLINE: HCPCS

## 2023-12-22 PROCEDURE — D9220A PRA ANESTHESIA: Mod: CRNA,,, | Performed by: NURSE ANESTHETIST, CERTIFIED REGISTERED

## 2023-12-22 PROCEDURE — 63600175 PHARM REV CODE 636 W HCPCS: Performed by: NURSE ANESTHETIST, CERTIFIED REGISTERED

## 2023-12-22 PROCEDURE — 36415 COLL VENOUS BLD VENIPUNCTURE: CPT | Performed by: HOSPITALIST

## 2023-12-22 PROCEDURE — 83735 ASSAY OF MAGNESIUM: CPT | Performed by: HOSPITALIST

## 2023-12-22 PROCEDURE — D9220A PRA ANESTHESIA: ICD-10-PCS | Mod: CRNA,,, | Performed by: NURSE ANESTHETIST, CERTIFIED REGISTERED

## 2023-12-22 PROCEDURE — 80053 COMPREHEN METABOLIC PANEL: CPT | Performed by: HOSPITALIST

## 2023-12-22 PROCEDURE — 76937 US GUIDE VASCULAR ACCESS: CPT

## 2023-12-22 PROCEDURE — 85025 COMPLETE CBC W/AUTO DIFF WBC: CPT | Performed by: HOSPITALIST

## 2023-12-22 PROCEDURE — 36410 VNPNXR 3YR/> PHY/QHP DX/THER: CPT

## 2023-12-22 RX ORDER — ROCURONIUM BROMIDE 10 MG/ML
INJECTION, SOLUTION INTRAVENOUS
Status: DISCONTINUED | OUTPATIENT
Start: 2023-12-22 | End: 2023-12-22

## 2023-12-22 RX ORDER — SUCCINYLCHOLINE CHLORIDE 20 MG/ML
INJECTION INTRAMUSCULAR; INTRAVENOUS
Status: DISCONTINUED | OUTPATIENT
Start: 2023-12-22 | End: 2023-12-22

## 2023-12-22 RX ORDER — PHENYLEPHRINE HYDROCHLORIDE 10 MG/ML
INJECTION INTRAVENOUS
Status: DISCONTINUED | OUTPATIENT
Start: 2023-12-22 | End: 2023-12-22

## 2023-12-22 RX ORDER — LIDOCAINE HYDROCHLORIDE 20 MG/ML
INJECTION INTRAVENOUS
Status: DISCONTINUED | OUTPATIENT
Start: 2023-12-22 | End: 2023-12-22

## 2023-12-22 RX ORDER — FENTANYL CITRATE 50 UG/ML
INJECTION, SOLUTION INTRAMUSCULAR; INTRAVENOUS
Status: DISCONTINUED | OUTPATIENT
Start: 2023-12-22 | End: 2023-12-22

## 2023-12-22 RX ORDER — HALOPERIDOL 5 MG/ML
0.5 INJECTION INTRAMUSCULAR EVERY 10 MIN PRN
Status: DISCONTINUED | OUTPATIENT
Start: 2023-12-22 | End: 2023-12-22 | Stop reason: HOSPADM

## 2023-12-22 RX ORDER — ONDANSETRON 2 MG/ML
INJECTION INTRAMUSCULAR; INTRAVENOUS
Status: DISCONTINUED | OUTPATIENT
Start: 2023-12-22 | End: 2023-12-22

## 2023-12-22 RX ORDER — FENTANYL CITRATE 50 UG/ML
25 INJECTION, SOLUTION INTRAMUSCULAR; INTRAVENOUS EVERY 5 MIN PRN
Status: DISCONTINUED | OUTPATIENT
Start: 2023-12-22 | End: 2023-12-22 | Stop reason: HOSPADM

## 2023-12-22 RX ORDER — SODIUM CHLORIDE 0.9 % (FLUSH) 0.9 %
10 SYRINGE (ML) INJECTION EVERY 6 HOURS
Status: DISCONTINUED | OUTPATIENT
Start: 2023-12-22 | End: 2023-12-22 | Stop reason: HOSPADM

## 2023-12-22 RX ORDER — MIDAZOLAM HYDROCHLORIDE 1 MG/ML
INJECTION, SOLUTION INTRAMUSCULAR; INTRAVENOUS
Status: DISCONTINUED | OUTPATIENT
Start: 2023-12-22 | End: 2023-12-22

## 2023-12-22 RX ORDER — SODIUM CHLORIDE 0.9 % (FLUSH) 0.9 %
10 SYRINGE (ML) INJECTION
Status: DISCONTINUED | OUTPATIENT
Start: 2023-12-22 | End: 2023-12-22 | Stop reason: HOSPADM

## 2023-12-22 RX ORDER — PROPOFOL 10 MG/ML
VIAL (ML) INTRAVENOUS
Status: DISCONTINUED | OUTPATIENT
Start: 2023-12-22 | End: 2023-12-22

## 2023-12-22 RX ADMIN — PHENYLEPHRINE HYDROCHLORIDE 200 MCG: 10 INJECTION INTRAVENOUS at 09:12

## 2023-12-22 RX ADMIN — DICYCLOMINE HYDROCHLORIDE 10 MG: 10 CAPSULE ORAL at 12:12

## 2023-12-22 RX ADMIN — FENTANYL CITRATE 50 MCG: 50 INJECTION, SOLUTION INTRAMUSCULAR; INTRAVENOUS at 09:12

## 2023-12-22 RX ADMIN — DICYCLOMINE HYDROCHLORIDE 10 MG: 10 CAPSULE ORAL at 10:12

## 2023-12-22 RX ADMIN — PHENYLEPHRINE HYDROCHLORIDE 100 MCG: 10 INJECTION INTRAVENOUS at 09:12

## 2023-12-22 RX ADMIN — MIDAZOLAM HYDROCHLORIDE 2 MG: 1 INJECTION, SOLUTION INTRAMUSCULAR; INTRAVENOUS at 09:12

## 2023-12-22 RX ADMIN — ROCURONIUM BROMIDE 10 MG: 10 INJECTION INTRAVENOUS at 09:12

## 2023-12-22 RX ADMIN — CEFAZOLIN 2 G: 2 INJECTION, POWDER, FOR SOLUTION INTRAMUSCULAR; INTRAVENOUS at 05:12

## 2023-12-22 RX ADMIN — ONDANSETRON 4 MG: 2 INJECTION INTRAMUSCULAR; INTRAVENOUS at 09:12

## 2023-12-22 RX ADMIN — EZETIMIBE 10 MG: 10 TABLET ORAL at 10:12

## 2023-12-22 RX ADMIN — ATORVASTATIN CALCIUM 80 MG: 40 TABLET, FILM COATED ORAL at 10:12

## 2023-12-22 RX ADMIN — CEFAZOLIN 2 G: 2 INJECTION, POWDER, FOR SOLUTION INTRAMUSCULAR; INTRAVENOUS at 12:12

## 2023-12-22 RX ADMIN — SODIUM CHLORIDE, SODIUM GLUCONATE, SODIUM ACETATE, POTASSIUM CHLORIDE, MAGNESIUM CHLORIDE, SODIUM PHOSPHATE, DIBASIC, AND POTASSIUM PHOSPHATE: .53; .5; .37; .037; .03; .012; .00082 INJECTION, SOLUTION INTRAVENOUS at 09:12

## 2023-12-22 RX ADMIN — FENTANYL CITRATE 25 MCG: 50 INJECTION, SOLUTION INTRAMUSCULAR; INTRAVENOUS at 09:12

## 2023-12-22 RX ADMIN — SUCCINYLCHOLINE CHLORIDE 120 MG: 20 INJECTION, SOLUTION INTRAMUSCULAR; INTRAVENOUS at 09:12

## 2023-12-22 RX ADMIN — ASPIRIN 81 MG: 81 TABLET, COATED ORAL at 10:12

## 2023-12-22 RX ADMIN — PROPOFOL 150 MG: 10 INJECTION, EMULSION INTRAVENOUS at 09:12

## 2023-12-22 RX ADMIN — LIDOCAINE HYDROCHLORIDE 100 MG: 20 INJECTION INTRAVENOUS at 09:12

## 2023-12-22 NOTE — PLAN OF CARE
[facility] - Med Surg  Discharge Reassessment    Primary Care Provider: Dominguez Hyatt MD    Expected Discharge Date: 12/23/2023      Patient remains inpatient due to continued need for medical management. Patient undergoing CHARMAINE. Will continue to follow for post acute needs. Discharge Plan A and Plan B have been determined by review of patient's clinical status, future medical and therapeutic needs, and coverage/benefits for post-acute care in coordination with multidisciplinary team members.  Reassessment (most recent)       Discharge Reassessment - 12/22/23 1414          Discharge Reassessment    Assessment Type Discharge Planning Reassessment (P)      Did the patient's condition or plan change since previous assessment? Yes (P)      Discharge Plan discussed with: Patient (P)      Communicated MAGNO with patient/caregiver Date not available/Unable to determine (P)      Discharge Plan A Home with family (P)      Discharge Plan B Home (P)      DME Needed Upon Discharge  none (P)      Transition of Care Barriers None (P)      Why the patient remains in the hospital Requires continued medical care (P)         Post-Acute Status    Discharge Delays None known at this time (P)                      RALPH Ryan  Case Management  [phone]

## 2023-12-22 NOTE — PROGRESS NOTES
[facility] - Med Surg  Infectious Disease  Progress Note    Patient Name: Michael Espinoza  MRN: [id number]  Admission Date: 12/13/2023  Length of Stay: 7 days  Attending Physician: Teo Thomson MD  Primary Care Provider: Dominguez Hyatt MD    Isolation Status: No active isolations  Assessment/Plan:      Renal/  Complicated UTI (urinary tract infection)  See AP above.     ID  Bacteremia  68 year old male with history of CAD s/p CABG x3, HLD, heart block s/p pacemaker admitted with MSSA bacteremia and MSSA UTI.     CT A/P reviewed and notable for nonobstructing renal stone, enlarged prostate. Patient is on IV Cefazolin. Blood cultures persistently positive (12/14 - 12/16), but repeats on 12/17 no growth thus far. Afebrile and HDS. Symptomatically improved. Of note, his urine culture in September was also positive for MSSA.    CHARMAINE aborted, recommending CT for further evaluation of esophagus. CT chest without evidence of obstruction. Repeated CHARMAINE today, without concerns for endocarditis. MRI spine without infectious concerns, without fluid collection or note of osteo like changes.         Recommendations:  Continue IV Cefazolin 2g IV q8hr   Will continue for 4 weeks from negative culture for complicated bacteremia   Will need ID follow up, ID will schedule.   Plan reviewed with ID staff. ID will sign off. See OPAT below    Outpatient Antibiotic Therapy Plan:    Please send referral to [facility].    1) Infection: MSSA bacteremia     2) Discharge Antibiotics:    Intravenous antibiotics:  Cefazolin 6g IV CI OR 2g IV q 8 hours     3) Therapy Duration:  4 weeks from cleared culture     Estimated end date of IV antibiotics: 01/14/23    4) Outpatient Weekly Labs:    Order the following labs to be drawn on Mondays:   CBC  CMP   CRP    5) Fax Lab Results to Infectious Diseases Provider: KELIN Oshea FNP-C    [facility] Fax Number: [phone]    6) Outpatient Infectious Diseases  Follow-up    Follow-up appointment will be arranged by the ID clinic and will be found in the patient's appointments tab.    Prior to discharge, please ensure the patient's follow-up has been scheduled.    If there is still no follow-up scheduled prior to discharge, please send an EPIC message to Marlee Armas in Infectious Diseases.               Thank you for your consult. I will sign off. Please contact us if you have any additional questions.    Emelyn Renee NP  Infectious Disease  [facility] Surg    Subjective:     Principal Problem:Sepsis without acute organ dysfunction    HPI: Mr. Espinoza is a 67 yo male with PMH of CAD s/p CABG x3, HLD, heart block s/p dual chamber pacemaker who presents to [facility] ED with cc of persistant diarrhea x6 days. Pt reported this started on 12/8, and diarrhea persisted. Pt started with dysuria, right flank pain, fevers, and presented to ED.     Since admission, pt was febrile, without leukocytosis. Blood culture + staph aureus, MRSA negative on PCR. Repeats ordered. Urine culture + staph aureus, pending. Stool studies negative. Flu swab negative.     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 Reports he is very active, and routinely walks long distances. Pt denies recent wounds, injuries. Denies swollen joints, knees. Denies hardware other than pacemaker that was placed three years ago. Currently, denies fever, chills. Reports he is tolerating small amts of food again. Denies NVD. Denies flank pain, dysuria. States he is feeling better.     To note, micro hx significant for + MSSA urine culture from 9/2023. Pt states it was found on routine physical. Denies dysuria at that time, but states his urine was forthy. Pt states he followed with his PCP and took bactrim as prescribed.     Pt has received vanc, ceftriaxone, and cipro. ID was consulted d/t staph aures bacteremia.     Interval History:     Afebrile. HDS. Blood cultures positive from  12/14-12/16, MSSA. Repeats on 12/17 and 12/19 NGTD. CHARMAINE negative.     Review of Systems   Constitutional:  Negative for appetite change, chills, diaphoresis, fatigue and fever.   Eyes:  Negative for visual disturbance.   Respiratory:  Negative for cough and shortness of breath.    Cardiovascular:  Negative for chest pain and leg swelling.   Gastrointestinal:  Negative for abdominal pain, constipation, diarrhea (loose stools), nausea and vomiting.   Genitourinary:  Negative for difficulty urinating, dysuria, frequency and hematuria.   Musculoskeletal:  Negative for arthralgias, back pain and joint swelling.   Skin:  Negative for color change, rash and wound.   Neurological:  Negative for dizziness, weakness, numbness and headaches.   Psychiatric/Behavioral:  Negative for agitation and confusion. The patient is not nervous/anxious.    All other systems reviewed and are negative.    Objective:     Vital Signs (Most Recent):  Temp: 97.5 °F (36.4 °C) (12/22/23 1111)  Pulse: 70 (12/22/23 1111)  Resp: 16 (12/22/23 1111)  BP: 118/75 (12/22/23 1111)  SpO2: (!) 93 % (12/22/23 1111) Vital Signs (24h Range):  Temp:  [97.5 °F (36.4 °C)-98 °F (36.7 °C)] 97.5 °F (36.4 °C)  Pulse:  [70-99] 70  Resp:  [16-22] 16  SpO2:  [93 %-100 %] 93 %  BP: (103-141)/(61-85) 118/75     Weight: 75.8 kg (167 lb)  Body mass index is 26.16 kg/m².    Estimated Creatinine Clearance: 73.4 mL/min (based on SCr of 0.9 mg/dL).     Physical Exam  Vitals reviewed.   Constitutional:       General: He is not in acute distress.     Appearance: Normal appearance. He is well-developed. He is not ill-appearing or diaphoretic.   HENT:      Head: Normocephalic and atraumatic.      Right Ear: External ear normal.      Left Ear: External ear normal.      Nose: Nose normal.   Eyes:      General: No scleral icterus.        Right eye: No discharge.         Left eye: No discharge.      Extraocular Movements: Extraocular movements intact.      Conjunctiva/sclera:  Conjunctivae normal.   Cardiovascular:      Rate and Rhythm: Normal rate and regular rhythm.   Pulmonary:      Effort: Pulmonary effort is normal. No respiratory distress.      Breath sounds: No stridor.   Abdominal:      General: There is no distension.      Palpations: Abdomen is soft.   Musculoskeletal:         General: No swelling or tenderness.      Comments: No joint or midline spine tenderness   Skin:     General: Skin is dry.      Coloration: Skin is not jaundiced or pale.      Findings: No erythema or rash.   Neurological:      General: No focal deficit present.      Mental Status: He is alert and oriented to person, place, and time. Mental status is at baseline.      Motor: No weakness.      Gait: Gait normal.   Psychiatric:         Mood and Affect: Mood normal.         Behavior: Behavior normal.         Thought Content: Thought content normal.         Judgment: Judgment normal.          Significant Labs: CBC:   Recent Labs   Lab 12/21/23 0257 12/22/23 0455   WBC 7.99 7.32   HGB 12.2* 12.5*   HCT 37.1* 36.6*    363       CMP:   Recent Labs   Lab 12/21/23 0257 12/22/23 0455    137   K 4.4 4.2    102   CO2 25 25   GLU 90 89   BUN 12 14   CREATININE 0.8 0.9   CALCIUM 8.7 8.6*   PROT 6.7 6.6   ALBUMIN 2.6* 2.7*   BILITOT 1.2* 1.0   ALKPHOS 94 94   AST 96* 79*   ALT 59* 50*   ANIONGAP 8 10       Microbiology Results (last 7 days)       Procedure Component Value Units Date/Time    Blood culture [[id number]] Collected: 12/19/23 0702    Order Status: Completed Specimen: Blood Updated: 12/22/23 1012     Blood Culture, Routine No Growth to date      No Growth to date      No Growth to date      No Growth to date    Narrative:      Rt wrist    Blood culture [[id number]] Collected: 12/19/23 0702    Order Status: Completed Specimen: Blood Updated: 12/22/23 1012     Blood Culture, Routine No Growth to date      No Growth to date      No Growth to date      No Growth to date    Narrative:      Rt  AC    Blood culture [[id number]] Collected: 12/17/23 0458    Order Status: Completed Specimen: Blood Updated: 12/22/23 0612     Blood Culture, Routine No growth after 5 days.    Blood culture [[id number]] Collected: 12/17/23 0458    Order Status: Completed Specimen: Blood Updated: 12/22/23 0612     Blood Culture, Routine No growth after 5 days.    Blood culture [[id number]]  (Abnormal) Collected: 12/16/23 0341    Order Status: Completed Specimen: Blood Updated: 12/19/23 1113     Blood Culture, Routine Gram stain aer bottle: Gram positive cocci in clusters resembling Staph      Positive results previously called 12/17/2023      STAPHYLOCOCCUS AUREUS  For susceptibility see order #[id number]      Blood culture [[id number]]  (Abnormal) Collected: 12/16/23 0341    Order Status: Completed Specimen: Blood Updated: 12/19/23 1112     Blood Culture, Routine Gram stain aer bottle: Gram positive cocci in clusters resembling Staph      Results called to and read back by: Lei Marmolejo 12/17/2023  02:14      STAPHYLOCOCCUS AUREUS  For susceptibility see order #[id number]      Blood culture [[id number]]  (Abnormal) Collected: 12/14/23 2310    Order Status: Completed Specimen: Blood Updated: 12/17/23 0902     Blood Culture, Routine Gram stain aer bottle: Gram positive cocci in clusters resembling Staph      Positive results previously called 12/15/2023  17:26      STAPHYLOCOCCUS AUREUS  For susceptibility see order #[id number]      Blood culture [[id number]]  (Abnormal) Collected: 12/14/23 2310    Order Status: Completed Specimen: Blood Updated: 12/17/23 0902     Blood Culture, Routine Gram stain aer bottle: Gram positive cocci in clusters resembling Staph      Results called to and read back by: Krupa Salas RN 12/15/2023  15:52      STAPHYLOCOCCUS AUREUS  For susceptibility see order #[id number]      Urine culture [[id number]]  (Abnormal)  (Susceptibility) Collected: 12/13/23 2346    Order Status: Completed Specimen: Urine  Updated: 12/16/23 2048     Urine Culture, Routine STAPHYLOCOCCUS AUREUS  > 100,000 cfu/ml      Narrative:      Specimen Source->Urine    Blood culture [[id number]]  (Abnormal) Collected: 12/14/23 0552    Order Status: Completed Specimen: Blood from Peripheral, Forearm, Right Updated: 12/16/23 1026     Blood Culture, Routine Gram stain aer bottle: Gram positive cocci in clusters resembling Staph      Positive results previously called 12/14/2023      STAPHYLOCOCCUS AUREUS  For susceptibility see order #[id number]      Blood culture [[id number]]  (Abnormal)  (Susceptibility) Collected: 12/14/23 0552    Order Status: Completed Specimen: Blood from Peripheral, Forearm, Right Updated: 12/16/23 1025     Blood Culture, Routine Gram stain aer bottle: Gram positive cocci in clusters resembling Staph      Results called to and read back by:Benita Mercedes RN 12/14/2023  20:43      STAPHYLOCOCCUS AUREUS          Recent Lab Results         12/22/23  0455        Albumin 2.7       ALP 94       ALT 50       Anion Gap 10       AST 79       Baso # 0.06       Basophil % 0.8       BILIRUBIN TOTAL 1.0  Comment: For infants and newborns, interpretation of results should be based  on gestational age, weight and in agreement with clinical  observations.    Premature Infant recommended reference ranges:  Up to 24 hours.............<8.0 mg/dL  Up to 48 hours............<12.0 mg/dL  3-5 days..................<15.0 mg/dL  6-29 days.................<15.0 mg/dL         BUN 14       Calcium 8.6       Chloride 102       CO2 25       Creatinine 0.9       Differential Method Automated       eGFR >60.0       Eos # 0.2       Eosinophil % 2.3       Glucose 89       Gran # (ANC) 5.5       Gran % 74.9       Hematocrit 36.6       Hemoglobin 12.5       Immature Grans (Abs) 0.10  Comment: Mild elevation in immature granulocytes is non specific and   can be seen in a variety of conditions including stress response,   acute inflammation, trauma and  pregnancy. Correlation with other   laboratory and clinical findings is essential.         Immature Granulocytes 1.4       Lymph # 0.9       Lymph % 12.8       Magnesium  2.0       MCH 30.0       MCHC 34.2       MCV 88       Mono # 0.6       Mono % 7.8       MPV 9.2       nRBC 0       Platelet Count 363       Potassium 4.2       PROTEIN TOTAL 6.6       RBC 4.17       RDW 14.0       Sodium 137       WBC 7.32               Significant Imaging:     Imaging Results              CT Abdomen Pelvis With IV Contrast NO Oral Contrast (Final result)  Result time 12/14/23 08:54:04      Final result by Lalo Puri MD (12/14/23 08:54:04)                   Impression:      1. Intraluminal fluid throughout the small and large bowel with adjacent mesenteric fat stranding and free fluid, as may be seen in the setting of a nonspecific infectious or noninfectious inflammatory enterocolitis.  2. Nonobstructive right nephrolithiasis.  3. Small bilateral pleural effusions.  4. Additional details of chronic and incidental findings, as provided in the body of report.    Electronically signed by resident: Katherine Hidalgo  Date:    12/14/2023  Time:    06:04    Electronically signed by: Lalo Puri  Date:    12/14/2023  Time:    08:54               Narrative:    EXAMINATION:  CT ABDOMEN PELVIS WITH IV CONTRAST    CLINICAL HISTORY:  Abdominal abscess/infection suspected;LLQ abdominal pain;    TECHNIQUE:  Low dose axial images, sagittal and coronal reformations were obtained from the lung bases to the pubic symphysis following the IV administration of 75 mL of Omnipaque 350.Oral contrast was not given.    COMPARISON:  Ultrasound abdomen 11/01/2023    FINDINGS:  Comment: Motion compromised examination.    Lower thorax:    Heart: Cardiomegaly.  Cardiac pacing leads.  Severe calcific atherosclerosis of multiple coronary arteries.    Lung Bases: Small bilateral pleural effusions, larger on the right.  Bibasal linear opacities, likely  subsegmental atelectasis versus scarring.    Liver: Normal in size and attenuation, with no focal hepatic lesions.    Gallbladder: No calcified gallstones.  Gallbladder is not distended.  No gallbladder wall thickening or pericholecystic fluid collection.    Bile Ducts: No intra or extrahepatic biliary duct dilatation.    Pancreas: No mass or peripancreatic fat stranding.    Spleen: Normal in size.  No focal lesion.    Adrenals: Unremarkable.    Kidneys/ Ureters: Normal in size and location. Normal enhancement.  1.3 cm right lower pole nonobstructive nephrolithiasis.  No hydronephrosis or hydroureter.  Multiple cortical hypodensities which are too small to characterize likely renal cysts.  Nonspecific mild bilateral perinephric stranding.    Bladder: No evidence of wall thickening.    Reproductive organs: Prostate appears enlarged, measuring 4.6 cm with dystrophic calcification.    Gl/Mesentery/peritoneum and retroperitoneum: Small hiatal hernia.  Stomach is unremarkable.  Small and large bowel are normal in caliber with no evidence of obstruction.  Liquid stool throughout the small and large bowel with mild mesenteric soft tissue stranding and trace of free fluid, as may be seen in a nonspecific infectious versus noninfectious inflammatory enterocolitis.    Abdominal wall: Left fat containing inguinal hernia.    Vasculature: Moderate calcific atherosclerosis.  No aneurysm.    Bones: Degenerative changes.  Similar appearing compression fractures of T12 and L1 when compared to radiograph of 04/22/2021 and MRI of 04/05/2021, allowing for differences in technique/modality.  No acute fracture.  No suspicious lytic or sclerotic lesion.

## 2023-12-22 NOTE — PLAN OF CARE
Spoke to Ivan with [facility] and was informed that she is able to accept patient. The plan is for patient to come to [facility] suite once a week for lab draws and PICC dressing changes. Spoke with patient and he is agreeable.  Patient also states that a family member will pick him up once discharged.      Beau Byers, LIZZIECM  Case Management  [phone]

## 2023-12-22 NOTE — PLAN OF CARE
Problem: Adult Inpatient Plan of Care  Goal: Plan of Care Review  Outcome: Ongoing, Not Progressing  Goal: Patient-Specific Goal (Individualized)  Outcome: Ongoing, Not Progressing  Goal: Absence of Hospital-Acquired Illness or Injury  Outcome: Ongoing, Not Progressing  Goal: Optimal Comfort and Wellbeing  Outcome: Ongoing, Not Progressing  Goal: Readiness for Transition of Care  Outcome: Ongoing, Not Progressing     Problem: Infection  Goal: Absence of Infection Signs and Symptoms  Outcome: Ongoing, Not Progressing     Problem: Adjustment to Illness (Sepsis/Septic Shock)  Goal: Optimal Coping  Outcome: Ongoing, Not Progressing     Problem: Infection Progression (Sepsis/Septic Shock)  Goal: Absence of Infection Signs and Symptoms  Outcome: Ongoing, Not Progressing     Problem: Fall Injury Risk  Goal: Absence of Fall and Fall-Related Injury  Outcome: Ongoing, Not Progressing

## 2023-12-22 NOTE — SUBJECTIVE & OBJECTIVE
Interval History: For CHARMAINE today, than possible dc home. BC from 12/17 and 12/19 NGTD -PICC is needed.    Review of Systems  Objective:     Vital Signs (Most Recent):  Temp: 97.6 °F (36.4 °C) (12/22/23 0725)  Pulse: 82 (12/22/23 0725)  Resp: 18 (12/22/23 0725)  BP: (!) 141/85 (12/22/23 0912)  SpO2: 96 % (12/22/23 0725) Vital Signs (24h Range):  Temp:  [97.6 °F (36.4 °C)-98 °F (36.7 °C)] 97.6 °F (36.4 °C)  Pulse:  [76-99] 82  Resp:  [16-20] 18  SpO2:  [93 %-97 %] 96 %  BP: (109-141)/(68-85) 141/85     Weight: 75.8 kg (167 lb)  Body mass index is 26.16 kg/m².    Intake/Output Summary (Last 24 hours) at 12/22/2023 0956  Last data filed at 12/22/2023 0933  Gross per 24 hour   Intake 540 ml   Output --   Net 540 ml         Physical Exam  Vitals reviewed.   Constitutional:       General: He is not in acute distress.     Appearance: Normal appearance. He is well-developed. He is not ill-appearing or diaphoretic.   HENT:      Head: Normocephalic and atraumatic.      Right Ear: External ear normal.      Left Ear: External ear normal.      Nose: Nose normal.   Eyes:      General: No scleral icterus.        Right eye: No discharge.         Left eye: No discharge.      Extraocular Movements: Extraocular movements intact.      Conjunctiva/sclera: Conjunctivae normal.   Cardiovascular:      Rate and Rhythm: Normal rate and regular rhythm.   Pulmonary:      Effort: Pulmonary effort is normal. No respiratory distress.      Breath sounds: No stridor.   Abdominal:      General: There is no distension.      Palpations: Abdomen is soft.   Musculoskeletal:         General: No swelling or tenderness.      Comments: No joint or midline spine tenderness   Skin:     General: Skin is dry.      Coloration: Skin is not jaundiced or pale.      Findings: No erythema or rash.   Neurological:      General: No focal deficit present.      Mental Status: He is alert and oriented to person, place, and time. Mental status is at baseline.      Motor: No  weakness.      Gait: Gait normal.   Psychiatric:         Mood and Affect: Mood normal.         Behavior: Behavior normal.         Thought Content: Thought content normal.         Judgment: Judgment normal.             Significant Labs: All pertinent labs within the past 24 hours have been reviewed.  CBC:   Recent Labs   Lab 12/21/23  0257 12/22/23  0455   WBC 7.99 7.32   HGB 12.2* 12.5*   HCT 37.1* 36.6*    363       Significant Imaging: I have reviewed all pertinent imaging results/findings within the past 24 hours.

## 2023-12-22 NOTE — NURSING TRANSFER
Nursing Transfer Note      12/22/2023   10:33 AM    Nurse giving handoff:rosa rn   Nurse receiving handoff:leslie floor nurse    Reason patient is being transferred: d/c critieria met     Transfer To: 629    Transfer via stretcher    Transfer with cardiac monitoring and verified able to see patient on monitor     Transported by escort with ticket to rid e    Transfer Vital Signs:  Blood Pressure:see epic   Heart Rate:see epic   O2:room air   Temperature:see epic   Respirations:see epic     Telemetry: yes   Order for Tele Monitor? Yes    Additional Lines: none     4eyes on Skin: in recovery     Medicines sent: none     Any special needs or follow-up needed: none at this time     Patient belongings transferred with patient:  n/a    Chart send with patient: Yes    Notified: reported to floor nurse     Patient reassessed at: see epic  (date, time)  1  Upon arrival to floor: to room no complaints no distress noted.

## 2023-12-22 NOTE — CONSULTS
[facility], [place]  CHARMAINE Consult      Michael Espinoza  YOB: 1955  Medical Record Number:  [id number]  Attending Physician:  Teo Thomson MD   Current Principal Problem:  Sepsis without acute organ dysfunction    History     Cc: MSSA    HPI  68 year old male with history of CAD s/p CABG x3, HLD, heart block s/p pacemaker admitted with MSSA bacteremia and MSSA UTI.      CT A/P reviewed and notable for nonobstructing renal stone, enlarged prostate. Awaiting CHARMAINE, planned for tomorrow. Patient is on IV Cefazolin. Blood cultures persistently positive (12/14 - 12/16), but repeats on 12/17 no growth thus far. Afebrile and HDS. Symptomatically improved. Of note, his urine culture in September was also positive for MSSA.     Consult for CHARMAINE for endocarditis eval    Initially attempted CHARMAINE on 12/19. CHARMAINE aborted due to clinical instability (hypoxia) during procedure. Additionally, we had significant difficulties advancing the US probe. CT to further evaluate for esophageal obstruction - no esophageal obstuciton but signficiant cevical lordosis, thoracic kyphosis.      Plan for CHARMAINE with intubation today     Dysphagia or odynophagia:  No  Liver Disease, esophageal disease, or known varices:  No  Upper GI Bleeding: No  Snoring:  No  Sleep Apnea:  No  Prior neck surgery or radiation:  No  History of anesthetic difficulties:  No  Family history of anesthetic difficulties:  No  Last oral intake: yesterday before midnight  Able to move neck in all directions:  Yes    Medications - Outpatient  Prior to Admission medications    Medication Sig Start Date End Date Taking? Authorizing Provider   aspirin (ECOTRIN) 81 MG EC tablet Take 81 mg by mouth 2 (two) times daily. (Breakfast & Afternoon)   Yes Provider, Historical   atorvastatin (LIPITOR) 80 MG tablet TAKE 1 TABLET EVERY DAY  Patient taking differently: Take 80 mg by mouth every evening. 10/16/23  Yes Chad Tapia MD   calcium carbonate (TUMS  ORAL) Take 2 tablets by mouth daily as needed (Heartburn).   Yes Provider, Historical   coenzyme Q10 (CO Q-10) 300 mg Cap Take 1 capsule by mouth once daily.   Yes Provider, Historical   dicyclomine (BENTYL) 10 MG capsule Take 10 mg by mouth daily as needed (Abdominal pain).   Yes Provider, Historical   ERGOCALCIFEROL, VITAMIN D2, (VITAMIN D ORAL) Take 1 capsule by mouth Mon, Wed, Fri, Sat & Sun   Yes Provider, Historical   ezetimibe (ZETIA) 10 mg tablet TAKE 1 TABLET EVERY DAY  Patient taking differently: Take 10 mg by mouth once daily. 10/16/23  Yes Chad Tapia MD   multivit-min/FA/lycopen/lutein (CENTRUM SILVER MEN ORAL) Take 1 tablet by mouth every Mon, Wed, Fri.   Yes Provider, Historical   ondansetron (ZOFRAN-ODT) 4 MG TbDL Take 1 tablet (4 mg total) by mouth every 8 (eight) hours as needed (nausea). 12/11/23   Chen Alexander NP       Medications - Current  Scheduled Meds:   aspirin  81 mg Oral BID    atorvastatin  80 mg Oral Daily    ceFAZolin (ANCEF) IVPB  2 g Intravenous Q8H    dicyclomine  10 mg Oral QID    enoxparin  40 mg Subcutaneous Daily    ezetimibe  10 mg Oral Daily     Continuous Infusions:    Allergies  Review of patient's allergies indicates:  No Known Allergies  Past Medical History  Past Medical History:   Diagnosis Date    Complete heart block 7/5/2022    Coronary artery disease of native artery of native heart with stable angina pectoris 4/10/2018    Hyperlipidemia      Past Surgical History  Past Surgical History:   Procedure Laterality Date    A-V CARDIAC PACEMAKER INSERTION N/A 7/5/2022    Procedure: INSERTION, CARDIAC PACEMAKER, DUAL CHAMBER;  Surgeon: Alessandro Canales MD;  Location: [facility] EP LAB;  Service: Cardiology;  Laterality: N/A;  CHB, TBD, ANES, ED 6, MB    COLONOSCOPY N/A 8/5/2022    Procedure: COLONOSCOPY;  Surgeon: Namita Dumont MD;  Location: [facility] ENDO ([address] [place] [address]);  Service: Endoscopy;  Laterality: N/A;  vacc-inst portal-tb    CYST REMOVAL      TONSILLECTOMY    "    ROS  10 point ROS performed and negative except as stated in HPI     Physical Examination   Vital Signs  Vitals  Temp: 97.6 °F (36.4 °C)  Temp Source: Oral  Pulse: 82  Heart Rate Source: Monitor  Resp: 18  SpO2: 96 %  Pulse Oximetry Type: Continuous  Flow (L/min): 2  BP: 123/80  MAP (mmHg): 96  BP Location: Right arm  BP Method: Automatic  Patient Position: Lying    24 Hour VS Range  Temp:  [97.6 °F (36.4 °C)-98 °F (36.7 °C)]   Pulse:  [76-99]   Resp:  [16-20]   BP: (109-123)/(68-80)   SpO2:  [93 %-97 %]     Intake/Output Summary (Last 24 hours) at 12/22/2023 0759  Last data filed at 12/21/2023 2141  Gross per 24 hour   Intake 240 ml   Output --   Net 240 ml         Physical Exam  HENT:      Head: Normocephalic and atraumatic.   Eyes:      Conjunctiva/sclera: Conjunctivae normal.   Cardiovascular:      Rate and Rhythm: Normal rate and regular rhythm.   Pulmonary:      Effort: Pulmonary effort is normal. No respiratory distress.      Breath sounds: Normal breath sounds. No wheezing.   Abdominal:      General: There is no distension.      Palpations: Abdomen is soft.      Tenderness: There is no abdominal tenderness.   Musculoskeletal:      Cervical back: Normal range of motion.   Neurological:      Mental Status: He is alert and oriented to person, place, and time.   Psychiatric:         Mood and Affect: Affect normal.         Data     Recent Labs   Lab 12/17/23  0908 12/18/23  0538 12/19/23  0702 12/20/23  0328 12/21/23  0257   WBC 11.30 9.18 8.86 9.02 7.99   HGB 14.5 11.9* 13.4* 12.7* 12.2*   HCT 41.7 35.5* 39.8* 38.6* 37.1*    263 363 379 378      No results for input(s): "PROTIME", "INR" in the last 168 hours.   Recent Labs   Lab 12/18/23  0538 12/19/23  0702 12/20/23  0328 12/21/23  0257 12/22/23  0455    138 137 137 137   K 4.1 4.0 4.2 4.4 4.2    105 105 104 102   CO2 25 26 22* 25 25   BUN 11 9 11 12 14   CREATININE 0.8 0.8 0.7 0.8 0.9   ANIONGAP 7* 7* 10 8 10   CALCIUM 8.2* 8.9 8.7 8.7 " 8.6*      Assessment & Plan     CHARMAINE for endocarditis eval  -No absolute contraindications of esophageal stricture, tumor, perforation, laceration,or diverticulum and/or active GI bleed  -The risks, benefits & alternatives of the procedure were explained to the patient.   -The risks of transesophageal echo include but are not limited to:  Dental trauma, esophageal trauma/perforation, bleeding, laryngospasm/brochospasm, aspiration, sore throat/hoarseness, & dislodgement of the endotracheal tube/nasogastric tube (where applicable).    -The risks of ANES monitored sedation include hypotension, respiratory depression, arrhythmias, bronchospasm, & death.    -Informed consent was obtained. The patient is agreeable to proceed with the procedure and all questions and concerns addressed.    Case discussed with an attending in echocardiography lab.    Zuhair Lucia MD  [facility]   Cardiovascular Disease PGY-V

## 2023-12-22 NOTE — TRANSFER OF CARE
"Anesthesia Transfer of Care Note    Patient: Michael Espinoza    Procedure(s) Performed: Procedure(s) (LRB):  Transesophageal echo (CHARMAINE) intra-procedure log documentation (N/A)    Patient location: PACU    Anesthesia Type: general    Transport from OR: Transported from OR on 6-10 L/min O2 by face mask with adequate spontaneous ventilation    Post pain: adequate analgesia    Post assessment: no apparent anesthetic complications and tolerated procedure well    Post vital signs: stable    Level of consciousness: responds to stimulation and sedated    Nausea/Vomiting: no nausea/vomiting    Complications: none    Transfer of care protocol was followed      Last vitals: Visit Vitals  BP (!) 141/85   Pulse 82   Temp 36.4 °C (97.6 °F) (Oral)   Resp 18   Ht 5' 7" (1.702 m)   Wt 75.8 kg (167 lb)   SpO2 96%   BMI 26.16 kg/m²     "

## 2023-12-22 NOTE — ANESTHESIA PROCEDURE NOTES
Intubation    Date/Time: 12/22/2023 9:20 AM    Performed by: Tracee Rose CRNA  Authorized by: Mike Winston MD    Intubation:     Induction:  Intravenous    Intubated:  Postinduction    Mask Ventilation:  Easy mask    Attempts:  1    Attempted By:  CRNA    Method of Intubation:  Video laryngoscopy    Blade:  Avalos 3    Laryngeal View Grade: Grade I - full view of cords      Difficult Airway Encountered?: No      Complications:  None    Airway Device:  Oral endotracheal tube    Airway Device Size:  7.5    Style/Cuff Inflation:  Cuffed (inflated to minimal occlusive pressure)    Inflation Amount (mL):  8    Tube secured:  22    Secured at:  The lips    Placement Verified By:  Capnometry and Revisualization with laryngoscopy    Complicating Factors:  None    Findings Post-Intubation:  BS equal bilateral and atraumatic/condition of teeth unchanged

## 2023-12-22 NOTE — PROGRESS NOTES
Patient admitted to recovery see [facility] for complete assessment pacu bcg's maintained safety measures verified patient instructed on pain scale and patient verbalized understanding. No complaints no distress at this time.

## 2023-12-22 NOTE — PLAN OF CARE
Problem: Adult Inpatient Plan of Care  Goal: Plan of Care Review  Outcome: Ongoing, Progressing  Goal: Patient-Specific Goal (Individualized)  Outcome: Ongoing, Progressing  Goal: Absence of Hospital-Acquired Illness or Injury  Outcome: Ongoing, Progressing  Goal: Optimal Comfort and Wellbeing  Outcome: Ongoing, Progressing  Goal: Readiness for Transition of Care  Outcome: Ongoing, Progressing     Problem: Infection  Goal: Absence of Infection Signs and Symptoms  Outcome: Ongoing, Progressing     Problem: Adjustment to Illness (Sepsis/Septic Shock)  Goal: Optimal Coping  Outcome: Ongoing, Progressing   Patient VSS.NAD. Patient with no complaints this shift. Safety maintained.Will continue to monitor.

## 2023-12-22 NOTE — PLAN OF CARE
Problem: Adult Inpatient Plan of Care  Goal: Plan of Care Review  Outcome: Ongoing, Progressing  Goal: Patient-Specific Goal (Individualized)  Outcome: Ongoing, Progressing  Goal: Absence of Hospital-Acquired Illness or Injury  Outcome: Ongoing, Progressing  Goal: Optimal Comfort and Wellbeing  Outcome: Ongoing, Progressing  Goal: Readiness for Transition of Care  Outcome: Ongoing, Progressing     Problem: Infection  Goal: Absence of Infection Signs and Symptoms  Outcome: Ongoing, Progressing     Problem: Adjustment to Illness (Sepsis/Septic Shock)  Goal: Optimal Coping  Outcome: Ongoing, Progressing     Problem: Infection Progression (Sepsis/Septic Shock)  Goal: Absence of Infection Signs and Symptoms  Outcome: Ongoing, Progressing     Problem: Fall Injury Risk  Goal: Absence of Fall and Fall-Related Injury  Outcome: Ongoing, Progressing     Problem: Pain Acute  Goal: Acceptable Pain Control and Functional Ability  Outcome: Ongoing, Progressing

## 2023-12-22 NOTE — ASSESSMENT & PLAN NOTE
68 year old male with history of CAD s/p CABG x3, HLD, heart block s/p pacemaker admitted with MSSA bacteremia and MSSA UTI.     CT A/P reviewed and notable for nonobstructing renal stone, enlarged prostate. Patient is on IV Cefazolin. Blood cultures persistently positive (12/14 - 12/16), but repeats on 12/17 no growth thus far. Afebrile and HDS. Symptomatically improved. Of note, his urine culture in September was also positive for MSSA.    CHARMAINE aborted, recommending CT for further evaluation of esophagus. CT chest without evidence of obstruction. Repeated CHARMAINE today, without concerns for endocarditis. MRI spine without infectious concerns, without fluid collection or note of osteo like changes.         Recommendations:  Continue IV Cefazolin 2g IV q8hr   Will continue for 4 weeks from negative culture for complicated bacteremia   Will need ID follow up, ID will schedule.   Plan reviewed with ID staff. ID will sign off. See OPAT below    Outpatient Antibiotic Therapy Plan:    Please send referral to [facility].    1) Infection: MSSA bacteremia     2) Discharge Antibiotics:    Intravenous antibiotics:  Cefazolin 6g IV CI OR 2g IV q 8 hours     3) Therapy Duration:  4 weeks from cleared culture     Estimated end date of IV antibiotics: 01/14/23    4) Outpatient Weekly Labs:    Order the following labs to be drawn on Mondays:   CBC  CMP   CRP    5) Fax Lab Results to Infectious Diseases Provider: KELIN Oshea FNP-C    [facility] Fax Number: [phone]    6) Outpatient Infectious Diseases Follow-up    Follow-up appointment will be arranged by the ID clinic and will be found in the patient's appointments tab.    Prior to discharge, please ensure the patient's follow-up has been scheduled.    If there is still no follow-up scheduled prior to discharge, please send an EPIC message to Marlee Armas in Infectious Diseases.

## 2023-12-22 NOTE — SUBJECTIVE & OBJECTIVE
Interval History:     Afebrile. HDS. Blood cultures positive from 12/14-12/16, MSSA. Repeats on 12/17 and 12/19 NGTD. CHARMAINE negative.     Review of Systems   Constitutional:  Negative for appetite change, chills, diaphoresis, fatigue and fever.   Eyes:  Negative for visual disturbance.   Respiratory:  Negative for cough and shortness of breath.    Cardiovascular:  Negative for chest pain and leg swelling.   Gastrointestinal:  Negative for abdominal pain, constipation, diarrhea (loose stools), nausea and vomiting.   Genitourinary:  Negative for difficulty urinating, dysuria, frequency and hematuria.   Musculoskeletal:  Negative for arthralgias, back pain and joint swelling.   Skin:  Negative for color change, rash and wound.   Neurological:  Negative for dizziness, weakness, numbness and headaches.   Psychiatric/Behavioral:  Negative for agitation and confusion. The patient is not nervous/anxious.    All other systems reviewed and are negative.    Objective:     Vital Signs (Most Recent):  Temp: 97.5 °F (36.4 °C) (12/22/23 1111)  Pulse: 70 (12/22/23 1111)  Resp: 16 (12/22/23 1111)  BP: 118/75 (12/22/23 1111)  SpO2: (!) 93 % (12/22/23 1111) Vital Signs (24h Range):  Temp:  [97.5 °F (36.4 °C)-98 °F (36.7 °C)] 97.5 °F (36.4 °C)  Pulse:  [70-99] 70  Resp:  [16-22] 16  SpO2:  [93 %-100 %] 93 %  BP: (103-141)/(61-85) 118/75     Weight: 75.8 kg (167 lb)  Body mass index is 26.16 kg/m².    Estimated Creatinine Clearance: 73.4 mL/min (based on SCr of 0.9 mg/dL).     Physical Exam  Vitals reviewed.   Constitutional:       General: He is not in acute distress.     Appearance: Normal appearance. He is well-developed. He is not ill-appearing or diaphoretic.   HENT:      Head: Normocephalic and atraumatic.      Right Ear: External ear normal.      Left Ear: External ear normal.      Nose: Nose normal.   Eyes:      General: No scleral icterus.        Right eye: No discharge.         Left eye: No discharge.      Extraocular Movements:  Extraocular movements intact.      Conjunctiva/sclera: Conjunctivae normal.   Cardiovascular:      Rate and Rhythm: Normal rate and regular rhythm.   Pulmonary:      Effort: Pulmonary effort is normal. No respiratory distress.      Breath sounds: No stridor.   Abdominal:      General: There is no distension.      Palpations: Abdomen is soft.   Musculoskeletal:         General: No swelling or tenderness.      Comments: No joint or midline spine tenderness   Skin:     General: Skin is dry.      Coloration: Skin is not jaundiced or pale.      Findings: No erythema or rash.   Neurological:      General: No focal deficit present.      Mental Status: He is alert and oriented to person, place, and time. Mental status is at baseline.      Motor: No weakness.      Gait: Gait normal.   Psychiatric:         Mood and Affect: Mood normal.         Behavior: Behavior normal.         Thought Content: Thought content normal.         Judgment: Judgment normal.          Significant Labs: CBC:   Recent Labs   Lab 12/21/23 0257 12/22/23 0455   WBC 7.99 7.32   HGB 12.2* 12.5*   HCT 37.1* 36.6*    363       CMP:   Recent Labs   Lab 12/21/23 0257 12/22/23 0455    137   K 4.4 4.2    102   CO2 25 25   GLU 90 89   BUN 12 14   CREATININE 0.8 0.9   CALCIUM 8.7 8.6*   PROT 6.7 6.6   ALBUMIN 2.6* 2.7*   BILITOT 1.2* 1.0   ALKPHOS 94 94   AST 96* 79*   ALT 59* 50*   ANIONGAP 8 10       Microbiology Results (last 7 days)       Procedure Component Value Units Date/Time    Blood culture [[id number]] Collected: 12/19/23 0702    Order Status: Completed Specimen: Blood Updated: 12/22/23 1012     Blood Culture, Routine No Growth to date      No Growth to date      No Growth to date      No Growth to date    Narrative:      Rt wrist    Blood culture [[id number]] Collected: 12/19/23 0702    Order Status: Completed Specimen: Blood Updated: 12/22/23 1012     Blood Culture, Routine No Growth to date      No Growth to date      No Growth  to date      No Growth to date    Narrative:      Rt AC    Blood culture [[id number]] Collected: 12/17/23 0458    Order Status: Completed Specimen: Blood Updated: 12/22/23 0612     Blood Culture, Routine No growth after 5 days.    Blood culture [[id number]] Collected: 12/17/23 0458    Order Status: Completed Specimen: Blood Updated: 12/22/23 0612     Blood Culture, Routine No growth after 5 days.    Blood culture [[id number]]  (Abnormal) Collected: 12/16/23 0341    Order Status: Completed Specimen: Blood Updated: 12/19/23 1113     Blood Culture, Routine Gram stain aer bottle: Gram positive cocci in clusters resembling Staph      Positive results previously called 12/17/2023      STAPHYLOCOCCUS AUREUS  For susceptibility see order #[id number]      Blood culture [[id number]]  (Abnormal) Collected: 12/16/23 0341    Order Status: Completed Specimen: Blood Updated: 12/19/23 1112     Blood Culture, Routine Gram stain aer bottle: Gram positive cocci in clusters resembling Staph      Results called to and read back by: Lei Marmolejo 12/17/2023  02:14      STAPHYLOCOCCUS AUREUS  For susceptibility see order #[id number]      Blood culture [[id number]]  (Abnormal) Collected: 12/14/23 2310    Order Status: Completed Specimen: Blood Updated: 12/17/23 0902     Blood Culture, Routine Gram stain aer bottle: Gram positive cocci in clusters resembling Staph      Positive results previously called 12/15/2023  17:26      STAPHYLOCOCCUS AUREUS  For susceptibility see order #[id number]      Blood culture [[id number]]  (Abnormal) Collected: 12/14/23 2310    Order Status: Completed Specimen: Blood Updated: 12/17/23 0902     Blood Culture, Routine Gram stain aer bottle: Gram positive cocci in clusters resembling Staph      Results called to and read back by: Krupa Salas RN 12/15/2023  15:52      STAPHYLOCOCCUS AUREUS  For susceptibility see order #[id number]      Urine culture [[id number]]  (Abnormal)  (Susceptibility) Collected:  12/13/23 2346    Order Status: Completed Specimen: Urine Updated: 12/16/23 2048     Urine Culture, Routine STAPHYLOCOCCUS AUREUS  > 100,000 cfu/ml      Narrative:      Specimen Source->Urine    Blood culture [[id number]]  (Abnormal) Collected: 12/14/23 0552    Order Status: Completed Specimen: Blood from Peripheral, Forearm, Right Updated: 12/16/23 1026     Blood Culture, Routine Gram stain aer bottle: Gram positive cocci in clusters resembling Staph      Positive results previously called 12/14/2023      STAPHYLOCOCCUS AUREUS  For susceptibility see order #[id number]      Blood culture [[id number]]  (Abnormal)  (Susceptibility) Collected: 12/14/23 0552    Order Status: Completed Specimen: Blood from Peripheral, Forearm, Right Updated: 12/16/23 1025     Blood Culture, Routine Gram stain aer bottle: Gram positive cocci in clusters resembling Staph      Results called to and read back by:Benita Mercedes RN 12/14/2023  20:43      STAPHYLOCOCCUS AUREUS          Recent Lab Results         12/22/23  0455        Albumin 2.7       ALP 94       ALT 50       Anion Gap 10       AST 79       Baso # 0.06       Basophil % 0.8       BILIRUBIN TOTAL 1.0  Comment: For infants and newborns, interpretation of results should be based  on gestational age, weight and in agreement with clinical  observations.    Premature Infant recommended reference ranges:  Up to 24 hours.............<8.0 mg/dL  Up to 48 hours............<12.0 mg/dL  3-5 days..................<15.0 mg/dL  6-29 days.................<15.0 mg/dL         BUN 14       Calcium 8.6       Chloride 102       CO2 25       Creatinine 0.9       Differential Method Automated       eGFR >60.0       Eos # 0.2       Eosinophil % 2.3       Glucose 89       Gran # (ANC) 5.5       Gran % 74.9       Hematocrit 36.6       Hemoglobin 12.5       Immature Grans (Abs) 0.10  Comment: Mild elevation in immature granulocytes is non specific and   can be seen in a variety of conditions including  stress response,   acute inflammation, trauma and pregnancy. Correlation with other   laboratory and clinical findings is essential.         Immature Granulocytes 1.4       Lymph # 0.9       Lymph % 12.8       Magnesium  2.0       MCH 30.0       MCHC 34.2       MCV 88       Mono # 0.6       Mono % 7.8       MPV 9.2       nRBC 0       Platelet Count 363       Potassium 4.2       PROTEIN TOTAL 6.6       RBC 4.17       RDW 14.0       Sodium 137       WBC 7.32               Significant Imaging:     Imaging Results              CT Abdomen Pelvis With IV Contrast NO Oral Contrast (Final result)  Result time 12/14/23 08:54:04      Final result by Lalo Puri MD (12/14/23 08:54:04)                   Impression:      1. Intraluminal fluid throughout the small and large bowel with adjacent mesenteric fat stranding and free fluid, as may be seen in the setting of a nonspecific infectious or noninfectious inflammatory enterocolitis.  2. Nonobstructive right nephrolithiasis.  3. Small bilateral pleural effusions.  4. Additional details of chronic and incidental findings, as provided in the body of report.    Electronically signed by resident: Katherine Hidalgo  Date:    12/14/2023  Time:    06:04    Electronically signed by: Lalo Puri  Date:    12/14/2023  Time:    08:54               Narrative:    EXAMINATION:  CT ABDOMEN PELVIS WITH IV CONTRAST    CLINICAL HISTORY:  Abdominal abscess/infection suspected;LLQ abdominal pain;    TECHNIQUE:  Low dose axial images, sagittal and coronal reformations were obtained from the lung bases to the pubic symphysis following the IV administration of 75 mL of Omnipaque 350.Oral contrast was not given.    COMPARISON:  Ultrasound abdomen 11/01/2023    FINDINGS:  Comment: Motion compromised examination.    Lower thorax:    Heart: Cardiomegaly.  Cardiac pacing leads.  Severe calcific atherosclerosis of multiple coronary arteries.    Lung Bases: Small bilateral pleural effusions, larger  on the right.  Bibasal linear opacities, likely subsegmental atelectasis versus scarring.    Liver: Normal in size and attenuation, with no focal hepatic lesions.    Gallbladder: No calcified gallstones.  Gallbladder is not distended.  No gallbladder wall thickening or pericholecystic fluid collection.    Bile Ducts: No intra or extrahepatic biliary duct dilatation.    Pancreas: No mass or peripancreatic fat stranding.    Spleen: Normal in size.  No focal lesion.    Adrenals: Unremarkable.    Kidneys/ Ureters: Normal in size and location. Normal enhancement.  1.3 cm right lower pole nonobstructive nephrolithiasis.  No hydronephrosis or hydroureter.  Multiple cortical hypodensities which are too small to characterize likely renal cysts.  Nonspecific mild bilateral perinephric stranding.    Bladder: No evidence of wall thickening.    Reproductive organs: Prostate appears enlarged, measuring 4.6 cm with dystrophic calcification.    Gl/Mesentery/peritoneum and retroperitoneum: Small hiatal hernia.  Stomach is unremarkable.  Small and large bowel are normal in caliber with no evidence of obstruction.  Liquid stool throughout the small and large bowel with mild mesenteric soft tissue stranding and trace of free fluid, as may be seen in a nonspecific infectious versus noninfectious inflammatory enterocolitis.    Abdominal wall: Left fat containing inguinal hernia.    Vasculature: Moderate calcific atherosclerosis.  No aneurysm.    Bones: Degenerative changes.  Similar appearing compression fractures of T12 and L1 when compared to radiograph of 04/22/2021 and MRI of 04/05/2021, allowing for differences in technique/modality.  No acute fracture.  No suspicious lytic or sclerotic lesion.

## 2023-12-22 NOTE — CONSULTS
Single lumen  4F x 13cm  midline placed in the right brachial vein. Needle advanced into the vessel under real time ultrasound guidance. Image recorded and saved.    Max dwell date 01/20/2024.  Lot number: [id number]

## 2023-12-22 NOTE — PROGRESS NOTES
[facility] - Cardiology  [facility] Medicine  Progress Note    Patient Name: Michael Espinoza  MRN: [id number]  Patient Class: IP- Inpatient   Admission Date: 12/13/2023  Length of Stay: 7 days  Attending Physician: Teo Thomson MD  Primary Care Provider: Dominguez Hyatt MD        Subjective:     Principal Problem:Sepsis without acute organ dysfunction        HPI:  Michael Espinoza is a 68 y.o. male with PMHx of CAD s/p CABG x3, HLD, complete heart block s/p dual chamber pacemaker. He presents to [facility] ED 12/13 with diarrhea for the last 6 days. On 12/8 he developed left lower quadrant cramping followed by multiple episodes of loose stool. The next two days he had about 10-13 episodes diarrhea daily. Watery-brown. No blood. By Monday he continued to have diarrhea but then also developed mild nausea, but was able to keep down bland foods (jello, applesauce, banana, eggs) without emesis. He also began having urinary frequency, urgency, and dysuria and though his right kidney was mildly painful. Denies hematuria. He began to keep extremely dehydrated, started having fevers up to 102F Monday-Tuesday. He endorses polydipsia, myalgias, and hallucinations when he closes his eyes. He also reports shaking chills that would last for 20 minutes at a time that he could not control. Wednesday diarrhea finally stopped and he also stopped urinating but reports he did spontaneously urinate in ED once he got IV fluids.     He does have history of diarrhea and saw GI Dr. Feliz in September for this and thought to be maybe post infectious IBS but patient states he had completely symptom free period for months since then. He saw urgent care for symptoms 3 days ago and reports taking Zofran 4 times from them as well as a medication from GI (dicyclomine?) 10 times over 2 days. He reports normal colonoscopy one year ago (lipoma biopsied with surface erosions). No rectal pain. No more abdominal/flank pain. No strange foods or travel. He denies  falls, chest pain, palpitations, shortness of breath.     Per chart review: 12/11 stool culture without significant growth to date, E.coli shiga toxin 1 and 2 negative. Urine culture + staph aureus. A previous urine culture was also positive for staph aures in September and pt reports completing bactrim course.    In the ED, febrile to 100.4F, , RR 28. BP sable. On RA. Labs with WBC 8.58k, platelets 118k.  Na 129, K 2.9, Cl 94, BUN/Cr without YESICA, albumin 2.8, T bili 2.0 (chronically elevated), AST/ALT elevated 90/60. UA 3+ leukocytes, nitrite positive, 3+ occult blood, 1+ ketones, 1+ protein, 35 RBC, >100 WBC< many bacteria. LA 1.4. lipase and mag wnl. Covid and flu negative. CT abdomen pending. Given ceftriaxone, dicyclomine, famotidine, Zofran, potassium po and IV,  1L LR bolus and 1L NS bolus. Admitted to .     Overview/Hospital Course:  12/14 K replaced. BCX 2 and stool studies pending. CT abdomen -  Intraluminal fluid throughout the small and large bowel with adjacent mesenteric fat stranding and free fluid, as may be seen in the setting of a nonspecific infectious or noninfectious inflammatory enterocolitis. Nonobstructive right nephrolithiasis. loperamide PRN. continue ciprofloxacin and vancomycin . denies abdominal pain. advanced to soft diet   12/15 K and P replaced. BCX 2 from 12/14 with staph aureus. repeated BCX 2.  UC with STAPHYLOCOCCUS AUREUS > 100,000 cfu/ml  Susceptibility pending. echo ordered. tolerating soft diet. CHARMAINE ordered as with pacemaker . 1.3 cm right lower pole nonobstructive nephrolithiasis/ staph aureus on UC ( positive for MSSA urine culture from 9/2023). started on cefazolin. vancomycin discontinued   12/16 BC from 12/15 staph aureus. repeat BCX 2 TTE today-     Left Ventricle: The left ventricle is normal in size. Normal wall thickness. Regional wall motion abnormalities present. See diagram for wall motion findings. There is mildly reduced systolic function with a visually  estimated ejection fraction of 40 - 45%. Ejection fraction by visual approximation is 43%. There is normal diastolic function.    Right Ventricle: Normal right ventricular cavity size. Wall thickness is normal. Right ventricle wall motion  is normal. Systolic function is normal.    Left Atrium: Left atrium is severely dilated. 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    Aortic Valve: The aortic valve is a trileaflet valve. There is mild aortic valve sclerosis. There is mild stenosis. Aortic valve area by VTI is 1.74 cm². Aortic valve peak velocity is 1.65 m/s. Mean gradient is 7 mmHg. The dimensionless index is 0.46. There is trace aortic regurgitation.    Mitral Valve: There is mild bileaflet sclerosis.    Tricuspid Valve: There is mild regurgitation.    Pulmonary Artery: The estimated pulmonary artery systolic pressure is 35 mmHg.    IVC/SVC: Normal venous pressure at 3 mmHg.    CHARMAINE scheduled. K and P replaced  12/18 BC x2 12/18 with staph aureus.  CHARMAINE postponed to tomorow. MRI spine WWO contrast   12/19 CHARMAINE today. fecal elastase decreased 94. r/o exocrine pancreatic insufficiency . ativan PRN for sedation with MRI spine. difficulty with CHARMAINE and  advancing the US probe  today. CT chest and Neck wo contrast to r/o obstruction. if CT normal, plan for CHARMAINE on 12/22 with intubation  12/20 BCx 2 12/17 NGTD. CT neck -No evidence of significant esophageal abnormality     Interval History: For CHARMAINE today, than possible dc home. BC from 12/17 and 12/19 NGTD -PICC is needed.    Review of Systems  Objective:     Vital Signs (Most Recent):  Temp: 97.6 °F (36.4 °C) (12/22/23 0725)  Pulse: 82 (12/22/23 0725)  Resp: 18 (12/22/23 0725)  BP: (!) 141/85 (12/22/23 0912)  SpO2: 96 % (12/22/23 0725) Vital Signs (24h Range):  Temp:  [97.6 °F (36.4 °C)-98 °F (36.7 °C)] 97.6 °F (36.4 °C)  Pulse:  [76-99] 82  Resp:  [16-20] 18  SpO2:  [93 %-97 %] 96 %  BP: (109-141)/(68-85) 141/85     Weight: 75.8 kg (167 lb)  Body mass index is  26.16 kg/m².    Intake/Output Summary (Last 24 hours) at 12/22/2023 0956  Last data filed at 12/22/2023 0933  Gross per 24 hour   Intake 540 ml   Output --   Net 540 ml         Physical Exam  Vitals reviewed.   Constitutional:       General: He is not in acute distress.     Appearance: Normal appearance. He is well-developed. He is not ill-appearing or diaphoretic.   HENT:      Head: Normocephalic and atraumatic.      Right Ear: External ear normal.      Left Ear: External ear normal.      Nose: Nose normal.   Eyes:      General: No scleral icterus.        Right eye: No discharge.         Left eye: No discharge.      Extraocular Movements: Extraocular movements intact.      Conjunctiva/sclera: Conjunctivae normal.   Cardiovascular:      Rate and Rhythm: Normal rate and regular rhythm.   Pulmonary:      Effort: Pulmonary effort is normal. No respiratory distress.      Breath sounds: No stridor.   Abdominal:      General: There is no distension.      Palpations: Abdomen is soft.   Musculoskeletal:         General: No swelling or tenderness.      Comments: No joint or midline spine tenderness   Skin:     General: Skin is dry.      Coloration: Skin is not jaundiced or pale.      Findings: No erythema or rash.   Neurological:      General: No focal deficit present.      Mental Status: He is alert and oriented to person, place, and time. Mental status is at baseline.      Motor: No weakness.      Gait: Gait normal.   Psychiatric:         Mood and Affect: Mood normal.         Behavior: Behavior normal.         Thought Content: Thought content normal.         Judgment: Judgment normal.             Significant Labs: All pertinent labs within the past 24 hours have been reviewed.  CBC:   Recent Labs   Lab 12/21/23  0257 12/22/23  0455   WBC 7.99 7.32   HGB 12.2* 12.5*   HCT 37.1* 36.6*    363       Significant Imaging: I have reviewed all pertinent imaging results/findings within the past 24 hours.    Assessment/Plan:       * Sepsis without acute organ dysfunction  This patient does have evidence of infective focus  My overall impression is sepsis.  Source: Urinary Tract and Abdominal  Antibiotics given-   Antibiotics (72h ago, onward)      Start     Stop Route Frequency Ordered    12/14/23 1900  vancomycin 750 mg in dextrose 5 % (D5W) 250 mL IVPB (Vial-Mate)         -- IV Every 12 hours (non-standard times) 12/14/23 0547    12/14/23 0900  ciprofloxacin HCl tablet 500 mg         12/19/23 0859 Oral Every 12 hours 12/14/23 0613    12/14/23 0623  vancomycin - pharmacy to dose  (vancomycin IVPB (PEDS and ADULTS))        See Hyperspace for full Linked Orders Report.    -- IV pharmacy to manage frequency 12/14/23 0523          Latest lactate reviewed-  Recent Labs   Lab 12/13/23 2212   LACTATE 1.4     Organ dysfunction indicated by  None- mild delirium (possible form electrolyte derangement and fevers) Mild transaminitis, mild thrombocytopenia    Fluid challenge Not needed - patient is not hypotensive      Post- resuscitation assessment No - Post resuscitation assessment not needed     Will Not start Pressors- Levophed for MAP of 65  Source control achieved by: Vancomycin ordered for staph aureus UTI on previous cultures, Ciprofloxacin ordered for possible infectious (vs other) diarrhea.  Blood, urine, stool cultures and studies pending.     Right kidney stone  12/15 s/p urology eval - stone is non obstructing. -No indication for acute urologic intervention.  outpatient urologic follow up for stone treatment            Bacteremia  12/15 BCX 2 from 12/14 with staph aureus. repeated BCX 2.    CHARMAINE ordered as with pacemaker . 1.3 cm right lower pole nonobstructive nephrolithiasis/ staph aureus on UC ( positive for MSSA urine culture from 9/2023). started on cefazolin. vancomycin discontinued   12/18 BC from 12/16 staph aureus.   12/20 BCx 2 12/17 NGTD.  difficulty with CHARMAINE and  advancing the US probe  today. CT chest and Neck wo contrast  to r/o obstruction. if CT normal, plan for CHARMAINE on 12/22 with intubation      Thrombocytopenia    resolved     Transaminitis  -Mildly elevated, possible 2/2 dehydration/infection  -NO RUQ pain. T bili chronically elevated  -Check acute hepatitis panel   -Daily CMP    12/14 Patients liver enzymes  elevated.   Recent Labs     12/17/23  0908 12/18/23  0538 12/19/23  0702 12/20/23  0328   BILITOT 1.8* 1.2* 1.5* 1.2*   * 125* 130* 120*   * 82* 89* 74*   ALKPHOS 107 96 104 105    . Liver enzymes  trending up. sono liver with doppler -Normal Doppler evaluation of the liver.Cholelithiasis.  No sonographic evidence for acute cholecystitis.       Diarrhea  68M with acute onset of multiple episodes diarrhea with only mild LLQ abdominal cramping early in course. Mild nausea. No abdominal pain, emesis, and is tolerating PO. Having fevers, chills, polydipsia. Also urinary infectious symptoms. Previously seen by GI Dr. 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 Work up unremarkable.  Previous stool cultures without growth.Giardia negative in the past. Recent stool culture without growth and E coli antigen negative.   -Multiple electrolyte derangements on admission  -Tachycardic, febrile, and tachypneic   -S/p 2L IVF bolus in ED, continue IVFs today  -Check complete stool studies  -C-scope 8/2022 Lipoma in the recto-sigmoid colon. Biopsied and found to have surface erosions  -CT pending  -GI consulted, appreciate recommendations  -PRN bentyl for cramping, prn Zofran/compazine for nausea  -ON Vanc for sepsis/UTI and will add ciprofloxacin 500 mg Q12h x5 days for diarrhea  for now pending culture results  12/14  CT abdomen -  Intraluminal fluid throughout the small and large bowel with adjacent mesenteric fat stranding and free fluid, as may be seen in the setting of a nonspecific infectious or noninfectious inflammatory enterocolitis. Nonobstructive right nephrolithiasis. loperamide  PRN.   12/15 C diff  and stool culture negative.     Complicated UTI (urinary tract infection)  Presenting with dysuria/frequency/urgency/maybe mild flank pain/fever/chills (also with diarrheal illness).  -3/4 SIRs on admit for fever, tachycardia tachypnea  -UA infectious, nitrite positive, 3+ leukocytes >100 WBC and many bacteria  -Previous urine cultures with staph aureus  -S/p ceftriaxone in ED  -Will cover with vancomycin   -Follow up urine cultures  -Follow up CT  -Monitor I/Os  12/14. BCX 2 and stool studies pending.continue ciprofloxacin and vancomycin   12/15  UC with STAPHYLOCOCCUS AUREUS > 100,000 cfu/ml  Susceptibility pending. echo ordered. tolerating soft diet. 1.3 cm right lower pole nonobstructive nephrolithiasis/ staph aureus on UC ( positive for MSSA urine culture from 9/2023). started on cefazolin    Hypokalemia   resolved     Hyponatremia  resolved     Complete heart block  -s/p dual chamber pacemaker placement  7/5/2022      Coronary artery disease of native artery of native heart with stable angina pectoris  Patient with known CAD s/p CABG, which is controlled Will continue ASA and Statin and monitor for S/Sx of angina/ACS. Continue to monitor on telemetry.     Gilbert's syndrome  -Chronic condition- reports diagnosed by Dr. Rowe years ago  -T bili elevated on admission, similar to previous  -Monitor CMP      HLD (hyperlipidemia)  -Continue home statin      VTE Risk Mitigation (From admission, onward)           Ordered     enoxaparin injection 40 mg  Daily         12/16/23 0900     IP VTE HIGH RISK PATIENT  Once         12/16/23 0900     Place sequential compression device  Until discontinued         12/14/23 0526                    Discharge Planning   MAGNO: 12/23/2023     Code Status: Full Code   Is the patient medically ready for discharge?: No    Reason for patient still in hospital (select all that apply): Patient unstable  Discharge Plan A: Home with family   Discharge Delays: None known  at this time              Teo Thomson MD  Department of [facility] Medicine   [facility] - Cardiology

## 2023-12-22 NOTE — PLAN OF CARE
12/22/23 1517   Post-Acute Status   Post-Acute Authorization IV Infusion;Home Health   Home Health Status Referrals Sent   IV Infusion Status Referral(s) sent   Discharge Delays None known at this time   Discharge Plan   Discharge Plan A Home Health   Discharge Plan B Home Health     Met with patient to review discharge recommendation of home health and patient is agreeable to plan    Patient/family provided list of facilities in-network with patient's payor plan. Providers that are owned, operated, or affiliated with [facility] are included on the list.     Notified that referral sent to below listed facilities from in-network list based on proximity to home/family support:   [facility]  2.   [facility]  3.   [facility]  4.   [facility]      Patient/family instructed to identify preference.        If an additional preferred facility not listed above is identified, additional referral to be sent. If above facilities unable to accept, will send additional referrals to in-network providers.

## 2023-12-22 NOTE — PROGRESS NOTES
[facility] Home IV ATB Education/Discharge Planning Note:     [facility] educator met with the patient and discussed discharge plan for home IV ATB. Mr Michael Espinoza will discharge home with family support but will self infuse. Patient's IV medication will infuse via IV Push. The patient was educated on S.A.S.H procedure & [facility] S.A.S.[facility] mat provided. Patient education checklist reviewed and acknowledged by the patient and he is agreeable to discharge with home infusion plan of care. IV administration process using aspetic technique was reviewed with successful return demonstration by the patient. The patient feels comfortable with home infusion process after bedside education provided. The patient is also comfortable with infusing without assistance of nurse, as he is aware that HH will not be set up (patient is agreeable to coming to an [facility] for weekly labs/midline dressing change in this instance). The patient will discharge home on IV Cefazolin 2g Q8 hours (Q8 per patient preference) for estimated end of therapy date of 1/14/2024. Dosing schedule time will be 22:00/06:00/14:00 daily, beginning with first home dose due at 22:00 on 12/22/2023; I confirmed that the patient received a dose at 13:30 prior to discharge on 12/22/2023. Extension set has been placed on SL midline by bedside nurse, as patient will be self infusing. Patient accepted to care by [facility] for weekly dressing changes and lab draws. The patient is agreeable to coming to an [facility] for any necessary lab draws as it relates to his IV ATB, as well as midline dressing change. The patient acknowledges the importance of adhering to appointments to ascertain that labs are drawn appropriately and dressing is changed within protocol. [facility] staff to call patient on 12/22/2023 to confirm appointment time, date, and location.       Time allotted for questions; questions addressed appropriately. Patients  home IV ATB & supplies will be delivered to the patient's home on the evening of 12/22/2023. Provided patient with [facility] support number [phone]. Patient is ready for discharge home from [facility] perspective. Patient's discharge planning team and bedside nurse updated with the information above.      -Patient has been accepted to care by [facility]. Appointment to be made with the patient by [facility] staff on 12/22/2023.  -Phone number [phone]. Location, date, and time of appointment pending.        Please do not hesitate to reach out for any additional needs.     Ivan Canales MS, RDN, LDN  Clinical Liaison & Dietitian  [facility]   Phone: [phone]  [email or web address]

## 2023-12-22 NOTE — PLAN OF CARE
APPOINTMENT:    Patient Appointment(s) scheduled with  Kerrie Pedro MD Thursday Dec 28, 2023 3:00 PM

## 2023-12-22 NOTE — PLAN OF CARE
[facility]      HOME HEALTH ORDERS  FACE TO FACE ENCOUNTER    Patient Name: Michael Espinoza  YOB: 1955    PCP: Dominguez Hyatt MD   PCP Address: [address] / [place] [address]  PCP Phone Number: [phone]  PCP Fax: [phone]    Encounter Date: 12/13/23    Admit to Home Health    Diagnoses:  Active Hospital Problems    Diagnosis  POA    *Sepsis without acute organ dysfunction [A41.9]  Yes    Bacteremia [R78.81]  Yes    Right kidney stone [N20.0]  Yes    Hyponatremia [E87.1]  Yes    Hypokalemia [E87.6]  Yes    Complicated UTI (urinary tract infection) [N39.0]  Yes    Diarrhea [R19.7]  Yes    Transaminitis [R74.01]  Yes    Thrombocytopenia [D69.6]  Yes    Complete heart block [I44.2]  Yes    Coronary artery disease of native artery of native heart with stable angina pectoris [I25.118]  Yes    Gilbert's syndrome [E80.4]  Yes    HLD (hyperlipidemia) [E78.5]  Yes      Resolved Hospital Problems    Diagnosis Date Resolved POA    Hypophosphatemia [E83.39] 12/19/2023 Yes       Follow Up Appointments:  Future Appointments   Date Time Provider Department Center   12/27/2023 10:00 AM HOME MONITOR DEVICE CHECK, [facility]   12/28/2023  3:00 PM Kerrie Pedro MD [facility] IM [facility]   1/9/2024 10:45 AM Sanchez Moreno Jr., MD [facility] UROLOGY [facility]       Allergies:Review of patient's allergies indicates:  No Known Allergies    Medications: Review discharge medications with patient and family and provide education.    Cefazolin 6g IV CI OR 2g IV q 8 hours      3) Therapy Duration:  4 weeks from cleared culture      Estimated end date of IV antibiotics: 01/14/23     4) Outpatient Weekly Labs:     Order the following labs to be drawn on Mondays:   CBC  CMP   CRP     5) Fax Lab Results to Infectious Diseases Provider: KELIN Oshea FNP-C     [facility] Fax Number: [phone]    Current Facility-Administered Medications   Medication Dose Route Frequency  Provider Last Rate Last Admin    acetaminophen tablet 1,000 mg  1,000 mg Oral Q8H PRN Adalgisa Mccartney PA-C        acetaminophen tablet 650 mg  650 mg Oral Q4H PRN Adalgisa Mccartney PA-C        aluminum-magnesium hydroxide-simethicone 200-200-20 mg/5 mL suspension 30 mL  30 mL Oral QID PRN Adalgisa Mccartney PA-C        aspirin EC tablet 81 mg  81 mg Oral BID Adalgisa Mccartney PA-C   81 mg at 12/22/23 1055    atorvastatin tablet 80 mg  80 mg Oral Daily Adalgisa Mccartney PA-C   80 mg at 12/22/23 1055    calcium carbonate 200 mg calcium (500 mg) chewable tablet 500 mg  500 mg Oral Daily PRN Adalgisa Mccartney PA-C   500 mg at 12/14/23 0544    ceFAZolin 2 g in dextrose 5 % in water (D5W) 50 mL IVPB (MB+)  2 g Intravenous Q8H Emelyn Hernandez  mL/hr at 12/22/23 1248 2 g at 12/22/23 1248    dextrose 10% bolus 125 mL 125 mL  12.5 g Intravenous PRN Adalgisa Mccartney PA-C        dextrose 10% bolus 250 mL 250 mL  25 g Intravenous PRN Adalgisa Mccartney PA-C        dicyclomine capsule 10 mg  10 mg Oral QID Adalgisa Mccartney PA-C   10 mg at 12/22/23 1245    enoxaparin injection 40 mg  40 mg Subcutaneous Daily Mukesh Golden MD   40 mg at 12/21/23 1718    ezetimibe tablet 10 mg  10 mg Oral Daily Adalgisa Mccartney PA-C   10 mg at 12/22/23 1055    fentaNYL 50 mcg/mL injection 25 mcg  25 mcg Intravenous Q5 Min PRN Mike Winston MD        glucagon (human recombinant) injection 1 mg  1 mg Intramuscular PRN Adalgisa Mccartney PA-C        glucose chewable tablet 16 g  16 g Oral PRN Adalgisa Mccartney PA-C        glucose chewable tablet 24 g  24 g Oral PRN Adalgisa Mccartney PA-C        haloperidol lactate injection 0.5 mg  0.5 mg Intravenous Q10 Min PRN Mike Winston MD        loperamide capsule 2 mg  2 mg Oral QID PRN Mukesh Golden MD   2 mg at 12/20/23 2052    melatonin tablet 6 mg  6 mg Oral Nightly PRN Adalgisa Mccartney PA-C   6 mg at 12/21/23 2015    naloxone 0.4 mg/mL injection  0.02 mg  0.02 mg Intravenous PRN Adalgisa Mccartney PA-C        ondansetron disintegrating tablet 8 mg  8 mg Oral Q8H PRN Adalgisa Mccartney PA-C        simethicone chewable tablet 80 mg  1 tablet Oral QID PRN Adalgisa Mccartney PA-C   80 mg at 12/16/23 2130    sodium chloride 0.9% flush 10 mL  10 mL Intravenous PRN Mike Winston MD        sodium chloride 0.9% flush 10 mL  10 mL Intravenous PRN Mike Winston MD        sodium chloride 0.9% flush 5 mL  5 mL Intravenous PRN Adalgisa Mccartney PA-C         Current Discharge Medication List        START taking these medications    Details   dextrose 5 % in water (D5W) PgBk 50 mL with ceFAZolin 2 gram SolR 2 g Inject 2 g into the vein every 8 (eight) hours. for 23 days  Refills: 0           CONTINUE these medications which have NOT CHANGED    Details   aspirin (ECOTRIN) 81 MG EC tablet Take 81 mg by mouth 2 (two) times daily. (Breakfast & Afternoon)      atorvastatin (LIPITOR) 80 MG tablet TAKE 1 TABLET EVERY DAY  Qty: 90 tablet, Refills: 10      calcium carbonate (TUMS ORAL) Take 2 tablets by mouth daily as needed (Heartburn).      coenzyme Q10 (CO Q-10) 300 mg Cap Take 1 capsule by mouth once daily.      dicyclomine (BENTYL) 10 MG capsule Take 10 mg by mouth daily as needed (Abdominal pain).      ERGOCALCIFEROL, VITAMIN D2, (VITAMIN D ORAL) Take 1 capsule by mouth Mon, Wed, Fri, Sat & Sun      ezetimibe (ZETIA) 10 mg tablet TAKE 1 TABLET EVERY DAY  Qty: 90 tablet, Refills: 10    Associated Diagnoses: Coronary artery disease involving native coronary artery of native heart without angina pectoris; Pure hypercholesterolemia      multivit-min/FA/lycopen/lutein (CENTRUM SILVER MEN ORAL) Take 1 tablet by mouth every Mon, Wed, Fri.      ondansetron (ZOFRAN-ODT) 4 MG TbDL Take 1 tablet (4 mg total) by mouth every 8 (eight) hours as needed (nausea).  Qty: 12 tablet, Refills: 0    Associated Diagnoses: Diarrhea, unspecified type; Nausea               I have seen  and examined this patient within the last 30 days. My clinical findings that support the need for the home health skilled services and home bound status are the following:no   Weakness/numbness causing balance and gait disturbance due to Infection making it taxing to leave home.     Diet:   regular diet        Nursing:   Agency to admit patient within 24 hours of hospital discharge unless specified on physician order or at patient request    SN to complete comprehensive assessment including routine vital signs. Instruct on disease process and s/s of complications to report to MD. Review/verify medication list sent home with the patient at time of discharge  and instruct patient/caregiver as needed. Frequency may be adjusted depending on start of care date.     Skilled nurse to perform up to 3 visits PRN for symptoms related to diagnosis    Notify MD if SBP > 160 or < 90; DBP > 90 or < 50; HR > 120 or < 50; Temp > 101; O2 < 88%; Other:       Ok to schedule additional visits based on staff availability and patient request on consecutive days within the home health episode.    When multiple disciplines ordered:    Start of Care occurs on Sunday - Wednesday schedule remaining discipline evaluations as ordered on separate consecutive days following the start of care.    Thursday SOC -schedule subsequent evaluations Friday and Monday the following week.     Friday - Saturday SOC - schedule subsequent discipline evaluations on consecutive days starting Monday of the following week.    For all post-discharge communication and subsequent orders please contact patient's primary care physician. If unable to reach primary care physician or do not receive response within 30 minutes, please contact  for clinical staff order clarification        I certify that this patient is confined to his home and needs intermittent skilled nursing care.

## 2023-12-22 NOTE — NURSING
Patient discharging with right arm midline. All other peripheral IV removed. AVS discharge education provided. Patient was told by infusion nurse that antibiotics are being delivered to his home.

## 2023-12-22 NOTE — ANESTHESIA POSTPROCEDURE EVALUATION
Anesthesia Post Evaluation    Patient: Michael Espinoza    Procedure(s) Performed: Procedure(s) (LRB):  Transesophageal echo (CHARMAINE) intra-procedure log documentation (N/A)    Final Anesthesia Type: general      Patient location during evaluation: PACU  Patient participation: Yes- Able to Participate  Level of consciousness: awake and alert  Post-procedure vital signs: reviewed and stable  Pain management: adequate  Airway patency: patent    PONV status at discharge: No PONV  Anesthetic complications: no      Cardiovascular status: hemodynamically stable  Respiratory status: unassisted  Hydration status: euvolemic  Follow-up not needed.              Vitals Value Taken Time   /68 12/22/23 1515   Temp 36.3 °C (97.4 °F) 12/22/23 1515   Pulse 80 12/22/23 1515   Resp 16 12/22/23 1515   SpO2 95 % 12/22/23 1515         No case tracking events are documented in the log.      Pain/Eduin Score: Pain Rating Prior to Med Admin: 0 (12/21/2023  6:10 PM)  Eduin Score: 10 (12/22/2023 10:15 AM)

## 2023-12-24 LAB
BACTERIA BLD CULT: NORMAL
BACTERIA BLD CULT: NORMAL

## 2023-12-26 ENCOUNTER — PATIENT MESSAGE (OUTPATIENT)
Dept: INTERNAL MEDICINE | Facility: CLINIC | Age: 68
End: 2023-12-26
Payer: MEDICARE

## 2023-12-28 ENCOUNTER — OFFICE VISIT (OUTPATIENT)
Dept: INTERNAL MEDICINE | Facility: CLINIC | Age: 68
End: 2023-12-28
Payer: MEDICARE

## 2023-12-28 VITALS
DIASTOLIC BLOOD PRESSURE: 78 MMHG | HEART RATE: 96 BPM | HEIGHT: 67 IN | OXYGEN SATURATION: 97 % | SYSTOLIC BLOOD PRESSURE: 128 MMHG | BODY MASS INDEX: 22.98 KG/M2 | RESPIRATION RATE: 16 BRPM | WEIGHT: 146.38 LBS

## 2023-12-28 DIAGNOSIS — Z09 HOSPITAL DISCHARGE FOLLOW-UP: Primary | ICD-10-CM

## 2023-12-28 DIAGNOSIS — R78.81 BACTEREMIA: ICD-10-CM

## 2023-12-28 DIAGNOSIS — N20.0 RIGHT KIDNEY STONE: ICD-10-CM

## 2023-12-28 PROCEDURE — 3288F PR FALLS RISK ASSESSMENT DOCUMENTED: ICD-10-PCS | Mod: CPTII,S$GLB,, | Performed by: STUDENT IN AN ORGANIZED HEALTH CARE EDUCATION/TRAINING PROGRAM

## 2023-12-28 PROCEDURE — 99213 PR OFFICE/OUTPT VISIT, EST, LEVL III, 20-29 MIN: ICD-10-PCS | Mod: S$GLB,,, | Performed by: STUDENT IN AN ORGANIZED HEALTH CARE EDUCATION/TRAINING PROGRAM

## 2023-12-28 PROCEDURE — 3008F PR BODY MASS INDEX (BMI) DOCUMENTED: ICD-10-PCS | Mod: CPTII,S$GLB,, | Performed by: STUDENT IN AN ORGANIZED HEALTH CARE EDUCATION/TRAINING PROGRAM

## 2023-12-28 PROCEDURE — 1159F MED LIST DOCD IN RCRD: CPT | Mod: CPTII,S$GLB,, | Performed by: STUDENT IN AN ORGANIZED HEALTH CARE EDUCATION/TRAINING PROGRAM

## 2023-12-28 PROCEDURE — 1159F PR MEDICATION LIST DOCUMENTED IN MEDICAL RECORD: ICD-10-PCS | Mod: CPTII,S$GLB,, | Performed by: STUDENT IN AN ORGANIZED HEALTH CARE EDUCATION/TRAINING PROGRAM

## 2023-12-28 PROCEDURE — 1126F PR PAIN SEVERITY QUANTIFIED, NO PAIN PRESENT: ICD-10-PCS | Mod: CPTII,S$GLB,, | Performed by: STUDENT IN AN ORGANIZED HEALTH CARE EDUCATION/TRAINING PROGRAM

## 2023-12-28 PROCEDURE — 1101F PT FALLS ASSESS-DOCD LE1/YR: CPT | Mod: CPTII,S$GLB,, | Performed by: STUDENT IN AN ORGANIZED HEALTH CARE EDUCATION/TRAINING PROGRAM

## 2023-12-28 PROCEDURE — 3288F FALL RISK ASSESSMENT DOCD: CPT | Mod: CPTII,S$GLB,, | Performed by: STUDENT IN AN ORGANIZED HEALTH CARE EDUCATION/TRAINING PROGRAM

## 2023-12-28 PROCEDURE — 3044F HG A1C LEVEL LT 7.0%: CPT | Mod: CPTII,S$GLB,, | Performed by: STUDENT IN AN ORGANIZED HEALTH CARE EDUCATION/TRAINING PROGRAM

## 2023-12-28 PROCEDURE — 99999 PR PBB SHADOW E&M-EST. PATIENT-LVL III: ICD-10-PCS | Mod: PBBFAC,,, | Performed by: STUDENT IN AN ORGANIZED HEALTH CARE EDUCATION/TRAINING PROGRAM

## 2023-12-28 PROCEDURE — 1126F AMNT PAIN NOTED NONE PRSNT: CPT | Mod: CPTII,S$GLB,, | Performed by: STUDENT IN AN ORGANIZED HEALTH CARE EDUCATION/TRAINING PROGRAM

## 2023-12-28 PROCEDURE — 3078F PR MOST RECENT DIASTOLIC BLOOD PRESSURE < 80 MM HG: ICD-10-PCS | Mod: CPTII,S$GLB,, | Performed by: STUDENT IN AN ORGANIZED HEALTH CARE EDUCATION/TRAINING PROGRAM

## 2023-12-28 PROCEDURE — 1111F PR DISCHARGE MEDS RECONCILED W/ CURRENT OUTPATIENT MED LIST: ICD-10-PCS | Mod: CPTII,S$GLB,, | Performed by: STUDENT IN AN ORGANIZED HEALTH CARE EDUCATION/TRAINING PROGRAM

## 2023-12-28 PROCEDURE — 99213 OFFICE O/P EST LOW 20 MIN: CPT | Mod: S$GLB,,, | Performed by: STUDENT IN AN ORGANIZED HEALTH CARE EDUCATION/TRAINING PROGRAM

## 2023-12-28 PROCEDURE — 1111F DSCHRG MED/CURRENT MED MERGE: CPT | Mod: CPTII,S$GLB,, | Performed by: STUDENT IN AN ORGANIZED HEALTH CARE EDUCATION/TRAINING PROGRAM

## 2023-12-28 PROCEDURE — 1101F PR PT FALLS ASSESS DOC 0-1 FALLS W/OUT INJ PAST YR: ICD-10-PCS | Mod: CPTII,S$GLB,, | Performed by: STUDENT IN AN ORGANIZED HEALTH CARE EDUCATION/TRAINING PROGRAM

## 2023-12-28 PROCEDURE — 3078F DIAST BP <80 MM HG: CPT | Mod: CPTII,S$GLB,, | Performed by: STUDENT IN AN ORGANIZED HEALTH CARE EDUCATION/TRAINING PROGRAM

## 2023-12-28 PROCEDURE — 3074F PR MOST RECENT SYSTOLIC BLOOD PRESSURE < 130 MM HG: ICD-10-PCS | Mod: CPTII,S$GLB,, | Performed by: STUDENT IN AN ORGANIZED HEALTH CARE EDUCATION/TRAINING PROGRAM

## 2023-12-28 PROCEDURE — 3074F SYST BP LT 130 MM HG: CPT | Mod: CPTII,S$GLB,, | Performed by: STUDENT IN AN ORGANIZED HEALTH CARE EDUCATION/TRAINING PROGRAM

## 2023-12-28 PROCEDURE — 3044F PR MOST RECENT HEMOGLOBIN A1C LEVEL <7.0%: ICD-10-PCS | Mod: CPTII,S$GLB,, | Performed by: STUDENT IN AN ORGANIZED HEALTH CARE EDUCATION/TRAINING PROGRAM

## 2023-12-28 PROCEDURE — 1157F ADVNC CARE PLAN IN RCRD: CPT | Mod: CPTII,S$GLB,, | Performed by: STUDENT IN AN ORGANIZED HEALTH CARE EDUCATION/TRAINING PROGRAM

## 2023-12-28 PROCEDURE — 99999 PR PBB SHADOW E&M-EST. PATIENT-LVL III: CPT | Mod: PBBFAC,,, | Performed by: STUDENT IN AN ORGANIZED HEALTH CARE EDUCATION/TRAINING PROGRAM

## 2023-12-28 PROCEDURE — 3008F BODY MASS INDEX DOCD: CPT | Mod: CPTII,S$GLB,, | Performed by: STUDENT IN AN ORGANIZED HEALTH CARE EDUCATION/TRAINING PROGRAM

## 2023-12-28 PROCEDURE — 1157F PR ADVANCE CARE PLAN OR EQUIV PRESENT IN MEDICAL RECORD: ICD-10-PCS | Mod: CPTII,S$GLB,, | Performed by: STUDENT IN AN ORGANIZED HEALTH CARE EDUCATION/TRAINING PROGRAM

## 2023-12-28 NOTE — PROGRESS NOTES
Subjective     Patient ID: Michael Espinoza is a 68 y.o. male.    Chief Complaint: Hospital Follow Up    HPI  Michael Espinoza is a 68 y.o. male with PMHx of CAD s/p CABG x3, HLD, complete heart block s/p dual chamber pacemaker presented to [facility] ED 12/13 with non-bloody diarrhea for 6 days. He then developed nausea, urinary freq, dysuria and urgency. He started feeling dehydrated and developed a temp of 102. Diarrhea finally stopped. Has hx of diarrhea. 12/11 stool culture without significant growth to date, E.coli shiga toxin 1 and 2 negative. Urine culture + staph aureus. A previous urine culture was also positive for staph aureus in September. Given ceftriaxone, dicyclomine, famotidine, Zofran, potassium po and IV,  1L LR bolus and 1L NS bolus in ED and was Admitted to  for sepsis. He was found to have MSSA bacteremia and MSSA UTI. Was placed on IV cefazolin. Cardiology was consulted for CHARMAINE for endocarditis eval. No evidence of valvular or lead vegetations were visualized. ID was consulted who suspected that stone in right kidney could be source of recurrent staph infection.he was started by them on cefazolin. MRI of spine with no evidence of osteomyelitis. Urology was consulted but no intervention was recommended sicne cr is at baseline, stone is non-obstructing, no hydronephrosis.   -no discharge summary is present in the chart.   -pt denies diarrhea, fever or urinary symptoms. Feeling much better. They want to be on abx until jan 14th. Having no issues with picc line.     Review of Systems   Constitutional:  Negative for chills and fever.   HENT:  Negative for nasal congestion and rhinorrhea.    Respiratory:  Negative for cough, chest tightness and shortness of breath.    Cardiovascular:  Negative for chest pain.   Gastrointestinal:  Negative for abdominal pain, blood in stool, constipation and diarrhea.   Genitourinary:  Negative for dysuria, frequency and hematuria.   Neurological:  Negative for dizziness,  light-headedness and headaches.          Objective     Physical Exam  Vitals and nursing note reviewed.   Constitutional:       Appearance: Normal appearance.   Eyes:      Conjunctiva/sclera: Conjunctivae normal.   Cardiovascular:      Rate and Rhythm: Normal rate and regular rhythm.      Heart sounds: Normal heart sounds.   Pulmonary:      Effort: Pulmonary effort is normal. No respiratory distress.      Breath sounds: Normal breath sounds.   Abdominal:      General: Bowel sounds are normal.   Musculoskeletal:      Right lower leg: No edema.      Left lower leg: No edema.   Skin:     General: Skin is warm.   Neurological:      Mental Status: He is alert and oriented to person, place, and time.   Psychiatric:         Mood and Affect: Mood normal.         Behavior: Behavior normal.            Assessment and Plan     1. Hospital discharge follow-up  Assessment & Plan:  Events summarized in HPI. No discharge summary in chart but information gathered from HPI and patient. Pt is taking IV cefazolin as prescribed. End date is jan 14th. continue      2. Bacteremia  Assessment & Plan:  MSSA bacteremia. On cefazolin with end date of jan 14th. He has to do a prolonged course. MRI negative for osteo. No vegetations noted in the heart. Continue abx. No appt scheduled with ID.       3. Right kidney stone  Assessment & Plan:  Has appt to f/u with urology. Non-obstructing stone, no hydronephrosis.        I spent a total of 20 minutes on the day of the visit.  This includes face to face time and non-face to face time preparing to see the patient (eg, review of tests), obtaining and/or reviewing separately obtained history, documenting clinical information in the electronic or other health record, independently interpreting results and communicating results to the patient/family/caregiver, or care coordinator.

## 2023-12-28 NOTE — ASSESSMENT & PLAN NOTE
Events summarized in HPI. No discharge summary in chart but information gathered from HPI and patient. Pt is taking IV cefazolin as prescribed. End date is jan 14th. continue

## 2023-12-28 NOTE — ASSESSMENT & PLAN NOTE
MSSA bacteremia. On cefazolin with end date of jan 14th. He has to do a prolonged course. MRI negative for osteo. No vegetations noted in the heart. Continue abx. No appt scheduled with ID.

## 2023-12-29 ENCOUNTER — CLINICAL SUPPORT (OUTPATIENT)
Dept: CARDIOLOGY | Facility: HOSPITAL | Age: 68
End: 2023-12-29
Attending: INTERNAL MEDICINE
Payer: MEDICARE

## 2023-12-29 ENCOUNTER — CLINICAL SUPPORT (OUTPATIENT)
Dept: CARDIOLOGY | Facility: HOSPITAL | Age: 68
End: 2023-12-29
Payer: MEDICARE

## 2023-12-29 DIAGNOSIS — Z95.0 PRESENCE OF CARDIAC PACEMAKER: ICD-10-CM

## 2023-12-29 DIAGNOSIS — I44.2 ATRIOVENTRICULAR BLOCK, COMPLETE: ICD-10-CM

## 2023-12-29 PROCEDURE — 93296 REM INTERROG EVL PM/IDS: CPT | Performed by: INTERNAL MEDICINE

## 2023-12-29 PROCEDURE — 93294 REM INTERROG EVL PM/LDLS PM: CPT | Mod: ,,, | Performed by: INTERNAL MEDICINE

## 2024-01-03 ENCOUNTER — TELEPHONE (OUTPATIENT)
Dept: INFECTIOUS DISEASES | Facility: HOSPITAL | Age: 69
End: 2024-01-03
Payer: MEDICARE

## 2024-01-03 NOTE — TELEPHONE ENCOUNTER
[facility] follow up:    Hx: MSSA bacteremia, likely urinary source. CHARMAINE negative.     Antibiotics: Cefazolin 2g IVq8hr,   EOC: 01/14/23  Labs reviewed:     WBC: 4.97  Cr: 0.9  CRP: 1.4      Infusion company: [facility]   Follow up: pending schedule

## 2024-01-05 LAB
OHS CV AF BURDEN PERCENT: < 1
OHS CV DC REMOTE DEVICE TYPE: NORMAL
OHS CV RV PACING PERCENT: 92 %

## 2024-01-09 ENCOUNTER — HOSPITAL ENCOUNTER (OUTPATIENT)
Dept: RADIOLOGY | Facility: HOSPITAL | Age: 69
Discharge: HOME | End: 2024-01-09
Attending: UROLOGY
Payer: MEDICARE

## 2024-01-09 ENCOUNTER — OFFICE VISIT (OUTPATIENT)
Dept: UROLOGY | Facility: CLINIC | Age: 69
End: 2024-01-09
Payer: MEDICARE

## 2024-01-09 VITALS
BODY MASS INDEX: 22.91 KG/M2 | WEIGHT: 146 LBS | DIASTOLIC BLOOD PRESSURE: 86 MMHG | HEIGHT: 67 IN | HEART RATE: 99 BPM | SYSTOLIC BLOOD PRESSURE: 145 MMHG

## 2024-01-09 DIAGNOSIS — N20.0 RENAL CALCULI: ICD-10-CM

## 2024-01-09 DIAGNOSIS — N20.0 RENAL CALCULI: Primary | ICD-10-CM

## 2024-01-09 PROCEDURE — 1111F DSCHRG MED/CURRENT MED MERGE: CPT | Mod: CPTII,S$GLB,, | Performed by: UROLOGY

## 2024-01-09 PROCEDURE — 1157F ADVNC CARE PLAN IN RCRD: CPT | Mod: CPTII,S$GLB,, | Performed by: UROLOGY

## 2024-01-09 PROCEDURE — 1159F MED LIST DOCD IN RCRD: CPT | Mod: CPTII,S$GLB,, | Performed by: UROLOGY

## 2024-01-09 PROCEDURE — 99999 PR PBB SHADOW E&M-EST. PATIENT-LVL III: CPT | Mod: PBBFAC,,, | Performed by: UROLOGY

## 2024-01-09 PROCEDURE — 1101F PT FALLS ASSESS-DOCD LE1/YR: CPT | Mod: CPTII,S$GLB,, | Performed by: UROLOGY

## 2024-01-09 PROCEDURE — 74018 RADEX ABDOMEN 1 VIEW: CPT | Mod: TC

## 2024-01-09 PROCEDURE — 74018 RADEX ABDOMEN 1 VIEW: CPT | Mod: 26,,, | Performed by: INTERNAL MEDICINE

## 2024-01-09 PROCEDURE — 3077F SYST BP >= 140 MM HG: CPT | Mod: CPTII,S$GLB,, | Performed by: UROLOGY

## 2024-01-09 PROCEDURE — 3288F FALL RISK ASSESSMENT DOCD: CPT | Mod: CPTII,S$GLB,, | Performed by: UROLOGY

## 2024-01-09 PROCEDURE — 3079F DIAST BP 80-89 MM HG: CPT | Mod: CPTII,S$GLB,, | Performed by: UROLOGY

## 2024-01-09 PROCEDURE — 99203 OFFICE O/P NEW LOW 30 MIN: CPT | Mod: S$GLB,,, | Performed by: UROLOGY

## 2024-01-09 PROCEDURE — 87086 URINE CULTURE/COLONY COUNT: CPT | Performed by: UROLOGY

## 2024-01-09 PROCEDURE — 3008F BODY MASS INDEX DOCD: CPT | Mod: CPTII,S$GLB,, | Performed by: UROLOGY

## 2024-01-09 NOTE — DISCHARGE SUMMARY
[facility] Medicine  Discharge Summary      Patient Name: Michael Espinoza  MRN: [id number]  VERONICA: [id number]  Patient Class: IP- Inpatient  Admission Date: 12/13/2023  Hospital Length of Stay: 7 days  Discharge Date and Time:  01/09/2024 10:13 AM  Attending Physician: No att. providers found   Discharging Provider: Teo Thomson MD  Primary Care Provider: Dominguez Hyatt MD  [facility]: [facility] [place] Teo Thomson MD  Primary Care Team: [facility]    HPI:   Michael Espinoza is a 68 y.o. male with PMHx of CAD s/p CABG x3, HLD, complete heart block s/p dual chamber pacemaker. [place] [facility] [place] with diarrhea for the last 6 days. On 12/8 he developed left lower quadrant cramping followed by multiple episodes of loose stool. The next two days he had about 10-13 episodes diarrhea daily. Watery-brown. No blood. By Monday he continued to have diarrhea but then also developed mild nausea, but was able to keep down bland foods (jello, applesauce, banana, eggs) without emesis. He also began having urinary frequency, urgency, and dysuria and though his right kidney was mildly painful. Denies hematuria. He began to keep extremely dehydrated, started having fevers up to 102F Monday-Tuesday. He endorses polydipsia, myalgias, and hallucinations when he closes his eyes. He also reports shaking chills that would last for 20 minutes at a time that he could not control. Wednesday diarrhea finally stopped and he also stopped urinating but reports he did spontaneously urinate in ED once he got IV fluids.     He does have history of diarrhea and saw GI Dr. Feliz in September for this and thought to be maybe post infectious IBS but patient states he had completely symptom free period for months since then. He saw urgent care for symptoms 3 days ago and reports taking Zofran 4 times from them as well as a medication from GI (dicyclomine?) 10 times over 2 days. He reports normal colonoscopy one  year ago (lipoma biopsied with surface erosions). No rectal pain. No more abdominal/flank pain. No strange foods or travel. He denies falls, chest pain, palpitations, shortness of breath.     Per chart review: 12/11 stool culture without significant growth to date, E.coli shiga toxin 1 and 2 negative. Urine culture + staph aureus. A previous urine culture was also positive for staph aures in September and pt reports completing bactrim course.    In the ED, febrile to 100.4F, , RR 28. BP sable. On RA. Labs with WBC 8.58k, platelets 118k.  Na 129, K 2.9, Cl 94, BUN/Cr without YESICA, albumin 2.8, T bili 2.0 (chronically elevated), AST/ALT elevated 90/60. UA 3+ leukocytes, nitrite positive, 3+ occult blood, 1+ ketones, 1+ protein, 35 RBC, >100 WBC< many bacteria. LA 1.4. lipase and mag wnl. Covid and flu negative. CT abdomen pending. Given ceftriaxone, dicyclomine, famotidine, Zofran, potassium po and IV,  1L LR bolus and 1L NS bolus. Admitted to .     Procedure(s) (LRB):  Transesophageal echo (CHARMAINE) intra-procedure log documentation (N/A)      Hospital Course:   Michael Espinoza is a 68 y.o. male with PMHx of CAD s/p CABG x3, HLD, complete heart block s/p dual chamber pacemaker. He presents to [facility] ED 12/13 with diarrhea for the last 6 days. On 12/8 he developed left lower quadrant cramping followed by multiple episodes of loose stool. The next two days he had about 10-13 episodes diarrhea daily. Watery-brown. No blood. By Monday he continued to have diarrhea but then also developed mild nausea, but was able to keep down bland foods (jello, applesauce, banana, eggs) without emesis. He also began having urinary frequency, urgency, and dysuria and though his right kidney was mildly painful. Denies hematuria. He began to keep extremely dehydrated, started having fevers up to 102F Monday-Tuesday. He endorses polydipsia, myalgias, and hallucinations when he closes his eyes. He also reports shaking chills that would last  for 20 minutes at a time that he could not control. Wednesday diarrhea finally stopped and he also stopped urinating but reports he did spontaneously urinate in ED once he got IV fluids.      He does have history of diarrhea and saw GI Dr. Feliz in September for this and thought to be maybe post infectious IBS but patient states he had completely symptom free period for months since then. He saw urgent care for symptoms 3 days ago and reports taking Zofran 4 times from them as well as a medication from GI (dicyclomine?) 10 times over 2 days. He reports normal colonoscopy one year ago (lipoma biopsied with surface erosions). No rectal pain. No more abdominal/flank pain. No strange foods or travel. He denies falls, chest pain, palpitations, shortness of breath.     Per chart review: 12/11 stool culture without significant growth to date, E.coli shiga toxin 1 and 2 negative. Urine culture + staph aureus. A previous urine culture was also positive for staph aures in September and pt reports completing bactrim course.     In the ED, febrile to 100.4F, , RR 28. BP sable. On RA. Labs with WBC 8.58k, platelets 118k.  Na 129, K 2.9, Cl 94, BUN/Cr without YESICA, albumin 2.8, T bili 2.0 (chronically elevated), AST/ALT elevated 90/60. UA 3+ leukocytes, nitrite positive, 3+ occult blood, 1+ ketones, 1+ protein, 35 RBC, >100 WBC< many bacteria. LA 1.4. lipase and mag wnl. Covid and flu negative. CT abdomen pending. Given ceftriaxone, dicyclomine, famotidine, Zofran, potassium po and IV,  1L LR bolus and 1L NS bolus. Admitted to . 12/14 K replaced. BCX 2 and stool studies pending. CT abdomen -  Intraluminal fluid throughout the small and large bowel with adjacent mesenteric fat stranding and free fluid, as may be seen in the setting of a nonspecific infectious or noninfectious inflammatory enterocolitis. Nonobstructive right nephrolithiasis. loperamide PRN. continue ciprofloxacin and vancomycin . denies abdominal pain.  advanced to soft diet   12/15 K and P replaced. BCX 2 from 12/14 with staph aureus. repeated BCX 2.  UC with STAPHYLOCOCCUS AUREUS > 100,000 cfu/ml  Susceptibility pending. echo ordered. tolerating soft diet. CHARMAINE ordered as with pacemaker . 1.3 cm right lower pole nonobstructive nephrolithiasis/ staph aureus on UC ( positive for MSSA urine culture from 9/2023). started on cefazolin. vancomycin discontinued   12/16 BC from 12/15 staph aureus. repeat BCX 2 TTE today-     Left Ventricle: The left ventricle is normal in size. Normal wall thickness. Regional wall motion abnormalities present. See diagram for wall motion findings. There is mildly reduced systolic function with a visually estimated ejection fraction of 40 - 45%. Ejection fraction by visual approximation is 43%. There is normal diastolic function.    Right Ventricle: Normal right ventricular cavity size. Wall thickness is normal. Right ventricle wall motion  is normal. Systolic function is normal.    Left Atrium: Left atrium is severely dilated. 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    Aortic Valve: The aortic valve is a trileaflet valve. There is mild aortic valve sclerosis. There is mild stenosis. Aortic valve area by VTI is 1.74 cm². Aortic valve peak velocity is 1.65 m/s. Mean gradient is 7 mmHg. The dimensionless index is 0.46. There is trace aortic regurgitation.    Mitral Valve: There is mild bileaflet sclerosis.    Tricuspid Valve: There is mild regurgitation.    Pulmonary Artery: The estimated pulmonary artery systolic pressure is 35 mmHg.    IVC/SVC: Normal venous pressure at 3 mmHg.    CHARMAINE scheduled. K and P replaced  12/18 BC x2 12/18 with staph aureus.  CHARMAINE postponed to tomorow. MRI spine WWO contrast   12/19 CHARMAINE today. fecal elastase decreased 94. r/o exocrine pancreatic insufficiency . ativan PRN for sedation with MRI spine. difficulty with CHARMAINE and  advancing the US probe  today. CT chest and Neck wo contrast to r/o obstruction.  if CT normal, plan for CHARMAINE on 12/22 with intubation  12/20 BCx 2 12/17 NGTD. CT neck -No evidence of significant esophageal abnormality     Per ID: CHARMAINE aborted, recommending CT for further evaluation of esophagus. CT chest without evidence of obstruction. Repeated CHARMAINE today, without concerns for endocarditis. MRI spine without infectious concerns, without fluid collection or note of osteo like changes.            Recommendations:  Continue IV Cefazolin 2g IV q8hr   Will continue for 4 weeks from negative culture for complicated bacteremia   Will need ID follow up, ID will schedule.   Plan reviewed with ID staff. ID will sign off. See OPAT below       Goals of Care Treatment Preferences:  Code Status: Full Code    Living Will: Yes              Consults:   Consults (From admission, onward)          Status Ordering Provider     Inpatient consult to PICC team (\[facility] [place] [facility]\"")  Once        Provider:  (Not yet assigned)    Completed CARMELLA BAIG     Inpatient consult to Urology  Once        Provider:  (Not yet assigned)    Completed ROD ZARATE     Inpatient consult to Infectious Diseases  Once        Provider:  (Not yet assigned)    Completed ROD ZARATE            No new Assessment & Plan notes have been filed under this hospital service since the last note was generated.  Service: Hospital Medicine    Final Active Diagnoses:    Diagnosis Date Noted POA    PRINCIPAL PROBLEM:  Sepsis without acute organ dysfunction [A41.9] 12/14/2023 Yes    Bacteremia [R78.81] 12/15/2023 Yes    Right kidney stone [N20.0] 12/15/2023 Yes    Hyponatremia [E87.1] 12/14/2023 Yes    Hypokalemia [E87.6] 12/14/2023 Yes    Complicated UTI (urinary tract infection) [N39.0] 12/14/2023 Yes    Diarrhea [R19.7] 12/14/2023 Yes    Transaminitis [R74.01] 12/14/2023 Yes    Thrombocytopenia [D69.6] 12/14/2023 Yes    Complete heart block [I44.2] 07/05/2022 Yes    Coronary artery disease of native artery of native heart with stable angina pectoris  "[I25.118] 04/10/2018 Yes    Gilbert's syndrome [E80.4] 11/17/2015 Yes    HLD (hyperlipidemia) [E78.5] 10/17/2012 Yes      Problems Resolved During this Admission:    Diagnosis Date Noted Date Resolved POA    Hypophosphatemia [E83.39] 12/14/2023 12/19/2023 Yes       Discharged Condition: good    Disposition: Home-Health Care [facility]    Follow Up:   Follow-up Information       Dominguez Hyatt MD Follow up.    Specialty: Internal Medicine  Contact information:  [address]  [facility] [address]  [phone]                           Patient Instructions:   No discharge procedures on file.    Significant Diagnostic Studies: Labs: BMP: No results for input(s): "GLU", "NA", "K", "CL", "CO2", "BUN", "CREATININE", "CALCIUM", "MG" in the last 48 hours.    Pending Diagnostic Studies:       None           Medications:  Reconciled Home Medications:      Medication List        START taking these medications      dextrose 5 % in water (D5W) PgBk 50 mL with ceFAZolin 2 gram SolR 2 g  Inject 2 g into the vein every 8 (eight) hours. for 23 days            CHANGE how you take these medications      atorvastatin 80 MG tablet  Commonly known as: LIPITOR  TAKE 1 TABLET EVERY DAY  What changed: when to take this     ezetimibe 10 mg tablet  Commonly known as: ZETIA  TAKE 1 TABLET EVERY DAY  What changed: when to take this            CONTINUE taking these medications      aspirin 81 MG EC tablet  Commonly known as: ECOTRIN  Take 81 mg by mouth 2 (two) times daily. (Breakfast & Afternoon)     CENTRUM SILVER MEN ORAL  Take 1 tablet by mouth every Mon, Wed, Fri.     CO Q-10 300 mg Cap  Generic drug: coenzyme Q10  Take 1 capsule by mouth once daily.     dicyclomine 10 MG capsule  Commonly known as: BENTYL  Take 10 mg by mouth daily as needed (Abdominal pain).     ondansetron 4 MG Tbdl  Commonly known as: ZOFRAN-ODT  Take 1 tablet (4 mg total) by mouth every 8 (eight) hours as needed (nausea).     TUMS ORAL  Take 2 tablets by mouth " daily as needed (Heartburn).     VITAMIN D ORAL  Take 1 capsule by mouth Mon, Wed, Fri, Sat & Sun              Indwelling Lines/Drains at time of discharge:   Lines/Drains/Airways       None                   Time spent on the discharge of patient: 15 minutes         Teo Thomson MD  Department of Hospital Medicine  [facility]

## 2024-01-09 NOTE — PROGRESS NOTES
Subjective:       Patient ID: Michael Espinoza is a 68 y.o. male.    Chief Complaint: Nephrolithiasis (Pt has stone that is causing no pain. )    HPI patient has a 1.3 cm right renal stone he does have some recurrent infections.  Today his urine looks okay I am going to get a culture.  He has not having any flank pain    Past Medical History:   Diagnosis Date    Complete heart block 7/5/2022    Coronary artery disease of native artery of native heart with stable angina pectoris 4/10/2018    Hyperlipidemia        Past Surgical History:   Procedure Laterality Date    A-V CARDIAC PACEMAKER INSERTION N/A 7/5/2022    Procedure: INSERTION, CARDIAC PACEMAKER, DUAL CHAMBER;  Surgeon: Alessandro Canales MD;  Location: [facility] EP LAB;  Service: Cardiology;  Laterality: N/A;  CHB, TBD, ANES, ED 6, MB    COLONOSCOPY N/A 8/5/2022    Procedure: COLONOSCOPY;  Surgeon: Namita Dumont MD;  Location: [facility] ENDO ([address] [place] [address]);  Service: Endoscopy;  Laterality: N/A;  vacc-inst portal-tb    CYST REMOVAL      ECHOCARDIOGRAM,TRANSESOPHAGEAL N/A 12/22/2023    Procedure: Transesophageal echo (CHARMAINE) intra-procedure log documentation;  Surgeon: Provider, [facility];  Location: [facility] EP LAB;  Service: Cardiology;  Laterality: N/A;    TONSILLECTOMY         Family History   Problem Relation Age of Onset    Hyperlipidemia Brother     Hypertension Brother     Heart disease Brother     Heart attack Brother     Diabetes Brother        Social History     Socioeconomic History    Marital status: Single   Tobacco Use    Smoking status: Never     Passive exposure: Never    Smokeless tobacco: Never   Substance and Sexual Activity    Alcohol use: Yes     Alcohol/week: 1.0 standard drink of alcohol     Types: 1 Glasses of wine per week     Comment: 1 drink 2-3 times/weekly    Drug use: No     Social Determinants of Health     Financial Resource Strain: Low Risk  (12/16/2023)    Overall Financial Resource Strain (CARDIA)     Difficulty of Paying Living Expenses:  Not hard at all   Food Insecurity: No Food Insecurity (12/16/2023)    Hunger Vital Sign     Worried About Running Out of Food in the Last Year: Never true     Ran Out of Food in the Last Year: Never true   Transportation Needs: No Transportation Needs (12/16/2023)    PRAPARE - Transportation     Lack of Transportation (Medical): No     Lack of Transportation (Non-Medical): No   Physical Activity: Patient Declined (12/16/2023)    Exercise Vital Sign     Days of Exercise per Week: Patient declined     Minutes of Exercise per Session: Patient declined   Stress: No Stress Concern Present (12/16/2023)    [employer] - Occupational Stress Questionnaire     Feeling of Stress : Only a little   Social Connections: Unknown (12/16/2023)    Social Connection and Isolation Panel [NHANES]     Frequency of Communication with Friends and Family: Once a week     Frequency of Social Gatherings with Friends and Family: Once a week     Attends L[employer] Services: Patient declined     Active Member of Clubs or Organizations: Patient declined     Attends Club or Organization Meetings: Patient declined     Marital Status: Never    Housing Stability: Low Risk  (12/16/2023)    Housing Stability Vital Sign     Unable to Pay for Housing in the Last Year: No     Number of Places Lived in the Last Year: 1     Unstable Housing in the Last Year: No       Allergies:  Patient has no known allergies.    Medications:    Current Outpatient Medications:     aspirin (ECOTRIN) 81 MG EC tablet, Take 81 mg by mouth 2 (two) times daily. (Breakfast & Afternoon), Disp: , Rfl:     atorvastatin (LIPITOR) 80 MG tablet, TAKE 1 TABLET EVERY DAY (Patient taking differently: Take 80 mg by mouth every evening.), Disp: 90 tablet, Rfl: 10    calcium carbonate (TUMS ORAL), Take 2 tablets by mouth daily as needed (Heartburn)., Disp: , Rfl:     coenzyme Q10 (CO Q-10) 300 mg Cap, Take 1 capsule by mouth once daily., Disp: , Rfl:      dextrose 5 % in water (D5W) PgBk 50 mL with ceFAZolin 2 gram SolR 2 g, Inject 2 g into the vein every 8 (eight) hours. for 23 days, Disp: , Rfl: 0    dicyclomine (BENTYL) 10 MG capsule, Take 10 mg by mouth daily as needed (Abdominal pain)., Disp: , Rfl:     ERGOCALCIFEROL, VITAMIN D2, (VITAMIN D ORAL), Take 1 capsule by mouth Mon, Wed, Fri, Sat & Sun, Disp: , Rfl:     ezetimibe (ZETIA) 10 mg tablet, TAKE 1 TABLET EVERY DAY (Patient taking differently: Take 10 mg by mouth once daily.), Disp: 90 tablet, Rfl: 10    multivit-min/FA/lycopen/lutein (CENTRUM SILVER MEN ORAL), Take 1 tablet by mouth every Mon, Wed, Fri., Disp: , Rfl:     ondansetron (ZOFRAN-ODT) 4 MG TbDL, Take 1 tablet (4 mg total) by mouth every 8 (eight) hours as needed (nausea)., Disp: 12 tablet, Rfl: 0    Review of Systems   Constitutional:  Negative for activity change, appetite change, chills, diaphoresis, fatigue, fever and unexpected weight change.   HENT:  Negative for congestion, dental problem, hearing loss, mouth sores, postnasal drip, rhinorrhea, sinus pressure and trouble swallowing.    Eyes:  Negative for pain, discharge and itching.   Respiratory:  Negative for apnea, cough, choking, chest tightness, shortness of breath and wheezing.    Cardiovascular:  Negative for chest pain, palpitations and leg swelling.   Gastrointestinal:  Negative for abdominal distention, abdominal pain, anal bleeding, blood in stool, constipation, diarrhea, nausea, rectal pain and vomiting.   Endocrine: Negative for polydipsia and polyuria.   Genitourinary:  Negative for decreased urine volume, difficulty urinating, dysuria, enuresis, flank pain, frequency, genital sores, hematuria, penile discharge, penile pain, penile swelling, scrotal swelling, testicular pain and urgency.   Musculoskeletal:  Negative for arthralgias, back pain and myalgias.   Skin:  Negative for color change, rash and wound.   Neurological:  Negative for dizziness, syncope, speech difficulty,  light-headedness and headaches.   Hematological:  Negative for adenopathy. Does not bruise/bleed easily.   Psychiatric/Behavioral:  Negative for behavioral problems, confusion, hallucinations and sleep disturbance.        Objective:      Physical Exam  Constitutional:       Appearance: He is well-developed.   HENT:      Head: Normocephalic.   Cardiovascular:      Rate and Rhythm: Normal rate.   Pulmonary:      Effort: Pulmonary effort is normal.   Abdominal:      Palpations: Abdomen is soft.   Skin:     General: Skin is warm.   Neurological:      Mental Status: He is alert.         Assessment:       1. Renal calculi        Plan:       Michael was seen today for nephrolithiasis.    Diagnoses and all orders for this visit:    Renal calculi  -     X-Ray Abdomen AP 1 View; Future  -     Urine culture    Await KUB and consider options

## 2024-01-10 LAB — BACTERIA UR CULT: NO GROWTH

## 2024-01-11 NOTE — PROGRESS NOTES
Subjective:      Patient ID: Michael Espinoza is a 68 y.o. male.    Chief Complaint:Follow-up  MSSA bacteremia    History of Present Illness      Mr. Michael Espinoza is here for hospital follow up of MSSA bacteremia.      In review, he is 69 yo male with PMH of CAD s/p CABG x3, HLD, heart block s/p dual chamber pacemaker who presented to [facility] ED with complaints of persistant diarrhea x6 days, with associated rigors.   Blood and urine cultures on admission + for MSSA . Blood cultures were persistently positive (12/14 - 12/16), but cleared on 12/17 .  CHARMAINE negative for valvular or pacemaker lead vegetations. A CT A/P was notable for nonobstructing renal stone, enlarged prostate.  MRI spine negative for fluid collections or concern for osteomyelitis.  He was discharged with plan for 4 weeks of IV cefazolin given unknown source.    Source of bacteremia was unclear.  No recent injuries/wounds.  Had not had recent dental work.  No dental procedures. No other procedures.     Noted to have had + MSSA in urine in September 2023 that was treated with Bactrim  Further review of cx history show MSSA in urine in July 2022  He was noted to have had prior MSSA + urine culture     Saw Dr. Moreno in Urology for evaluation of options for stone management He had repeat urine culture on 1/9 that was negative.     Adherent with IV antibiotics.  Denies problems with PICC.  No fevers, chills, sweats.  No urinary symptoms    Review of Systems   Constitutional: Negative for chills, decreased appetite, fever, malaise/fatigue, night sweats, weight gain and weight loss.   HENT:  Negative for congestion, ear pain, hearing loss, hoarse voice, sore throat and tinnitus.    Eyes:  Negative for blurred vision, redness and visual disturbance.   Cardiovascular:  Negative for chest pain, leg swelling and palpitations.   Respiratory:  Negative for cough, hemoptysis, shortness of breath, sputum production and wheezing.    Hematologic/Lymphatic: Negative for  adenopathy. Does not bruise/bleed easily.   Skin:  Negative for dry skin, itching, rash and suspicious lesions.   Musculoskeletal:  Negative for back pain, joint pain, myalgias and neck pain.   Gastrointestinal:  Negative for abdominal pain, constipation, diarrhea, heartburn, nausea and vomiting.   Genitourinary:  Negative for dysuria, flank pain, frequency, hematuria, hesitancy and urgency.   Neurological:  Negative for dizziness, headaches, numbness, paresthesias and weakness.   Psychiatric/Behavioral:  Negative for depression and memory loss. The patient does not have insomnia and is not nervous/anxious.    Allergic/Immunologic: Negative for environmental allergies, HIV exposure, hives and persistent infections.     Objective:   Physical Exam  Vitals reviewed.   Constitutional:       General: He is not in acute distress.     Appearance: He is not ill-appearing, toxic-appearing or diaphoretic.   HENT:      Head: Normocephalic and atraumatic.      Nose: Nose normal.   Eyes:      Conjunctiva/sclera: Conjunctivae normal.   Cardiovascular:      Rate and Rhythm: Normal rate and regular rhythm.      Heart sounds: Normal heart sounds. No murmur heard.     Comments: Pacer pocket left chest without signs of infection  Pulmonary:      Effort: Pulmonary effort is normal. No respiratory distress.      Breath sounds: Normal breath sounds.   Abdominal:      General: There is no distension.      Palpations: Abdomen is soft.      Tenderness: There is no abdominal tenderness.   Musculoskeletal:      Right lower leg: No edema.      Left lower leg: No edema.   Skin:     General: Skin is warm and dry.      Findings: No rash.      Comments: PICC RUE c/d/i   Neurological:      Mental Status: He is alert and oriented to person, place, and time.   Psychiatric:         Mood and Affect: Mood normal.         Behavior: Behavior normal.       Assessment:     1. MSSA bacteremia    2. Complicated UTI (urinary tract infection)    3. Right kidney  stone    4. Bacteremia    5. Receiving intravenous antibiotic treatment as outpatient       Stable.  No systemic signs of infection or urinary symptoms.  Tolerating IV abx.  Source.   Kidney stone may ? Be colonized given recurrent urine cultures + for MSSA, though have low suspicion that was  initial source.  MSSA unusual urinary pathogen.   Concerned for other occult source or lead colonization though CHARMAINE negative for evidence of vegetation.  Will follow up with surveillance blood cultures    Plan:    Complete IV abx as planned on 1/14.  PICC removal scheduled for 1/16 at [facility]    Advised to  flush the line on Monday before PICC removal.     Surveillance blood cultus q 2 weeks X 2.      Seek immediate medical attention with any recurrence of your original symptoms, fevers, shaking chills/rigors or other concerns.   Follow up with Dr. Magaña regarding stone management options.  Urine culture prior to any intervention  Recommend updated COVID vaccine   The patient understands and agrees to plan of care and all questions were answered.   Encouraged to call with questions or concerns.  Given my contact information.     50 minutes of total time was spent on this encounter, which includes face to face time and non-face to face time preparing to see the patient (eg, review of tests), Obtaining and/or reviewing separately obtained history, documenting clinical information in the electronic or other health record, independently interpreting results (not separately reported) and communicating results to the patient/family/caregiver, or care coordination (not separately reported).

## 2024-01-12 ENCOUNTER — OFFICE VISIT (OUTPATIENT)
Dept: INFECTIOUS DISEASES | Facility: CLINIC | Age: 69
End: 2024-01-12
Payer: MEDICARE

## 2024-01-12 VITALS
HEART RATE: 94 BPM | BODY MASS INDEX: 23.63 KG/M2 | WEIGHT: 150.56 LBS | SYSTOLIC BLOOD PRESSURE: 118 MMHG | TEMPERATURE: 98 F | HEIGHT: 67 IN | DIASTOLIC BLOOD PRESSURE: 80 MMHG

## 2024-01-12 DIAGNOSIS — Z79.2 RECEIVING INTRAVENOUS ANTIBIOTIC TREATMENT AS OUTPATIENT: ICD-10-CM

## 2024-01-12 DIAGNOSIS — R78.81 MSSA BACTEREMIA: Primary | ICD-10-CM

## 2024-01-12 DIAGNOSIS — N39.0 COMPLICATED UTI (URINARY TRACT INFECTION): ICD-10-CM

## 2024-01-12 DIAGNOSIS — B95.61 MSSA BACTEREMIA: Primary | ICD-10-CM

## 2024-01-12 DIAGNOSIS — R78.81 BACTEREMIA: ICD-10-CM

## 2024-01-12 DIAGNOSIS — N20.0 RIGHT KIDNEY STONE: ICD-10-CM

## 2024-01-12 PROCEDURE — 1126F AMNT PAIN NOTED NONE PRSNT: CPT | Mod: CPTII,S$GLB,, | Performed by: NURSE PRACTITIONER

## 2024-01-12 PROCEDURE — 1160F RVW MEDS BY RX/DR IN RCRD: CPT | Mod: CPTII,S$GLB,, | Performed by: NURSE PRACTITIONER

## 2024-01-12 PROCEDURE — 99999 PR PBB SHADOW E&M-EST. PATIENT-LVL IV: CPT | Mod: PBBFAC,,, | Performed by: NURSE PRACTITIONER

## 2024-01-12 PROCEDURE — 3288F FALL RISK ASSESSMENT DOCD: CPT | Mod: CPTII,S$GLB,, | Performed by: NURSE PRACTITIONER

## 2024-01-12 PROCEDURE — 3079F DIAST BP 80-89 MM HG: CPT | Mod: CPTII,S$GLB,, | Performed by: NURSE PRACTITIONER

## 2024-01-12 PROCEDURE — 1159F MED LIST DOCD IN RCRD: CPT | Mod: CPTII,S$GLB,, | Performed by: NURSE PRACTITIONER

## 2024-01-12 PROCEDURE — 3074F SYST BP LT 130 MM HG: CPT | Mod: CPTII,S$GLB,, | Performed by: NURSE PRACTITIONER

## 2024-01-12 PROCEDURE — 1111F DSCHRG MED/CURRENT MED MERGE: CPT | Mod: CPTII,S$GLB,, | Performed by: NURSE PRACTITIONER

## 2024-01-12 PROCEDURE — 3008F BODY MASS INDEX DOCD: CPT | Mod: CPTII,S$GLB,, | Performed by: NURSE PRACTITIONER

## 2024-01-12 PROCEDURE — 1157F ADVNC CARE PLAN IN RCRD: CPT | Mod: CPTII,S$GLB,, | Performed by: NURSE PRACTITIONER

## 2024-01-12 PROCEDURE — 1101F PT FALLS ASSESS-DOCD LE1/YR: CPT | Mod: CPTII,S$GLB,, | Performed by: NURSE PRACTITIONER

## 2024-01-12 PROCEDURE — 99215 OFFICE O/P EST HI 40 MIN: CPT | Mod: S$GLB,,, | Performed by: NURSE PRACTITIONER

## 2024-01-12 NOTE — PATIENT INSTRUCTIONS
Plan on ending IV abx 1/14.  PICC removal 1/16 as scheduled.    Be sure to flush the line on Monday before PICC removal.     Will plan on surveillance blood cultures/blood work.  I will contact you regarding scheduling.    Seek immediate medical attention with any recurrence of your original symptoms, fevers, shaking chills/rigors or other concerns.     Recommend updated COVID booster - you'll get through Pharmacy   Follow up with Dr. Magaña regarding possible stone management   Call me with any questions/concerns

## 2024-01-12 NOTE — Clinical Note
Hi there!  I have ordered blood cultures for this fellow.  Would like to schedule [facility] around 1/26 for one set and another set 2/15 ramy.  Will you please  call patient and let him know you are scheduling?  Thanks!

## 2024-01-16 DIAGNOSIS — R78.81 MSSA BACTEREMIA: Primary | ICD-10-CM

## 2024-01-16 DIAGNOSIS — B95.61 MSSA BACTEREMIA: Primary | ICD-10-CM

## 2024-01-22 DIAGNOSIS — I25.10 CORONARY ARTERY DISEASE INVOLVING NATIVE CORONARY ARTERY OF NATIVE HEART WITHOUT ANGINA PECTORIS: ICD-10-CM

## 2024-01-22 DIAGNOSIS — E78.00 PURE HYPERCHOLESTEROLEMIA: ICD-10-CM

## 2024-01-22 RX ORDER — ATORVASTATIN CALCIUM 80 MG/1
80 TABLET, FILM COATED ORAL DAILY
Qty: 90 TABLET | Refills: 3 | Status: SHIPPED | OUTPATIENT
Start: 2024-01-22

## 2024-01-22 RX ORDER — EZETIMIBE 10 MG/1
10 TABLET ORAL DAILY
Qty: 90 TABLET | Refills: 3 | Status: SHIPPED | OUTPATIENT
Start: 2024-01-22

## 2024-01-24 ENCOUNTER — TELEPHONE (OUTPATIENT)
Dept: UROLOGY | Facility: CLINIC | Age: 69
End: 2024-01-24

## 2024-01-24 ENCOUNTER — OFFICE VISIT (OUTPATIENT)
Dept: UROLOGY | Facility: CLINIC | Age: 69
End: 2024-01-24
Payer: MEDICARE

## 2024-01-24 VITALS — WEIGHT: 150 LBS | BODY MASS INDEX: 23.54 KG/M2 | HEIGHT: 67 IN

## 2024-01-24 DIAGNOSIS — N20.1 URETERAL CALCULUS: Primary | ICD-10-CM

## 2024-01-24 DIAGNOSIS — N20.1 OBSTRUCTION OF RIGHT URETEROPELVIC JUNCTION (UPJ) DUE TO STONE: Primary | ICD-10-CM

## 2024-01-24 PROCEDURE — 3288F FALL RISK ASSESSMENT DOCD: CPT | Mod: CPTII,S$GLB,, | Performed by: UROLOGY

## 2024-01-24 PROCEDURE — 99999 PR PBB SHADOW E&M-EST. PATIENT-LVL III: CPT | Mod: PBBFAC,,, | Performed by: UROLOGY

## 2024-01-24 PROCEDURE — 1157F ADVNC CARE PLAN IN RCRD: CPT | Mod: CPTII,S$GLB,, | Performed by: UROLOGY

## 2024-01-24 PROCEDURE — 1159F MED LIST DOCD IN RCRD: CPT | Mod: CPTII,S$GLB,, | Performed by: UROLOGY

## 2024-01-24 PROCEDURE — 3008F BODY MASS INDEX DOCD: CPT | Mod: CPTII,S$GLB,, | Performed by: UROLOGY

## 2024-01-24 PROCEDURE — 1126F AMNT PAIN NOTED NONE PRSNT: CPT | Mod: CPTII,S$GLB,, | Performed by: UROLOGY

## 2024-01-24 PROCEDURE — 99213 OFFICE O/P EST LOW 20 MIN: CPT | Mod: S$GLB,,, | Performed by: UROLOGY

## 2024-01-24 PROCEDURE — 1101F PT FALLS ASSESS-DOCD LE1/YR: CPT | Mod: CPTII,S$GLB,, | Performed by: UROLOGY

## 2024-01-24 NOTE — TELEPHONE ENCOUNTER
Called and spoke to the pt to offer surgery date of 2/9, pt accepted. Confirmed with the pt per Dr. Moreno he is to stop his ASA 5 days prior to surgery. Informed the pt I will call the day before surgery with arrival time and pre-op instructions after 2pm. Pt verbalized understanding.

## 2024-01-24 NOTE — PROGRESS NOTES
Subjective:       Patient ID: Michael Espinoza is a 68 y.o. male.    Chief Complaint: Nephrolithiasis (Discuss eswl)    HPI patient has a 1.3 cm right UPJ stone with minimal obstruction he is feeling fine his urine shows an occasional red blood cell we discussed different treatment methods including ESWL PCNL ureteroscopy and patient would like to have right renal ESWL.  The stone is slightly difficult to see on KUB so I will probably put up a ureteral catheter and do ESWL via need to leave a stent temporarily but hopefully we can get by without a stent all risks and benefits were carefully explained the patient    Past Medical History:   Diagnosis Date    Complete heart block 7/5/2022    Coronary artery disease of native artery of native heart with stable angina pectoris 4/10/2018    Hyperlipidemia        Past Surgical History:   Procedure Laterality Date    A-V CARDIAC PACEMAKER INSERTION N/A 7/5/2022    Procedure: INSERTION, CARDIAC PACEMAKER, DUAL CHAMBER;  Surgeon: Alessandro Canales MD;  Location: [facility] EP LAB;  Service: Cardiology;  Laterality: N/A;  CHB, TBD, ANES, ED 6, MB    COLONOSCOPY N/A 8/5/2022    Procedure: COLONOSCOPY;  Surgeon: Namita Dumont MD;  Location: [facility] ENDO ([address] [place] [address]);  Service: Endoscopy;  Laterality: N/A;  vacc-inst portal-tb    CYST REMOVAL      ECHOCARDIOGRAM,TRANSESOPHAGEAL N/A 12/22/2023    Procedure: Transesophageal echo (CHARMAINE) intra-procedure log documentation;  Surgeon: Provider, [facility];  Location: [facility] EP LAB;  Service: Cardiology;  Laterality: N/A;    TONSILLECTOMY         Family History   Problem Relation Age of Onset    Hyperlipidemia Brother     Hypertension Brother     Heart disease Brother     Heart attack Brother     Diabetes Brother        Social History     Socioeconomic History    Marital status: Single   Tobacco Use    Smoking status: Never     Passive exposure: Never    Smokeless tobacco: Never   Substance and Sexual Activity    Alcohol use: Yes      Alcohol/week: 1.0 standard drink of alcohol     Types: 1 Glasses of wine per week     Comment: 1 drink 2-3 times/weekly    Drug use: No     Social Determinants of Health     Financial Resource Strain: Low Risk  (12/16/2023)    Overall Financial Resource Strain (CARDIA)     Difficulty of Paying Living Expenses: Not hard at all   Food Insecurity: No Food Insecurity (12/16/2023)    Hunger Vital Sign     Worried About Running Out of Food in the Last Year: Never true     Ran Out of Food in the Last Year: Never true   Transportation Needs: No Transportation Needs (12/16/2023)    PRAPARE - Transportation     Lack of Transportation (Medical): No     Lack of Transportation (Non-Medical): No   Physical Activity: Patient Declined (12/16/2023)    Exercise Vital Sign     Days of Exercise per Week: Patient declined     Minutes of Exercise per Session: Patient declined   Stress: No Stress Concern Present (12/16/2023)    [employer] - Occupational Stress Questionnaire     Feeling of Stress : Only a little   Social Connections: Unknown (12/16/2023)    Social Connection and Isolation Panel [NHANES]     Frequency of Communication with Friends and Family: Once a week     Frequency of Social Gatherings with Friends and Family: Once a week     Attends [employer] Services: Patient declined     Active Member of Clubs or Organizations: Patient declined     Attends Club or Organization Meetings: Patient declined     Marital Status: Never    Housing Stability: Low Risk  (12/16/2023)    Housing Stability Vital Sign     Unable to Pay for Housing in the Last Year: No     Number of Places Lived in the Last Year: 1     Unstable Housing in the Last Year: No       Allergies:  Patient has no known allergies.    Medications:    Current Outpatient Medications:     aspirin (ECOTRIN) 81 MG EC tablet, Take 81 mg by mouth 2 (two) times daily. (Breakfast & Afternoon), Disp: , Rfl:     atorvastatin (LIPITOR) 80 MG tablet,  Take 1 tablet (80 mg total) by mouth once daily., Disp: 90 tablet, Rfl: 3    calcium carbonate (TUMS ORAL), Take 2 tablets by mouth daily as needed (Heartburn)., Disp: , Rfl:     coenzyme Q10 (CO Q-10) 300 mg Cap, Take 1 capsule by mouth once daily., Disp: , Rfl:     ERGOCALCIFEROL, VITAMIN D2, (VITAMIN D ORAL), Take 1 capsule by mouth Mon, Wed, Fri, Sat & Sun, Disp: , Rfl:     ezetimibe (ZETIA) 10 mg tablet, Take 1 tablet (10 mg total) by mouth once daily., Disp: 90 tablet, Rfl: 3    multivit-min/FA/lycopen/lutein (CENTRUM SILVER MEN ORAL), Take 1 tablet by mouth every Mon, Wed, Fri., Disp: , Rfl:     dicyclomine (BENTYL) 10 MG capsule, Take 10 mg by mouth daily as needed (Abdominal pain)., Disp: , Rfl:     ondansetron (ZOFRAN-ODT) 4 MG TbDL, Take 1 tablet (4 mg total) by mouth every 8 (eight) hours as needed (nausea). (Patient not taking: Reported on 1/24/2024), Disp: 12 tablet, Rfl: 0    Review of Systems   Constitutional:  Negative for activity change, appetite change, chills, diaphoresis, fatigue, fever and unexpected weight change.   HENT:  Negative for congestion, dental problem, hearing loss, mouth sores, postnasal drip, rhinorrhea, sinus pressure and trouble swallowing.    Eyes:  Negative for pain, discharge and itching.   Respiratory:  Negative for apnea, cough, choking, chest tightness, shortness of breath and wheezing.    Cardiovascular:  Negative for chest pain, palpitations and leg swelling.   Gastrointestinal:  Negative for abdominal distention, abdominal pain, anal bleeding, blood in stool, constipation, diarrhea, nausea, rectal pain and vomiting.   Endocrine: Negative for polydipsia and polyuria.   Genitourinary:  Negative for decreased urine volume, difficulty urinating, dysuria, enuresis, flank pain, frequency, genital sores, hematuria, penile discharge, penile pain, penile swelling, scrotal swelling, testicular pain and urgency.   Musculoskeletal:  Negative for arthralgias, back pain and  myalgias.   Skin:  Negative for color change, rash and wound.   Neurological:  Negative for dizziness, syncope, speech difficulty, light-headedness and headaches.   Hematological:  Negative for adenopathy. Does not bruise/bleed easily.   Psychiatric/Behavioral:  Negative for behavioral problems, confusion, hallucinations and sleep disturbance.        Objective:      Physical Exam    Assessment:       1. Ureteral calculus        Plan:       Michael was seen today for nephrolithiasis.    Diagnoses and all orders for this visit:    Ureteral calculus    extracorporeal shock wave lithotripsy  .eswl

## 2024-01-26 ENCOUNTER — LAB VISIT (OUTPATIENT)
Dept: LAB | Facility: HOSPITAL | Age: 69
End: 2024-01-26
Payer: MEDICARE

## 2024-01-26 DIAGNOSIS — R78.81 MSSA BACTEREMIA: ICD-10-CM

## 2024-01-26 DIAGNOSIS — B95.61 MSSA BACTEREMIA: ICD-10-CM

## 2024-01-26 PROCEDURE — 87040 BLOOD CULTURE FOR BACTERIA: CPT | Performed by: NURSE PRACTITIONER

## 2024-01-29 ENCOUNTER — PATIENT MESSAGE (OUTPATIENT)
Dept: INFECTIOUS DISEASES | Facility: CLINIC | Age: 69
End: 2024-01-29
Payer: MEDICARE

## 2024-01-31 ENCOUNTER — TELEPHONE (OUTPATIENT)
Dept: INFECTIOUS DISEASES | Facility: CLINIC | Age: 69
End: 2024-01-31
Payer: MEDICARE

## 2024-01-31 LAB
BACTERIA BLD CULT: NORMAL
BACTERIA BLD CULT: NORMAL

## 2024-01-31 NOTE — TELEPHONE ENCOUNTER
ID Follow up Note:    Patient scheduled for lithotripsy 2/9 for removal of non-obstructing stone.   Recent history of MSSA bacteremia and bacteriuria  tx with 4 weeks ancef.   History of prior MSSA in urine since 2022.  CHARMAINE negative   Concerned stone could be colonized.  Discussed with ID staff and recommend 2 g Ancef one hour prior to procedure in addition to Urology's routine GNR pre-procedure coverage.  Discussed with Dr. Moreno.   
Female

## 2024-02-05 RX ORDER — GENTAMICIN 40 MG/ML
160 INJECTION, SOLUTION INTRAMUSCULAR; INTRAVENOUS ONCE
Status: DISCONTINUED | OUTPATIENT
Start: 2024-02-05 | End: 2024-02-29

## 2024-02-07 ENCOUNTER — DOCUMENTATION ONLY (OUTPATIENT)
Dept: CARDIOLOGY | Facility: HOSPITAL | Age: 69
End: 2024-02-07
Payer: MEDICARE

## 2024-02-07 NOTE — PROGRESS NOTES
Patient has been identified as having an implanted cardiac rhythm device (CRD); the implanted device is a [employer] pacemaker.      Planned procedure:  Lithotripsy.    No noted pacer dependency.        PACEMAKERS/NON-DEPENDENT    Per protocol, no pacemaker reprogramming is required in this non-dependent patient.    For additional questions, please contact the Arrhythmia Department at Ext [phone].

## 2024-02-08 ENCOUNTER — TELEPHONE (OUTPATIENT)
Dept: UROLOGY | Facility: CLINIC | Age: 69
End: 2024-02-08
Payer: MEDICARE

## 2024-02-08 NOTE — TELEPHONE ENCOUNTER
Pt was in the office today. He was scheduled for eswl and has not stopped asa. Procedure will be rescheduled and pt was instructed by dr ahn that he will need to hold asa 10 days, if approved by dr Jeanna Tapia, his cardiologist. Message was sent to Dr Tapia and his staff asking for approval. Destiny (surgery scheduler, urology) was notified.

## 2024-02-15 ENCOUNTER — LAB VISIT (OUTPATIENT)
Dept: LAB | Facility: HOSPITAL | Age: 69
End: 2024-02-15
Attending: NURSE PRACTITIONER
Payer: MEDICARE

## 2024-02-15 DIAGNOSIS — B95.61 MSSA BACTEREMIA: ICD-10-CM

## 2024-02-15 DIAGNOSIS — R78.81 MSSA BACTEREMIA: ICD-10-CM

## 2024-02-15 PROCEDURE — 36415 COLL VENOUS BLD VENIPUNCTURE: CPT | Performed by: NURSE PRACTITIONER

## 2024-02-15 PROCEDURE — 87040 BLOOD CULTURE FOR BACTERIA: CPT | Performed by: NURSE PRACTITIONER

## 2024-02-19 ENCOUNTER — TELEPHONE (OUTPATIENT)
Dept: UROLOGY | Facility: CLINIC | Age: 69
End: 2024-02-19
Payer: MEDICARE

## 2024-02-19 DIAGNOSIS — N20.1 URETERAL CALCULUS: Primary | ICD-10-CM

## 2024-02-19 NOTE — TELEPHONE ENCOUNTER
Called and spoke to the pt to offer rescheduled surgery date of 3/1, pt accepted. Confirmed with the pt per Dr. Moreno the pt is to stop ASA 1 week prior to surgery scheduled for 3/1. Informed the pt I will call the day before surgery with arrival time and pre-op instructions after 2 pm. Pt verbalized understanding.

## 2024-02-20 ENCOUNTER — PATIENT MESSAGE (OUTPATIENT)
Dept: CARDIOLOGY | Facility: CLINIC | Age: 69
End: 2024-02-20
Payer: MEDICARE

## 2024-02-20 LAB — BACTERIA BLD CULT: NORMAL

## 2024-02-20 NOTE — ANESTHESIA PAT ROS NOTE
02/20/2024  Michael Espinoza is a 68 y.o., male.      Pre-op Assessment    I have reviewed the NPO Status.   I have reviewed the Medications.     Review of Systems  Anesthesia Hx:  No problems with previous Anesthesia   History of prior surgery of interest to airway management or planning: heart surgery. Previous anesthesia: General        Denies Family Hx of Anesthesia complications.    Denies Personal Hx of Anesthesia complications.                    Social:  Non-Smoker, Social Alcohol Use       Hematology/Oncology:  Hematology Normal   Oncology Normal                                   EENT/Dental:  EENT/Dental Normal           Cardiovascular:  Exercise tolerance: good  Pacemaker    Denies MI. CAD   CABG/stent Dysrhythmias   Denies Angina.     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   Denies Shortness of breath.   Denies Sleep Apnea.                Renal/:   renal calculi   Renal Symptoms/Infections/Stones:   Urolithiasis  Ureteral calculus, h/o rt kidney stone          Hepatic/GI:  Hepatic/GI Normal     Denies GERD. Denies Liver Disease.            Musculoskeletal:     Joint Disease:   Chronic right shoulder pain  Bone Disorders:    Osteoporosis        Neurological:  Neurology Normal Denies TIA.  Denies CVA.    Denies Seizures.                                Endocrine:  Endocrine Normal Denies Diabetes. Denies Hypothyroidism.          Dermatological:  Skin Normal    Psych:  Psychiatric Normal                         Anesthesia Assessment: Preoperative EQUATION    Planned Procedure: Procedure(s) (LRB):  LITHOTRIPSY, ESWL  (Right)  INSERTION, CATHETER, URETER (Right)  Requested Anesthesia Type:General  Surgeon: Sanchez Moreno Jr., MD  Service: Urology  Known or anticipated Date of Surgery:3/1/2024    Surgeon notes: reviewed    Electronic QUestionnaire Assessment completed via nurse interview with patient.        Triage considerations:     The patient has no apparent active cardiac condition (No unstable coronary Syndrome such as severe unstable angina or recent [<1 month] myocardial infarction, decompensated CHF, severe valvular   disease or significant arrhythmia)    Previous anesthesia records:GETA and No problems  12/22/23 CHARMAINE, gen anes, ASA 3  Airway:  Mallampati: II   Mouth Opening: Normal  TM Distance: Normal  Tongue: Normal  Neck ROM: Normal ROM  Intubation     Date/Time: 12/22/2023 9:20 AM     Performed by: Tracee Rose CRNA  Authorized by: Mike Winston MD    Intubation:     Induction:  Intravenous    Intubated:  Postinduction    Mask Ventilation:  Easy mask    Attempts:  1    Attempted By:  CRNA    Method of Intubation:  Video laryngoscopy    Blade:  Avalos 3    Laryngeal View Grade: Grade I - full view of cords      Difficult Airway Encountered?: No      Complications:  None    Airway Device:  Oral endotracheal tube    Airway Device Size:  7.5    Style/Cuff Inflation:  Cuffed (inflated to minimal occlusive pressure)    Inflation Amount (mL):  8    Tube secured:  22    Secured at:  The lips    Placement Verified By:  Capnometry and Revisualization with laryngoscopy    Complicating Factors:  None    Findings Post-Intubation:  BS equal bilateral and atraumatic/condition of teeth unchanged    Last [facility] note[facility] 6[facility]12 months [facility]   Subspecialty notes: Urology    Other important co-morbidities: HLD and CAD s/p CABGx3, CHB s/p PPM, ASD, CONTRERAS, Gilbert's syndrome, chronic rt shoulder pain, MSSA bacteremia, complicated UTI, h/o rt renal stone, ureteral stone       Tests already available:  Available tests,   within 3 months , [facility] .   01/09/24 CBC, CMP  12/22/23 CHARMAINE (EF 50-55%)  12/13/23 EKG            Instructions given. (See in Nurse's note)    Optimization:  Anesthesia Preop Clinic Assessment Indicated:                                  --phone screen done        Plan:    Testing:  None Needed     Navigation:  Straight Line to surgery.               No tests, anesthesia preop clinic visit, or consult required.

## 2024-02-29 ENCOUNTER — ANESTHESIA EVENT (OUTPATIENT)
Dept: SURGERY | Facility: HOSPITAL | Age: 69
End: 2024-02-29
Payer: MEDICARE

## 2024-02-29 ENCOUNTER — CLINICAL SUPPORT (OUTPATIENT)
Dept: UROLOGY | Facility: CLINIC | Age: 69
End: 2024-02-29
Payer: MEDICARE

## 2024-02-29 ENCOUNTER — TELEPHONE (OUTPATIENT)
Dept: UROLOGY | Facility: CLINIC | Age: 69
End: 2024-02-29

## 2024-02-29 ENCOUNTER — PATIENT MESSAGE (OUTPATIENT)
Dept: UROLOGY | Facility: CLINIC | Age: 69
End: 2024-02-29

## 2024-02-29 DIAGNOSIS — N20.1 URETERAL CALCULUS: Primary | ICD-10-CM

## 2024-02-29 DIAGNOSIS — Z95.0 PRESENCE OF CARDIAC PACEMAKER: ICD-10-CM

## 2024-02-29 DIAGNOSIS — I44.2 ATRIOVENTRICULAR BLOCK, COMPLETE: ICD-10-CM

## 2024-02-29 PROCEDURE — 96372 THER/PROPH/DIAG INJ SC/IM: CPT | Mod: S$GLB,,, | Performed by: UROLOGY

## 2024-02-29 PROCEDURE — 99499 UNLISTED E&M SERVICE: CPT | Mod: S$GLB,,, | Performed by: UROLOGY

## 2024-02-29 RX ORDER — GENTAMICIN 40 MG/ML
160 INJECTION, SOLUTION INTRAMUSCULAR; INTRAVENOUS ONCE
Status: COMPLETED | OUTPATIENT
Start: 2024-02-29 | End: 2024-02-29

## 2024-02-29 RX ADMIN — GENTAMICIN 160 MG: 40 INJECTION, SOLUTION INTRAMUSCULAR; INTRAVENOUS at 08:02

## 2024-02-29 NOTE — PROGRESS NOTES
Pt here for nurse visit. Allergies reviewed,Preop injection given without difficulty as documented in MAR. Pt instructed to remain in office for 15 minutes and signs /symptoms of allergic reaction explained to mr Espinoza.

## 2024-02-29 NOTE — TELEPHONE ENCOUNTER
Called pt to confirm arrival time of 8:30am for procedure on 3/1/24. Gave pt NPO instructions and gave pt opportunity to ask questions. Pt verbalized understanding.

## 2024-03-01 ENCOUNTER — ANESTHESIA (OUTPATIENT)
Dept: SURGERY | Facility: HOSPITAL | Age: 69
End: 2024-03-01
Payer: MEDICARE

## 2024-03-01 ENCOUNTER — HOSPITAL ENCOUNTER (OUTPATIENT)
Facility: HOSPITAL | Age: 69
Discharge: HOME | End: 2024-03-01
Attending: UROLOGY | Admitting: UROLOGY
Payer: MEDICARE

## 2024-03-01 VITALS
RESPIRATION RATE: 20 BRPM | TEMPERATURE: 97 F | HEIGHT: 67 IN | WEIGHT: 150 LBS | BODY MASS INDEX: 23.54 KG/M2 | SYSTOLIC BLOOD PRESSURE: 150 MMHG | OXYGEN SATURATION: 96 % | DIASTOLIC BLOOD PRESSURE: 85 MMHG | HEART RATE: 64 BPM

## 2024-03-01 DIAGNOSIS — N20.0 RIGHT KIDNEY STONE: Primary | ICD-10-CM

## 2024-03-01 DIAGNOSIS — N20.0 RIGHT NEPHROLITHIASIS: ICD-10-CM

## 2024-03-01 PROCEDURE — D9220A PRA ANESTHESIA: Mod: CRNA,,, | Performed by: NURSE ANESTHETIST, CERTIFIED REGISTERED

## 2024-03-01 PROCEDURE — 50590 FRAGMENTING OF KIDNEY STONE: CPT | Mod: RT,,, | Performed by: UROLOGY

## 2024-03-01 PROCEDURE — 36000704 HC OR TIME LEV I 1ST 15 MIN: Performed by: UROLOGY

## 2024-03-01 PROCEDURE — 36000705 HC OR TIME LEV I EA ADD 15 MIN: Performed by: UROLOGY

## 2024-03-01 PROCEDURE — D9220A PRA ANESTHESIA: Mod: ANES,,, | Performed by: INTERNAL MEDICINE

## 2024-03-01 PROCEDURE — 25000003 PHARM REV CODE 250

## 2024-03-01 PROCEDURE — 63600175 PHARM REV CODE 636 W HCPCS: Performed by: NURSE ANESTHETIST, CERTIFIED REGISTERED

## 2024-03-01 PROCEDURE — 37000009 HC ANESTHESIA EA ADD 15 MINS: Performed by: UROLOGY

## 2024-03-01 PROCEDURE — 37000008 HC ANESTHESIA 1ST 15 MINUTES: Performed by: UROLOGY

## 2024-03-01 PROCEDURE — 63600175 PHARM REV CODE 636 W HCPCS

## 2024-03-01 PROCEDURE — 25000003 PHARM REV CODE 250: Performed by: NURSE ANESTHETIST, CERTIFIED REGISTERED

## 2024-03-01 PROCEDURE — 71000015 HC POSTOP RECOV 1ST HR: Performed by: UROLOGY

## 2024-03-01 PROCEDURE — 71000044 HC DOSC ROUTINE RECOVERY FIRST HOUR: Performed by: UROLOGY

## 2024-03-01 PROCEDURE — C1769 GUIDE WIRE: HCPCS | Performed by: UROLOGY

## 2024-03-01 RX ORDER — FENTANYL CITRATE 50 UG/ML
INJECTION, SOLUTION INTRAMUSCULAR; INTRAVENOUS
Status: DISCONTINUED | OUTPATIENT
Start: 2024-03-01 | End: 2024-03-01

## 2024-03-01 RX ORDER — DEXAMETHASONE SODIUM PHOSPHATE 4 MG/ML
INJECTION, SOLUTION INTRA-ARTICULAR; INTRALESIONAL; INTRAMUSCULAR; INTRAVENOUS; SOFT TISSUE
Status: DISCONTINUED | OUTPATIENT
Start: 2024-03-01 | End: 2024-03-01

## 2024-03-01 RX ORDER — ONDANSETRON HYDROCHLORIDE 2 MG/ML
INJECTION, SOLUTION INTRAVENOUS
Status: DISCONTINUED | OUTPATIENT
Start: 2024-03-01 | End: 2024-03-01

## 2024-03-01 RX ORDER — OXYCODONE HYDROCHLORIDE 5 MG/1
5 TABLET ORAL EVERY 4 HOURS PRN
Qty: 7 TABLET | Refills: 0 | Status: ON HOLD | OUTPATIENT
Start: 2024-03-01 | End: 2024-05-21 | Stop reason: HOSPADM

## 2024-03-01 RX ORDER — LIDOCAINE HYDROCHLORIDE 20 MG/ML
INJECTION, SOLUTION EPIDURAL; INFILTRATION; INTRACAUDAL; PERINEURAL
Status: DISCONTINUED | OUTPATIENT
Start: 2024-03-01 | End: 2024-03-01

## 2024-03-01 RX ORDER — OXYBUTYNIN CHLORIDE 5 MG/1
5 TABLET ORAL 3 TIMES DAILY PRN
Qty: 63 TABLET | Refills: 0 | Status: ON HOLD | OUTPATIENT
Start: 2024-03-01 | End: 2024-05-21 | Stop reason: HOSPADM

## 2024-03-01 RX ORDER — PROPOFOL 10 MG/ML
VIAL (ML) INTRAVENOUS
Status: DISCONTINUED | OUTPATIENT
Start: 2024-03-01 | End: 2024-03-01

## 2024-03-01 RX ORDER — ROCURONIUM BROMIDE 10 MG/ML
INJECTION, SOLUTION INTRAVENOUS
Status: DISCONTINUED | OUTPATIENT
Start: 2024-03-01 | End: 2024-03-01

## 2024-03-01 RX ORDER — PHENYLEPHRINE HYDROCHLORIDE 10 MG/ML
INJECTION INTRAVENOUS
Status: DISCONTINUED | OUTPATIENT
Start: 2024-03-01 | End: 2024-03-01

## 2024-03-01 RX ORDER — DEXMEDETOMIDINE HYDROCHLORIDE 100 UG/ML
INJECTION, SOLUTION INTRAVENOUS
Status: DISCONTINUED | OUTPATIENT
Start: 2024-03-01 | End: 2024-03-01

## 2024-03-01 RX ORDER — TAMSULOSIN HYDROCHLORIDE 0.4 MG/1
0.4 CAPSULE ORAL DAILY
Qty: 30 CAPSULE | Refills: 1 | Status: SHIPPED | OUTPATIENT
Start: 2024-03-01 | End: 2024-05-30

## 2024-03-01 RX ORDER — MIDAZOLAM HYDROCHLORIDE 1 MG/ML
INJECTION, SOLUTION INTRAMUSCULAR; INTRAVENOUS
Status: DISCONTINUED | OUTPATIENT
Start: 2024-03-01 | End: 2024-03-01

## 2024-03-01 RX ADMIN — LIDOCAINE HYDROCHLORIDE 100 MG: 20 INJECTION, SOLUTION EPIDURAL; INFILTRATION; INTRACAUDAL; PERINEURAL at 11:03

## 2024-03-01 RX ADMIN — MIDAZOLAM HYDROCHLORIDE 2 MG: 1 INJECTION, SOLUTION INTRAMUSCULAR; INTRAVENOUS at 11:03

## 2024-03-01 RX ADMIN — DEXMEDETOMIDINE 8 MCG: 100 INJECTION, SOLUTION, CONCENTRATE INTRAVENOUS at 11:03

## 2024-03-01 RX ADMIN — ONDANSETRON 4 MG: 2 INJECTION INTRAMUSCULAR; INTRAVENOUS at 11:03

## 2024-03-01 RX ADMIN — FENTANYL CITRATE 50 MCG: 50 INJECTION, SOLUTION INTRAMUSCULAR; INTRAVENOUS at 11:03

## 2024-03-01 RX ADMIN — DEXAMETHASONE SODIUM PHOSPHATE 4 MG: 4 INJECTION, SOLUTION INTRAMUSCULAR; INTRAVENOUS at 11:03

## 2024-03-01 RX ADMIN — ROCURONIUM BROMIDE 20 MG: 10 INJECTION, SOLUTION INTRAVENOUS at 11:03

## 2024-03-01 RX ADMIN — PROPOFOL 50 MG: 10 INJECTION, EMULSION INTRAVENOUS at 11:03

## 2024-03-01 RX ADMIN — SUGAMMADEX 200 MG: 100 INJECTION, SOLUTION INTRAVENOUS at 12:03

## 2024-03-01 RX ADMIN — CEFAZOLIN 2 G: 2 INJECTION, POWDER, FOR SOLUTION INTRAMUSCULAR; INTRAVENOUS at 11:03

## 2024-03-01 RX ADMIN — SODIUM CHLORIDE: 9 INJECTION, SOLUTION INTRAVENOUS at 11:03

## 2024-03-01 RX ADMIN — PHENYLEPHRINE HYDROCHLORIDE 100 MCG: 10 INJECTION INTRAVENOUS at 11:03

## 2024-03-01 RX ADMIN — PROPOFOL 150 MG: 10 INJECTION, EMULSION INTRAVENOUS at 11:03

## 2024-03-01 NOTE — ANESTHESIA POSTPROCEDURE EVALUATION
Anesthesia Post Evaluation    Patient: Michael Espinoza    Procedure(s) Performed: Procedure(s) (LRB):  LITHOTRIPSY, ESWL (Right)    Final Anesthesia Type: general      Patient location during evaluation: PACU  Patient participation: Yes- Able to Participate  Level of consciousness: awake and alert  Post-procedure vital signs: reviewed and stable  Pain management: adequate  Airway patency: patent  SUDHEER mitigation strategies: Extubation while patient is awake and Verification of full reversal of neuromuscular block  PONV status at discharge: No PONV  Anesthetic complications: no      Cardiovascular status: blood pressure returned to baseline  Respiratory status: spontaneous ventilation and unassisted  Hydration status: euvolemic  Follow-up not needed.              Vitals Value Taken Time   /71 03/01/24 1246   Temp 36.2 °C (97.2 °F) 03/01/24 1224   Pulse 67 03/01/24 1250   Resp 22 03/01/24 1250   SpO2 96 % 03/01/24 1250   Vitals shown include unvalidated device data.      No case tracking events are documented in the log.      Pain/Eduin Score: Eduin Score: 10 (3/1/2024 12:45 PM)

## 2024-03-01 NOTE — OP NOTE
[facility]  Operative Note    Date: 03/01/2024    Pre-Op Diagnosis: Right renal stone    Post-Op Diagnosis: same    Procedure(s) Performed:   1.  Right ESWL    Specimen(s): none    Staff Surgeon: Sanchez Moreno MD    Assistant Surgeon: Alanna Gan MD    Anesthesia: General Anesthesia with ETT     Indications: Michael Espinoza is a 68 y.o. male with a  right renal stone presenting for definitive stone management.  The stone is radio-opaque on KUB.      Findings: R ESWL with good fragmentation    Estimated Blood Loss: none    Drains: none    Procedure in Detail:  After risk, benefits, and possible complications of ESWL were discussed, the patient elected to undergo the procedure and informed consent was obtained. All questions were answered in the pre-operative area and the patient was transferred to the lithotripsy suite and placed on the lithotriptor table. SCDs were applied and working.  Time out was preformed, vernell-procedural antibiotics were administered.    The patient's Right-sided stone was aligned on the X-Y- and Z axis.  MAC anesthesia was administered.  When the patient was adequately sedated, 3000 thousand shocks were administered initially at a rate of 60 shocks per second, then increased to 120 shocks per second after a 2 minute mari protective pause, beginning at 16 KV of power and advanced to 26 KV. Intermittent fluoroscopy was used to monitor fragmentation of the stone.    At the end of the procedure the stone fragmented well.      The patient tolerated the procedure well and was transferred to the PACU in stable condition.      Plan: The patient will follow up with Dr. Moreno in 3 weeks with a KUB and renal ultrasound prior.  The patient was given prescriptions for oxycodone and flomax.  The patient will strain all urine and bring any fragments to the follow up appt for stone analysis.      Alanna Gan MD     I was present entire procedure and agree with above  Ruby BROOKS

## 2024-03-01 NOTE — ANESTHESIA PREPROCEDURE EVALUATION
[facility]  Anesthesia Pre-Operative Evaluation       Patient Name: Michael Espinoza  YOB: 1955  MRN: [id number]  [facility]: [id number]      Code Status: Prior   Date of Procedure: 3/1/2024  Anesthesia: General Procedure: Procedure(s) (LRB):  LITHOTRIPSY, ESWL (Right)  INSERTION, CATHETER, URETER (Right)  Pre-Operative Diagnosis: Ureteral calculus [N20.1]  Proceduralist: Surgeon(s) and Role:     * Sanchez Moreno Jr., MD - Primary        SUBJECTIVE:   Michael Espinoza is a 68 y.o. male who  has a past medical history of Complete heart block (7/5/2022), Coronary artery disease of native artery of native heart with stable angina pectoris (4/10/2018), and Hyperlipidemia. No notes on file    Anticoagulants   Medication Route Frequency       he has a current medication list which includes the following long-term medication(s): aspirin, atorvastatin, co q-10, and ezetimibe.   ALLERGIES:   Review of patient's allergies indicates:  No Known Allergies  LDA:        Intubation     Date/Time: 12/22/2023 9:20 AM     Performed by: Tracee Rose CRNA  Authorized by: Mike Winston MD    Intubation:     Induction:  Intravenous    Intubated:  Postinduction    Mask Ventilation:  Easy mask    Attempts:  1    Attempted By:  CRNA    Method of Intubation:  Video laryngoscopy    Blade:  Avalos 3    Laryngeal View Grade: Grade I - full view of cords      Difficult Airway Encountered?: No      Complications:  None    Airway Device:  Oral endotracheal tube    Airway Device Size:  7.5    Style/Cuff Inflation:  Cuffed (inflated to minimal occlusive pressure)    Inflation Amount (mL):  8    Tube secured:  22    Secured at:  The lips    Placement Verified By:  Capnometry and Revisualization with laryngoscopy    Complicating Factors:  None    Findings Post-Intubation:  BS equal bilateral and atraumatic/condition of teeth unchanged          Lines/Drains/Airways       Peripheral Intravenous Line  Duration                   Midline Catheter Insertion/Assessment  - Single Lumen 12/22/23 1500 Right brachial vein other (see comments) 69 days         Peripheral IV - Single Lumen 03/01/24 0950 20 G Anterior;Right Forearm <1 day                  MEDICATIONS:     Antibiotics (From admission, onward)      Start     Stop Route Frequency Ordered    03/01/24 0919  ceFAZolin 2 g in dextrose 5 % in water (D5W) 50 mL IVPB (MB+)         -- IV On Call Procedure 03/01/24 0919          VTE Risk Mitigation (From admission, onward)           Ordered     IP VTE LOW RISK PATIENT  Once         03/01/24 0919     Place sequential compression device  Until discontinued         03/01/24 0919                  Current Facility-Administered Medications   Medication Dose Route Frequency Provider Last Rate Last Admin    ceFAZolin 2 g in dextrose 5 % in water (D5W) 50 mL IVPB (MB+)  2 g Intravenous On Call Procedure Alanna Gan MD              History:   There are no hospital problems to display for this patient.    Surgical History:    has a past surgical history that includes Tonsillectomy; Cyst Removal; A-V cardiac pacemaker insertion (N/A, 7/5/2022); Colonoscopy (N/A, 8/5/2022); and echocardiogram,transesophageal (N/A, 12/22/2023).   Social History:    has no history on file for sexual activity.  reports that he has never smoked. He has never been exposed to tobacco smoke. He has never used smokeless tobacco. He reports current alcohol use of about 1.0 standard drink of alcohol per week. He reports that he does not use drugs.     OBJECTIVE:     Vital Signs (Most Recent):  Temp: 36.7 °C (98.1 °F) (03/01/24 0949)  Pulse: 91 (03/01/24 0949)  Resp: 16 (03/01/24 0949)  BP: (!) 163/96 (03/01/24 0949)  SpO2: 97 % (03/01/24 0949) Vital Signs Range (Last 24H):  Temp:  [36.7 °C (98.1 °F)]   Pulse:  [91]   Resp:  [16]   BP: (163)/(96)   SpO2:  [97 %]        Body mass index is 23.49 kg/m².   Wt Readings from Last 4 Encounters:   03/01/24 68 kg (150 lb)   01/24/24 68 kg  (150 lb)   01/12/24 68.3 kg (150 lb 9.2 oz)   01/09/24 66.2 kg (146 lb)     Significant Labs:  Lab Results   Component Value Date    WBC 4.22 01/09/2024    HGB 12.7 (L) 01/09/2024    HCT 38.7 (L) 01/09/2024     01/09/2024     01/09/2024    K 4.0 01/09/2024     01/09/2024    CREATININE 0.8 01/09/2024    BUN 13 01/09/2024    CO2 25 01/09/2024    GLU 83 01/09/2024    CALCIUM 9.1 01/09/2024    MG 2.0 12/22/2023    PHOS 2.1 (L) 12/18/2023    ALKPHOS 86 01/09/2024    ALT 12 01/09/2024    AST 36 01/09/2024    ALBUMIN 3.4 (L) 01/09/2024    INR 1.1 05/19/2018    APTT <21.0 05/19/2018    HGBA1C 5.6 08/11/2023    TROPONINI <0.006 07/05/2022     (H) 07/05/2022     No LMP for male patient.  No results found for this or any previous visit (from the past 72 hour(s)).    EKG:   Results for orders placed or performed during the hospital encounter of 12/13/23   EKG 12-lead    Collection Time: 12/13/23  9:38 PM    Narrative    Test Reason : R11.0,    Vent. Rate : 103 BPM     Atrial Rate : 103 BPM     P-R Int : 170 ms          QRS Dur : 176 ms      QT Int : 410 ms       P-R-T Axes : 037 158 -07 degrees     QTc Int : 537 ms    Atrial-sensed ventricular-paced rhythm  Biventricular pacemaker detected  Abnormal ECG  When compared with ECG of 21-NOV-2023 08:01,  Vent. rate has increased BY  36 BPM  Confirmed by Ivone Garibay MD ([id number]) on 12/14/2023 2:17:29 PM    Referred By: [email or web address]   SELF           Confirmed By:Ivone Garibay MD       TTE:  Results for orders placed or performed during the hospital encounter of 12/13/23   Echo   Result Value Ref Range    RA Width 4.54 cm    LA volume (mod) 57.20 cm3    Left Atrium Major Axis 5.74 cm    Left Atrium Minor Axis 6.25 cm    RA Major Axis 4.49 cm    LV Diastolic Volume 104.64 mL    LV Systolic Volume 33.78 mL    MV Peak A Larry 0.87 m/s    MV stenosis pressure 1/2 time 31.36 ms    TR Max Larry 2.84 m/s    MV Peak E Larry 0.71 m/s    Ao VTI 29.53 cm    Ao peak larry 1.65 m/s     LVOT peak VTI 13.49 cm    LVOT peak jesus 0.86 m/s    LVOT diameter 2.20 cm    IVRT 105.61 msec    E wave deceleration time 108.15 msec    AV mean gradient 7 mmHg    TAPSE 1.10 cm    RVDD 3.41 cm    LA size 4.89 cm    Ascending aorta 3.00 cm    STJ 2.45 cm    Sinus 3.10 cm    LVIDs 2.96 2.1 - 4.0 cm    Posterior Wall 0.88 0.6 - 1.1 cm    IVS 0.84 0.6 - 1.1 cm    LVIDd 4.74 3.5 - 6.0 cm    TDI LATERAL 0.15 m/s    LA WIDTH 4.56 cm    TDI SEPTAL 0.07 m/s    LV LATERAL E/E' RATIO 4.73 m/s    LV SEPTAL E/E' RATIO 10.14 m/s    FS 38 28 - 44 %    LA volume 113.42 cm3    LV mass 136.29 g    ZLVIDD -0.95     ZLVIDS -0.65     Left Ventricle Relative Wall Thickness 0.37 cm    AV valve area 1.74 cm²    AV Velocity Ratio 0.52     AV index (prosthetic) 0.46     MV valve area p 1/2 method 7.02 cm2    E/A ratio 0.82     Mean e' 0.11 m/s    LVOT area 3.8 cm2    LVOT stroke volume 51.25 cm3    AV peak gradient 11 mmHg    E/E' ratio 6.45 m/s    LV Systolic Volume Index 18.1 mL/m2    LV Diastolic Volume Index 55.96 mL/m2    LA Volume Index 60.7 mL/m2    LV Mass Index 73 g/m2    Triscuspid Valve Regurgitation Peak Gradient 32 mmHg    LA Volume Index (Mod) 30.6 mL/m2    BRYAN by Velocity Ratio 1.98 cm²    BSA 1.89 m2    EF 43 %    TV resting pulmonary artery pressure 35 mmHg    RV TB RVSP 6 mmHg    Est. RA pres 3 mmHg    Narrative      Left Ventricle: The left ventricle is normal in size. Normal wall   thickness. Regional wall motion abnormalities present. See diagram for   wall motion findings. There is mildly reduced systolic function with a   visually estimated ejection fraction of 40 - 45%. Ejection fraction by   visual approximation is 43%. There is normal diastolic function.    Right Ventricle: Normal right ventricular cavity size. Wall thickness   is normal. Right ventricle wall motion  is normal. Systolic function is   normal.    Left Atrium: Left atrium is severely dilated. 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     Aortic Valve: The aortic valve is a trileaflet valve. There is mild   aortic valve sclerosis. There is mild stenosis. Aortic valve area by VTI   is 1.74 cm². Aortic valve peak velocity is 1.65 m/s. Mean gradient is 7   mmHg. The dimensionless index is 0.46. There is trace aortic   regurgitation.    Mitral Valve: There is mild bileaflet sclerosis.    Tricuspid Valve: There is mild regurgitation.    Pulmonary Artery: The estimated pulmonary artery systolic pressure is   35 mmHg.    IVC/SVC: Normal venous pressure at 3 mmHg.       EF   Date Value Ref Range Status   12/16/2023 43 % Final   07/05/2022 65 % Final      Results for orders placed or performed during the hospital encounter of 04/10/18   2D Echo w/ Color Flow Doppler   Result Value Ref Range    EF + QEF 60 55 - 65    Aortic Valve Regurgitation TRIVIAL     Est. PA Systolic Pressure 17.81     Mitral Valve Mobility NORMAL     Tricuspid Valve Regurgitation TRIVIAL      CHARMAINE:  Results for orders placed or performed during the hospital encounter of 12/13/23   Transesophageal echo (CHARMAINE)   Result Value Ref Range    BSA 1.89 m2    Narrative      CHARMAINE for endocarditis evaluation. No evidence of valvular or lead   vegetations visualized.    Left Ventricle: The left ventricle is normal in size. Normal wall   thickness. Normal wall motion. There is low normal systolic function with   a visually estimated ejection fraction of 50 - 55%.    Right Ventricle: Normal right ventricular cavity size. Wall thickness   is normal. Right ventricle wall motion  is normal. Systolic function is   normal. Pacemaker lead present in the ventricle, no veggitation   visualized.    Left Atrium: Left atrium is dilated. There is no thrombus in the left   atrial cavity.    Right Atrium: Right atrium is dilated.    Aortic Valve: The aortic valve is structurally normal. There is no   mass/vegetation present. There is trace aortic regurgitation.    Mitral Valve: Mildly thickened leaflets. There is no  "mass/vegetation   present. There is trace regurgitation.    Tricuspid Valve: The tricuspid valve is structurally normal. There is   no mass/vegetation present. There is trace regurgitation.    Pulmonic Valve: The pulmonic valve is structurally normal. There is no   mass/vegetation present.       Stress Test:  Results for orders placed during the hospital encounter of 07/21/21    Nuclear Stress - Cardiology Interpreted    Interpretation Summary    Normal myocardial perfusion scan. There is no evidence of myocardial ischemia or infarction.    The gated perfusion images showed an ejection fraction of 58% at rest. The gated perfusion images showed an ejection fraction of 56% post stress. Normal ejection fraction is greater than 53%.    There is normal wall motion at rest and post stress.    LV cavity size is normal at rest and normal at stress.    The EKG portion of this study is abnormal but not diagnostic.    The patient reported no chest pain during the stress test.    There are no prior studies for comparison.    The patient developed 2nd degree heart block, Mobitz II during stress.     LHC:  Results for orders placed during the hospital encounter of 12/13/23    Intra-Procedure Documentation    Narrative  CHARMAINE performed in the Invasive Lab  - See Procedure Log link below for nursing documentation  - See CHARMAINE order on Card Proc Tab for physician findings     PFT:  No results found for: "FEV1", "FVC", "CTJ5LHE", "TLC", "DLCO"   ASSESSMENT/PLAN:          Pre-op Assessment    I have reviewed the Patient Summary Reports.     I have reviewed the Nursing Notes. I have reviewed the NPO Status.   I have reviewed the Medications.     Review of Systems  Social:  Non-Smoker, No Alcohol Use       Cardiovascular:  Exercise tolerance: good  Pacemaker    CAD   CABG/stent            Pacemaker dependent, v paced >90% of the time                          Pulmonary:  Pulmonary Normal                           Physical Exam  General: Well " nourished, Cooperative, Alert and Cachexia    Airway:  Mallampati: II   Mouth Opening: Normal  TM Distance: 4 - 6 cm  Tongue: Normal  Neck ROM: Extension Decreased    Dental:  Intact        Anesthesia Plan  Type of Anesthesia, risks & benefits discussed:    Anesthesia Type: Gen ETT  Intra-op Monitoring Plan: Standard ASA Monitors  Induction:  IV  Informed Consent: Informed consent signed with the Patient and all parties understand the risks and agree with anesthesia plan.  All questions answered.   ASA Score: 3    Ready For Surgery From Anesthesia Perspective.     .

## 2024-03-01 NOTE — TRANSFER OF CARE
"Anesthesia Transfer of Care Note    Patient: Michael Espinoza    Procedure(s) Performed: Procedure(s) (LRB):  LITHOTRIPSY, ESWL (Right)    Patient location: PACU    Anesthesia Type: general    Transport from OR: Transported from OR on 100% O2 by closed face mask with adequate spontaneous ventilation    Post pain: adequate analgesia    Post assessment: no apparent anesthetic complications and tolerated procedure well    Post vital signs: stable    Level of consciousness: awake, alert and oriented    Nausea/Vomiting: no nausea/vomiting    Complications: none    Transfer of care protocol was followed      Last vitals: Visit Vitals  BP (!) 163/96 (BP Location: Right arm, Patient Position: Lying)   Pulse 91   Temp 36.7 °C (98.1 °F) (Oral)   Resp 16   Ht 5' 7" (1.702 m)   Wt 68 kg (150 lb)   SpO2 97%   BMI 23.49 kg/m²     "

## 2024-03-01 NOTE — SUBJECTIVE & OBJECTIVE
Past Medical History:   Diagnosis Date    Complete heart block 7/5/2022    Coronary artery disease of native artery of native heart with stable angina pectoris 4/10/2018    Hyperlipidemia        Past Surgical History:   Procedure Laterality Date    A-V CARDIAC PACEMAKER INSERTION N/A 7/5/2022    Procedure: INSERTION, CARDIAC PACEMAKER, DUAL CHAMBER;  Surgeon: Alessandro Canales MD;  Location: [facility] EP LAB;  Service: Cardiology;  Laterality: N/A;  CHB, TBD, ANES, ED 6, MB    COLONOSCOPY N/A 8/5/2022    Procedure: COLONOSCOPY;  Surgeon: Namita Dumont MD;  Location: [facility] ENDO (4TH FLR);  Service: Endoscopy;  Laterality: N/A;  vacc-inst portal-tb    CYST REMOVAL      ECHOCARDIOGRAM,TRANSESOPHAGEAL N/A 12/22/2023    Procedure: Transesophageal echo (CHARMAINE) intra-procedure log documentation;  Surgeon: Provider, [facility];  Location: [facility] EP LAB;  Service: Cardiology;  Laterality: N/A;    TONSILLECTOMY         Review of patient's allergies indicates:  No Known Allergies    Family History       Problem Relation (Age of Onset)    Diabetes Brother    Heart attack Brother    Heart disease Brother    Hyperlipidemia Brother    Hypertension Brother            Tobacco Use    Smoking status: Never     Passive exposure: Never    Smokeless tobacco: Never   Substance and Sexual Activity    Alcohol use: Yes     Alcohol/week: 1.0 standard drink of alcohol     Types: 1 Glasses of wine per week     Comment: 1 drink 2-3 times/weekly    Drug use: No    Sexual activity: Not on file       Review of Systems    Objective:     Temp:  [98.1 °F (36.7 °C)] 98.1 °F (36.7 °C)  Pulse:  [91] 91  Resp:  [16] 16  SpO2:  [97 %] 97 %  BP: (163)/(96) 163/96     Body mass index is 23.49 kg/m².    No intake/output data recorded.       Drains       None                     Physical Exam  Constitutional:       Appearance: Normal appearance.   HENT:      Head: Normocephalic.   Pulmonary:      Effort: Pulmonary effort is normal.   Neurological:       "General: No focal deficit present.      Mental Status: He is alert.          Significant Labs:    BMP:  No results for input(s): "NA", "K", "CL", "CO2", "BUN", "CREATININE", "LABGLOM", "GLUCOSE", "CALCIUM" in the last 168 hours.    CBC:  No results for input(s): "WBC", "HGB", "HCT", "PLT" in the last 168 hours.    All pertinent labs results from the past 24 hours have been reviewed.    Significant Imaging:  All pertinent imaging results/findings from the past 24 hours have been reviewed.              "

## 2024-03-01 NOTE — BRIEF OP NOTE
[facility] ([facility])  Brief Operative Note    Surgery Date: 3/1/2024     Surgeon(s) and Role:     * Sanchez Moreno Jr., MD - Primary     * Alanna Gan MD - Resident - Assisting        Pre-op Diagnosis:  Ureteral calculus [N20.1]    Post-op Diagnosis:  Post-Op Diagnosis Codes:     * Ureteral calculus [N20.1]    Procedure(s) (LRB):  LITHOTRIPSY, ESWL (Right)    Anesthesia: General    Operative Findings: R ESWL    Estimated Blood Loss: none         Specimens:   Specimen (24h ago, onward)      None              Discharge Note    OUTCOME: Patient tolerated treatment/procedure well without complication and is now ready for discharge.    DISPOSITION: Home or Self Care    FINAL DIAGNOSIS:  nephrolithiasis    FOLLOWUP: In clinic    DISCHARGE INSTRUCTIONS:    Discharge Procedure Orders   X-Ray KUB   Standing Status: Future Standing Exp. Date: 03/01/25     Order Specific Question Answer Comments   May the Radiologist modify the order per protocol to meet the clinical needs of the patient? Yes    Release to patient Immediate      US Retroperitoneal Complete   Standing Status: Future Standing Exp. Date: 03/01/25     Order Specific Question Answer Comments   May the Radiologist modify the order per protocol to meet the clinical needs of the patient? Yes    Release to patient Immediate

## 2024-03-01 NOTE — ASSESSMENT & PLAN NOTE
- OR today for R ESWL   - urine dip negative for all components   - ancef pre-op   - consent obtained, all questions answered

## 2024-03-01 NOTE — ANESTHESIA PROCEDURE NOTES
Intubation    Date/Time: 3/1/2024 11:26 AM    Performed by: Fatou Feliz CRNA  Authorized by: Marlee Fairchild MD    Intubation:     Induction:  Intravenous    Intubated:  Postinduction    Mask Ventilation:  Easy mask    Attempts:  1    Attempted By:  CRNA    Method of Intubation:  Video laryngoscopy    Blade:  Avalos 3    Laryngeal View Grade: Grade IIA - cords partially seen      Difficult Airway Encountered?: No      Complications:  None    Airway Device:  Oral endotracheal tube    Airway Device Size:  7.5    Style/Cuff Inflation:  Cuffed    Secured at:  The lips    Placement Verified By:  Capnometry    Complicating Factors:  None    Findings Post-Intubation:  BS equal bilateral and atraumatic/condition of teeth unchanged

## 2024-03-01 NOTE — H&P
[facility] - [facility] (2nd Fl)  Urology  History & Physical    Patient Name: Michael Espinoza  MRN: [id number]  Admission Date: 3/1/2024  Code Status: Prior   Attending Provider: Sanchez Moreno Jr., MD   Primary Care Physician: Dominguez Hyatt MD  Principal Problem:<principal problem not specified>    Subjective:     HPI:  68M w/ 1.3 cm R lower pole stone, visible on KUB.  He presents today for R ESWL.  Urine dip negative for all components.  Last dose ASA 81 7 days ago.      Past Medical History:   Diagnosis Date    Complete heart block 7/5/2022    Coronary artery disease of native artery of native heart with stable angina pectoris 4/10/2018    Hyperlipidemia        Past Surgical History:   Procedure Laterality Date    A-V CARDIAC PACEMAKER INSERTION N/A 7/5/2022    Procedure: INSERTION, CARDIAC PACEMAKER, DUAL CHAMBER;  Surgeon: Alessandro Canales MD;  Location: [facility] EP LAB;  Service: Cardiology;  Laterality: N/A;  CHB, TBD, ANES, ED 6, MB    COLONOSCOPY N/A 8/5/2022    Procedure: COLONOSCOPY;  Surgeon: Namita uDmont MD;  Location: [facility] ENDO (4TH FLR);  Service: Endoscopy;  Laterality: N/A;  vacc-inst portal-tb    CYST REMOVAL      ECHOCARDIOGRAM,TRANSESOPHAGEAL N/A 12/22/2023    Procedure: Transesophageal echo (CHARMAINE) intra-procedure log documentation;  Surgeon: Provider, [facility];  Location: [facility] EP LAB;  Service: Cardiology;  Laterality: N/A;    TONSILLECTOMY         Review of patient's allergies indicates:  No Known Allergies    Family History       Problem Relation (Age of Onset)    Diabetes Brother    Heart attack Brother    Heart disease Brother    Hyperlipidemia Brother    Hypertension Brother            Tobacco Use    Smoking status: Never     Passive exposure: Never    Smokeless tobacco: Never   Substance and Sexual Activity    Alcohol use: Yes     Alcohol/week: 1.0 standard drink of alcohol     Types: 1 Glasses of wine per week     Comment: 1 drink 2-3 times/weekly    Drug use: No    Sexual  "activity: Not on file       Review of Systems    Objective:     Temp:  [98.1 °F (36.7 °C)] 98.1 °F (36.7 °C)  Pulse:  [91] 91  Resp:  [16] 16  SpO2:  [97 %] 97 %  BP: (163)/(96) 163/96     Body mass index is 23.49 kg/m².    No intake/output data recorded.       Drains       None                     Physical Exam  Constitutional:       Appearance: Normal appearance.   HENT:      Head: Normocephalic.   Pulmonary:      Effort: Pulmonary effort is normal.   Neurological:      General: No focal deficit present.      Mental Status: He is alert.          Significant Labs:    BMP:  No results for input(s): "NA", "K", "CL", "CO2", "BUN", "CREATININE", "LABGLOM", "GLUCOSE", "CALCIUM" in the last 168 hours.    CBC:  No results for input(s): "WBC", "HGB", "HCT", "PLT" in the last 168 hours.    All pertinent labs results from the past 24 hours have been reviewed.    Significant Imaging:  All pertinent imaging results/findings from the past 24 hours have been reviewed.                Assessment and Plan:     Right kidney stone  - OR today for R ESWL   - urine dip negative for all components   - ancef pre-op   - consent obtained, all questions answered         VTE Risk Mitigation (From admission, onward)           Ordered     IP VTE LOW RISK PATIENT  Once         03/01/24 0919     Place sequential compression device  Until discontinued         03/01/24 0919                    Alanna Gan MD  Urology  [facility] - Surgery ([facility])  "

## 2024-03-01 NOTE — HPI
68M w/ 1.3 cm R lower pole stone, visible on KUB.  He presents today for R ESWL.  Urine dip negative for all components.  Last dose ASA 81 7 days ago.

## 2024-03-01 NOTE — DISCHARGE INSTRUCTIONS
[facility] post-op instructions:  You may see some blood in your urine while the stone fragments are passing and a few days afterward. Do not be alarmed, even if the urine was clear for a while. Push fluids and refrain from strenuous activity until clearing occurs. If you have difficulty passing clots or don't improve, call us. You can also try sitting in a warm tub of water to help to urinate if needed. Avoid medications such as Aspirin, Advil, Motrin, Plavix, or Coumadin, which thin the blood and cause bleeding.  If you have never had your stones analyzed, strain all urine and bring stone fragments with you to your next appointment.  Diet:  You may return to your normal diet immediately. Alcohol, spicy foods, acidy foods and drinks with caffeine may cause irritation or frequency and should be used in moderation. To keep your urine flowing freely and to avoid constipation, drink plenty of fluids during the day (8-10 glasses).  Activity:  While the kidney is healing do not engage in strenuous activity. If you are active, you may see more blood in the urine. We would suggest cutting down your activity under these circumstances until the bleeding has stopped, perhaps two weeks.  Bowels:  It is important to keep your bowels regular during the postoperative period. Straining with bowel movements can cause bleeding. A bowel movement every other day is reasonable. Use a mild over-the-counter laxative if needed, such as Milk of Magnesia 2-3 tablespoons, or 1-2 Dulcolax tablets. Call if you continue to have problems. Narcotics can worsen constipation; if you had been taking narcotics for pain, before, during or after your surgery, you may be constipated. Ditropan for bladder spasms may also cause constipation.  Problems you should report to us:  Fevers over 101.5 degrees Fahrenheit.   Inability to urinate.   Drug reactions (hives, rash, nausea, vomiting, diarrhea)   Severe burning or pain with urination that is not improving.    You will also have some burning with urination. This is normal after stone therapy and is also expected while the stent is in place. This burning should be mild or moderate and should improve over time. Severe burning, especially when it is not improving, can be a sign of infection.  Follow-up  After the stone has been fragmented you will likely need an x-ray prior to next clinic appointment    Bring stone fragments with you to your next appt.     In some select cases it is important to not leave the string on the stent.  The stent is usually removed 1-4 weeks after treatment.    Call Urology at [phone] with any problems

## 2024-03-06 ENCOUNTER — TELEPHONE (OUTPATIENT)
Dept: UROLOGY | Facility: CLINIC | Age: 69
End: 2024-03-06
Payer: MEDICARE

## 2024-03-06 NOTE — TELEPHONE ENCOUNTER
"Pt is on ditropan and flomax. He c/o that the medication is "drying" him out and he would like to stop it. Pt was instructed p/  jimy to stop the ditropan and let us know if he continues to have any problems. He is agreeable   "

## 2024-03-06 NOTE — TELEPHONE ENCOUNTER
----- Message from Yoselin Wilcox sent at 3/6/2024  8:23 AM CST -----  Regarding: Advise  Contact: [phone]  Michael Espinoza calling regarding Patient Advice (message) for # pt is calling to speak to nurse regarding medication he is taking generic to flomax

## 2024-03-18 ENCOUNTER — PATIENT MESSAGE (OUTPATIENT)
Dept: ADMINISTRATIVE | Facility: HOSPITAL | Age: 69
End: 2024-03-18
Payer: MEDICARE

## 2024-03-18 PROBLEM — N39.0 COMPLICATED UTI (URINARY TRACT INFECTION): Status: RESOLVED | Noted: 2023-12-14 | Resolved: 2024-03-18

## 2024-03-18 PROBLEM — A41.9 SEPSIS WITHOUT ACUTE ORGAN DYSFUNCTION: Status: RESOLVED | Noted: 2023-12-14 | Resolved: 2024-03-18

## 2024-03-22 ENCOUNTER — HOSPITAL ENCOUNTER (OUTPATIENT)
Dept: RADIOLOGY | Facility: HOSPITAL | Age: 69
Discharge: HOME | End: 2024-03-22
Payer: MEDICARE

## 2024-03-22 DIAGNOSIS — N20.0 RIGHT KIDNEY STONE: ICD-10-CM

## 2024-03-22 PROCEDURE — 74018 RADEX ABDOMEN 1 VIEW: CPT | Mod: 26,,, | Performed by: RADIOLOGY

## 2024-03-22 PROCEDURE — 76770 US EXAM ABDO BACK WALL COMP: CPT | Mod: TC

## 2024-03-22 PROCEDURE — 74018 RADEX ABDOMEN 1 VIEW: CPT | Mod: TC

## 2024-03-22 PROCEDURE — 76770 US EXAM ABDO BACK WALL COMP: CPT | Mod: 26,,, | Performed by: STUDENT IN AN ORGANIZED HEALTH CARE EDUCATION/TRAINING PROGRAM

## 2024-03-26 ENCOUNTER — PATIENT OUTREACH (OUTPATIENT)
Dept: ADMINISTRATIVE | Facility: HOSPITAL | Age: 69
End: 2024-03-26
Payer: MEDICARE

## 2024-03-26 ENCOUNTER — OFFICE VISIT (OUTPATIENT)
Dept: UROLOGY | Facility: CLINIC | Age: 69
End: 2024-03-26
Payer: MEDICARE

## 2024-03-26 VITALS
HEART RATE: 72 BPM | HEIGHT: 67 IN | BODY MASS INDEX: 23.53 KG/M2 | WEIGHT: 149.94 LBS | SYSTOLIC BLOOD PRESSURE: 132 MMHG | DIASTOLIC BLOOD PRESSURE: 82 MMHG

## 2024-03-26 DIAGNOSIS — Z13.820 ENCOUNTER FOR SCREENING FOR OSTEOPOROSIS: Primary | ICD-10-CM

## 2024-03-26 DIAGNOSIS — N20.0 RENAL CALCULI: Primary | ICD-10-CM

## 2024-03-26 DIAGNOSIS — Z98.890 POST-OPERATIVE STATE: ICD-10-CM

## 2024-03-26 DIAGNOSIS — M81.6 LOCALIZED OSTEOPOROSIS OF LEQUESNE: ICD-10-CM

## 2024-03-26 PROCEDURE — 3288F FALL RISK ASSESSMENT DOCD: CPT | Mod: CPTII,S$GLB,, | Performed by: UROLOGY

## 2024-03-26 PROCEDURE — 1157F ADVNC CARE PLAN IN RCRD: CPT | Mod: CPTII,S$GLB,, | Performed by: UROLOGY

## 2024-03-26 PROCEDURE — 3079F DIAST BP 80-89 MM HG: CPT | Mod: CPTII,S$GLB,, | Performed by: UROLOGY

## 2024-03-26 PROCEDURE — 99999 PR PBB SHADOW E&M-EST. PATIENT-LVL III: CPT | Mod: PBBFAC,,, | Performed by: UROLOGY

## 2024-03-26 PROCEDURE — 1159F MED LIST DOCD IN RCRD: CPT | Mod: CPTII,S$GLB,, | Performed by: UROLOGY

## 2024-03-26 PROCEDURE — 82365 CALCULUS SPECTROSCOPY: CPT

## 2024-03-26 PROCEDURE — 3075F SYST BP GE 130 - 139MM HG: CPT | Mod: CPTII,S$GLB,, | Performed by: UROLOGY

## 2024-03-26 PROCEDURE — 1101F PT FALLS ASSESS-DOCD LE1/YR: CPT | Mod: CPTII,S$GLB,, | Performed by: UROLOGY

## 2024-03-26 PROCEDURE — 99024 POSTOP FOLLOW-UP VISIT: CPT | Mod: S$GLB,,, | Performed by: UROLOGY

## 2024-03-26 NOTE — PROGRESS NOTES
Health Maintenance Due   Topic Date Due    RSV Vaccine (Age 60+ and Pregnant patients) (1 - 1-dose 60+ series) Never done    Pneumococcal Vaccines (Age 65+) (2 of 2 - PPSV23 or PCV20) 11/11/2021     SCHEDULED DEXA CHART REVIEWED

## 2024-03-26 NOTE — PROGRESS NOTES
"[facility] [address]   Clinic Note    SUBJECTIVE:     Chief Complaint: ESWL follow up    History of Present Illness:  Michael Espinoza is a 68 y.o. male who presents to clinic for ESWL follow up. He is established to our clinic. Referral from No ref. provider found.    Here for follow up after ESWL for right kidney stone. Reports feeling great since surgery, no pain, no n/v/f/c/hematuria. Reports passing the stones and has brought fragments in today for analysis. Stone no longer visible on follow up KUB last week. Otherwise has no complaints today        OBJECTIVE:     Estimated body mass index is 23.48 kg/m² as calculated from the following:    Height as of this encounter: 5' 7" (1.702 m).    Weight as of this encounter: 68 kg (149 lb 14.6 oz).    Vital Signs (Most Recent)  Pulse: 72 (03/26/24 0909)  BP: 132/82 (03/26/24 0909)    Physical Exam  Constitutional:       General: He is not in acute distress.  HENT:      Head: Normocephalic and atraumatic.      Nose: Nose normal.   Eyes:      Conjunctiva/sclera: Conjunctivae normal.   Cardiovascular:      Rate and Rhythm: Normal rate.   Pulmonary:      Effort: Pulmonary effort is normal. No respiratory distress.   Abdominal:      Tenderness: There is no right CVA tenderness or left CVA tenderness.   Musculoskeletal:         General: No deformity.   Skin:     General: Skin is warm and dry.   Neurological:      General: No focal deficit present.      Mental Status: He is alert.   Psychiatric:         Mood and Affect: Mood normal.         Behavior: Behavior normal.         Lab Results   Component Value Date    BUN 13 01/09/2024    CREATININE 0.8 01/09/2024    WBC 4.22 01/09/2024    HGB 12.7 (L) 01/09/2024    HCT 38.7 (L) 01/09/2024     01/09/2024    AST 36 01/09/2024    ALT 12 01/09/2024    ALKPHOS 86 01/09/2024    ALBUMIN 3.4 (L) 01/09/2024    HGBA1C 5.6 08/11/2023        Lab Results   Component Value Date    PSA 0.46 08/11/2023    PSA 0.31 06/24/2022    " PSA 0.36 11/06/2020    PSA 0.34 08/22/2019    PSA 0.37 11/15/2017    PSA 0.41 11/17/2016           ASSESSMENT     PLAN:     Here for ESWL follow up had ESWL for 1.3cm right lower pole stone. Follow up KUB no visible stone disease     - can stop taking flomax/ditropan   - will send stone fragments for analysis   - will call patient with results of stone analysis   - can follow up in one year with KUB if patient desires    Faisal Duque MD     Letter to No ref. provider found

## 2024-03-29 ENCOUNTER — CLINICAL SUPPORT (OUTPATIENT)
Dept: CARDIOLOGY | Facility: HOSPITAL | Age: 69
End: 2024-03-29
Attending: INTERNAL MEDICINE
Payer: MEDICARE

## 2024-03-29 DIAGNOSIS — I44.2 ATRIOVENTRICULAR BLOCK, COMPLETE: ICD-10-CM

## 2024-03-29 DIAGNOSIS — Z95.0 PRESENCE OF CARDIAC PACEMAKER: ICD-10-CM

## 2024-03-29 LAB
COMPN STONE: NORMAL
LABORATORY COMMENT REPORT: NORMAL
SPECIMEN SOURCE: NORMAL
STONE ANALYSIS IR-IMP: NORMAL

## 2024-03-29 PROCEDURE — 93294 REM INTERROG EVL PM/LDLS PM: CPT | Mod: ,,, | Performed by: INTERNAL MEDICINE

## 2024-03-29 PROCEDURE — 93296 REM INTERROG EVL PM/IDS: CPT | Performed by: INTERNAL MEDICINE

## 2024-04-01 PROBLEM — Z09 HOSPITAL DISCHARGE FOLLOW-UP: Status: RESOLVED | Noted: 2023-12-28 | Resolved: 2024-04-01

## 2024-04-11 LAB
OHS CV AF BURDEN PERCENT: < 1
OHS CV DC REMOTE DEVICE TYPE: NORMAL
OHS CV RV PACING PERCENT: 95 %

## 2024-05-13 ENCOUNTER — PATIENT MESSAGE (OUTPATIENT)
Dept: ELECTROPHYSIOLOGY | Facility: CLINIC | Age: 69
End: 2024-05-13
Payer: MEDICARE

## 2024-05-13 NOTE — TELEPHONE ENCOUNTER
Returned patients call. He states that for the last week or two the Pacemaker site seems to have a bruise and is more sensitive to touch. He states that it seems that the edge of the pacemaker is closer to the skin. Patient feels fine other than the pacemaker area being sensitive. Patient denies any big weight loss or any trauma to the area. I told the patient I would speak to one of Dr. Canales's partners in his absence and also the device team and I would call him back tomorrow. Patient verbalized understanding.

## 2024-05-14 ENCOUNTER — HOSPITAL ENCOUNTER (INPATIENT)
Facility: HOSPITAL | Age: 69
LOS: 7 days | Discharge: HOME HEALTH | DRG: 243 | End: 2024-05-22
Attending: EMERGENCY MEDICINE | Admitting: STUDENT IN AN ORGANIZED HEALTH CARE EDUCATION/TRAINING PROGRAM
Payer: MEDICARE

## 2024-05-14 ENCOUNTER — TELEPHONE (OUTPATIENT)
Dept: ELECTROPHYSIOLOGY | Facility: CLINIC | Age: 69
End: 2024-05-14
Payer: MEDICARE

## 2024-05-14 DIAGNOSIS — R07.9 CHEST PAIN: ICD-10-CM

## 2024-05-14 DIAGNOSIS — Z01.818 PREOP EXAMINATION: ICD-10-CM

## 2024-05-14 DIAGNOSIS — I49.9 ARRHYTHMIA: ICD-10-CM

## 2024-05-14 DIAGNOSIS — T82.7XXA PACEMAKER INFECTION: ICD-10-CM

## 2024-05-14 DIAGNOSIS — I44.2 COMPLETE HEART BLOCK: ICD-10-CM

## 2024-05-14 DIAGNOSIS — Z95.0 S/P PLACEMENT OF CARDIAC PACEMAKER: ICD-10-CM

## 2024-05-14 DIAGNOSIS — T82.7XXA: ICD-10-CM

## 2024-05-14 DIAGNOSIS — T82.7XXA PACEMAKER INFECTION, INITIAL ENCOUNTER: Primary | ICD-10-CM

## 2024-05-14 DIAGNOSIS — Z95.0 PACEMAKER: ICD-10-CM

## 2024-05-14 LAB
ALBUMIN SERPL BCP-MCNC: 4.1 G/DL (ref 3.5–5.2)
ALP SERPL-CCNC: 81 U/L (ref 55–135)
ALT SERPL W/O P-5'-P-CCNC: 36 U/L (ref 10–44)
ANION GAP SERPL CALC-SCNC: 11 MMOL/L (ref 8–16)
AST SERPL-CCNC: 37 U/L (ref 10–40)
BASOPHILS # BLD AUTO: 0.05 K/UL (ref 0–0.2)
BASOPHILS NFR BLD: 1.1 % (ref 0–1.9)
BILIRUB SERPL-MCNC: 1.7 MG/DL (ref 0.1–1)
BUN SERPL-MCNC: 17 MG/DL (ref 8–23)
CALCIUM SERPL-MCNC: 9.6 MG/DL (ref 8.7–10.5)
CHLORIDE SERPL-SCNC: 108 MMOL/L (ref 95–110)
CO2 SERPL-SCNC: 20 MMOL/L (ref 23–29)
CREAT SERPL-MCNC: 1.1 MG/DL (ref 0.5–1.4)
CRP SERPL-MCNC: <0.3 MG/L (ref 0–8.2)
DIFFERENTIAL METHOD BLD: NORMAL
EOSINOPHIL # BLD AUTO: 0.1 K/UL (ref 0–0.5)
EOSINOPHIL NFR BLD: 2.1 % (ref 0–8)
ERYTHROCYTE [DISTWIDTH] IN BLOOD BY AUTOMATED COUNT: 13.5 % (ref 11.5–14.5)
EST. GFR  (NO RACE VARIABLE): >60 ML/MIN/1.73 M^2
GLUCOSE SERPL-MCNC: 74 MG/DL (ref 70–110)
HCT VFR BLD AUTO: 45 % (ref 40–54)
HGB BLD-MCNC: 14.7 G/DL (ref 14–18)
IMM GRANULOCYTES # BLD AUTO: 0.02 K/UL (ref 0–0.04)
IMM GRANULOCYTES NFR BLD AUTO: 0.4 % (ref 0–0.5)
LYMPHOCYTES # BLD AUTO: 1.1 K/UL (ref 1–4.8)
LYMPHOCYTES NFR BLD: 22.3 % (ref 18–48)
MCH RBC QN AUTO: 29.3 PG (ref 27–31)
MCHC RBC AUTO-ENTMCNC: 32.7 G/DL (ref 32–36)
MCV RBC AUTO: 90 FL (ref 82–98)
MONOCYTES # BLD AUTO: 0.4 K/UL (ref 0.3–1)
MONOCYTES NFR BLD: 9.1 % (ref 4–15)
NEUTROPHILS # BLD AUTO: 3.1 K/UL (ref 1.8–7.7)
NEUTROPHILS NFR BLD: 65 % (ref 38–73)
NRBC BLD-RTO: 0 /100 WBC
PLATELET # BLD AUTO: 192 K/UL (ref 150–450)
PMV BLD AUTO: 10.6 FL (ref 9.2–12.9)
POTASSIUM SERPL-SCNC: 4.2 MMOL/L (ref 3.5–5.1)
PROT SERPL-MCNC: 7.8 G/DL (ref 6–8.4)
RBC # BLD AUTO: 5.02 M/UL (ref 4.6–6.2)
SODIUM SERPL-SCNC: 139 MMOL/L (ref 136–145)
WBC # BLD AUTO: 4.7 K/UL (ref 3.9–12.7)

## 2024-05-14 PROCEDURE — 85025 COMPLETE CBC W/AUTO DIFF WBC: CPT | Performed by: EMERGENCY MEDICINE

## 2024-05-14 PROCEDURE — 63600175 PHARM REV CODE 636 W HCPCS: Performed by: EMERGENCY MEDICINE

## 2024-05-14 PROCEDURE — 63600175 PHARM REV CODE 636 W HCPCS: Performed by: NURSE PRACTITIONER

## 2024-05-14 PROCEDURE — 86140 C-REACTIVE PROTEIN: CPT | Performed by: EMERGENCY MEDICINE

## 2024-05-14 PROCEDURE — G0378 HOSPITAL OBSERVATION PER HR: HCPCS

## 2024-05-14 PROCEDURE — 80053 COMPREHEN METABOLIC PANEL: CPT | Performed by: EMERGENCY MEDICINE

## 2024-05-14 PROCEDURE — 87040 BLOOD CULTURE FOR BACTERIA: CPT | Performed by: EMERGENCY MEDICINE

## 2024-05-14 PROCEDURE — 99285 EMERGENCY DEPT VISIT HI MDM: CPT | Mod: 25

## 2024-05-14 PROCEDURE — 96365 THER/PROPH/DIAG IV INF INIT: CPT

## 2024-05-14 PROCEDURE — 25000003 PHARM REV CODE 250: Performed by: EMERGENCY MEDICINE

## 2024-05-14 PROCEDURE — 25000003 PHARM REV CODE 250: Performed by: NURSE PRACTITIONER

## 2024-05-14 RX ORDER — IBUPROFEN 200 MG
24 TABLET ORAL
Status: DISCONTINUED | OUTPATIENT
Start: 2024-05-14 | End: 2024-05-22 | Stop reason: HOSPADM

## 2024-05-14 RX ORDER — GLUCAGON 1 MG
1 KIT INJECTION
Status: DISCONTINUED | OUTPATIENT
Start: 2024-05-14 | End: 2024-05-22 | Stop reason: HOSPADM

## 2024-05-14 RX ORDER — CALCIUM CARBONATE 200(500)MG
500 TABLET,CHEWABLE ORAL 3 TIMES DAILY PRN
Status: DISCONTINUED | OUTPATIENT
Start: 2024-05-14 | End: 2024-05-22 | Stop reason: HOSPADM

## 2024-05-14 RX ORDER — ONDANSETRON HYDROCHLORIDE 2 MG/ML
4 INJECTION, SOLUTION INTRAVENOUS EVERY 8 HOURS PRN
Status: DISCONTINUED | OUTPATIENT
Start: 2024-05-14 | End: 2024-05-22 | Stop reason: HOSPADM

## 2024-05-14 RX ORDER — ACETAMINOPHEN 500 MG
1000 TABLET ORAL EVERY 8 HOURS PRN
Status: DISCONTINUED | OUTPATIENT
Start: 2024-05-14 | End: 2024-05-22 | Stop reason: HOSPADM

## 2024-05-14 RX ORDER — NALOXONE HCL 0.4 MG/ML
0.02 VIAL (ML) INJECTION
Status: DISCONTINUED | OUTPATIENT
Start: 2024-05-14 | End: 2024-05-22 | Stop reason: HOSPADM

## 2024-05-14 RX ORDER — ATORVASTATIN CALCIUM 20 MG/1
80 TABLET, FILM COATED ORAL NIGHTLY
Status: DISCONTINUED | OUTPATIENT
Start: 2024-05-14 | End: 2024-05-22 | Stop reason: HOSPADM

## 2024-05-14 RX ORDER — SODIUM CHLORIDE 0.9 % (FLUSH) 0.9 %
10 SYRINGE (ML) INJECTION EVERY 12 HOURS PRN
Status: DISCONTINUED | OUTPATIENT
Start: 2024-05-14 | End: 2024-05-22 | Stop reason: HOSPADM

## 2024-05-14 RX ORDER — ASPIRIN 81 MG/1
81 TABLET ORAL 2 TIMES DAILY
Status: DISCONTINUED | OUTPATIENT
Start: 2024-05-14 | End: 2024-05-22 | Stop reason: HOSPADM

## 2024-05-14 RX ORDER — IBUPROFEN 200 MG
16 TABLET ORAL
Status: DISCONTINUED | OUTPATIENT
Start: 2024-05-14 | End: 2024-05-22 | Stop reason: HOSPADM

## 2024-05-14 RX ORDER — EPINEPHRINE 0.22MG
1 AEROSOL WITH ADAPTER (ML) INHALATION DAILY
Status: DISCONTINUED | OUTPATIENT
Start: 2024-05-15 | End: 2024-05-22 | Stop reason: HOSPADM

## 2024-05-14 RX ORDER — EZETIMIBE 10 MG/1
10 TABLET ORAL DAILY
Status: DISCONTINUED | OUTPATIENT
Start: 2024-05-15 | End: 2024-05-22 | Stop reason: HOSPADM

## 2024-05-14 RX ORDER — ACETAMINOPHEN 325 MG/1
650 TABLET ORAL EVERY 6 HOURS PRN
Status: DISCONTINUED | OUTPATIENT
Start: 2024-05-14 | End: 2024-05-22 | Stop reason: HOSPADM

## 2024-05-14 RX ORDER — TALC
6 POWDER (GRAM) TOPICAL NIGHTLY PRN
Status: DISCONTINUED | OUTPATIENT
Start: 2024-05-14 | End: 2024-05-22 | Stop reason: HOSPADM

## 2024-05-14 RX ADMIN — ATORVASTATIN CALCIUM 80 MG: 40 TABLET, FILM COATED ORAL at 09:05

## 2024-05-14 RX ADMIN — ASPIRIN 81 MG: 81 TABLET, COATED ORAL at 09:05

## 2024-05-14 RX ADMIN — PIPERACILLIN SODIUM AND TAZOBACTAM SODIUM 4.5 G: 4; .5 INJECTION, POWDER, FOR SOLUTION INTRAVENOUS at 06:05

## 2024-05-14 RX ADMIN — CEFAZOLIN 2 G: 2 INJECTION, POWDER, FOR SOLUTION INTRAMUSCULAR; INTRAVENOUS at 10:05

## 2024-05-14 RX ADMIN — VANCOMYCIN HYDROCHLORIDE 1250 MG: 1.25 INJECTION, POWDER, LYOPHILIZED, FOR SOLUTION INTRAVENOUS at 07:05

## 2024-05-14 NOTE — Clinical Note
A dressing was applied to the incision. Meplilex placed 30 minutes after dermabond dries. Followed by pressure dressing

## 2024-05-14 NOTE — ED PROVIDER NOTES
Chief Complaint   Pacemaker Problem (Pacemaker placed 18 mos ago, told to come see if pocket infection)      History Of Present Illness   Michael Espinoza is a 68 y.o. male presenting with swelling and pain at the site of his pacemaker.  He states that over the past 7-10 days, both symptoms have increased.  He is at the point now he will not sleep on his left side due to the pain.  No fever or chills.  He talked to his cardiologist and was told to come to the ER for evaluation for pocket infection.  No shortness of breath.      Review of patient's allergies indicates:  No Known Allergies    No current facility-administered medications on file prior to encounter.     Current Outpatient Medications on File Prior to Encounter   Medication Sig Dispense Refill    aspirin (ECOTRIN) 81 MG EC tablet Take 81 mg by mouth 2 (two) times daily. (Breakfast & Afternoon)      atorvastatin (LIPITOR) 80 MG tablet Take 1 tablet (80 mg total) by mouth once daily. (Patient taking differently: Take 80 mg by mouth every evening.) 90 tablet 3    ezetimibe (ZETIA) 10 mg tablet Take 1 tablet (10 mg total) by mouth once daily. 90 tablet 3    calcium carbonate (TUMS ORAL) Take 2 tablets by mouth daily as needed (Heartburn).      coenzyme Q10 (CO Q-10) 300 mg Cap Take 1 capsule by mouth once daily.      dicyclomine (BENTYL) 10 MG capsule Take 10 mg by mouth daily as needed (Abdominal pain).      ERGOCALCIFEROL, VITAMIN D2, (VITAMIN D ORAL) Take 1 capsule by mouth Mon, Wed, Fri, Sat & Sun      multivit-min/FA/lycopen/lutein (CENTRUM SILVER MEN ORAL) Take 1 tablet by mouth every Mon, Wed, Fri.      ondansetron (ZOFRAN-ODT) 4 MG TbDL Take 1 tablet (4 mg total) by mouth every 8 (eight) hours as needed (nausea). (Patient not taking: Reported on 1/24/2024) 12 tablet 0    oxybutynin (DITROPAN) 5 MG Tab Take 1 tablet (5 mg total) by mouth 3 (three) times daily as needed (for bladder spasms and stent pain). 63 tablet 0    oxyCODONE (ROXICODONE) 5 MG  immediate release tablet Take 1 tablet (5 mg total) by mouth every 4 (four) hours as needed for Pain. 7 tablet 0    tamsulosin (FLOMAX) 0.4 mg Cap Take 1 capsule (0.4 mg total) by mouth once daily. 30 capsule 1       Past History   As per HPI and below:  Past Medical History:   Diagnosis Date    Complete heart block 7/5/2022    Coronary artery disease of native artery of native heart with stable angina pectoris 4/10/2018    Hyperlipidemia      Past Surgical History:   Procedure Laterality Date    A-V CARDIAC PACEMAKER INSERTION N/A 7/5/2022    Procedure: INSERTION, CARDIAC PACEMAKER, DUAL CHAMBER;  Surgeon: Alessandro Canales MD;  Location: [facility] EP LAB;  Service: Cardiology;  Laterality: N/A;  CHB, TBD, ANES, ED 6, MB    COLONOSCOPY N/A 8/5/2022    Procedure: COLONOSCOPY;  Surgeon: Namita Dumont MD;  Location: [facility] ENDO (4TH FLR);  Service: Endoscopy;  Laterality: N/A;  vacc-inst portal-tb    CYST REMOVAL      ECHOCARDIOGRAM,TRANSESOPHAGEAL N/A 12/22/2023    Procedure: Transesophageal echo (CHARMAINE) intra-procedure log documentation;  Surgeon: Provider, [facility];  Location: [facility] EP LAB;  Service: Cardiology;  Laterality: N/A;    EXTRACORPOREAL SHOCK WAVE LITHOTRIPSY Right 3/1/2024    Procedure: LITHOTRIPSY, ESWL;  Surgeon: Sanchez Moreno Jr., MD;  Location: [facility] OR 2ND FLR;  Service: Urology;  Laterality: Right;  1 hr    TONSILLECTOMY         Social History     Tobacco Use    Smoking status: Never     Passive exposure: Never    Smokeless tobacco: Never   Substance Use Topics    Alcohol use: Yes     Alcohol/week: 1.0 standard drink of alcohol     Types: 1 Glasses of wine per week     Comment: 1 drink 2-3 times/weekly    Drug use: No       Family History   Problem Relation Name Age of Onset    Hyperlipidemia Brother 2     Hypertension Brother 2     Heart disease Brother 2     Heart attack Brother 2     Diabetes Brother 2        Physical Exam     Vitals:    05/15/24 0400 05/15/24 0600 05/15/24 0711 05/15/24  0743   BP:   135/82    BP Location:       Patient Position:       Pulse: 60 60 62    Resp:   18    Temp:   97.4 °F (36.3 °C)    TempSrc:   Oral    SpO2:   96%    Weight:    64.6 kg (142 lb 6.4 oz)   Height:         Appearance: No acute distress.  Skin: No rashes seen.  Good turgor.  No abrasions.  No ecchymoses.  Eyes: No conjunctival injection.  ENT: Oropharynx clear.    Chest: Clear to auscultation bilaterally.  Pacemaker appears to be slightly bulging out with some associated focal redness and tenderness.  No erythema.  Good air movement.  No wheezes.  No rhonchi.  Cardiovascular: Regular rate and rhythm.  No murmurs. No gallops. No rubs.  Musculoskeletal: Good range of motion all joints.  No deformities.    Neurologic: Motor intact.  Sensation intact.  Cranial nerves intact.  Mental Status:  Alert and oriented x 3.  Appropriate, conversant.      Initial MDM   Patient with increasing pain and swelling at the site of the pacemaker, some local redness noted, more dark than a classic erythema look.  Will send basic labs and discuss with Cardiology.  Will defer imaging to them.      Medications Given     Medications   atorvastatin tablet 80 mg (80 mg Oral Given 5/14/24 2101)   coenzyme Q10 100 mg (has no administration in time range)   multivitamin tablet (has no administration in time range)   ezetimibe tablet 10 mg (has no administration in time range)   sodium chloride 0.9% flush 10 mL (has no administration in time range)   naloxone 0.4 mg/mL injection 0.02 mg (has no administration in time range)   glucose chewable tablet 16 g (has no administration in time range)   glucose chewable tablet 24 g (has no administration in time range)   glucagon (human recombinant) injection 1 mg (has no administration in time range)   acetaminophen tablet 650 mg (has no administration in time range)   acetaminophen tablet 1,000 mg (has no administration in time range)   ondansetron injection 4 mg (has no administration in time  range)   melatonin tablet 6 mg (has no administration in time range)   dextrose 10% bolus 125 mL 125 mL (has no administration in time range)   dextrose 10% bolus 250 mL 250 mL (has no administration in time range)   aspirin EC tablet 81 mg (81 mg Oral Given 5/14/24 2101)   calcium carbonate 200 mg calcium (500 mg) chewable tablet 500 mg (has no administration in time range)   ceFAZolin 2 g in dextrose 5 % in water (D5W) 50 mL IVPB (MB+) (0 g Intravenous Stopped 5/15/24 0656)   vancomycin 1,250 mg in dextrose 5 % (D5W) 250 mL IVPB (Vial-Mate) (0 mg Intravenous Stopped 5/14/24 2051)   piperacillin-tazobactam (ZOSYN) 4.5 g in dextrose 5 % in water (D5W) 100 mL IVPB (MB+) (0 g Intravenous Stopped 5/14/24 1913)       Results and Course     Labs Reviewed   COMPREHENSIVE METABOLIC PANEL - Abnormal; Notable for the following components:       Result Value    CO2 20 (*)     Total Bilirubin 1.7 (*)     All other components within normal limits   CBC W/ AUTO DIFFERENTIAL   C-REACTIVE PROTEIN       Imaging Results              X-Ray Chest PA And Lateral (Final result)  Result time 05/14/24 20:44:07      Final result by Eb He DO (05/14/24 20:44:07)                   Impression:      No acute abnormality.      Electronically signed by: Eb He  Date:    05/14/2024  Time:    20:44               Narrative:    EXAMINATION:  XR CHEST PA AND LATERAL    CLINICAL HISTORY:  pacemaker pocket infection;    TECHNIQUE:  PA and lateral views of the chest were performed.    COMPARISON:  Chest radiograph and CT chest from 12/20/2023.    FINDINGS:  There is a cardiac pacer, unchanged in position.  The lungs are well expanded clear.  No focal opacities are seen.  The pleural spaces are clear.  The cardiac silhouette is unremarkable.  There are calcifications of the aortic arch.  There are median sternotomy wires.  There are multiple remote compression fractures of the thoracic and lumbar spine                                       ED Course as of 05/15/24 0854   Tue May 14, 2024   1600 Discussed with cardiology [DC]      ED Course User Index  [DC] Daniel Jaramillo MD               MDM, Impression and Plan   68 y.o. male with infected pacemaker pocket.  Will hospitalize with .           Final diagnoses:  [T82.7XXA] Pacemaker infection, initial encounter (Primary)  [T82.7XXA] Pacemaker infection        ED Disposition Condition    Observation Stable                  Daniel Jaramillo MD  05/15/24 0854     No

## 2024-05-14 NOTE — ED NOTES
"Patient states the area where his pacemaker located " sensitive to touch" x 2 weeks,, reports pain with shirt touching  "

## 2024-05-14 NOTE — ED NOTES
Patient identifiers verified and correct for  Mr Espinoza  C/C:  Pacemaker issue SEE NN  APPEARANCE: awake and alert in NAD. PAIN  0/10  SKIN: warm, dry, slight redness to site. SEE PHOTOS  MUSCULOSKELETAL: Patient moving all extremities spontaneously, no obvious swelling or deformities noted. Ambulates independently.  RESPIRATORY: Denies shortness of breath.Respirations unlabored.   CARDIAC: Denies CP, 2+ distal pulses; no peripheral edema  ABDOMEN: S/ND/NT, Denies nausea  : voids spontaneously, denies difficulty  Neurologic: AAO x 4; follows commands equal strength in all extremities; denies numbness/tingling. Denies dizziness Deneis new weakness

## 2024-05-14 NOTE — Clinical Note
The groin, chest and right neck was prepped. The site was prepped with ChloraPrep and Ioban. The site was clipped. The patient was draped.

## 2024-05-14 NOTE — PLAN OF CARE
"Called to evaluate patient for pacemaker pocket infection. Unfortunately no labs or CXR were back at time of consult. In brief, Mr. Espinoza is a 68 year old male with past medical history of:  CAD s/p CABG x3, HLD, heart block s/p dual chamber pacemaker  in July 2022 with Dr. Canales who presents today for tenderness and redness around his pacemaker site for the past week. He states that about a month ago he started noticing that his device was "creeping" closer to the skin but it was not bothering him. He was admitted in December for MSSA bacteremia and completed IV antibiotics. Blood cultures cleared. Patient denies fever or chills recently. Also denies chest pain, shortness of breath, palpitations.    Plan:  Consult Infectious Disease  Obtain blood cultures and start empiric antibiotics  Obtain CXR  Admit to hospital medicine  EP to formally see in the morning    Case discussed with EP fellow on call. EP team to formally see in AM.  "

## 2024-05-14 NOTE — Clinical Note
Manual pressure was applied to the left femoral vein and right femoral vein sheath insertion site. Manual pressure applied for 10 mins, hemostasis achieved, dressing applied to bilateral groins

## 2024-05-14 NOTE — Clinical Note
Balloon filled with 30 cc of contrast and saline; balloon then deflated and syringed marked and secured at site

## 2024-05-14 NOTE — TELEPHONE ENCOUNTER
Returned call to patient. He sent pictures of his PPM site. Dr. Garibay and Dr. Pagan reviewed. Orders given for patient to come to the ED for possible PPM infection. I called the patient and gave him these instructions. Patient will come to the ED at [facility].

## 2024-05-14 NOTE — Clinical Note
Multiple samples collected from the pacemaker pocket 3 samples from superficial pocket, 4 deep pocket samples of pocket

## 2024-05-15 LAB
ALBUMIN SERPL BCP-MCNC: 3.8 G/DL (ref 3.5–5.2)
ALP SERPL-CCNC: 71 U/L (ref 55–135)
ALT SERPL W/O P-5'-P-CCNC: 32 U/L (ref 10–44)
ANION GAP SERPL CALC-SCNC: 11 MMOL/L (ref 8–16)
AST SERPL-CCNC: 32 U/L (ref 10–40)
BASOPHILS # BLD AUTO: 0.03 K/UL (ref 0–0.2)
BASOPHILS NFR BLD: 0.5 % (ref 0–1.9)
BILIRUB SERPL-MCNC: 2 MG/DL (ref 0.1–1)
BUN SERPL-MCNC: 15 MG/DL (ref 8–23)
CALCIUM SERPL-MCNC: 9.5 MG/DL (ref 8.7–10.5)
CHLORIDE SERPL-SCNC: 108 MMOL/L (ref 95–110)
CO2 SERPL-SCNC: 20 MMOL/L (ref 23–29)
CREAT SERPL-MCNC: 0.9 MG/DL (ref 0.5–1.4)
DIFFERENTIAL METHOD BLD: ABNORMAL
EOSINOPHIL # BLD AUTO: 0.1 K/UL (ref 0–0.5)
EOSINOPHIL NFR BLD: 1.6 % (ref 0–8)
ERYTHROCYTE [DISTWIDTH] IN BLOOD BY AUTOMATED COUNT: 13.5 % (ref 11.5–14.5)
EST. GFR  (NO RACE VARIABLE): >60 ML/MIN/1.73 M^2
GLUCOSE SERPL-MCNC: 80 MG/DL (ref 70–110)
HCT VFR BLD AUTO: 43.2 % (ref 40–54)
HGB BLD-MCNC: 14.4 G/DL (ref 14–18)
IMM GRANULOCYTES # BLD AUTO: 0.01 K/UL (ref 0–0.04)
IMM GRANULOCYTES NFR BLD AUTO: 0.2 % (ref 0–0.5)
LYMPHOCYTES # BLD AUTO: 1.1 K/UL (ref 1–4.8)
LYMPHOCYTES NFR BLD: 17.4 % (ref 18–48)
MAGNESIUM SERPL-MCNC: 1.8 MG/DL (ref 1.6–2.6)
MCH RBC QN AUTO: 29.7 PG (ref 27–31)
MCHC RBC AUTO-ENTMCNC: 33.3 G/DL (ref 32–36)
MCV RBC AUTO: 89 FL (ref 82–98)
MONOCYTES # BLD AUTO: 0.6 K/UL (ref 0.3–1)
MONOCYTES NFR BLD: 9 % (ref 4–15)
NEUTROPHILS # BLD AUTO: 4.5 K/UL (ref 1.8–7.7)
NEUTROPHILS NFR BLD: 71.3 % (ref 38–73)
NRBC BLD-RTO: 0 /100 WBC
PLATELET # BLD AUTO: 169 K/UL (ref 150–450)
PMV BLD AUTO: 10.5 FL (ref 9.2–12.9)
POTASSIUM SERPL-SCNC: 3.9 MMOL/L (ref 3.5–5.1)
PROT SERPL-MCNC: 7 G/DL (ref 6–8.4)
RBC # BLD AUTO: 4.85 M/UL (ref 4.6–6.2)
SODIUM SERPL-SCNC: 139 MMOL/L (ref 136–145)
WBC # BLD AUTO: 6.25 K/UL (ref 3.9–12.7)

## 2024-05-15 PROCEDURE — 25000003 PHARM REV CODE 250: Performed by: NURSE PRACTITIONER

## 2024-05-15 PROCEDURE — 63600175 PHARM REV CODE 636 W HCPCS: Performed by: NURSE PRACTITIONER

## 2024-05-15 PROCEDURE — 85025 COMPLETE CBC W/AUTO DIFF WBC: CPT | Performed by: NURSE PRACTITIONER

## 2024-05-15 PROCEDURE — 20600001 HC STEP DOWN PRIVATE ROOM

## 2024-05-15 PROCEDURE — 80053 COMPREHEN METABOLIC PANEL: CPT | Performed by: NURSE PRACTITIONER

## 2024-05-15 PROCEDURE — 99222 1ST HOSP IP/OBS MODERATE 55: CPT | Mod: ,,, | Performed by: INTERNAL MEDICINE

## 2024-05-15 PROCEDURE — 83735 ASSAY OF MAGNESIUM: CPT | Performed by: NURSE PRACTITIONER

## 2024-05-15 PROCEDURE — 99223 1ST HOSP IP/OBS HIGH 75: CPT | Mod: GC,,, | Performed by: STUDENT IN AN ORGANIZED HEALTH CARE EDUCATION/TRAINING PROGRAM

## 2024-05-15 PROCEDURE — 36415 COLL VENOUS BLD VENIPUNCTURE: CPT | Performed by: NURSE PRACTITIONER

## 2024-05-15 RX ADMIN — CEFAZOLIN 2 G: 2 INJECTION, POWDER, FOR SOLUTION INTRAMUSCULAR; INTRAVENOUS at 06:05

## 2024-05-15 RX ADMIN — ASPIRIN 81 MG: 81 TABLET, COATED ORAL at 09:05

## 2024-05-15 RX ADMIN — EZETIMIBE 10 MG: 10 TABLET ORAL at 09:05

## 2024-05-15 RX ADMIN — ASPIRIN 81 MG: 81 TABLET, COATED ORAL at 08:05

## 2024-05-15 RX ADMIN — ATORVASTATIN CALCIUM 80 MG: 40 TABLET, FILM COATED ORAL at 08:05

## 2024-05-15 RX ADMIN — THERA TABS 1 TABLET: TAB at 09:05

## 2024-05-15 RX ADMIN — CEFAZOLIN 2 G: 2 INJECTION, POWDER, FOR SOLUTION INTRAMUSCULAR; INTRAVENOUS at 10:05

## 2024-05-15 RX ADMIN — CEFAZOLIN 2 G: 2 INJECTION, POWDER, FOR SOLUTION INTRAMUSCULAR; INTRAVENOUS at 02:05

## 2024-05-15 RX ADMIN — Medication 100 MG: at 09:05

## 2024-05-15 NOTE — ASSESSMENT & PLAN NOTE
EP consulted in the ED and recommend abx and ID consultation.  -Afebrile, no leukocytosis.  -CRP <0.3  -Blood cxs in process.   -CXR in process.  -The patient received zosyn and vancomycin in the ED, will change to ancef given history of MSSA bacteremia.  -Erythema and tenderness localized on exam, no swelling.  -ID consulted per EP recs.

## 2024-05-15 NOTE — HPI
68 year old male with history of CAD s/p CABG, heart block s/p dual chamber pacemaker in July 2022 and MSSA bacteremia (12/14-12/16, cleared 12/17, unclear source, CHARMAINE and MRI spine neg, 4w cefazolin, surveillance blood cultures negative), admitted this time for pacemaker infection.      Patient states about 2-3 weeks ago he noticed a brown lesion that was small and circular overlying pacemaker site, and slow started to get worse and changed to a reddish color over time associated with progressive tenderness to touch, but denies fever, rigors, chills, recent procedures, skin manipulation or recent trauma to the area. Furthermore, he denies chest pain. ID consulted for sent by EP due to concern for pacemaker pocket infection, Hx of MSSA bacteremia

## 2024-05-15 NOTE — ASSESSMENT & PLAN NOTE
- Monitor WBC count and fever curve  - Follow up ID Consult and recommendations  - Continue with antibiotics  - Follow up Blood cultures  - Patient is pacer dependent, currently Rvp ~95%  - Will plan for possible extraction potentially Friday with re-implantation on Monday

## 2024-05-15 NOTE — ASSESSMENT & PLAN NOTE
EP consulted in the ED and recommend abx and ID consultation.  -Afebrile, no leukocytosis.  -CRP <0.3  -Blood cxs in process.   -CXR with no acute process  -The patient received zosyn and vancomycin in the ED, will change to ancef given history of MSSA bacteremia.  -Erythema and tenderness localized on exam, no swelling.  -ID consulted per EP recs.- Monitor WBC count and fever curve  -EP Will plan for possible extraction potentially Friday with re-implantation on Monday

## 2024-05-15 NOTE — PLAN OF CARE
SW spoke to Pt at bedside to discuss discharge planning and complete initial discharge planning assessment. SW confirmed demographics and emergency contacts.  Pt lives alone, he has family and friends who will be available to transport Pt and assist after D/C if needed. Discharge Plan A and Plan B have been determined by review of patient's clinical status, future medical and therapeutic needs, and discussion with multidisciplinary team members as well as coverage/benefits for post-acute care.   Discharge Planning booklet with CM/SW phone numbers provided. SW is following for post-acute planning needs.    Sindhu Bowden [facility]  Case Management Department  [email or web address]      [facility] - Cardiology Stepdown  Initial Discharge Assessment       Primary Care Provider: Dominguez Hyatt MD    Admission Diagnosis: Pacemaker infection [T82.7XXA]  Chest pain [R07.9]  Pacemaker infection, initial encounter [T82.7XXA]    Admission Date: 5/14/2024  Expected Discharge Date: 5/20/2024    Transition of Care Barriers: None    Payor: [employer] MEDICARE ADVANTAGE / Plan: [employer] H[employer] POS MCARE / Product Type: Medicare Advantage /     Extended Emergency Contact Information  Primary Emergency Contact: Gerard Espinoza  Address: [address] [place] [address]           JOSEPHINE Ladd [id number] [place] [address] [place]  Home Phone: [phone]  Mobile Phone: [phone]  Relation: Brother    Discharge Plan A: Home, Home Health, Other (IV Infusion)  Discharge Plan B: Home      [id number]  JOSEPHINE LADD [facility] [address]  AT [facility] [address]  [address] [place] [address]  Phone: [phone] Fax: [phone]    [facility] [place], [place] - [address]  [address] [address]  Phone: [phone] Fax: [phone]      Initial Assessment (most recent)       Adult Discharge Assessment - 05/15/24 1617          Discharge Assessment    Assessment Type Discharge Planning  Assessment     Confirmed/corrected address, phone number and insurance Yes     Confirmed Demographics Correct on Facesheet     Source of Information patient     Communicated MAGNO with patient/caregiver No;Date not available/Unable to determine     People in Home alone     Do you expect to return to your current living situation? Yes     Do you have help at home or someone to help you manage your care at home? Yes     Who are your caregiver(s) and their phone number(s)? Family if needed     Prior to hospitilization cognitive status: Alert/Oriented     Current cognitive status: Alert/Oriented     Walking or Climbing Stairs Difficulty no     Dressing/Bathing Difficulty no     Home Accessibility wheelchair accessible     Equipment Currently Used at Home none     Readmission within 30 days? No     Patient currently being followed by outpatient case management? No     Do you currently have service(s) that help you manage your care at home? No     Do you take prescription medications? Yes     Do you have prescription coverage? Yes     Do you have any problems affording any of your prescribed medications? No     Is the patient taking medications as prescribed? yes     Who is going to help you get home at discharge? Brother or other family/friend     How do you get to doctors appointments? car, drives self     Are you on dialysis? No     Do you take coumadin? No     Discharge Plan A Home;Home Health;Other   IV Infusion    Discharge Plan B Home     DME Needed Upon Discharge  none     Discharge Plan discussed with: Patient     Transition of Care Barriers None        Physical Activity    On average, how many days per week do you engage in moderate to strenuous exercise (like a brisk walk)? 3 days     On average, how many minutes do you engage in exercise at this level? 30 min        Financial Resource Strain    How hard is it for you to pay for the very basics like food, housing, medical care, and heating? Not hard at all         Housing Stability    In the last 12 months, was there a time when you were not able to pay the mortgage or rent on time? No     At any time in the past 12 months, were you homeless or living in a shelter (including now)? No        Transportation Needs    Has the lack of transportation kept you from medical appointments, meetings, work or from getting things needed for daily living? No        Food Insecurity    Within the past 12 months, you worried that your food would run out before you got the money to buy more. Never true     Within the past 12 months, the food you bought just didn't last and you didn't have money to get more. Never true        Stress    Do you feel stress - tense, restless, nervous, or anxious, or unable to sleep at night because your mind is troubled all the time - these days? Only a little        Social Isolation    How often do you feel lonely or isolated from those around you?  Never        Alcohol Use    Q1: How often do you have a drink containing alcohol? 2-3 times a week     Q2: How many drinks containing alcohol do you have on a typical day when you are drinking? 1 or 2     Q3: How often do you have six or more drinks on one occasion? Never        Utilities    In the past 12 months has the electric, gas, oil, or water company threatened to shut off services in your home? No        Health Literacy    How often do you need to have someone help you when you read instructions, pamphlets, or other written material from your doctor or pharmacy? Never

## 2024-05-15 NOTE — ASSESSMENT & PLAN NOTE
68 year old male with history of CAD s/p CABG, heart block s/p dual chamber pacemaker in July 2022 and MSSA bacteremia (12/14-12/16, cleared 12/17, unclear source, CHARMAINE and MRI spine neg, 4w cefazolin, surveillance blood cultures negative), admitted this time for pacemaker infection.      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 High likelihood this is MSSA given recent history of bacteremia. So far no evidence of bacteremia but blood cultures are still incubating. Afebrile, without leucocytosis and HDS.       Recommendations     -Agree with cefazolin IV for now, will follow closely when patient undergoes extraction, please send operative cultures for clinical clarity regards to microbiological etiology     -Plan on a prolonged course of IV antibiotics. Discussed with patient.

## 2024-05-15 NOTE — CONSULTS
[facility] - Cardiology Stepdown  Infectious Disease  Consult Note    Patient Name: Michael Espinoza  MRN: [id number]  Admission Date: 5/14/2024  Hospital Length of Stay: 0 days  Attending Physician: Praveen Mario MD  Primary Care Provider: Dominguez Hyatt MD     Isolation Status: No active isolations    Patient information was obtained from patient and ER records.      Inpatient consult to Infectious Diseases  Consult performed by: Lencho Asencio MD  Consult ordered by: Barb Pace NP        Assessment/Plan:     ID  * Infection associated with cardiovascular device  68 year old male with history of CAD s/p CABG, heart block s/p dual chamber pacemaker in July 2022 and MSSA bacteremia (12/14-12/16, cleared 12/17, unclear source, CHARMAINE and MRI spine neg, 4w cefazolin, surveillance blood cultures negative), admitted this time for pacemaker infection.      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 High likelihood this is MSSA given recent history of bacteremia. So far no evidence of bacteremia but blood cultures are still incubating. Afebrile, without leucocytosis and HDS.       Recommendations     -Agree with cefazolin IV for now, will follow closely when patient undergoes extraction, please send operative cultures for clinical clarity regards to microbiological etiology     -Plan on a prolonged course of IV antibiotics. Discussed with patient.               Thank you for your consult. I will follow-up with patient. Please contact us if you have any additional questions.    Lencho Asencio MD  Infectious Disease  [facility] - Cardiology Stepdown    Subjective:     Principal Problem: Infection associated with cardiovascular device    HPI: 68 year old male with history of CAD s/p CABG, heart block s/p dual chamber pacemaker in July 2022 and MSSA  bacteremia (12/14-12/16, cleared 12/17, unclear source, CHARMAINE and MRI spine neg, 4w cefazolin, surveillance blood cultures negative), admitted this time for pacemaker infection.      Patient states about 2-3 weeks ago he noticed a brown lesion that was small and circular overlying pacemaker site, and slow started to get worse and changed to a reddish color over time associated with progressive tenderness to touch, but denies fever, rigors, chills, recent procedures, skin manipulation or recent trauma to the area. Furthermore, he denies chest pain. ID consulted for sent by EP due to concern for pacemaker pocket infection, Hx of MSSA bacteremia     Past Medical History:   Diagnosis Date    Complete heart block 7/5/2022    Coronary artery disease of native artery of native heart with stable angina pectoris 4/10/2018    Hyperlipidemia        Past Surgical History:   Procedure Laterality Date    A-V CARDIAC PACEMAKER INSERTION N/A 7/5/2022    Procedure: INSERTION, CARDIAC PACEMAKER, DUAL CHAMBER;  Surgeon: Alessandro Canales MD;  Location: [facility] EP LAB;  Service: Cardiology;  Laterality: N/A;  CHB, TBD, ANES, ED 6, MB    COLONOSCOPY N/A 8/5/2022    Procedure: COLONOSCOPY;  Surgeon: Namita Dumont MD;  Location: [facility] ENDO (4TH FLR);  Service: Endoscopy;  Laterality: N/A;  vacc-inst portal-tb    CYST REMOVAL      ECHOCARDIOGRAM,TRANSESOPHAGEAL N/A 12/22/2023    Procedure: Transesophageal echo (CHARMAINE) intra-procedure log documentation;  Surgeon: Provider, Dos Diagnostic;  Location: [facility] EP LAB;  Service: Cardiology;  Laterality: N/A;    EXTRACORPOREAL SHOCK WAVE LITHOTRIPSY Right 3/1/2024    Procedure: LITHOTRIPSY, ESWL;  Surgeon: Sanchez Moreno Jr., MD;  Location: [facility] OR 2ND FLR;  Service: Urology;  Laterality: Right;  1 hr    TONSILLECTOMY         Review of patient's allergies indicates:  No Known Allergies    Medications:  Medications Prior to Admission   Medication Sig    aspirin (ECOTRIN) 81 MG EC tablet Take 81 mg  by mouth 2 (two) times daily. (Breakfast & Afternoon)    atorvastatin (LIPITOR) 80 MG tablet Take 1 tablet (80 mg total) by mouth once daily. (Patient taking differently: Take 80 mg by mouth every evening.)    ezetimibe (ZETIA) 10 mg tablet Take 1 tablet (10 mg total) by mouth once daily.    calcium carbonate (TUMS ORAL) Take 2 tablets by mouth daily as needed (Heartburn).    coenzyme Q10 (CO Q-10) 300 mg Cap Take 1 capsule by mouth once daily.    dicyclomine (BENTYL) 10 MG capsule Take 10 mg by mouth daily as needed (Abdominal pain).    ERGOCALCIFEROL, VITAMIN D2, (VITAMIN D ORAL) Take 1 capsule by mouth Mon, Wed, Fri, Sat & Sun    multivit-min/FA/lycopen/lutein (CENTRUM SILVER MEN ORAL) Take 1 tablet by mouth every Mon, Wed, Fri.    ondansetron (ZOFRAN-ODT) 4 MG TbDL Take 1 tablet (4 mg total) by mouth every 8 (eight) hours as needed (nausea). (Patient not taking: Reported on 1/24/2024)    oxybutynin (DITROPAN) 5 MG Tab Take 1 tablet (5 mg total) by mouth 3 (three) times daily as needed (for bladder spasms and stent pain).    oxyCODONE (ROXICODONE) 5 MG immediate release tablet Take 1 tablet (5 mg total) by mouth every 4 (four) hours as needed for Pain.    tamsulosin (FLOMAX) 0.4 mg Cap Take 1 capsule (0.4 mg total) by mouth once daily.     Antibiotics (From admission, onward)      Start     Stop Route Frequency Ordered    05/14/24 2245  ceFAZolin 2 g in dextrose 5 % in water (D5W) 50 mL IVPB (MB+)         -- IV Every 8 hours (non-standard times) 05/14/24 2132          Antifungals (From admission, onward)      None          Antivirals (From admission, onward)      None             Immunization History   Administered Date(s) Administered    COVID-19, MRNA, LN-S, PF (Pfizer) (Gray Cap) 05/19/2022    COVID-19, MRNA, LN-S, PF (Pfizer) (Purple Cap) 02/15/2021, 03/08/2021, 10/09/2021    COVID-19, mRNA, LNP-S, PF (Moderna 2023)Ages 12+ 01/12/2024    COVID-19, mRNA, LNP-S, bivalent booster, PF (PFIZER OMICRON) 11/15/2022     Influenza 10/04/2018, 10/15/2019    Influenza (FLUAD) - Quadrivalent - Adjuvanted - PF *Preferred* (65+) 10/24/2020, 10/10/2022, 10/23/2023    Pneumococcal Conjugate - 13 Valent 11/11/2020    Tdap 08/22/2019    Zoster Recombinant 08/08/2023, 11/21/2023       Family History       Problem Relation (Age of Onset)    Diabetes Brother    Heart attack Brother    Heart disease Brother    Hyperlipidemia Brother    Hypertension Brother          Social History     Socioeconomic History    Marital status: Single   Tobacco Use    Smoking status: Never     Passive exposure: Never    Smokeless tobacco: Never   Substance and Sexual Activity    Alcohol use: Yes     Alcohol/week: 1.0 standard drink of alcohol     Types: 1 Glasses of wine per week     Comment: 1 drink 2-3 times/weekly    Drug use: No     Social Determinants of Health     Financial Resource Strain: Low Risk  (12/16/2023)    Overall Financial Resource Strain (CARDIA)     Difficulty of Paying Living Expenses: Not hard at all   Food Insecurity: No Food Insecurity (12/16/2023)    Hunger Vital Sign     Worried About Running Out of Food in the Last Year: Never true     Ran Out of Food in the Last Year: Never true   Transportation Needs: No Transportation Needs (12/16/2023)    PRAPARE - Transportation     Lack of Transportation (Medical): No     Lack of Transportation (Non-Medical): No   Physical Activity: Patient Declined (12/16/2023)    Exercise Vital Sign     Days of Exercise per Week: Patient declined     Minutes of Exercise per Session: Patient declined   Stress: No Stress Concern Present (12/16/2023)    [employer] - Occupational Stress Questionnaire     Feeling of Stress : Only a little   Housing Stability: Low Risk  (12/16/2023)    Housing Stability Vital Sign     Unable to Pay for Housing in the Last Year: No     Number of Places Lived in the Last Year: 1     Unstable Housing in the Last Year: No     Review of Systems    Constitutional:  Positive for activity change, appetite change and fatigue. Negative for chills and fever.   HENT:  Negative for trouble swallowing.    Respiratory:  Negative for cough and shortness of breath.    Gastrointestinal:  Negative for abdominal pain, blood in stool, diarrhea and vomiting.   Genitourinary:  Negative for dysuria and hematuria.   Musculoskeletal:  Negative for arthralgias, joint swelling and neck stiffness.   Skin:  Positive for wound.   Neurological:  Negative for syncope.   Psychiatric/Behavioral:  Negative for confusion.      Objective:     Vital Signs (Most Recent):  Temp: 97.4 °F (36.3 °C) (05/15/24 0711)  Pulse: 62 (05/15/24 0711)  Resp: 18 (05/15/24 0711)  BP: 135/82 (05/15/24 0711)  SpO2: 96 % (05/15/24 0711) Vital Signs (24h Range):  Temp:  [96 °F (35.6 °C)-98.6 °F (37 °C)] 97.4 °F (36.3 °C)  Pulse:  [60-88] 62  Resp:  [16-18] 18  SpO2:  [96 %-98 %] 96 %  BP: (130-160)/(72-95) 135/82     Weight: 64.6 kg (142 lb 6.4 oz)  Body mass index is 22.98 kg/m².    Estimated Creatinine Clearance: 70.9 mL/min (based on SCr of 0.9 mg/dL).     Physical Exam  Constitutional:       Appearance: Normal appearance.   HENT:      Head: Normocephalic and atraumatic.      Nose: Nose normal.      Mouth/Throat:      Mouth: Mucous membranes are moist.      Pharynx: Oropharynx is clear.   Eyes:      Conjunctiva/sclera: Conjunctivae normal.   Cardiovascular:      Rate and Rhythm: Normal rate and regular rhythm.      Pulses: Normal pulses.      Heart sounds: Normal heart sounds.      Comments: Erythema, warmth and induration at site overlying pacemaker  Pulmonary:      Effort: Pulmonary effort is normal.      Breath sounds: Normal breath sounds.   Abdominal:      General: Bowel sounds are normal.      Palpations: Abdomen is soft.      Tenderness: There is no guarding or rebound.   Musculoskeletal:         General: No deformity.      Cervical back: Neck supple.   Skin:     General: Skin is warm and dry.       Coloration: Skin is not jaundiced.      Findings: No rash.   Neurological:      Mental Status: He is alert and oriented to person, place, and time. Mental status is at baseline.          Significant Labs:   Microbiology Results (last 7 days)       Procedure Component Value Units Date/Time    Blood Culture #1 **CANNOT BE ORDERED STAT** [[id number]] Collected: 05/14/24 1644    Order Status: Completed Specimen: Blood from Peripheral, Antecubital, Left Updated: 05/15/24 0115     Blood Culture, Routine No Growth to date    Blood Culture #2 **CANNOT BE ORDERED STAT** [[id number]] Collected: 05/14/24 1643    Order Status: Completed Specimen: Blood from Peripheral, Hand, Right Updated: 05/15/24 0115     Blood Culture, Routine No Growth to date          Recent Lab Results         05/15/24  0336   05/14/24 1644 05/14/24 1643        Albumin 3.8     4.1       ALP 71     81       ALT 32     36       Anion Gap 11     11       AST 32     37       Baso # 0.03     0.05       Basophil % 0.5     1.1       BILIRUBIN TOTAL 2.0  Comment: For infants and newborns, interpretation of results should be based  on gestational age, weight and in agreement with clinical  observations.    Premature Infant recommended reference ranges:  Up to 24 hours.............<8.0 mg/dL  Up to 48 hours............<12.0 mg/dL  3-5 days..................<15.0 mg/dL  6-29 days.................<15.0 mg/dL       1.7  Comment: For infants and newborns, interpretation of results should be based  on gestational age, weight and in agreement with clinical  observations.    Premature Infant recommended reference ranges:  Up to 24 hours.............<8.0 mg/dL  Up to 48 hours............<12.0 mg/dL  3-5 days..................<15.0 mg/dL  6-29 days.................<15.0 mg/dL         Blood Culture, Routine   No Growth to date  [P]   No Growth to date  [P]       BUN 15     17       Calcium 9.5     9.6       Chloride 108     108       CO2 20     20       Creatinine 0.9      1.1       CRP     <0.3       Differential Method Automated     Automated       eGFR >60.0     >60.0       Eos # 0.1     0.1       Eos % 1.6     2.1       Glucose 80     74       Gran # (ANC) 4.5     3.1       Gran % 71.3     65.0       Hematocrit 43.2     45.0       Hemoglobin 14.4     14.7       Immature Grans (Abs) 0.01  Comment: Mild elevation in immature granulocytes is non specific and   can be seen in a variety of conditions including stress response,   acute inflammation, trauma and pregnancy. Correlation with other   laboratory and clinical findings is essential.       0.02  Comment: Mild elevation in immature granulocytes is non specific and   can be seen in a variety of conditions including stress response,   acute inflammation, trauma and pregnancy. Correlation with other   laboratory and clinical findings is essential.         Immature Granulocytes 0.2     0.4       Lymph # 1.1     1.1       Lymph % 17.4     22.3       Magnesium  1.8           MCH 29.7     29.3       MCHC 33.3     32.7       MCV 89     90       Mono # 0.6     0.4       Mono % 9.0     9.1       MPV 10.5     10.6       nRBC 0     0       Platelet Count 169     192       Potassium 3.9     4.2       PROTEIN TOTAL 7.0     7.8       RBC 4.85     5.02       RDW 13.5     13.5       Sodium 139     139       WBC 6.25     4.70                [P] - Preliminary Result               Significant Imaging: I have reviewed all pertinent imaging results/findings within the past 24 hours.

## 2024-05-15 NOTE — HOSPITAL COURSE
Mr. Espinoza was admitted to Hospital Medicine for management of a pacemaker infection.  ID was consulted.  He was started on Cefazolin while waiting on blood cultures.  EP was consulted, who plan for pacemaker removal on 5/17 with temporary pacemaker placement, and new permanent pacemaker placement on Monday 5/20.  One culture from 5/17 returned positive for C. Acnes.  PICC line was ordered for left chest, as his new pacemaker was placed on the right.  ID suggested prolonged IV treatment despite negative blood cultures given MSSA bacteremia in December, but concerned there was more of a pacemaker erosion on the skin than an infection.  He was discharged on Ancef until 6/17 with EP followup.    Mr. Michael Espinoza was deemed appropriate for discharge.  At the time of discharge, the plan of care was discussed with patient/family, who were agreeable and amenable.  All medications were verbally reviewed and discussed with the patient/family.  They endorsed understanding and compliance.  Informed them that these changes will be available on their discharge paperwork, as well.  Outpatient follow-ups were scheduled, or if unable to be scheduled ambulatory referrals were placed, and ER return precautions were given.  All questions were answered to the patient/family's satisfaction.  Mr. Michael Espinoza was subsequently discharged in stable condition.

## 2024-05-15 NOTE — NURSING
Nurses Note -- 4 Eyes      5/14/2024   10:38 PM      Skin assessed during: Admit      [] No Altered Skin Integrity Present    []Prevention Measures Documented      [] Yes- Altered Skin Integrity Present or Discovered   [] LDA Added if Not in Epic (Describe Wound)   [x] New Altered Skin Integrity was Present on Admit and Documented in LDA   [] Wound Image Taken    Wound Care Consulted? No    Attending Nurse:  Mak Yeung RN/Staff Member:  Mak Wilcox    Redness/  swelling to pacemaker site, left upper chest. Otherwise, skin is intact.

## 2024-05-15 NOTE — PLAN OF CARE
Problem: Adult Inpatient Plan of Care  Goal: Plan of Care Review  Flowsheets (Taken 5/15/2024 5648)  Plan of Care Reviewed With: patient  Goal: Patient-Specific Goal (Individualized)  Reactivated  Goal: Absence of Hospital-Acquired Illness or Injury  Reactivated  Goal: Optimal Comfort and Wellbeing  Reactivated  Goal: Readiness for Transition of Care  Reactivated

## 2024-05-15 NOTE — HPI
"Michael Espinoza is 68 y.o. male with a PMHx of CAD s/p CABG x3, HLD, Gilbert's syndrome, MSSA bacteremia, and heart block s/p dual chamber pacemaker in July 2022 with Dr. Canales who presents to the ED for tenderness and redness around his pacemaker site for the past week. He states that about a month ago he started noticing that his device was "creeping" closer to the skin but it was not bothering him. He was admitted in December for MSSA bacteremia and completed IV antibiotics. Blood cultures cleared. Patient denies fever or chills recently. Also denies chest pain, shortness of breath, palpitations.    In the ED, pt mildly hypertensive which improved without treatment otherwise vitals stable, afebrile. CBC unremarkable. Bicarb 20. Tbili 1.7 (chronic issue). CRP <0.3. Blood cxs in process. The patient received vancomycin and zosyn. EP consulted in the ED and recommend CXR, IV abx, and ID consult.  "

## 2024-05-15 NOTE — SUBJECTIVE & OBJECTIVE
Past Medical History:   Diagnosis Date    Complete heart block 7/5/2022    Coronary artery disease of native artery of native heart with stable angina pectoris 4/10/2018    Hyperlipidemia        Past Surgical History:   Procedure Laterality Date    A-V CARDIAC PACEMAKER INSERTION N/A 7/5/2022    Procedure: INSERTION, CARDIAC PACEMAKER, DUAL CHAMBER;  Surgeon: Alessandro Canales MD;  Location: [facility] EP LAB;  Service: Cardiology;  Laterality: N/A;  CHB, TBD, ANES, ED 6, MB    COLONOSCOPY N/A 8/5/2022    Procedure: COLONOSCOPY;  Surgeon: Namita Dumont MD;  Location: [facility] ENDO (4TH FLR);  Service: Endoscopy;  Laterality: N/A;  vacc-inst portal-tb    CYST REMOVAL      ECHOCARDIOGRAM,TRANSESOPHAGEAL N/A 12/22/2023    Procedure: Transesophageal echo (CHARMAINE) intra-procedure log documentation;  Surgeon: Provider, Dosc Diagnostic;  Location: [facility] EP LAB;  Service: Cardiology;  Laterality: N/A;    EXTRACORPOREAL SHOCK WAVE LITHOTRIPSY Right 3/1/2024    Procedure: LITHOTRIPSY, ESWL;  Surgeon: Sanchez Moreno Jr., MD;  Location: [facility] OR 2ND FLR;  Service: Urology;  Laterality: Right;  1 hr    TONSILLECTOMY         Review of patient's allergies indicates:  No Known Allergies    No current facility-administered medications on file prior to encounter.     Current Outpatient Medications on File Prior to Encounter   Medication Sig    aspirin (ECOTRIN) 81 MG EC tablet Take 81 mg by mouth 2 (two) times daily. (Breakfast & Afternoon)    atorvastatin (LIPITOR) 80 MG tablet Take 1 tablet (80 mg total) by mouth once daily. (Patient taking differently: Take 80 mg by mouth every evening.)    ezetimibe (ZETIA) 10 mg tablet Take 1 tablet (10 mg total) by mouth once daily.    calcium carbonate (TUMS ORAL) Take 2 tablets by mouth daily as needed (Heartburn).    coenzyme Q10 (CO Q-10) 300 mg Cap Take 1 capsule by mouth once daily.    dicyclomine (BENTYL) 10 MG capsule Take 10 mg by mouth daily as needed (Abdominal pain).    ERGOCALCIFEROL,  VITAMIN D2, (VITAMIN D ORAL) Take 1 capsule by mouth Mon, Wed, Fri, Sat & Sun    multivit-min/FA/lycopen/lutein (CENTRUM SILVER MEN ORAL) Take 1 tablet by mouth every Mon, Wed, Fri.    ondansetron (ZOFRAN-ODT) 4 MG TbDL Take 1 tablet (4 mg total) by mouth every 8 (eight) hours as needed (nausea). (Patient not taking: Reported on 1/24/2024)    oxybutynin (DITROPAN) 5 MG Tab Take 1 tablet (5 mg total) by mouth 3 (three) times daily as needed (for bladder spasms and stent pain).    oxyCODONE (ROXICODONE) 5 MG immediate release tablet Take 1 tablet (5 mg total) by mouth every 4 (four) hours as needed for Pain.    tamsulosin (FLOMAX) 0.4 mg Cap Take 1 capsule (0.4 mg total) by mouth once daily.     Family History       Problem Relation (Age of Onset)    Diabetes Brother    Heart attack Brother    Heart disease Brother    Hyperlipidemia Brother    Hypertension Brother          Tobacco Use    Smoking status: Never     Passive exposure: Never    Smokeless tobacco: Never   Substance and Sexual Activity    Alcohol use: Yes     Alcohol/week: 1.0 standard drink of alcohol     Types: 1 Glasses of wine per week     Comment: 1 drink 2-3 times/weekly    Drug use: No    Sexual activity: Not on file       Objective:     Vital Signs (Most Recent):  Temp: 97.4 °F (36.3 °C) (05/15/24 0711)  Pulse: 62 (05/15/24 0711)  Resp: 18 (05/15/24 0711)  BP: 135/82 (05/15/24 0711)  SpO2: 96 % (05/15/24 0711) Vital Signs (24h Range):  Temp:  [96 °F (35.6 °C)-98.6 °F (37 °C)] 97.4 °F (36.3 °C)  Pulse:  [60-88] 62  Resp:  [16-18] 18  SpO2:  [96 %-98 %] 96 %  BP: (130-160)/(72-95) 135/82       Weight: 64.6 kg (142 lb 6.4 oz)  Body mass index is 22.98 kg/m².    SpO2: 96 %        Physical Exam  Vitals and nursing note reviewed.   Constitutional:       Appearance: Normal appearance.   HENT:      Head: Atraumatic.   Eyes:      Conjunctiva/sclera: Conjunctivae normal.   Cardiovascular:      Rate and Rhythm: Normal rate and regular rhythm.      Heart sounds:  No murmur heard.     Comments: Device site appears close to erode  Pulmonary:      Effort: Pulmonary effort is normal.      Breath sounds: Normal breath sounds. No wheezing.   Abdominal:      General: There is no distension.      Palpations: Abdomen is soft.      Tenderness: There is no abdominal tenderness.   Musculoskeletal:      Right lower leg: No edema.      Left lower leg: No edema.   Psychiatric:         Mood and Affect: Mood normal.       Significant Labs: All pertinent lab results from the last 24 hours have been reviewed.    Significant Imaging:     CHARMAINE for bacteremia (12/22/2023)    CHARMAINE for endocarditis evaluation. No evidence of valvular or lead vegetations visualized.    Left Ventricle: The left ventricle is normal in size. Normal wall thickness. Normal wall motion. There is low normal systolic function with a visually estimated ejection fraction of 50 - 55%.    Right Ventricle: Normal right ventricular cavity size. Wall thickness is normal. Right ventricle wall motion  is normal. Systolic function is normal. Pacemaker lead present in the ventricle, no veggitation visualized.    Left Atrium: Left atrium is dilated. There is no thrombus in the left atrial cavity.    Right Atrium: Right atrium is dilated.    Aortic Valve: The aortic valve is structurally normal. There is no mass/vegetation present. There is trace aortic regurgitation.    Mitral Valve: Mildly thickened leaflets. There is no mass/vegetation present. There is trace regurgitation.    Tricuspid Valve: The tricuspid valve is structurally normal. There is no mass/vegetation present. There is trace regurgitation.    Pulmonic Valve: The pulmonic valve is structurally normal. There is no mass/vegetation present.     Echo 12/16/2023    Left Ventricle: The left ventricle is normal in size. Normal wall thickness. Regional wall motion abnormalities present. See diagram for wall motion findings. There is mildly reduced systolic function with a visually  estimated ejection fraction of 40 - 45%. Ejection fraction by visual approximation is 43%. There is normal diastolic function.    Right Ventricle: Normal right ventricular cavity size. Wall thickness is normal. Right ventricle wall motion  is normal. Systolic function is normal.    Left Atrium: Left atrium is severely dilated. 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    Aortic Valve: The aortic valve is a trileaflet valve. There is mild aortic valve sclerosis. There is mild stenosis. Aortic valve area by VTI is 1.74 cm². Aortic valve peak velocity is 1.65 m/s. Mean gradient is 7 mmHg. The dimensionless index is 0.46. There is trace aortic regurgitation.    Mitral Valve: There is mild bileaflet sclerosis.    Tricuspid Valve: There is mild regurgitation.    Pulmonary Artery: The estimated pulmonary artery systolic pressure is 35 mmHg.    IVC/SVC: Normal venous pressure at 3 mmHg.

## 2024-05-15 NOTE — PROGRESS NOTES
"[facility] - Cardiology [facility] Medicine  Progress Note    Patient Name: Michael Espinoza  MRN: [id number]  Patient Class: IP- Inpatient   Admission Date: 5/14/2024  Length of Stay: 0 days  Attending Physician: Praveen Mario MD  Primary Care Provider: Dominguez Hyatt MD        Subjective:     Principal Problem:Infection of pacemaker pocket        HPI:  Michael Espinoza is 68 y.o. male with a PMHx of CAD s/p CABG x3, HLD, Gilbert's syndrome, MSSA bacteremia, and heart block s/p dual chamber pacemaker in July 2022 with Dr. Canales who presents to the ED for tenderness and redness around his pacemaker site for the past week. He states that about a month ago he started noticing that his device was "creeping" closer to the skin but it was not bothering him. He was admitted in December for MSSA bacteremia and completed IV antibiotics. Blood cultures cleared. Patient denies fever or chills recently. Also denies chest pain, shortness of breath, palpitations.    In the ED, pt mildly hypertensive which improved without treatment otherwise vitals stable, afebrile. CBC unremarkable. Bicarb 20. Tbili 1.7 (chronic issue). CRP <0.3. Blood cxs in process. The patient received vancomycin and zosyn. EP consulted in the ED and recommend CXR, IV abx, and ID consult.    Overview/Hospital Course:  Started on empiric cefazolin. Consulted ID and EP. EP plans for possible extraction potentially Friday with re-implantation on Monday. FU blood cx and ID recs    Interval History:   Patient reports no fever or chills. Mild tenderness over pacemaker site. ID and EP consulted. Plan for possible pacemaker extraction potentially Friday with re-implantation on Monday    Objective:     Vital Signs (Most Recent):  Temp: 98 °F (36.7 °C) (05/15/24 1141)  Pulse: 93 (05/15/24 1141)  Resp: 18 (05/15/24 1141)  BP: 111/75 (05/15/24 1141)  SpO2: 96 % (05/15/24 1141) Vital Signs (24h Range):  Temp:  [96 °F (35.6 °C)-98.6 °F (37 °C)] 98 °F (36.7 " °C)  Pulse:  [60-94] 93  Resp:  [16-18] 18  SpO2:  [96 %-98 %] 96 %  BP: (111-160)/(72-95) 111/75     Weight: 64.6 kg (142 lb 6.4 oz)  Body mass index is 22.98 kg/m².    Intake/Output Summary (Last 24 hours) at 5/15/2024 1330  Last data filed at 5/15/2024 1030  Gross per 24 hour   Intake 490 ml   Output 550 ml   Net -60 ml         Physical Exam  Vitals and nursing note reviewed.   Constitutional:       General: He is not in acute distress.     Appearance: He is not ill-appearing, toxic-appearing or diaphoretic.   HENT:      Head: Normocephalic and atraumatic.      Nose: Nose normal.      Mouth/Throat:      Mouth: Mucous membranes are moist.   Eyes:      Pupils: Pupils are equal, round, and reactive to light.   Cardiovascular:      Rate and Rhythm: Normal rate and regular rhythm.      Pulses: Normal pulses.   Pulmonary:      Effort: Pulmonary effort is normal. No respiratory distress.      Breath sounds: No wheezing, rhonchi or rales.   Chest:      Chest wall: Tenderness present.          Comments: Erythema noted to lateral aspect of pacemaker pocket with mild TTP, no swelling. See photos  Abdominal:      General: Bowel sounds are normal. There is no distension.      Palpations: Abdomen is soft.      Tenderness: There is no abdominal tenderness. There is no guarding.   Musculoskeletal:      Cervical back: Normal range of motion.   Skin:     General: Skin is warm and dry.      Capillary Refill: Capillary refill takes less than 2 seconds.   Neurological:      General: No focal deficit present.      Mental Status: He is alert and oriented to person, place, and time.      Sensory: No sensory deficit.      Motor: No weakness.   Psychiatric:         Mood and Affect: Mood normal.         Behavior: Behavior normal.             Significant Labs: All pertinent labs within the past 24 hours have been reviewed.  BMP:   Recent Labs   Lab 05/15/24  0336   GLU 80      K 3.9      CO2 20*   BUN 15   CREATININE 0.9    CALCIUM 9.5   MG 1.8     CBC:   Recent Labs   Lab 05/14/24  1643 05/15/24  0336   WBC 4.70 6.25   HGB 14.7 14.4   HCT 45.0 43.2    169       Significant Imaging: I have reviewed all pertinent imaging results/findings within the past 24 hours.    Assessment/Plan:      * Infection of pacemaker pocket  EP consulted in the ED and recommend abx and ID consultation.  -Afebrile, no leukocytosis.  -CRP <0.3  -Blood cxs in process.   -CXR with no acute process  -The patient received zosyn and vancomycin in the ED, will change to ancef given history of MSSA bacteremia.  -Erythema and tenderness localized on exam, no swelling.  -ID consulted per EP recs.- Monitor WBC count and fever curve  -EP Will plan for possible extraction potentially Friday with re-implantation on Monday    Complete heart block  -s/p dual chamber pacemaker placement  7/5/2022    Coronary artery disease of native artery of native heart with stable angina pectoris  Patient with known CAD s/p CABG, which is controlled Will continue ASA and Statin and monitor for S/Sx of angina/ACS. Continue to monitor on telemetry.     Gilbert's syndrome  -Chronic condition- reports diagnosed by Dr. Rowe years ago  -T bili elevated on admission, similar to previous  -Monitor CMP    HLD (hyperlipidemia)   Patient is chronically on statin.will continue for now. Monitor clinically. Last LDL was   Lab Results   Component Value Date    LDLCALC 48.0 (L) 08/11/2023         VTE Risk Mitigation (From admission, onward)           Ordered     IP VTE LOW RISK PATIENT  Once         05/14/24 1912     Place sequential compression device  Until discontinued         05/14/24 1912                    Discharge Planning   MAGNO: 5/20/2024     Code Status: Full Code   Is the patient medically ready for discharge?:     Reason for patient still in hospital (select all that apply): Patient trending condition                     Sienaal MD Fabiano  Department of Hospital Medicine   [facility]  - Cardiology Stepdown

## 2024-05-15 NOTE — SUBJECTIVE & OBJECTIVE
Past Medical History:   Diagnosis Date    Complete heart block 7/5/2022    Coronary artery disease of native artery of native heart with stable angina pectoris 4/10/2018    Hyperlipidemia        Past Surgical History:   Procedure Laterality Date    A-V CARDIAC PACEMAKER INSERTION N/A 7/5/2022    Procedure: INSERTION, CARDIAC PACEMAKER, DUAL CHAMBER;  Surgeon: Alessandro Canales MD;  Location: [facility] EP LAB;  Service: Cardiology;  Laterality: N/A;  CHB, TBD, ANES, ED 6, MB    COLONOSCOPY N/A 8/5/2022    Procedure: COLONOSCOPY;  Surgeon: Namita Dumont MD;  Location: [facility] ENDO (4TH FLR);  Service: Endoscopy;  Laterality: N/A;  vacc-inst portal-tb    CYST REMOVAL      ECHOCARDIOGRAM,TRANSESOPHAGEAL N/A 12/22/2023    Procedure: Transesophageal echo (CHARMAINE) intra-procedure log documentation;  Surgeon: Provider, Dosc Diagnostic;  Location: [facility] EP LAB;  Service: Cardiology;  Laterality: N/A;    EXTRACORPOREAL SHOCK WAVE LITHOTRIPSY Right 3/1/2024    Procedure: LITHOTRIPSY, ESWL;  Surgeon: Sanchez Moreno Jr., MD;  Location: [facility] OR 2ND FLR;  Service: Urology;  Laterality: Right;  1 hr    TONSILLECTOMY         Review of patient's allergies indicates:  No Known Allergies    Medications:  Medications Prior to Admission   Medication Sig    aspirin (ECOTRIN) 81 MG EC tablet Take 81 mg by mouth 2 (two) times daily. (Breakfast & Afternoon)    atorvastatin (LIPITOR) 80 MG tablet Take 1 tablet (80 mg total) by mouth once daily. (Patient taking differently: Take 80 mg by mouth every evening.)    ezetimibe (ZETIA) 10 mg tablet Take 1 tablet (10 mg total) by mouth once daily.    calcium carbonate (TUMS ORAL) Take 2 tablets by mouth daily as needed (Heartburn).    coenzyme Q10 (CO Q-10) 300 mg Cap Take 1 capsule by mouth once daily.    dicyclomine (BENTYL) 10 MG capsule Take 10 mg by mouth daily as needed (Abdominal pain).    ERGOCALCIFEROL, VITAMIN D2, (VITAMIN D ORAL) Take 1 capsule by mouth Mon, Wed, Fri, Sat & Sun     multivit-min/FA/lycopen/lutein (CENTRUM SILVER MEN ORAL) Take 1 tablet by mouth every Mon, Wed, Fri.    ondansetron (ZOFRAN-ODT) 4 MG TbDL Take 1 tablet (4 mg total) by mouth every 8 (eight) hours as needed (nausea). (Patient not taking: Reported on 1/24/2024)    oxybutynin (DITROPAN) 5 MG Tab Take 1 tablet (5 mg total) by mouth 3 (three) times daily as needed (for bladder spasms and stent pain).    oxyCODONE (ROXICODONE) 5 MG immediate release tablet Take 1 tablet (5 mg total) by mouth every 4 (four) hours as needed for Pain.    tamsulosin (FLOMAX) 0.4 mg Cap Take 1 capsule (0.4 mg total) by mouth once daily.     Antibiotics (From admission, onward)      Start     Stop Route Frequency Ordered    05/14/24 2245  ceFAZolin 2 g in dextrose 5 % in water (D5W) 50 mL IVPB (MB+)         -- IV Every 8 hours (non-standard times) 05/14/24 2132          Antifungals (From admission, onward)      None          Antivirals (From admission, onward)      None             Immunization History   Administered Date(s) Administered    COVID-19, MRNA, LN-S, PF (Pfizer) (Gray Cap) 05/19/2022    COVID-19, MRNA, LN-S, PF (Pfizer) (Purple Cap) 02/15/2021, 03/08/2021, 10/09/2021    COVID-19, mRNA, LNP-S, PF (Moderna 2023)Ages 12+ 01/12/2024    COVID-19, mRNA, LNP-S, bivalent booster, PF (PFIZER OMICRON) 11/15/2022    Influenza 10/04/2018, 10/15/2019    Influenza (FLUAD) - Quadrivalent - Adjuvanted - PF *Preferred* (65+) 10/24/2020, 10/10/2022, 10/23/2023    Pneumococcal Conjugate - 13 Valent 11/11/2020    Tdap 08/22/2019    Zoster Recombinant 08/08/2023, 11/21/2023       Family History       Problem Relation (Age of Onset)    Diabetes Brother    Heart attack Brother    Heart disease Brother    Hyperlipidemia Brother    Hypertension Brother          Social History     Socioeconomic History    Marital status: Single   Tobacco Use    Smoking status: Never     Passive exposure: Never    Smokeless tobacco: Never   Substance and Sexual Activity     Alcohol use: Yes     Alcohol/week: 1.0 standard drink of alcohol     Types: 1 Glasses of wine per week     Comment: 1 drink 2-3 times/weekly    Drug use: No     Social Determinants of Health     Financial Resource Strain: Low Risk  (12/16/2023)    Overall Financial Resource Strain (CARDIA)     Difficulty of Paying Living Expenses: Not hard at all   Food Insecurity: No Food Insecurity (12/16/2023)    Hunger Vital Sign     Worried About Running Out of Food in the Last Year: Never true     Ran Out of Food in the Last Year: Never true   Transportation Needs: No Transportation Needs (12/16/2023)    PRAPARE - Transportation     Lack of Transportation (Medical): No     Lack of Transportation (Non-Medical): No   Physical Activity: Patient Declined (12/16/2023)    Exercise Vital Sign     Days of Exercise per Week: Patient declined     Minutes of Exercise per Session: Patient declined   Stress: No Stress Concern Present (12/16/2023)    [employer] - Occupational Stress Questionnaire     Feeling of Stress : Only a little   Housing Stability: Low Risk  (12/16/2023)    Housing Stability Vital Sign     Unable to Pay for Housing in the Last Year: No     Number of Places Lived in the Last Year: 1     Unstable Housing in the Last Year: No     Review of Systems   Constitutional:  Positive for activity change, appetite change and fatigue. Negative for chills and fever.   HENT:  Negative for trouble swallowing.    Respiratory:  Negative for cough and shortness of breath.    Gastrointestinal:  Negative for abdominal pain, blood in stool, diarrhea and vomiting.   Genitourinary:  Negative for dysuria and hematuria.   Musculoskeletal:  Negative for arthralgias, joint swelling and neck stiffness.   Skin:  Positive for wound.   Neurological:  Negative for syncope.   Psychiatric/Behavioral:  Negative for confusion.      Objective:     Vital Signs (Most Recent):  Temp: 97.4 °F (36.3 °C) (05/15/24 0711)  Pulse: 62  (05/15/24 0711)  Resp: 18 (05/15/24 0711)  BP: 135/82 (05/15/24 0711)  SpO2: 96 % (05/15/24 0711) Vital Signs (24h Range):  Temp:  [96 °F (35.6 °C)-98.6 °F (37 °C)] 97.4 °F (36.3 °C)  Pulse:  [60-88] 62  Resp:  [16-18] 18  SpO2:  [96 %-98 %] 96 %  BP: (130-160)/(72-95) 135/82     Weight: 64.6 kg (142 lb 6.4 oz)  Body mass index is 22.98 kg/m².    Estimated Creatinine Clearance: 70.9 mL/min (based on SCr of 0.9 mg/dL).     Physical Exam  Constitutional:       Appearance: Normal appearance.   HENT:      Head: Normocephalic and atraumatic.      Nose: Nose normal.      Mouth/Throat:      Mouth: Mucous membranes are moist.      Pharynx: Oropharynx is clear.   Eyes:      Conjunctiva/sclera: Conjunctivae normal.   Cardiovascular:      Rate and Rhythm: Normal rate and regular rhythm.      Pulses: Normal pulses.      Heart sounds: Normal heart sounds.      Comments: Erythema, warmth and induration at site overlying pacemaker  Pulmonary:      Effort: Pulmonary effort is normal.      Breath sounds: Normal breath sounds.   Abdominal:      General: Bowel sounds are normal.      Palpations: Abdomen is soft.      Tenderness: There is no guarding or rebound.   Musculoskeletal:         General: No deformity.      Cervical back: Neck supple.   Skin:     General: Skin is warm and dry.      Coloration: Skin is not jaundiced.      Findings: No rash.   Neurological:      Mental Status: He is alert and oriented to person, place, and time. Mental status is at baseline.          Significant Labs:   Microbiology Results (last 7 days)       Procedure Component Value Units Date/Time    Blood Culture #1 **CANNOT BE ORDERED STAT** [[id number]] Collected: 05/14/24 1644    Order Status: Completed Specimen: Blood from Peripheral, Antecubital, Left Updated: 05/15/24 0115     Blood Culture, Routine No Growth to date    Blood Culture #2 **CANNOT BE ORDERED STAT** [[id number]] Collected: 05/14/24 1643    Order Status: Completed Specimen: Blood from  Peripheral, Hand, Right Updated: 05/15/24 0115     Blood Culture, Routine No Growth to date          Recent Lab Results         05/15/24  0336   05/14/24  1644   05/14/24  1643        Albumin 3.8     4.1       ALP 71     81       ALT 32     36       Anion Gap 11     11       AST 32     37       Baso # 0.03     0.05       Basophil % 0.5     1.1       BILIRUBIN TOTAL 2.0  Comment: For infants and newborns, interpretation of results should be based  on gestational age, weight and in agreement with clinical  observations.    Premature Infant recommended reference ranges:  Up to 24 hours.............<8.0 mg/dL  Up to 48 hours............<12.0 mg/dL  3-5 days..................<15.0 mg/dL  6-29 days.................<15.0 mg/dL       1.7  Comment: For infants and newborns, interpretation of results should be based  on gestational age, weight and in agreement with clinical  observations.    Premature Infant recommended reference ranges:  Up to 24 hours.............<8.0 mg/dL  Up to 48 hours............<12.0 mg/dL  3-5 days..................<15.0 mg/dL  6-29 days.................<15.0 mg/dL         Blood Culture, Routine   No Growth to date  [P]   No Growth to date  [P]       BUN 15     17       Calcium 9.5     9.6       Chloride 108     108       CO2 20     20       Creatinine 0.9     1.1       CRP     <0.3       Differential Method Automated     Automated       eGFR >60.0     >60.0       Eos # 0.1     0.1       Eos % 1.6     2.1       Glucose 80     74       Gran # (ANC) 4.5     3.1       Gran % 71.3     65.0       Hematocrit 43.2     45.0       Hemoglobin 14.4     14.7       Immature Grans (Abs) 0.01  Comment: Mild elevation in immature granulocytes is non specific and   can be seen in a variety of conditions including stress response,   acute inflammation, trauma and pregnancy. Correlation with other   laboratory and clinical findings is essential.       0.02  Comment: Mild elevation in immature granulocytes is non  specific and   can be seen in a variety of conditions including stress response,   acute inflammation, trauma and pregnancy. Correlation with other   laboratory and clinical findings is essential.         Immature Granulocytes 0.2     0.4       Lymph # 1.1     1.1       Lymph % 17.4     22.3       Magnesium  1.8           MCH 29.7     29.3       MCHC 33.3     32.7       MCV 89     90       Mono # 0.6     0.4       Mono % 9.0     9.1       MPV 10.5     10.6       nRBC 0     0       Platelet Count 169     192       Potassium 3.9     4.2       PROTEIN TOTAL 7.0     7.8       RBC 4.85     5.02       RDW 13.5     13.5       Sodium 139     139       WBC 6.25     4.70                [P] - Preliminary Result               Significant Imaging: I have reviewed all pertinent imaging results/findings within the past 24 hours.

## 2024-05-15 NOTE — SUBJECTIVE & OBJECTIVE
Past Medical History:   Diagnosis Date    Complete heart block 7/5/2022    Coronary artery disease of native artery of native heart with stable angina pectoris 4/10/2018    Hyperlipidemia        Past Surgical History:   Procedure Laterality Date    A-V CARDIAC PACEMAKER INSERTION N/A 7/5/2022    Procedure: INSERTION, CARDIAC PACEMAKER, DUAL CHAMBER;  Surgeon: Alessandro Canales MD;  Location: [facility] EP LAB;  Service: Cardiology;  Laterality: N/A;  CHB, TBD, ANES, ED 6, MB    COLONOSCOPY N/A 8/5/2022    Procedure: COLONOSCOPY;  Surgeon: Namita Dumont MD;  Location: [facility] ENDO (4TH FLR);  Service: Endoscopy;  Laterality: N/A;  vacc-inst portal-tb    CYST REMOVAL      ECHOCARDIOGRAM,TRANSESOPHAGEAL N/A 12/22/2023    Procedure: Transesophageal echo (CHARMAINE) intra-procedure log documentation;  Surgeon: Provider, Dosc Diagnostic;  Location: [facility] EP LAB;  Service: Cardiology;  Laterality: N/A;    EXTRACORPOREAL SHOCK WAVE LITHOTRIPSY Right 3/1/2024    Procedure: LITHOTRIPSY, ESWL;  Surgeon: Sanchez Moreno Jr., MD;  Location: [facility] OR 2ND FLR;  Service: Urology;  Laterality: Right;  1 hr    TONSILLECTOMY         Review of patient's allergies indicates:  No Known Allergies    No current facility-administered medications on file prior to encounter.     Current Outpatient Medications on File Prior to Encounter   Medication Sig    aspirin (ECOTRIN) 81 MG EC tablet Take 81 mg by mouth 2 (two) times daily. (Breakfast & Afternoon)    atorvastatin (LIPITOR) 80 MG tablet Take 1 tablet (80 mg total) by mouth once daily. (Patient taking differently: Take 80 mg by mouth every evening.)    ezetimibe (ZETIA) 10 mg tablet Take 1 tablet (10 mg total) by mouth once daily.    calcium carbonate (TUMS ORAL) Take 2 tablets by mouth daily as needed (Heartburn).    coenzyme Q10 (CO Q-10) 300 mg Cap Take 1 capsule by mouth once daily.    dicyclomine (BENTYL) 10 MG capsule Take 10 mg by mouth daily as needed (Abdominal pain).    ERGOCALCIFEROL,  VITAMIN D2, (VITAMIN D ORAL) Take 1 capsule by mouth Mon, Wed, Fri, Sat & Sun    multivit-min/FA/lycopen/lutein (CENTRUM SILVER MEN ORAL) Take 1 tablet by mouth every Mon, Wed, Fri.    ondansetron (ZOFRAN-ODT) 4 MG TbDL Take 1 tablet (4 mg total) by mouth every 8 (eight) hours as needed (nausea). (Patient not taking: Reported on 1/24/2024)    oxybutynin (DITROPAN) 5 MG Tab Take 1 tablet (5 mg total) by mouth 3 (three) times daily as needed (for bladder spasms and stent pain).    oxyCODONE (ROXICODONE) 5 MG immediate release tablet Take 1 tablet (5 mg total) by mouth every 4 (four) hours as needed for Pain.    tamsulosin (FLOMAX) 0.4 mg Cap Take 1 capsule (0.4 mg total) by mouth once daily.     Family History       Problem Relation (Age of Onset)    Diabetes Brother    Heart attack Brother    Heart disease Brother    Hyperlipidemia Brother    Hypertension Brother          Tobacco Use    Smoking status: Never     Passive exposure: Never    Smokeless tobacco: Never   Substance and Sexual Activity    Alcohol use: Yes     Alcohol/week: 1.0 standard drink of alcohol     Types: 1 Glasses of wine per week     Comment: 1 drink 2-3 times/weekly    Drug use: No    Sexual activity: Not on file     Review of Systems   Constitutional:  Negative for appetite change, chills, diaphoresis, fatigue and fever.   HENT:  Negative for congestion, rhinorrhea and sore throat.    Eyes:  Negative for photophobia and visual disturbance.   Respiratory:  Negative for cough, shortness of breath and wheezing.    Cardiovascular:  Negative for chest pain, palpitations and leg swelling.   Gastrointestinal:  Negative for abdominal distention, abdominal pain, diarrhea, nausea and vomiting.   Genitourinary:  Negative for dysuria, frequency and hematuria.   Musculoskeletal:  Negative for back pain, myalgias and neck pain.   Skin:  Positive for color change. Negative for pallor, rash and wound.   Neurological:  Negative for dizziness, syncope, weakness,  light-headedness and headaches.   Psychiatric/Behavioral:  Negative for confusion and hallucinations. The patient is not nervous/anxious.      Objective:     Vital Signs (Most Recent):  Temp: 98 °F (36.7 °C) (05/14/24 1810)  Pulse: 72 (05/14/24 1810)  Resp: 16 (05/14/24 1810)  BP: (!) 144/86 (05/14/24 1810)  SpO2: 98 % (05/14/24 1810) Vital Signs (24h Range):  Temp:  [98 °F (36.7 °C)-98.6 °F (37 °C)] 98 °F (36.7 °C)  Pulse:  [72-88] 72  Resp:  [16-18] 16  SpO2:  [98 %] 98 %  BP: (144-160)/(86-95) 144/86     Weight: 67.1 kg (148 lb)  Body mass index is 23.89 kg/m².     Physical Exam  Vitals and nursing note reviewed.   Constitutional:       General: He is not in acute distress.     Appearance: He is not ill-appearing, toxic-appearing or diaphoretic.   HENT:      Head: Normocephalic and atraumatic.      Nose: Nose normal.      Mouth/Throat:      Mouth: Mucous membranes are moist.   Eyes:      Pupils: Pupils are equal, round, and reactive to light.   Cardiovascular:      Rate and Rhythm: Normal rate and regular rhythm.      Pulses: Normal pulses.   Pulmonary:      Effort: Pulmonary effort is normal. No respiratory distress.      Breath sounds: No wheezing, rhonchi or rales.   Chest:      Chest wall: Tenderness present.          Comments: Erythema noted to lateral aspect of pacemaker pocket with mild TTP, no swelling. See photos  Abdominal:      General: Bowel sounds are normal. There is no distension.      Palpations: Abdomen is soft.      Tenderness: There is no abdominal tenderness. There is no guarding.   Musculoskeletal:      Cervical back: Normal range of motion.   Skin:     General: Skin is warm and dry.      Capillary Refill: Capillary refill takes less than 2 seconds.   Neurological:      General: No focal deficit present.      Mental Status: He is alert and oriented to person, place, and time.      Sensory: No sensory deficit.      Motor: No weakness.   Psychiatric:         Mood and Affect: Mood normal.          Behavior: Behavior normal.              CRANIAL NERVES     CN III, IV, VI   Pupils are equal, round, and reactive to light.             Significant Labs: All pertinent labs within the past 24 hours have been reviewed.  CBC:   Recent Labs   Lab 05/14/24  1643   WBC 4.70   HGB 14.7   HCT 45.0        CMP:   Recent Labs   Lab 05/14/24  1643      K 4.2      CO2 20*   GLU 74   BUN 17   CREATININE 1.1   CALCIUM 9.6   PROT 7.8   ALBUMIN 4.1   BILITOT 1.7*   ALKPHOS 81   AST 37   ALT 36   ANIONGAP 11       Significant Imaging: I have reviewed all pertinent imaging results/findings within the past 24 hours.  Imaging Results    None

## 2024-05-15 NOTE — CONSULTS
"[facility] - Cardiology Stepdown  Cardiac Electrophysiology  Consult Note    Admission Date: 5/14/2024  Code Status: Full Code   Attending Provider: Praveen Mario MD  Consulting Provider: Deirdre Williamson MD  Principal Problem:Infection of pacemaker pocket    Consults  Subjective:     HPI:   68 year old male with Hx of CAD s/p CABG and CHB s/p Dual-Chamber PPM (Implanted 7/5/2022 with Dr. Canales), who presented ot the ED for tenderness and redness around his pacemaker site for the past week. Patient states that about a month ago he started noticing that his device was "creeping" closer to the skin but it was not bothering him. He was admitted in December for MSSA bacteremia and completed IV antibiotics. Blood cultures cleared. Last positive cultures were 12/16/23 positive for S.Aureus:, 4 sets repeated after were negative. Patient today denies fever or chills recently. Also denies chest pain, shortness of breath, palpitations.       Past Medical History:   Diagnosis Date    Complete heart block 7/5/2022    Coronary artery disease of native artery of native heart with stable angina pectoris 4/10/2018    Hyperlipidemia        Past Surgical History:   Procedure Laterality Date    A-V CARDIAC PACEMAKER INSERTION N/A 7/5/2022    Procedure: INSERTION, CARDIAC PACEMAKER, DUAL CHAMBER;  Surgeon: Alessandro Canales MD;  Location: [facility] EP LAB;  Service: Cardiology;  Laterality: N/A;  CHB, TBD, ANES, ED 6, MB    COLONOSCOPY N/A 8/5/2022    Procedure: COLONOSCOPY;  Surgeon: Namita Dumont MD;  Location: [facility] ENDO ([address] [place] [address]);  Service: Endoscopy;  Laterality: N/A;  vacc-inst portal-tb    CYST REMOVAL      ECHOCARDIOGRAM,TRANSESOPHAGEAL N/A 12/22/2023    Procedure: Transesophageal echo (CHARMAINE) intra-procedure log documentation;  Surgeon: Provider, [facility]osc [facility];  Location: [facility] EP LAB;  Service: Cardiology;  Laterality: N/A;    EXTRACORPOREAL SHOCK WAVE LITHOTRIPSY Right 3/1/2024    Procedure: LITHOTRIPSY, ESWL;  Surgeon: Josh" Sanchez MOSQUERA Jr., MD;  Location: [facility] OR [address] [place] [address];  Service: Urology;  Laterality: Right;  1 hr    TONSILLECTOMY         Review of patient's allergies indicates:  No Known Allergies    No current facility-administered medications on file prior to encounter.     Current Outpatient Medications on File Prior to Encounter   Medication Sig    aspirin (ECOTRIN) 81 MG EC tablet Take 81 mg by mouth 2 (two) times daily. (Breakfast & Afternoon)    atorvastatin (LIPITOR) 80 MG tablet Take 1 tablet (80 mg total) by mouth once daily. (Patient taking differently: Take 80 mg by mouth every evening.)    ezetimibe (ZETIA) 10 mg tablet Take 1 tablet (10 mg total) by mouth once daily.    calcium carbonate (TUMS ORAL) Take 2 tablets by mouth daily as needed (Heartburn).    coenzyme Q10 (CO Q-10) 300 mg Cap Take 1 capsule by mouth once daily.    dicyclomine (BENTYL) 10 MG capsule Take 10 mg by mouth daily as needed (Abdominal pain).    ERGOCALCIFEROL, VITAMIN D2, (VITAMIN D ORAL) Take 1 capsule by mouth Mon, Wed, Fri, Sat & Sun    multivit-min/FA/lycopen/lutein (CENTRUM SILVER MEN ORAL) Take 1 tablet by mouth every Mon, Wed, Fri.    ondansetron (ZOFRAN-ODT) 4 MG TbDL Take 1 tablet (4 mg total) by mouth every 8 (eight) hours as needed (nausea). (Patient not taking: Reported on 1/24/2024)    oxybutynin (DITROPAN) 5 MG Tab Take 1 tablet (5 mg total) by mouth 3 (three) times daily as needed (for bladder spasms and stent pain).    oxyCODONE (ROXICODONE) 5 MG immediate release tablet Take 1 tablet (5 mg total) by mouth every 4 (four) hours as needed for Pain.    tamsulosin (FLOMAX) 0.4 mg Cap Take 1 capsule (0.4 mg total) by mouth once daily.     Family History       Problem Relation (Age of Onset)    Diabetes Brother    Heart attack Brother    Heart disease Brother    Hyperlipidemia Brother    Hypertension Brother          Tobacco Use    Smoking status: Never     Passive exposure: Never    Smokeless tobacco: Never   Substance and Sexual Activity     Alcohol use: Yes     Alcohol/week: 1.0 standard drink of alcohol     Types: 1 Glasses of wine per week     Comment: 1 drink 2-3 times/weekly    Drug use: No    Sexual activity: Not on file       Objective:     Vital Signs (Most Recent):  Temp: 97.4 °F (36.3 °C) (05/15/24 0711)  Pulse: 62 (05/15/24 0711)  Resp: 18 (05/15/24 0711)  BP: 135/82 (05/15/24 0711)  SpO2: 96 % (05/15/24 0711) Vital Signs (24h Range):  Temp:  [96 °F (35.6 °C)-98.6 °F (37 °C)] 97.4 °F (36.3 °C)  Pulse:  [60-88] 62  Resp:  [16-18] 18  SpO2:  [96 %-98 %] 96 %  BP: (130-160)/(72-95) 135/82       Weight: 64.6 kg (142 lb 6.4 oz)  Body mass index is 22.98 kg/m².    SpO2: 96 %        Physical Exam  Vitals and nursing note reviewed.   Constitutional:       Appearance: Normal appearance.   HENT:      Head: Atraumatic.   Eyes:      Conjunctiva/sclera: Conjunctivae normal.   Cardiovascular:      Rate and Rhythm: Normal rate and regular rhythm.      Heart sounds: No murmur heard.     Comments: Device site appears close to erode  Pulmonary:      Effort: Pulmonary effort is normal.      Breath sounds: Normal breath sounds. No wheezing.   Abdominal:      General: There is no distension.      Palpations: Abdomen is soft.      Tenderness: There is no abdominal tenderness.   Musculoskeletal:      Right lower leg: No edema.      Left lower leg: No edema.   Psychiatric:         Mood and Affect: Mood normal.       Significant Labs: All pertinent lab results from the last 24 hours have been reviewed.    Significant Imaging:     CHARMAINE for bacteremia (12/22/2023)    CHARMAINE for endocarditis evaluation. No evidence of valvular or lead vegetations visualized.    Left Ventricle: The left ventricle is normal in size. Normal wall thickness. Normal wall motion. There is low normal systolic function with a visually estimated ejection fraction of 50 - 55%.    Right Ventricle: Normal right ventricular cavity size. Wall thickness is normal. Right ventricle wall motion  is normal.  Systolic function is normal. Pacemaker lead present in the ventricle, no veggitation visualized.    Left Atrium: Left atrium is dilated. There is no thrombus in the left atrial cavity.    Right Atrium: Right atrium is dilated.    Aortic Valve: The aortic valve is structurally normal. There is no mass/vegetation present. There is trace aortic regurgitation.    Mitral Valve: Mildly thickened leaflets. There is no mass/vegetation present. There is trace regurgitation.    Tricuspid Valve: The tricuspid valve is structurally normal. There is no mass/vegetation present. There is trace regurgitation.    Pulmonic Valve: The pulmonic valve is structurally normal. There is no mass/vegetation present.     Echo 12/16/2023    Left Ventricle: The left ventricle is normal in size. Normal wall thickness. Regional wall motion abnormalities present. See diagram for wall motion findings. There is mildly reduced systolic function with a visually estimated ejection fraction of 40 - 45%. Ejection fraction by visual approximation is 43%. There is normal diastolic function.    Right Ventricle: Normal right ventricular cavity size. Wall thickness is normal. Right ventricle wall motion  is normal. Systolic function is normal.    Left Atrium: Left atrium is severely dilated. 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    Aortic Valve: The aortic valve is a trileaflet valve. There is mild aortic valve sclerosis. There is mild stenosis. Aortic valve area by VTI is 1.74 cm². Aortic valve peak velocity is 1.65 m/s. Mean gradient is 7 mmHg. The dimensionless index is 0.46. There is trace aortic regurgitation.    Mitral Valve: There is mild bileaflet sclerosis.    Tricuspid Valve: There is mild regurgitation.    Pulmonary Artery: The estimated pulmonary artery systolic pressure is 35 mmHg.    IVC/SVC: Normal venous pressure at 3 mmHg.          Assessment and Plan:     * Infection of pacemaker pocket  - Monitor WBC count and fever curve  -  Follow up ID Consult and recommendations  - Continue with antibiotics  - Follow up Blood cultures  - Patient is pacer dependent, currently Rvp ~95%  - Will plan for possible extraction potentially Friday with re-implantation on Monday      Thank you for this consult.Please follow up Attending Attestation for further recommendations. Please call if any questions.       Deirdre Williamson MD  Cardiac Electrophysiology  [facility] - Cardiology Stepdown

## 2024-05-15 NOTE — HPI
"68 year old male with Hx of CAD s/p CABG and CHB s/p Dual-Chamber PPM (Implanted 7/5/2022 with Dr. Canales), who presented ot the ED for tenderness and redness around his pacemaker site for the past week. Patient states that about a month ago he started noticing that his device was "creeping" closer to the skin but it was not bothering him. He was admitted in December for MSSA bacteremia and completed IV antibiotics. Blood cultures cleared. Last positive cultures were 12/16/23 positive for S.Aureus:, 4 sets repeated after were negative. Patient today denies fever or chills recently. Also denies chest pain, shortness of breath, palpitations.     "

## 2024-05-15 NOTE — PROVIDER PROGRESS NOTES - EMERGENCY DEPT.
Encounter Date: 5/14/2024    ED Physician Progress Notes        Physician Note:   Received patient at sign-out  Patient evaluated by Cardiology who recommended cultures and empiric antibiotics.  He will have a formal EP consult in morning.  Will administer vanc and Zosyn.  Patient updated with plan.  He was resting comfortably.  Labs are reassuring.  Observation per case management.

## 2024-05-15 NOTE — SUBJECTIVE & OBJECTIVE
Interval History:   Patient reports no fever or chills. Mild tenderness over pacemaker site. ID and EP consulted. Plan for possible pacemaker extraction potentially Friday with re-implantation on Monday    Objective:     Vital Signs (Most Recent):  Temp: 98 °F (36.7 °C) (05/15/24 1141)  Pulse: 93 (05/15/24 1141)  Resp: 18 (05/15/24 1141)  BP: 111/75 (05/15/24 1141)  SpO2: 96 % (05/15/24 1141) Vital Signs (24h Range):  Temp:  [96 °F (35.6 °C)-98.6 °F (37 °C)] 98 °F (36.7 °C)  Pulse:  [60-94] 93  Resp:  [16-18] 18  SpO2:  [96 %-98 %] 96 %  BP: (111-160)/(72-95) 111/75     Weight: 64.6 kg (142 lb 6.4 oz)  Body mass index is 22.98 kg/m².    Intake/Output Summary (Last 24 hours) at 5/15/2024 1330  Last data filed at 5/15/2024 1030  Gross per 24 hour   Intake 490 ml   Output 550 ml   Net -60 ml         Physical Exam  Vitals and nursing note reviewed.   Constitutional:       General: He is not in acute distress.     Appearance: He is not ill-appearing, toxic-appearing or diaphoretic.   HENT:      Head: Normocephalic and atraumatic.      Nose: Nose normal.      Mouth/Throat:      Mouth: Mucous membranes are moist.   Eyes:      Pupils: Pupils are equal, round, and reactive to light.   Cardiovascular:      Rate and Rhythm: Normal rate and regular rhythm.      Pulses: Normal pulses.   Pulmonary:      Effort: Pulmonary effort is normal. No respiratory distress.      Breath sounds: No wheezing, rhonchi or rales.   Chest:      Chest wall: Tenderness present.          Comments: Erythema noted to lateral aspect of pacemaker pocket with mild TTP, no swelling. See photos  Abdominal:      General: Bowel sounds are normal. There is no distension.      Palpations: Abdomen is soft.      Tenderness: There is no abdominal tenderness. There is no guarding.   Musculoskeletal:      Cervical back: Normal range of motion.   Skin:     General: Skin is warm and dry.      Capillary Refill: Capillary refill takes less than 2 seconds.    Neurological:      General: No focal deficit present.      Mental Status: He is alert and oriented to person, place, and time.      Sensory: No sensory deficit.      Motor: No weakness.   Psychiatric:         Mood and Affect: Mood normal.         Behavior: Behavior normal.             Significant Labs: All pertinent labs within the past 24 hours have been reviewed.  BMP:   Recent Labs   Lab 05/15/24  0336   GLU 80      K 3.9      CO2 20*   BUN 15   CREATININE 0.9   CALCIUM 9.5   MG 1.8     CBC:   Recent Labs   Lab 05/14/24  1643 05/15/24  0336   WBC 4.70 6.25   HGB 14.7 14.4   HCT 45.0 43.2    169       Significant Imaging: I have reviewed all pertinent imaging results/findings within the past 24 hours.

## 2024-05-15 NOTE — ASSESSMENT & PLAN NOTE
Patient is chronically on statin.will continue for now. Monitor clinically. Last LDL was   Lab Results   Component Value Date    LDLCALC 48.0 (L) 08/11/2023

## 2024-05-15 NOTE — H&P
"[facility] - Emergency [facility] Medicine  History & Physical    Patient Name: Michael Espnioza  MRN: [id number]  Patient Class: OP- Observation  Admission Date: 5/14/2024  Attending Physician: Praveen Mario MD   Primary Care Provider: Dominguez Hyatt MD         Patient information was obtained from patient, past medical records, and ER records.     Subjective:     Principal Problem:Infection of pacemaker pocket    Chief Complaint:   Chief Complaint   Patient presents with    Pacemaker Problem     Pacemaker placed 18 mos ago, told to come see if pocket infection        HPI: Michael Espinoza is 68 y.o. male with a PMHx of CAD s/p CABG x3, HLD, Gilbert's syndrome, MSSA bacteremia, and heart block s/p dual chamber pacemaker in July 2022 with Dr. Canales who presents to the ED for tenderness and redness around his pacemaker site for the past week. He states that about a month ago he started noticing that his device was "creeping" closer to the skin but it was not bothering him. He was admitted in December for MSSA bacteremia and completed IV antibiotics. Blood cultures cleared. Patient denies fever or chills recently. Also denies chest pain, shortness of breath, palpitations.    In the ED, pt mildly hypertensive which improved without treatment otherwise vitals stable, afebrile. CBC unremarkable. Bicarb 20. Tbili 1.7 (chronic issue). CRP <0.3. Blood cxs in process. The patient received vancomycin and zosyn. EP consulted in the ED and recommend CXR, IV abx, and ID consult.    Past Medical History:   Diagnosis Date    Complete heart block 7/5/2022    Coronary artery disease of native artery of native heart with stable angina pectoris 4/10/2018    Hyperlipidemia        Past Surgical History:   Procedure Laterality Date    A-V CARDIAC PACEMAKER INSERTION N/A 7/5/2022    Procedure: INSERTION, CARDIAC PACEMAKER, DUAL CHAMBER;  Surgeon: Alessandro Canales MD;  Location: [facility] EP LAB;  Service: Cardiology;  Laterality: N/A;  " CHB, TBD, ANES, ED 6, MB    COLONOSCOPY N/A 8/5/2022    Procedure: COLONOSCOPY;  Surgeon: Namita Dumont MD;  Location: [facility] ENDO (4TH FLR);  Service: Endoscopy;  Laterality: N/A;  vacc-inst portal-tb    CYST REMOVAL      ECHOCARDIOGRAM,TRANSESOPHAGEAL N/A 12/22/2023    Procedure: Transesophageal echo (CHARMAINE) intra-procedure log documentation;  Surgeon: Provider, [facility];  Location: [facility] EP LAB;  Service: Cardiology;  Laterality: N/A;    EXTRACORPOREAL SHOCK WAVE LITHOTRIPSY Right 3/1/2024    Procedure: LITHOTRIPSY, ESWL;  Surgeon: Sanchez Moreno Jr., MD;  Location: [facility] OR 2ND FLR;  Service: Urology;  Laterality: Right;  1 hr    TONSILLECTOMY         Review of patient's allergies indicates:  No Known Allergies    No current facility-administered medications on file prior to encounter.     Current Outpatient Medications on File Prior to Encounter   Medication Sig    aspirin (ECOTRIN) 81 MG EC tablet Take 81 mg by mouth 2 (two) times daily. (Breakfast & Afternoon)    atorvastatin (LIPITOR) 80 MG tablet Take 1 tablet (80 mg total) by mouth once daily. (Patient taking differently: Take 80 mg by mouth every evening.)    ezetimibe (ZETIA) 10 mg tablet Take 1 tablet (10 mg total) by mouth once daily.    calcium carbonate (TUMS ORAL) Take 2 tablets by mouth daily as needed (Heartburn).    coenzyme Q10 (CO Q-10) 300 mg Cap Take 1 capsule by mouth once daily.    dicyclomine (BENTYL) 10 MG capsule Take 10 mg by mouth daily as needed (Abdominal pain).    ERGOCALCIFEROL, VITAMIN D2, (VITAMIN D ORAL) Take 1 capsule by mouth Mon, Wed, Fri, Sat & Sun    multivit-min/FA/lycopen/lutein (CENTRUM SILVER MEN ORAL) Take 1 tablet by mouth every Mon, Wed, Fri.    ondansetron (ZOFRAN-ODT) 4 MG TbDL Take 1 tablet (4 mg total) by mouth every 8 (eight) hours as needed (nausea). (Patient not taking: Reported on 1/24/2024)    oxybutynin (DITROPAN) 5 MG Tab Take 1 tablet (5 mg total) by mouth 3 (three) times daily as needed (for  bladder spasms and stent pain).    oxyCODONE (ROXICODONE) 5 MG immediate release tablet Take 1 tablet (5 mg total) by mouth every 4 (four) hours as needed for Pain.    tamsulosin (FLOMAX) 0.4 mg Cap Take 1 capsule (0.4 mg total) by mouth once daily.     Family History       Problem Relation (Age of Onset)    Diabetes Brother    Heart attack Brother    Heart disease Brother    Hyperlipidemia Brother    Hypertension Brother          Tobacco Use    Smoking status: Never     Passive exposure: Never    Smokeless tobacco: Never   Substance and Sexual Activity    Alcohol use: Yes     Alcohol/week: 1.0 standard drink of alcohol     Types: 1 Glasses of wine per week     Comment: 1 drink 2-3 times/weekly    Drug use: No    Sexual activity: Not on file     Review of Systems   Constitutional:  Negative for appetite change, chills, diaphoresis, fatigue and fever.   HENT:  Negative for congestion, rhinorrhea and sore throat.    Eyes:  Negative for photophobia and visual disturbance.   Respiratory:  Negative for cough, shortness of breath and wheezing.    Cardiovascular:  Negative for chest pain, palpitations and leg swelling.   Gastrointestinal:  Negative for abdominal distention, abdominal pain, diarrhea, nausea and vomiting.   Genitourinary:  Negative for dysuria, frequency and hematuria.   Musculoskeletal:  Negative for back pain, myalgias and neck pain.   Skin:  Positive for color change. Negative for pallor, rash and wound.   Neurological:  Negative for dizziness, syncope, weakness, light-headedness and headaches.   Psychiatric/Behavioral:  Negative for confusion and hallucinations. The patient is not nervous/anxious.      Objective:     Vital Signs (Most Recent):  Temp: 98 °F (36.7 °C) (05/14/24 1810)  Pulse: 72 (05/14/24 1810)  Resp: 16 (05/14/24 1810)  BP: (!) 144/86 (05/14/24 1810)  SpO2: 98 % (05/14/24 1810) Vital Signs (24h Range):  Temp:  [98 °F (36.7 °C)-98.6 °F (37 °C)] 98 °F (36.7 °C)  Pulse:  [72-88] 72  Resp:   [16-18] 16  SpO2:  [98 %] 98 %  BP: (144-160)/(86-95) 144/86     Weight: 67.1 kg (148 lb)  Body mass index is 23.89 kg/m².     Physical Exam  Vitals and nursing note reviewed.   Constitutional:       General: He is not in acute distress.     Appearance: He is not ill-appearing, toxic-appearing or diaphoretic.   HENT:      Head: Normocephalic and atraumatic.      Nose: Nose normal.      Mouth/Throat:      Mouth: Mucous membranes are moist.   Eyes:      Pupils: Pupils are equal, round, and reactive to light.   Cardiovascular:      Rate and Rhythm: Normal rate and regular rhythm.      Pulses: Normal pulses.   Pulmonary:      Effort: Pulmonary effort is normal. No respiratory distress.      Breath sounds: No wheezing, rhonchi or rales.   Chest:      Chest wall: Tenderness present.          Comments: Erythema noted to lateral aspect of pacemaker pocket with mild TTP, no swelling. See photos  Abdominal:      General: Bowel sounds are normal. There is no distension.      Palpations: Abdomen is soft.      Tenderness: There is no abdominal tenderness. There is no guarding.   Musculoskeletal:      Cervical back: Normal range of motion.   Skin:     General: Skin is warm and dry.      Capillary Refill: Capillary refill takes less than 2 seconds.   Neurological:      General: No focal deficit present.      Mental Status: He is alert and oriented to person, place, and time.      Sensory: No sensory deficit.      Motor: No weakness.   Psychiatric:         Mood and Affect: Mood normal.         Behavior: Behavior normal.              CRANIAL NERVES     CN III, IV, VI   Pupils are equal, round, and reactive to light.             Significant Labs: All pertinent labs within the past 24 hours have been reviewed.  CBC:   Recent Labs   Lab 05/14/24  1643   WBC 4.70   HGB 14.7   HCT 45.0        CMP:   Recent Labs   Lab 05/14/24  1643      K 4.2      CO2 20*   GLU 74   BUN 17   CREATININE 1.1   CALCIUM 9.6   PROT 7.8    ALBUMIN 4.1   BILITOT 1.7*   ALKPHOS 81   AST 37   ALT 36   ANIONGAP 11       Significant Imaging: I have reviewed all pertinent imaging results/findings within the past 24 hours.  Imaging Results    None         Assessment/Plan:     * Infection of pacemaker pocket  EP consulted in the ED and recommend abx and ID consultation.  -Afebrile, no leukocytosis.  -CRP <0.3  -Blood cxs in process.   -CXR in process.  -The patient received zosyn and vancomycin in the ED, will change to ancef given history of MSSA bacteremia.  -Erythema and tenderness localized on exam, no swelling.  -ID consulted per EP recs.    Complete heart block  -s/p dual chamber pacemaker placement  7/5/2022    Coronary artery disease of native artery of native heart with stable angina pectoris  Patient with known CAD s/p CABG, which is controlled Will continue ASA and Statin and monitor for S/Sx of angina/ACS. Continue to monitor on telemetry.     Gilbert's syndrome  -Chronic condition- reports diagnosed by Dr. Rowe years ago  -T bili elevated on admission, similar to previous  -Monitor CMP    HLD (hyperlipidemia)   Patient is chronically on statin.will continue for now. Monitor clinically. Last LDL was   Lab Results   Component Value Date    LDLCALC 48.0 (L) 08/11/2023         VTE Risk Mitigation (From admission, onward)           Ordered     IP VTE LOW RISK PATIENT  Once         05/14/24 1912     Place sequential compression device  Until discontinued         05/14/24 1912                         On 05/14/2024, patient should be placed in hospital observation services under my care in collaboration with Emiliano Ellison MD.           Barb Pace NP  Department of Hospital Medicine  [facility] - Emergency Dept

## 2024-05-16 ENCOUNTER — ANESTHESIA EVENT (OUTPATIENT)
Dept: MEDSURG UNIT | Facility: HOSPITAL | Age: 69
DRG: 243 | End: 2024-05-16
Payer: MEDICARE

## 2024-05-16 LAB
ABO + RH BLD: NORMAL
ALBUMIN SERPL BCP-MCNC: 3.7 G/DL (ref 3.5–5.2)
ALP SERPL-CCNC: 69 U/L (ref 55–135)
ALT SERPL W/O P-5'-P-CCNC: 23 U/L (ref 10–44)
ANION GAP SERPL CALC-SCNC: 8 MMOL/L (ref 8–16)
AST SERPL-CCNC: 28 U/L (ref 10–40)
BASOPHILS # BLD AUTO: 0.04 K/UL (ref 0–0.2)
BASOPHILS NFR BLD: 0.9 % (ref 0–1.9)
BILIRUB SERPL-MCNC: 1.7 MG/DL (ref 0.1–1)
BLD GP AB SCN CELLS X3 SERPL QL: NORMAL
BUN SERPL-MCNC: 14 MG/DL (ref 8–23)
CALCIUM SERPL-MCNC: 9.2 MG/DL (ref 8.7–10.5)
CHLORIDE SERPL-SCNC: 109 MMOL/L (ref 95–110)
CO2 SERPL-SCNC: 22 MMOL/L (ref 23–29)
CREAT SERPL-MCNC: 0.9 MG/DL (ref 0.5–1.4)
DIFFERENTIAL METHOD BLD: ABNORMAL
EOSINOPHIL # BLD AUTO: 0.1 K/UL (ref 0–0.5)
EOSINOPHIL NFR BLD: 3.2 % (ref 0–8)
ERYTHROCYTE [DISTWIDTH] IN BLOOD BY AUTOMATED COUNT: 13.8 % (ref 11.5–14.5)
EST. GFR  (NO RACE VARIABLE): >60 ML/MIN/1.73 M^2
GLUCOSE SERPL-MCNC: 90 MG/DL (ref 70–110)
HCT VFR BLD AUTO: 40.8 % (ref 40–54)
HGB BLD-MCNC: 14.1 G/DL (ref 14–18)
IMM GRANULOCYTES # BLD AUTO: 0.01 K/UL (ref 0–0.04)
IMM GRANULOCYTES NFR BLD AUTO: 0.2 % (ref 0–0.5)
LYMPHOCYTES # BLD AUTO: 0.6 K/UL (ref 1–4.8)
LYMPHOCYTES NFR BLD: 13.8 % (ref 18–48)
MAGNESIUM SERPL-MCNC: 1.9 MG/DL (ref 1.6–2.6)
MCH RBC QN AUTO: 30.3 PG (ref 27–31)
MCHC RBC AUTO-ENTMCNC: 34.6 G/DL (ref 32–36)
MCV RBC AUTO: 88 FL (ref 82–98)
MONOCYTES # BLD AUTO: 0.4 K/UL (ref 0.3–1)
MONOCYTES NFR BLD: 9.5 % (ref 4–15)
NEUTROPHILS # BLD AUTO: 3.2 K/UL (ref 1.8–7.7)
NEUTROPHILS NFR BLD: 72.4 % (ref 38–73)
NRBC BLD-RTO: 0 /100 WBC
PLATELET # BLD AUTO: 170 K/UL (ref 150–450)
PMV BLD AUTO: 10.2 FL (ref 9.2–12.9)
POTASSIUM SERPL-SCNC: 4.1 MMOL/L (ref 3.5–5.1)
PROT SERPL-MCNC: 6.9 G/DL (ref 6–8.4)
RBC # BLD AUTO: 4.65 M/UL (ref 4.6–6.2)
SODIUM SERPL-SCNC: 139 MMOL/L (ref 136–145)
SPECIMEN OUTDATE: NORMAL
WBC # BLD AUTO: 4.41 K/UL (ref 3.9–12.7)

## 2024-05-16 PROCEDURE — 86850 RBC ANTIBODY SCREEN: CPT | Performed by: STUDENT IN AN ORGANIZED HEALTH CARE EDUCATION/TRAINING PROGRAM

## 2024-05-16 PROCEDURE — 25000003 PHARM REV CODE 250: Performed by: NURSE PRACTITIONER

## 2024-05-16 PROCEDURE — 36415 COLL VENOUS BLD VENIPUNCTURE: CPT | Performed by: NURSE PRACTITIONER

## 2024-05-16 PROCEDURE — 20600001 HC STEP DOWN PRIVATE ROOM

## 2024-05-16 PROCEDURE — 99232 SBSQ HOSP IP/OBS MODERATE 35: CPT | Mod: ,,, | Performed by: INTERNAL MEDICINE

## 2024-05-16 PROCEDURE — 63600175 PHARM REV CODE 636 W HCPCS: Performed by: NURSE PRACTITIONER

## 2024-05-16 PROCEDURE — 36415 COLL VENOUS BLD VENIPUNCTURE: CPT | Performed by: STUDENT IN AN ORGANIZED HEALTH CARE EDUCATION/TRAINING PROGRAM

## 2024-05-16 PROCEDURE — 85025 COMPLETE CBC W/AUTO DIFF WBC: CPT | Performed by: NURSE PRACTITIONER

## 2024-05-16 PROCEDURE — 83735 ASSAY OF MAGNESIUM: CPT | Performed by: NURSE PRACTITIONER

## 2024-05-16 PROCEDURE — 86920 COMPATIBILITY TEST SPIN: CPT | Performed by: STUDENT IN AN ORGANIZED HEALTH CARE EDUCATION/TRAINING PROGRAM

## 2024-05-16 PROCEDURE — 99233 SBSQ HOSP IP/OBS HIGH 50: CPT | Mod: ,,, | Performed by: STUDENT IN AN ORGANIZED HEALTH CARE EDUCATION/TRAINING PROGRAM

## 2024-05-16 PROCEDURE — 80053 COMPREHEN METABOLIC PANEL: CPT | Performed by: NURSE PRACTITIONER

## 2024-05-16 RX ORDER — HYDROCODONE BITARTRATE AND ACETAMINOPHEN 500; 5 MG/1; MG/1
TABLET ORAL
Status: DISCONTINUED | OUTPATIENT
Start: 2024-05-16 | End: 2024-05-20

## 2024-05-16 RX ADMIN — CEFAZOLIN 2 G: 2 INJECTION, POWDER, FOR SOLUTION INTRAMUSCULAR; INTRAVENOUS at 05:05

## 2024-05-16 RX ADMIN — ATORVASTATIN CALCIUM 80 MG: 40 TABLET, FILM COATED ORAL at 08:05

## 2024-05-16 RX ADMIN — CEFAZOLIN 2 G: 2 INJECTION, POWDER, FOR SOLUTION INTRAMUSCULAR; INTRAVENOUS at 01:05

## 2024-05-16 RX ADMIN — ASPIRIN 81 MG: 81 TABLET, COATED ORAL at 08:05

## 2024-05-16 RX ADMIN — THERA TABS 1 TABLET: TAB at 08:05

## 2024-05-16 RX ADMIN — EZETIMIBE 10 MG: 10 TABLET ORAL at 08:05

## 2024-05-16 RX ADMIN — Medication 100 MG: at 08:05

## 2024-05-16 RX ADMIN — CEFAZOLIN 2 G: 2 INJECTION, POWDER, FOR SOLUTION INTRAMUSCULAR; INTRAVENOUS at 10:05

## 2024-05-16 NOTE — SUBJECTIVE & OBJECTIVE
Interval History: No acute events overnight.  Feels ok.   EP plans for pacemaker removal on 5/17 with temporary pacemaker placement, and new permanent pacemaker placement on Monday 5/20.    Review of Systems   Constitutional:  Negative for chills, fatigue and fever.   Respiratory:  Negative for cough and shortness of breath.    Cardiovascular:  Negative for chest pain, palpitations and leg swelling.   Gastrointestinal:  Negative for abdominal pain, diarrhea, nausea and vomiting.   Genitourinary:  Negative for dysuria and urgency.   Neurological:  Negative for dizziness and headaches.   All other systems reviewed and are negative.    Objective:     Vital Signs (Most Recent):  Temp: 97.6 °F (36.4 °C) (05/16/24 0904)  Pulse: 90 (05/16/24 1110)  Resp: 18 (05/16/24 0904)  BP: 130/86 (05/16/24 0904)  SpO2: 98 % (05/16/24 0904) Vital Signs (24h Range):  Temp:  [97.4 °F (36.3 °C)-98 °F (36.7 °C)] 97.6 °F (36.4 °C)  Pulse:  [] 90  Resp:  [18] 18  SpO2:  [94 %-98 %] 98 %  BP: (106-130)/(62-86) 130/86     Weight: 64.2 kg (141 lb 8.6 oz)  Body mass index is 22.84 kg/m².    Intake/Output Summary (Last 24 hours) at 5/16/2024 1113  Last data filed at 5/16/2024 0552  Gross per 24 hour   Intake 360 ml   Output 300 ml   Net 60 ml         Physical Exam  Constitutional:       Appearance: Normal appearance.   HENT:      Head: Normocephalic and atraumatic.   Cardiovascular:      Rate and Rhythm: Normal rate and regular rhythm.      Heart sounds: No murmur heard.     Comments: Pacemaker in place  Pulmonary:      Effort: Pulmonary effort is normal. No respiratory distress.      Breath sounds: Normal breath sounds. No wheezing or rales.   Abdominal:      General: There is no distension.      Palpations: Abdomen is soft.      Tenderness: There is no abdominal tenderness.   Musculoskeletal:         General: No deformity.   Neurological:      General: No focal deficit present.      Mental Status: He is alert and oriented to person,  place, and time. Mental status is at baseline.             Significant Labs: All pertinent labs within the past 24 hours have been reviewed.  Blood Culture:   Recent Labs   Lab 05/14/24 1643 05/14/24  1644   LABBLOO No Growth to date  No Growth to date No Growth to date  No Growth to date     CBC:   Recent Labs   Lab 05/14/24  1643 05/15/24  0336 05/16/24  0336   WBC 4.70 6.25 4.41   HGB 14.7 14.4 14.1   HCT 45.0 43.2 40.8    169 170     CMP:   Recent Labs   Lab 05/14/24  1643 05/15/24  0336 05/16/24  0336    139 139   K 4.2 3.9 4.1    108 109   CO2 20* 20* 22*   GLU 74 80 90   BUN 17 15 14   CREATININE 1.1 0.9 0.9   CALCIUM 9.6 9.5 9.2   PROT 7.8 7.0 6.9   ALBUMIN 4.1 3.8 3.7   BILITOT 1.7* 2.0* 1.7*   ALKPHOS 81 71 69   AST 37 32 28   ALT 36 32 23   ANIONGAP 11 11 8       Significant Imaging: I have reviewed all pertinent imaging results/findings within the past 24 hours.

## 2024-05-16 NOTE — PLAN OF CARE
Problem: Adult Inpatient Plan of Care  Goal: Patient-Specific Goal (Individualized)  5/16/2024 1220 by Fatou Kimbrough RN  Outcome: Progressing  5/16/2024 1220 by Fatou Kimbrough RN  Outcome: Progressing  Flowsheets (Taken 5/16/2024 1216)  Individualized Care Needs: monitoring vs, labs, etc..  Anxieties, Fears or Concerns: none  Problem: Infection  Goal: Absence of Infection Signs and Symptoms  5/16/2024 1220 by Fatou Kimbrough RN  Outcome: Progressing  5/16/2024 1216 by Fatou Kimbrough RN  Outcome: Progressing  Intervention: Prevent or Manage Infection  Flowsheets (Taken 5/16/2024 1220)  Infection Management: aseptic technique maintained  Isolation Precautions: precautions maintained    AAOX4,VSS,Plan of care discussed with patient. Patient has no complaints of chest pain/SOB/palpitations. Pt ambulating independently, fall precautions in place,no falls/injuries through the shift.Discussed medications and care.Patient has no questions at this time.Pt resting comfortably with no acute distress.Call light within reach,bed at lowest position. IV ABT given per MAR.

## 2024-05-16 NOTE — PLAN OF CARE
Problem: Adult Inpatient Plan of Care  Goal: Plan of Care Review  Outcome: Progressing  Flowsheets (Taken 5/16/2024 8160)  Plan of Care Reviewed With: patient  Goal: Patient-Specific Goal (Individualized)  Outcome: Progressing  Goal: Absence of Hospital-Acquired Illness or Injury  Outcome: Progressing  Goal: Optimal Comfort and Wellbeing  Outcome: Progressing  Goal: Readiness for Transition of Care  Outcome: Progressing

## 2024-05-16 NOTE — ASSESSMENT & PLAN NOTE
Admitted for a pacemaker infection  Started on empiric Ancef while awaiting cultures given history of MSSA  ID following  Cultures NGTD  EP was consulted, who plan for pacemaker removal on 5/17 with temporary pacemaker placement, and new permanent pacemaker placement on Monday 5/20.

## 2024-05-16 NOTE — SUBJECTIVE & OBJECTIVE
Interval History: TREVA    Review of Systems  Constitutional:  Positive for activity change, appetite change and fatigue. Negative for chills and fever.   HENT:  Negative for trouble swallowing.    Respiratory:  Negative for cough and shortness of breath.    Gastrointestinal:  Negative for abdominal pain, blood in stool, diarrhea and vomiting.   Genitourinary:  Negative for dysuria and hematuria.   Musculoskeletal:  Negative for arthralgias, joint swelling and neck stiffness.   Skin:  Positive for wound.   Neurological:  Negative for syncope.   Psychiatric/Behavioral:  Negative for confusion.    Objective:     Vital Signs (Most Recent):  Temp: 98.6 °F (37 °C) (05/16/24 1554)  Pulse: 74 (05/16/24 1554)  Resp: 19 (05/16/24 1554)  BP: 125/82 (05/16/24 1554)  SpO2: 98 % (05/16/24 1554) Vital Signs (24h Range):  Temp:  [97.4 °F (36.3 °C)-98.6 °F (37 °C)] 98.6 °F (37 °C)  Pulse:  [63-99] 74  Resp:  [18-19] 19  SpO2:  [94 %-98 %] 98 %  BP: (106-130)/(62-86) 125/82     Weight: 64.2 kg (141 lb 8.6 oz)  Body mass index is 22.84 kg/m².    Estimated Creatinine Clearance: 70.9 mL/min (based on SCr of 0.9 mg/dL).     Physical Exam   Constitutional:       Appearance: Normal appearance.   HENT:      Head: Normocephalic and atraumatic.      Nose: Nose normal.      Mouth/Throat:      Mouth: Mucous membranes are moist.      Pharynx: Oropharynx is clear.   Eyes:      Conjunctiva/sclera: Conjunctivae normal.   Cardiovascular:      Rate and Rhythm: Normal rate and regular rhythm.      Pulses: Normal pulses.      Heart sounds: Normal heart sounds.      Comments: Erythema, warmth and induration at site overlying pacemaker  Pulmonary:      Effort: Pulmonary effort is normal.      Breath sounds: Normal breath sounds.   Abdominal:      General: Bowel sounds are normal.      Palpations: Abdomen is soft.      Tenderness: There is no guarding or rebound.   Musculoskeletal:         General: No deformity.      Cervical back: Neck supple.   Skin:      General: Skin is warm and dry.      Coloration: Skin is not jaundiced.      Findings: No rash.   Neurological:      Mental Status: He is alert and oriented to person, place, and time. Mental status is at baseline.   Significant Labs:   Microbiology Results (last 7 days)       Procedure Component Value Units Date/Time    Blood Culture #1 **CANNOT BE ORDERED STAT** [[id number]] Collected: 05/14/24 1644    Order Status: Completed Specimen: Blood from Peripheral, Antecubital, Left Updated: 05/15/24 1812     Blood Culture, Routine No Growth to date      No Growth to date    Blood Culture #2 **CANNOT BE ORDERED STAT** [[id number]] Collected: 05/14/24 1643    Order Status: Completed Specimen: Blood from Peripheral, Hand, Right Updated: 05/15/24 1812     Blood Culture, Routine No Growth to date      No Growth to date          Recent Lab Results         05/16/24  0336        Albumin 3.7       ALP 69       ALT 23       Anion Gap 8       AST 28       Baso # 0.04       Basophil % 0.9       BILIRUBIN TOTAL 1.7  Comment: For infants and newborns, interpretation of results should be based  on gestational age, weight and in agreement with clinical  observations.    Premature Infant recommended reference ranges:  Up to 24 hours.............<8.0 mg/dL  Up to 48 hours............<12.0 mg/dL  3-5 days..................<15.0 mg/dL  6-29 days.................<15.0 mg/dL         BUN 14       Calcium 9.2       Chloride 109       CO2 22       Creatinine 0.9       Differential Method Automated       eGFR >60.0       Eos # 0.1       Eos % 3.2       Glucose 90       Gran # (ANC) 3.2       Gran % 72.4       Hematocrit 40.8       Hemoglobin 14.1       Immature Grans (Abs) 0.01  Comment: Mild elevation in immature granulocytes is non specific and   can be seen in a variety of conditions including stress response,   acute inflammation, trauma and pregnancy. Correlation with other   laboratory and clinical findings is essential.         Immature  Granulocytes 0.2       Lymph # 0.6       Lymph % 13.8       Magnesium  1.9       MCH 30.3       MCHC 34.6       MCV 88       Mono # 0.4       Mono % 9.5       MPV 10.2       nRBC 0       Platelet Count 170       Potassium 4.1       PROTEIN TOTAL 6.9       RBC 4.65       RDW 13.8       Sodium 139       WBC 4.41               Significant Imaging: I have reviewed all pertinent imaging results/findings within the past 24 hours.

## 2024-05-16 NOTE — PROGRESS NOTES
"[facility] - Cardiology [facility] Medicine  Progress Note    Patient Name: Michael Espinoza  MRN: [id number]  Patient Class: IP- Inpatient   Admission Date: 5/14/2024  Length of Stay: 1 days  Attending Physician: Harmony Hooks MD  Primary Care Provider: Dominguez Hyatt MD        Subjective:     Principal Problem:Infection associated with cardiovascular device        HPI:  Michael Espinoza is 68 y.o. male with a PMHx of CAD s/p CABG x3, HLD, Gilbert's syndrome, MSSA bacteremia, and heart block s/p dual chamber pacemaker in July 2022 with Dr. Canales who presents to the ED for tenderness and redness around his pacemaker site for the past week. He states that about a month ago he started noticing that his device was "creeping" closer to the skin but it was not bothering him. He was admitted in December for MSSA bacteremia and completed IV antibiotics. Blood cultures cleared. Patient denies fever or chills recently. Also denies chest pain, shortness of breath, palpitations.    In the ED, pt mildly hypertensive which improved without treatment otherwise vitals stable, afebrile. CBC unremarkable. Bicarb 20. Tbili 1.7 (chronic issue). CRP <0.3. Blood cxs in process. The patient received vancomycin and zosyn. EP consulted in the ED and recommend CXR, IV abx, and ID consult.    Overview/Hospital Course:  Mr. Espinoza was admitted to Hospital Medicine for management of a pacemaker infection.  ID was consulted.  He was started on Cefazolin while waiting on blood cultures.  EP was consulted, who plan for pacemaker removal on 5/17 with temporary pacemaker placement, and new permanent pacemaker placement on Monday 5/20.    Interval History: No acute events overnight.  Feels ok.   EP plans for pacemaker removal on 5/17 with temporary pacemaker placement, and new permanent pacemaker placement on Monday 5/20.    Review of Systems   Constitutional:  Negative for chills, fatigue and fever.   Respiratory:  Negative for cough and " shortness of breath.    Cardiovascular:  Negative for chest pain, palpitations and leg swelling.   Gastrointestinal:  Negative for abdominal pain, diarrhea, nausea and vomiting.   Genitourinary:  Negative for dysuria and urgency.   Neurological:  Negative for dizziness and headaches.   All other systems reviewed and are negative.    Objective:     Vital Signs (Most Recent):  Temp: 97.6 °F (36.4 °C) (05/16/24 0904)  Pulse: 90 (05/16/24 1110)  Resp: 18 (05/16/24 0904)  BP: 130/86 (05/16/24 0904)  SpO2: 98 % (05/16/24 0904) Vital Signs (24h Range):  Temp:  [97.4 °F (36.3 °C)-98 °F (36.7 °C)] 97.6 °F (36.4 °C)  Pulse:  [] 90  Resp:  [18] 18  SpO2:  [94 %-98 %] 98 %  BP: (106-130)/(62-86) 130/86     Weight: 64.2 kg (141 lb 8.6 oz)  Body mass index is 22.84 kg/m².    Intake/Output Summary (Last 24 hours) at 5/16/2024 1113  Last data filed at 5/16/2024 0552  Gross per 24 hour   Intake 360 ml   Output 300 ml   Net 60 ml         Physical Exam  Constitutional:       Appearance: Normal appearance.   HENT:      Head: Normocephalic and atraumatic.   Cardiovascular:      Rate and Rhythm: Normal rate and regular rhythm.      Heart sounds: No murmur heard.     Comments: Pacemaker in place  Pulmonary:      Effort: Pulmonary effort is normal. No respiratory distress.      Breath sounds: Normal breath sounds. No wheezing or rales.   Abdominal:      General: There is no distension.      Palpations: Abdomen is soft.      Tenderness: There is no abdominal tenderness.   Musculoskeletal:         General: No deformity.   Neurological:      General: No focal deficit present.      Mental Status: He is alert and oriented to person, place, and time. Mental status is at baseline.             Significant Labs: All pertinent labs within the past 24 hours have been reviewed.  Blood Culture:   Recent Labs   Lab 05/14/24  1643 05/14/24  1644   LABBLOO No Growth to date  No Growth to date No Growth to date  No Growth to date     CBC:   Recent  Labs   Lab 05/14/24  1643 05/15/24  0336 05/16/24  0336   WBC 4.70 6.25 4.41   HGB 14.7 14.4 14.1   HCT 45.0 43.2 40.8    169 170     CMP:   Recent Labs   Lab 05/14/24  1643 05/15/24  0336 05/16/24  0336    139 139   K 4.2 3.9 4.1    108 109   CO2 20* 20* 22*   GLU 74 80 90   BUN 17 15 14   CREATININE 1.1 0.9 0.9   CALCIUM 9.6 9.5 9.2   PROT 7.8 7.0 6.9   ALBUMIN 4.1 3.8 3.7   BILITOT 1.7* 2.0* 1.7*   ALKPHOS 81 71 69   AST 37 32 28   ALT 36 32 23   ANIONGAP 11 11 8       Significant Imaging: I have reviewed all pertinent imaging results/findings within the past 24 hours.    Assessment/Plan:      * Infection associated with cardiovascular device  Admitted for a pacemaker infection  Started on empiric Ancef while awaiting cultures given history of MSSA  ID following  Cultures NGTD  EP was consulted, who plan for pacemaker removal on 5/17 with temporary pacemaker placement, and new permanent pacemaker placement on Monday 5/20.    Complete heart block  S/p dual chamber pacemaker placement  7/5/2022    Coronary artery disease of native artery of native heart with stable angina pectoris  Patient with known CAD s/p CABG, which is controlled Will continue ASA and Statin and monitor for S/Sx of angina/ACS. Continue to monitor on telemetry.     Gilbert's syndrome  Chronic condition- reports diagnosed by Dr. Rowe years ago  T bili elevated on admission, similar to previous  Monitor CMP    HLD (hyperlipidemia)   Patient is chronically on statin.will continue for now. Monitor clinically. Last LDL was   Lab Results   Component Value Date    LDLCALC 48.0 (L) 08/11/2023         VTE Risk Mitigation (From admission, onward)           Ordered     IP VTE LOW RISK PATIENT  Once         05/14/24 1912     Place sequential compression device  Until discontinued         05/14/24 1912                    Discharge Planning   MAGNO: 5/20/2024     Code Status: Full Code   Is the patient medically ready for discharge?:      Reason for patient still in hospital (select all that apply): Patient trending condition and Consult recommendations  Discharge Plan A: Home, Home Health, Other (IV Infusion)                  Harmony Hooks MD  Department of Hospital Medicine   [facility] - Cardiology Stepdown

## 2024-05-16 NOTE — ASSESSMENT & PLAN NOTE
68 year old male with history of CAD s/p CABG, heart block s/p dual chamber pacemaker in July 2022 and MSSA bacteremia (12/14-12/16, cleared 12/17, unclear source, CHARMAINE and MRI spine neg, 4w cefazolin, surveillance blood cultures negative), admitted this time for pacemaker infection.      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 High likelihood this is MSSA given recent history of bacteremia. So far no evidence of bacteremia but blood cultures are still incubating. So far no bacteremia noted, if patient remains clear on blood cultures, no contraindication to implantation of new cardiac device whenever EP deems reasonable to do so. Afebrile, without leucocytosis and HDS.       Recommendations     -Agree with cefazolin IV for now, will follow closely when patient undergoes extraction, please send operative cultures for clinical clarity regards to microbiological etiology     -Plan on a prolonged course of IV antibiotics. Discussed with patient.

## 2024-05-16 NOTE — SUBJECTIVE & OBJECTIVE
Interval History:   No overnight acute events. Plan for device extraction tomorrow.     Objective:     Vital Signs (Most Recent):  Temp: 97.6 °F (36.4 °C) (05/16/24 0407)  Pulse: 63 (05/16/24 0407)  Resp: 18 (05/16/24 0407)  BP: 107/64 (05/16/24 0407)  SpO2: 97 % (05/16/24 0407) Vital Signs (24h Range):  Temp:  [97.4 °F (36.3 °C)-98 °F (36.7 °C)] 97.6 °F (36.4 °C)  Pulse:  [] 63  Resp:  [18] 18  SpO2:  [94 %-98 %] 97 %  BP: (106-130)/(62-75) 107/64     Weight: 64.6 kg (142 lb 6.4 oz)  Body mass index is 22.98 kg/m².     SpO2: 97 %         Intake/Output Summary (Last 24 hours) at 5/16/2024 0751  Last data filed at 5/16/2024 0552  Gross per 24 hour   Intake 600 ml   Output 850 ml   Net -250 ml       Lines/Drains/Airways       Peripheral Intravenous Line  Duration                  Peripheral IV - Single Lumen 20 G Left Antecubital -- days         Peripheral IV - Single Lumen 05/14/24 1639 20 G Anterior;Right Hand 1 day                       Physical Exam  Constitutional:       Appearance: Normal appearance.   HENT:      Head: Atraumatic.   Eyes:      Conjunctiva/sclera: Conjunctivae normal.   Cardiovascular:      Rate and Rhythm: Normal rate and regular rhythm.      Heart sounds: No murmur heard.  Pulmonary:      Effort: Pulmonary effort is normal.      Breath sounds: Normal breath sounds.   Abdominal:      General: There is no distension.      Palpations: Abdomen is soft.   Musculoskeletal:      Right lower leg: No edema.      Left lower leg: No edema.   Skin:     General: Skin is warm.   Neurological:      Mental Status: He is alert.   Psychiatric:         Mood and Affect: Mood normal.            Significant Labs: All pertinent lab results from the last 24 hours have been reviewed.

## 2024-05-16 NOTE — PROGRESS NOTES
[facility] - Cardiology Stepdown  Cardiac Electrophysiology  Progress Note    Admission Date: 5/14/2024  Code Status: Full Code   Attending Physician: Harmony Hooks MD   Expected Discharge Date: 5/20/2024  Principal Problem:Infection associated with cardiovascular device    Subjective:     Interval History:   No overnight acute events. Plan for device extraction tomorrow.     Objective:     Vital Signs (Most Recent):  Temp: 97.6 °F (36.4 °C) (05/16/24 0407)  Pulse: 63 (05/16/24 0407)  Resp: 18 (05/16/24 0407)  BP: 107/64 (05/16/24 0407)  SpO2: 97 % (05/16/24 0407) Vital Signs (24h Range):  Temp:  [97.4 °F (36.3 °C)-98 °F (36.7 °C)] 97.6 °F (36.4 °C)  Pulse:  [] 63  Resp:  [18] 18  SpO2:  [94 %-98 %] 97 %  BP: (106-130)/(62-75) 107/64     Weight: 64.6 kg (142 lb 6.4 oz)  Body mass index is 22.98 kg/m².     SpO2: 97 %         Intake/Output Summary (Last 24 hours) at 5/16/2024 0751  Last data filed at 5/16/2024 0552  Gross per 24 hour   Intake 600 ml   Output 850 ml   Net -250 ml       Lines/Drains/Airways       Peripheral Intravenous Line  Duration                  Peripheral IV - Single Lumen 20 G Left Antecubital -- days         Peripheral IV - Single Lumen 05/14/24 1639 20 G Anterior;Right Hand 1 day                       Physical Exam  Constitutional:       Appearance: Normal appearance.   HENT:      Head: Atraumatic.   Eyes:      Conjunctiva/sclera: Conjunctivae normal.   Cardiovascular:      Rate and Rhythm: Normal rate and regular rhythm.      Heart sounds: No murmur heard.  Pulmonary:      Effort: Pulmonary effort is normal.      Breath sounds: Normal breath sounds.   Abdominal:      General: There is no distension.      Palpations: Abdomen is soft.   Musculoskeletal:      Right lower leg: No edema.      Left lower leg: No edema.   Skin:     General: Skin is warm.   Neurological:      Mental Status: He is alert.   Psychiatric:         Mood and Affect: Mood normal.            Significant Labs: All  pertinent lab results from the last 24 hours have been reviewed.    Assessment and Plan:     * Infection of pacemaker pocket  - Continue with antibiotics  - Follow up Blood cultures, NGTD  - Patient is pacer dependent, currently Rvp ~95%  - Will plan for possible extraction tomorrow and leave a temporary-permanent pacemaker in the mean time  with re-implantation on Monday on the contralateral side.      Thank you for this consult.Please follow up Attending Attestation for further recommendations. Please call if any questions.       Deirdre Williamson MD  Cardiac Electrophysiology  [facility] - Cardiology Stepdown

## 2024-05-16 NOTE — ASSESSMENT & PLAN NOTE
Chronic condition- reports diagnosed by Dr. Rowe years ago  T bili elevated on admission, similar to previous  Monitor CMP

## 2024-05-16 NOTE — PROGRESS NOTES
[facility] - Cardiology Stepdown  Infectious Disease  Progress Note    Patient Name: Michael Espinoza  MRN: [id number]  Admission Date: 5/14/2024  Length of Stay: 1 days  Attending Physician: Harmony Hooks MD  Primary Care Provider: Dominguez Hyatt MD    Isolation Status: No active isolations  Assessment/Plan:      ID  * Infection associated with cardiovascular device  68 year old male with history of CAD s/p CABG, heart block s/p dual chamber pacemaker in July 2022 and MSSA bacteremia (12/14-12/16, cleared 12/17, unclear source, CHARMAINE and MRI spine neg, 4w cefazolin, surveillance blood cultures negative), admitted this time for pacemaker infection.      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 High likelihood this is MSSA given recent history of bacteremia. So far no evidence of bacteremia but blood cultures are still incubating. So far no bacteremia noted, if patient remains clear on blood cultures, no contraindication to implantation of new cardiac device whenever EP deems reasonable to do so. Afebrile, without leucocytosis and HDS.       Recommendations     -Agree with cefazolin IV for now, will follow closely when patient undergoes extraction, please send operative cultures for clinical clarity regards to microbiological etiology     -Plan on a prolonged course of IV antibiotics. Discussed with patient.               Anticipated Disposition: TBD    Thank you for your consult. I will follow-up with patient. Please contact us if you have any additional questions.    Lencho Asencio MD  Infectious Disease  [facility] - Cardiology Stepdown    Subjective:     Principal Problem:Infection associated with cardiovascular device    HPI: 68 year old male with history of CAD s/p CABG, heart block s/p dual chamber pacemaker in July 2022 and MSSA bacteremia  (12/14-12/16, cleared 12/17, unclear source, CHARMAINE and MRI spine neg, 4w cefazolin, surveillance blood cultures negative), admitted this time for pacemaker infection.      Patient states about 2-3 weeks ago he noticed a brown lesion that was small and circular overlying pacemaker site, and slow started to get worse and changed to a reddish color over time associated with progressive tenderness to touch, but denies fever, rigors, chills, recent procedures, skin manipulation or recent trauma to the area. Furthermore, he denies chest pain. ID consulted for sent by EP due to concern for pacemaker pocket infection, Hx of MSSA bacteremia   Interval History: TREVA    Review of Systems  Constitutional:  Positive for activity change, appetite change and fatigue. Negative for chills and fever.   HENT:  Negative for trouble swallowing.    Respiratory:  Negative for cough and shortness of breath.    Gastrointestinal:  Negative for abdominal pain, blood in stool, diarrhea and vomiting.   Genitourinary:  Negative for dysuria and hematuria.   Musculoskeletal:  Negative for arthralgias, joint swelling and neck stiffness.   Skin:  Positive for wound.   Neurological:  Negative for syncope.   Psychiatric/Behavioral:  Negative for confusion.    Objective:     Vital Signs (Most Recent):  Temp: 98.6 °F (37 °C) (05/16/24 1554)  Pulse: 74 (05/16/24 1554)  Resp: 19 (05/16/24 1554)  BP: 125/82 (05/16/24 1554)  SpO2: 98 % (05/16/24 1554) Vital Signs (24h Range):  Temp:  [97.4 °F (36.3 °C)-98.6 °F (37 °C)] 98.6 °F (37 °C)  Pulse:  [63-99] 74  Resp:  [18-19] 19  SpO2:  [94 %-98 %] 98 %  BP: (106-130)/(62-86) 125/82     Weight: 64.2 kg (141 lb 8.6 oz)  Body mass index is 22.84 kg/m².    Estimated Creatinine Clearance: 70.9 mL/min (based on SCr of 0.9 mg/dL).     Physical Exam   Constitutional:       Appearance: Normal appearance.   HENT:      Head: Normocephalic and atraumatic.      Nose: Nose normal.      Mouth/Throat:      Mouth: Mucous membranes  are moist.      Pharynx: Oropharynx is clear.   Eyes:      Conjunctiva/sclera: Conjunctivae normal.   Cardiovascular:      Rate and Rhythm: Normal rate and regular rhythm.      Pulses: Normal pulses.      Heart sounds: Normal heart sounds.      Comments: Erythema, warmth and induration at site overlying pacemaker  Pulmonary:      Effort: Pulmonary effort is normal.      Breath sounds: Normal breath sounds.   Abdominal:      General: Bowel sounds are normal.      Palpations: Abdomen is soft.      Tenderness: There is no guarding or rebound.   Musculoskeletal:         General: No deformity.      Cervical back: Neck supple.   Skin:     General: Skin is warm and dry.      Coloration: Skin is not jaundiced.      Findings: No rash.   Neurological:      Mental Status: He is alert and oriented to person, place, and time. Mental status is at baseline.   Significant Labs:   Microbiology Results (last 7 days)       Procedure Component Value Units Date/Time    Blood Culture #1 **CANNOT BE ORDERED STAT** [[id number]] Collected: 05/14/24 1644    Order Status: Completed Specimen: Blood from Peripheral, Antecubital, Left Updated: 05/15/24 1812     Blood Culture, Routine No Growth to date      No Growth to date    Blood Culture #2 **CANNOT BE ORDERED STAT** [[id number]] Collected: 05/14/24 1643    Order Status: Completed Specimen: Blood from Peripheral, Hand, Right Updated: 05/15/24 1812     Blood Culture, Routine No Growth to date      No Growth to date          Recent Lab Results         05/16/24  0336        Albumin 3.7       ALP 69       ALT 23       Anion Gap 8       AST 28       Baso # 0.04       Basophil % 0.9       BILIRUBIN TOTAL 1.7  Comment: For infants and newborns, interpretation of results should be based  on gestational age, weight and in agreement with clinical  observations.    Premature Infant recommended reference ranges:  Up to 24 hours.............<8.0 mg/dL  Up to 48 hours............<12.0 mg/dL  3-5  days..................<15.0 mg/dL  6-29 days.................<15.0 mg/dL         BUN 14       Calcium 9.2       Chloride 109       CO2 22       Creatinine 0.9       Differential Method Automated       eGFR >60.0       Eos # 0.1       Eos % 3.2       Glucose 90       Gran # (ANC) 3.2       Gran % 72.4       Hematocrit 40.8       Hemoglobin 14.1       Immature Grans (Abs) 0.01  Comment: Mild elevation in immature granulocytes is non specific and   can be seen in a variety of conditions including stress response,   acute inflammation, trauma and pregnancy. Correlation with other   laboratory and clinical findings is essential.         Immature Granulocytes 0.2       Lymph # 0.6       Lymph % 13.8       Magnesium  1.9       MCH 30.3       MCHC 34.6       MCV 88       Mono # 0.4       Mono % 9.5       MPV 10.2       nRBC 0       Platelet Count 170       Potassium 4.1       PROTEIN TOTAL 6.9       RBC 4.65       RDW 13.8       Sodium 139       WBC 4.41               Significant Imaging: I have reviewed all pertinent imaging results/findings within the past 24 hours.

## 2024-05-16 NOTE — ANESTHESIA PREPROCEDURE EVALUATION
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    Michael Espinoza is a 68 y.o. male w/ a significant PMHx of CAD s/p CABG, CHB s/p dual chamber pacemaker, MMSA bacteremia admitted for pacemaker infection. Pt to undergo TVP placement and CHARMAINE. .         Patient now presents for the above procedure(s).    TTE:  Results for orders placed or performed during the hospital encounter of 12/13/23   Echo   Result Value Ref Range    RA Width 4.54 cm    LA volume (mod) 57.20 cm3    Left Atrium Major Axis 5.74 cm    Left Atrium Minor Axis 6.25 cm    RA Major Axis 4.49 cm    LV Diastolic Volume 104.64 mL    LV Systolic Volume 33.78 mL    MV Peak A Larry 0.87 m/s    MV stenosis pressure 1/2 time 31.36 ms    TR Max Larry 2.84 m/s    MV Peak E Larry 0.71 m/s    Ao VTI 29.53 cm    Ao peak larry 1.65 m/s    LVOT peak VTI 13.49 cm    LVOT peak larry 0.86 m/s    LVOT diameter 2.20 cm    IVRT 105.61 msec    E wave deceleration time 108.15 msec    AV mean gradient 7  mmHg    TAPSE 1.10 cm    RVDD 3.41 cm    LA size 4.89 cm    Ascending aorta 3.00 cm    STJ 2.45 cm    Sinus 3.10 cm    LVIDs 2.96 2.1 - 4.0 cm    Posterior Wall 0.88 0.6 - 1.1 cm    IVS 0.84 0.6 - 1.1 cm    LVIDd 4.74 3.5 - 6.0 cm    TDI LATERAL 0.15 m/s    LA WIDTH 4.56 cm    TDI SEPTAL 0.07 m/s    LV LATERAL E/E' RATIO 4.73 m/s    LV SEPTAL E/E' RATIO 10.14 m/s    FS 38 28 - 44 %    LA volume 113.42 cm3    LV mass 136.29 g    ZLVIDD -0.95     ZLVIDS -0.65     Left Ventricle Relative Wall Thickness 0.37 cm    AV valve area 1.74 cm²    AV Velocity Ratio 0.52     AV index (prosthetic) 0.46     MV valve area p 1/2 method 7.02 cm2    E/A ratio 0.82     Mean e' 0.11 m/s    LVOT area 3.8 cm2    LVOT stroke volume 51.25 cm3    AV peak gradient 11 mmHg    E/E' ratio 6.45 m/s    LV Systolic Volume Index 18.1 mL/m2    LV Diastolic Volume Index 55.96 mL/m2    LA Volume Index 60.7 mL/m2    LV Mass Index 73 g/m2    Triscuspid Valve Regurgitation Peak Gradient 32 mmHg    LA Volume Index (Mod) 30.6 mL/m2    BRYAN by Velocity Ratio 1.98 cm²    BSA 1.89 m2    EF 43 %    TV resting pulmonary artery pressure 35 mmHg    RV TB RVSP 6 mmHg    Est. RA pres 3 mmHg    Narrative      Left Ventricle: The left ventricle is normal in size. Normal wall   thickness. Regional wall motion abnormalities present. See diagram for   wall motion findings. There is mildly reduced systolic function with a   visually estimated ejection fraction of 40 - 45%. Ejection fraction by   visual approximation is 43%. There is normal diastolic function.    Right Ventricle: Normal right ventricular cavity size. Wall thickness   is normal. Right ventricle wall motion  is normal. Systolic function is   normal.    Left Atrium: Left atrium is severely dilated. 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    Aortic Valve: The aortic valve is a trileaflet valve. There is mild   aortic valve sclerosis. There is mild stenosis. Aortic valve area by VTI   is 1.74 cm².  Aortic valve peak velocity is 1.65 m/s. Mean gradient is 7   mmHg. The dimensionless index is 0.46. There is trace aortic   regurgitation.    Mitral Valve: There is mild bileaflet sclerosis.    Tricuspid Valve: There is mild regurgitation.    Pulmonary Artery: The estimated pulmonary artery systolic pressure is   35 mmHg.    IVC/SVC: Normal venous pressure at 3 mmHg.       Results for orders placed or performed during the hospital encounter of 04/10/18   2D Echo w/ Color Flow Doppler   Result Value Ref Range    EF + QEF 60 55 - 65    Aortic Valve Regurgitation TRIVIAL     Est. PA Systolic Pressure 17.81     Mitral Valve Mobility NORMAL     Tricuspid Valve Regurgitation TRIVIAL      CHARMAINE:  Results for orders placed or performed during the hospital encounter of 12/13/23   Transesophageal echo (CHARMAINE)   Result Value Ref Range    BSA 1.89 m2    Narrative      CHARMAINE for endocarditis evaluation. No evidence of valvular or lead   vegetations visualized.    Left Ventricle: The left ventricle is normal in size. Normal wall   thickness. Normal wall motion. There is low normal systolic function with   a visually estimated ejection fraction of 50 - 55%.    Right Ventricle: Normal right ventricular cavity size. Wall thickness   is normal. Right ventricle wall motion  is normal. Systolic function is   normal. Pacemaker lead present in the ventricle, no veggitation   visualized.    Left Atrium: Left atrium is dilated. There is no thrombus in the left   atrial cavity.    Right Atrium: Right atrium is dilated.    Aortic Valve: The aortic valve is structurally normal. There is no   mass/vegetation present. There is trace aortic regurgitation.    Mitral Valve: Mildly thickened leaflets. There is no mass/vegetation   present. There is trace regurgitation.    Tricuspid Valve: The tricuspid valve is structurally normal. There is   no mass/vegetation present. There is trace regurgitation.    Pulmonic Valve: The pulmonic valve is structurally  "normal. There is no   mass/vegetation present.       Stress Test:  Results for orders placed during the hospital encounter of 07/21/21    Nuclear Stress - Cardiology Interpreted    Interpretation Summary    Normal myocardial perfusion scan. There is no evidence of myocardial ischemia or infarction.    The gated perfusion images showed an ejection fraction of 58% at rest. The gated perfusion images showed an ejection fraction of 56% post stress. Normal ejection fraction is greater than 53%.    There is normal wall motion at rest and post stress.    LV cavity size is normal at rest and normal at stress.    The EKG portion of this study is abnormal but not diagnostic.    The patient reported no chest pain during the stress test.    There are no prior studies for comparison.    The patient developed 2nd degree heart block, Mobitz II during stress.     Cardiac Catheterization:  Results for orders placed during the hospital encounter of 12/13/23    Intra-Procedure Documentation    Narrative  CHARMAINE performed in the Invasive Lab  - See Procedure Log link below for nursing documentation  - See CHARMAINE order on Card Proc Tab for physician findings     PFT:  No results found for: "FEV1", "FVC", "ISK1CEH", "TLC", "DLCO"     NPO Status:     LDA:       Peripheral IV - Single Lumen 20 G Left Antecubital (Active)   Site Assessment Dry;Clean;Intact 05/15/24 2339   Extremity Assessment Distal to IV No warmth;No swelling;No redness 05/15/24 2000   Line Status Flushed;Saline locked 05/15/24 2339   Dressing Status Clean;Intact;Dry 05/15/24 2339   Dressing Intervention Integrity maintained 05/15/24 2339   Dressing Change Due 05/18/24 05/14/24 2200   Number of days:             Peripheral IV - Single Lumen 05/14/24 1639 20 G Anterior;Right Hand (Active)   Site Assessment Clean;Dry;Intact 05/16/24 0400   Extremity Assessment Distal to IV No warmth;No swelling;No redness;No abnormal discoloration 05/15/24 2000   Line Status Flushed;Saline locked " 05/16/24 0400   Dressing Status Clean;Dry;Intact 05/16/24 0400   Dressing Intervention Integrity maintained 05/16/24 0400   Dressing Change Due 05/18/24 05/14/24 2200   Number of days: 1           Previous Airway:   Induction:  Intravenous    Intubated:  Postinduction    Mask Ventilation:  Easy mask    Attempts:  1    Attempted By:  CRNA    Method of Intubation:  Video laryngoscopy    Blade:  Avalos 3    Laryngeal View Grade: Grade IIA - cords partially seen      Difficult Airway Encountered?: No      Complications:  None    Airway Device:  Oral endotracheal tube    Airway Device Size:  7.5    Style/Cuff Inflation:  Cuffed    Secured at:  The lips    Placement Verified By:  Capnometry    Complicating Factors:  None    Findings Post-Intubation:  BS equal bilateral and atraumatic/condition of teeth unchanged       Allergies:  Review of patient's allergies indicates:  No Known Allergies    Medications:     Current Outpatient Medications   Medication Instructions    aspirin (ECOTRIN) 81 mg, Oral, 2 times daily, (Breakfast & Afternoon)    atorvastatin (LIPITOR) 80 mg, Oral, Daily    calcium carbonate (TUMS ORAL) 2 tablets, Oral, Daily PRN    coenzyme Q10 (CO Q-10) 300 mg Cap 1 capsule, Oral, Daily    dicyclomine (BENTYL) 10 mg, Oral, Daily PRN    ERGOCALCIFEROL, VITAMIN D2, (VITAMIN D ORAL) Take 1 capsule by mouth Mon, Wed, Fri, Sat & Sun    ezetimibe (ZETIA) 10 mg, Oral, Daily    multivit-min/FA/lycopen/lutein (CENTRUM SILVER MEN ORAL) 1 tablet, Oral, Every Mon, Wed, Fri    ondansetron (ZOFRAN-ODT) 4 mg, Oral, Every 8 hours PRN    oxybutynin (DITROPAN) 5 mg, Oral, 3 times daily PRN    oxyCODONE (ROXICODONE) 5 mg, Oral, Every 4 hours PRN    tamsulosin (FLOMAX) 0.4 mg, Oral, Daily     Inpatient Medications:   aspirin  81 mg Oral BID    atorvastatin  80 mg Oral QHS    ceFAZolin (Ancef) IV (PEDS and ADULTS)  2 g Intravenous Q8H    coenzyme Q10  1 capsule Oral Daily    ezetimibe  10 mg Oral Daily    multivitamin  1 tablet  Oral Daily     Antibiotics (From admission, onward)      Start     Stop Route Frequency Ordered    05/14/24 2245  ceFAZolin 2 g in dextrose 5 % in water (D5W) 50 mL IVPB (MB+)         -- IV Every 8 hours (non-standard times) 05/14/24 2132          VTE Risk Mitigation (From admission, onward)           Ordered     IP VTE LOW RISK PATIENT  Once         05/14/24 1912     Place sequential compression device  Until discontinued         05/14/24 1912                    History:     Active Hospital Problems    Diagnosis  POA    *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     Medical History:  Past Medical History:   Diagnosis Date    Complete heart block 7/5/2022    Coronary artery disease of native artery of native heart with stable angina pectoris 4/10/2018    Hyperlipidemia      Surgical History:    has a past surgical history that includes Tonsillectomy; Cyst Removal; A-V cardiac pacemaker insertion (N/A, 7/5/2022); Colonoscopy (N/A, 8/5/2022); echocardiogram,transesophageal (N/A, 12/22/2023); and Extracorporeal shock wave lithotripsy (Right, 3/1/2024).   Social History:    has no history on file for sexual activity.  reports that he has never smoked. He has never been exposed to tobacco smoke. He has never used smokeless tobacco. He reports current alcohol use of about 1.0 standard drink of alcohol per week. He reports that he does not use drugs.    OBJECTIVE:   Vital Signs Range (Last 24H):  Temp:  [36.3 °C (97.4 °F)-36.7 °C (98 °F)]   Pulse:  []   Resp:  [18]   BP: (106-130)/(62-86)   SpO2:  [94 %-98 %]   Lab Results   Component Value Date    WBC 4.41 05/16/2024    HGB 14.1 05/16/2024    HCT 40.8 05/16/2024    PLT  "170 05/16/2024     05/16/2024    K 4.1 05/16/2024     05/16/2024    CREATININE 0.9 05/16/2024    BUN 14 05/16/2024    CO2 22 (L) 05/16/2024    GLU 90 05/16/2024    CALCIUM 9.2 05/16/2024    MG 1.9 05/16/2024    PHOS 2.1 (L) 12/18/2023    ALKPHOS 69 05/16/2024    ALT 23 05/16/2024    AST 28 05/16/2024    ALBUMIN 3.7 05/16/2024    INR 1.1 05/19/2018    APTT <21.0 05/19/2018    HGBA1C 5.6 08/11/2023    TROPONINI <0.006 07/05/2022     (H) 07/05/2022     Recent Labs     05/14/24  1643 05/15/24  0336 05/16/24  0336   WBC 4.70 6.25 4.41   HGB 14.7 14.4 14.1   HCT 45.0 43.2 40.8    169 170    139 139   K 4.2 3.9 4.1   CREATININE 1.1 0.9 0.9   GLU 74 80 90     No results for input(s): "PH", "PCO2", "PO2", "HCO3", "POCSATURATED", "BE" in the last 72 hours.   No LMP for male patient.    Please see Results Review for additional labs & imaging.     EKG:   Results for orders placed or performed during the hospital encounter of 12/13/23   EKG 12-lead    Collection Time: 12/13/23  9:38 PM    Narrative    Test Reason : R11.0,    Vent. Rate : 103 BPM     Atrial Rate : 103 BPM     P-R Int : 170 ms          QRS Dur : 176 ms      QT Int : 410 ms       P-R-T Axes : 037 158 -07 degrees     QTc Int : 537 ms    Atrial-sensed ventricular-paced rhythm  Biventricular pacemaker detected  Abnormal ECG  When compared with ECG of 21-NOV-2023 08:01,  Vent. rate has increased BY  36 BPM  Confirmed by Ivone Garibay MD ([id number]) on 12/14/2023 2:17:29 PM    Referred By: [email or web address]   SELF           Confirmed By:Ivone Garibay MD       ECHO:  See subjective, if available.    ASSESSMENT/PLAN:         Pre-op Assessment    I have reviewed the Patient Summary Reports.     I have reviewed the Nursing Notes. I have reviewed the NPO Status.   I have reviewed the Medications.     Review of Systems  Anesthesia Hx:   History of prior surgery of interest to airway management or planning:          Denies Family Hx of Anesthesia " complications.    Denies Personal Hx of Anesthesia complications.                    Cardiovascular:        CAD    Dysrhythmias  Angina                                  Pulmonary:      Shortness of breath                  Renal/:  Chronic Renal Disease                    Physical Exam  General: Well nourished, Cooperative, Alert and Oriented    Airway:  Mallampati: II   Mouth Opening: Normal  TM Distance: Normal  Tongue: Normal  Neck ROM: Normal ROM    Dental:  Intact        Anesthesia Plan  Type of Anesthesia, risks & benefits discussed:    Anesthesia Type: Gen ETT  Intra-op Monitoring Plan: Standard ASA Monitors and Art Line  Post Op Pain Control Plan: multimodal analgesia and IV/PO Opioids PRN  Induction:  IV  Airway Plan: Video, Post-Induction  Informed Consent: Informed consent signed with the Patient and all parties understand the risks and agree with anesthesia plan.  All questions answered. Patient consented to blood products? Yes  ASA Score: 3  Day of Surgery Review of History & Physical: H&P Update referred to the surgeon/provider.    Ready For Surgery From Anesthesia Perspective.     .

## 2024-05-17 ENCOUNTER — ANESTHESIA (OUTPATIENT)
Dept: MEDSURG UNIT | Facility: HOSPITAL | Age: 69
DRG: 243 | End: 2024-05-17
Payer: MEDICARE

## 2024-05-17 LAB
ASCENDING AORTA: 2.8 CM
BSA FOR ECHO PROCEDURE: 1.77 M2
OHS QRS DURATION: 178 MS
OHS QTC CALCULATION: 492 MS
SINUS: 3.2 CM
STJ: 2.6 CM

## 2024-05-17 PROCEDURE — 93010 ELECTROCARDIOGRAM REPORT: CPT | Mod: ,,, | Performed by: INTERNAL MEDICINE

## 2024-05-17 PROCEDURE — 33233 REMOVAL OF PM GENERATOR: CPT | Performed by: STUDENT IN AN ORGANIZED HEALTH CARE EDUCATION/TRAINING PROGRAM

## 2024-05-17 PROCEDURE — D9220A PRA ANESTHESIA: Mod: ANES,,, | Performed by: ANESTHESIOLOGY

## 2024-05-17 PROCEDURE — 33207 INSERT HEART PM VENTRICULAR: CPT | Performed by: STUDENT IN AN ORGANIZED HEALTH CARE EDUCATION/TRAINING PROGRAM

## 2024-05-17 PROCEDURE — 63600175 PHARM REV CODE 636 W HCPCS: Performed by: STUDENT IN AN ORGANIZED HEALTH CARE EDUCATION/TRAINING PROGRAM

## 2024-05-17 PROCEDURE — 99233 SBSQ HOSP IP/OBS HIGH 50: CPT | Mod: ,,, | Performed by: STUDENT IN AN ORGANIZED HEALTH CARE EDUCATION/TRAINING PROGRAM

## 2024-05-17 PROCEDURE — 33233 REMOVAL OF PM GENERATOR: CPT | Mod: 51,,, | Performed by: STUDENT IN AN ORGANIZED HEALTH CARE EDUCATION/TRAINING PROGRAM

## 2024-05-17 PROCEDURE — 02PA3MZ REMOVAL OF CARDIAC LEAD FROM HEART, PERCUTANEOUS APPROACH: ICD-10-PCS | Performed by: STUDENT IN AN ORGANIZED HEALTH CARE EDUCATION/TRAINING PROGRAM

## 2024-05-17 PROCEDURE — D9220A PRA ANESTHESIA: Mod: CRNA,,, | Performed by: NURSE ANESTHETIST, CERTIFIED REGISTERED

## 2024-05-17 PROCEDURE — 0JPT3PZ REMOVAL OF CARDIAC RHYTHM RELATED DEVICE FROM TRUNK SUBCUTANEOUS TISSUE AND FASCIA, PERCUTANEOUS APPROACH: ICD-10-PCS | Performed by: STUDENT IN AN ORGANIZED HEALTH CARE EDUCATION/TRAINING PROGRAM

## 2024-05-17 PROCEDURE — 37000009 HC ANESTHESIA EA ADD 15 MINS: Performed by: STUDENT IN AN ORGANIZED HEALTH CARE EDUCATION/TRAINING PROGRAM

## 2024-05-17 PROCEDURE — 87076 CULTURE ANAEROBE IDENT EACH: CPT | Performed by: STUDENT IN AN ORGANIZED HEALTH CARE EDUCATION/TRAINING PROGRAM

## 2024-05-17 PROCEDURE — 33235 REMOVAL PACEMAKER ELECTRODE: CPT | Mod: 22,,, | Performed by: STUDENT IN AN ORGANIZED HEALTH CARE EDUCATION/TRAINING PROGRAM

## 2024-05-17 PROCEDURE — 63600175 PHARM REV CODE 636 W HCPCS: Performed by: NURSE ANESTHETIST, CERTIFIED REGISTERED

## 2024-05-17 PROCEDURE — C1894 INTRO/SHEATH, NON-LASER: HCPCS | Performed by: STUDENT IN AN ORGANIZED HEALTH CARE EDUCATION/TRAINING PROGRAM

## 2024-05-17 PROCEDURE — 33207 INSERT HEART PM VENTRICULAR: CPT | Mod: 51,,, | Performed by: STUDENT IN AN ORGANIZED HEALTH CARE EDUCATION/TRAINING PROGRAM

## 2024-05-17 PROCEDURE — C2628 CATHETER, OCCLUSION: HCPCS | Performed by: STUDENT IN AN ORGANIZED HEALTH CARE EDUCATION/TRAINING PROGRAM

## 2024-05-17 PROCEDURE — 02HK3JZ INSERTION OF PACEMAKER LEAD INTO RIGHT VENTRICLE, PERCUTANEOUS APPROACH: ICD-10-PCS | Performed by: STUDENT IN AN ORGANIZED HEALTH CARE EDUCATION/TRAINING PROGRAM

## 2024-05-17 PROCEDURE — 99232 SBSQ HOSP IP/OBS MODERATE 35: CPT | Mod: ,,, | Performed by: INTERNAL MEDICINE

## 2024-05-17 PROCEDURE — 88305 TISSUE EXAM BY PATHOLOGIST: CPT | Mod: 59 | Performed by: PATHOLOGY

## 2024-05-17 PROCEDURE — 93005 ELECTROCARDIOGRAM TRACING: CPT

## 2024-05-17 PROCEDURE — 27201423 OPTIME MED/SURG SUP & DEVICES STERILE SUPPLY: Performed by: STUDENT IN AN ORGANIZED HEALTH CARE EDUCATION/TRAINING PROGRAM

## 2024-05-17 PROCEDURE — 25000003 PHARM REV CODE 250: Performed by: NURSE ANESTHETIST, CERTIFIED REGISTERED

## 2024-05-17 PROCEDURE — 88304 TISSUE EXAM BY PATHOLOGIST: CPT | Performed by: PATHOLOGY

## 2024-05-17 PROCEDURE — 20600001 HC STEP DOWN PRIVATE ROOM

## 2024-05-17 PROCEDURE — 36620 INSERTION CATHETER ARTERY: CPT | Mod: 59,,, | Performed by: ANESTHESIOLOGY

## 2024-05-17 PROCEDURE — 5A1223Z PERFORMANCE OF CARDIAC PACING, CONTINUOUS: ICD-10-PCS | Performed by: STUDENT IN AN ORGANIZED HEALTH CARE EDUCATION/TRAINING PROGRAM

## 2024-05-17 PROCEDURE — 87070 CULTURE OTHR SPECIMN AEROBIC: CPT | Mod: 59 | Performed by: STUDENT IN AN ORGANIZED HEALTH CARE EDUCATION/TRAINING PROGRAM

## 2024-05-17 PROCEDURE — C1898 LEAD, PMKR, OTHER THAN TRANS: HCPCS | Performed by: STUDENT IN AN ORGANIZED HEALTH CARE EDUCATION/TRAINING PROGRAM

## 2024-05-17 PROCEDURE — 25000003 PHARM REV CODE 250: Performed by: NURSE PRACTITIONER

## 2024-05-17 PROCEDURE — C1786 PMKR, SINGLE, RATE-RESP: HCPCS | Performed by: STUDENT IN AN ORGANIZED HEALTH CARE EDUCATION/TRAINING PROGRAM

## 2024-05-17 PROCEDURE — 33235 REMOVAL PACEMAKER ELECTRODE: CPT | Performed by: STUDENT IN AN ORGANIZED HEALTH CARE EDUCATION/TRAINING PROGRAM

## 2024-05-17 PROCEDURE — 37000008 HC ANESTHESIA 1ST 15 MINUTES: Performed by: STUDENT IN AN ORGANIZED HEALTH CARE EDUCATION/TRAINING PROGRAM

## 2024-05-17 PROCEDURE — 88304 TISSUE EXAM BY PATHOLOGIST: CPT | Mod: 26,,, | Performed by: PATHOLOGY

## 2024-05-17 PROCEDURE — C1730 CATH, EP, 19 OR FEW ELECT: HCPCS | Performed by: STUDENT IN AN ORGANIZED HEALTH CARE EDUCATION/TRAINING PROGRAM

## 2024-05-17 PROCEDURE — 87075 CULTR BACTERIA EXCEPT BLOOD: CPT | Mod: 59 | Performed by: STUDENT IN AN ORGANIZED HEALTH CARE EDUCATION/TRAINING PROGRAM

## 2024-05-17 PROCEDURE — 63600175 PHARM REV CODE 636 W HCPCS: Performed by: NURSE PRACTITIONER

## 2024-05-17 PROCEDURE — 0JB60ZZ EXCISION OF CHEST SUBCUTANEOUS TISSUE AND FASCIA, OPEN APPROACH: ICD-10-PCS | Performed by: STUDENT IN AN ORGANIZED HEALTH CARE EDUCATION/TRAINING PROGRAM

## 2024-05-17 PROCEDURE — 25000003 PHARM REV CODE 250: Performed by: STUDENT IN AN ORGANIZED HEALTH CARE EDUCATION/TRAINING PROGRAM

## 2024-05-17 PROCEDURE — C1729 CATH, DRAINAGE: HCPCS | Performed by: STUDENT IN AN ORGANIZED HEALTH CARE EDUCATION/TRAINING PROGRAM

## 2024-05-17 PROCEDURE — C1769 GUIDE WIRE: HCPCS | Performed by: STUDENT IN AN ORGANIZED HEALTH CARE EDUCATION/TRAINING PROGRAM

## 2024-05-17 DEVICE — PACING LEAD
Type: IMPLANTABLE DEVICE | Site: HEART | Status: NON-FUNCTIONAL
Brand: TENDRIL™
Removed: 2024-05-20

## 2024-05-17 DEVICE — PULSE GENERATOR SSIR
Type: IMPLANTABLE DEVICE | Site: HEART | Status: NON-FUNCTIONAL
Brand: ASSURITY MRI™
Removed: 2024-05-20

## 2024-05-17 RX ORDER — ONDANSETRON HYDROCHLORIDE 2 MG/ML
INJECTION, SOLUTION INTRAVENOUS
Status: DISCONTINUED | OUTPATIENT
Start: 2024-05-17 | End: 2024-05-17

## 2024-05-17 RX ORDER — DEXAMETHASONE SODIUM PHOSPHATE 4 MG/ML
INJECTION, SOLUTION INTRA-ARTICULAR; INTRALESIONAL; INTRAMUSCULAR; INTRAVENOUS; SOFT TISSUE
Status: DISCONTINUED | OUTPATIENT
Start: 2024-05-17 | End: 2024-05-17

## 2024-05-17 RX ORDER — LIDOCAINE HYDROCHLORIDE 20 MG/ML
INJECTION INTRAVENOUS
Status: DISCONTINUED | OUTPATIENT
Start: 2024-05-17 | End: 2024-05-17

## 2024-05-17 RX ORDER — BUPIVACAINE HYDROCHLORIDE 2.5 MG/ML
INJECTION, SOLUTION EPIDURAL; INFILTRATION; INTRACAUDAL
Status: DISCONTINUED | OUTPATIENT
Start: 2024-05-17 | End: 2024-05-20

## 2024-05-17 RX ORDER — ROCURONIUM BROMIDE 10 MG/ML
INJECTION, SOLUTION INTRAVENOUS
Status: DISCONTINUED | OUTPATIENT
Start: 2024-05-17 | End: 2024-05-17

## 2024-05-17 RX ORDER — VANCOMYCIN HYDROCHLORIDE 1 G/20ML
INJECTION, POWDER, LYOPHILIZED, FOR SOLUTION INTRAVENOUS
Status: DISCONTINUED | OUTPATIENT
Start: 2024-05-17 | End: 2024-05-20

## 2024-05-17 RX ORDER — LIDOCAINE HYDROCHLORIDE 20 MG/ML
INJECTION, SOLUTION INFILTRATION; PERINEURAL
Status: DISCONTINUED | OUTPATIENT
Start: 2024-05-17 | End: 2024-05-20

## 2024-05-17 RX ORDER — SODIUM CHLORIDE 0.9 G/100ML
IRRIGANT IRRIGATION
Status: DISCONTINUED | OUTPATIENT
Start: 2024-05-17 | End: 2024-05-20

## 2024-05-17 RX ORDER — PHENYLEPHRINE HYDROCHLORIDE 10 MG/ML
INJECTION INTRAVENOUS
Status: DISCONTINUED | OUTPATIENT
Start: 2024-05-17 | End: 2024-05-17

## 2024-05-17 RX ORDER — PROPOFOL 10 MG/ML
VIAL (ML) INTRAVENOUS
Status: DISCONTINUED | OUTPATIENT
Start: 2024-05-17 | End: 2024-05-17

## 2024-05-17 RX ORDER — EPHEDRINE SULFATE 50 MG/ML
INJECTION, SOLUTION INTRAVENOUS
Status: DISCONTINUED | OUTPATIENT
Start: 2024-05-17 | End: 2024-05-17

## 2024-05-17 RX ORDER — PHENYLEPHRINE HYDROCHLORIDE 10 MG/ML
INJECTION INTRAVENOUS CONTINUOUS PRN
Status: DISCONTINUED | OUTPATIENT
Start: 2024-05-17 | End: 2024-05-17

## 2024-05-17 RX ORDER — FENTANYL CITRATE 50 UG/ML
INJECTION, SOLUTION INTRAMUSCULAR; INTRAVENOUS
Status: DISCONTINUED | OUTPATIENT
Start: 2024-05-17 | End: 2024-05-17

## 2024-05-17 RX ADMIN — EPHEDRINE SULFATE 5 MG: 50 INJECTION INTRAVENOUS at 03:05

## 2024-05-17 RX ADMIN — FENTANYL CITRATE 50 MCG: 50 INJECTION, SOLUTION INTRAMUSCULAR; INTRAVENOUS at 12:05

## 2024-05-17 RX ADMIN — LIDOCAINE HYDROCHLORIDE 100 MG: 20 INJECTION INTRAVENOUS at 12:05

## 2024-05-17 RX ADMIN — PHENYLEPHRINE HYDROCHLORIDE 100 MCG: 10 INJECTION INTRAVENOUS at 12:05

## 2024-05-17 RX ADMIN — Medication 100 MG: at 08:05

## 2024-05-17 RX ADMIN — ASPIRIN 81 MG: 81 TABLET, COATED ORAL at 09:05

## 2024-05-17 RX ADMIN — ATORVASTATIN CALCIUM 80 MG: 40 TABLET, FILM COATED ORAL at 09:05

## 2024-05-17 RX ADMIN — PHENYLEPHRINE HYDROCHLORIDE 0.25 MCG/KG/MIN: 10 INJECTION INTRAVENOUS at 12:05

## 2024-05-17 RX ADMIN — ROCURONIUM BROMIDE 100 MG: 10 INJECTION INTRAVENOUS at 12:05

## 2024-05-17 RX ADMIN — VANCOMYCIN HYDROCHLORIDE 1000 MG: 1 INJECTION, POWDER, LYOPHILIZED, FOR SOLUTION INTRAVENOUS at 01:05

## 2024-05-17 RX ADMIN — EPHEDRINE SULFATE 5 MG: 50 INJECTION INTRAVENOUS at 01:05

## 2024-05-17 RX ADMIN — ONDANSETRON 4 MG: 2 INJECTION INTRAMUSCULAR; INTRAVENOUS at 04:05

## 2024-05-17 RX ADMIN — ASPIRIN 81 MG: 81 TABLET, COATED ORAL at 08:05

## 2024-05-17 RX ADMIN — CEFAZOLIN 2 G: 2 INJECTION, POWDER, FOR SOLUTION INTRAMUSCULAR; INTRAVENOUS at 05:05

## 2024-05-17 RX ADMIN — EZETIMIBE 10 MG: 10 TABLET ORAL at 08:05

## 2024-05-17 RX ADMIN — ROCURONIUM BROMIDE 20 MG: 10 INJECTION INTRAVENOUS at 02:05

## 2024-05-17 RX ADMIN — THERA TABS 1 TABLET: TAB at 08:05

## 2024-05-17 RX ADMIN — SUGAMMADEX 200 MG: 100 INJECTION, SOLUTION INTRAVENOUS at 04:05

## 2024-05-17 RX ADMIN — EPHEDRINE SULFATE 5 MG: 50 INJECTION INTRAVENOUS at 02:05

## 2024-05-17 RX ADMIN — SODIUM CHLORIDE: 0.9 INJECTION, SOLUTION INTRAVENOUS at 12:05

## 2024-05-17 RX ADMIN — CEFAZOLIN 2 G: 2 INJECTION, POWDER, FOR SOLUTION INTRAMUSCULAR; INTRAVENOUS at 09:05

## 2024-05-17 RX ADMIN — EPHEDRINE SULFATE 10 MG: 50 INJECTION INTRAVENOUS at 03:05

## 2024-05-17 RX ADMIN — PROPOFOL 150 MG: 10 INJECTION, EMULSION INTRAVENOUS at 12:05

## 2024-05-17 RX ADMIN — ROCURONIUM BROMIDE 20 MG: 10 INJECTION INTRAVENOUS at 01:05

## 2024-05-17 RX ADMIN — DEXAMETHASONE SODIUM PHOSPHATE 4 MG: 4 INJECTION, SOLUTION INTRAMUSCULAR; INTRAVENOUS at 04:05

## 2024-05-17 RX ADMIN — CEFAZOLIN 2 G: 2 INJECTION, POWDER, FOR SOLUTION INTRAMUSCULAR; INTRAVENOUS at 01:05

## 2024-05-17 NOTE — PLAN OF CARE
Patient alert and oriented x4, denies pain, no s/s of distress, VS WNL, comfort measures given, plan of care discussed with patient, aware of NPO status at midnight, will continue to monitor.    Problem: Adult Inpatient Plan of Care  Goal: Plan of Care Review  Outcome: Progressing  Goal: Absence of Hospital-Acquired Illness or Injury  Outcome: Progressing  Goal: Optimal Comfort and Wellbeing  Outcome: Progressing  Goal: Readiness for Transition of Care  Outcome: Progressing     Problem: Infection  Goal: Absence of Infection Signs and Symptoms  Outcome: Progressing

## 2024-05-17 NOTE — ASSESSMENT & PLAN NOTE
Admitted for a pacemaker infection  Started on empiric Ancef while awaiting cultures given history of MSSA bacteremia, currently NGTD  ID following:  Likely to need IV antibiotics on discharge  EP was consulted, who plan for pacemaker removal on 5/17 with temporary pacemaker placement, and new permanent pacemaker placement on Monday 5/20.

## 2024-05-17 NOTE — SUBJECTIVE & OBJECTIVE
Interval History:   No overnight acute events. Plan for Device extraction today with temporary pacemaker implantation.     Objective:     Vital Signs (Most Recent):  Temp: 97.7 °F (36.5 °C) (05/17/24 0820)  Pulse: 79 (05/17/24 0820)  Resp: 20 (05/17/24 0820)  BP: (!) 143/85 (05/17/24 0820)  SpO2: 98 % (05/17/24 0820) Vital Signs (24h Range):  Temp:  [97.6 °F (36.4 °C)-98.6 °F (37 °C)] 97.7 °F (36.5 °C)  Pulse:  [64-94] 79  Resp:  [15-20] 20  SpO2:  [96 %-98 %] 98 %  BP: (106-143)/(67-88) 143/85     Weight: 64.2 kg (141 lb 8.6 oz)  Body mass index is 22.84 kg/m².     SpO2: 98 %         Intake/Output Summary (Last 24 hours) at 5/17/2024 0847  Last data filed at 5/17/2024 0755  Gross per 24 hour   Intake 820 ml   Output 700 ml   Net 120 ml       Lines/Drains/Airways       Peripheral Intravenous Line  Duration                  Peripheral IV - Single Lumen 20 G Left Antecubital -- days         Peripheral IV - Single Lumen 05/14/24 1639 20 G Anterior;Right Hand 2 days                       Physical Exam  Constitutional:       Appearance: Normal appearance.   HENT:      Head: Atraumatic.   Eyes:      Conjunctiva/sclera: Conjunctivae normal.   Cardiovascular:      Rate and Rhythm: Normal rate and regular rhythm.      Heart sounds: No murmur heard.  Pulmonary:      Effort: Pulmonary effort is normal.      Breath sounds: Normal breath sounds.   Abdominal:      General: There is no distension.      Palpations: Abdomen is soft.   Musculoskeletal:      Right lower leg: No edema.      Left lower leg: No edema.   Skin:     General: Skin is warm.   Neurological:      Mental Status: He is alert.   Psychiatric:         Mood and Affect: Mood normal.       Significant Labs: All pertinent lab results from the last 24 hours have been reviewed.

## 2024-05-17 NOTE — ANESTHESIA PROCEDURE NOTES
Arterial    Diagnosis: infected pacemaker    Patient location during procedure: done in OR    Staffing  Authorizing Provider: Shravan Guzmán MD  Performing Provider: Shravan Guzmán MD    Staffing  Performed by: Shravan Guzmán MD  Authorized by: Shravan Guzmán MD    Anesthesiologist was present at the time of the procedure.  Arterial  Skin Prep: chlorhexidine gluconate  Local Infiltration: lidocaine  Orientation: right  Location: radial    Catheter Size: 20 G  Catheter placement by Ultrasound guidance. Heme positive aspiration all ports.   Vessel Caliber: medium, patent  Needle advanced into vessel with real time Ultrasound guidance.Insertion Attempts: 1  Assessment  Dressing: secured with tape and tegaderm

## 2024-05-17 NOTE — PLAN OF CARE
Problem: Adult Inpatient Plan of Care  Goal: Patient-Specific Goal (Individualized)  Outcome: Progressing  Flowsheets (Taken 5/17/2024 0924)  Individualized Care Needs: monitoring VS, labs, etc.  Anxieties, Fears or Concerns: none     Problem: Infection  Goal: Absence of Infection Signs and Symptoms  Outcome: Progressing  Intervention: Prevent or Manage Infection  Flowsheets (Taken 5/17/2024 0924)  Infection Management: aseptic technique maintained  Isolation Precautions:   protective   precautions maintained   AAOX4,VSS, Plan of care discussed with patient. Patient has no complaints of chest pain/SOB/palpitations. Pt ambulating independently., fall precautions in place,no falls/injuries through the shift.Discussed medications and care.Patient has no questions at this time.Pt resting comfortably with no acute distress.Call light within reach,bed at lowest position.

## 2024-05-17 NOTE — TRANSFER OF CARE
"Anesthesia Transfer of Care Note    Patient: Michael Espinoza    Procedure(s) Performed: Procedure(s) (LRB):  EXTRACTION, ELECTRODE LEAD (N/A)  Insertion, Pacemaker, Single Chamber Ventricular (N/A)  ECHOCARDIOGRAM, TRANSESOPHAGEAL (N/A)    Patient location: PACU    Anesthesia Type: general    Transport from OR: Transported from OR on 6-10 L/min O2 by face mask with adequate spontaneous ventilation    Post pain: adequate analgesia    Post assessment: no apparent anesthetic complications and tolerated procedure well    Post vital signs: stable    Level of consciousness: awake and alert    Nausea/Vomiting: no nausea/vomiting    Complications: none    Transfer of care protocol was followed    Last vitals: Visit Vitals  BP (!) 144/89   Pulse 71   Temp 36.9 °C (98.4 °F) (Oral)   Resp 20   Ht 5' 6" (1.676 m)   Wt 64 kg (141 lb)   SpO2 95%   BMI 22.76 kg/m²     "

## 2024-05-17 NOTE — ANESTHESIA PROCEDURE NOTES
Intubation    Date/Time: 5/17/2024 12:24 PM    Performed by: Celi Díaz CRNA  Authorized by: Shravan Guzmán MD    Intubation:     Induction:  Intravenous    Intubated:  Postinduction    Mask Ventilation:  Moderately difficult with oral airway    Attempts:  1    Attempted By:  CRNA    Method of Intubation:  Video laryngoscopy    Blade:  Avalos 3    Laryngeal View Grade: Grade I - full view of cords      Difficult Airway Encountered?: No      Complications:  None    Airway Device:  Oral endotracheal tube    Airway Device Size:  7.5    Style/Cuff Inflation:  Cuffed (inflated to minimal occlusive pressure)    Tube secured:  22    Secured at:  The lips    Placement Verified By:  Capnometry    Complicating Factors:  None    Findings Post-Intubation:  BS equal bilateral and atraumatic/condition of teeth unchanged

## 2024-05-17 NOTE — NURSING
Pt taken to EP. Pt AAOx4. Pt stable. No complaints of pain or signs of distress. Left per transport.

## 2024-05-17 NOTE — PROGRESS NOTES
[facility] - Cardiology Stepdown  Cardiac Electrophysiology  Progress Note    Admission Date: 5/14/2024  Code Status: Full Code   Attending Physician: Harmony Hooks MD   Expected Discharge Date: 5/20/2024  Principal Problem:Infection associated with cardiovascular device    Subjective:     Interval History:   No overnight acute events. Plan for Device extraction today with temporary pacemaker implantation.     Objective:     Vital Signs (Most Recent):  Temp: 97.7 °F (36.5 °C) (05/17/24 0820)  Pulse: 79 (05/17/24 0820)  Resp: 20 (05/17/24 0820)  BP: (!) 143/85 (05/17/24 0820)  SpO2: 98 % (05/17/24 0820) Vital Signs (24h Range):  Temp:  [97.6 °F (36.4 °C)-98.6 °F (37 °C)] 97.7 °F (36.5 °C)  Pulse:  [64-94] 79  Resp:  [15-20] 20  SpO2:  [96 %-98 %] 98 %  BP: (106-143)/(67-88) 143/85     Weight: 64.2 kg (141 lb 8.6 oz)  Body mass index is 22.84 kg/m².     SpO2: 98 %         Intake/Output Summary (Last 24 hours) at 5/17/2024 0847  Last data filed at 5/17/2024 0755  Gross per 24 hour   Intake 820 ml   Output 700 ml   Net 120 ml       Lines/Drains/Airways       Peripheral Intravenous Line  Duration                  Peripheral IV - Single Lumen 20 G Left Antecubital -- days         Peripheral IV - Single Lumen 05/14/24 1639 20 G Anterior;Right Hand 2 days                       Physical Exam  Constitutional:       Appearance: Normal appearance.   HENT:      Head: Atraumatic.   Eyes:      Conjunctiva/sclera: Conjunctivae normal.   Cardiovascular:      Rate and Rhythm: Normal rate and regular rhythm.      Heart sounds: No murmur heard.  Pulmonary:      Effort: Pulmonary effort is normal.      Breath sounds: Normal breath sounds.   Abdominal:      General: There is no distension.      Palpations: Abdomen is soft.   Musculoskeletal:      Right lower leg: No edema.      Left lower leg: No edema.   Skin:     General: Skin is warm.   Neurological:      Mental Status: He is alert.   Psychiatric:         Mood and Affect: Mood  normal.       Significant Labs: All pertinent lab results from the last 24 hours have been reviewed.    Assessment and Plan:     * Infection associated with cardiovascular device   Appreciate ID recommendations  - Continue with antibiotics  - Follow up Blood cultures and device cultures post extraction  - Patient is pacer dependent, currently Rvp ~95%  - Device extraction today with re-implantation on Monday      Thank you for this consult.Please follow up Attending Attestation for further recommendations. Please call if any questions.       Deirdre Williamson MD  Cardiac Electrophysiology  [facility] - Cardiology Stepdown

## 2024-05-17 NOTE — SUBJECTIVE & OBJECTIVE
Interval History: TREVA    Review of Systems  Constitutional:  Positive for activity change, appetite change and fatigue. Negative for chills and fever.   HENT:  Negative for trouble swallowing.    Respiratory:  Negative for cough and shortness of breath.    Gastrointestinal:  Negative for abdominal pain, blood in stool, diarrhea and vomiting.   Genitourinary:  Negative for dysuria and hematuria.   Musculoskeletal:  Negative for arthralgias, joint swelling and neck stiffness.   Skin:  Positive for wound.   Neurological:  Negative for syncope.   Psychiatric/Behavioral:  Negative for confusion.    Objective:     Vital Signs (Most Recent):  Temp: 97.7 °F (36.5 °C) (05/17/24 0820)  Pulse: 79 (05/17/24 0820)  Resp: 20 (05/17/24 0820)  BP: (!) 143/85 (05/17/24 0820)  SpO2: 98 % (05/17/24 0820) Vital Signs (24h Range):  Temp:  [97.6 °F (36.4 °C)-98.6 °F (37 °C)] 97.7 °F (36.5 °C)  Pulse:  [64-94] 79  Resp:  [15-20] 20  SpO2:  [96 %-98 %] 98 %  BP: (106-143)/(67-88) 143/85     Weight: 64.2 kg (141 lb 8.6 oz)  Body mass index is 22.84 kg/m².    Estimated Creatinine Clearance: 70.9 mL/min (based on SCr of 0.9 mg/dL).     Physical Exam    Constitutional:       Appearance: Normal appearance.   HENT:      Head: Normocephalic and atraumatic.      Nose: Nose normal.      Mouth/Throat:      Mouth: Mucous membranes are moist.      Pharynx: Oropharynx is clear.   Eyes:      Conjunctiva/sclera: Conjunctivae normal.   Cardiovascular:      Rate and Rhythm: Normal rate and regular rhythm.      Pulses: Normal pulses.      Heart sounds: Normal heart sounds.      Comments: Erythema, warmth and induration at site overlying pacemaker  Pulmonary:      Effort: Pulmonary effort is normal.      Breath sounds: Normal breath sounds.   Abdominal:      General: Bowel sounds are normal.      Palpations: Abdomen is soft.      Tenderness: There is no guarding or rebound.   Musculoskeletal:         General: No deformity.      Cervical back: Neck supple.    Skin:     General: Skin is warm and dry.      Coloration: Skin is not jaundiced.      Findings: No rash.   Neurological:      Mental Status: He is alert and oriented to person, place, and time. Mental status is at baseline.   Significant Labs:   Microbiology Results (last 7 days)       Procedure Component Value Units Date/Time    Blood Culture #1 **CANNOT BE ORDERED STAT** [[id number]] Collected: 05/14/24 1644    Order Status: Completed Specimen: Blood from Peripheral, Antecubital, Left Updated: 05/16/24 1812     Blood Culture, Routine No Growth to date      No Growth to date      No Growth to date    Blood Culture #2 **CANNOT BE ORDERED STAT** [[id number]] Collected: 05/14/24 1643    Order Status: Completed Specimen: Blood from Peripheral, Hand, Right Updated: 05/16/24 1812     Blood Culture, Routine No Growth to date      No Growth to date      No Growth to date          Recent Lab Results         05/16/24  1807        Unit Blood Type Code 6200  [P]        6200  [P]        6200  [P]        6200  [P]       Unit Expiration 202405312359  [P]        [id number]  [P]        [id number]  [P]        [id number]  [P]       Unit Blood Type A POS  [P]        A POS  [P]        A POS  [P]        A POS  [P]       CODING SYSTEM DQPB971  [P]        CUBI052  [P]        KGWS803  [P]        VGBU092  [P]       Crossmatch Interpretation Compatible  [P]        Compatible  [P]        Compatible  [P]        Compatible  [P]       DISPENSE STATUS CROSSMATCHED  [P]        CROSSMATCHED  [P]        CROSSMATCHED  [P]        CROSSMATCHED  [P]       Group & Rh A POS       INDIRECT MARIA GUADALUPE NEG       Product Code Y9807A93  [P]        R2104S17  [P]        T4149V73  [P]        D9769Q94  [P]       Specimen Outdate 05/19/2024 23:59       UNIT NUMBER [id number]  [P]        [id number]  [P]        [id number]  [P]        [id number]  [P]                [P] - Preliminary Result               Significant Imaging: I have reviewed all  pertinent imaging results/findings within the past 24 hours.

## 2024-05-17 NOTE — ASSESSMENT & PLAN NOTE
68 year old male with history of CAD s/p CABG, heart block s/p dual chamber pacemaker in July 2022 and MSSA bacteremia (12/14-12/16, cleared 12/17, unclear source, CHARMAINE and MRI spine neg, 4w cefazolin, surveillance blood cultures negative), admitted this time for pacemaker infection.      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 High likelihood this is MSSA given recent history of bacteremia. So far no evidence of bacteremia but blood cultures are still incubating. So far no bacteremia noted, if patient remains clear on blood cultures, no contraindication to implantation of new cardiac device whenever EP deems reasonable to do so. Afebrile, without leucocytosis and HDS.       Recommendations     -Continue cefazolin IV for now - when patient undergoes extraction, please send operative cultures for clinical clarity regards to microbiological etiology     -Plan on a prolonged course of IV antibiotics. Discussed with patient.

## 2024-05-17 NOTE — PROGRESS NOTES
[facility] - Cardiology  Infectious Disease  Progress Note    Patient Name: Michael Espinoza  MRN: [id number]  Admission Date: 5/14/2024  Length of Stay: 2 days  Attending Physician: Harmony Hooks MD  Primary Care Provider: Dominguez Hyatt MD    Isolation Status: No active isolations  Assessment/Plan:      ID  * Infection associated with cardiovascular device  68 year old male with history of CAD s/p CABG, heart block s/p dual chamber pacemaker in July 2022 and MSSA bacteremia (12/14-12/16, cleared 12/17, unclear source, CHARMAINE and MRI spine neg, 4w cefazolin, surveillance blood cultures negative), admitted this time for pacemaker infection.      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 High likelihood this is MSSA given recent history of bacteremia. So far no evidence of bacteremia but blood cultures are still incubating. So far no bacteremia noted, if patient remains clear on blood cultures, no contraindication to implantation of new cardiac device whenever EP deems reasonable to do so. Afebrile, without leucocytosis and HDS.       Recommendations     -Continue cefazolin IV for now - when patient undergoes extraction, please send operative cultures for clinical clarity regards to microbiological etiology     -Plan on a prolonged course of IV antibiotics. Discussed with patient.               Anticipated Disposition: TBD    Thank you for your consult. I will follow-up with patient. Please contact us if you have any additional questions.    Lencho Asencio MD  Infectious Disease  [facility] - Cardiology    Subjective:     Principal Problem:Infection associated with cardiovascular device    HPI: 68 year old male with history of CAD s/p CABG, heart block s/p dual chamber pacemaker in July 2022 and MSSA bacteremia (12/14-12/16, cleared 12/17, unclear source,  CHARMAINE and MRI spine neg, 4w cefazolin, surveillance blood cultures negative), admitted this time for pacemaker infection.      Patient states about 2-3 weeks ago he noticed a brown lesion that was small and circular overlying pacemaker site, and slow started to get worse and changed to a reddish color over time associated with progressive tenderness to touch, but denies fever, rigors, chills, recent procedures, skin manipulation or recent trauma to the area. Furthermore, he denies chest pain. ID consulted for sent by EP due to concern for pacemaker pocket infection, Hx of MSSA bacteremia   Interval History: TREVA    Review of Systems  Constitutional:  Positive for activity change, appetite change and fatigue. Negative for chills and fever.   HENT:  Negative for trouble swallowing.    Respiratory:  Negative for cough and shortness of breath.    Gastrointestinal:  Negative for abdominal pain, blood in stool, diarrhea and vomiting.   Genitourinary:  Negative for dysuria and hematuria.   Musculoskeletal:  Negative for arthralgias, joint swelling and neck stiffness.   Skin:  Positive for wound.   Neurological:  Negative for syncope.   Psychiatric/Behavioral:  Negative for confusion.    Objective:     Vital Signs (Most Recent):  Temp: 97.7 °F (36.5 °C) (05/17/24 0820)  Pulse: 79 (05/17/24 0820)  Resp: 20 (05/17/24 0820)  BP: (!) 143/85 (05/17/24 0820)  SpO2: 98 % (05/17/24 0820) Vital Signs (24h Range):  Temp:  [97.6 °F (36.4 °C)-98.6 °F (37 °C)] 97.7 °F (36.5 °C)  Pulse:  [64-94] 79  Resp:  [15-20] 20  SpO2:  [96 %-98 %] 98 %  BP: (106-143)/(67-88) 143/85     Weight: 64.2 kg (141 lb 8.6 oz)  Body mass index is 22.84 kg/m².    Estimated Creatinine Clearance: 70.9 mL/min (based on SCr of 0.9 mg/dL).     Physical Exam    Constitutional:       Appearance: Normal appearance.   HENT:      Head: Normocephalic and atraumatic.      Nose: Nose normal.      Mouth/Throat:      Mouth: Mucous membranes are moist.      Pharynx: Oropharynx  is clear.   Eyes:      Conjunctiva/sclera: Conjunctivae normal.   Cardiovascular:      Rate and Rhythm: Normal rate and regular rhythm.      Pulses: Normal pulses.      Heart sounds: Normal heart sounds.      Comments: Erythema, warmth and induration at site overlying pacemaker  Pulmonary:      Effort: Pulmonary effort is normal.      Breath sounds: Normal breath sounds.   Abdominal:      General: Bowel sounds are normal.      Palpations: Abdomen is soft.      Tenderness: There is no guarding or rebound.   Musculoskeletal:         General: No deformity.      Cervical back: Neck supple.   Skin:     General: Skin is warm and dry.      Coloration: Skin is not jaundiced.      Findings: No rash.   Neurological:      Mental Status: He is alert and oriented to person, place, and time. Mental status is at baseline.   Significant Labs:   Microbiology Results (last 7 days)       Procedure Component Value Units Date/Time    Blood Culture #1 **CANNOT BE ORDERED STAT** [[id number]] Collected: 05/14/24 1644    Order Status: Completed Specimen: Blood from Peripheral, Antecubital, Left Updated: 05/16/24 1812     Blood Culture, Routine No Growth to date      No Growth to date      No Growth to date    Blood Culture #2 **CANNOT BE ORDERED STAT** [[id number]] Collected: 05/14/24 1643    Order Status: Completed Specimen: Blood from Peripheral, Hand, Right Updated: 05/16/24 1812     Blood Culture, Routine No Growth to date      No Growth to date      No Growth to date          Recent Lab Results         05/16/24  1807        Unit Blood Type Code 6200  [P]        6200  [P]        6200  [P]        6200  [P]       Unit Expiration [id number]  [P]        [id number]  [P]        [id number]  [P]        [id number]  [P]       Unit Blood Type A POS  [P]        A POS  [P]        A POS  [P]        A POS  [P]       CODING SYSTEM VPFS939  [P]        QNNJ375  [P]        NEUX227  [P]        UQOM377  [P]       Crossmatch Interpretation  Compatible  [P]        Compatible  [P]        Compatible  [P]        Compatible  [P]       DISPENSE STATUS CROSSMATCHED  [P]        CROSSMATCHED  [P]        CROSSMATCHED  [P]        CROSSMATCHED  [P]       Group & Rh A POS       INDIRECT MARIA GUADALUPE NEG       Product Code D7744R93  [P]        L8919K76  [P]        L3824M06  [P]        G3813R70  [P]       Specimen Outdate 05/19/2024 23:59       UNIT NUMBER [id number]  [P]        [id number]  [P]        [id number]  [P]        [id number]  [P]                [P] - Preliminary Result               Significant Imaging: I have reviewed all pertinent imaging results/findings within the past 24 hours.

## 2024-05-17 NOTE — SUBJECTIVE & OBJECTIVE
Interval History: No acute events overnight.  No complaints.  Device extraction and temporary pacemaker placement today.  Cultures remain negative.    Review of Systems   Constitutional:  Negative for chills, fatigue and fever.   Respiratory:  Negative for cough and shortness of breath.    Cardiovascular:  Negative for chest pain, palpitations and leg swelling.   Gastrointestinal:  Negative for abdominal pain, diarrhea, nausea and vomiting.   Genitourinary:  Negative for dysuria and urgency.   Neurological:  Negative for dizziness and headaches.   All other systems reviewed and are negative.    Objective:     Vital Signs (Most Recent):  Temp: 97.7 °F (36.5 °C) (05/17/24 0820)  Pulse: 79 (05/17/24 0820)  Resp: 20 (05/17/24 0820)  BP: (!) 143/85 (05/17/24 0820)  SpO2: 98 % (05/17/24 0820) Vital Signs (24h Range):  Temp:  [97.6 °F (36.4 °C)-98.6 °F (37 °C)] 97.7 °F (36.5 °C)  Pulse:  [64-94] 79  Resp:  [15-20] 20  SpO2:  [96 %-98 %] 98 %  BP: (106-143)/(67-88) 143/85     Weight: 64.2 kg (141 lb 8.6 oz)  Body mass index is 22.84 kg/m².    Intake/Output Summary (Last 24 hours) at 5/17/2024 0938  Last data filed at 5/17/2024 0755  Gross per 24 hour   Intake 702 ml   Output 700 ml   Net 2 ml         Physical Exam  Constitutional:       Appearance: Normal appearance.   HENT:      Head: Normocephalic and atraumatic.   Cardiovascular:      Rate and Rhythm: Normal rate and regular rhythm.      Heart sounds: No murmur heard.     Comments: Pacemaker in place.  Left lateral chest with mild erythema near the armpit  Pulmonary:      Effort: Pulmonary effort is normal. No respiratory distress.      Breath sounds: Normal breath sounds. No wheezing or rales.   Abdominal:      General: There is no distension.      Palpations: Abdomen is soft.      Tenderness: There is no abdominal tenderness.   Musculoskeletal:         General: No deformity.   Neurological:      General: No focal deficit present.      Mental Status: He is alert and  "oriented to person, place, and time. Mental status is at baseline.             Significant Labs: All pertinent labs within the past 24 hours have been reviewed.  Blood Culture:   No results for input(s): "LABBLOO" in the last 48 hours.    CBC:   Recent Labs   Lab 05/16/24  0336   WBC 4.41   HGB 14.1   HCT 40.8        CMP:   Recent Labs   Lab 05/16/24  0336      K 4.1      CO2 22*   GLU 90   BUN 14   CREATININE 0.9   CALCIUM 9.2   PROT 6.9   ALBUMIN 3.7   BILITOT 1.7*   ALKPHOS 69   AST 28   ALT 23   ANIONGAP 8       Significant Imaging: I have reviewed all pertinent imaging results/findings within the past 24 hours.  "

## 2024-05-17 NOTE — CONSULTS
"[facility]  CHARMAINE History and Physical      Michael Espinoza  YOB: 1955  Medical Record Number:  [id number]  Attending Physician:  Harmony Hooks MD   Date of Admission: 5/14/2024       Hospital Day:  2  Current Principal Problem:  Infection associated with cardiovascular device    Patient information was obtained from patient and past medical records.  History     Cc: CHARMAINE for device extraction with temporary pacemaker implantation     HPI  Michael Espinoza is 68 y.o. male with a PMHx of CAD s/p CABG x3, HLD, Gilbert's syndrome, MSSA bacteremia, and heart block s/p dual chamber pacemaker in July 2022 with Dr. Canales who presents to the ED for tenderness and redness around his pacemaker site for the past week. He states that about a month ago he started noticing that his device was "creeping" closer to the skin but it was not bothering him. He was admitted in December for MSSA bacteremia and completed IV antibiotics. Blood cultures cleared. Patient denies fever or chills recently. Also denies chest pain, shortness of breath, palpitations.     In the ED, pt mildly hypertensive which improved without treatment otherwise vitals stable, afebrile. CBC unremarkable. Bicarb 20. Tbili 1.7 (chronic issue). CRP <0.3. Blood cxs in process. The patient received vancomycin and zosyn. EP consulted in the ED and recommend CXR, IV abx, and ID consult.    Plan for Device extraction today with temporary pacemaker implantation.       Today, in good spirits    Anticoagulant/antiplatelets: ASA 81 mg  ECG: Atrial-sensed V-paced rhythm 69 bpm   Platelet count: 170   INR: 1.1     History of stroke:  no  Dysphagia or odynophagia:  no  Liver Disease, esophageal disease, or known varices:  no  Upper GI Bleeding:  no  Snoring:  no   Sleep Apnea:  no  Prior neck surgery or radiation:  no  History of anesthetic difficulties:  no  Family history of anesthetic difficulties:  no  Last oral intake: last pm "   Able to move neck in all directions:  yes  Use of GLP-1:  no      Medications - Outpatient  Prior to Admission medications    Medication Sig Start Date End Date Taking? Authorizing Provider   aspirin (ECOTRIN) 81 MG EC tablet Take 81 mg by mouth 2 (two) times daily. (Breakfast & Afternoon)   Yes Provider, Historical   atorvastatin (LIPITOR) 80 MG tablet Take 1 tablet (80 mg total) by mouth once daily.  Patient taking differently: Take 80 mg by mouth every evening. 1/22/24  Yes Chad Tapia MD   ezetimibe (ZETIA) 10 mg tablet Take 1 tablet (10 mg total) by mouth once daily. 1/22/24  Yes Chad Tapia MD   calcium carbonate (TUMS ORAL) Take 2 tablets by mouth daily as needed (Heartburn).    Provider, Historical   coenzyme Q10 (CO Q-10) 300 mg Cap Take 1 capsule by mouth once daily.    Provider, Historical   dicyclomine (BENTYL) 10 MG capsule Take 10 mg by mouth daily as needed (Abdominal pain).    Provider, Historical   ERGOCALCIFEROL, VITAMIN D2, (VITAMIN D ORAL) Take 1 capsule by mouth Mon, Wed, Fri, Sat & Sun    Provider, Historical   multivit-min/FA/lycopen/lutein (CENTRUM SILVER MEN ORAL) Take 1 tablet by mouth every Mon, Wed, Fri.    Provider, Historical   ondansetron (ZOFRAN-ODT) 4 MG TbDL Take 1 tablet (4 mg total) by mouth every 8 (eight) hours as needed (nausea).  Patient not taking: Reported on 1/24/2024 12/11/23   Chen Alexander, NP   oxybutynin (DITROPAN) 5 MG Tab Take 1 tablet (5 mg total) by mouth 3 (three) times daily as needed (for bladder spasms and stent pain). 3/1/24 3/22/24  Alanna Gan MD   oxyCODONE (ROXICODONE) 5 MG immediate release tablet Take 1 tablet (5 mg total) by mouth every 4 (four) hours as needed for Pain. 3/1/24   Alanna Gan MD   tamsulosin (FLOMAX) 0.4 mg Cap Take 1 capsule (0.4 mg total) by mouth once daily. 3/1/24 4/30/24  Alanna Gan MD       Medications - Current  Scheduled Meds:   aspirin  81 mg Oral BID    atorvastatin  80 mg Oral QHS    ceFAZolin (Ancef) IV (PEDS  and ADULTS)  2 g Intravenous Q8H    coenzyme Q10  1 capsule Oral Daily    ezetimibe  10 mg Oral Daily    multivitamin  1 tablet Oral Daily     Continuous Infusions:  PRN Meds:.  Current Facility-Administered Medications:     0.9%  NaCl infusion (for blood administration), , Intravenous, Q24H PRN    acetaminophen, 1,000 mg, Oral, Q8H PRN    acetaminophen, 650 mg, Oral, Q6H PRN    calcium carbonate, 500 mg, Oral, TID PRN    dextrose 10%, 12.5 g, Intravenous, PRN    dextrose 10%, 25 g, Intravenous, PRN    glucagon (human recombinant), 1 mg, Intramuscular, PRN    glucose, 16 g, Oral, PRN    glucose, 24 g, Oral, PRN    melatonin, 6 mg, Oral, Nightly PRN    naloxone, 0.02 mg, Intravenous, PRN    ondansetron, 4 mg, Intravenous, Q8H PRN    sodium chloride 0.9%, 10 mL, Intravenous, Q12H PRN      Allergies  Review of patient's allergies indicates:  No Known Allergies      Past Medical History  Past Medical History:   Diagnosis Date    Complete heart block 7/5/2022    Coronary artery disease of native artery of native heart with stable angina pectoris 4/10/2018    Hyperlipidemia        Past Surgical History  Past Surgical History:   Procedure Laterality Date    A-V CARDIAC PACEMAKER INSERTION N/A 7/5/2022    Procedure: INSERTION, CARDIAC PACEMAKER, DUAL CHAMBER;  Surgeon: Alessandro Canales MD;  Location: [facility] EP LAB;  Service: Cardiology;  Laterality: N/A;  CHB, TBD, ANES, ED 6, MB    COLONOSCOPY N/A 8/5/2022    Procedure: COLONOSCOPY;  Surgeon: Namita Dumont MD;  Location: [facility] ENDO [address]);  Service: Endoscopy;  Laterality: N/A;  vacc-inst portal-tb    CYST REMOVAL      ECHOCARDIOGRAM,TRANSESOPHAGEAL N/A 12/22/2023    Procedure: Transesophageal echo (CHARMAINE) intra-procedure log documentation;  Surgeon: Moni, Von [facility];  Location: [facility] EP LAB;  Service: Cardiology;  Laterality: N/A;    EXTRACORPOREAL SHOCK WAVE LITHOTRIPSY Right 3/1/2024    Procedure: LITHOTRIPSY, ESWL;  Surgeon: Sanchez Moreno Jr., MD;   Location: [facility] OR [address] [place] [address];  Service: Urology;  Laterality: Right;  1 hr    TONSILLECTOMY         Social History  Social History     Socioeconomic History    Marital status: Single   Tobacco Use    Smoking status: Never     Passive exposure: Never    Smokeless tobacco: Never   Substance and Sexual Activity    Alcohol use: Yes     Alcohol/week: 1.0 standard drink of alcohol     Types: 1 Glasses of wine per week     Comment: 1 drink 2-3 times/weekly    Drug use: No     Social Determinants of Health     Financial Resource Strain: Low Risk  (5/16/2024)    Overall Financial Resource Strain (CARDIA)     Difficulty of Paying Living Expenses: Not hard at all   Food Insecurity: No Food Insecurity (5/16/2024)    Hunger Vital Sign     Worried About Running Out of Food in the Last Year: Never true     Ran Out of Food in the Last Year: Never true   Transportation Needs: No Transportation Needs (5/15/2024)    TRANSPORTATION NEEDS     Transportation : No   Physical Activity: Insufficiently Active (5/16/2024)    Exercise Vital Sign     Days of Exercise per Week: 3 days     Minutes of Exercise per Session: 30 min   Stress: No Stress Concern Present (5/16/2024)    [employer] [place] [employer] - Occupational Stress Questionnaire     Feeling of Stress : Not at all       ROS  10 point ROS performed and negative except as stated in HPI     Physical Examination     Vital Signs  24 Hour VS Range    Temp:  [97.6 °F (36.4 °C)-98.6 °F (37 °C)]   Pulse:  [64-94]   Resp:  [15-20]   BP: (106-143)/(67-88)   SpO2:  [96 %-98 %]     Intake/Output Summary (Last 24 hours) at 5/17/2024 1055  Last data filed at 5/17/2024 0755  Gross per 24 hour   Intake 702 ml   Output 700 ml   Net 2 ml         Physical Exam:   Constitutional: no acute distress  HEENT: NCAT, EOMI, no scleral icterus  Cardiovascular: Regular rate and rhythm   Pulmonary: Normal respiratory effort   Abdomen: nontender, non-distended   Neuro: alert and oriented, no focal  "deficits  Extremities: warm, no edema   MSK: no deformities  Integument: intact, no rashes     Data       Recent Labs   Lab 05/14/24  1643 05/15/24  0336 05/16/24  0336   WBC 4.70 6.25 4.41   HGB 14.7 14.4 14.1   HCT 45.0 43.2 40.8    169 170        No results for input(s): "PROTIME", "INR" in the last 168 hours.     Recent Labs   Lab 05/14/24  1643 05/15/24  0336 05/16/24  0336    139 139   K 4.2 3.9 4.1    108 109   CO2 20* 20* 22*   BUN 17 15 14   CREATININE 1.1 0.9 0.9   ANIONGAP 11 11 8   CALCIUM 9.6 9.5 9.2        Recent Labs   Lab 05/14/24  1643 05/15/24  0336 05/16/24  0336   PROT 7.8 7.0 6.9   ALBUMIN 4.1 3.8 3.7   BILITOT 1.7* 2.0* 1.7*   ALKPHOS 81 71 69   AST 37 32 28   ALT 36 32 23        No results for input(s): "TROPONINI" in the last 168 hours.     BNP (pg/mL)   Date Value   07/05/2022 596 (H)       Recent Labs   Lab 05/14/24 1643 05/14/24  1644   LABBLOO No Growth to date  No Growth to date  No Growth to date No Growth to date  No Growth to date  No Growth to date        Assessment & Plan     #Infection associated with cardiovascular device  -proceed with CHARMAINE     CHARMAINE 12/22/23    CHARMAINE for endocarditis evaluation. No evidence of valvular or lead vegetations visualized.    Left Ventricle: The left ventricle is normal in size. Normal wall thickness. Normal wall motion. There is low normal systolic function with a visually estimated ejection fraction of 50 - 55%.    Right Ventricle: Normal right ventricular cavity size. Wall thickness is normal. Right ventricle wall motion  is normal. Systolic function is normal. Pacemaker lead present in the ventricle, no veggitation visualized.    Left Atrium: Left atrium is dilated. There is no thrombus in the left atrial cavity.    Right Atrium: Right atrium is dilated.    Aortic Valve: The aortic valve is structurally normal. There is no mass/vegetation present. There is trace aortic regurgitation.    Mitral Valve: Mildly thickened leaflets. " There is no mass/vegetation present. There is trace regurgitation.    Tricuspid Valve: The tricuspid valve is structurally normal. There is no mass/vegetation present. There is trace regurgitation.    Pulmonic Valve: The pulmonic valve is structurally normal. There is no mass/vegetation present.    TTE 12/16/23    Left Ventricle: The left ventricle is normal in size. Normal wall thickness. Regional wall motion abnormalities present. See diagram for wall motion findings. There is mildly reduced systolic function with a visually estimated ejection fraction of 40 - 45%. Ejection fraction by visual approximation is 43%. There is normal diastolic function.    Right Ventricle: Normal right ventricular cavity size. Wall thickness is normal. Right ventricle wall motion  is normal. Systolic function is normal.    Left Atrium: Left atrium is severely dilated. 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    Aortic Valve: The aortic valve is a trileaflet valve. There is mild aortic valve sclerosis. There is mild stenosis. Aortic valve area by VTI is 1.74 cm². Aortic valve peak velocity is 1.65 m/s. Mean gradient is 7 mmHg. The dimensionless index is 0.46. There is trace aortic regurgitation.    Mitral Valve: There is mild bileaflet sclerosis.    Tricuspid Valve: There is mild regurgitation.    Pulmonary Artery: The estimated pulmonary artery systolic pressure is 35 mmHg.    IVC/SVC: Normal venous pressure at 3 mmHg.    -No absolute contraindications of esophageal stricture, tumor, perforation, laceration,or diverticulum and/or active GI bleed.  -The risks, benefits & alternatives of the procedure were explained to the patient.   -The risks of transesophageal echo include but are not limited to:  Dental trauma, esophageal trauma/perforation, bleeding, laryngospasm/brochospasm, aspiration, sore throat/hoarseness, & dislodgement of the endotracheal tube/nasogastric tube (where applicable).    -The risks of moderate sedation  include hypotension, respiratory depression, arrhythmias, bronchospasm, & death.    -Informed consent was obtained. The patient is agreeable to proceed with the procedure and all questions and concerns addressed.    Case was discussed with an attending physician in echocardiography lab prior to procedure.    Jessie Peacock PA-C  [facility]

## 2024-05-17 NOTE — NURSING TRANSFER
Nursing Transfer Note      5/17/2024   6:50 PM    Nurse giving handoff:Silvana SAMUEL PACU  Nurse receiving handoff:Fatou SAMUEL CSU    Reason patient is being transferred: s/p anesthesia    Transfer To: CSU Room 302    Transfer via stretcher    Transfer with cardiac monitoring    Transported by transport staff    Transfer Vital Signs: see flowsheet    Telemetry: Rate 60 and Rhythm paced  Order for Tele Monitor? Yes    Additional Lines: Nur Catheter    4eyes on Skin: no    Medicines sent: none    Any special needs or follow-up needed: bedrest complete at 2000    Patient belongings transferred with patient: No    Chart send with patient: Yes    Notified: sister    Patient reassessed at: 5/17/24 at 1830 (date, time)

## 2024-05-17 NOTE — ASSESSMENT & PLAN NOTE
Appreciate ID recommendations  - Continue with antibiotics  - Follow up Blood cultures and device cultures post extraction  - Patient is pacer dependent, currently Rvp ~95%  - Device extraction today with re-implantation on Monday

## 2024-05-17 NOTE — PROGRESS NOTES
"[facility] Medicine  Progress Note    Patient Name: Michael Espinoza  MRN: [id number]  Patient Class: IP- Inpatient   Admission Date: 5/14/2024  Length of Stay: 2 days  Attending Physician: Harmony Hooks MD  Primary Care Provider: Dominguez Hyatt MD        Subjective:     Principal Problem:Infection associated with cardiovascular device        HPI:  Michael Espinoza is 68 y.o. male with a PMHx of CAD s/p CABG x3, HLD, Gilbert's syndrome, MSSA bacteremia, and heart block s/p dual chamber pacemaker in July 2022 with Dr. Canales who presents to the ED for tenderness and redness around his pacemaker site for the past week. He states that about a month ago he started noticing that his device was "creeping" closer to the skin but it was not bothering him. He was admitted in December for MSSA bacteremia and completed IV antibiotics. Blood cultures cleared. Patient denies fever or chills recently. Also denies chest pain, shortness of breath, palpitations.    In the ED, pt mildly hypertensive which improved without treatment otherwise vitals stable, afebrile. CBC unremarkable. Bicarb 20. Tbili 1.7 (chronic issue). CRP <0.3. Blood cxs in process. The patient received vancomycin and zosyn. EP consulted in the ED and recommend CXR, IV abx, and ID consult.    Overview/Hospital Course:  Mr. Espinoza was admitted to Hospital Medicine for management of a pacemaker infection.  ID was consulted.  He was started on Cefazolin while waiting on blood cultures.  EP was consulted, who plan for pacemaker removal on 5/17 with temporary pacemaker placement, and new permanent pacemaker placement on Monday 5/20.    Interval History: No acute events overnight.  No complaints.  Device extraction and temporary pacemaker placement today.  Cultures remain negative.    Review of Systems   Constitutional:  Negative for chills, fatigue and fever.   Respiratory:  Negative for cough and shortness of breath.    Cardiovascular:  " "Negative for chest pain, palpitations and leg swelling.   Gastrointestinal:  Negative for abdominal pain, diarrhea, nausea and vomiting.   Genitourinary:  Negative for dysuria and urgency.   Neurological:  Negative for dizziness and headaches.   All other systems reviewed and are negative.    Objective:     Vital Signs (Most Recent):  Temp: 97.7 °F (36.5 °C) (05/17/24 0820)  Pulse: 79 (05/17/24 0820)  Resp: 20 (05/17/24 0820)  BP: (!) 143/85 (05/17/24 0820)  SpO2: 98 % (05/17/24 0820) Vital Signs (24h Range):  Temp:  [97.6 °F (36.4 °C)-98.6 °F (37 °C)] 97.7 °F (36.5 °C)  Pulse:  [64-94] 79  Resp:  [15-20] 20  SpO2:  [96 %-98 %] 98 %  BP: (106-143)/(67-88) 143/85     Weight: 64.2 kg (141 lb 8.6 oz)  Body mass index is 22.84 kg/m².    Intake/Output Summary (Last 24 hours) at 5/17/2024 0938  Last data filed at 5/17/2024 0755  Gross per 24 hour   Intake 702 ml   Output 700 ml   Net 2 ml         Physical Exam  Constitutional:       Appearance: Normal appearance.   HENT:      Head: Normocephalic and atraumatic.   Cardiovascular:      Rate and Rhythm: Normal rate and regular rhythm.      Heart sounds: No murmur heard.     Comments: Pacemaker in place.  Left lateral chest with mild erythema near the armpit  Pulmonary:      Effort: Pulmonary effort is normal. No respiratory distress.      Breath sounds: Normal breath sounds. No wheezing or rales.   Abdominal:      General: There is no distension.      Palpations: Abdomen is soft.      Tenderness: There is no abdominal tenderness.   Musculoskeletal:         General: No deformity.   Neurological:      General: No focal deficit present.      Mental Status: He is alert and oriented to person, place, and time. Mental status is at baseline.             Significant Labs: All pertinent labs within the past 24 hours have been reviewed.  Blood Culture:   No results for input(s): "LABBLOO" in the last 48 hours.    CBC:   Recent Labs   Lab 05/16/24  0336   WBC 4.41   HGB 14.1   HCT 40.8 "        CMP:   Recent Labs   Lab 05/16/24  0336      K 4.1      CO2 22*   GLU 90   BUN 14   CREATININE 0.9   CALCIUM 9.2   PROT 6.9   ALBUMIN 3.7   BILITOT 1.7*   ALKPHOS 69   AST 28   ALT 23   ANIONGAP 8       Significant Imaging: I have reviewed all pertinent imaging results/findings within the past 24 hours.    Assessment/Plan:      * Infection associated with cardiovascular device  Admitted for a pacemaker infection  Started on empiric Ancef while awaiting cultures given history of MSSA bacteremia, currently NGTD  ID following:  Likely to need IV antibiotics on discharge  EP was consulted, who plan for pacemaker removal on 5/17 with temporary pacemaker placement, and new permanent pacemaker placement on Monday 5/20.    Complete heart block  S/p dual chamber pacemaker placement  7/5/2022    Coronary artery disease of native artery of native heart with stable angina pectoris  Patient with known CAD s/p CABG, which is controlled Will continue ASA and Statin and monitor for S/Sx of angina/ACS. Continue to monitor on telemetry.     Gilbert's syndrome  Chronic condition- reports diagnosed by Dr. Rowe years ago  T bili elevated on admission, similar to previous  Monitor CMP    HLD (hyperlipidemia)   Patient is chronically on statin.will continue for now. Monitor clinically. Last LDL was   Lab Results   Component Value Date    LDLCALC 48.0 (L) 08/11/2023         VTE Risk Mitigation (From admission, onward)           Ordered     IP VTE LOW RISK PATIENT  Once         05/14/24 1912     Place sequential compression device  Until discontinued         05/14/24 1912                    Discharge Planning   MAGNO: 5/20/2024     Code Status: Full Code   Is the patient medically ready for discharge?:     Reason for patient still in hospital (select all that apply): Patient trending condition and Consult recommendations  Discharge Plan A: Home, Home Health, Other (IV Infusion)                  Harmony Hooks,  MD  Department of Hospital Medicine   [facility] Stepdown

## 2024-05-18 LAB
ALBUMIN SERPL BCP-MCNC: 4.1 G/DL (ref 3.5–5.2)
ALP SERPL-CCNC: 88 U/L (ref 55–135)
ALT SERPL W/O P-5'-P-CCNC: 19 U/L (ref 10–44)
ANION GAP SERPL CALC-SCNC: 11 MMOL/L (ref 8–16)
AST SERPL-CCNC: 34 U/L (ref 10–40)
BASOPHILS # BLD AUTO: 0.05 K/UL (ref 0–0.2)
BASOPHILS NFR BLD: 0.6 % (ref 0–1.9)
BILIRUB SERPL-MCNC: 2 MG/DL (ref 0.1–1)
BUN SERPL-MCNC: 18 MG/DL (ref 8–23)
CALCIUM SERPL-MCNC: 9.7 MG/DL (ref 8.7–10.5)
CHLORIDE SERPL-SCNC: 104 MMOL/L (ref 95–110)
CO2 SERPL-SCNC: 23 MMOL/L (ref 23–29)
CREAT SERPL-MCNC: 1.2 MG/DL (ref 0.5–1.4)
DIFFERENTIAL METHOD BLD: ABNORMAL
EOSINOPHIL # BLD AUTO: 0.1 K/UL (ref 0–0.5)
EOSINOPHIL NFR BLD: 1.2 % (ref 0–8)
ERYTHROCYTE [DISTWIDTH] IN BLOOD BY AUTOMATED COUNT: 13.7 % (ref 11.5–14.5)
EST. GFR  (NO RACE VARIABLE): >60 ML/MIN/1.73 M^2
GLUCOSE SERPL-MCNC: 88 MG/DL (ref 70–110)
HCT VFR BLD AUTO: 43.1 % (ref 40–54)
HGB BLD-MCNC: 14.3 G/DL (ref 14–18)
IMM GRANULOCYTES # BLD AUTO: 0.02 K/UL (ref 0–0.04)
IMM GRANULOCYTES NFR BLD AUTO: 0.2 % (ref 0–0.5)
LYMPHOCYTES # BLD AUTO: 0.9 K/UL (ref 1–4.8)
LYMPHOCYTES NFR BLD: 11.3 % (ref 18–48)
MAGNESIUM SERPL-MCNC: 2.1 MG/DL (ref 1.6–2.6)
MCH RBC QN AUTO: 29.4 PG (ref 27–31)
MCHC RBC AUTO-ENTMCNC: 33.2 G/DL (ref 32–36)
MCV RBC AUTO: 89 FL (ref 82–98)
MONOCYTES # BLD AUTO: 0.8 K/UL (ref 0.3–1)
MONOCYTES NFR BLD: 10.2 % (ref 4–15)
NEUTROPHILS # BLD AUTO: 6.3 K/UL (ref 1.8–7.7)
NEUTROPHILS NFR BLD: 76.5 % (ref 38–73)
NRBC BLD-RTO: 0 /100 WBC
OHS QRS DURATION: 146 MS
OHS QTC CALCULATION: 532 MS
PHOSPHATE SERPL-MCNC: 2.4 MG/DL (ref 2.7–4.5)
PLATELET # BLD AUTO: 207 K/UL (ref 150–450)
PMV BLD AUTO: 10.7 FL (ref 9.2–12.9)
POTASSIUM SERPL-SCNC: 4 MMOL/L (ref 3.5–5.1)
PROT SERPL-MCNC: 7.7 G/DL (ref 6–8.4)
RBC # BLD AUTO: 4.87 M/UL (ref 4.6–6.2)
SODIUM SERPL-SCNC: 138 MMOL/L (ref 136–145)
WBC # BLD AUTO: 8.2 K/UL (ref 3.9–12.7)

## 2024-05-18 PROCEDURE — 87040 BLOOD CULTURE FOR BACTERIA: CPT | Mod: 59 | Performed by: HOSPITALIST

## 2024-05-18 PROCEDURE — 80053 COMPREHEN METABOLIC PANEL: CPT | Performed by: HOSPITALIST

## 2024-05-18 PROCEDURE — 83735 ASSAY OF MAGNESIUM: CPT | Performed by: HOSPITALIST

## 2024-05-18 PROCEDURE — 87102 FUNGUS ISOLATION CULTURE: CPT | Performed by: STUDENT IN AN ORGANIZED HEALTH CARE EDUCATION/TRAINING PROGRAM

## 2024-05-18 PROCEDURE — 85025 COMPLETE CBC W/AUTO DIFF WBC: CPT | Performed by: HOSPITALIST

## 2024-05-18 PROCEDURE — 84100 ASSAY OF PHOSPHORUS: CPT | Performed by: HOSPITALIST

## 2024-05-18 PROCEDURE — 63600175 PHARM REV CODE 636 W HCPCS: Performed by: NURSE PRACTITIONER

## 2024-05-18 PROCEDURE — 99232 SBSQ HOSP IP/OBS MODERATE 35: CPT | Mod: ,,, | Performed by: INTERNAL MEDICINE

## 2024-05-18 PROCEDURE — 87070 CULTURE OTHR SPECIMN AEROBIC: CPT | Performed by: STUDENT IN AN ORGANIZED HEALTH CARE EDUCATION/TRAINING PROGRAM

## 2024-05-18 PROCEDURE — 99232 SBSQ HOSP IP/OBS MODERATE 35: CPT | Mod: GC,,, | Performed by: INTERNAL MEDICINE

## 2024-05-18 PROCEDURE — 87075 CULTR BACTERIA EXCEPT BLOOD: CPT | Performed by: STUDENT IN AN ORGANIZED HEALTH CARE EDUCATION/TRAINING PROGRAM

## 2024-05-18 PROCEDURE — 25000003 PHARM REV CODE 250: Performed by: NURSE PRACTITIONER

## 2024-05-18 PROCEDURE — 36415 COLL VENOUS BLD VENIPUNCTURE: CPT | Performed by: HOSPITALIST

## 2024-05-18 PROCEDURE — 87206 SMEAR FLUORESCENT/ACID STAI: CPT | Performed by: STUDENT IN AN ORGANIZED HEALTH CARE EDUCATION/TRAINING PROGRAM

## 2024-05-18 PROCEDURE — 20600001 HC STEP DOWN PRIVATE ROOM

## 2024-05-18 PROCEDURE — 87116 MYCOBACTERIA CULTURE: CPT | Performed by: STUDENT IN AN ORGANIZED HEALTH CARE EDUCATION/TRAINING PROGRAM

## 2024-05-18 RX ADMIN — CEFAZOLIN 2 G: 2 INJECTION, POWDER, FOR SOLUTION INTRAMUSCULAR; INTRAVENOUS at 08:05

## 2024-05-18 RX ADMIN — ASPIRIN 81 MG: 81 TABLET, COATED ORAL at 09:05

## 2024-05-18 RX ADMIN — ATORVASTATIN CALCIUM 80 MG: 40 TABLET, FILM COATED ORAL at 08:05

## 2024-05-18 RX ADMIN — EZETIMIBE 10 MG: 10 TABLET ORAL at 09:05

## 2024-05-18 RX ADMIN — CEFAZOLIN 2 G: 2 INJECTION, POWDER, FOR SOLUTION INTRAMUSCULAR; INTRAVENOUS at 05:05

## 2024-05-18 RX ADMIN — Medication 100 MG: at 09:05

## 2024-05-18 RX ADMIN — ASPIRIN 81 MG: 81 TABLET, COATED ORAL at 08:05

## 2024-05-18 RX ADMIN — CEFAZOLIN 2 G: 2 INJECTION, POWDER, FOR SOLUTION INTRAMUSCULAR; INTRAVENOUS at 01:05

## 2024-05-18 RX ADMIN — THERA TABS 1 TABLET: TAB at 09:05

## 2024-05-18 NOTE — SUBJECTIVE & OBJECTIVE
Interval History: TREVA, s/p extraction, denies fevers or significant pain    Review of Systems  Constitutional:  Positive for activity change, appetite change and fatigue. Negative for chills and fever.   HENT:  Negative for trouble swallowing.    Respiratory:  Negative for cough and shortness of breath.    Gastrointestinal:  Negative for abdominal pain, blood in stool, diarrhea and vomiting.   Genitourinary:  Negative for dysuria and hematuria.   Musculoskeletal:  Negative for arthralgias, joint swelling and neck stiffness.   Skin:  Positive for wound.   Neurological:  Negative for syncope.   Psychiatric/Behavioral:  Negative for confusion.    Objective:     Objective:     Vital Signs (Most Recent):  Temp: 97.4 °F (36.3 °C) (05/18/24 0752)  Pulse: 60 (05/18/24 0752)  Resp: 18 (05/18/24 0752)  BP: 119/71 (05/18/24 0752)  SpO2: 97 % (05/18/24 0752) Vital Signs (24h Range):  Temp:  [97.4 °F (36.3 °C)-98.7 °F (37.1 °C)] 97.4 °F (36.3 °C)  Pulse:  [59-77] 60  Resp:  [11-27] 18  SpO2:  [94 %-99 %] 97 %  BP: ()/(51-89) 119/71  Arterial Line BP: ()/(42-52) 125/52     Weight: 64 kg (141 lb)  Body mass index is 22.76 kg/m².    Estimated Creatinine Clearance: 70.9 mL/min (based on SCr of 0.9 mg/dL).     Physical Exam    Constitutional:       Appearance: Normal appearance.   HENT:      Head: Normocephalic and atraumatic.      Nose: Nose normal.      Mouth/Throat:      Mouth: Mucous membranes are moist.      Pharynx: Oropharynx is clear.   Eyes:      Conjunctiva/sclera: Conjunctivae normal.   Cardiovascular:      Rate and Rhythm: Normal rate and regular rhythm.      Pulses: Normal pulses.      Heart sounds: Normal heart sounds.      Comments: incision site bandaged, dry, no significant erythema or tenderness, drain present to left chest with serosanguinous fluid in drain bulb  Pulmonary:      Effort: Pulmonary effort is normal.      Breath sounds: Normal breath sounds.   Abdominal:      General: Bowel sounds are  normal.      Palpations: Abdomen is soft.      Tenderness: There is no guarding or rebound.   Musculoskeletal:         General: No deformity.      Cervical back: Neck supple.   Skin:     General: Skin is warm and dry. RIJ bandage c/d/I  Groin catheter entry site cd/i     Coloration: Skin is not jaundiced.      Findings: No rash.   Neurological:      Mental Status: He is alert and oriented to person, place, and time. Mental status is at baseline.   Significant Labs:   Microbiology Results (last 7 days)       Procedure Component Value Units Date/Time    Blood culture [[id number]]     Order Status: Sent Specimen: Blood     Blood culture [[id number]]     Order Status: Sent Specimen: Blood     Culture, Anaerobe [[id number]] Collected: 05/17/24 1507    Order Status: Completed Specimen: Incision site from Chest, Left Updated: 05/18/24 0912     Anaerobic Culture Culture in progress    Narrative:      Superficial pocket #3    Culture, Anaerobe [[id number]] Collected: 05/17/24 1507    Order Status: Completed Specimen: Incision site from Chest, Left Updated: 05/18/24 0912     Anaerobic Culture Culture in progress    Narrative:      Deep pocket #1    Culture, Anaerobe [[id number]] Collected: 05/17/24 1515    Order Status: Completed Specimen: Incision site from Chest, Left Updated: 05/18/24 0912     Anaerobic Culture Culture in progress    Narrative:      Deep pocket #4    Culture, Anaerobe [[id number]] Collected: 05/17/24 1507    Order Status: Completed Specimen: Incision site from Chest, Left Updated: 05/18/24 0912     Anaerobic Culture Culture in progress    Narrative:      Superficial pocket #1    Culture, Anaerobe [[id number]] Collected: 05/17/24 1507    Order Status: Completed Specimen: Incision site from Chest, Left Updated: 05/18/24 0912     Anaerobic Culture Culture in progress    Narrative:      Superficial pocket #2    Culture, Anaerobe [[id number]] Collected: 05/17/24 1515    Order Status: Completed Specimen:  Incision site from Chest, Left Updated: 05/18/24 0912     Anaerobic Culture Culture in progress    Narrative:      Deep pocket #2    Culture, Anaerobe [[id number]] Collected: 05/18/24 0704    Order Status: Sent Specimen: Abscess from Buttocks, Left Updated: 05/18/24 0741    AFB Culture & Smear [[id number]] Collected: 05/18/24 0704    Order Status: Sent Specimen: Abscess from Buttocks, Left Updated: 05/18/24 0741    Aerobic culture [[id number]] Collected: 05/18/24 0704    Order Status: Sent Specimen: Abscess from Buttocks, Left Updated: 05/18/24 0740    Fungus culture [[id number]] Collected: 05/18/24 0704    Order Status: Sent Specimen: Abscess from Buttocks, Left Updated: 05/18/24 0740    Aerobic culture [[id number]] Collected: 05/17/24 1515    Order Status: Completed Specimen: Incision site from Chest, Left Updated: 05/18/24 0709     Aerobic Bacterial Culture No growth    Narrative:      Deep pocket #2    Aerobic culture [[id number]] Collected: 05/17/24 1515    Order Status: Completed Specimen: Incision site from Chest, Left Updated: 05/18/24 0709     Aerobic Bacterial Culture No growth    Narrative:      Deep pocket #4    IV catheter culture [[id number]] Collected: 05/17/24 1535    Order Status: Completed Specimen: Catheter Tip, Implanted Updated: 05/18/24 0709     Aerobic Culture - Cath tip No growth    Narrative:      RA lead tip    Aerobic culture [[id number]] Collected: 05/17/24 1507    Order Status: Completed Specimen: Incision site from Chest, Left Updated: 05/18/24 0709     Aerobic Bacterial Culture No growth    Narrative:      Superficial pocket #1    Aerobic culture [[id number]] Collected: 05/17/24 1507    Order Status: Completed Specimen: Incision site from Chest, Left Updated: 05/18/24 0709     Aerobic Bacterial Culture No growth    Narrative:      Deep pocket #1    Aerobic culture [[id number]] Collected: 05/17/24 1507    Order Status: Completed Specimen: Incision site from Chest, Left Updated: 05/18/24  0708     Aerobic Bacterial Culture No growth    Narrative:      Superficial pocket #2    Aerobic culture [[id number]] Collected: 05/17/24 1507    Order Status: Completed Specimen: Incision site from Chest, Left Updated: 05/18/24 0708     Aerobic Bacterial Culture No growth    Narrative:      Superficial pocket #3    Blood Culture #2 **CANNOT BE ORDERED STAT** [[id number]] Collected: 05/14/24 1643    Order Status: Completed Specimen: Blood from Peripheral, Hand, Right Updated: 05/17/24 1812     Blood Culture, Routine No Growth to date      No Growth to date      No Growth to date      No Growth to date    Blood Culture #1 **CANNOT BE ORDERED STAT** [[id number]] Collected: 05/14/24 1644    Order Status: Completed Specimen: Blood from Peripheral, Antecubital, Left Updated: 05/17/24 1812     Blood Culture, Routine No Growth to date      No Growth to date      No Growth to date      No Growth to date    IV catheter culture [[id number]] Collected: 05/17/24 1535    Order Status: Sent Specimen: Catheter Tip, Implanted Updated: 05/17/24 1630          Recent Lab Results         05/17/24  1558   05/17/24  1535   05/17/24  1515   05/17/24  1507        Aerobic Bacterial Culture     No growth  [P]   No growth  [P]            No growth  [P]   No growth  [P]              No growth  [P]              No growth  [P]       Aerobic Culture - Cath tip   No growth  [P]           Anaerobe Culture     Culture in progress  [P]   Culture in progress  [P]            Culture in progress  [P]   Culture in progress  [P]              Culture in progress  [P]              Culture in progress  [P]       Ascending aorta 2.8             BSA 1.77             Sinus 3.2             STJ 2.6                      [P] - Preliminary Result               Significant Imaging: I have reviewed all pertinent imaging results/findings within the past 24 hours.

## 2024-05-18 NOTE — PLAN OF CARE
Plan of care reviewed with patient. Call light within reach, fall precautions maintained bed alarm set. Patient made aware. Nurse instructed patient to call for assistance. Patient verbalized understanding. Telemetry monitor throughout shift. NAD noted. Will continue to monitor and continue plan of care.       Problem: Adult Inpatient Plan of Care  Goal: Plan of Care Review  Outcome: Progressing  Goal: Patient-Specific Goal (Individualized)  Outcome: Progressing  Goal: Absence of Hospital-Acquired Illness or Injury  Outcome: Progressing  Goal: Optimal Comfort and Wellbeing  Outcome: Progressing  Goal: Readiness for Transition of Care  Outcome: Progressing     Problem: Infection  Goal: Absence of Infection Signs and Symptoms  Outcome: Progressing     Problem: Wound  Goal: Optimal Coping  Outcome: Progressing  Goal: Optimal Functional Ability  Outcome: Progressing  Goal: Absence of Infection Signs and Symptoms  Outcome: Progressing  Goal: Improved Oral Intake  Outcome: Progressing  Goal: Optimal Pain Control and Function  Outcome: Progressing  Goal: Skin Health and Integrity  Outcome: Progressing  Goal: Optimal Wound Healing  Outcome: Progressing

## 2024-05-18 NOTE — ASSESSMENT & PLAN NOTE
Addended by: Conchita Flynn on: 2/27/2023 11:52 AM     Modules accepted: Orders S/p device extraction and Temp Perm placement pending repeat bcx and recovery for possible reimplantation when cx negative for 72 hours.     - Appreciate ID recommendations  - Continue with antibiotics  - Follow up Blood cultures and device cultures post extraction; Repeated cultures today   - Patient is pacer dependent, currently Rvp ~95%  - Device extraction today with re-implantation early next week

## 2024-05-18 NOTE — PLAN OF CARE
Patient alert and oriented, back from procedure, denies pain, VS WNL, comfort measures given, plan of care discussed with patient, will continue to monitor.    Problem: Adult Inpatient Plan of Care  Goal: Plan of Care Review  Outcome: Progressing  Goal: Absence of Hospital-Acquired Illness or Injury  Outcome: Progressing  Goal: Optimal Comfort and Wellbeing  Outcome: Progressing  Goal: Readiness for Transition of Care  Outcome: Progressing     Problem: Infection  Goal: Absence of Infection Signs and Symptoms  Outcome: Progressing     Problem: Wound  Goal: Optimal Coping  Outcome: Progressing

## 2024-05-18 NOTE — SUBJECTIVE & OBJECTIVE
Interval History:   No overnight acute events.   Tolerated procedure well. MODESTA drain with 25 cc serosanguinous OP      Review of Systems   Constitutional: Negative for chills, decreased appetite and fever.   HENT:  Negative for congestion.    Cardiovascular:  Negative for chest pain, dyspnea on exertion, leg swelling, orthopnea, palpitations and paroxysmal nocturnal dyspnea.   Respiratory:  Negative for shortness of breath.    Gastrointestinal:  Negative for abdominal pain.   Neurological:  Negative for light-headedness.   Psychiatric/Behavioral:  The patient is not nervous/anxious.      Objective:     Vital Signs (Most Recent):  Temp: 97.4 °F (36.3 °C) (05/18/24 0752)  Pulse: 60 (05/18/24 0752)  Resp: 18 (05/18/24 0752)  BP: 119/71 (05/18/24 0752)  SpO2: 97 % (05/18/24 0752) Vital Signs (24h Range):  Temp:  [97.4 °F (36.3 °C)-98.7 °F (37.1 °C)] 97.4 °F (36.3 °C)  Pulse:  [59-77] 60  Resp:  [11-27] 18  SpO2:  [94 %-99 %] 97 %  BP: ()/(51-89) 119/71  Arterial Line BP: ()/(42-52) 125/52     Weight: 64 kg (141 lb)  Body mass index is 22.76 kg/m².     SpO2: 97 %        Physical Exam  Constitutional:       Appearance: Normal appearance.   HENT:      Head: Atraumatic.   Eyes:      Conjunctiva/sclera: Conjunctivae normal.   Cardiovascular:      Rate and Rhythm: Normal rate and regular rhythm.      Heart sounds: No murmur heard.     Comments: Paced rhythm   Temp Perm site stable  Incision c/d/I  Pressure dressing removed  MODESTA drain with 25 cc out serosanguinous op  Pulmonary:      Effort: Pulmonary effort is normal.      Breath sounds: Normal breath sounds.   Abdominal:      General: There is no distension.      Palpations: Abdomen is soft.   Musculoskeletal:      Right lower leg: No edema.      Left lower leg: No edema.   Skin:     General: Skin is warm.   Neurological:      Mental Status: He is alert.   Psychiatric:         Mood and Affect: Mood normal.            Significant Labs: EP: No results for input(s):  ""NA", "K", "CL", "CO2", "GLU", "BUN", "CREATININE", "CALCIUM", "PROT", "ALBUMIN", "BILITOT", "ALKPHOS", "AST", "ALT", "ANIONGAP", "ESTGFRAFRICA", "EGFRNONAA", "WBC", "HGB", "HCT", "PLT", "INR", "PROTIME" in the last 48 hours., ABG: No results for input(s): "PH", "PCO2", "HCO3", "POCSATURATED", "BE" in the last 48 hours., Blood Culture: No results for input(s): "LABBLOO" in the last 48 hours., BMP: No results for input(s): "GLU", "NA", "K", "CL", "CO2", "BUN", "CREATININE", "CALCIUM", "MG" in the last 48 hours., CMP: No results for input(s): "NA", "K", "CL", "CO2", "GLU", "BUN", "CREATININE", "CALCIUM", "PROT", "ALBUMIN", "BILITOT", "ALKPHOS", "AST", "ALT", "ANIONGAP", "ESTGFRAFRICA", "EGFRNONAA" in the last 48 hours., CBC: No results for input(s): "WBC", "HGB", "HCT", "PLT" in the last 48 hours., INR: No results for input(s): "INR", "PROTIME" in the last 48 hours., Lipid Panel No results for input(s): "CHOL", "HDL", "LDLCALC", "TRIG", "CHOLHDL" in the last 48 hours.,   Pathology Results  (Last 10 years)                 08/05/22 1901  Specimen to Pathology, Surgery Gastrointestinal tract Final result    Narrative:  Pre-op Diagnosis: Colon cancer screening [Z12.11]   Procedure(s):   COLONOSCOPY   Number of specimens: 1   Name of specimens:   #1 = sigmoid colon abnormal mucosa cold bx   Which provider would you like to cc?->DELFINA DELEON   Release to patient->Immediate   Specimen total (fresh, frozen, permanent):->1           , Troponin No results for input(s): "TROPONINI" in the last 48 hours., and All pertinent lab results from the last 24 hours have been reviewed.     "

## 2024-05-18 NOTE — SUBJECTIVE & OBJECTIVE
Interval History: No acute events overnight.  No complaints.  Device extraction and temporary pacemaker placement with MODESTA drain done yesterday.  Wound cx taken last night and mislabeled as butt abscess.  Feels well today.    Review of Systems   Constitutional:  Negative for chills, fatigue and fever.   Respiratory:  Negative for cough and shortness of breath.    Cardiovascular:  Negative for chest pain, palpitations and leg swelling.   Gastrointestinal:  Negative for abdominal pain, diarrhea, nausea and vomiting.   Genitourinary:  Negative for dysuria and urgency.   Neurological:  Negative for dizziness and headaches.   All other systems reviewed and are negative.    Objective:     Vital Signs (Most Recent):  Temp: 97.4 °F (36.3 °C) (05/18/24 0752)  Pulse: 60 (05/18/24 0752)  Resp: 18 (05/18/24 0752)  BP: 119/71 (05/18/24 0752)  SpO2: 97 % (05/18/24 0752) Vital Signs (24h Range):  Temp:  [97.4 °F (36.3 °C)-98.7 °F (37.1 °C)] 97.4 °F (36.3 °C)  Pulse:  [59-77] 60  Resp:  [11-27] 18  SpO2:  [94 %-99 %] 97 %  BP: ()/(51-89) 119/71  Arterial Line BP: ()/(42-52) 125/52     Weight: 64 kg (141 lb)  Body mass index is 22.76 kg/m².    Intake/Output Summary (Last 24 hours) at 5/18/2024 1021  Last data filed at 5/18/2024 0546  Gross per 24 hour   Intake 850 ml   Output 900 ml   Net -50 ml         Physical Exam  Constitutional:       Appearance: Normal appearance.   HENT:      Head: Normocephalic and atraumatic.   Cardiovascular:      Rate and Rhythm: Normal rate and regular rhythm.      Heart sounds: No murmur heard.     Comments: Temporary pacemaker in place with MODESTA drain  Pulmonary:      Effort: Pulmonary effort is normal. No respiratory distress.      Breath sounds: Normal breath sounds. No wheezing or rales.   Abdominal:      General: There is no distension.      Palpations: Abdomen is soft.      Tenderness: There is no abdominal tenderness.   Musculoskeletal:         General: No deformity.   Neurological:       "General: No focal deficit present.      Mental Status: He is alert and oriented to person, place, and time. Mental status is at baseline.             Significant Labs: All pertinent labs within the past 24 hours have been reviewed.  Blood Culture:   No results for input(s): "LABBLOO" in the last 48 hours.    CBC:   No results for input(s): "WBC", "HGB", "HCT", "PLT" in the last 48 hours.    CMP:   No results for input(s): "NA", "K", "CL", "CO2", "GLU", "BUN", "CREATININE", "CALCIUM", "PROT", "ALBUMIN", "BILITOT", "ALKPHOS", "AST", "ALT", "ANIONGAP", "EGFRNONAA" in the last 48 hours.    Invalid input(s): "ESTGFAFRICA"      Significant Imaging: I have reviewed all pertinent imaging results/findings within the past 24 hours.  "

## 2024-05-18 NOTE — ASSESSMENT & PLAN NOTE
68 year old male with history of CAD s/p CABG, heart block s/p dual chamber pacemaker in July 2022 and MSSA bacteremia (12/14-12/16, cleared 12/17, unclear source, CHARMAINE and MRI spine neg, 4w cefazolin, surveillance blood cultures negative), admitted this time for pacemaker infection.      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 High likelihood this is MSSA given recent history of bacteremia. So far no evidence of bacteremia but blood cultures are still incubating. So far no bacteremia noted, remains afebrile, without leucocytosis and HDS.     Status post successful extraction on 5/17/24. Operative cultures are incubating. Remains clinically stable. Per EP, they plan on implanation of new device at contralateral side if bcx remain with no growth in 72 hours. Agree with plan and will follow closely       Recommendations     -Continue cefazolin IV for now - follow up repeat blood cultures and operative cultures.      -Plan on a prolonged course of IV antibiotics. Discussed with patient.

## 2024-05-18 NOTE — ASSESSMENT & PLAN NOTE
Admitted for a pacemaker infection  Started on empiric Ancef while awaiting cultures given history of MSSA bacteremia, currently NGTD  ID following:  Likely to need IV antibiotics on discharge  EP was consulted  S/p pacemaker removal on 5/17 with temporary pacemaker and MODESTA drain placement, and new permanent pacemaker placement on Monday 5/20.  Intra-op cultures 5/17 NGTD  Post procedure wound x 5/18 NGTD but mislabeled as butt abscess

## 2024-05-18 NOTE — PROGRESS NOTES
[facility] - [facility]  Cardiac Electrophysiology  Progress Note    Admission Date: 5/14/2024  Code Status: Full Code   Attending Physician: Harmony Hooks MD   Expected Discharge Date: 5/20/2024  Principal Problem:Infection associated with cardiovascular device    Subjective:     Interval History:   No overnight acute events.   Tolerated procedure well. MODESTA drain with 25 cc serosanguinous OP      Review of Systems   Constitutional: Negative for chills, decreased appetite and fever.   HENT:  Negative for congestion.    Cardiovascular:  Negative for chest pain, dyspnea on exertion, leg swelling, orthopnea, palpitations and paroxysmal nocturnal dyspnea.   Respiratory:  Negative for shortness of breath.    Gastrointestinal:  Negative for abdominal pain.   Neurological:  Negative for light-headedness.   Psychiatric/Behavioral:  The patient is not nervous/anxious.      Objective:     Vital Signs (Most Recent):  Temp: 97.4 °F (36.3 °C) (05/18/24 0752)  Pulse: 60 (05/18/24 0752)  Resp: 18 (05/18/24 0752)  BP: 119/71 (05/18/24 0752)  SpO2: 97 % (05/18/24 0752) Vital Signs (24h Range):  Temp:  [97.4 °F (36.3 °C)-98.7 °F (37.1 °C)] 97.4 °F (36.3 °C)  Pulse:  [59-77] 60  Resp:  [11-27] 18  SpO2:  [94 %-99 %] 97 %  BP: ()/(51-89) 119/71  Arterial Line BP: ()/(42-52) 125/52     Weight: 64 kg (141 lb)  Body mass index is 22.76 kg/m².     SpO2: 97 %        Physical Exam  Constitutional:       Appearance: Normal appearance.   HENT:      Head: Atraumatic.   Eyes:      Conjunctiva/sclera: Conjunctivae normal.   Cardiovascular:      Rate and Rhythm: Normal rate and regular rhythm.      Heart sounds: No murmur heard.     Comments: Paced rhythm   Temp Perm site stable  Incision c/d/I  Pressure dressing removed  MODESTA drain with 25 cc out serosanguinous op  Pulmonary:      Effort: Pulmonary effort is normal.      Breath sounds: Normal breath sounds.   Abdominal:      General: There is no distension.      Palpations:  "Abdomen is soft.   Musculoskeletal:      Right lower leg: No edema.      Left lower leg: No edema.   Skin:     General: Skin is warm.   Neurological:      Mental Status: He is alert.   Psychiatric:         Mood and Affect: Mood normal.            Significant Labs: EP: No results for input(s): "NA", "K", "CL", "CO2", "GLU", "BUN", "CREATININE", "CALCIUM", "PROT", "ALBUMIN", "BILITOT", "ALKPHOS", "AST", "ALT", "ANIONGAP", "ESTGFRAFRICA", "EGFRNONAA", "WBC", "HGB", "HCT", "PLT", "INR", "PROTIME" in the last 48 hours., ABG: No results for input(s): "PH", "PCO2", "HCO3", "POCSATURATED", "BE" in the last 48 hours., Blood Culture: No results for input(s): "LABBLOO" in the last 48 hours., BMP: No results for input(s): "GLU", "NA", "K", "CL", "CO2", "BUN", "CREATININE", "CALCIUM", "MG" in the last 48 hours., CMP: No results for input(s): "NA", "K", "CL", "CO2", "GLU", "BUN", "CREATININE", "CALCIUM", "PROT", "ALBUMIN", "BILITOT", "ALKPHOS", "AST", "ALT", "ANIONGAP", "ESTGFRAFRICA", "EGFRNONAA" in the last 48 hours., CBC: No results for input(s): "WBC", "HGB", "HCT", "PLT" in the last 48 hours., INR: No results for input(s): "INR", "PROTIME" in the last 48 hours., Lipid Panel No results for input(s): "CHOL", "HDL", "LDLCALC", "TRIG", "CHOLHDL" in the last 48 hours.,   Pathology Results  (Last 10 years)                 08/05/22 0516  Specimen to Pathology, Surgery Gastrointestinal tract Final result    Narrative:  Pre-op Diagnosis: Colon cancer screening [Z12.11]   Procedure(s):   COLONOSCOPY   Number of specimens: 1   Name of specimens:   #1 = sigmoid colon abnormal mucosa cold bx   Which provider would you like to cc?->DELFINA DELEON   Release to patient->Immediate   Specimen total (fresh, frozen, permanent):->1           , Troponin No results for input(s): "TROPONINI" in the last 48 hours., and All pertinent lab results from the last 24 hours have been reviewed.     Assessment and Plan:     * Infection associated with " cardiovascular device  S/p device extraction and Temp Perm placement pending repeat bcx and recovery for possible reimplantation when cx negative for 72 hours.     - Appreciate ID recommendations  - Continue with antibiotics  - Follow up Blood cultures and device cultures post extraction; Repeated cultures today   - Patient is pacer dependent, currently Rvp ~95%  - Device extraction today with re-implantation early next week         Barbara Tran MD  Cardiac Electrophysiology  [facility] - [facility] Step[facility]

## 2024-05-18 NOTE — PROGRESS NOTES
[facility] - Cardiology Stepdown  Infectious Disease  Progress Note    Patient Name: Michael Espinoza  MRN: [id number]  Admission Date: 5/14/2024  Length of Stay: 3 days  Attending Physician: Harmony Hooks MD  Primary Care Provider: Dominguez Hyatt MD    Isolation Status: No active isolations  Assessment/Plan:      ID  * Infection associated with cardiovascular device  68 year old male with history of CAD s/p CABG, heart block s/p dual chamber pacemaker in July 2022 and MSSA bacteremia (12/14-12/16, cleared 12/17, unclear source, CHARMAINE and MRI spine neg, 4w cefazolin, surveillance blood cultures negative), admitted this time for pacemaker infection.      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 High likelihood this is MSSA given recent history of bacteremia. So far no evidence of bacteremia but blood cultures are still incubating. So far no bacteremia noted, remains afebrile, without leucocytosis and HDS.     Status post successful extraction on 5/17/24. Operative cultures are incubating. Remains clinically stable. Per EP, they plan on implanation of new device at contralateral side if bcx remain with no growth in 72 hours. Agree with plan and will follow closely       Recommendations     -Continue cefazolin IV for now - follow up repeat blood cultures and operative cultures.      -Plan on a prolonged course of IV antibiotics. Discussed with patient.               Anticipated Disposition: TBD    Thank you for your consult. I will follow-up with patient. Please contact us if you have any additional questions.    Lencho Asencio MD  Infectious Disease  [facility] - [facility]    Subjective:     Principal Problem:Infection associated with cardiovascular device    HPI: 68 year old male with history of CAD s/p CABG, heart block s/p dual chamber  pacemaker in July 2022 and MSSA bacteremia (12/14-12/16, cleared 12/17, unclear source, CHARMAINE and MRI spine neg, 4w cefazolin, surveillance blood cultures negative), admitted this time for pacemaker infection.      Patient states about 2-3 weeks ago he noticed a brown lesion that was small and circular overlying pacemaker site, and slow started to get worse and changed to a reddish color over time associated with progressive tenderness to touch, but denies fever, rigors, chills, recent procedures, skin manipulation or recent trauma to the area. Furthermore, he denies chest pain. ID consulted for sent by EP due to concern for pacemaker pocket infection, Hx of MSSA bacteremia   Interval History: TREVA, s/p extraction, denies fevers or significant pain    Review of Systems  Constitutional:  Positive for activity change, appetite change and fatigue. Negative for chills and fever.   HENT:  Negative for trouble swallowing.    Respiratory:  Negative for cough and shortness of breath.    Gastrointestinal:  Negative for abdominal pain, blood in stool, diarrhea and vomiting.   Genitourinary:  Negative for dysuria and hematuria.   Musculoskeletal:  Negative for arthralgias, joint swelling and neck stiffness.   Skin:  Positive for wound.   Neurological:  Negative for syncope.   Psychiatric/Behavioral:  Negative for confusion.    Objective:     Objective:     Vital Signs (Most Recent):  Temp: 97.4 °F (36.3 °C) (05/18/24 0752)  Pulse: 60 (05/18/24 0752)  Resp: 18 (05/18/24 0752)  BP: 119/71 (05/18/24 0752)  SpO2: 97 % (05/18/24 0752) Vital Signs (24h Range):  Temp:  [97.4 °F (36.3 °C)-98.7 °F (37.1 °C)] 97.4 °F (36.3 °C)  Pulse:  [59-77] 60  Resp:  [11-27] 18  SpO2:  [94 %-99 %] 97 %  BP: ()/(51-89) 119/71  Arterial Line BP: ()/(42-52) 125/52     Weight: 64 kg (141 lb)  Body mass index is 22.76 kg/m².    Estimated Creatinine Clearance: 70.9 mL/min (based on SCr of 0.9 mg/dL).     Physical Exam    Constitutional:        Appearance: Normal appearance.   HENT:      Head: Normocephalic and atraumatic.      Nose: Nose normal.      Mouth/Throat:      Mouth: Mucous membranes are moist.      Pharynx: Oropharynx is clear.   Eyes:      Conjunctiva/sclera: Conjunctivae normal.   Cardiovascular:      Rate and Rhythm: Normal rate and regular rhythm.      Pulses: Normal pulses.      Heart sounds: Normal heart sounds.      Comments: incision site bandaged, dry, no significant erythema or tenderness, drain present to left chest with serosanguinous fluid in drain bulb  Pulmonary:      Effort: Pulmonary effort is normal.      Breath sounds: Normal breath sounds.   Abdominal:      General: Bowel sounds are normal.      Palpations: Abdomen is soft.      Tenderness: There is no guarding or rebound.   Musculoskeletal:         General: No deformity.      Cervical back: Neck supple.   Skin:     General: Skin is warm and dry. RIJ bandage c/d/I  Groin catheter entry site cd/i     Coloration: Skin is not jaundiced.      Findings: No rash.   Neurological:      Mental Status: He is alert and oriented to person, place, and time. Mental status is at baseline.   Significant Labs:   Microbiology Results (last 7 days)       Procedure Component Value Units Date/Time    Blood culture [[id number]]     Order Status: Sent Specimen: Blood     Blood culture [[id number]]     Order Status: Sent Specimen: Blood     Culture, Anaerobe [[id number]] Collected: 05/17/24 1507    Order Status: Completed Specimen: Incision site from Chest, Left Updated: 05/18/24 0912     Anaerobic Culture Culture in progress    Narrative:      Superficial pocket #3    Culture, Anaerobe [[id number]] Collected: 05/17/24 1507    Order Status: Completed Specimen: Incision site from Chest, Left Updated: 05/18/24 0912     Anaerobic Culture Culture in progress    Narrative:      Deep pocket #1    Culture, Anaerobe [[id number]] Collected: 05/17/24 1515    Order Status: Completed Specimen: Incision site  from Chest, Left Updated: 05/18/24 0912     Anaerobic Culture Culture in progress    Narrative:      Deep pocket #4    Culture, Anaerobe [[id number]] Collected: 05/17/24 1507    Order Status: Completed Specimen: Incision site from Chest, Left Updated: 05/18/24 0912     Anaerobic Culture Culture in progress    Narrative:      Superficial pocket #1    Culture, Anaerobe [[id number]] Collected: 05/17/24 1507    Order Status: Completed Specimen: Incision site from Chest, Left Updated: 05/18/24 0912     Anaerobic Culture Culture in progress    Narrative:      Superficial pocket #2    Culture, Anaerobe [[id number]] Collected: 05/17/24 1515    Order Status: Completed Specimen: Incision site from Chest, Left Updated: 05/18/24 0912     Anaerobic Culture Culture in progress    Narrative:      Deep pocket #2    Culture, Anaerobe [[id number]] Collected: 05/18/24 0704    Order Status: Sent Specimen: Abscess from Buttocks, Left Updated: 05/18/24 0741    AFB Culture & Smear [[id number]] Collected: 05/18/24 0704    Order Status: Sent Specimen: Abscess from Buttocks, Left Updated: 05/18/24 0741    Aerobic culture [[id number]] Collected: 05/18/24 0704    Order Status: Sent Specimen: Abscess from Buttocks, Left Updated: 05/18/24 0740    Fungus culture [[id number]] Collected: 05/18/24 0704    Order Status: Sent Specimen: Abscess from Buttocks, Left Updated: 05/18/24 0740    Aerobic culture [[id number]] Collected: 05/17/24 1515    Order Status: Completed Specimen: Incision site from Chest, Left Updated: 05/18/24 0709     Aerobic Bacterial Culture No growth    Narrative:      Deep pocket #2    Aerobic culture [[id number]] Collected: 05/17/24 1515    Order Status: Completed Specimen: Incision site from Chest, Left Updated: 05/18/24 0709     Aerobic Bacterial Culture No growth    Narrative:      Deep pocket #4    IV catheter culture [[id number]] Collected: 05/17/24 1535    Order Status: Completed Specimen: Catheter Tip, Implanted Updated:  05/18/24 0709     Aerobic Culture - Cath tip No growth    Narrative:      RA lead tip    Aerobic culture [[id number]] Collected: 05/17/24 1507    Order Status: Completed Specimen: Incision site from Chest, Left Updated: 05/18/24 0709     Aerobic Bacterial Culture No growth    Narrative:      Superficial pocket #1    Aerobic culture [[id number]] Collected: 05/17/24 1507    Order Status: Completed Specimen: Incision site from Chest, Left Updated: 05/18/24 0709     Aerobic Bacterial Culture No growth    Narrative:      Deep pocket #1    Aerobic culture [[id number]] Collected: 05/17/24 1507    Order Status: Completed Specimen: Incision site from Chest, Left Updated: 05/18/24 0708     Aerobic Bacterial Culture No growth    Narrative:      Superficial pocket #2    Aerobic culture [[id number]] Collected: 05/17/24 1507    Order Status: Completed Specimen: Incision site from Chest, Left Updated: 05/18/24 0708     Aerobic Bacterial Culture No growth    Narrative:      Superficial pocket #3    Blood Culture #2 **CANNOT BE ORDERED STAT** [[id number]] Collected: 05/14/24 1643    Order Status: Completed Specimen: Blood from Peripheral, Hand, Right Updated: 05/17/24 1812     Blood Culture, Routine No Growth to date      No Growth to date      No Growth to date      No Growth to date    Blood Culture #1 **CANNOT BE ORDERED STAT** [[id number]] Collected: 05/14/24 1644    Order Status: Completed Specimen: Blood from Peripheral, Antecubital, Left Updated: 05/17/24 1812     Blood Culture, Routine No Growth to date      No Growth to date      No Growth to date      No Growth to date    IV catheter culture [[id number]] Collected: 05/17/24 1535    Order Status: Sent Specimen: Catheter Tip, Implanted Updated: 05/17/24 1630          Recent Lab Results         05/17/24  1558   05/17/24  1535   05/17/24  1515   05/17/24  1507        Aerobic Bacterial Culture     No growth  [P]   No growth  [P]            No growth  [P]   No growth  [P]               No growth  [P]              No growth  [P]       Aerobic Culture - Cath tip   No growth  [P]           Anaerobe Culture     Culture in progress  [P]   Culture in progress  [P]            Culture in progress  [P]   Culture in progress  [P]              Culture in progress  [P]              Culture in progress  [P]       Ascending aorta 2.8             BSA 1.77             Sinus 3.2             STJ 2.6                      [P] - Preliminary Result               Significant Imaging: I have reviewed all pertinent imaging results/findings within the past 24 hours.

## 2024-05-18 NOTE — PROGRESS NOTES
"[facility] - Cardiology [facility]  Progress Note    Patient Name: Michael Espinoza  MRN: [id number]  Patient Class: IP- Inpatient   Admission Date: 5/14/2024  Length of Stay: 3 days  Attending Physician: Harmony Hooks MD  Primary Care Provider: Dominguez Hyatt MD        Subjective:     Principal Problem:Infection associated with cardiovascular device        HPI:  Michael Espinoza is 68 y.o. male with a PMHx of CAD s/p CABG x3, HLD, Gilbert's syndrome, MSSA bacteremia, and heart block s/p dual chamber pacemaker in July 2022 with Dr. Canales who presents to the ED for tenderness and redness around his pacemaker site for the past week. He states that about a month ago he started noticing that his device was "creeping" closer to the skin but it was not bothering him. He was admitted in December for MSSA bacteremia and completed IV antibiotics. Blood cultures cleared. Patient denies fever or chills recently. Also denies chest pain, shortness of breath, palpitations.    In the ED, pt mildly hypertensive which improved without treatment otherwise vitals stable, afebrile. CBC unremarkable. Bicarb 20. Tbili 1.7 (chronic issue). CRP <0.3. Blood cxs in process. The patient received vancomycin and zosyn. EP consulted in the ED and recommend CXR, IV abx, and ID consult.    Overview/Hospital Course:  Mr. Espinoza was admitted to Hospital Medicine for management of a pacemaker infection.  ID was consulted.  He was started on Cefazolin while waiting on blood cultures.  EP was consulted, who plan for pacemaker removal on 5/17 with temporary pacemaker placement, and new permanent pacemaker placement on Monday 5/20.    Interval History: No acute events overnight.  No complaints.  Device extraction and temporary pacemaker placement with MODESTA drain done yesterday.  Wound cx taken last night and mislabeled as butt abscess.  Feels well today.    Review of Systems   Constitutional:  Negative for chills, fatigue and fever. " "  Respiratory:  Negative for cough and shortness of breath.    Cardiovascular:  Negative for chest pain, palpitations and leg swelling.   Gastrointestinal:  Negative for abdominal pain, diarrhea, nausea and vomiting.   Genitourinary:  Negative for dysuria and urgency.   Neurological:  Negative for dizziness and headaches.   All other systems reviewed and are negative.    Objective:     Vital Signs (Most Recent):  Temp: 97.4 °F (36.3 °C) (05/18/24 0752)  Pulse: 60 (05/18/24 0752)  Resp: 18 (05/18/24 0752)  BP: 119/71 (05/18/24 0752)  SpO2: 97 % (05/18/24 0752) Vital Signs (24h Range):  Temp:  [97.4 °F (36.3 °C)-98.7 °F (37.1 °C)] 97.4 °F (36.3 °C)  Pulse:  [59-77] 60  Resp:  [11-27] 18  SpO2:  [94 %-99 %] 97 %  BP: ()/(51-89) 119/71  Arterial Line BP: ()/(42-52) 125/52     Weight: 64 kg (141 lb)  Body mass index is 22.76 kg/m².    Intake/Output Summary (Last 24 hours) at 5/18/2024 1021  Last data filed at 5/18/2024 0546  Gross per 24 hour   Intake 850 ml   Output 900 ml   Net -50 ml         Physical Exam  Constitutional:       Appearance: Normal appearance.   HENT:      Head: Normocephalic and atraumatic.   Cardiovascular:      Rate and Rhythm: Normal rate and regular rhythm.      Heart sounds: No murmur heard.     Comments: Temporary pacemaker in place with MODESTA drain  Pulmonary:      Effort: Pulmonary effort is normal. No respiratory distress.      Breath sounds: Normal breath sounds. No wheezing or rales.   Abdominal:      General: There is no distension.      Palpations: Abdomen is soft.      Tenderness: There is no abdominal tenderness.   Musculoskeletal:         General: No deformity.   Neurological:      General: No focal deficit present.      Mental Status: He is alert and oriented to person, place, and time. Mental status is at baseline.             Significant Labs: All pertinent labs within the past 24 hours have been reviewed.  Blood Culture:   No results for input(s): "LABBLOO" in the last 48 " "hours.    CBC:   No results for input(s): "WBC", "HGB", "HCT", "PLT" in the last 48 hours.    CMP:   No results for input(s): "NA", "K", "CL", "CO2", "GLU", "BUN", "CREATININE", "CALCIUM", "PROT", "ALBUMIN", "BILITOT", "ALKPHOS", "AST", "ALT", "ANIONGAP", "EGFRNONAA" in the last 48 hours.    Invalid input(s): "ESTGFAFRICA"      Significant Imaging: I have reviewed all pertinent imaging results/findings within the past 24 hours.    Assessment/Plan:      * Infection associated with cardiovascular device  Admitted for a pacemaker infection  Started on empiric Ancef while awaiting cultures given history of MSSA bacteremia, currently NGTD  ID following:  Likely to need IV antibiotics on discharge  EP was consulted  S/p pacemaker removal on 5/17 with temporary pacemaker and MODESTA drain placement, and new permanent pacemaker placement on Monday 5/20.  Intra-op cultures 5/17 NGTD  Post procedure wound x 5/18 NGTD but mislabeled as butt abscess    Complete heart block  S/p dual chamber pacemaker placement  7/5/2022    Coronary artery disease of native artery of native heart with stable angina pectoris  Patient with known CAD s/p CABG, which is controlled Will continue ASA and Statin and monitor for S/Sx of angina/ACS. Continue to monitor on telemetry.     Gilbert's syndrome  Chronic condition- reports diagnosed by Dr. Rowe years ago  T bili elevated on admission, similar to previous  Monitor CMP    HLD (hyperlipidemia)   Patient is chronically on statin.will continue for now. Monitor clinically. Last LDL was   Lab Results   Component Value Date    LDLCALC 48.0 (L) 08/11/2023         VTE Risk Mitigation (From admission, onward)           Ordered     IP VTE LOW RISK PATIENT  Once         05/14/24 1912     Place sequential compression device  Until discontinued         05/14/24 1912                    Discharge Planning   MAGNO: 5/20/2024     Code Status: Full Code   Is the patient medically ready for discharge?:     Reason for " patient still in hospital (select all that apply): Patient trending condition  Discharge Plan A: Home, Home Health, Other (IV Infusion)                  Harmony Hooks MD  Department of Hospital Medicine   [facility] - Cardiology Stepdown

## 2024-05-19 LAB
BACTERIA BLD CULT: NORMAL
BACTERIA BLD CULT: NORMAL

## 2024-05-19 PROCEDURE — 20600001 HC STEP DOWN PRIVATE ROOM

## 2024-05-19 PROCEDURE — 25000003 PHARM REV CODE 250: Performed by: NURSE PRACTITIONER

## 2024-05-19 PROCEDURE — 99231 SBSQ HOSP IP/OBS SF/LOW 25: CPT | Mod: GC,,, | Performed by: INTERNAL MEDICINE

## 2024-05-19 PROCEDURE — 63600175 PHARM REV CODE 636 W HCPCS: Performed by: NURSE PRACTITIONER

## 2024-05-19 RX ADMIN — THERA TABS 1 TABLET: TAB at 08:05

## 2024-05-19 RX ADMIN — CEFAZOLIN 2 G: 2 INJECTION, POWDER, FOR SOLUTION INTRAMUSCULAR; INTRAVENOUS at 05:05

## 2024-05-19 RX ADMIN — ATORVASTATIN CALCIUM 80 MG: 40 TABLET, FILM COATED ORAL at 08:05

## 2024-05-19 RX ADMIN — CEFAZOLIN 2 G: 2 INJECTION, POWDER, FOR SOLUTION INTRAMUSCULAR; INTRAVENOUS at 08:05

## 2024-05-19 RX ADMIN — Medication 100 MG: at 08:05

## 2024-05-19 RX ADMIN — CEFAZOLIN 2 G: 2 INJECTION, POWDER, FOR SOLUTION INTRAMUSCULAR; INTRAVENOUS at 01:05

## 2024-05-19 RX ADMIN — ASPIRIN 81 MG: 81 TABLET, COATED ORAL at 08:05

## 2024-05-19 RX ADMIN — EZETIMIBE 10 MG: 10 TABLET ORAL at 08:05

## 2024-05-19 NOTE — ASSESSMENT & PLAN NOTE
S/p device extraction and Temp Perm placement pending repeat bcx and recovery for possible reimplantation when cx negative for 72 hours.     - Appreciate ID recommendations  - Continue with antibiotics  - Follow up Blood cultures and device cultures post extraction;   - Patient is pacer dependent, currently Rvp ~95%   - Device extraction today with re-implantation early next week

## 2024-05-19 NOTE — PLAN OF CARE
Patient up in chair, alert  and oriented , denies pain, no s/s  of distress, VS WNL, wound dressing applied to left chest incision, plan of care discussed with patient, comfort measures given, will continue to monitor.    Problem: Adult Inpatient Plan of Care  Goal: Plan of Care Review  Outcome: Progressing  Goal: Absence of Hospital-Acquired Illness or Injury  Outcome: Progressing  Goal: Optimal Comfort and Wellbeing  Outcome: Progressing  Goal: Readiness for Transition of Care  Outcome: Progressing     Problem: Infection  Goal: Absence of Infection Signs and Symptoms  Outcome: Progressing     Problem: Wound  Goal: Optimal Coping  Outcome: Progressing

## 2024-05-19 NOTE — SUBJECTIVE & OBJECTIVE
Interval History: No acute events overnight.  No complaints.  Took 6 hours to get basic labs and blood cultures yesterday despite calling lab - had to get them ordered stat to be collected.  Plan for permanent pacemaker placement tomorrow and likely PICC line for IV abx.    Review of Systems   Constitutional:  Negative for chills, fatigue and fever.   Respiratory:  Negative for cough and shortness of breath.    Cardiovascular:  Negative for chest pain, palpitations and leg swelling.   Gastrointestinal:  Negative for abdominal pain, diarrhea, nausea and vomiting.   Genitourinary:  Negative for dysuria and urgency.   Neurological:  Negative for dizziness and headaches.   All other systems reviewed and are negative.    Objective:     Vital Signs (Most Recent):  Temp: 97.6 °F (36.4 °C) (05/19/24 0805)  Pulse: 63 (05/19/24 0805)  Resp: 18 (05/19/24 0805)  BP: 118/72 (05/19/24 0805)  SpO2: 97 % (05/19/24 0805) Vital Signs (24h Range):  Temp:  [97.5 °F (36.4 °C)-98.9 °F (37.2 °C)] 97.6 °F (36.4 °C)  Pulse:  [59-63] 63  Resp:  [16-18] 18  SpO2:  [95 %-99 %] 97 %  BP: ()/(63-72) 118/72     Weight: 65.4 kg (144 lb 1.1 oz)  Body mass index is 23.25 kg/m².    Intake/Output Summary (Last 24 hours) at 5/19/2024 1035  Last data filed at 5/18/2024 2046  Gross per 24 hour   Intake 240 ml   Output 200 ml   Net 40 ml         Physical Exam  Constitutional:       Appearance: Normal appearance.   HENT:      Head: Normocephalic and atraumatic.   Cardiovascular:      Rate and Rhythm: Normal rate and regular rhythm.      Heart sounds: No murmur heard.     Comments: Temporary pacemaker in place with MODESTA drain  Pulmonary:      Effort: Pulmonary effort is normal. No respiratory distress.      Breath sounds: Normal breath sounds. No wheezing or rales.   Abdominal:      General: There is no distension.      Palpations: Abdomen is soft.      Tenderness: There is no abdominal tenderness.   Musculoskeletal:         General: No deformity.    Neurological:      General: No focal deficit present.      Mental Status: He is alert and oriented to person, place, and time. Mental status is at baseline.             Significant Labs: All pertinent labs within the past 24 hours have been reviewed.  Blood Culture:   Recent Labs   Lab 05/18/24  1526 05/18/24  1535   LABBLOO No Growth to date No Growth to date       CBC:   Recent Labs   Lab 05/18/24  1526   WBC 8.20   HGB 14.3   HCT 43.1          CMP:   Recent Labs   Lab 05/18/24  1526      K 4.0      CO2 23   GLU 88   BUN 18   CREATININE 1.2   CALCIUM 9.7   PROT 7.7   ALBUMIN 4.1   BILITOT 2.0*   ALKPHOS 88   AST 34   ALT 19   ANIONGAP 11         Significant Imaging: I have reviewed all pertinent imaging results/findings within the past 24 hours.

## 2024-05-19 NOTE — PROGRESS NOTES
"[facility] - Cardiology [facility] Medicine  Progress Note    Patient Name: Michael Espinoza  MRN: [id number]  Patient Class: IP- Inpatient   Admission Date: 5/14/2024  Length of Stay: 4 days  Attending Physician: Harmony Hooks MD  Primary Care Provider: Dominguez Hyatt MD        Subjective:     Principal Problem:Infection associated with cardiovascular device        HPI:  Michael Espinoza is 68 y.o. male with a PMHx of CAD s/p CABG x3, HLD, Gilbert's syndrome, MSSA bacteremia, and heart block s/p dual chamber pacemaker in July 2022 with Dr. Canales who presents to the ED for tenderness and redness around his pacemaker site for the past week. He states that about a month ago he started noticing that his device was "creeping" closer to the skin but it was not bothering him. He was admitted in December for MSSA bacteremia and completed IV antibiotics. Blood cultures cleared. Patient denies fever or chills recently. Also denies chest pain, shortness of breath, palpitations.    In the ED, pt mildly hypertensive which improved without treatment otherwise vitals stable, afebrile. CBC unremarkable. Bicarb 20. Tbili 1.7 (chronic issue). CRP <0.3. Blood cxs in process. The patient received vancomycin and zosyn. EP consulted in the ED and recommend CXR, IV abx, and ID consult.    Overview/Hospital Course:  Mr. Espinoza was admitted to Hospital Medicine for management of a pacemaker infection.  ID was consulted.  He was started on Cefazolin while waiting on blood cultures.  EP was consulted, who plan for pacemaker removal on 5/17 with temporary pacemaker placement, and new permanent pacemaker placement on Monday 5/20.    Interval History: No acute events overnight.  No complaints.  Took 6 hours to get basic labs and blood cultures yesterday despite calling lab - had to get them ordered stat to be collected.  Plan for permanent pacemaker placement tomorrow and likely PICC line for IV abx.    Review of Systems   Constitutional:  " Negative for chills, fatigue and fever.   Respiratory:  Negative for cough and shortness of breath.    Cardiovascular:  Negative for chest pain, palpitations and leg swelling.   Gastrointestinal:  Negative for abdominal pain, diarrhea, nausea and vomiting.   Genitourinary:  Negative for dysuria and urgency.   Neurological:  Negative for dizziness and headaches.   All other systems reviewed and are negative.    Objective:     Vital Signs (Most Recent):  Temp: 97.6 °F (36.4 °C) (05/19/24 0805)  Pulse: 63 (05/19/24 0805)  Resp: 18 (05/19/24 0805)  BP: 118/72 (05/19/24 0805)  SpO2: 97 % (05/19/24 0805) Vital Signs (24h Range):  Temp:  [97.5 °F (36.4 °C)-98.9 °F (37.2 °C)] 97.6 °F (36.4 °C)  Pulse:  [59-63] 63  Resp:  [16-18] 18  SpO2:  [95 %-99 %] 97 %  BP: ()/(63-72) 118/72     Weight: 65.4 kg (144 lb 1.1 oz)  Body mass index is 23.25 kg/m².    Intake/Output Summary (Last 24 hours) at 5/19/2024 1035  Last data filed at 5/18/2024 2046  Gross per 24 hour   Intake 240 ml   Output 200 ml   Net 40 ml         Physical Exam  Constitutional:       Appearance: Normal appearance.   HENT:      Head: Normocephalic and atraumatic.   Cardiovascular:      Rate and Rhythm: Normal rate and regular rhythm.      Heart sounds: No murmur heard.     Comments: Temporary pacemaker in place with MODESTA drain  Pulmonary:      Effort: Pulmonary effort is normal. No respiratory distress.      Breath sounds: Normal breath sounds. No wheezing or rales.   Abdominal:      General: There is no distension.      Palpations: Abdomen is soft.      Tenderness: There is no abdominal tenderness.   Musculoskeletal:         General: No deformity.   Neurological:      General: No focal deficit present.      Mental Status: He is alert and oriented to person, place, and time. Mental status is at baseline.             Significant Labs: All pertinent labs within the past 24 hours have been reviewed.  Blood Culture:   Recent Labs   Lab 05/18/24  3788  05/18/24  1535   LABBLOO No Growth to date No Growth to date       CBC:   Recent Labs   Lab 05/18/24  1526   WBC 8.20   HGB 14.3   HCT 43.1          CMP:   Recent Labs   Lab 05/18/24  1526      K 4.0      CO2 23   GLU 88   BUN 18   CREATININE 1.2   CALCIUM 9.7   PROT 7.7   ALBUMIN 4.1   BILITOT 2.0*   ALKPHOS 88   AST 34   ALT 19   ANIONGAP 11         Significant Imaging: I have reviewed all pertinent imaging results/findings within the past 24 hours.    Assessment/Plan:      * Infection associated with cardiovascular device  Admitted for a pacemaker infection  Started on empiric Ancef while awaiting cultures given history of MSSA bacteremia, currently NGTD  ID following:  Likely to need IV antibiotics on discharge  EP was consulted  S/p pacemaker removal on 5/17 with temporary pacemaker and MODESTA drain placement, and new permanent pacemaker placement on Monday 5/20.  Intra-op cultures 5/17 NGTD  Post procedure wound x 5/18 NGTD but mislabeled as butt abscess    Complete heart block  S/p dual chamber pacemaker placement  7/5/2022    Coronary artery disease of native artery of native heart with stable angina pectoris  Patient with known CAD s/p CABG, which is controlled Will continue ASA and Statin and monitor for S/Sx of angina/ACS. Continue to monitor on telemetry.     Gilbert's syndrome  Chronic condition- reports diagnosed by Dr. Rowe years ago  T bili elevated on admission, similar to previous  Monitor CMP    HLD (hyperlipidemia)   Patient is chronically on statin.will continue for now. Monitor clinically. Last LDL was   Lab Results   Component Value Date    LDLCALC 48.0 (L) 08/11/2023         VTE Risk Mitigation (From admission, onward)           Ordered     IP VTE LOW RISK PATIENT  Once         05/14/24 1912     Place sequential compression device  Until discontinued         05/14/24 1912                    Discharge Planning   MAGNO: 5/21/2024     Code Status: Full Code   Is the patient  medically ready for discharge?:     Reason for patient still in hospital (select all that apply): Patient trending condition and Consult recommendations  Discharge Plan A: Home, Home Health, Other (IV Infusion)                  Harmony Hooks MD  Department of Hospital Medicine   [facility] - Cardiology Stepdown

## 2024-05-19 NOTE — SUBJECTIVE & OBJECTIVE
Interval History:   No overnight acute events.    MODESTA drain with 15 cc serosanguinous OP  Bacitracin ordered prn for incision sites     Review of Systems   Constitutional: Negative for chills, decreased appetite and fever.   HENT:  Negative for congestion.    Cardiovascular:  Negative for chest pain, dyspnea on exertion, leg swelling, orthopnea, palpitations and paroxysmal nocturnal dyspnea.   Respiratory:  Negative for shortness of breath.    Gastrointestinal:  Negative for abdominal pain.   Neurological:  Negative for light-headedness.   Psychiatric/Behavioral:  The patient is not nervous/anxious.      Objective:     Vital Signs (Most Recent):  Temp: 97.6 °F (36.4 °C) (05/19/24 0805)  Pulse: 63 (05/19/24 0805)  Resp: 18 (05/19/24 0805)  BP: 118/72 (05/19/24 0805)  SpO2: 97 % (05/19/24 0805) Vital Signs (24h Range):  Temp:  [97.5 °F (36.4 °C)-98.9 °F (37.2 °C)] 97.6 °F (36.4 °C)  Pulse:  [59-63] 63  Resp:  [16-18] 18  SpO2:  [95 %-99 %] 97 %  BP: ()/(63-72) 118/72     Weight: 65.4 kg (144 lb 1.1 oz)  Body mass index is 23.25 kg/m².     SpO2: 97 %        Physical Exam  Constitutional:       Appearance: Normal appearance.   HENT:      Head: Atraumatic.   Eyes:      Conjunctiva/sclera: Conjunctivae normal.   Cardiovascular:      Rate and Rhythm: Normal rate and regular rhythm.      Heart sounds: No murmur heard.     Comments: Paced rhythm   Temp Perm site stable  Incision c/d/I   MODESTA drain with 15 cc out serosanguinous op  Pulmonary:      Effort: Pulmonary effort is normal.      Breath sounds: Normal breath sounds.   Abdominal:      General: There is no distension.      Palpations: Abdomen is soft.   Musculoskeletal:      Right lower leg: No edema.      Left lower leg: No edema.   Skin:     General: Skin is warm.   Neurological:      Mental Status: He is alert.   Psychiatric:         Mood and Affect: Mood normal.            Significant Labs: EP:   Recent Labs   Lab 05/18/24  1526      K 4.0      CO2 23  "  GLU 88   BUN 18   CREATININE 1.2   CALCIUM 9.7   PROT 7.7   ALBUMIN 4.1   BILITOT 2.0*   ALKPHOS 88   AST 34   ALT 19   ANIONGAP 11   WBC 8.20   HGB 14.3   HCT 43.1      , ABG: No results for input(s): "PH", "PCO2", "HCO3", "POCSATURATED", "BE" in the last 48 hours., Blood Culture:   Recent Labs   Lab 05/18/24  1526 05/18/24  1535   LABBLOO No Growth to date No Growth to date   , BMP:   Recent Labs   Lab 05/18/24  1526   GLU 88      K 4.0      CO2 23   BUN 18   CREATININE 1.2   CALCIUM 9.7   MG 2.1   , CMP:   Recent Labs   Lab 05/18/24  1526      K 4.0      CO2 23   GLU 88   BUN 18   CREATININE 1.2   CALCIUM 9.7   PROT 7.7   ALBUMIN 4.1   BILITOT 2.0*   ALKPHOS 88   AST 34   ALT 19   ANIONGAP 11   , CBC:   Recent Labs   Lab 05/18/24  1526   WBC 8.20   HGB 14.3   HCT 43.1      , INR: No results for input(s): "INR", "PROTIME" in the last 48 hours., Lipid Panel No results for input(s): "CHOL", "HDL", "LDLCALC", "TRIG", "CHOLHDL" in the last 48 hours.,   Pathology Results  (Last 10 years)                 08/05/22 0933  Specimen to Pathology, Surgery Gastrointestinal tract Final result    Narrative:  Pre-op Diagnosis: Colon cancer screening [Z12.11]   Procedure(s):   COLONOSCOPY   Number of specimens: 1   Name of specimens:   #1 = sigmoid colon abnormal mucosa cold bx   Which provider would you like to cc?->DELFINA DELEON   Release to patient->Immediate   Specimen total (fresh, frozen, permanent):->1           , Troponin No results for input(s): "TROPONINI" in the last 48 hours., and All pertinent lab results from the last 24 hours have been reviewed.     "

## 2024-05-19 NOTE — PROGRESS NOTES
[facility] - Cardiology Stepdown  Cardiac Electrophysiology  Progress Note    Admission Date: 5/14/2024  Code Status: Full Code   Attending Physician: Harmony Hooks MD   Expected Discharge Date: 5/20/2024  Principal Problem:Infection associated with cardiovascular device    Subjective:     Interval History:   No overnight acute events.    MODESTA drain with 15 cc serosanguinous OP  Bacitracin ordered prn for incision sites     Review of Systems   Constitutional: Negative for chills, decreased appetite and fever.   HENT:  Negative for congestion.    Cardiovascular:  Negative for chest pain, dyspnea on exertion, leg swelling, orthopnea, palpitations and paroxysmal nocturnal dyspnea.   Respiratory:  Negative for shortness of breath.    Gastrointestinal:  Negative for abdominal pain.   Neurological:  Negative for light-headedness.   Psychiatric/Behavioral:  The patient is not nervous/anxious.      Objective:     Vital Signs (Most Recent):  Temp: 97.6 °F (36.4 °C) (05/19/24 0805)  Pulse: 63 (05/19/24 0805)  Resp: 18 (05/19/24 0805)  BP: 118/72 (05/19/24 0805)  SpO2: 97 % (05/19/24 0805) Vital Signs (24h Range):  Temp:  [97.5 °F (36.4 °C)-98.9 °F (37.2 °C)] 97.6 °F (36.4 °C)  Pulse:  [59-63] 63  Resp:  [16-18] 18  SpO2:  [95 %-99 %] 97 %  BP: ()/(63-72) 118/72     Weight: 65.4 kg (144 lb 1.1 oz)  Body mass index is 23.25 kg/m².     SpO2: 97 %        Physical Exam  Constitutional:       Appearance: Normal appearance.   HENT:      Head: Atraumatic.   Eyes:      Conjunctiva/sclera: Conjunctivae normal.   Cardiovascular:      Rate and Rhythm: Normal rate and regular rhythm.      Heart sounds: No murmur heard.     Comments: Paced rhythm   Temp Perm site stable  Incision c/d/I   MODESTA drain with 15 cc out serosanguinous op  Pulmonary:      Effort: Pulmonary effort is normal.      Breath sounds: Normal breath sounds.   Abdominal:      General: There is no distension.      Palpations: Abdomen is soft.   Musculoskeletal:       "Right lower leg: No edema.      Left lower leg: No edema.   Skin:     General: Skin is warm.   Neurological:      Mental Status: He is alert.   Psychiatric:         Mood and Affect: Mood normal.            Significant Labs: EP:   Recent Labs   Lab 05/18/24  1526      K 4.0      CO2 23   GLU 88   BUN 18   CREATININE 1.2   CALCIUM 9.7   PROT 7.7   ALBUMIN 4.1   BILITOT 2.0*   ALKPHOS 88   AST 34   ALT 19   ANIONGAP 11   WBC 8.20   HGB 14.3   HCT 43.1      , ABG: No results for input(s): "PH", "PCO2", "HCO3", "POCSATURATED", "BE" in the last 48 hours., Blood Culture:   Recent Labs   Lab 05/18/24  1526 05/18/24  1535   LABBLOO No Growth to date No Growth to date   , BMP:   Recent Labs   Lab 05/18/24  1526   GLU 88      K 4.0      CO2 23   BUN 18   CREATININE 1.2   CALCIUM 9.7   MG 2.1   , CMP:   Recent Labs   Lab 05/18/24  1526      K 4.0      CO2 23   GLU 88   BUN 18   CREATININE 1.2   CALCIUM 9.7   PROT 7.7   ALBUMIN 4.1   BILITOT 2.0*   ALKPHOS 88   AST 34   ALT 19   ANIONGAP 11   , CBC:   Recent Labs   Lab 05/18/24  1526   WBC 8.20   HGB 14.3   HCT 43.1      , INR: No results for input(s): "INR", "PROTIME" in the last 48 hours., Lipid Panel No results for input(s): "CHOL", "HDL", "LDLCALC", "TRIG", "CHOLHDL" in the last 48 hours.,   Pathology Results  (Last 10 years)                 08/05/22 0933  Specimen to Pathology, Surgery Gastrointestinal tract Final result    Narrative:  Pre-op Diagnosis: Colon cancer screening [Z12.11]   Procedure(s):   COLONOSCOPY   Number of specimens: 1   Name of specimens:   #1 = sigmoid colon abnormal mucosa cold bx   Which provider would you like to cc?->DELFINA DELEON   Release to patient->Immediate   Specimen total (fresh, frozen, permanent):->1           , Troponin No results for input(s): "TROPONINI" in the last 48 hours., and All pertinent lab results from the last 24 hours have been reviewed.     Assessment and Plan:     * " Infection associated with cardiovascular device  S/p device extraction and Temp Perm placement pending repeat bcx and recovery for possible reimplantation when cx negative for 72 hours.     - Appreciate ID recommendations  - NPO MN   - Continue with antibiotics  - Follow up Blood cultures and device cultures post extraction;   - Patient is pacer dependent, currently Rvp ~95%   - Device extraction today with re-implantation early next week         Barbara Tran MD  Cardiac Electrophysiology  [facility] - Cardiology Stepdown

## 2024-05-20 ENCOUNTER — ANESTHESIA EVENT (OUTPATIENT)
Dept: MEDSURG UNIT | Facility: HOSPITAL | Age: 69
DRG: 243 | End: 2024-05-20
Payer: MEDICARE

## 2024-05-20 ENCOUNTER — ANESTHESIA (OUTPATIENT)
Dept: MEDSURG UNIT | Facility: HOSPITAL | Age: 69
DRG: 243 | End: 2024-05-20
Payer: MEDICARE

## 2024-05-20 LAB
ACID FAST MOD KINY STN SPEC: NORMAL
BACTERIA CATH TIP CULT: NO GROWTH
BACTERIA CATH TIP CULT: NO GROWTH
BACTERIA SPEC AEROBE CULT: NO GROWTH
BLD PROD TYP BPU: NORMAL
BLOOD UNIT EXPIRATION DATE: NORMAL
BLOOD UNIT TYPE CODE: 6200
BLOOD UNIT TYPE: NORMAL
CODING SYSTEM: NORMAL
CROSSMATCH INTERPRETATION: NORMAL
DISPENSE STATUS: NORMAL
INR PPP: 1.1 (ref 0.8–1.2)
MYCOBACTERIUM SPEC QL CULT: NORMAL
NUM UNITS TRANS PACKED RBC: NORMAL
PROTHROMBIN TIME: 11.8 SEC (ref 9–12.5)

## 2024-05-20 PROCEDURE — 27800903 OPTIME MED/SURG SUP & DEVICES OTHER IMPLANTS: Performed by: INTERNAL MEDICINE

## 2024-05-20 PROCEDURE — 36415 COLL VENOUS BLD VENIPUNCTURE: CPT | Performed by: INTERNAL MEDICINE

## 2024-05-20 PROCEDURE — 33208 INSRT HEART PM ATRIAL & VENT: CPT | Mod: 22,79,, | Performed by: INTERNAL MEDICINE

## 2024-05-20 PROCEDURE — 0JH606Z INSERTION OF PACEMAKER, DUAL CHAMBER INTO CHEST SUBCUTANEOUS TISSUE AND FASCIA, OPEN APPROACH: ICD-10-PCS | Performed by: INTERNAL MEDICINE

## 2024-05-20 PROCEDURE — 27201423 OPTIME MED/SURG SUP & DEVICES STERILE SUPPLY: Performed by: INTERNAL MEDICINE

## 2024-05-20 PROCEDURE — 63600175 PHARM REV CODE 636 W HCPCS: Performed by: NURSE PRACTITIONER

## 2024-05-20 PROCEDURE — 02HL3JZ INSERTION OF PACEMAKER LEAD INTO LEFT VENTRICLE, PERCUTANEOUS APPROACH: ICD-10-PCS | Performed by: INTERNAL MEDICINE

## 2024-05-20 PROCEDURE — 37000008 HC ANESTHESIA 1ST 15 MINUTES: Performed by: INTERNAL MEDICINE

## 2024-05-20 PROCEDURE — 33208 INSRT HEART PM ATRIAL & VENT: CPT | Mod: 79 | Performed by: INTERNAL MEDICINE

## 2024-05-20 PROCEDURE — 85610 PROTHROMBIN TIME: CPT | Performed by: INTERNAL MEDICINE

## 2024-05-20 PROCEDURE — C1894 INTRO/SHEATH, NON-LASER: HCPCS | Performed by: INTERNAL MEDICINE

## 2024-05-20 PROCEDURE — 25000003 PHARM REV CODE 250: Performed by: NURSE PRACTITIONER

## 2024-05-20 PROCEDURE — D9220A PRA ANESTHESIA: Mod: ANES,,, | Performed by: ANESTHESIOLOGY

## 2024-05-20 PROCEDURE — 99233 SBSQ HOSP IP/OBS HIGH 50: CPT | Mod: ,,, | Performed by: INTERNAL MEDICINE

## 2024-05-20 PROCEDURE — 25000003 PHARM REV CODE 250: Performed by: NURSE ANESTHETIST, CERTIFIED REGISTERED

## 2024-05-20 PROCEDURE — 02H63JZ INSERTION OF PACEMAKER LEAD INTO RIGHT ATRIUM, PERCUTANEOUS APPROACH: ICD-10-PCS | Performed by: INTERNAL MEDICINE

## 2024-05-20 PROCEDURE — 37000009 HC ANESTHESIA EA ADD 15 MINS: Performed by: INTERNAL MEDICINE

## 2024-05-20 PROCEDURE — 02PA3MZ REMOVAL OF CARDIAC LEAD FROM HEART, PERCUTANEOUS APPROACH: ICD-10-PCS | Performed by: INTERNAL MEDICINE

## 2024-05-20 PROCEDURE — 20600001 HC STEP DOWN PRIVATE ROOM

## 2024-05-20 PROCEDURE — C1785 PMKR, DUAL, RATE-RESP: HCPCS | Performed by: INTERNAL MEDICINE

## 2024-05-20 PROCEDURE — 93005 ELECTROCARDIOGRAM TRACING: CPT

## 2024-05-20 PROCEDURE — 25000003 PHARM REV CODE 250: Performed by: INTERNAL MEDICINE

## 2024-05-20 PROCEDURE — 63600175 PHARM REV CODE 636 W HCPCS: Performed by: NURSE ANESTHETIST, CERTIFIED REGISTERED

## 2024-05-20 PROCEDURE — D9220A PRA ANESTHESIA: Mod: CRNA,,, | Performed by: NURSE ANESTHETIST, CERTIFIED REGISTERED

## 2024-05-20 PROCEDURE — 63600175 PHARM REV CODE 636 W HCPCS: Mod: JZ,JG | Performed by: INTERNAL MEDICINE

## 2024-05-20 PROCEDURE — C1898 LEAD, PMKR, OTHER THAN TRANS: HCPCS | Performed by: INTERNAL MEDICINE

## 2024-05-20 PROCEDURE — 3E0132A INTRODUCTION OF ANTI-INFECTIVE ENVELOPE INTO SUBCUTANEOUS TISSUE, PERCUTANEOUS APPROACH: ICD-10-PCS | Performed by: INTERNAL MEDICINE

## 2024-05-20 PROCEDURE — 93010 ELECTROCARDIOGRAM REPORT: CPT | Mod: ,,, | Performed by: INTERNAL MEDICINE

## 2024-05-20 DEVICE — IMPLANTABLE DEVICE
Type: IMPLANTABLE DEVICE | Site: CHEST | Status: FUNCTIONAL
Brand: EDORA 8 DR-T

## 2024-05-20 DEVICE — IMPLANTABLE DEVICE
Type: IMPLANTABLE DEVICE | Site: HEART | Status: FUNCTIONAL
Brand: SOLIA

## 2024-05-20 DEVICE — ENVELOPE CMRM6133 ABSORB LRG MR
Type: IMPLANTABLE DEVICE | Site: CHEST | Status: FUNCTIONAL
Brand: TYRX™

## 2024-05-20 RX ORDER — CEFAZOLIN SODIUM 1 G/3ML
INJECTION, POWDER, FOR SOLUTION INTRAMUSCULAR; INTRAVENOUS
Status: DISCONTINUED | OUTPATIENT
Start: 2024-05-20 | End: 2024-05-20

## 2024-05-20 RX ORDER — HYDROMORPHONE HYDROCHLORIDE 1 MG/ML
0.2 INJECTION, SOLUTION INTRAMUSCULAR; INTRAVENOUS; SUBCUTANEOUS EVERY 5 MIN PRN
Status: DISCONTINUED | OUTPATIENT
Start: 2024-05-20 | End: 2024-05-21

## 2024-05-20 RX ORDER — MIDAZOLAM HYDROCHLORIDE 1 MG/ML
INJECTION INTRAMUSCULAR; INTRAVENOUS
Status: DISCONTINUED | OUTPATIENT
Start: 2024-05-20 | End: 2024-05-20

## 2024-05-20 RX ORDER — PHENYLEPHRINE HYDROCHLORIDE 10 MG/ML
INJECTION INTRAVENOUS
Status: DISCONTINUED | OUTPATIENT
Start: 2024-05-20 | End: 2024-05-20

## 2024-05-20 RX ORDER — FENTANYL CITRATE 50 UG/ML
25 INJECTION, SOLUTION INTRAMUSCULAR; INTRAVENOUS EVERY 5 MIN PRN
Status: DISCONTINUED | OUTPATIENT
Start: 2024-05-20 | End: 2024-05-21

## 2024-05-20 RX ORDER — FENTANYL CITRATE 50 UG/ML
INJECTION, SOLUTION INTRAMUSCULAR; INTRAVENOUS
Status: DISCONTINUED | OUTPATIENT
Start: 2024-05-20 | End: 2024-05-20

## 2024-05-20 RX ORDER — BUPIVACAINE HYDROCHLORIDE 2.5 MG/ML
INJECTION, SOLUTION EPIDURAL; INFILTRATION; INTRACAUDAL
Status: DISCONTINUED | OUTPATIENT
Start: 2024-05-20 | End: 2024-05-21

## 2024-05-20 RX ORDER — VANCOMYCIN HYDROCHLORIDE 1 G/20ML
INJECTION, POWDER, LYOPHILIZED, FOR SOLUTION INTRAVENOUS
Status: DISCONTINUED | OUTPATIENT
Start: 2024-05-20 | End: 2024-05-21

## 2024-05-20 RX ORDER — LIDOCAINE HYDROCHLORIDE 20 MG/ML
INJECTION, SOLUTION INFILTRATION; PERINEURAL
Status: DISCONTINUED | OUTPATIENT
Start: 2024-05-20 | End: 2024-05-21

## 2024-05-20 RX ORDER — SODIUM CHLORIDE 0.9 G/100ML
IRRIGANT IRRIGATION
Status: DISCONTINUED | OUTPATIENT
Start: 2024-05-20 | End: 2024-05-21

## 2024-05-20 RX ORDER — ONDANSETRON HYDROCHLORIDE 2 MG/ML
4 INJECTION, SOLUTION INTRAVENOUS ONCE AS NEEDED
Status: DISCONTINUED | OUTPATIENT
Start: 2024-05-20 | End: 2024-05-21

## 2024-05-20 RX ORDER — PROPOFOL 10 MG/ML
VIAL (ML) INTRAVENOUS CONTINUOUS PRN
Status: DISCONTINUED | OUTPATIENT
Start: 2024-05-20 | End: 2024-05-20

## 2024-05-20 RX ORDER — DIPHENHYDRAMINE HYDROCHLORIDE 50 MG/ML
25 INJECTION INTRAMUSCULAR; INTRAVENOUS EVERY 6 HOURS PRN
Status: DISCONTINUED | OUTPATIENT
Start: 2024-05-20 | End: 2024-05-21

## 2024-05-20 RX ORDER — KETAMINE HCL IN 0.9 % NACL 50 MG/5 ML
SYRINGE (ML) INTRAVENOUS
Status: DISCONTINUED | OUTPATIENT
Start: 2024-05-20 | End: 2024-05-20

## 2024-05-20 RX ADMIN — CEFAZOLIN 2 G: 2 INJECTION, POWDER, FOR SOLUTION INTRAMUSCULAR; INTRAVENOUS at 05:05

## 2024-05-20 RX ADMIN — Medication 10 MG: at 02:05

## 2024-05-20 RX ADMIN — CEFAZOLIN 2 G: 2 INJECTION, POWDER, FOR SOLUTION INTRAMUSCULAR; INTRAVENOUS at 12:05

## 2024-05-20 RX ADMIN — CEFAZOLIN 2 G: 330 INJECTION, POWDER, FOR SOLUTION INTRAMUSCULAR; INTRAVENOUS at 03:05

## 2024-05-20 RX ADMIN — PROPOFOL 50 MG: 10 INJECTION, EMULSION INTRAVENOUS at 04:05

## 2024-05-20 RX ADMIN — CEFAZOLIN 2 G: 2 INJECTION, POWDER, FOR SOLUTION INTRAMUSCULAR; INTRAVENOUS at 10:05

## 2024-05-20 RX ADMIN — ATORVASTATIN CALCIUM 80 MG: 40 TABLET, FILM COATED ORAL at 10:05

## 2024-05-20 RX ADMIN — FENTANYL CITRATE 50 MCG: 50 INJECTION, SOLUTION INTRAMUSCULAR; INTRAVENOUS at 04:05

## 2024-05-20 RX ADMIN — PHENYLEPHRINE HYDROCHLORIDE 100 MCG: 10 INJECTION INTRAVENOUS at 03:05

## 2024-05-20 RX ADMIN — PROPOFOL 50 MG: 10 INJECTION, EMULSION INTRAVENOUS at 02:05

## 2024-05-20 RX ADMIN — ASPIRIN 81 MG: 81 TABLET, COATED ORAL at 10:05

## 2024-05-20 RX ADMIN — EZETIMIBE 10 MG: 10 TABLET ORAL at 08:05

## 2024-05-20 RX ADMIN — Medication 10 MG: at 04:05

## 2024-05-20 RX ADMIN — FENTANYL CITRATE 50 MCG: 50 INJECTION, SOLUTION INTRAMUSCULAR; INTRAVENOUS at 02:05

## 2024-05-20 RX ADMIN — ASPIRIN 81 MG: 81 TABLET, COATED ORAL at 08:05

## 2024-05-20 RX ADMIN — PHENYLEPHRINE HYDROCHLORIDE 100 MCG: 10 INJECTION INTRAVENOUS at 02:05

## 2024-05-20 RX ADMIN — PROPOFOL 100 MCG/KG/MIN: 10 INJECTION, EMULSION INTRAVENOUS at 02:05

## 2024-05-20 RX ADMIN — THERA TABS 1 TABLET: TAB at 08:05

## 2024-05-20 RX ADMIN — PHENYLEPHRINE HYDROCHLORIDE 0.3 MCG/KG/MIN: 10 INJECTION INTRAVENOUS at 02:05

## 2024-05-20 RX ADMIN — Medication 100 MG: at 08:05

## 2024-05-20 RX ADMIN — MIDAZOLAM HYDROCHLORIDE 2 MG: 2 INJECTION, SOLUTION INTRAMUSCULAR; INTRAVENOUS at 02:05

## 2024-05-20 RX ADMIN — SODIUM CHLORIDE: 0.9 INJECTION, SOLUTION INTRAVENOUS at 02:05

## 2024-05-20 NOTE — ANESTHESIA PROCEDURE NOTES
Intubation    Date/Time: 5/20/2024 2:41 PM    Performed by: Jamshid Lam CRNA  Authorized by: Shravan Guzmán MD    Intubation:     Induction:  Intravenous    Intubated:  Postinduction    Mask Ventilation:  Very difficult with oral airway    Attempts:  1    Attempted By:  CRNA    Difficult Airway Encountered?: No      Complications:  None    Airway Device:  Supraglottic airway/LMA    Airway Device Size:  4.0    Secured at:  The lips    Placement Verified By:  Capnometry    Complicating Factors:  Anterior larynx, small mouth and narrow palate    Findings Post-Intubation:  BS equal bilateral and atraumatic/condition of teeth unchanged

## 2024-05-20 NOTE — PLAN OF CARE
Problem: Adult Inpatient Plan of Care  Goal: Patient-Specific Goal (Individualized)  Outcome: Progressing  Flowsheets (Taken 5/20/2024 1607)  Individualized Care Needs: monitroing vs, labs, etc  Anxieties, Fears or Concerns: none     Problem: Infection  Goal: Absence of Infection Signs and Symptoms  Outcome: Progressing  Intervention: Prevent or Manage Infection  Flowsheets (Taken 5/20/2024 1607)  Infection Management: aseptic technique maintained  Isolation Precautions: precautions maintained     Problem: Fall Injury Risk  Goal: Absence of Fall and Fall-Related Injury  Outcome: Progressing  Intervention: Identify and Manage Contributors  Flowsheets (Taken 5/20/2024 1607)  Self-Care Promotion:   independence encouraged   BADL personal objects within reach   BADL personal routines maintained  Medication Review/Management: medications reviewed  Intervention: Promote Injury-Free Environment  Flowsheets (Taken 5/20/2024 1607)  Safety Promotion/Fall Prevention:   assistive device/personal item within reach   Fall Risk reviewed with patient/family   Fall Risk signage in place   medications reviewed   nonskid shoes/socks when out of bed   room near unit station   side rails raised x 2   instructed to call staff for mobility    AAOX4,VSS, Plan of care discussed with patient. Patient has no complaints of chest pain/SOB/palpitations. Pt ambulating independently, fall precautions in place,no falls/injuries through the shift.Discussed medications and care.Patient has no questions at this time.Pt resting comfortably with no acute distress.Call light within reach,bed at lowest position.

## 2024-05-20 NOTE — PLAN OF CARE
Pt maintained free from falls/trauma/injuries and skin breakdown. Pt denied pain or discomfort. Plan of care reviewed. Pt verbalized understanding. All questions and concerns addressed.   Problem: Adult Inpatient Plan of Care  Goal: Plan of Care Review  Outcome: Progressing  Goal: Patient-Specific Goal (Individualized)  Outcome: Progressing  Goal: Absence of Hospital-Acquired Illness or Injury  Outcome: Progressing  Goal: Optimal Comfort and Wellbeing  Outcome: Progressing  Goal: Readiness for Transition of Care  Outcome: Progressing     Problem: Infection  Goal: Absence of Infection Signs and Symptoms  Outcome: Progressing     Problem: Wound  Goal: Optimal Coping  Outcome: Progressing  Goal: Optimal Functional Ability  Outcome: Progressing  Goal: Absence of Infection Signs and Symptoms  Outcome: Progressing  Goal: Improved Oral Intake  Outcome: Progressing  Goal: Optimal Pain Control and Function  Outcome: Progressing  Goal: Skin Health and Integrity  Outcome: Progressing  Goal: Optimal Wound Healing  Outcome: Progressing     Problem: Fall Injury Risk  Goal: Absence of Fall and Fall-Related Injury  Outcome: Progressing

## 2024-05-20 NOTE — TRANSFER OF CARE
"Anesthesia Transfer of Care Note    Patient: Michael Espinoza    Procedure(s) Performed: Procedure(s) (LRB):  INSERTION, CARDIAC PACEMAKER, DUAL CHAMBER (Right)    Patient location: PACU    Anesthesia Type: general    Transport from OR: Transported from OR on 6-10 L/min O2 by face mask with adequate spontaneous ventilation    Post pain: adequate analgesia    Post assessment: no apparent anesthetic complications and tolerated procedure well    Post vital signs: stable    Level of consciousness: awake, alert and oriented    Nausea/Vomiting: no nausea/vomiting    Complications: none    Transfer of care protocol was followed      Last vitals: Visit Vitals  /76 (BP Location: Right arm, Patient Position: Sitting)   Pulse 60   Temp 36.7 °C (98 °F) (Oral)   Resp 18   Ht 5' 6" (1.676 m)   Wt 64.7 kg (142 lb 10.2 oz)   SpO2 96%   BMI 23.02 kg/m²     "

## 2024-05-20 NOTE — ASSESSMENT & PLAN NOTE
68 year old male with history of CAD s/p CABG, heart block s/p dual chamber pacemaker in July 2022 and MSSA bacteremia (12/14-12/16, cleared 12/17, unclear source, CHARMAINE and MRI spine neg, 4w cefazolin, surveillance blood cultures negative), admitted this time for pacemaker infection.      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 High likelihood this is MSSA given recent history of bacteremia. So far no evidence of bacteremia but blood cultures are still incubating. So far no bacteremia noted, remains afebrile, without leucocytosis and HDS. Bcx 5/14 and 5/18 with NG.    Status post successful extraction on 5/17/24. Operative cultures 5/17 with NG. Remains clinically stable. New device implanataion today 5/20. Agree with plan and will follow closely       Recommendations     -Continue cefazolin IV for now - follow up post-operative clinical course.      -Plan on a prolonged course of IV antibiotics for four weeks. Discussed OPAT with patient.

## 2024-05-20 NOTE — NURSING TRANSFER
Nursing Transfer Note      Reason patient is being transferred: PACU 15    Transfer To:     Transfer via bed    Transfer with cardiac monitoring, Pacemaker Smart Box    Transported by Patient Transport    Medicines sent: None    Any special needs or follow-up needed: Bed Rest Complete at 2019.    Chart send with patient: Yes    Notified: Brother    Patient reassessed at: 5/20/2024 1392 (date, time)

## 2024-05-20 NOTE — PLAN OF CARE
SW sent referrals to [facility] and [facility] per discussion with patient.    [facility] Bowden [facility]  Case Management Department  [email or web address]       05/20/24 1413   Post-Acute Status   Post-Acute Authorization IV Infusion;Home Health   Home Health Status Referrals Sent   IV Infusion Status Referral(s) sent   Discharge Delays None known at this time   Discharge Plan   Discharge Plan A Home Health;Other  (IV Infusion)   Discharge Plan B Home Health;Other  (IV Infusion)

## 2024-05-20 NOTE — ASSESSMENT & PLAN NOTE
S/p device extraction and Temp Perm placement pending repeat bcx and recovery for possible reimplantation when cx negative for 72 hours.     - Appreciate ID recommendations  - Continue with antibiotics  - Blood cultures negative  - Patient is pacer dependent, currently Rvp ~95%   - Plan for Right side, Biotronik dual chamber PM today in the afternoon.

## 2024-05-20 NOTE — ANESTHESIA PREPROCEDURE EVALUATION
05/20/2024  Michael Espinoza is a 68 y.o., male.  Patient Active Problem List   Diagnosis    HLD (hyperlipidemia)    Gilbert's syndrome    Exertional angina    ASD (atrial septal defect)    SOB (shortness of breath) on exertion    Abnormal CT of the chest    Chest pain    Coronary artery disease of native artery of native heart with stable angina pectoris    Pre-operative cardiovascular examination    S/P CABG x 3    Chronic right shoulder pain    Partial nontraumatic tear of rotator cuff, right    Pathological fracture due to osteoporosis    Age-related osteoporosis with current pathological fracture    Localized osteoporosis of [place]    Complete heart block    Hyponatremia    Hypokalemia    Diarrhea    Transaminitis    Thrombocytopenia    Bacteremia    Right kidney stone    Infection associated with cardiovascular device           Pre-op Assessment    I have reviewed the Patient Summary Reports.       I have reviewed the Medications.     Review of Systems  Anesthesia Hx:  No problems with previous Anesthesia               Denies Personal Hx of Anesthesia complications.                    Cardiovascular:        CAD    Dysrhythmias  Angina           Cardiovascular Symptoms: Angina   Shortness of Breath    Coronary Artery Disease:                              Disorder of Cardiac Rhythm     Pulmonary:      Shortness of breath                  Renal/:  Chronic Renal Disease        Kidney Function/Disease                 Physical Exam    Airway:  No airway management difficulties anticipated  Dental:  No active dental issues noted  Chest/Lungs:  Clear to auscultation    Heart:  Rate: Normal  Rhythm: Regular Rhythm  Sounds: Normal      Anesthesia Plan  Type of Anesthesia, risks & benefits discussed:    Anesthesia Type: Gen Natural Airway, Gen Supraglottic Airway  Informed Consent:  Informed consent signed with the Patient and all parties understand the risks and agree with anesthesia plan.  All questions answered.   ASA Score: 3  Anesthesia Plan Notes: Chart reviewed. Patient seen and examined. Anesthesia plan discussed and questions answered. E-consent signed. Shravan Guzmán MD    Ready For Surgery From Anesthesia Perspective.     .

## 2024-05-20 NOTE — PROGRESS NOTES
[facility] - [facility]iology Step[facility]  Cardiac Electrophysiology  Progress Note    Admission Date: 5/14/2024  Code Status: Full Code   Attending Physician: Harmony Hooks MD   Expected Discharge Date: 5/21/2024  Principal Problem:Infection associated with cardiovascular device    Subjective:     Interval History:   Only 5mL output form MODESTA drain in past 24 hours. Cultures negative since 5/18. NAEON, no acute complaints.    Review of Systems   Constitutional: Negative for chills, fever, malaise/fatigue and night sweats.   HENT:  Negative for congestion, nosebleeds, sore throat, stridor and tinnitus.    Eyes:  Negative for blurred vision, discharge, double vision, pain, vision loss in left eye, vision loss in right eye, visual disturbance and visual halos.   Cardiovascular:  Negative for chest pain, dyspnea on exertion, leg swelling, near-syncope, orthopnea, palpitations and syncope.   Respiratory:  Negative for cough, hemoptysis, shortness of breath, snoring, sputum production and wheezing.    Endocrine: Negative for cold intolerance, heat intolerance and polydipsia.   Hematologic/Lymphatic: Negative for adenopathy and bleeding problem. Does not bruise/bleed easily.   Skin:  Negative for color change, dry skin, flushing, poor wound healing and suspicious lesions.   Musculoskeletal:  Negative for arthritis, back pain, gout, joint pain and joint swelling.   Gastrointestinal:  Negative for bloating, abdominal pain, constipation and diarrhea.   Genitourinary:  Negative for dysuria, frequency and hematuria.   Neurological:  Negative for dizziness, focal weakness, headaches, light-headedness, loss of balance, numbness and weakness.   Psychiatric/Behavioral:  Negative for altered mental status, hallucinations and hypervigilance. The patient is not nervous/anxious.      Objective:     Vital Signs (Most Recent):  Temp: 97.9 °F (36.6 °C) (05/20/24 0759)  Pulse: 70 (05/20/24 0759)  Resp: 18 (05/20/24 0759)  BP: 119/73 (05/20/24  0759)  SpO2: 98 % (05/20/24 0759) Vital Signs (24h Range):  Temp:  [97.3 °F (36.3 °C)-98.8 °F (37.1 °C)] 97.9 °F (36.6 °C)  Pulse:  [60-78] 70  Resp:  [16-18] 18  SpO2:  [93 %-98 %] 98 %  BP: (109-133)/(66-82) 119/73     Weight: 64.7 kg (142 lb 10.2 oz)  Body mass index is 23.02 kg/m².     SpO2: 98 %        Physical Exam  Constitutional:       General: He is not in acute distress.     Appearance: Normal appearance. He is normal weight. He is not toxic-appearing.   HENT:      Head: Normocephalic and atraumatic.   Eyes:      General:         Right eye: No discharge.         Left eye: No discharge.      Extraocular Movements: Extraocular movements intact.      Conjunctiva/sclera: Conjunctivae normal.      Pupils: Pupils are equal, round, and reactive to light.   Cardiovascular:      Rate and Rhythm: Normal rate and regular rhythm.      Pulses: Normal pulses.      Heart sounds: Normal heart sounds. No murmur heard.     No friction rub.      Comments: MODESTA drain with minimal output  Pulmonary:      Effort: Pulmonary effort is normal. No respiratory distress.      Breath sounds: Normal breath sounds. No wheezing or rales.   Abdominal:      General: Abdomen is flat. Bowel sounds are normal. There is no distension.      Palpations: Abdomen is soft.      Tenderness: There is no abdominal tenderness. There is no guarding.   Musculoskeletal:         General: No swelling, tenderness or deformity. Normal range of motion.      Cervical back: Normal range of motion and neck supple. No rigidity.      Right lower leg: No edema.      Left lower leg: No edema.   Skin:     General: Skin is warm and dry.      Capillary Refill: Capillary refill takes less than 2 seconds.      Coloration: Skin is not jaundiced or pale.      Findings: No bruising.   Neurological:      General: No focal deficit present.      Mental Status: He is alert and oriented to person, place, and time. Mental status is at baseline.   Psychiatric:         Mood and  Affect: Mood normal.         Thought Content: Thought content normal.         Judgment: Judgment normal.            Significant Labs: All pertinent lab results from the last 24 hours have been reviewed.    Significant Imaging:  all reviewed  Assessment and Plan:     * Infection associated with cardiovascular device  S/p device extraction and Temp Perm placement pending repeat bcx and recovery for possible reimplantation when cx negative for 72 hours.     - Appreciate ID recommendations  - Continue with antibiotics  - Blood cultures negative  - Patient is pacer dependent, currently Rvp ~95%   - Plan for Right side, Biotronik dual chamber PM today in the afternoon.      Will follow up after procedure    Corwin Caldwell MD  Cardiac Electrophysiology  [facility] - [facility]

## 2024-05-20 NOTE — BRIEF OP NOTE
Attending: Arthur Pagan MD  Date of Procedure: 05/20/24    Post-operative Diagnosis: CHB, pocket infection    Procedure Performed: Right sided, Dual-chamber pacemaker utilizing left bundle branch area pacing    Description of Procedure: The patient was brought to the EP lab in the fasting state. Prepped and draped in sterile fashion. Safety timeout was performed. Sedation administered by anesthesia staff. Selective venogram of the right axillary and cephalic veins performed via right ac IV. Lidocaine used for local anesthetic. Fluoroscopic guided axillary access utilized. Guide/J Wire advanced and confirmed in IVC. Incision made. Blunt and electrocautery dissection performed. Pocket made. Second fluoroscopic guided axillary access obtained. Sheath advanced to the interventricular septum. Lead placed through sheath, and advanced through the septum. V1 activation transitioned to RBBB-like morphology with decreasing impendence and LV activation time monitoring via lateral precordial leads. Difference in Adequate current of injury, sensing, and thresholds obtained. Sheath was slit with stable lead positioning by fluoroscopy. Lead parameters confirmed adequate and sutured into place. RA lead advanced into RA via peel away sheath. After adequate p waves and current of injury detected, the RA lead was fixed into place in the RA appendage. Peel away sheath removed and RA lead sutured into place. Pocket washed using antibiotic solution. Leads connected to generator. Generator placed into pocket, sutured in place, and washed with antibiotic solution. Deep layer closed with interrupted 3-0 suture. Intermediate layer closed with running 3-0 suture. Superficial layer closed with running 4-0 suture. Skin closed with Dermabond. Dressing to be placed after Dermabond dried.    Final values:  V6RWPT = 66 ms    EBL: <10 mL    Specimens: none  Complications: no immediate    Post-CIED implantation instructions:  Antibiotics per  ID  Avoid all heparin products (e.g., DOACs, enoxaparin, heparin) for 5 days following implant. If the patient is taking coumadin, this can be continued uninterrupted  CXR & ECG (ordered)  Sling to arm for first 48 hours continuously, then only nightly for 6 weeks  No lifting elbow above shoulder height on arm ipsilateral to implant device for 6 weeks  No lifting over 5 lbs. for 2 weeks with arm ipsilateral to implanted device  No driving for 1 week if patient uses arm contralateral to implantation and 4 weeks if patient uses arm ipsilateral to implantation  Patient can shower starting post-procedure day 1. Do not submerge surgical site for 1 month (e.g., bathing or swimming)  Dressing can be removed tomorrow (Mepilex dressing only) or in 1 week at device clinic follow up (Aquacel dressing only)  Patient will be scheduled for device clinic follow up in 1 week to assess surgical site and device interrogation  Please contact EP for any questions or concerns at [phone] or page EP fellow on call  Patient is to seek immediate medical attention for acute onset of chest pain, shortness of breath, syncope, or evidence of surgical site infection or hematoma.      The attending physician was present for the entire duration of the procedure    Anne Keller MD, PGY7  Electrophysiology

## 2024-05-20 NOTE — SUBJECTIVE & OBJECTIVE
Interval History: TREVA    Review of Systems  Constitutional:  Positive for activity change, appetite change and fatigue. Negative for chills and fever.   HENT:  Negative for trouble swallowing.    Respiratory:  Negative for cough and shortness of breath.    Gastrointestinal:  Negative for abdominal pain, blood in stool, diarrhea and vomiting.   Genitourinary:  Negative for dysuria and hematuria.   Musculoskeletal:  Negative for arthralgias, joint swelling and neck stiffness.   Skin:  Positive for wound.   Neurological:  Negative for syncope.   Psychiatric/Behavioral:  Negative for confusion.    Objective:     Vital Signs (Most Recent):  Temp: 98 °F (36.7 °C) (05/20/24 1219)  Pulse: 60 (05/20/24 1400)  Resp: 18 (05/20/24 1219)  BP: 132/76 (05/20/24 1219)  SpO2: 96 % (05/20/24 1219) Vital Signs (24h Range):  Temp:  [97.3 °F (36.3 °C)-98 °F (36.7 °C)] 98 °F (36.7 °C)  Pulse:  [59-78] 60  Resp:  [16-18] 18  SpO2:  [93 %-98 %] 96 %  BP: (109-133)/(66-82) 132/76     Weight: 64.7 kg (142 lb 10.2 oz)  Body mass index is 23.02 kg/m².    Estimated Creatinine Clearance: 53.2 mL/min (based on SCr of 1.2 mg/dL).     Physical Exam   Constitutional:       Appearance: Normal appearance.   HENT:      Head: Normocephalic and atraumatic.      Nose: Nose normal.      Mouth/Throat:      Mouth: Mucous membranes are moist.      Pharynx: Oropharynx is clear.   Eyes:      Conjunctiva/sclera: Conjunctivae normal.   Cardiovascular:      Rate and Rhythm: Normal rate and regular rhythm.      Pulses: Normal pulses.      Heart sounds: Normal heart sounds.      Comments: incision site bandaged, dry, no significant erythema or tenderness, drain present to left chest with serosanguinous fluid in drain bulb  Pulmonary:      Effort: Pulmonary effort is normal.      Breath sounds: Normal breath sounds.   Abdominal:      General: Bowel sounds are normal.      Palpations: Abdomen is soft.      Tenderness: There is no guarding or rebound.    Musculoskeletal:         General: No deformity.      Cervical back: Neck supple.   Skin:     General: Skin is warm and dry. RIJ bandage c/d/I  Groin catheter entry site cd/i     Coloration: Skin is not jaundiced.      Findings: No rash.   Neurological:      Mental Status: He is alert and oriented to person, place, and time. Mental status is at baseline.   Significant Labs:   Microbiology Results (last 7 days)       Procedure Component Value Units Date/Time    Blood culture [[id number]] Collected: 05/18/24 1526    Order Status: Completed Specimen: Blood from Peripheral, Antecubital, Left Updated: 05/20/24 1622     Blood Culture, Routine No Growth to date      No Growth to date      No Growth to date    Narrative:      Left antecubital     Blood culture [[id number]] Collected: 05/18/24 1535    Order Status: Completed Specimen: Blood from Peripheral, Antecubital, Right Updated: 05/20/24 1622     Blood Culture, Routine No Growth to date      No Growth to date      No Growth to date    AFB Culture & Smear [[id number]] Collected: 05/18/24 0704    Order Status: Completed Specimen: Abscess from Buttocks, Left Updated: 05/20/24 1406     AFB Culture & Smear Culture in progress     AFB CULTURE STAIN No acid fast bacilli seen.    IV catheter culture [[id number]] Collected: 05/17/24 1535    Order Status: Completed Specimen: Catheter Tip, Implanted Updated: 05/20/24 1045     Aerobic Culture - Cath tip No growth    Narrative:      RV lead tip    IV catheter culture [[id number]] Collected: 05/17/24 1535    Order Status: Completed Specimen: Catheter Tip, Implanted Updated: 05/20/24 0935     Aerobic Culture - Cath tip No growth    Narrative:      RA lead tip    Aerobic culture [[id number]] Collected: 05/17/24 1507    Order Status: Completed Specimen: Incision site from Chest, Left Updated: 05/20/24 0932     Aerobic Bacterial Culture No growth    Narrative:      Superficial pocket #1    Aerobic culture [[id number]] Collected:  05/17/24 1507    Order Status: Completed Specimen: Incision site from Chest, Left Updated: 05/20/24 0932     Aerobic Bacterial Culture No growth    Narrative:      Superficial pocket #3    Aerobic culture [[id number]] Collected: 05/17/24 1515    Order Status: Completed Specimen: Incision site from Chest, Left Updated: 05/20/24 0932     Aerobic Bacterial Culture No growth    Narrative:      Deep pocket #2    Aerobic culture [[id number]] Collected: 05/17/24 1507    Order Status: Completed Specimen: Incision site from Chest, Left Updated: 05/20/24 0932     Aerobic Bacterial Culture No growth    Narrative:      Superficial pocket #2    Aerobic culture [[id number]] Collected: 05/17/24 1507    Order Status: Completed Specimen: Incision site from Chest, Left Updated: 05/20/24 0932     Aerobic Bacterial Culture No growth    Narrative:      Deep pocket #1    Aerobic culture [[id number]] Collected: 05/17/24 1515    Order Status: Completed Specimen: Incision site from Chest, Left Updated: 05/20/24 0932     Aerobic Bacterial Culture No growth    Narrative:      Deep pocket #4    Blood Culture #1 **CANNOT BE ORDERED STAT** [[id number]] Collected: 05/14/24 1644    Order Status: Completed Specimen: Blood from Peripheral, Antecubital, Left Updated: 05/19/24 1812     Blood Culture, Routine No growth after 5 days.    Blood Culture #2 **CANNOT BE ORDERED STAT** [[id number]] Collected: 05/14/24 1643    Order Status: Completed Specimen: Blood from Peripheral, Hand, Right Updated: 05/19/24 1812     Blood Culture, Routine No growth after 5 days.    Culture, Anaerobe [[id number]] Collected: 05/18/24 0704    Order Status: Completed Specimen: Abscess from Buttocks, Left Updated: 05/19/24 0731     Anaerobic Culture Culture in progress    Aerobic culture [[id number]] Collected: 05/18/24 0704    Order Status: Completed Specimen: Abscess from Buttocks, Left Updated: 05/19/24 0655     Aerobic Bacterial Culture No growth    Blood culture  [[id number]]     Order Status: Canceled Specimen: Blood     Blood culture [[id number]]     Order Status: Canceled Specimen: Blood     Blood culture [[id number]]     Order Status: Canceled Specimen: Blood     Blood culture [[id number]]     Order Status: Canceled Specimen: Blood     Culture, Anaerobe [[id number]] Collected: 05/17/24 1507    Order Status: Completed Specimen: Incision site from Chest, Left Updated: 05/18/24 0912     Anaerobic Culture Culture in progress    Narrative:      Superficial pocket #3    Culture, Anaerobe [[id number]] Collected: 05/17/24 1507    Order Status: Completed Specimen: Incision site from Chest, Left Updated: 05/18/24 0912     Anaerobic Culture Culture in progress    Narrative:      Deep pocket #1    Culture, Anaerobe [[id number]] Collected: 05/17/24 1515    Order Status: Completed Specimen: Incision site from Chest, Left Updated: 05/18/24 0912     Anaerobic Culture Culture in progress    Narrative:      Deep pocket #4    Culture, Anaerobe [[id number]] Collected: 05/17/24 1507    Order Status: Completed Specimen: Incision site from Chest, Left Updated: 05/18/24 0912     Anaerobic Culture Culture in progress    Narrative:      Superficial pocket #1    Culture, Anaerobe [[id number]] Collected: 05/17/24 1507    Order Status: Completed Specimen: Incision site from Chest, Left Updated: 05/18/24 0912     Anaerobic Culture Culture in progress    Narrative:      Superficial pocket #2    Culture, Anaerobe [[id number]] Collected: 05/17/24 1515    Order Status: Completed Specimen: Incision site from Chest, Left Updated: 05/18/24 0912     Anaerobic Culture Culture in progress    Narrative:      Deep pocket #2    Fungus culture [[id number]] Collected: 05/18/24 0704    Order Status: Sent Specimen: Abscess from Buttocks, Left Updated: 05/18/24 0740          Recent Lab Results         05/20/24  1150        INR 1.1  Comment: Coumadin Therapy:  2.0 - 3.0 for INR for all indicators except mechanical  heart valves  and antiphospholipid syndromes which should use 2.5 - 3.5.         PT 11.8               Significant Imaging: I have reviewed all pertinent imaging results/findings within the past 24 hours.

## 2024-05-20 NOTE — PLAN OF CARE
Pt maintained free from falls/trauma/injuries and skin breakdown. Pt denied pain or discomfort. Plan of care reviewed. Pt verbalized understanding. All questions and concerns addressed.   Problem: Adult Inpatient Plan of Care  Goal: Plan of Care Review  5/20/2024 0606 by Soto Pinzon RN  Outcome: Progressing  5/19/2024 1932 by Soto Pinzon RN  Outcome: Progressing  Goal: Patient-Specific Goal (Individualized)  5/20/2024 0606 by Soto Pinzon RN  Outcome: Progressing  5/19/2024 1932 by Soto Pinzon RN  Outcome: Progressing  Goal: Absence of Hospital-Acquired Illness or Injury  5/20/2024 0606 by Soto Pinzon RN  Outcome: Progressing  5/19/2024 1932 by Soto Pinzon RN  Outcome: Progressing  Goal: Optimal Comfort and Wellbeing  5/20/2024 0606 by Soto Pinzon RN  Outcome: Progressing  5/19/2024 1932 by Soto Pinzon RN  Outcome: Progressing  Goal: Readiness for Transition of Care  5/20/2024 0606 by Soto Pinzon RN  Outcome: Progressing  5/19/2024 1932 by Soto Pinzon RN  Outcome: Progressing     Problem: Wound  Goal: Optimal Coping  5/20/2024 0606 by Soto Pinzon RN  Outcome: Progressing  5/19/2024 1932 by Soto Pinzon RN  Outcome: Progressing  Goal: Optimal Functional Ability  5/20/2024 0606 by Soto Pinzon RN  Outcome: Progressing  5/19/2024 1932 by Soto Pinzon RN  Outcome: Progressing  Goal: Absence of Infection Signs and Symptoms  5/20/2024 0606 by Soto Pinzon RN  Outcome: Progressing  5/19/2024 1932 by Soto Pinzon RN  Outcome: Progressing  Goal: Improved Oral Intake  5/20/2024 0606 by Soto Pinzon RN  Outcome: Progressing  5/19/2024 1932 by Soto Pinzon RN  Outcome: Progressing  Goal: Optimal Pain Control and Function  5/20/2024 0606 by Soto Pinzon RN  Outcome: Progressing  5/19/2024 1932 by Soto Pinzon RN  Outcome: Progressing  Goal: Skin Health and Integrity  5/20/202420/2024 0606 by Soto Pinzon, RN  Outcome: Progressing  5/19/2024 1932 by Soto Pinzon,  RN  Outcome: Progressing  Goal: Optimal Wound Healing  5/20/2024 0606 by Soto Pinzon RN  Outcome: Progressing  5/19/2024 1932 by Soto Pinzon RN  Outcome: Progressing     Problem: Infection  Goal: Absence of Infection Signs and Symptoms  5/20/2024 0606 by Soto Pinzon RN  Outcome: Progressing  5/19/2024 1932 by Soto Pinzon RN  Outcome: Progressing     Problem: Fall Injury Risk  Goal: Absence of Fall and Fall-Related Injury  5/20/2024 0606 by Soto Pinzon RN  Outcome: Progressing  5/19/2024 1932 by Soto Pinzon RN  Outcome: Progressing

## 2024-05-20 NOTE — PROGRESS NOTES
"[facility] - Cardiology [facility] Medicine  Progress Note    Patient Name: Michael Espinoza  MRN: [id number]  Patient Class: IP- Inpatient   Admission Date: 5/14/2024  Length of Stay: 5 days  Attending Physician: Harmony Hooks MD  Primary Care Provider: Dominguez Hyatt MD        Subjective:     Principal Problem:Infection associated with cardiovascular device        HPI:  Michael Espinoza is 68 y.o. male with a PMHx of CAD s/p CABG x3, HLD, Gilbert's syndrome, MSSA bacteremia, and heart block s/p dual chamber pacemaker in July 2022 with Dr. Canales who presents to the ED for tenderness and redness around his pacemaker site for the past week. He states that about a month ago he started noticing that his device was "creeping" closer to the skin but it was not bothering him. He was admitted in December for MSSA bacteremia and completed IV antibiotics. Blood cultures cleared. Patient denies fever or chills recently. Also denies chest pain, shortness of breath, palpitations.    In the ED, pt mildly hypertensive which improved without treatment otherwise vitals stable, afebrile. CBC unremarkable. Bicarb 20. Tbili 1.7 (chronic issue). CRP <0.3. Blood cxs in process. The patient received vancomycin and zosyn. EP consulted in the ED and recommend CXR, IV abx, and ID consult.    Overview/Hospital Course:  Mr. Espinoza was admitted to Hospital Medicine for management of a pacemaker infection.  ID was consulted.  He was started on Cefazolin while waiting on blood cultures.  EP was consulted, who plan for pacemaker removal on 5/17 with temporary pacemaker placement, and new permanent pacemaker placement on Monday 5/20.  PICC line was ordered.  ID plans for a prolonged IV treatment despite negative cultures given MSSA bacteremia in December, but concerned there was more of a pacemaker erosion on the skin than an infection.    Interval History: No acute events overnight.  No complaints.  Cultures remain negative.  Seen at bedside " with ID.  Plan for prolonged IV therapy of 4 weeks for presumed bacteremia, but likely more skin erosion from pacemaker than actual infection.    Review of Systems   Constitutional:  Negative for chills, fatigue and fever.   Respiratory:  Negative for cough and shortness of breath.    Cardiovascular:  Negative for chest pain, palpitations and leg swelling.   Gastrointestinal:  Negative for abdominal pain, diarrhea, nausea and vomiting.   Genitourinary:  Negative for dysuria and urgency.   Neurological:  Negative for dizziness and headaches.   All other systems reviewed and are negative.    Objective:     Vital Signs (Most Recent):  Temp: 97.9 °F (36.6 °C) (05/20/24 0759)  Pulse: 60 (05/20/24 0800)  Resp: 18 (05/20/24 0759)  BP: 119/73 (05/20/24 0759)  SpO2: 98 % (05/20/24 0759) Vital Signs (24h Range):  Temp:  [97.3 °F (36.3 °C)-98.8 °F (37.1 °C)] 97.9 °F (36.6 °C)  Pulse:  [60-78] 60  Resp:  [16-18] 18  SpO2:  [93 %-98 %] 98 %  BP: (109-133)/(66-82) 119/73     Weight: 64.7 kg (142 lb 10.2 oz)  Body mass index is 23.02 kg/m².    Intake/Output Summary (Last 24 hours) at 5/20/2024 1024  Last data filed at 5/20/2024 0800  Gross per 24 hour   Intake --   Output 5 ml   Net -5 ml         Physical Exam  Constitutional:       Appearance: Normal appearance.   HENT:      Head: Normocephalic and atraumatic.   Cardiovascular:      Rate and Rhythm: Normal rate and regular rhythm.      Heart sounds: No murmur heard.     Comments: Temporary pacemaker in place with MODESTA drain  Pulmonary:      Effort: Pulmonary effort is normal. No respiratory distress.      Breath sounds: Normal breath sounds. No wheezing or rales.   Abdominal:      General: There is no distension.      Palpations: Abdomen is soft.      Tenderness: There is no abdominal tenderness.   Musculoskeletal:         General: No deformity.   Neurological:      General: No focal deficit present.      Mental Status: He is alert and oriented to person, place, and time. Mental  status is at baseline.             Significant Labs: All pertinent labs within the past 24 hours have been reviewed.  Blood Culture:   Recent Labs   Lab 05/18/24  1526 05/18/24  1535   LABBLOO No Growth to date  No Growth to date No Growth to date  No Growth to date       CBC:   Recent Labs   Lab 05/18/24  1526   WBC 8.20   HGB 14.3   HCT 43.1          CMP:   Recent Labs   Lab 05/18/24  1526      K 4.0      CO2 23   GLU 88   BUN 18   CREATININE 1.2   CALCIUM 9.7   PROT 7.7   ALBUMIN 4.1   BILITOT 2.0*   ALKPHOS 88   AST 34   ALT 19   ANIONGAP 11         Significant Imaging: I have reviewed all pertinent imaging results/findings within the past 24 hours.    Assessment/Plan:      * Infection associated with cardiovascular device  Admitted for a pacemaker infection  Started on empiric Ancef while awaiting cultures given history of MSSA bacteremia, currently NGTD  ID following:  PICC line ordered for IV abx on DC  EP was consulted  S/p pacemaker removal on 5/17 with temporary pacemaker and MODESTA drain placement, and new permanent pacemaker placement on Monday 5/20.  Intra-op cultures 5/17 NGTD  Post procedure wound x 5/18 NGTD but mislabeled as butt abscess    Complete heart block  S/p dual chamber pacemaker placement  7/5/2022    Coronary artery disease of native artery of native heart with stable angina pectoris  Patient with known CAD s/p CABG, which is controlled Will continue ASA and Statin and monitor for S/Sx of angina/ACS. Continue to monitor on telemetry.     Gilbert's syndrome  Chronic condition- reports diagnosed by Dr. Rowe years ago  T bili elevated on admission, similar to previous  Monitor CMP    HLD (hyperlipidemia)   Patient is chronically on statin.will continue for now. Monitor clinically. Last LDL was   Lab Results   Component Value Date    LDLCALC 48.0 (L) 08/11/2023         VTE Risk Mitigation (From admission, onward)           Ordered     IP VTE LOW RISK PATIENT  Once          05/14/24 1912     Place sequential compression device  Until discontinued         05/14/24 1912                    Discharge Planning   MAGNO: 5/21/2024     Code Status: Full Code   Is the patient medically ready for discharge?:     Reason for patient still in hospital (select all that apply): Patient trending condition  Discharge Plan A: Home, Home Health, Other (IV Infusion)                  Harmony Hooks MD  Department of Hospital Medicine   [facility] - Cardiology Stepdown

## 2024-05-20 NOTE — NURSING
Pt taken to EP Lab. Pt AAOx4. Pt stable. No complaints of pain or signs of distress. Left  with transport.

## 2024-05-20 NOTE — ANESTHESIA POSTPROCEDURE EVALUATION
Anesthesia Post Evaluation    Patient: Michael Espinoza    Procedure(s) Performed: Procedure(s) (LRB):  EXTRACTION, ELECTRODE LEAD (N/A)  Insertion, Pacemaker, Single Chamber Ventricular (N/A)  ECHOCARDIOGRAM, TRANSESOPHAGEAL (N/A)  REVISION, SKIN POCKET, FOR CARDIAC PACEMAKER    Final Anesthesia Type: general      Level of consciousness: awake and alert  Post-procedure vital signs: reviewed and stable  Pain control: Pain has been treated.  Airway patency: patent    PONV status: Absent or treated.  Anesthetic complications: no      Cardiovascular status: hemodynamically stable  Respiratory status: unassisted  Hydration status: euvolemic                Vitals Value Taken Time   /66 05/20/24 0517   Temp 36.4 °C (97.5 °F) 05/20/24 0517   Pulse 66 05/20/24 0517   Resp 16 05/20/24 0517   SpO2 95 % 05/20/24 0517         No case tracking events are documented in the log.      Pain/Eduin Score: Eduin Score: 10 (5/19/2024  7:40 PM)

## 2024-05-20 NOTE — SUBJECTIVE & OBJECTIVE
Interval History:   Only 5mL output form MODESTA drain in past 24 hours. Cultures negative since 5/18. NAEON, no acute complaints.    Review of Systems   Constitutional: Negative for chills, fever, malaise/fatigue and night sweats.   HENT:  Negative for congestion, nosebleeds, sore throat, stridor and tinnitus.    Eyes:  Negative for blurred vision, discharge, double vision, pain, vision loss in left eye, vision loss in right eye, visual disturbance and visual halos.   Cardiovascular:  Negative for chest pain, dyspnea on exertion, leg swelling, near-syncope, orthopnea, palpitations and syncope.   Respiratory:  Negative for cough, hemoptysis, shortness of breath, snoring, sputum production and wheezing.    Endocrine: Negative for cold intolerance, heat intolerance and polydipsia.   Hematologic/Lymphatic: Negative for adenopathy and bleeding problem. Does not bruise/bleed easily.   Skin:  Negative for color change, dry skin, flushing, poor wound healing and suspicious lesions.   Musculoskeletal:  Negative for arthritis, back pain, gout, joint pain and joint swelling.   Gastrointestinal:  Negative for bloating, abdominal pain, constipation and diarrhea.   Genitourinary:  Negative for dysuria, frequency and hematuria.   Neurological:  Negative for dizziness, focal weakness, headaches, light-headedness, loss of balance, numbness and weakness.   Psychiatric/Behavioral:  Negative for altered mental status, hallucinations and hypervigilance. The patient is not nervous/anxious.      Objective:     Vital Signs (Most Recent):  Temp: 97.9 °F (36.6 °C) (05/20/24 0759)  Pulse: 70 (05/20/24 0759)  Resp: 18 (05/20/24 0759)  BP: 119/73 (05/20/24 0759)  SpO2: 98 % (05/20/24 0759) Vital Signs (24h Range):  Temp:  [97.3 °F (36.3 °C)-98.8 °F (37.1 °C)] 97.9 °F (36.6 °C)  Pulse:  [60-78] 70  Resp:  [16-18] 18  SpO2:  [93 %-98 %] 98 %  BP: (109-133)/(66-82) 119/73     Weight: 64.7 kg (142 lb 10.2 oz)  Body mass index is 23.02 kg/m².     SpO2:  98 %        Physical Exam  Constitutional:       General: He is not in acute distress.     Appearance: Normal appearance. He is normal weight. He is not toxic-appearing.   HENT:      Head: Normocephalic and atraumatic.   Eyes:      General:         Right eye: No discharge.         Left eye: No discharge.      Extraocular Movements: Extraocular movements intact.      Conjunctiva/sclera: Conjunctivae normal.      Pupils: Pupils are equal, round, and reactive to light.   Cardiovascular:      Rate and Rhythm: Normal rate and regular rhythm.      Pulses: Normal pulses.      Heart sounds: Normal heart sounds. No murmur heard.     No friction rub.      Comments: MODESTA drain with minimal output  Pulmonary:      Effort: Pulmonary effort is normal. No respiratory distress.      Breath sounds: Normal breath sounds. No wheezing or rales.   Abdominal:      General: Abdomen is flat. Bowel sounds are normal. There is no distension.      Palpations: Abdomen is soft.      Tenderness: There is no abdominal tenderness. There is no guarding.   Musculoskeletal:         General: No swelling, tenderness or deformity. Normal range of motion.      Cervical back: Normal range of motion and neck supple. No rigidity.      Right lower leg: No edema.      Left lower leg: No edema.   Skin:     General: Skin is warm and dry.      Capillary Refill: Capillary refill takes less than 2 seconds.      Coloration: Skin is not jaundiced or pale.      Findings: No bruising.   Neurological:      General: No focal deficit present.      Mental Status: He is alert and oriented to person, place, and time. Mental status is at baseline.   Psychiatric:         Mood and Affect: Mood normal.         Thought Content: Thought content normal.         Judgment: Judgment normal.            Significant Labs: All pertinent lab results from the last 24 hours have been reviewed.    Significant Imaging:  all reviewed

## 2024-05-20 NOTE — SUBJECTIVE & OBJECTIVE
Interval History: No acute events overnight.  No complaints.  Cultures remain negative.  Seen at bedside with ID.  Plan for prolonged IV therapy of 4 weeks for presumed bacteremia, but likely more skin erosion from pacemaker than actual infection.    Review of Systems   Constitutional:  Negative for chills, fatigue and fever.   Respiratory:  Negative for cough and shortness of breath.    Cardiovascular:  Negative for chest pain, palpitations and leg swelling.   Gastrointestinal:  Negative for abdominal pain, diarrhea, nausea and vomiting.   Genitourinary:  Negative for dysuria and urgency.   Neurological:  Negative for dizziness and headaches.   All other systems reviewed and are negative.    Objective:     Vital Signs (Most Recent):  Temp: 97.9 °F (36.6 °C) (05/20/24 0759)  Pulse: 60 (05/20/24 0800)  Resp: 18 (05/20/24 0759)  BP: 119/73 (05/20/24 0759)  SpO2: 98 % (05/20/24 0759) Vital Signs (24h Range):  Temp:  [97.3 °F (36.3 °C)-98.8 °F (37.1 °C)] 97.9 °F (36.6 °C)  Pulse:  [60-78] 60  Resp:  [16-18] 18  SpO2:  [93 %-98 %] 98 %  BP: (109-133)/(66-82) 119/73     Weight: 64.7 kg (142 lb 10.2 oz)  Body mass index is 23.02 kg/m².    Intake/Output Summary (Last 24 hours) at 5/20/2024 1024  Last data filed at 5/20/2024 0800  Gross per 24 hour   Intake --   Output 5 ml   Net -5 ml         Physical Exam  Constitutional:       Appearance: Normal appearance.   HENT:      Head: Normocephalic and atraumatic.   Cardiovascular:      Rate and Rhythm: Normal rate and regular rhythm.      Heart sounds: No murmur heard.     Comments: Temporary pacemaker in place with MODESTA drain  Pulmonary:      Effort: Pulmonary effort is normal. No respiratory distress.      Breath sounds: Normal breath sounds. No wheezing or rales.   Abdominal:      General: There is no distension.      Palpations: Abdomen is soft.      Tenderness: There is no abdominal tenderness.   Musculoskeletal:         General: No deformity.   Neurological:      General: No  focal deficit present.      Mental Status: He is alert and oriented to person, place, and time. Mental status is at baseline.             Significant Labs: All pertinent labs within the past 24 hours have been reviewed.  Blood Culture:   Recent Labs   Lab 05/18/24  1526 05/18/24  1535   LABBLOO No Growth to date  No Growth to date No Growth to date  No Growth to date       CBC:   Recent Labs   Lab 05/18/24  1526   WBC 8.20   HGB 14.3   HCT 43.1          CMP:   Recent Labs   Lab 05/18/24  1526      K 4.0      CO2 23   GLU 88   BUN 18   CREATININE 1.2   CALCIUM 9.7   PROT 7.7   ALBUMIN 4.1   BILITOT 2.0*   ALKPHOS 88   AST 34   ALT 19   ANIONGAP 11         Significant Imaging: I have reviewed all pertinent imaging results/findings within the past 24 hours.

## 2024-05-20 NOTE — PROGRESS NOTES
[facility]iology  Infectious Disease  Progress Note    Patient Name: Michael Espinoza  MRN: [id number]  Admission Date: 5/14/2024  Length of Stay: 5 days  Attending Physician: Harmony Hooks MD  Primary Care Provider: Dominguez Hyatt MD    Isolation Status: No active isolations  Assessment/Plan:      ID  * Infection associated with cardiovascular device  68 year old male with history of CAD s/p CABG, heart block s/p dual chamber pacemaker in July 2022 and MSSA bacteremia (12/14-12/16, cleared 12/17, unclear source, CHARMAINE and MRI spine neg, 4w cefazolin, surveillance blood cultures negative), admitted this time for pacemaker infection.      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 High likelihood this is MSSA given recent history of bacteremia. So far no evidence of bacteremia but blood cultures are still incubating. So far no bacteremia noted, remains afebrile, without leucocytosis and HDS. Bcx 5/14 and 5/18 with NG.    Status post successful extraction on 5/17/24. Operative cultures 5/17 with NG. Remains clinically stable. New device implanataion today 5/20. Agree with plan and will follow closely       Recommendations     -Continue cefazolin IV for now - follow up post-operative clinical course.      -Plan on a prolonged course of IV antibiotics for four weeks. Discussed OPAT with patient.               Anticipated Disposition: TBD    Thank you for your consult. I will follow-up with patient. Please contact us if you have any additional questions.    Lencho Asencio MD  Infectious Disease  [facility]    Subjective:     Principal Problem:Infection associated with cardiovascular device    HPI: 68 year old male with history of CAD s/p CABG, heart block s/p dual chamber pacemaker in July 2022 and MSSA bacteremia (12/14-12/16, cleared  12/17, unclear source, CHARMAINE and MRI spine neg, 4w cefazolin, surveillance blood cultures negative), admitted this time for pacemaker infection.      Patient states about 2-3 weeks ago he noticed a brown lesion that was small and circular overlying pacemaker site, and slow started to get worse and changed to a reddish color over time associated with progressive tenderness to touch, but denies fever, rigors, chills, recent procedures, skin manipulation or recent trauma to the area. Furthermore, he denies chest pain. ID consulted for sent by EP due to concern for pacemaker pocket infection, Hx of MSSA bacteremia   Interval History: TREVA    Review of Systems  Constitutional:  Positive for activity change, appetite change and fatigue. Negative for chills and fever.   HENT:  Negative for trouble swallowing.    Respiratory:  Negative for cough and shortness of breath.    Gastrointestinal:  Negative for abdominal pain, blood in stool, diarrhea and vomiting.   Genitourinary:  Negative for dysuria and hematuria.   Musculoskeletal:  Negative for arthralgias, joint swelling and neck stiffness.   Skin:  Positive for wound.   Neurological:  Negative for syncope.   Psychiatric/Behavioral:  Negative for confusion.    Objective:     Vital Signs (Most Recent):  Temp: 98 °F (36.7 °C) (05/20/24 1219)  Pulse: 60 (05/20/24 1400)  Resp: 18 (05/20/24 1219)  BP: 132/76 (05/20/24 1219)  SpO2: 96 % (05/20/24 1219) Vital Signs (24h Range):  Temp:  [97.3 °F (36.3 °C)-98 °F (36.7 °C)] 98 °F (36.7 °C)  Pulse:  [59-78] 60  Resp:  [16-18] 18  SpO2:  [93 %-98 %] 96 %  BP: (109-133)/(66-82) 132/76     Weight: 64.7 kg (142 lb 10.2 oz)  Body mass index is 23.02 kg/m².    Estimated Creatinine Clearance: 53.2 mL/min (based on SCr of 1.2 mg/dL).     Physical Exam   Constitutional:       Appearance: Normal appearance.   HENT:      Head: Normocephalic and atraumatic.      Nose: Nose normal.      Mouth/Throat:      Mouth: Mucous membranes are moist.       Pharynx: Oropharynx is clear.   Eyes:      Conjunctiva/sclera: Conjunctivae normal.   Cardiovascular:      Rate and Rhythm: Normal rate and regular rhythm.      Pulses: Normal pulses.      Heart sounds: Normal heart sounds.      Comments: incision site bandaged, dry, no significant erythema or tenderness, drain present to left chest with serosanguinous fluid in drain bulb  Pulmonary:      Effort: Pulmonary effort is normal.      Breath sounds: Normal breath sounds.   Abdominal:      General: Bowel sounds are normal.      Palpations: Abdomen is soft.      Tenderness: There is no guarding or rebound.   Musculoskeletal:         General: No deformity.      Cervical back: Neck supple.   Skin:     General: Skin is warm and dry. RIJ bandage c/d/I  Groin catheter entry site cd/i     Coloration: Skin is not jaundiced.      Findings: No rash.   Neurological:      Mental Status: He is alert and oriented to person, place, and time. Mental status is at baseline.   Significant Labs:   Microbiology Results (last 7 days)       Procedure Component Value Units Date/Time    Blood culture [[id number]] Collected: 05/18/24 1526    Order Status: Completed Specimen: Blood from Peripheral, Antecubital, Left Updated: 05/20/24 1622     Blood Culture, Routine No Growth to date      No Growth to date      No Growth to date    Narrative:      Left antecubital     Blood culture [[id number]] Collected: 05/18/24 1535    Order Status: Completed Specimen: Blood from Peripheral, Antecubital, Right Updated: 05/20/24 1622     Blood Culture, Routine No Growth to date      No Growth to date      No Growth to date    AFB Culture & Smear [[id number]] Collected: 05/18/24 0704    Order Status: Completed Specimen: Abscess from Buttocks, Left Updated: 05/20/24 1406     AFB Culture & Smear Culture in progress     AFB CULTURE STAIN No acid fast bacilli seen.    IV catheter culture [[id number]] Collected: 05/17/24 1535    Order Status: Completed Specimen: Catheter  Tip, Implanted Updated: 05/20/24 1045     Aerobic Culture - Cath tip No growth    Narrative:      RV lead tip    IV catheter culture [[id number]] Collected: 05/17/24 1535    Order Status: Completed Specimen: Catheter Tip, Implanted Updated: 05/20/24 0935     Aerobic Culture - Cath tip No growth    Narrative:      RA lead tip    Aerobic culture [[id number]] Collected: 05/17/24 1507    Order Status: Completed Specimen: Incision site from Chest, Left Updated: 05/20/24 0932     Aerobic Bacterial Culture No growth    Narrative:      Superficial pocket #1    Aerobic culture [[id number]] Collected: 05/17/24 1507    Order Status: Completed Specimen: Incision site from Chest, Left Updated: 05/20/24 0932     Aerobic Bacterial Culture No growth    Narrative:      Superficial pocket #3    Aerobic culture [[id number]] Collected: 05/17/24 1515    Order Status: Completed Specimen: Incision site from Chest, Left Updated: 05/20/24 0932     Aerobic Bacterial Culture No growth    Narrative:      Deep pocket #2    Aerobic culture [[id number]] Collected: 05/17/24 1507    Order Status: Completed Specimen: Incision site from Chest, Left Updated: 05/20/24 0932     Aerobic Bacterial Culture No growth    Narrative:      Superficial pocket #2    Aerobic culture [[id number]] Collected: 05/17/24 1507    Order Status: Completed Specimen: Incision site from Chest, Left Updated: 05/20/24 0932     Aerobic Bacterial Culture No growth    Narrative:      Deep pocket #1    Aerobic culture [[id number]] Collected: 05/17/24 1515    Order Status: Completed Specimen: Incision site from Chest, Left Updated: 05/20/24 0932     Aerobic Bacterial Culture No growth    Narrative:      Deep pocket #4    Blood Culture #1 **CANNOT BE ORDERED STAT** [[id number]] Collected: 05/14/24 1644    Order Status: Completed Specimen: Blood from Peripheral, Antecubital, Left Updated: 05/19/24 1812     Blood Culture, Routine No growth after 5 days.    Blood Culture #2 **CANNOT BE  ORDERED STAT** [[id number]] Collected: 05/14/24 1643    Order Status: Completed Specimen: Blood from Peripheral, Hand, Right Updated: 05/19/24 1812     Blood Culture, Routine No growth after 5 days.    Culture, Anaerobe [[id number]] Collected: 05/18/24 0704    Order Status: Completed Specimen: Abscess from Buttocks, Left Updated: 05/19/24 0731     Anaerobic Culture Culture in progress    Aerobic culture [[id number]] Collected: 05/18/24 0704    Order Status: Completed Specimen: Abscess from Buttocks, Left Updated: 05/19/24 0655     Aerobic Bacterial Culture No growth    Blood culture [[id number]]     Order Status: Canceled Specimen: Blood     Blood culture [[id number]]     Order Status: Canceled Specimen: Blood     Blood culture [[id number]]     Order Status: Canceled Specimen: Blood     Blood culture [[id number]]     Order Status: Canceled Specimen: Blood     Culture, Anaerobe [[id number]] Collected: 05/17/24 1507    Order Status: Completed Specimen: Incision site from Chest, Left Updated: 05/18/24 0912     Anaerobic Culture Culture in progress    Narrative:      Superficial pocket #3    Culture, Anaerobe [[id number]] Collected: 05/17/24 1507    Order Status: Completed Specimen: Incision site from Chest, Left Updated: 05/18/24 0912     Anaerobic Culture Culture in progress    Narrative:      Deep pocket #1    Culture, Anaerobe [[id number]] Collected: 05/17/24 1515    Order Status: Completed Specimen: Incision site from Chest, Left Updated: 05/18/24 0912     Anaerobic Culture Culture in progress    Narrative:      Deep pocket #4    Culture, Anaerobe [[id number]] Collected: 05/17/24 1507    Order Status: Completed Specimen: Incision site from Chest, Left Updated: 05/18/24 0912     Anaerobic Culture Culture in progress    Narrative:      Superficial pocket #1    Culture, Anaerobe [[id number]] Collected: 05/17/24 1507    Order Status: Completed Specimen: Incision site from Chest, Left Updated: 05/18/24 0912      Anaerobic Culture Culture in progress    Narrative:      Superficial pocket #2    Culture, Anaerobe [[id number]] Collected: 05/17/24 1515    Order Status: Completed Specimen: Incision site from Chest, Left Updated: 05/18/24 0912     Anaerobic Culture Culture in progress    Narrative:      Deep pocket #2    Fungus culture [[id number]] Collected: 05/18/24 0704    Order Status: Sent Specimen: Abscess from Buttocks, Left Updated: 05/18/24 0740          Recent Lab Results         05/20/24  1150        INR 1.1  Comment: Coumadin Therapy:  2.0 - 3.0 for INR for all indicators except mechanical heart valves  and antiphospholipid syndromes which should use 2.5 - 3.5.         PT 11.8               Significant Imaging: I have reviewed all pertinent imaging results/findings within the past 24 hours.

## 2024-05-20 NOTE — ASSESSMENT & PLAN NOTE
Admitted for a pacemaker infection  Started on empiric Ancef while awaiting cultures given history of MSSA bacteremia, currently NGTD  ID following:  PICC line ordered for IV abx on DC  EP was consulted  S/p pacemaker removal on 5/17 with temporary pacemaker and MODESTA drain placement, and new permanent pacemaker placement on Monday 5/20.  Intra-op cultures 5/17 NGTD  Post procedure wound x 5/18 NGTD but mislabeled as butt abscess

## 2024-05-21 DIAGNOSIS — Z95.0 PACEMAKER: Primary | ICD-10-CM

## 2024-05-21 DIAGNOSIS — I44.2 CHB (COMPLETE HEART BLOCK): ICD-10-CM

## 2024-05-21 LAB
BACTERIA SPEC ANAEROBE CULT: NORMAL
OHS QRS DURATION: 138 MS
OHS QRS DURATION: 138 MS
OHS QRS DURATION: 176 MS
OHS QTC CALCULATION: 498 MS
OHS QTC CALCULATION: 518 MS
OHS QTC CALCULATION: 570 MS

## 2024-05-21 PROCEDURE — 25000003 PHARM REV CODE 250: Performed by: NURSE PRACTITIONER

## 2024-05-21 PROCEDURE — 20600001 HC STEP DOWN PRIVATE ROOM

## 2024-05-21 PROCEDURE — 63600175 PHARM REV CODE 636 W HCPCS: Performed by: NURSE PRACTITIONER

## 2024-05-21 PROCEDURE — 99233 SBSQ HOSP IP/OBS HIGH 50: CPT | Mod: GC,,, | Performed by: INTERNAL MEDICINE

## 2024-05-21 RX ORDER — IBUPROFEN 200 MG
16 TABLET ORAL
Status: CANCELLED | OUTPATIENT
Start: 2024-05-21

## 2024-05-21 RX ORDER — IBUPROFEN 200 MG
24 TABLET ORAL
Status: CANCELLED | OUTPATIENT
Start: 2024-05-21

## 2024-05-21 RX ORDER — GLUCAGON 1 MG
1 KIT INJECTION
Status: CANCELLED | OUTPATIENT
Start: 2024-05-21

## 2024-05-21 RX ADMIN — CEFAZOLIN 2 G: 2 INJECTION, POWDER, FOR SOLUTION INTRAMUSCULAR; INTRAVENOUS at 12:05

## 2024-05-21 RX ADMIN — Medication 100 MG: at 12:05

## 2024-05-21 RX ADMIN — ATORVASTATIN CALCIUM 80 MG: 40 TABLET, FILM COATED ORAL at 08:05

## 2024-05-21 RX ADMIN — ASPIRIN 81 MG: 81 TABLET, COATED ORAL at 09:05

## 2024-05-21 RX ADMIN — THERA TABS 1 TABLET: TAB at 09:05

## 2024-05-21 RX ADMIN — EZETIMIBE 10 MG: 10 TABLET ORAL at 09:05

## 2024-05-21 RX ADMIN — Medication 6 MG: at 08:05

## 2024-05-21 RX ADMIN — CEFAZOLIN 2 G: 2 INJECTION, POWDER, FOR SOLUTION INTRAMUSCULAR; INTRAVENOUS at 05:05

## 2024-05-21 RX ADMIN — CEFAZOLIN 2 G: 2 INJECTION, POWDER, FOR SOLUTION INTRAMUSCULAR; INTRAVENOUS at 08:05

## 2024-05-21 RX ADMIN — ASPIRIN 81 MG: 81 TABLET, COATED ORAL at 08:05

## 2024-05-21 NOTE — PLAN OF CARE
9:34- Attempted to complete IMM, RN in room with patient at bedside. Will try again later    9:55- CHW met with patient/family at bedside. Patient experience rounding completed and reviewed the following.     Do you know your discharge plan? Yes or No,    If yes, what is the plan? (Home, Home Health, Rehab, SNF, LTAC, or Other) Yes Home w/ HH    Have you discussed your needs and preferences with your SW/CM? Yes or No  Yes    If you are discharging home, do you have help at home? Yes or No No patient states he lives alone and has no help    Do you think you will need help additional at home at discharge? Yes or No   No    Do you currently have difficulty keeping up with bills, affording medicine or buying food? Yes or No No    Assigned SW/CM notified of any patient/family needs or concerns. Appropriate resources provided to address patient's needs.  Sindhu MACIAS      Patient states he is uncomfortable with returning home with his right arm in a brace and having a pickline. He would like to speak to the attending first and maybe see about an extra day in the hospital before returning home. CHW explain IMM and how the appeal process works. CHW informed patient to notify RN or another staff  member that he has called the [facility] number and provide the case number so documentation can be sent over. Patient voiced understanding and thanked the CHW. SW notified of the above.    Jennifer Bob CHW  Case Management   [phone]

## 2024-05-21 NOTE — PLAN OF CARE
Patient is AOX4 and VS stable.     Patient remained free of falls and trauma, fall precautions are in place.    Patient is ambulating independently.    Patient denies reports of  Problem: Adult Inpatient Plan of Care  Goal: Plan of Care Review  Outcome: Progressing  Goal: Patient-Specific Goal (Individualized)  Outcome: Progressing  Goal: Absence of Hospital-Acquired Illness or Injury  Outcome: Progressing  Goal: Optimal Comfort and Wellbeing  Outcome: Progressing  Goal: Readiness for Transition of Care  Outcome: Progressing     Problem: Infection  Goal: Absence of Infection Signs and Symptoms  Outcome: Progressing     Problem: Wound  Goal: Optimal Coping  Outcome: Progressing  Goal: Optimal Functional Ability  Outcome: Progressing  Goal: Absence of Infection Signs and Symptoms  Outcome: Progressing  Goal: Improved Oral Intake  Outcome: Progressing  Goal: Optimal Pain Control and Function  Outcome: Progressing  Goal: Skin Health and Integrity  Outcome: Progressing  Goal: Optimal Wound Healing  Outcome: Progressing     Problem: Fall Injury Risk  Goal: Absence of Fall and Fall-Related Injury  Outcome: Progressing    pain    Patients care plan and medication discussed.     Patient has no questions at this time/ verbalized understanding.     Bed is in low position and wheels are locked for patient safety.      The call light is within pt's reach.     Telemetry is on,    Pt remains free of falls, injury, and trauma.

## 2024-05-21 NOTE — SUBJECTIVE & OBJECTIVE
Interval History:   No acute overnight events. Denies any complaints today. Patient dressing was removed and site appears good without any signs of any hematoma, drainage or redness. Plan for left-sided PICC line for IV antibiotics     Objective:     Vital Signs (Most Recent):  Temp: 98.1 °F (36.7 °C) (05/21/24 1102)  Pulse: 92 (05/21/24 1200)  Resp: 18 (05/21/24 1102)  BP: 123/84 (05/21/24 1102)  SpO2: 96 % (05/21/24 1102) Vital Signs (24h Range):  Temp:  [97.5 °F (36.4 °C)-98.1 °F (36.7 °C)] 98.1 °F (36.7 °C)  Pulse:  [] 92  Resp:  [13-20] 18  SpO2:  [94 %-100 %] 96 %  BP: (122-146)/(67-89) 123/84     Weight: 64.8 kg (142 lb 14.4 oz)  Body mass index is 23.06 kg/m².     SpO2: 96 %        Physical Exam  Constitutional:       Appearance: Normal appearance.   HENT:      Head: Atraumatic.   Eyes:      Conjunctiva/sclera: Conjunctivae normal.   Cardiovascular:      Rate and Rhythm: Normal rate and regular rhythm.      Heart sounds: No murmur heard.  Pulmonary:      Effort: Pulmonary effort is normal.   Abdominal:      General: There is no distension.      Palpations: Abdomen is soft.      Tenderness: There is no abdominal tenderness.   Musculoskeletal:      Right lower leg: No edema.   Neurological:      Mental Status: He is alert.            Significant Labs: All pertinent lab results from the last 24 hours have been reviewed.

## 2024-05-21 NOTE — PROGRESS NOTES
[facility] - Cardiology Stepdown  Infectious Disease  Progress Note    Patient Name: Michael Espinoza  MRN: [id number]  Admission Date: 5/14/2024  Length of Stay: 6 days  Attending Physician: Harmony Hokos MD  Primary Care Provider: Dominguez Hyatt MD    Isolation Status: No active isolations  Assessment/Plan:      ID  * Infection associated with cardiovascular device  68M with CAD s/p CABG, heart block s/p dual chamber pacemaker in July 2022 and MSSA bacteremia (12/14-12/16, cleared 12/17, unclear source, CHARMAINE and MRI spine neg, 4w cefazolin, surveillance blood cultures negative), admitted this time for pacemaker infection. Now s/p extraction on 5/17 and new R sided PM placed on 5/20. Blood and operative cultures both without any growth. Is on ancef. Today is feeling well with some soreness at the new PM site.     Recommendations  Continue cefazolin  Duration 4 weeks (5/20 - 6/17)              Anticipated Disposition: TBD    Thank you for your consult. I will sign off. Please contact us if you have any additional questions.    Leilani Floyd, DO  Infectious Disease  [facility] - Cardiology Stepdown    Subjective:     Principal Problem:Infection associated with cardiovascular device    HPI: 68 year old male with history of CAD s/p CABG, heart block s/p dual chamber pacemaker in July 2022 and MSSA bacteremia (12/14-12/16, cleared 12/17, unclear source, CHARMAINE and MRI spine neg, 4w cefazolin, surveillance blood cultures negative), admitted this time for pacemaker infection.      Patient states about 2-3 weeks ago he noticed a brown lesion that was small and circular overlying pacemaker site, and slow started to get worse and changed to a reddish color over time associated with progressive tenderness to touch, but denies fever, rigors, chills, recent procedures, skin manipulation or recent trauma to the area. Furthermore, he denies chest pain. ID consulted for sent by EP due to concern for pacemaker pocket infection, Hx of MSSA  bacteremia   Interval History: feels well today, has some soreness at new PM site    Review of Systems   Constitutional:  Negative for chills and fever.   Respiratory:  Negative for cough and shortness of breath.    Cardiovascular:  Negative for chest pain.   Gastrointestinal:  Negative for abdominal pain, constipation, diarrhea, nausea and vomiting.   Musculoskeletal:  Negative for arthralgias and myalgias.   Skin:  Negative for rash.   Neurological:  Negative for weakness and headaches.     Objective:     Vital Signs (Most Recent):  Temp: 98.1 °F (36.7 °C) (05/21/24 1102)  Pulse: 100 (05/21/24 1112)  Resp: 18 (05/21/24 1102)  BP: 123/84 (05/21/24 1102)  SpO2: 96 % (05/21/24 1102) Vital Signs (24h Range):  Temp:  [97.5 °F (36.4 °C)-98.1 °F (36.7 °C)] 98.1 °F (36.7 °C)  Pulse:  [] 100  Resp:  [13-20] 18  SpO2:  [94 %-100 %] 96 %  BP: (122-146)/(67-89) 123/84     Weight: 64.8 kg (142 lb 14.4 oz)  Body mass index is 23.06 kg/m².    Estimated Creatinine Clearance: 53.2 mL/min (based on SCr of 1.2 mg/dL).     Physical Exam  Vitals reviewed.   Constitutional:       General: He is not in acute distress.     Appearance: Normal appearance. He is not ill-appearing.   HENT:      Head: Normocephalic and atraumatic.   Eyes:      Extraocular Movements: Extraocular movements intact.      Conjunctiva/sclera: Conjunctivae normal.   Cardiovascular:      Rate and Rhythm: Normal rate and regular rhythm.      Heart sounds: No murmur heard.  Pulmonary:      Effort: Pulmonary effort is normal. No respiratory distress.      Breath sounds: Normal breath sounds.   Abdominal:      General: Abdomen is flat. Bowel sounds are normal.      Palpations: Abdomen is soft.      Tenderness: There is no abdominal tenderness.   Musculoskeletal:      Cervical back: Normal range of motion.   Skin:     General: Skin is warm and dry.   Neurological:      General: No focal deficit present.      Mental Status: He is alert and oriented to person, place,  and time.   Psychiatric:         Mood and Affect: Mood normal.         Behavior: Behavior normal.         Thought Content: Thought content normal.          Significant Labs: All pertinent labs within the past 24 hours have been reviewed.  Recent Lab Results         05/20/24  1150        INR 1.1  Comment: Coumadin Therapy:  2.0 - 3.0 for INR for all indicators except mechanical heart valves  and antiphospholipid syndromes which should use 2.5 - 3.5.         PT 11.8               Significant Imaging: I have reviewed all pertinent imaging results/findings within the past 24 hours.

## 2024-05-21 NOTE — PLAN OF CARE
Outpatient Antibiotic Therapy Plan:    Please send referral to [facility].    1) Infection: pacemaker infection    2) Discharge Antibiotics:    Intravenous antibiotics:  Cefazolin 2g IV q 8 hours     3) Therapy Duration:  4 weeks    Estimated end date of IV antibiotics: 06/17/24    4) Outpatient Weekly Labs:    Order the following labs to be drawn on Mondays:   CBC  CMP     5) Fax Lab Results to Infectious Diseases Provider: Dr. Floyd    [facility] Fax Number: [phone]    6) Outpatient Infectious Diseases Follow-up    Follow-up appointment will be arranged by the ID clinic and will be found in the patient's appointments tab.    Prior to discharge, please ensure the patient's follow-up has been scheduled.    If there is still no follow-up scheduled prior to discharge, please send an EPIC message to Marlee Armas in Infectious Diseases.

## 2024-05-21 NOTE — ANESTHESIA POSTPROCEDURE EVALUATION
Anesthesia Post Evaluation    Patient: Michael Espinoza    Procedure(s) Performed: Procedure(s) (LRB):  INSERTION, CARDIAC PACEMAKER, DUAL CHAMBER (Right)    Final Anesthesia Type: general      Patient location during evaluation: PACU  Patient participation: Yes- Able to Participate  Level of consciousness: awake and alert  Post-procedure vital signs: reviewed and stable  Pain management: adequate  Airway patency: patent    PONV status at discharge: No PONV  Anesthetic complications: no      Cardiovascular status: hemodynamically stable  Respiratory status: spontaneous ventilation  Follow-up not needed.              Vitals Value Taken Time   /80 05/21/24 0404   Temp 36.4 °C (97.5 °F) 05/21/24 0404   Pulse 88 05/21/24 0404   Resp 18 05/21/24 0404   SpO2 95 % 05/21/24 0404         No case tracking events are documented in the log.      Pain/Eduin Score: Eduin Score: 10 (5/20/2024  5:45 PM)

## 2024-05-21 NOTE — PROGRESS NOTES
"[facility] - Cardiology [facility] Medicine  Progress Note    Patient Name: Michael Espinoza  MRN: [id number]  Patient Class: IP- Inpatient   Admission Date: 5/14/2024  Length of Stay: 6 days  Attending Physician: Harmony Hooks MD  Primary Care Provider: Dominguez Hyatt MD        Subjective:     Principal Problem:Infection associated with cardiovascular device        HPI:  Michael Espinoza is 68 y.o. male with a PMHx of CAD s/p CABG x3, HLD, Gilbert's syndrome, MSSA bacteremia, and heart block s/p dual chamber pacemaker in July 2022 with Dr. Canales who presents to the ED for tenderness and redness around his pacemaker site for the past week. He states that about a month ago he started noticing that his device was "creeping" closer to the skin but it was not bothering him. He was admitted in December for MSSA bacteremia and completed IV antibiotics. Blood cultures cleared. Patient denies fever or chills recently. Also denies chest pain, shortness of breath, palpitations.    In the ED, pt mildly hypertensive which improved without treatment otherwise vitals stable, afebrile. CBC unremarkable. Bicarb 20. Tbili 1.7 (chronic issue). CRP <0.3. Blood cxs in process. The patient received vancomycin and zosyn. EP consulted in the ED and recommend CXR, IV abx, and ID consult.    Overview/Hospital Course:  Mr. Espinoza was admitted to Hospital Medicine for management of a pacemaker infection.  ID was consulted.  He was started on Cefazolin while waiting on blood cultures.  EP was consulted, who plan for pacemaker removal on 5/17 with temporary pacemaker placement, and new permanent pacemaker placement on Monday 5/20.  PICC line was ordered.  ID plans for a prolonged IV treatment despite negative cultures given MSSA bacteremia in December, but concerned there was more of a pacemaker erosion on the skin than an infection.    Interval History: No acute events overnight.  No complaints.  Was going to file an appeal on discharge " stating that he needs another day in the hospital.  I explained that he doesn't need to file an appeal, I am happy to keep him here for 48h while his RUE is in a sling.  Reaching out to ID and EP about PICC - PICC team endorsed concerns about if a PICC would be able to placed with his bilateral chest anatomy.  Unclear if he will need a tunneled line for 4 weeks of IV abx vs a midline that will need to be replaced after 3 weeks.  Cultures still remain negative.Tentative plan to DC tomorrow at the earliest, but will need to determine his IV access prior to DC.    Review of Systems   Constitutional:  Negative for chills, fatigue and fever.   Respiratory:  Negative for cough and shortness of breath.    Cardiovascular:  Negative for chest pain, palpitations and leg swelling.   Gastrointestinal:  Negative for abdominal pain, diarrhea, nausea and vomiting.   Genitourinary:  Negative for dysuria and urgency.   Neurological:  Negative for dizziness and headaches.   All other systems reviewed and are negative.    Objective:     Vital Signs (Most Recent):  Temp: 98.1 °F (36.7 °C) (05/21/24 1102)  Pulse: 100 (05/21/24 1112)  Resp: 18 (05/21/24 1102)  BP: 123/84 (05/21/24 1102)  SpO2: 96 % (05/21/24 1102) Vital Signs (24h Range):  Temp:  [97.5 °F (36.4 °C)-98.1 °F (36.7 °C)] 98.1 °F (36.7 °C)  Pulse:  [] 100  Resp:  [13-20] 18  SpO2:  [94 %-100 %] 96 %  BP: (122-146)/(67-89) 123/84     Weight: 64.8 kg (142 lb 14.4 oz)  Body mass index is 23.06 kg/m².    Intake/Output Summary (Last 24 hours) at 5/21/2024 1115  Last data filed at 5/21/2024 0930  Gross per 24 hour   Intake 1800 ml   Output 800 ml   Net 1000 ml         Physical Exam  Constitutional:       Appearance: Normal appearance.   HENT:      Head: Normocephalic and atraumatic.   Cardiovascular:      Rate and Rhythm: Normal rate and regular rhythm.      Heart sounds: No murmur heard.     Comments: Left chest pacemaker site with stitches s/p pacemaker removal  Pulmonary:  "     Effort: Pulmonary effort is normal. No respiratory distress.      Breath sounds: Normal breath sounds. No wheezing or rales.   Abdominal:      General: There is no distension.      Palpations: Abdomen is soft.      Tenderness: There is no abdominal tenderness.   Musculoskeletal:         General: No deformity.      Comments: RUE in sling   Neurological:      General: No focal deficit present.      Mental Status: He is alert and oriented to person, place, and time. Mental status is at baseline.             Significant Labs: All pertinent labs within the past 24 hours have been reviewed.  Blood Culture:   No results for input(s): "LABBLOO" in the last 48 hours.      CBC:   No results for input(s): "WBC", "HGB", "HCT", "PLT" in the last 48 hours.      CMP:   No results for input(s): "NA", "K", "CL", "CO2", "GLU", "BUN", "CREATININE", "CALCIUM", "PROT", "ALBUMIN", "BILITOT", "ALKPHOS", "AST", "ALT", "ANIONGAP", "EGFRNONAA" in the last 48 hours.    Invalid input(s): "ESTGFAFRICA"        Significant Imaging: I have reviewed all pertinent imaging results/findings within the past 24 hours.    Assessment/Plan:      * Infection associated with cardiovascular device  Admitted for a pacemaker infection  Started on empiric Ancef while awaiting cultures given history of MSSA bacteremia, currently NGTD  ID following:  PICC line ordered for IV abx on DC but need to discuss with EP/ID if he is able to get it with bilateral chest procedures for pacemakers  EP was consulted  S/p pacemaker removal on 5/17 with temporary pacemaker and MODESTA drain placement, and new permanent pacemaker placement on Monday 5/20.  Intra-op cultures 5/17 NGTD  Post procedure wound x 5/18 NGTD but mislabeled as butt abscess    Complete heart block  S/p dual chamber pacemaker placement  7/5/2022    Coronary artery disease of native artery of native heart with stable angina pectoris  Patient with known CAD s/p CABG, which is controlled Will continue ASA and " Statin and monitor for S/Sx of angina/ACS. Continue to monitor on telemetry.     Gilbert's syndrome  Chronic condition- reports diagnosed by Dr. Rowe years ago  T bili elevated on admission, similar to previous  Monitor CMP    HLD (hyperlipidemia)   Patient is chronically on statin.will continue for now. Monitor clinically. Last LDL was   Lab Results   Component Value Date    LDLCALC 48.0 (L) 08/11/2023         VTE Risk Mitigation (From admission, onward)           Ordered     IP VTE LOW RISK PATIENT  Once         05/14/24 1912     Place sequential compression device  Until discontinued         05/14/24 1912                    Discharge Planning   MAGNO: 5/23/2024     Code Status: Full Code   Is the patient medically ready for discharge?:     Reason for patient still in hospital (select all that apply): Patient trending condition and Consult recommendations  Discharge Plan A: Home Health, Other (IV Infusion)   Discharge Delays: None known at this time              Harmony Hooks MD  Department of Hospital Medicine   [facility] - Cardiology Stepdown

## 2024-05-21 NOTE — PROGRESS NOTES
[facility] - Cardiology Stepdown  Cardiac Electrophysiology  Progress Note    Admission Date: 5/14/2024  Code Status: Full Code   Attending Physician: Harmony Hooks MD   Expected Discharge Date: 5/23/2024  Principal Problem:Infection associated with cardiovascular device    Subjective:     Interval History:   No acute overnight events. Denies any complaints today. Patient dressing was removed and site appears good without any signs of any hematoma, drainage or redness. Plan for left-sided PICC line for IV antibiotics     Objective:     Vital Signs (Most Recent):  Temp: 98.1 °F (36.7 °C) (05/21/24 1102)  Pulse: 92 (05/21/24 1200)  Resp: 18 (05/21/24 1102)  BP: 123/84 (05/21/24 1102)  SpO2: 96 % (05/21/24 1102) Vital Signs (24h Range):  Temp:  [97.5 °F (36.4 °C)-98.1 °F (36.7 °C)] 98.1 °F (36.7 °C)  Pulse:  [] 92  Resp:  [13-20] 18  SpO2:  [94 %-100 %] 96 %  BP: (122-146)/(67-89) 123/84     Weight: 64.8 kg (142 lb 14.4 oz)  Body mass index is 23.06 kg/m².     SpO2: 96 %        Physical Exam  Constitutional:       Appearance: Normal appearance.   HENT:      Head: Atraumatic.   Eyes:      Conjunctiva/sclera: Conjunctivae normal.   Cardiovascular:      Rate and Rhythm: Normal rate and regular rhythm.      Heart sounds: No murmur heard.  Pulmonary:      Effort: Pulmonary effort is normal.   Abdominal:      General: There is no distension.      Palpations: Abdomen is soft.      Tenderness: There is no abdominal tenderness.   Musculoskeletal:      Right lower leg: No edema.   Neurological:      Mental Status: He is alert.            Significant Labs: All pertinent lab results from the last 24 hours have been reviewed.    Assessment and Plan:     * Infection associated with cardiovascular device  S/p left device extraction (5/17) with R.sided Biotronic dual chamber implantation (5/20)    - Continue with antibiotics per ID and plan for PICC line placement today   -Patient will be scheduled for device clinic follow up  in 1 week to assess surgical site and device interrogation. And follow up in 3 weeks for left sided suture removal    Thank you for this consult.Please follow up Attending Attestation for further recommendations. Please call if any questions.         Deirdre Williamson MD  Cardiac Electrophysiology  [facility] - Cardiology Stepdown

## 2024-05-21 NOTE — ASSESSMENT & PLAN NOTE
Admitted for a pacemaker infection  Started on empiric Ancef while awaiting cultures given history of MSSA bacteremia, currently NGTD  ID following:  PICC line ordered for IV abx on DC but need to discuss with EP/ID if he is able to get it with bilateral chest procedures for pacemakers  EP was consulted  S/p pacemaker removal on 5/17 with temporary pacemaker and MODESTA drain placement, and new permanent pacemaker placement on Monday 5/20.  Intra-op cultures 5/17 NGTD  Post procedure wound x 5/18 NGTD but mislabeled as butt abscess

## 2024-05-21 NOTE — ASSESSMENT & PLAN NOTE
68M with CAD s/p CABG, heart block s/p dual chamber pacemaker in July 2022 and MSSA bacteremia (12/14-12/16, cleared 12/17, unclear source, CHARMAINE and MRI spine neg, 4w cefazolin, surveillance blood cultures negative), admitted this time for pacemaker infection. Now s/p extraction on 5/17 and new R sided PM placed on 5/20. Blood and operative cultures both without any growth. Is on ancef. Today is feeling well with some soreness at the new PM site.     Recommendations  Continue cefazolin  Duration 4 weeks (5/20 - 6/17)

## 2024-05-21 NOTE — PLAN OF CARE
[facility]  [address]  [place] [address]  Phone: [phone]    Home Health Orders  Face to Face Encounter    Patient Name: Michael Espinoza  YOB: 1955    PCP: Dominguez Hyatt MD   PCP Address: [address] [place] [address]  PCP Phone Number: [phone]  PCP Fax: [phone]    Encounter Date: 05/21/2024    Admit To Home Health    Diagnoses:  Active Hospital Problems    Diagnosis  POA    *Infection associated with cardiovascular device [T82.7XXA]  Yes     Priority: 1 - High    Complete heart block [I44.2]  Yes     Priority: 2     Coronary artery disease of native artery of native heart with stable angina pectoris [I25.118]  Yes    Gilbert's syndrome [E80.4]  Yes    HLD (hyperlipidemia) [E78.5]  Yes      Resolved Hospital Problems   No resolved problems to display.       Follow Up Appointments:  Future Appointments   Date Time Provider Department Center   6/11/2024 10:20 AM [facility] DEXA1 [facility]           Allergies:Review of patient's allergies indicates:  No Known Allergies      Medications: Review discharge medications with patient and family and provide education.      Current Discharge Medication List        START taking these medications    Details   dextrose 5 % in water (D5W) PgBk 50 mL with ceFAZolin 2 gram SolR 2 g Inject 2 g into the vein every 8 (eight) hours.           CONTINUE these medications which have NOT CHANGED    Details   aspirin (ECOTRIN) 81 MG EC tablet Take 81 mg by mouth 2 (two) times daily. (Breakfast & Afternoon)      atorvastatin (LIPITOR) 80 MG tablet Take 1 tablet (80 mg total) by mouth once daily.  Qty: 90 tablet, Refills: 3      ezetimibe (ZETIA) 10 mg tablet Take 1 tablet (10 mg total) by mouth once daily.  Qty: 90 tablet, Refills: 3    Associated Diagnoses: Coronary artery disease involving native coronary artery of native heart without angina pectoris; Pure hypercholesterolemia      calcium carbonate (TUMS  ORAL) Take 2 tablets by mouth daily as needed (Heartburn).      coenzyme Q10 (CO Q-10) 300 mg Cap Take 1 capsule by mouth once daily.      dicyclomine (BENTYL) 10 MG capsule Take 10 mg by mouth daily as needed (Abdominal pain).      ERGOCALCIFEROL, VITAMIN D2, (VITAMIN D ORAL) Take 1 capsule by mouth Mon, Wed, Fri, Sat & Sun      multivit-min/FA/lycopen/lutein (CENTRUM SILVER MEN ORAL) Take 1 tablet by mouth every Mon, Wed, Fri.      ondansetron (ZOFRAN-ODT) 4 MG TbDL Take 1 tablet (4 mg total) by mouth every 8 (eight) hours as needed (nausea).  Qty: 12 tablet, Refills: 0    Associated Diagnoses: Diarrhea, unspecified type; Nausea      tamsulosin (FLOMAX) 0.4 mg Cap Take 1 capsule (0.4 mg total) by mouth once daily.  Qty: 30 capsule, Refills: 1           STOP taking these medications       oxybutynin (DITROPAN) 5 MG Tab Comments:   Reason for Stopping:         oxyCODONE (ROXICODONE) 5 MG immediate release tablet Comments:   Reason for Stopping:                 Code Status:    Code Status: Full Code      Nursing:   Agency to admit patient within 24 hours of hospital discharge unless specified on physician order or at patient request    SN to complete comprehensive assessment including routine vital signs. Instruct on disease process and s/s of complications to report to MD. Review/verify medication list sent home with the patient at time of discharge  and instruct patient/caregiver as needed. Frequency may be adjusted depending on start of care date.     Skilled nurse to perform up to 3 visits PRN for symptoms related to diagnosis    Notify MD if SBP > 160 or < 90; DBP > 90 or < 50; HR > 120 or < 50; Temp > 101; O2 < 88%    Ok to schedule additional visits based on staff availability and patient request on consecutive days within the home health episode.      When multiple disciplines ordered:    Start of Care occurs on Sunday - Wednesday schedule remaining discipline evaluations as ordered on separate consecutive  days following the start of care.    Thursday SOC -schedule subsequent evaluations Friday and Monday the following week.     Friday - Saturday SOC - schedule subsequent discipline evaluations on consecutive days starting Monday of the following week.      Diet:   cardiac diet      PICC/Midline Therapy:  SN to perform Infusion Therapy/Central Line Care.  Review Central Line Care & Central Line Flush with patient.  Pull midline/PICC line after completion of IV therapy    Scrub the Hub: Prior to accessing the line, always perform a 30 second alcohol scrub  Each lumen of the central line is to be flushed at least daily with 10 mL Normal Saline and 3 mL Heparin flush (10 units/mL)  Skilled Nurse (SN) may draw blood from IV access  Blood Draw Procedure:   - Aspirate at least 5 mL of blood   - Discard   - Obtain specimen   - Change injection cap   - Flush with 20 mL Normal Saline followed by a                 3-5 mL Heparin flush (10 units/mL)  Central :   - Sterile dressing changes are done weekly and as needed.   - Use chlor-hexadine scrub to cleanse site, apply Biopatch to insertion site,       apply securement device dressing   - Injection caps are changed weekly and after EVERY lab draw.   - If sterile gauze is under dressing to control oozing,                 dressing change must be performed every 24 hours until gauze is not needed.      Outpatient Antibiotic Therapy Plan:     Please send referral to [facility].     1) Infection: pacemaker infection     2) Discharge Antibiotics:     Intravenous antibiotics:  Cefazolin 2g IV q 8 hours      3) Therapy Duration:  4 weeks     Estimated end date of IV antibiotics: 06/17/24     4) Outpatient Weekly Labs:     Order the following labs to be drawn on Mondays:   CBC  CMP      5) Fax Lab Results to Infectious Diseases Provider: Dr. Floyd     [facility] Fax Number: [phone]     6) Outpatient Infectious Diseases  Follow-up     Follow-up appointment will be arranged by the ID clinic and will be found in the patient's appointments tab.     Prior to discharge, please ensure the patient's follow-up has been scheduled.    If there is still no follow-up scheduled prior to discharge, please send an EPIC message to Marlee Armas in Infectious Diseases.    For all post-discharge communication and subsequent orders please contact patient's primary care physician. If unable to reach primary care physician or do not receive response within 30 minutes, please contact [facility] on call for clinical staff order clarification      I certify that this patient is confined to his home and needs intermittent skilled nursing care, physical therapy and occupational therapy.      Harmony Hooks MD  [facility] Medicine

## 2024-05-21 NOTE — PLAN OF CARE
Patient discharged per MD orders. Instructions given on medications, wound care, activity, signs of infection, when to call MD, and follow up appointments. Pt verbalized understanding. Telemetry and PIV removed. Patient and family refused transport, ambulated off unit.    
Problem: Patient Care Overview  Goal: Plan of Care Review  Outcome: Ongoing (interventions implemented as appropriate)  Patient arrived to room. PIV placed, labs sent. Admit assessment completed. Plan of care discussed with patient. Will monitor      
Problem: Patient Care Overview  Goal: Plan of Care Review  Outcome: Ongoing (interventions implemented as appropriate)  Received report from [facility]. Patient s/p C, AAOx3. VSS, no c/o pain or discomfort at this time, resp even and unlabored. Vasc band to right wrist is CDI. No active bleeding. No hematoma noted. Post procedure protocol reviewed with patient and patient's family. Understanding verbalized. Family members at bedside. Nurse call bell within reach. Will continue to monitor per post procedure protocol.         
Vasc band removed per protocol over 1 hour period. Patient tolerated well. Gauze/tegaderm dressing placed. Will monitor.    
No

## 2024-05-21 NOTE — SUBJECTIVE & OBJECTIVE
Interval History: feels well today, has some soreness at new PM site    Review of Systems   Constitutional:  Negative for chills and fever.   Respiratory:  Negative for cough and shortness of breath.    Cardiovascular:  Negative for chest pain.   Gastrointestinal:  Negative for abdominal pain, constipation, diarrhea, nausea and vomiting.   Musculoskeletal:  Negative for arthralgias and myalgias.   Skin:  Negative for rash.   Neurological:  Negative for weakness and headaches.     Objective:     Vital Signs (Most Recent):  Temp: 98.1 °F (36.7 °C) (05/21/24 1102)  Pulse: 100 (05/21/24 1112)  Resp: 18 (05/21/24 1102)  BP: 123/84 (05/21/24 1102)  SpO2: 96 % (05/21/24 1102) Vital Signs (24h Range):  Temp:  [97.5 °F (36.4 °C)-98.1 °F (36.7 °C)] 98.1 °F (36.7 °C)  Pulse:  [] 100  Resp:  [13-20] 18  SpO2:  [94 %-100 %] 96 %  BP: (122-146)/(67-89) 123/84     Weight: 64.8 kg (142 lb 14.4 oz)  Body mass index is 23.06 kg/m².    Estimated Creatinine Clearance: 53.2 mL/min (based on SCr of 1.2 mg/dL).     Physical Exam  Vitals reviewed.   Constitutional:       General: He is not in acute distress.     Appearance: Normal appearance. He is not ill-appearing.   HENT:      Head: Normocephalic and atraumatic.   Eyes:      Extraocular Movements: Extraocular movements intact.      Conjunctiva/sclera: Conjunctivae normal.   Cardiovascular:      Rate and Rhythm: Normal rate and regular rhythm.      Heart sounds: No murmur heard.  Pulmonary:      Effort: Pulmonary effort is normal. No respiratory distress.      Breath sounds: Normal breath sounds.   Abdominal:      General: Abdomen is flat. Bowel sounds are normal.      Palpations: Abdomen is soft.      Tenderness: There is no abdominal tenderness.   Musculoskeletal:      Cervical back: Normal range of motion.   Skin:     General: Skin is warm and dry.   Neurological:      General: No focal deficit present.      Mental Status: He is alert and oriented to person, place, and time.    Psychiatric:         Mood and Affect: Mood normal.         Behavior: Behavior normal.         Thought Content: Thought content normal.          Significant Labs: All pertinent labs within the past 24 hours have been reviewed.  Recent Lab Results         05/20/24  1150        INR 1.1  Comment: Coumadin Therapy:  2.0 - 3.0 for INR for all indicators except mechanical heart valves  and antiphospholipid syndromes which should use 2.5 - 3.5.         PT 11.8               Significant Imaging: I have reviewed all pertinent imaging results/findings within the past 24 hours.

## 2024-05-21 NOTE — DISCHARGE INSTRUCTIONS
Will need follow up in 1 week for wound check and device check. Then will need a 3 week follow up for suture removal     Medication instructions:    Complete antibiotics per ID    Activity restrictions & precautions:    Sling & arm instructions:  Wear your sling for 48 hours. After 48 hours, you can take your arm out of your sling.   You must wear your sling at night when you sleep for 6 weeks after your procedure  Do not lift >5 lbs for 2 weeks.  Do not raise your elbow above your shoulder on the same side as your device for 6 weeks.     Surgical site   Dressing (see images below)  **Note: these are general rules to follow unless instructed otherwise** - see images below  If you have a brown rectangular gel bandage (A.K.A. Aquacel Ag dressing) over your surgical incision do not remove it. This will be removed at your device clinic follow up appointment in 1 week.  If you have a square light brown bandage (A.K.A Mepilex dressing) you should remove in 48 hours after your procedure.  If you have a pressure dressing (white elastic tape with gauze underneath or a very large bandage that covers your left chest and is shaped like a triangle) over your surgical site, this should be removed the morning after your procedure unless instructed otherwise.  Do not place any creams or ointments over the surgical site. This could effect the integrity of the Dermabond glue.  You can shower after 24 hours post-procedure, but do not let the jet directly hit your pocket site for at least 2 weeks. Recommend showering with your back to the shower head.   Do not reach your arm (on the same side as your device) around your back during showering for 6 weeks.  Do not submerge your surgical incision site under water (swimming, bathing if you submerge the actual surgical site) for at least 6 week. It is imperative that the surgical site is completely healed prior to doing so    Follow up in device clinic in 1 week to check your incision and  device function  Please contact the electrophysiology clinic if you have any questions or if you experience: potential surgical/pocket site complications (pain, swelling, bleeding, drainage), fevers, chest pain/shortness of breath, or for any other concerns.

## 2024-05-21 NOTE — SUBJECTIVE & OBJECTIVE
Interval History: No acute events overnight.  No complaints.  Was going to file an appeal on discharge stating that he needs another day in the hospital.  I explained that he doesn't need to file an appeal, I am happy to keep him here for 48h while his RUE is in a sling.  Reaching out to ID and EP about PICC - PICC team endorsed concerns about if a PICC would be able to placed with his bilateral chest anatomy.  Unclear if he will need a tunneled line for 4 weeks of IV abx vs a midline that will need to be replaced after 3 weeks.  Cultures still remain negative.Tentative plan to DC tomorrow at the earliest, but will need to determine his IV access prior to DC.    Review of Systems   Constitutional:  Negative for chills, fatigue and fever.   Respiratory:  Negative for cough and shortness of breath.    Cardiovascular:  Negative for chest pain, palpitations and leg swelling.   Gastrointestinal:  Negative for abdominal pain, diarrhea, nausea and vomiting.   Genitourinary:  Negative for dysuria and urgency.   Neurological:  Negative for dizziness and headaches.   All other systems reviewed and are negative.    Objective:     Vital Signs (Most Recent):  Temp: 98.1 °F (36.7 °C) (05/21/24 1102)  Pulse: 100 (05/21/24 1112)  Resp: 18 (05/21/24 1102)  BP: 123/84 (05/21/24 1102)  SpO2: 96 % (05/21/24 1102) Vital Signs (24h Range):  Temp:  [97.5 °F (36.4 °C)-98.1 °F (36.7 °C)] 98.1 °F (36.7 °C)  Pulse:  [] 100  Resp:  [13-20] 18  SpO2:  [94 %-100 %] 96 %  BP: (122-146)/(67-89) 123/84     Weight: 64.8 kg (142 lb 14.4 oz)  Body mass index is 23.06 kg/m².    Intake/Output Summary (Last 24 hours) at 5/21/2024 1115  Last data filed at 5/21/2024 0930  Gross per 24 hour   Intake 1800 ml   Output 800 ml   Net 1000 ml         Physical Exam  Constitutional:       Appearance: Normal appearance.   HENT:      Head: Normocephalic and atraumatic.   Cardiovascular:      Rate and Rhythm: Normal rate and regular rhythm.      Heart sounds: No  "murmur heard.     Comments: Left chest pacemaker site with stitches s/p pacemaker removal  Pulmonary:      Effort: Pulmonary effort is normal. No respiratory distress.      Breath sounds: Normal breath sounds. No wheezing or rales.   Abdominal:      General: There is no distension.      Palpations: Abdomen is soft.      Tenderness: There is no abdominal tenderness.   Musculoskeletal:         General: No deformity.      Comments: RUE in sling   Neurological:      General: No focal deficit present.      Mental Status: He is alert and oriented to person, place, and time. Mental status is at baseline.             Significant Labs: All pertinent labs within the past 24 hours have been reviewed.  Blood Culture:   No results for input(s): "LABBLOO" in the last 48 hours.      CBC:   No results for input(s): "WBC", "HGB", "HCT", "PLT" in the last 48 hours.      CMP:   No results for input(s): "NA", "K", "CL", "CO2", "GLU", "BUN", "CREATININE", "CALCIUM", "PROT", "ALBUMIN", "BILITOT", "ALKPHOS", "AST", "ALT", "ANIONGAP", "EGFRNONAA" in the last 48 hours.    Invalid input(s): "ESTGFAFRICA"        Significant Imaging: I have reviewed all pertinent imaging results/findings within the past 24 hours.  "

## 2024-05-22 VITALS
WEIGHT: 142.88 LBS | OXYGEN SATURATION: 95 % | SYSTOLIC BLOOD PRESSURE: 118 MMHG | BODY MASS INDEX: 22.96 KG/M2 | DIASTOLIC BLOOD PRESSURE: 77 MMHG | HEART RATE: 80 BPM | RESPIRATION RATE: 20 BRPM | HEIGHT: 66 IN | TEMPERATURE: 98 F

## 2024-05-22 LAB
BACTERIA SPEC AEROBE CULT: NO GROWTH
BACTERIA SPEC ANAEROBE CULT: ABNORMAL
FINAL PATHOLOGIC DIAGNOSIS: NORMAL
GROSS: NORMAL
Lab: NORMAL

## 2024-05-22 PROCEDURE — 36573 INSJ PICC RS&I 5 YR+: CPT

## 2024-05-22 PROCEDURE — 63600175 PHARM REV CODE 636 W HCPCS: Performed by: NURSE PRACTITIONER

## 2024-05-22 PROCEDURE — 25000003 PHARM REV CODE 250: Performed by: NURSE PRACTITIONER

## 2024-05-22 PROCEDURE — 94761 N-INVAS EAR/PLS OXIMETRY MLT: CPT

## 2024-05-22 PROCEDURE — C1751 CATH, INF, PER/CENT/MIDLINE: HCPCS

## 2024-05-22 PROCEDURE — 02HV33Z INSERTION OF INFUSION DEVICE INTO SUPERIOR VENA CAVA, PERCUTANEOUS APPROACH: ICD-10-PCS | Performed by: HOSPITALIST

## 2024-05-22 PROCEDURE — 76937 US GUIDE VASCULAR ACCESS: CPT

## 2024-05-22 RX ORDER — SODIUM CHLORIDE 0.9 % (FLUSH) 0.9 %
10 SYRINGE (ML) INJECTION EVERY 6 HOURS
Status: DISCONTINUED | OUTPATIENT
Start: 2024-05-22 | End: 2024-05-22 | Stop reason: HOSPADM

## 2024-05-22 RX ORDER — SODIUM CHLORIDE 0.9 % (FLUSH) 0.9 %
10 SYRINGE (ML) INJECTION
Status: DISCONTINUED | OUTPATIENT
Start: 2024-05-22 | End: 2024-05-22 | Stop reason: HOSPADM

## 2024-05-22 RX ADMIN — THERA TABS 1 TABLET: TAB at 09:05

## 2024-05-22 RX ADMIN — CEFAZOLIN 2 G: 2 INJECTION, POWDER, FOR SOLUTION INTRAMUSCULAR; INTRAVENOUS at 01:05

## 2024-05-22 RX ADMIN — Medication 100 MG: at 09:05

## 2024-05-22 RX ADMIN — CEFAZOLIN 2 G: 2 INJECTION, POWDER, FOR SOLUTION INTRAMUSCULAR; INTRAVENOUS at 05:05

## 2024-05-22 RX ADMIN — EZETIMIBE 10 MG: 10 TABLET ORAL at 09:05

## 2024-05-22 RX ADMIN — ASPIRIN 81 MG: 81 TABLET, COATED ORAL at 09:05

## 2024-05-22 NOTE — PROCEDURES
"Michael Espinoza is a 68 y.o. male patient.    Temp: 97.9 °F (36.6 °C) (05/22/24 0819)  Pulse: 93 (05/22/24 0819)  Resp: 16 (05/22/24 0819)  BP: 129/87 (05/22/24 0819)  SpO2: 95 % (05/22/24 0819)  Weight: 64.8 kg (142 lb 14.4 oz) (05/21/24 0736)  Height: 5' 6" (167.6 cm) (05/14/24 1524)    PICC  Date/Time: 5/22/2024 9:45 AM  Performed by: Dominguez Bull RN  Assisting provider: Lorenza Grace RN  Consent Done: Yes  Time out: Immediately prior to procedure a time out was called to verify the correct patient, procedure, equipment, support staff and site/side marked as required  Indications: med administration and vascular access  Anesthesia: local infiltration  Local anesthetic: lidocaine 1% without epinephrine  Anesthetic Total (mL): 3  Description of findings: PICC  Preparation: skin prepped with ChloraPrep  Skin prep agent dried: skin prep agent completely dried prior to procedure  Sterile barriers: all five maximum sterile barriers used - cap, mask, sterile gown, sterile gloves, and large sterile sheet  Hand hygiene: hand hygiene performed prior to central venous catheter insertion  Location details: left brachial  Catheter type: double lumen  Catheter size: 5 Fr  Catheter Length: 45cm    Ultrasound guidance: yes  Vessel Caliber: medium and patent, compressibility normal  Vascular Doppler: not done  Needle advanced into vessel with real time Ultrasound guidance.  Guidewire confirmed in vessel.  Image recorded and saved.  Sterile sheath used.  no esophageal manometryNumber of attempts: 1  Post-procedure: blood return through all ports, sterile dressing applied and chlorhexidine patch  Technical procedures used: 3cg  Estimated blood loss (mL): 0  Specimens: No  Implants: No  Assessment: placement verified by x-ray  Complications: none        Name   5/22/2024    "

## 2024-05-22 NOTE — SUBJECTIVE & OBJECTIVE
Interval History: No acute events overnight.  No complaints.  5/17 culture positive for C. Acnes.  ID and EP informed.  Continue Ancef.  Plan to DC today after left PICC line. Verbally discussed DC recs from EP and followup.  Comfortable with going home today.    Review of Systems   Constitutional:  Negative for chills, fatigue and fever.   Respiratory:  Negative for cough and shortness of breath.    Cardiovascular:  Negative for chest pain, palpitations and leg swelling.   Gastrointestinal:  Negative for abdominal pain, diarrhea, nausea and vomiting.   Genitourinary:  Negative for dysuria and urgency.   Neurological:  Negative for dizziness and headaches.   All other systems reviewed and are negative.    Objective:     Vital Signs (Most Recent):  Temp: 97.9 °F (36.6 °C) (05/22/24 0819)  Pulse: 93 (05/22/24 0819)  Resp: 16 (05/22/24 0819)  BP: 129/87 (05/22/24 0819)  SpO2: 95 % (05/22/24 0819) Vital Signs (24h Range):  Temp:  [97 °F (36.1 °C)-98.2 °F (36.8 °C)] 97.9 °F (36.6 °C)  Pulse:  [] 93  Resp:  [16-18] 16  SpO2:  [95 %-97 %] 95 %  BP: (102-131)/(72-87) 129/87     Weight: 64.8 kg (142 lb 14.4 oz)  Body mass index is 23.06 kg/m².    Intake/Output Summary (Last 24 hours) at 5/22/2024 1021  Last data filed at 5/22/2024 0015  Gross per 24 hour   Intake 342 ml   Output 503 ml   Net -161 ml         Physical Exam  Constitutional:       Appearance: Normal appearance.   HENT:      Head: Normocephalic and atraumatic.   Cardiovascular:      Rate and Rhythm: Normal rate and regular rhythm.      Heart sounds: No murmur heard.     Comments: Left chest pacemaker site with stitches s/p pacemaker removal  Pulmonary:      Effort: Pulmonary effort is normal. No respiratory distress.      Breath sounds: Normal breath sounds. No wheezing or rales.   Abdominal:      General: There is no distension.      Palpations: Abdomen is soft.      Tenderness: There is no abdominal tenderness.   Musculoskeletal:         General: No  "deformity.      Comments: RUE in sling   Neurological:      General: No focal deficit present.      Mental Status: He is alert and oriented to person, place, and time. Mental status is at baseline.             Significant Labs: All pertinent labs within the past 24 hours have been reviewed.  Blood Culture:   No results for input(s): "LABBLOO" in the last 48 hours.      CBC:   No results for input(s): "WBC", "HGB", "HCT", "PLT" in the last 48 hours.      CMP:   No results for input(s): "NA", "K", "CL", "CO2", "GLU", "BUN", "CREATININE", "CALCIUM", "PROT", "ALBUMIN", "BILITOT", "ALKPHOS", "AST", "ALT", "ANIONGAP", "EGFRNONAA" in the last 48 hours.    Invalid input(s): "ESTGFAFRICA"        Significant Imaging: I have reviewed all pertinent imaging results/findings within the past 24 hours.  "

## 2024-05-22 NOTE — DISCHARGE SUMMARY
"[facility] - Cardiology [facility] Medicine  Discharge Summary      Patient Name: Michael Espinoza  MRN: [id number]  VERONICA: [id number]  Patient Class: IP- Inpatient  Admission Date: 5/14/2024  Hospital Length of Stay: 7 days  Discharge Date and Time: No discharge date for patient encounter.  Attending Physician: Harmony Hooks MD   Discharging Provider: Harmony Hooks MD  Primary Care Provider: Dominguez Hyatt MD  [facility] Medicine Team: [facility] Harmony Hooks MD  Primary Care Team: [facility]    HPI:   Michael Espinoza is 68 y.o. male with a PMHx of CAD s/p CABG x3, HLD, Gilbert's syndrome, MSSA bacteremia, and heart block s/p dual chamber pacemaker in July 2022 with Dr. Canales who presents to the ED for tenderness and redness around his pacemaker site for the past week. He states that about a month ago he started noticing that his device was "creeping" closer to the skin but it was not bothering him. He was admitted in December for MSSA bacteremia and completed IV antibiotics. Blood cultures cleared. Patient denies fever or chills recently. Also denies chest pain, shortness of breath, palpitations.    In the ED, pt mildly hypertensive which improved without treatment otherwise vitals stable, afebrile. CBC unremarkable. Bicarb 20. Tbili 1.7 (chronic issue). CRP <0.3. Blood cxs in process. The patient received vancomycin and zosyn. EP consulted in the ED and recommend CXR, IV abx, and ID consult.    Procedure(s) (LRB):  INSERTION, CARDIAC PACEMAKER, DUAL CHAMBER (Right)      Hospital Course:   Mr. Espinoza was admitted to Hospital Medicine for management of a pacemaker infection.  ID was consulted.  He was started on Cefazolin while waiting on blood cultures.  EP was consulted, who plan for pacemaker removal on 5/17 with temporary pacemaker placement, and new permanent pacemaker placement on Monday 5/20.  One culture from 5/17 returned positive for C. Acnes.  PICC line was ordered for left chest, as " his new pacemaker was placed on the right.  ID suggested prolonged IV treatment despite negative blood cultures given MSSA bacteremia in December, but concerned there was more of a pacemaker erosion on the skin than an infection.  He was discharged on Ancef until 6/17 with EP followup.    Mr. Michael Espinoza was deemed appropriate for discharge.  At the time of discharge, the plan of care was discussed with patient/family, who were agreeable and amenable.  All medications were verbally reviewed and discussed with the patient/family.  They endorsed understanding and compliance.  Informed them that these changes will be available on their discharge paperwork, as well.  Outpatient follow-ups were scheduled, or if unable to be scheduled ambulatory referrals were placed, and ER return precautions were given.  All questions were answered to the patient/family's satisfaction.  Mr. Michael Espinoza was subsequently discharged in stable condition.       Goals of Care Treatment Preferences:  Code Status: Full Code    Living Will: Yes              Consults:   Consults (From admission, onward)          Status Ordering Provider     [facility]  Once        Provider:  (Not yet assigned)    Completed SAIRA HILARIO     Inpatient consult to Infectious Diseases  Once        Provider:  (Not yet assigned)    Completed DIMITRIS PAEZ            No new Assessment & Plan notes have been filed under this hospital service since the last note was generated.  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               DEVICE CHECK CLINIC Follow up in 3 week(s).    Why: Will need follow up in 1 week for wound check and device check. Then will need a 3 week follow up for suture removal                         Patient Instructions:   No discharge procedures on file.    Significant Diagnostic Studies: Microbiology: Wound Culture: positive for C. Acnes, 5/17    Pending Diagnostic Studies:       Procedure Component Value Units Date/Time    Specimen to Pathology, Surgery Cardiovascular [[id number]] Collected: 05/17/24 1518    Order Status: Sent Lab Status: In process Updated: 05/17/24 1518    Specimen: Tissue     X-Ray Chest 1 View S/P PICC Line by Nursing [[id number]]     Order Status: Sent Lab Status: No result            Medications:  Reconciled Home Medications:      Medication List        START taking these medications      dextrose 5 % in water (D5W) PgBk 50 mL with ceFAZolin 2 gram SolR 2 g  Inject 2 g into the vein every 8 (eight) hours.            CHANGE how you take these medications      atorvastatin 80 MG tablet  Commonly known as: LIPITOR  Take 1 tablet (80 mg total) by mouth once daily.  What changed: when to take this            CONTINUE taking these medications      aspirin 81 MG EC tablet  Commonly known as: ECOTRIN  Take 81 mg by mouth 2 (two) times daily. (Breakfast & Afternoon)     CENTRUM SILVER MEN ORAL  Take 1 tablet by mouth every Mon, Wed, Fri.     CO Q-10 300 mg Cap  Generic drug: coenzyme Q10  Take 1 capsule by mouth once daily.     dicyclomine 10 MG capsule  Commonly known as: BENTYL  Take 10 mg by mouth daily as needed (Abdominal pain).     ezetimibe 10 mg tablet  Commonly known as: ZETIA  Take 1 tablet (10 mg total) by mouth once daily.     ondansetron 4 MG Tbdl  Commonly known as: ZOFRAN-ODT  Take 1 tablet (4 mg total) by  mouth every 8 (eight) hours as needed (nausea).     tamsulosin 0.4 mg Cap  Commonly known as: FLOMAX  Take 1 capsule (0.4 mg total) by mouth once daily.     TUMS ORAL  Take 2 tablets by mouth daily as needed (Heartburn).     VITAMIN D ORAL  Take 1 capsule by mouth Mon, Wed, Fri, Sat & Sun            STOP taking these medications      oxybutynin 5 MG Tab  Commonly known as: DITROPAN     oxyCODONE 5 MG immediate release tablet  Commonly known as: ROXICODONE              Indwelling Lines/Drains at time of discharge:   Lines/Drains/Airways       Peripherally Inserted Central Catheter Line  Duration             PICC Double Lumen 05/22/24 0950 left brachial <1 day                    Time spent on the discharge of patient: 35 minutes         Harmony Hooks MD  Department of Hospital Medicine  [facility] - Cardiology Stepdown

## 2024-05-22 NOTE — PROGRESS NOTES
"[facility] Seals - Cardiology [facility] Medicine  Progress Note    Patient Name: Michael Espinoza  MRN: [id number]  Patient Class: IP- Inpatient   Admission Date: 5/14/2024  Length of Stay: 7 days  Attending Physician: Harmony Hooks MD  Primary Care Provider: Dominguez Hyatt MD        Subjective:     Principal Problem:Infection associated with cardiovascular device        HPI:  Michael Espinoza is 68 y.o. male with a PMHx of CAD s/p CABG x3, HLD, Gilbert's syndrome, MSSA bacteremia, and heart block s/p dual chamber pacemaker in July 2022 with Dr. Canales who presents to the ED for tenderness and redness around his pacemaker site for the past week. He states that about a month ago he started noticing that his device was "creeping" closer to the skin but it was not bothering him. He was admitted in December for MSSA bacteremia and completed IV antibiotics. Blood cultures cleared. Patient denies fever or chills recently. Also denies chest pain, shortness of breath, palpitations.    In the ED, pt mildly hypertensive which improved without treatment otherwise vitals stable, afebrile. CBC unremarkable. Bicarb 20. Tbili 1.7 (chronic issue). CRP <0.3. Blood cxs in process. The patient received vancomycin and zosyn. EP consulted in the ED and recommend CXR, IV abx, and ID consult.    Overview/Hospital Course:  Mr. Espinoza was admitted to Hospital Medicine for management of a pacemaker infection.  ID was consulted.  He was started on Cefazolin while waiting on blood cultures.  EP was consulted, who plan for pacemaker removal on 5/17 with temporary pacemaker placement, and new permanent pacemaker placement on Monday 5/20.  One culture from 5/17 returned positive for C. Acnes.  PICC line was ordered for left chest, as his new pacemaker was placed on the right.  ID suggested prolonged IV treatment despite negative blood cultures given MSSA bacteremia in December, but concerned there was more of a pacemaker erosion on the skin than " an infection.  He was discharged on Ancef until 6/17 with EP followup.    Interval History: No acute events overnight.  No complaints.  5/17 culture positive for C. Acnes.  ID and EP informed.  Continue Ancef.  Plan to DC today after left PICC line. Verbally discussed DC recs from EP and followup.  Comfortable with going home today.    Review of Systems   Constitutional:  Negative for chills, fatigue and fever.   Respiratory:  Negative for cough and shortness of breath.    Cardiovascular:  Negative for chest pain, palpitations and leg swelling.   Gastrointestinal:  Negative for abdominal pain, diarrhea, nausea and vomiting.   Genitourinary:  Negative for dysuria and urgency.   Neurological:  Negative for dizziness and headaches.   All other systems reviewed and are negative.    Objective:     Vital Signs (Most Recent):  Temp: 97.9 °F (36.6 °C) (05/22/24 0819)  Pulse: 93 (05/22/24 0819)  Resp: 16 (05/22/24 0819)  BP: 129/87 (05/22/24 0819)  SpO2: 95 % (05/22/24 0819) Vital Signs (24h Range):  Temp:  [97 °F (36.1 °C)-98.2 °F (36.8 °C)] 97.9 °F (36.6 °C)  Pulse:  [] 93  Resp:  [16-18] 16  SpO2:  [95 %-97 %] 95 %  BP: (102-131)/(72-87) 129/87     Weight: 64.8 kg (142 lb 14.4 oz)  Body mass index is 23.06 kg/m².    Intake/Output Summary (Last 24 hours) at 5/22/2024 1021  Last data filed at 5/22/2024 0015  Gross per 24 hour   Intake 342 ml   Output 503 ml   Net -161 ml         Physical Exam  Constitutional:       Appearance: Normal appearance.   HENT:      Head: Normocephalic and atraumatic.   Cardiovascular:      Rate and Rhythm: Normal rate and regular rhythm.      Heart sounds: No murmur heard.     Comments: Left chest pacemaker site with stitches s/p pacemaker removal  Pulmonary:      Effort: Pulmonary effort is normal. No respiratory distress.      Breath sounds: Normal breath sounds. No wheezing or rales.   Abdominal:      General: There is no distension.      Palpations: Abdomen is soft.      Tenderness:  "There is no abdominal tenderness.   Musculoskeletal:         General: No deformity.      Comments: RUE in sling   Neurological:      General: No focal deficit present.      Mental Status: He is alert and oriented to person, place, and time. Mental status is at baseline.             Significant Labs: All pertinent labs within the past 24 hours have been reviewed.  Blood Culture:   No results for input(s): "LABBLOO" in the last 48 hours.      CBC:   No results for input(s): "WBC", "HGB", "HCT", "PLT" in the last 48 hours.      CMP:   No results for input(s): "NA", "K", "CL", "CO2", "GLU", "BUN", "CREATININE", "CALCIUM", "PROT", "ALBUMIN", "BILITOT", "ALKPHOS", "AST", "ALT", "ANIONGAP", "EGFRNONAA" in the last 48 hours.    Invalid input(s): "ESTGFAFRICA"        Significant Imaging: I have reviewed all pertinent imaging results/findings within the past 24 hours.    Assessment/Plan:      * Infection associated with cardiovascular device  Admitted for a pacemaker infection  Started on empiric Ancef while awaiting cultures given history of MSSA bacteremia, currently NGTD  ID following:  PICC line ordered for IV abx - Needs IV Ancef through 6/17 per ID  EP was consulted  S/p pacemaker removal on 5/17 with temporary pacemaker and MODESTA drain placement, and new permanent pacemaker placement on Monday 5/20.  Intra-op cultures 5/17 NGTD  Post procedure wound x 5/18 NGTD but mislabeled as butt abscess  Wound cx 5/17 with C. Acnes    Complete heart block  S/p dual chamber pacemaker placement  7/5/2022    Coronary artery disease of native artery of native heart with stable angina pectoris  Patient with known CAD s/p CABG, which is controlled Will continue ASA and Statin and monitor for S/Sx of angina/ACS. Continue to monitor on telemetry.     Gilbert's syndrome  Chronic condition- reports diagnosed by Dr. Rowe years ago  T bili elevated on admission, similar to previous  Monitor CMP    HLD (hyperlipidemia)   Patient is chronically " on statin.will continue for now. Monitor clinically. Last LDL was   Lab Results   Component Value Date    LDLCALC 48.0 (L) 08/11/2023         VTE Risk Mitigation (From admission, onward)           Ordered     IP VTE LOW RISK PATIENT  Once         05/14/24 1912     Place sequential compression device  Until discontinued         05/14/24 1912                    Discharge Planning   MAGNO: 5/22/2024     Code Status: Full Code   Is the patient medically ready for discharge?:     Reason for patient still in hospital (select all that apply): Patient trending condition  Discharge Plan A: Home Health, Other (IV Infusion)   Discharge Delays: None known at this time              Harmony Hooks MD  Department of Hospital Medicine   [facility] - Cardiology Stepdown

## 2024-05-22 NOTE — CARE UPDATE
Unit [facility] Care Support Interaction      I have reviewed the chart of Michael Espinoza who is hospitalized for Infection associated with cardiovascular device. The patient is currently located in the following unit: CSU     I have seen and examined the patient and provided the following support:     Patient experience rounds - positive experience reported- no issues to escalate.      Mar Byrnes NP  Unit Based [facility]

## 2024-05-22 NOTE — PLAN OF CARE
Pt discharged home with [facility]. Education was done by Carol Tobin of [facility]. Transported by family members. Follow up appointments scheduled by AIMEE Davila.    Deanne Peterson, [facility]  Case Management Department  [email or web address]    Willam Seals - Cardiology Stepdown  Discharge Final Note    Primary Care Provider: Dominguez Hyatt MD    Expected Discharge Date: 5/22/2024    Final Discharge Note (most recent)       Final Note - 05/22/24 1316          Final Note    Assessment Type Final Discharge Note     Anticipated Discharge Disposition IV Therapy Provider     Hospital Resources/Appts/Education Provided Provided patient/caregiver with written discharge plan information;Appointments scheduled and added to AVS        Post-Acute Status    Post-Acute Authorization Home Health;IV Infusion     Home Health Status Set-up Complete/Auth obtained     IV Infusion Status Set-up Complete/Auth obtained     Discharge Delays None known at this time                     Important Message from Medicare  Important Message from Medicare regarding Discharge Appeal Rights: Given to patient/caregiver, Explained to patient/caregiver, Signed/date by patient/caregiver     Date IMM was signed: 05/21/24  Time IMM was signed: 0955    Contact Info       Device Check Clinic        Next Steps: Follow up in 1 week(s)    Device Check Clinic        Next Steps: Follow up in 3 week(s)    Instructions: Will need follow up in 1 week for wound check and device check. Then will need a 3 week follow up for suture removal    [place], [facility] -   Specialty: Home Health Services    [address]  [address]  [place] [address]   Phone: [phone]       Next Steps: Follow up    Instructions: Your Home Health company will continue your Physical and Occupational Therapy treatments in your home and send nursing or aides as needed. If you have not been contacted to schedule your first session within  two business days of discharge, please contact the company listed.    [facility]    [address] [address]   Phone: [phone]       Next Steps: Follow up          Future Appointments   Date Time Provider Department Center   5/28/2024 11:00 AM COORDINATED DEVICE CHECK [facility]   6/5/2024  8:00 AM Feroz Gonzalez MD [facility] IN[facility] [place]   6/11/2024 10:20 AM [facility] DEXA1 [facility]   6/19/2024 10:30 AM Feroz Gonzalez MD [facility] [place]

## 2024-05-22 NOTE — ASSESSMENT & PLAN NOTE
Admitted for a pacemaker infection  Started on empiric Ancef while awaiting cultures given history of MSSA bacteremia, currently NGTD  ID following:  PICC line ordered for IV abx - Needs IV Ancef through 6/17 per ID  EP was consulted  S/p pacemaker removal on 5/17 with temporary pacemaker and MODESTA drain placement, and new permanent pacemaker placement on Monday 5/20.  Intra-op cultures 5/17 NGTD  Post procedure wound x 5/18 NGTD but mislabeled as butt abscess  Wound cx 5/17 with C. Acnes

## 2024-05-22 NOTE — CONSULTS
Double lumen PICC to left brachial vein.  45 cm in length, 30 cm arm circumference and 1 cm exposed.   Lot # [id number].

## 2024-05-22 NOTE — PROGRESS NOTES
[facility] educator met with Patient discussed discharge plan for home IVABX. Michael Espinoza will dc home with family support. Patient will infuse medication via Elastomeric Pump. Patient educated on S.A.S.H procedure. S.A.S.H mat provided.  Patient education checklist reviewed and acknowledged by above person(s) above and are agreeable to discharge with home infusion plan of care. IV administration process using aspetic technique was reviewed with successful return demonstration. Patient feels comfortable with infusion. Patient will dc home with Cefazolin 2g 1 IV q 8 hours for estimated end of therapy date 6/17/24. Dosing schedule times are 6.2.10. Extension placed to double lumen picc . [facility] will follow patient  for weekly dressing changes and lab draws. Time allotted for questions. Patients nurse and case management team notified teaching has been completed.     Medication delivery will be made to home    Patient accepted to care by [facility] and report called to [facility]  Carol Tobin RN, BSN, Clinical Liaison  Office [phone]  Cell [phone]

## 2024-05-22 NOTE — PLAN OF CARE
Pt discharged per MD orders.  Tele discontinued and returned to station.  IV discontinued; catheter tip intact.  PICC remained in d/t abx therapy. Medication list and prescriptions reviewed; prescriptions sent to pt preferred pharmacy.  Pt verbalizes understanding of all written and verbal discharge instructions.  Pt awaiting family/escort arrival.  Will continue to monitor.     Problem: Adult Inpatient Plan of Care  Goal: Plan of Care Review  5/22/2024 1346 by Gloria Erazo LPN  Outcome: Met  5/22/2024 1346 by Gloria Erazo LPN  Outcome: Progressing  Goal: Patient-Specific Goal (Individualized)  5/22/2024 1346 by Gloria Erazo LPN  Outcome: Met  5/22/2024 1346 by Gloria Erazo LPN  Outcome: Progressing  Goal: Absence of Hospital-Acquired Illness or Injury  5/22/2024 1346 by Gloria Erazo LPN  Outcome: Met  5/22/2024 1346 by Gloria Erazo LPN  Outcome: Progressing  Goal: Optimal Comfort and Wellbeing  5/22/2024 1346 by Gloria Erazo LPN  Outcome: Met  5/22/2024 1346 by Gloria Erazo LPN  Outcome: Progressing  Goal: Readiness for Transition of Care  5/22/2024 1346 by Gloria Erazo LPN  Outcome: Met  5/22/2024 1346 by Gloria Erazo LPN  Outcome: Progressing     Problem: Infection  Goal: Absence of Infection Signs and Symptoms  5/22/2024 1346 by Gloria Erazo LPN  Outcome: Met  5/22/2024 1346 by Gloria Erazo LPN  Outcome: Progressing     Problem: Wound  Goal: Optimal Coping  5/22/2024 1346 by Gloria Erazo LPN  Outcome: Met  5/22/2024 1346 by Gloria Erazo LPN  Outcome: Progressing  Goal: Optimal Functional Ability  5/22/2024 1346 by Gloria Erazo LPN  Outcome: Met  5/22/2024 1346 by Gloria Erazo LPN  Outcome: Progressing  Goal: Absence of Infection Signs and Symptoms  5/22/2024 1346 by Gloria Erazo LPN  Outcome: Met  5/22/2024 1346 by Gloria Erazo LPN  Outcome: Progressing  Goal: Improved Oral Intake  5/22/2024 1346 by Gloria Erazo LPN  Outcome: Met  5/22/2024 1346 by Gloria Erazo LPN  Outcome:  Progressing  Goal: Optimal Pain Control and Function  5/22/2024 1346 by Gloria Erazo LPN  Outcome: Met  5/22/2024 1346 by Gloria Erazo LPN  Outcome: Progressing  Goal: Skin Health and Integrity  5/22/2024 1346 by Gloria Erazo LPN  Outcome: Met  5/22/2024 1346 by Gloria Erazo LPN  Outcome: Progressing  Goal: Optimal Wound Healing  5/22/2024 1346 by Gloria Erazo LPN  Outcome: Met  5/22/2024 1346 by Gloria Erazo LPN  Outcome: Progressing     Problem: Fall Injury Risk  Goal: Absence of Fall and Fall-Related Injury  5/22/2024 1346 by Gloria Erazo LPN  Outcome: Met  5/22/2024 1346 by Gloria Erazo LPN  Outcome: Progressing

## 2024-05-23 LAB
BACTERIA BLD CULT: NORMAL
BACTERIA BLD CULT: NORMAL

## 2024-05-24 ENCOUNTER — PATIENT OUTREACH (OUTPATIENT)
Dept: ADMINISTRATIVE | Facility: CLINIC | Age: 69
End: 2024-05-24
Payer: MEDICARE

## 2024-05-24 NOTE — PROGRESS NOTES
C3 nurse spoke with Michael Espinoza  for a TCC post hospital discharge follow up call. The patient does not have a scheduled HOSFU appointment with Dominguez Hyatt MD  within 5-7 days post hospital discharge date 05/22/2024. C3 nurse was unable to schedule HOSFU appointment in [facility].  Please contact patient and schedule follow up appointment using HOSFU visit type on or before 05/29/2024.    Declined NP scheduling    Message sent to PCP staff.

## 2024-05-27 ENCOUNTER — PATIENT MESSAGE (OUTPATIENT)
Dept: INTERNAL MEDICINE | Facility: CLINIC | Age: 69
End: 2024-05-27
Payer: MEDICARE

## 2024-05-27 LAB
BACTERIA SPEC ANAEROBE CULT: NORMAL

## 2024-05-28 ENCOUNTER — TELEPHONE (OUTPATIENT)
Dept: ELECTROPHYSIOLOGY | Facility: CLINIC | Age: 69
End: 2024-05-28
Payer: MEDICARE

## 2024-05-28 ENCOUNTER — DOCUMENTATION ONLY (OUTPATIENT)
Dept: ELECTROPHYSIOLOGY | Facility: CLINIC | Age: 69
End: 2024-05-28
Payer: MEDICARE

## 2024-05-28 ENCOUNTER — CLINICAL SUPPORT (OUTPATIENT)
Dept: CARDIOLOGY | Facility: HOSPITAL | Age: 69
End: 2024-05-28
Attending: INTERNAL MEDICINE
Payer: MEDICARE

## 2024-05-28 DIAGNOSIS — I44.2 COMPLETE HEART BLOCK: Primary | ICD-10-CM

## 2024-05-28 DIAGNOSIS — Z00.00 ENCOUNTER FOR MEDICARE ANNUAL WELLNESS EXAM: ICD-10-CM

## 2024-05-28 DIAGNOSIS — I44.2 CHB (COMPLETE HEART BLOCK): ICD-10-CM

## 2024-05-28 DIAGNOSIS — Z95.0 CARDIAC PACEMAKER IN SITU: Primary | ICD-10-CM

## 2024-05-28 DIAGNOSIS — Z95.0 PACEMAKER: ICD-10-CM

## 2024-05-28 DIAGNOSIS — I44.2 COMPLETE HEART BLOCK: ICD-10-CM

## 2024-05-28 LAB
OHS QRS DURATION: 140 MS
OHS QTC CALCULATION: 450 MS

## 2024-05-28 PROCEDURE — 93280 PM DEVICE PROGR EVAL DUAL: CPT

## 2024-05-28 NOTE — PROGRESS NOTES
Pt had PPM extraction 5/20/24 and new PPM to right chest implanted    Both incision well approximated, no s/s of infection.      Reviewed picture of extraction with Dr. Pagan for further orders for suture removal.    See picture.  Orders received to bring pt in in one week and remove every other sutures on June 4, 2024 and the remaining sutures on June 11th.

## 2024-05-28 NOTE — TELEPHONE ENCOUNTER
----- Message from Eulalia Patiño MA sent at 5/28/2024 12:52 PM CDT -----  Regarding: FW: pt booked with wrong provider    ----- Message -----  From: Shelly Chavez RN  Sent: 5/28/2024  12:18 PM CDT  To: Ama Burroughs Staff; Parker Francois Staff  Subject: pt booked with wrong provider                    Mr. Espinoza was seen by Dr. Canales in November.  Pt had an  extraction with Dr. Pagan.     His 3 mos follow up is booked with Dr. Serrato.    Can you please put pt on the correct provider schedule.    Thank you,  Shelly

## 2024-05-30 ENCOUNTER — OFFICE VISIT (OUTPATIENT)
Dept: INTERNAL MEDICINE | Facility: CLINIC | Age: 69
End: 2024-05-30
Payer: MEDICARE

## 2024-05-30 VITALS
DIASTOLIC BLOOD PRESSURE: 82 MMHG | BODY MASS INDEX: 23.53 KG/M2 | WEIGHT: 146.38 LBS | OXYGEN SATURATION: 98 % | SYSTOLIC BLOOD PRESSURE: 130 MMHG | HEART RATE: 77 BPM | HEIGHT: 66 IN

## 2024-05-30 DIAGNOSIS — T82.7XXD: ICD-10-CM

## 2024-05-30 DIAGNOSIS — Z09 HOSPITAL DISCHARGE FOLLOW-UP: Primary | ICD-10-CM

## 2024-05-30 PROBLEM — I20.89 EXERTIONAL ANGINA: Status: RESOLVED | Noted: 2017-11-28 | Resolved: 2024-05-30

## 2024-05-30 PROBLEM — E87.6 HYPOKALEMIA: Status: RESOLVED | Noted: 2023-12-14 | Resolved: 2024-05-30

## 2024-05-30 PROBLEM — R74.01 TRANSAMINITIS: Status: RESOLVED | Noted: 2023-12-14 | Resolved: 2024-05-30

## 2024-05-30 PROBLEM — R78.81 BACTEREMIA: Status: RESOLVED | Noted: 2023-12-15 | Resolved: 2024-05-30

## 2024-05-30 PROBLEM — R07.9 CHEST PAIN: Status: RESOLVED | Noted: 2018-04-02 | Resolved: 2024-05-30

## 2024-05-30 PROBLEM — E87.1 HYPONATREMIA: Status: RESOLVED | Noted: 2023-12-14 | Resolved: 2024-05-30

## 2024-05-30 LAB
OHS CV DC REMOTE DEVICE TYPE: NORMAL
OHS CV RV PACING PERCENT: 97 %

## 2024-05-30 PROCEDURE — 1126F AMNT PAIN NOTED NONE PRSNT: CPT | Mod: CPTII,S$GLB,, | Performed by: PHYSICIAN ASSISTANT

## 2024-05-30 PROCEDURE — 3079F DIAST BP 80-89 MM HG: CPT | Mod: CPTII,S$GLB,, | Performed by: PHYSICIAN ASSISTANT

## 2024-05-30 PROCEDURE — 1157F ADVNC CARE PLAN IN RCRD: CPT | Mod: CPTII,S$GLB,, | Performed by: PHYSICIAN ASSISTANT

## 2024-05-30 PROCEDURE — 3075F SYST BP GE 130 - 139MM HG: CPT | Mod: CPTII,S$GLB,, | Performed by: PHYSICIAN ASSISTANT

## 2024-05-30 PROCEDURE — 1159F MED LIST DOCD IN RCRD: CPT | Mod: CPTII,S$GLB,, | Performed by: PHYSICIAN ASSISTANT

## 2024-05-30 PROCEDURE — 99999 PR PBB SHADOW E&M-EST. PATIENT-LVL IV: CPT | Mod: PBBFAC,,, | Performed by: PHYSICIAN ASSISTANT

## 2024-05-30 PROCEDURE — 3288F FALL RISK ASSESSMENT DOCD: CPT | Mod: CPTII,S$GLB,, | Performed by: PHYSICIAN ASSISTANT

## 2024-05-30 PROCEDURE — 1160F RVW MEDS BY RX/DR IN RCRD: CPT | Mod: CPTII,S$GLB,, | Performed by: PHYSICIAN ASSISTANT

## 2024-05-30 PROCEDURE — 1101F PT FALLS ASSESS-DOCD LE1/YR: CPT | Mod: CPTII,S$GLB,, | Performed by: PHYSICIAN ASSISTANT

## 2024-05-30 PROCEDURE — 1111F DSCHRG MED/CURRENT MED MERGE: CPT | Mod: CPTII,S$GLB,, | Performed by: PHYSICIAN ASSISTANT

## 2024-05-30 PROCEDURE — 99495 TRANSJ CARE MGMT MOD F2F 14D: CPT | Mod: S$GLB,,, | Performed by: PHYSICIAN ASSISTANT

## 2024-05-30 NOTE — PROGRESS NOTES
Subjective     Patient ID: Michael Espinoza is a 68 y.o. male.    Chief Complaint: Follow-up (HOSFU)    HPI    Established pt of Dominguez Hyatt MD (new to me)    Here for hospital discharge follow up after admission for pacemaker infection  Pacemaker was removed, temporary placed. Now on IV abx via PICC    Today he is doing well. No acute complaints  Occ random itching no rash, resolves with benadryl as needed    No cp, sob, n/v/d, fevers or chills.     Transitional Care Note    Family and/or Caretaker present at visit?  No.  Diagnostic tests reviewed/disposition: No diagnosic tests pending after this hospitalization.  Disease/illness education: Pacemaker infx  Home health/community services discussion/referrals: Patient does not have home health established from hospital visit.  They do not need home health.  If needed, we will set up home health for the patient.   Establishment or re-establishment of referral orders for community resources: No other necessary community resources.   Discussion with other health care providers: No discussion with other health care providers necessary.         Admission Date: 5/14/2024  Hospital Length of Stay: 7 days  Hospital Course:   Mr. Espinoza was admitted to Hospital Medicine for management of a pacemaker infection.  ID was consulted.  He was started on Cefazolin while waiting on blood cultures.  EP was consulted, who plan for pacemaker removal on 5/17 with temporary pacemaker placement, and new permanent pacemaker placement on Monday 5/20.  One culture from 5/17 returned positive for C. Acnes.  PICC line was ordered for left chest, as his new pacemaker was placed on the right.  ID suggested prolonged IV treatment despite negative blood cultures given MSSA bacteremia in December, but concerned there was more of a pacemaker erosion on the skin than an infection.  He was discharged on Ancef until 6/17 with EP followup.      Past Medical History:   Diagnosis Date    Complete heart  "block 7/5/2022    Coronary artery disease of native artery of native heart with stable angina pectoris 4/10/2018    Hyperlipidemia      Social History     Tobacco Use    Smoking status: Never     Passive exposure: Never    Smokeless tobacco: Never   Substance Use Topics    Alcohol use: Yes     Alcohol/week: 1.0 standard drink of alcohol     Types: 1 Glasses of wine per week     Comment: 1 drink 2-3 times/weekly    Drug use: No     Review of patient's allergies indicates:  No Known Allergies      Review of Systems   Constitutional:  Negative for chills and diaphoresis.   Respiratory:  Negative for cough and shortness of breath.    Cardiovascular:  Negative for chest pain.   Gastrointestinal:  Negative for abdominal pain, nausea and vomiting.   Integumentary:  Negative for rash.          Objective  /82   Pulse 77   Ht 5' 6" (1.676 m)   Wt 66.4 kg (146 lb 6.2 oz)   SpO2 98%   BMI 23.63 kg/m²       Physical Exam  Vitals reviewed.   Constitutional:       General: He is not in acute distress.     Appearance: He is well-developed.   HENT:      Head: Normocephalic and atraumatic.   Cardiovascular:      Rate and Rhythm: Normal rate and regular rhythm.      Heart sounds: No murmur heard.  Pulmonary:      Effort: Pulmonary effort is normal.      Breath sounds: Normal breath sounds. No wheezing or rales.   Chest:          Comments: Sutures of chest wall intact, no soi  Musculoskeletal:      Right lower leg: No edema.      Left lower leg: Edema (mild chronic stable, since CABG) present.   Skin:     General: Skin is warm and dry.      Findings: No rash.   Neurological:      Mental Status: He is alert.   Psychiatric:         Mood and Affect: Mood normal.            Assessment and Plan     1. Hospital discharge follow-up    2. Infection associated with other cardiovascular device, subsequent encounter    Discharge summary, notes, lab reviewed  Pt doing well  Pt has f/u with infusion, cardiology and infectious " disease  RTC in Aug for annual with PCP or sooner if needed.     Future Appointments   Date Time Provider Department Center   6/4/2024  1:00 PM COORDINATED DEVICE CHECK [facility]   6/5/2024  8:00 AM Feroz Gonzalez MD [facility] IN[facility] [place]   6/5/2024 11:00 AM Chair 05, [facility]   6/11/2024  1:00 PM COORDINATED DEVICE CHECK [facility]ANCY [facility]   6/12/2024 11:00 AM Chair 05, [facility]   6/19/2024 10:30 AM Feroz Gonzalez MD [facility] IN[facility] [place]   6/20/2024 10:20 AM [facility], DEXA1 [facility] BMD [facility]   9/10/2024  7:30 AM EKG, APPT [facility] EKG [facility]   9/10/2024  8:00 AM COORDINATED DEVICE CHECK [facility] CAMI [facility]   9/10/2024  8:40 AM Alessandro Canales MD [facility] ARRHYTH [facility]     Cadence Welch PA-C

## 2024-06-04 ENCOUNTER — DOCUMENTATION ONLY (OUTPATIENT)
Dept: ELECTROPHYSIOLOGY | Facility: CLINIC | Age: 69
End: 2024-06-04
Payer: MEDICARE

## 2024-06-04 NOTE — PROGRESS NOTES
S/p Wound extraction to left chest. Pt seen in clinic to remove every other suture.  Incision well approximated, no redness, swelling, or tenderness.  Cleansed incision using aseptic technique. Removed every other suture (7 sutures)   Applied dressing.  Advised pt to remove in am.  Discussed wound care.  RTC in one week to have remaining sutures removed.    See before and after pictures

## 2024-06-05 ENCOUNTER — OFFICE VISIT (OUTPATIENT)
Dept: INFECTIOUS DISEASES | Facility: CLINIC | Age: 69
End: 2024-06-05
Payer: MEDICARE

## 2024-06-05 VITALS
WEIGHT: 144.63 LBS | DIASTOLIC BLOOD PRESSURE: 87 MMHG | BODY MASS INDEX: 23.24 KG/M2 | SYSTOLIC BLOOD PRESSURE: 136 MMHG | HEIGHT: 66 IN | HEART RATE: 74 BPM

## 2024-06-05 DIAGNOSIS — T82.7XXD: Primary | ICD-10-CM

## 2024-06-05 PROCEDURE — 99213 OFFICE O/P EST LOW 20 MIN: CPT | Mod: S$GLB,,, | Performed by: INTERNAL MEDICINE

## 2024-06-05 PROCEDURE — 1101F PT FALLS ASSESS-DOCD LE1/YR: CPT | Mod: CPTII,S$GLB,, | Performed by: INTERNAL MEDICINE

## 2024-06-05 PROCEDURE — 99999 PR PBB SHADOW E&M-EST. PATIENT-LVL III: CPT | Mod: PBBFAC,,, | Performed by: INTERNAL MEDICINE

## 2024-06-05 PROCEDURE — 1111F DSCHRG MED/CURRENT MED MERGE: CPT | Mod: CPTII,S$GLB,, | Performed by: INTERNAL MEDICINE

## 2024-06-05 PROCEDURE — 1157F ADVNC CARE PLAN IN RCRD: CPT | Mod: CPTII,S$GLB,, | Performed by: INTERNAL MEDICINE

## 2024-06-05 PROCEDURE — 1126F AMNT PAIN NOTED NONE PRSNT: CPT | Mod: CPTII,S$GLB,, | Performed by: INTERNAL MEDICINE

## 2024-06-05 PROCEDURE — 1159F MED LIST DOCD IN RCRD: CPT | Mod: CPTII,S$GLB,, | Performed by: INTERNAL MEDICINE

## 2024-06-05 PROCEDURE — 3075F SYST BP GE 130 - 139MM HG: CPT | Mod: CPTII,S$GLB,, | Performed by: INTERNAL MEDICINE

## 2024-06-05 PROCEDURE — 3008F BODY MASS INDEX DOCD: CPT | Mod: CPTII,S$GLB,, | Performed by: INTERNAL MEDICINE

## 2024-06-05 PROCEDURE — 3079F DIAST BP 80-89 MM HG: CPT | Mod: CPTII,S$GLB,, | Performed by: INTERNAL MEDICINE

## 2024-06-05 PROCEDURE — 3288F FALL RISK ASSESSMENT DOCD: CPT | Mod: CPTII,S$GLB,, | Performed by: INTERNAL MEDICINE

## 2024-06-05 NOTE — PROGRESS NOTES
Subjective     Patient ID: Michael Espinoza is a 68 y.o. male.    Chief Complaint:Follow-up      History of Present Illness      68M with CAD s/p CABG, heart block s/p dual chamber pacemaker in July 2022 and MSSA bacteremia (12/14-12/16, cleared 12/17, unclear source, CHARMAINE and MRI spine neg, 4w cefazolin, surveillance blood cultures negative), admitted this time for pacemaker infection. Now s/p extraction on 5/17 and new R sided PM placed on 5/20. Cultures grew C.acnes,. Discharged on cefazolin until 6/17. Doing well. No complaints.    Review of Systems   Constitutional: Negative for chills and fever.      Objective   Physical Exam  Vitals and nursing note reviewed.   Constitutional:       Appearance: Normal appearance.   HENT:      Head: Normocephalic and atraumatic.   Eyes:      Pupils: Pupils are equal, round, and reactive to light.   Skin:     Findings: No erythema.      Comments: Incision is clean and intact   Neurological:      General: No focal deficit present.      Mental Status: He is alert and oriented to person, place, and time.   Psychiatric:         Mood and Affect: Mood normal.         Behavior: Behavior normal.         Thought Content: Thought content normal.         Judgment: Judgment normal.             Component 2 wk ago   Anaerobic Culture  Abnormal   CUTIBACTERIUM ACNES           Assessment and Plan     Mr. Espinoza is a pleasant 67 yo man with CIED infection  - h/o SA bacteremia  - s/p explant  - OR culture grew C.acnes  - completing 4 weeks of IV Ancef and doing well  - check surveillance labs  - EOC 6/17/24

## 2024-06-10 ENCOUNTER — PATIENT MESSAGE (OUTPATIENT)
Dept: INTERNAL MEDICINE | Facility: CLINIC | Age: 69
End: 2024-06-10
Payer: MEDICARE

## 2024-06-11 ENCOUNTER — DOCUMENTATION ONLY (OUTPATIENT)
Dept: ELECTROPHYSIOLOGY | Facility: CLINIC | Age: 69
End: 2024-06-11
Payer: MEDICARE

## 2024-06-11 ENCOUNTER — CLINICAL SUPPORT (OUTPATIENT)
Dept: CARDIOLOGY | Facility: HOSPITAL | Age: 69
End: 2024-06-11
Attending: INTERNAL MEDICINE
Payer: MEDICARE

## 2024-06-11 DIAGNOSIS — I44.2 CHB (COMPLETE HEART BLOCK): ICD-10-CM

## 2024-06-11 DIAGNOSIS — Z95.0 PACEMAKER: ICD-10-CM

## 2024-06-11 NOTE — PROGRESS NOTES
S/p Wound extraction to left chest. Pt seen in clinic to remove remaining sutures.  Incision well approximated, no redness, swelling, or tenderness.  Cleansed incision using aseptic technique. Applied dressing.  Advised pt to remove in am.  Discussed wound care. Pt understands to report s/s of infection.    RTC in for 3-4 month follow up    See picture

## 2024-06-19 ENCOUNTER — OFFICE VISIT (OUTPATIENT)
Dept: INFECTIOUS DISEASES | Facility: CLINIC | Age: 69
End: 2024-06-19
Payer: MEDICARE

## 2024-06-19 VITALS
DIASTOLIC BLOOD PRESSURE: 81 MMHG | HEART RATE: 85 BPM | TEMPERATURE: 99 F | SYSTOLIC BLOOD PRESSURE: 118 MMHG | WEIGHT: 146.19 LBS | BODY MASS INDEX: 23.49 KG/M2 | HEIGHT: 66 IN

## 2024-06-19 DIAGNOSIS — T82.7XXD: Primary | ICD-10-CM

## 2024-06-19 LAB — FUNGUS SPEC CULT: NORMAL

## 2024-06-19 PROCEDURE — 3074F SYST BP LT 130 MM HG: CPT | Mod: CPTII,S$GLB,, | Performed by: INTERNAL MEDICINE

## 2024-06-19 PROCEDURE — 99212 OFFICE O/P EST SF 10 MIN: CPT | Mod: S$GLB,,, | Performed by: INTERNAL MEDICINE

## 2024-06-19 PROCEDURE — 99999 PR PBB SHADOW E&M-EST. PATIENT-LVL III: CPT | Mod: PBBFAC,,, | Performed by: INTERNAL MEDICINE

## 2024-06-19 PROCEDURE — 1157F ADVNC CARE PLAN IN RCRD: CPT | Mod: CPTII,S$GLB,, | Performed by: INTERNAL MEDICINE

## 2024-06-19 PROCEDURE — 1126F AMNT PAIN NOTED NONE PRSNT: CPT | Mod: CPTII,S$GLB,, | Performed by: INTERNAL MEDICINE

## 2024-06-19 PROCEDURE — 1111F DSCHRG MED/CURRENT MED MERGE: CPT | Mod: CPTII,S$GLB,, | Performed by: INTERNAL MEDICINE

## 2024-06-19 PROCEDURE — 3079F DIAST BP 80-89 MM HG: CPT | Mod: CPTII,S$GLB,, | Performed by: INTERNAL MEDICINE

## 2024-06-19 PROCEDURE — 3008F BODY MASS INDEX DOCD: CPT | Mod: CPTII,S$GLB,, | Performed by: INTERNAL MEDICINE

## 2024-06-20 ENCOUNTER — HOSPITAL ENCOUNTER (OUTPATIENT)
Dept: RADIOLOGY | Facility: CLINIC | Age: 69
Discharge: HOME | End: 2024-06-20
Attending: INTERNAL MEDICINE
Payer: MEDICARE

## 2024-06-20 DIAGNOSIS — M81.6 LOCALIZED OSTEOPOROSIS OF LEQUESNE: ICD-10-CM

## 2024-06-20 PROCEDURE — 77080 DXA BONE DENSITY AXIAL: CPT | Mod: TC

## 2024-06-24 ENCOUNTER — CLINICAL SUPPORT (OUTPATIENT)
Dept: CARDIOLOGY | Facility: HOSPITAL | Age: 69
End: 2024-06-24
Payer: MEDICARE

## 2024-06-24 ENCOUNTER — CLINICAL SUPPORT (OUTPATIENT)
Dept: CARDIOLOGY | Facility: HOSPITAL | Age: 69
End: 2024-06-24
Attending: INTERNAL MEDICINE
Payer: MEDICARE

## 2024-06-24 DIAGNOSIS — Z95.0 PRESENCE OF CARDIAC PACEMAKER: ICD-10-CM

## 2024-06-24 DIAGNOSIS — I44.1 ATRIOVENTRICULAR BLOCK, SECOND DEGREE: ICD-10-CM

## 2024-06-24 PROCEDURE — 93296 REM INTERROG EVL PM/IDS: CPT | Performed by: INTERNAL MEDICINE

## 2024-06-24 RX ORDER — DICYCLOMINE HYDROCHLORIDE 10 MG/1
CAPSULE ORAL
Qty: 120 CAPSULE | Refills: 1 | Status: SHIPPED | OUTPATIENT
Start: 2024-06-24

## 2024-06-26 LAB
OHS CV AF BURDEN PERCENT: < 1
OHS CV DC REMOTE DEVICE TYPE: NORMAL
OHS CV RV PACING PERCENT: 96 %

## 2024-06-28 ENCOUNTER — PATIENT MESSAGE (OUTPATIENT)
Dept: INTERNAL MEDICINE | Facility: CLINIC | Age: 69
End: 2024-06-28
Payer: MEDICARE

## 2024-06-28 NOTE — TELEPHONE ENCOUNTER
Pt recently had infection associated with pacemaker  Pt finished course of abx injections     Pt never had follow up regarding use of Reclast    No reevaluation was completed after 3yrs of use     Pt is scheduled to see PCP 7/15

## 2024-07-15 ENCOUNTER — OFFICE VISIT (OUTPATIENT)
Dept: INTERNAL MEDICINE | Facility: CLINIC | Age: 69
End: 2024-07-15
Payer: MEDICARE

## 2024-07-15 VITALS
HEART RATE: 71 BPM | SYSTOLIC BLOOD PRESSURE: 120 MMHG | OXYGEN SATURATION: 98 % | BODY MASS INDEX: 23.66 KG/M2 | DIASTOLIC BLOOD PRESSURE: 82 MMHG | WEIGHT: 147.25 LBS | HEIGHT: 66 IN

## 2024-07-15 DIAGNOSIS — T82.7XXD: Primary | ICD-10-CM

## 2024-07-15 DIAGNOSIS — N20.0 RIGHT KIDNEY STONE: ICD-10-CM

## 2024-07-15 DIAGNOSIS — M81.6 LOCALIZED OSTEOPOROSIS OF LEQUESNE: ICD-10-CM

## 2024-07-15 DIAGNOSIS — E78.00 PURE HYPERCHOLESTEROLEMIA: ICD-10-CM

## 2024-07-15 DIAGNOSIS — H26.9 CATARACT OF LEFT EYE, UNSPECIFIED CATARACT TYPE: ICD-10-CM

## 2024-07-15 DIAGNOSIS — M80.00XD AGE-RELATED OSTEOPOROSIS WITH CURRENT PATHOLOGICAL FRACTURE WITH ROUTINE HEALING, SUBSEQUENT ENCOUNTER: ICD-10-CM

## 2024-07-15 PROCEDURE — 3008F BODY MASS INDEX DOCD: CPT | Mod: CPTII,S$GLB,, | Performed by: INTERNAL MEDICINE

## 2024-07-15 PROCEDURE — 1160F RVW MEDS BY RX/DR IN RCRD: CPT | Mod: CPTII,S$GLB,, | Performed by: INTERNAL MEDICINE

## 2024-07-15 PROCEDURE — 99999 PR PBB SHADOW E&M-EST. PATIENT-LVL III: CPT | Mod: PBBFAC,,, | Performed by: INTERNAL MEDICINE

## 2024-07-15 PROCEDURE — 3079F DIAST BP 80-89 MM HG: CPT | Mod: CPTII,S$GLB,, | Performed by: INTERNAL MEDICINE

## 2024-07-15 PROCEDURE — 1157F ADVNC CARE PLAN IN RCRD: CPT | Mod: CPTII,S$GLB,, | Performed by: INTERNAL MEDICINE

## 2024-07-15 PROCEDURE — 3288F FALL RISK ASSESSMENT DOCD: CPT | Mod: CPTII,S$GLB,, | Performed by: INTERNAL MEDICINE

## 2024-07-15 PROCEDURE — 1101F PT FALLS ASSESS-DOCD LE1/YR: CPT | Mod: CPTII,S$GLB,, | Performed by: INTERNAL MEDICINE

## 2024-07-15 PROCEDURE — 1126F AMNT PAIN NOTED NONE PRSNT: CPT | Mod: CPTII,S$GLB,, | Performed by: INTERNAL MEDICINE

## 2024-07-15 PROCEDURE — 3074F SYST BP LT 130 MM HG: CPT | Mod: CPTII,S$GLB,, | Performed by: INTERNAL MEDICINE

## 2024-07-15 PROCEDURE — 99214 OFFICE O/P EST MOD 30 MIN: CPT | Mod: S$GLB,,, | Performed by: INTERNAL MEDICINE

## 2024-07-15 PROCEDURE — 1159F MED LIST DOCD IN RCRD: CPT | Mod: CPTII,S$GLB,, | Performed by: INTERNAL MEDICINE

## 2024-07-15 NOTE — PROGRESS NOTES
Subjective:       Patient ID: Michael Espinoza is a 68 y.o. male.    Chief Complaint: Follow-up    Patient seen for follow-up of infected pacemaker and discussion of osteoporosis.    Patient had sepsis about 6 or 7 months ago, was treated.  Recently found to have infected leads associated with his pacemaker so that was removed, temporary pacer was placed then a right-sided pacemaker was placed.  He finished all of his antibiotic therapy in general he is doing well.  We also discussed his osteoporosis.  He did have an interruption in therapy from 2022 through now.  He had this initiated initially to Orthopedics.  But his last Reclast infusion was 2022 and his last bone density was around then.  Bone density recently showed worsening in 1 other you, stability in another but after discussing with the patient we decided to ask Endocrine their opinion on whether the same or a different medication should be started.  He was in agreement with this plan to do the eConsult.  Prescriptions are up-to-date.  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      Review of Systems   Constitutional:  Negative for activity change and unexpected weight change.   HENT:  Negative for hearing loss, rhinorrhea and trouble swallowing.    Eyes:  Negative for discharge and visual disturbance.   Respiratory:  Negative for chest tightness and wheezing.    Cardiovascular:  Negative for chest pain and palpitations.   Gastrointestinal:  Negative for blood in stool, constipation, diarrhea and vomiting.   Endocrine: Negative for polydipsia and polyuria.   Genitourinary:  Negative for difficulty urinating, hematuria and urgency.   Musculoskeletal:  Negative for arthralgias, joint swelling and neck pain.   Neurological:  Negative for weakness and headaches.   Psychiatric/Behavioral:  Negative for confusion and dysphoric mood.            Past Medical History:   Diagnosis Date    Complete heart block 7/5/2022    Coronary artery  disease of native artery of native heart with stable angina pectoris 4/10/2018    Hyperlipidemia      Past Surgical History:   Procedure Laterality Date    A-V CARDIAC PACEMAKER INSERTION N/A 7/5/2022    Procedure: INSERTION, CARDIAC PACEMAKER, DUAL CHAMBER;  Surgeon: Alessandro Canales MD;  Location: [facility] EP LAB;  Service: Cardiology;  Laterality: N/A;  CHB, TBD, ANES, ED 6, MB    A-V CARDIAC PACEMAKER INSERTION Right 5/20/2024    Procedure: INSERTION, CARDIAC PACEMAKER, DUAL CHAMBER;  Surgeon: Arthur Pagan MD;  Location: [facility] EP LAB;  Service: Cardiology;  Laterality: Right;  CHB, DUAL PPM, BIO, ANES, [place],     COLONOSCOPY N/A 8/5/2022    Procedure: COLONOSCOPY;  Surgeon: Namita Dumont MD;  Location: [facility] ENDO (4TH FLR);  Service: Endoscopy;  Laterality: N/A;  vacc-inst portal-tb    CYST REMOVAL      ECHOCARDIOGRAM,TRANSESOPHAGEAL N/A 12/22/2023    Procedure: Transesophageal echo (CHARMAINE) intra-procedure log documentation;  Surgeon: Provider, Dosc Diagnostic;  Location: [facility] EP LAB;  Service: Cardiology;  Laterality: N/A;    EXTRACORPOREAL SHOCK WAVE LITHOTRIPSY Right 3/1/2024    Procedure: LITHOTRIPSY, ESWL;  Surgeon: Sanchez Moreno Jr., MD;  Location: [facility] OR 2ND FLR;  Service: Urology;  Laterality: Right;  1 hr    EXTRACTION, ELECTRODE LEAD N/A 5/17/2024    Procedure: EXTRACTION, ELECTRODE LEAD;  Surgeon: MORELIA Serrato MD;  Location: [facility] EP LAB;  Service: Cardiology;  Laterality: N/A;  INFECTION, EXTRACTION DUAL PPM & REIMPLANT TEMP PPM (IJ), SJM, ANES, EH, CHARMAINE, LASER, NO CV BACKUP, 302    INSERTION, PACEMAKER, SINGLE CHAMBER VENTRICULAR N/A 5/17/2024    Procedure: Insertion, Pacemaker, Single Chamber Ventricular;  Surgeon: MORELIA Serrato MD;  Location: [facility] EP LAB;  Service: Cardiology;  Laterality: N/A;    REMOVAL, DEVICE  5/17/2024    Procedure: Removal, Device;  Surgeon: MORELIA Serrato MD;  Location: [facility] EP LAB;  Service: Cardiology;;    REVISION OF SKIN POCKET FOR PACEMAKER   5/17/2024    Procedure: REVISION, SKIN POCKET, FOR CARDIAC PACEMAKER;  Surgeon: MORELIA Serrato MD;  Location: [facility] EP LAB;  Service: Cardiology;;    TONSILLECTOMY      TRANSESOPHAGEAL ECHOCARDIOGRAPHY N/A 5/17/2024    Procedure: ECHOCARDIOGRAM, TRANSESOPHAGEAL;  Surgeon: Benton Jon MD;  Location: [facility] EP LAB;  Service: Cardiology;  Laterality: N/A;      Patient Active Problem List   Diagnosis    HLD (hyperlipidemia)    Gilbert's syndrome    ASD (atrial septal defect)    Abnormal CT of the chest    Coronary artery disease of native artery of native heart with stable angina pectoris    S/P CABG x 3    Chronic right shoulder pain    Partial nontraumatic tear of rotator cuff, right    Pathological fracture due to osteoporosis    Age-related osteoporosis with current pathological fracture    Localized osteoporosis of [place]    Complete heart block    Diarrhea    Thrombocytopenia    Right kidney stone    Infection associated with cardiovascular device        Objective:      Physical Exam  Constitutional:       General: He is not in acute distress.     Appearance: He is well-developed.   HENT:      Head: Normocephalic and atraumatic.      Right Ear: Tympanic membrane, ear canal and external ear normal.      Left Ear: Tympanic membrane, ear canal and external ear normal.      Mouth/Throat:      Pharynx: No oropharyngeal exudate or posterior oropharyngeal erythema.   Eyes:      General: No scleral icterus.     Conjunctiva/sclera: Conjunctivae normal.      Pupils: Pupils are equal, round, and reactive to light.   Neck:      Thyroid: No thyromegaly.      Comments: No supraclavicular nodes palpated  Cardiovascular:      Rate and Rhythm: Normal rate and regular rhythm.      Pulses: Normal pulses.      Heart sounds: Normal heart sounds. No murmur heard.     Comments: Chest incisions are well healed including the new pacemaker site in the old explanted pacemaker site  Pulmonary:      Effort: Pulmonary effort is  "normal.      Breath sounds: Normal breath sounds. No wheezing.   Abdominal:      General: Bowel sounds are normal.      Palpations: Abdomen is soft. There is no mass.      Tenderness: There is no abdominal tenderness.   Musculoskeletal:         General: No tenderness.      Cervical back: Normal range of motion and neck supple.      Right lower leg: No edema.      Left lower leg: No edema.   Lymphadenopathy:      Cervical: No cervical adenopathy.   Skin:     Coloration: Skin is not jaundiced or pale.   Neurological:      General: No focal deficit present.      Mental Status: He is alert and oriented to person, place, and time.   Psychiatric:         Mood and Affect: Mood normal.         Behavior: Behavior normal.         Assessment:       Problem List Items Addressed This Visit          Cardiac/Vascular    HLD (hyperlipidemia)       Renal/    Right kidney stone       ID    Infection associated with cardiovascular device - Primary       Endocrine    Localized osteoporosis of [place]    Relevant Orders    E-Consult to Endocrinology       Orthopedic    Age-related osteoporosis with current pathological fracture    Relevant Orders    E-Consult to Endocrinology     Other Visit Diagnoses       Cataract of left eye, unspecified cataract type                Plan:         Michael was seen today for follow-up.    Diagnoses and all orders for this visit:    Infection associated with other cardiovascular device, subsequent encounter    Localized osteoporosis of [place]  -     E-Consult to Endocrinology    Age-related osteoporosis with current pathological fracture with routine healing, subsequent encounter  -     E-Consult to Endocrinology    Pure hypercholesterolemia    Right kidney stone    Cataract of left eye, unspecified cataract type                     Portions of this note may have been created with voice recognition software. Occasional "wrong-word" or "sound-a-like" substitutions may have occurred due to the " inherent limitations of voice recognition software. Please, read the note carefully and recognize, using context, where substitutions have occurred.

## 2024-07-16 ENCOUNTER — E-CONSULT (OUTPATIENT)
Dept: ENDOCRINOLOGY | Facility: CLINIC | Age: 69
End: 2024-07-16
Payer: MEDICARE

## 2024-07-16 DIAGNOSIS — M81.0 OSTEOPOROSIS, UNSPECIFIED OSTEOPOROSIS TYPE, UNSPECIFIED PATHOLOGICAL FRACTURE PRESENCE: Primary | ICD-10-CM

## 2024-07-16 PROCEDURE — 99499 UNLISTED E&M SERVICE: CPT | Mod: S$GLB,,, | Performed by: INTERNAL MEDICINE

## 2024-07-16 NOTE — CONSULTS
[facility]  Response for E-Consult     Patient Name: Michael Espinoza  MRN: [id number]  Primary Care Provider: Dominguez Hyatt MD   Requesting Provider: Dominguez Hyatt MD  E-Consult to Endocrinology  Consult performed by: Odilon Bower MD  Consult ordered by: Dominguez Hyatt MD          After evaluation of your eConsult clinical questions, I believe the patient should be scheduled for an office visit in our specialty due to complexity. If you agree, please place a formal endocrinology consult and notify me so I can facilitate an appointment.     *This eConsult is based on the clinical data available to me and is furnished without benefit of a physician examination.  The eConsult will need to be interpreted in light of any clinical issues of changes in patient status not available to me at the time rime of filing this eConsults.  Significant changes in patient condition of level of acuity should result in a referral for in person consultation and reevaluation.  Please alert me if you have any further questions.     Thank you for this eConsult referral.     MD [facility]

## 2024-07-17 ENCOUNTER — TELEPHONE (OUTPATIENT)
Dept: INTERNAL MEDICINE | Facility: CLINIC | Age: 69
End: 2024-07-17

## 2024-07-17 DIAGNOSIS — M80.00XD AGE-RELATED OSTEOPOROSIS WITH CURRENT PATHOLOGICAL FRACTURE WITH ROUTINE HEALING, SUBSEQUENT ENCOUNTER: Primary | ICD-10-CM

## 2024-07-17 DIAGNOSIS — N20.0 RIGHT KIDNEY STONE: ICD-10-CM

## 2024-07-17 NOTE — TELEPHONE ENCOUNTER
Thanks for the E-Consult response. I have placed the formal Endocrinology Consult order for In Person Endocrine Consult for Complex Osteoporosis.   
no

## 2024-08-08 ENCOUNTER — OFFICE VISIT (OUTPATIENT)
Dept: ENDOCRINOLOGY | Facility: CLINIC | Age: 69
End: 2024-08-08
Payer: MEDICARE

## 2024-08-08 VITALS
HEIGHT: 66 IN | OXYGEN SATURATION: 98 % | BODY MASS INDEX: 24.06 KG/M2 | SYSTOLIC BLOOD PRESSURE: 117 MMHG | HEART RATE: 63 BPM | WEIGHT: 149.69 LBS | DIASTOLIC BLOOD PRESSURE: 82 MMHG

## 2024-08-08 DIAGNOSIS — M80.00XD AGE-RELATED OSTEOPOROSIS WITH CURRENT PATHOLOGICAL FRACTURE WITH ROUTINE HEALING, SUBSEQUENT ENCOUNTER: ICD-10-CM

## 2024-08-08 DIAGNOSIS — N20.0 RIGHT KIDNEY STONE: ICD-10-CM

## 2024-08-08 DIAGNOSIS — M89.9 DISORDER OF BONE, UNSPECIFIED: ICD-10-CM

## 2024-08-08 DIAGNOSIS — M80.08XD PATHOLOGICAL FRACTURE OF VERTEBRA DUE TO OSTEOPOROSIS WITH ROUTINE HEALING, UNSPECIFIED OSTEOPOROSIS TYPE, SUBSEQUENT ENCOUNTER: Primary | ICD-10-CM

## 2024-08-08 PROCEDURE — 99999 PR PBB SHADOW E&M-EST. PATIENT-LVL IV: CPT | Mod: PBBFAC,GC,, | Performed by: STUDENT IN AN ORGANIZED HEALTH CARE EDUCATION/TRAINING PROGRAM

## 2024-08-12 ENCOUNTER — LAB VISIT (OUTPATIENT)
Dept: LAB | Facility: HOSPITAL | Age: 69
End: 2024-08-12
Payer: MEDICARE

## 2024-08-12 DIAGNOSIS — M80.00XD AGE-RELATED OSTEOPOROSIS WITH CURRENT PATHOLOGICAL FRACTURE WITH ROUTINE HEALING, SUBSEQUENT ENCOUNTER: ICD-10-CM

## 2024-08-12 DIAGNOSIS — M89.9 DISORDER OF BONE, UNSPECIFIED: ICD-10-CM

## 2024-08-12 LAB
25(OH)D3+25(OH)D2 SERPL-MCNC: 38 NG/ML (ref 30–96)
ALBUMIN SERPL BCP-MCNC: 4.2 G/DL (ref 3.5–5.2)
ANION GAP SERPL CALC-SCNC: 9 MMOL/L (ref 8–16)
BUN SERPL-MCNC: 14 MG/DL (ref 8–23)
CALCIUM SERPL-MCNC: 9.9 MG/DL (ref 8.7–10.5)
CHLORIDE SERPL-SCNC: 110 MMOL/L (ref 95–110)
CO2 SERPL-SCNC: 24 MMOL/L (ref 23–29)
CREAT 24H UR-MRATE: 50.6 MG/HR (ref 40–75)
CREAT SERPL-MCNC: 0.9 MG/DL (ref 0.5–1.4)
CREAT UR-MCNC: 135 MG/DL (ref 23–375)
CREATININE, URINE (MG/SPEC): 1215 MG/SPEC
EST. GFR  (NO RACE VARIABLE): >60 ML/MIN/1.73 M^2
GLUCOSE SERPL-MCNC: 84 MG/DL (ref 70–110)
MAGNESIUM SERPL-MCNC: 1.9 MG/DL (ref 1.6–2.6)
PHOSPHATE SERPL-MCNC: 3.1 MG/DL (ref 2.7–4.5)
POTASSIUM SERPL-SCNC: 4 MMOL/L (ref 3.5–5.1)
PTH-INTACT SERPL-MCNC: 74.7 PG/ML (ref 9–77)
SODIUM SERPL-SCNC: 143 MMOL/L (ref 136–145)
URINE COLLECTION DURATION: 24 HR
URINE VOLUME: 900 ML

## 2024-08-12 PROCEDURE — 83735 ASSAY OF MAGNESIUM: CPT | Performed by: STUDENT IN AN ORGANIZED HEALTH CARE EDUCATION/TRAINING PROGRAM

## 2024-08-12 PROCEDURE — 82523 COLLAGEN CROSSLINKS: CPT | Performed by: STUDENT IN AN ORGANIZED HEALTH CARE EDUCATION/TRAINING PROGRAM

## 2024-08-12 PROCEDURE — 82340 ASSAY OF CALCIUM IN URINE: CPT | Performed by: STUDENT IN AN ORGANIZED HEALTH CARE EDUCATION/TRAINING PROGRAM

## 2024-08-12 PROCEDURE — 82040 ASSAY OF SERUM ALBUMIN: CPT | Performed by: STUDENT IN AN ORGANIZED HEALTH CARE EDUCATION/TRAINING PROGRAM

## 2024-08-12 PROCEDURE — 82306 VITAMIN D 25 HYDROXY: CPT | Performed by: STUDENT IN AN ORGANIZED HEALTH CARE EDUCATION/TRAINING PROGRAM

## 2024-08-12 PROCEDURE — 82570 ASSAY OF URINE CREATININE: CPT | Performed by: STUDENT IN AN ORGANIZED HEALTH CARE EDUCATION/TRAINING PROGRAM

## 2024-08-12 PROCEDURE — 84105 ASSAY OF URINE PHOSPHORUS: CPT | Performed by: STUDENT IN AN ORGANIZED HEALTH CARE EDUCATION/TRAINING PROGRAM

## 2024-08-12 PROCEDURE — 83970 ASSAY OF PARATHORMONE: CPT | Performed by: STUDENT IN AN ORGANIZED HEALTH CARE EDUCATION/TRAINING PROGRAM

## 2024-08-12 PROCEDURE — 84270 ASSAY OF SEX HORMONE GLOBUL: CPT | Performed by: STUDENT IN AN ORGANIZED HEALTH CARE EDUCATION/TRAINING PROGRAM

## 2024-08-12 PROCEDURE — 82652 VIT D 1 25-DIHYDROXY: CPT | Performed by: STUDENT IN AN ORGANIZED HEALTH CARE EDUCATION/TRAINING PROGRAM

## 2024-08-12 PROCEDURE — 80069 RENAL FUNCTION PANEL: CPT | Performed by: STUDENT IN AN ORGANIZED HEALTH CARE EDUCATION/TRAINING PROGRAM

## 2024-08-13 ENCOUNTER — TELEPHONE (OUTPATIENT)
Dept: ENDOCRINOLOGY | Facility: CLINIC | Age: 69
End: 2024-08-13
Payer: MEDICARE

## 2024-08-13 LAB
COLLAGEN CTX SERPL-MCNC: 354 PG/ML
PHOSPHATE 24H UR-MRATE: 35.6 MG/HR (ref 0–53.9)
PHOSPHATE UR-MCNC: 95 MG/DL
PHOSPHORUS, URINE (MG/SPEC): 855 MG/SPEC
URINE COLLECTION DURATION: 24 HR
URINE VOLUME: 900 ML

## 2024-08-14 LAB
CALCIUM 24H UR-MRATE: 3 MG/HR (ref 4–12)
CALCIUM UR-MCNC: 7 MG/DL (ref 0–15)
CALCIUM URINE (MG/SPEC): 63 MG/SPEC
URINE COLLECTION DURATION: 24 HR
URINE VOLUME: 900 ML

## 2024-08-15 LAB — 1,25(OH)2D3 SERPL-MCNC: 48 PG/ML (ref 20–79)

## 2024-08-21 LAB
ALBUMIN SERPL-MCNC: 4.4 G/DL (ref 3.6–5.1)
SHBG SERPL-SCNC: 67 NMOL/L (ref 22–77)
TESTOST FREE SERPL-MCNC: 41.9 PG/ML (ref 46–224)
TESTOST SERPL-MCNC: 575 NG/DL (ref 250–1100)
TESTOSTERONE.FREE+WB SERPL-MCNC: 84.4 NG/DL

## 2024-08-22 ENCOUNTER — PATIENT MESSAGE (OUTPATIENT)
Dept: ENDOCRINOLOGY | Facility: CLINIC | Age: 69
End: 2024-08-22
Payer: MEDICARE

## 2024-08-22 DIAGNOSIS — E29.1 HYPOGONADISM IN MALE: ICD-10-CM

## 2024-08-22 DIAGNOSIS — M81.0 OSTEOPOROSIS, UNSPECIFIED OSTEOPOROSIS TYPE, UNSPECIFIED PATHOLOGICAL FRACTURE PRESENCE: Primary | ICD-10-CM

## 2024-08-22 NOTE — TELEPHONE ENCOUNTER
Called patient to discuss his lab results.    He had low bioavailable and free testosterone, which might have resulted in his osteoporosis.  We will order LH and FSH to evaluate further.  Additionally, he did have a 20% excretion fraction of phosphate in his urine in the setting of normal serum calcium and phosphate, however the urine volume was low and he was not restricting phosphorus in his diet, so we instructed the patient to adhere to a low phosphorus diet and collect a new 24 hour urine sample.  Patient also tells me he has had a loose bowel movements for the last 2 or 3 years and has not be previously screened for celiac disease.  We will add those to the labs.

## 2024-09-03 ENCOUNTER — LAB VISIT (OUTPATIENT)
Dept: LAB | Facility: HOSPITAL | Age: 69
End: 2024-09-03
Payer: MEDICARE

## 2024-09-03 DIAGNOSIS — E29.1 HYPOGONADISM IN MALE: ICD-10-CM

## 2024-09-03 DIAGNOSIS — M81.0 OSTEOPOROSIS, UNSPECIFIED OSTEOPOROSIS TYPE, UNSPECIFIED PATHOLOGICAL FRACTURE PRESENCE: ICD-10-CM

## 2024-09-03 LAB
ALBUMIN SERPL BCP-MCNC: 4 G/DL (ref 3.5–5.2)
ANION GAP SERPL CALC-SCNC: 6 MMOL/L (ref 8–16)
BUN SERPL-MCNC: 14 MG/DL (ref 8–23)
CALCIUM SERPL-MCNC: 9.7 MG/DL (ref 8.7–10.5)
CHLORIDE SERPL-SCNC: 109 MMOL/L (ref 95–110)
CO2 SERPL-SCNC: 26 MMOL/L (ref 23–29)
CREAT SERPL-MCNC: 1.1 MG/DL (ref 0.5–1.4)
EST. GFR  (NO RACE VARIABLE): >60 ML/MIN/1.73 M^2
FSH SERPL-ACNC: 2.67 MIU/ML (ref 0.95–11.95)
GLUCOSE SERPL-MCNC: 107 MG/DL (ref 70–110)
LH SERPL-ACNC: 3.4 MIU/ML (ref 0.6–12.1)
PHOSPHATE SERPL-MCNC: 2.3 MG/DL (ref 2.7–4.5)
POTASSIUM SERPL-SCNC: 4.1 MMOL/L (ref 3.5–5.1)
SODIUM SERPL-SCNC: 141 MMOL/L (ref 136–145)

## 2024-09-03 PROCEDURE — 80069 RENAL FUNCTION PANEL: CPT | Performed by: STUDENT IN AN ORGANIZED HEALTH CARE EDUCATION/TRAINING PROGRAM

## 2024-09-03 PROCEDURE — 86364 TISS TRNSGLTMNASE EA IG CLAS: CPT | Mod: 59 | Performed by: STUDENT IN AN ORGANIZED HEALTH CARE EDUCATION/TRAINING PROGRAM

## 2024-09-03 PROCEDURE — 36415 COLL VENOUS BLD VENIPUNCTURE: CPT | Performed by: STUDENT IN AN ORGANIZED HEALTH CARE EDUCATION/TRAINING PROGRAM

## 2024-09-03 PROCEDURE — 86364 TISS TRNSGLTMNASE EA IG CLAS: CPT | Performed by: STUDENT IN AN ORGANIZED HEALTH CARE EDUCATION/TRAINING PROGRAM

## 2024-09-03 PROCEDURE — 83001 ASSAY OF GONADOTROPIN (FSH): CPT | Performed by: STUDENT IN AN ORGANIZED HEALTH CARE EDUCATION/TRAINING PROGRAM

## 2024-09-03 PROCEDURE — 83002 ASSAY OF GONADOTROPIN (LH): CPT | Performed by: STUDENT IN AN ORGANIZED HEALTH CARE EDUCATION/TRAINING PROGRAM

## 2024-09-06 LAB
TTG IGA SER-ACNC: <0.2 U/ML
TTG IGG SER-ACNC: <0.6 U/ML

## 2024-09-10 ENCOUNTER — HOSPITAL ENCOUNTER (OUTPATIENT)
Dept: CARDIOLOGY | Facility: CLINIC | Age: 69
Discharge: HOME | End: 2024-09-10
Payer: MEDICARE

## 2024-09-10 ENCOUNTER — OFFICE VISIT (OUTPATIENT)
Dept: ELECTROPHYSIOLOGY | Facility: CLINIC | Age: 69
End: 2024-09-10
Payer: MEDICARE

## 2024-09-10 ENCOUNTER — CLINICAL SUPPORT (OUTPATIENT)
Dept: CARDIOLOGY | Facility: HOSPITAL | Age: 69
End: 2024-09-10
Attending: INTERNAL MEDICINE
Payer: MEDICARE

## 2024-09-10 VITALS
WEIGHT: 149.69 LBS | SYSTOLIC BLOOD PRESSURE: 128 MMHG | BODY MASS INDEX: 24.06 KG/M2 | HEART RATE: 76 BPM | DIASTOLIC BLOOD PRESSURE: 82 MMHG | HEIGHT: 66 IN

## 2024-09-10 DIAGNOSIS — Z95.0 CARDIAC PACEMAKER IN SITU: ICD-10-CM

## 2024-09-10 DIAGNOSIS — I70.0 AORTIC ATHEROSCLEROSIS: Primary | ICD-10-CM

## 2024-09-10 DIAGNOSIS — I44.2 COMPLETE HEART BLOCK: ICD-10-CM

## 2024-09-10 DIAGNOSIS — Z95.0 PACEMAKER: ICD-10-CM

## 2024-09-10 DIAGNOSIS — Z95.1 S/P CABG X 3: ICD-10-CM

## 2024-09-10 LAB
OHS QRS DURATION: 134 MS
OHS QTC CALCULATION: 450 MS

## 2024-09-10 PROCEDURE — 99999 PR PBB SHADOW E&M-EST. PATIENT-LVL III: CPT | Mod: PBBFAC,,, | Performed by: INTERNAL MEDICINE

## 2024-09-10 PROCEDURE — 93005 ELECTROCARDIOGRAM TRACING: CPT | Mod: S$GLB,,, | Performed by: INTERNAL MEDICINE

## 2024-09-10 PROCEDURE — 1101F PT FALLS ASSESS-DOCD LE1/YR: CPT | Mod: CPTII,S$GLB,, | Performed by: INTERNAL MEDICINE

## 2024-09-10 PROCEDURE — 3079F DIAST BP 80-89 MM HG: CPT | Mod: CPTII,S$GLB,, | Performed by: INTERNAL MEDICINE

## 2024-09-10 PROCEDURE — 99214 OFFICE O/P EST MOD 30 MIN: CPT | Mod: S$GLB,,, | Performed by: INTERNAL MEDICINE

## 2024-09-10 PROCEDURE — 3288F FALL RISK ASSESSMENT DOCD: CPT | Mod: CPTII,S$GLB,, | Performed by: INTERNAL MEDICINE

## 2024-09-10 PROCEDURE — 93010 ELECTROCARDIOGRAM REPORT: CPT | Mod: S$GLB,,, | Performed by: INTERNAL MEDICINE

## 2024-09-10 PROCEDURE — 1126F AMNT PAIN NOTED NONE PRSNT: CPT | Mod: CPTII,S$GLB,, | Performed by: INTERNAL MEDICINE

## 2024-09-10 PROCEDURE — 3008F BODY MASS INDEX DOCD: CPT | Mod: CPTII,S$GLB,, | Performed by: INTERNAL MEDICINE

## 2024-09-10 PROCEDURE — 3074F SYST BP LT 130 MM HG: CPT | Mod: CPTII,S$GLB,, | Performed by: INTERNAL MEDICINE

## 2024-09-10 PROCEDURE — 1157F ADVNC CARE PLAN IN RCRD: CPT | Mod: CPTII,S$GLB,, | Performed by: INTERNAL MEDICINE

## 2024-09-10 PROCEDURE — 1159F MED LIST DOCD IN RCRD: CPT | Mod: CPTII,S$GLB,, | Performed by: INTERNAL MEDICINE

## 2024-09-10 NOTE — PROGRESS NOTES
Subjective   Patient ID:  Michael Espinoza is a 69 y.o. male who presents for follow-up of Pacemaker Check      68 yoM CAD/CABG, CHB here for PM follow up.     11/22: He presented for a stress test 7/22 and was found to be in CHB. DC PM implanted 7/5/22. Normal DC PM function with no sustained arrhythmias. 10% AP, >99%. No HF symptoms.     11/23:  RA 15%,  95%, 2:1 AVB underlying    Interval history: MSSA bacteremia 5/24 leading to extraction, TVP and ultimate re-implant 5/20/24 left sided Biot[employer] LBAP system. Normal DC PM with no sustained arrhythmias.     Extraction 5/24:  ·  Successful implantation of a [employer] single-chamber ventricular pacemaker via the right internal jugular vein.  ·  Successful device extraction with complete system removal of a [employer] dual-chamber pacemaker in the setting of prior MSSA bacteremia with threatened device erosion.  ·  Successful pocket revision with surgical debridement and excision of necrotic and infected tissue.  ·  Multiple tissue samples, pocket swabs, and both lead tips were sent for microbial analysis.  ·  Successful fusiform excision of the infected tissue at the lateral margin of the device pocket with closure with 7 monofilament non-absorbable sutures.  ·  The pocket access incision was closed with 6 monofilament non-absorbable sutures.  ·  A MODESTA drain was placed for continued drainage of his infected pocket site.  ·  Ultrasound-guided bilateral femoral venous central line access with placement of a Bridge balloon for the duration of the case.  ·  Complex modifier: This case required additional time and advanced lead extraction techniques in order to ensure safe extraction and removal of his infected pacemaker in a pacemaker-dependent patient.      Echo 7/5/22:  · Presence of bradycardia with HR 30s with AV dissociation  · The estimated ejection fraction is 65%.  · The left ventricle is normal in size with normal systolic  function.  · Normal left ventricular diastolic function.  · Normal right ventricular size with normal right ventricular systolic function.  · Mild mitral regurgitation.  · Presence of interatrial septal aneurysm.  · The estimated PA systolic pressure is 28 mmHg.  · Normal central venous pressure (3 mmHg).    My interpretation of the ECG is:    SR with LBAP QRS    Past Medical History:  7/5/2022: Complete heart block  4/10/2018: Coronary artery disease of native artery of native heart   with stable angina pectoris  No date: Hyperlipidemia    Past Surgical History:  7/5/2022: A-V CARDIAC PACEMAKER INSERTION; N/A      Comment:  Procedure: INSERTION, CARDIAC PACEMAKER, DUAL CHAMBER;                 Surgeon: Alessandro Canales MD;  Location: [facility] EP LAB;                Service: Cardiology;  Laterality: N/A;  CHB, TBD, ANES,                ED 6, MB  5/20/2024: A-V CARDIAC PACEMAKER INSERTION; Right      Comment:  Procedure: INSERTION, CARDIAC PACEMAKER, DUAL CHAMBER;                 Surgeon: Arthur Pagan MD;  Location: [facility] EP LAB;                 Service: Cardiology;  Laterality: Right;  CHB, DUAL PPM,                BIO, ANES, [place],   8/5/2022: COLONOSCOPY; N/A      Comment:  Procedure: COLONOSCOPY;  Surgeon: Namita Dumont MD;                 Location: [facility] ENDO (4TH FLR);  Service: Endoscopy;                 Laterality: N/A;  vacc-inst portal-tb  No date: CYST REMOVAL  12/22/2023: ECHOCARDIOGRAM,TRANSESOPHAGEAL; N/A      Comment:  Procedure: Transesophageal echo (CHARMAINE) intra-procedure                log documentation;  Surgeon: Provider, Dosc Diagnostic;                 Location: [facility] EP LAB;  Service: Cardiology;  Laterality:               N/A;  3/1/2024: EXTRACORPOREAL SHOCK WAVE LITHOTRIPSY; Right      Comment:  Procedure: LITHOTRIPSY, ESWL;  Surgeon: Sanchez Moreno Jr., MD;  Location: [facility] OR 2ND FLR;  Service: Urology;                 Laterality: Right;  1 hr  5/17/2024: EXTRACTION,  ELECTRODE LEAD; N/A      Comment:  Procedure: EXTRACTION, ELECTRODE LEAD;  Surgeon:                MORELIA Serrato MD;  Location: [facility] EP LAB;  Service:               Cardiology;  Laterality: N/A;  INFECTION, EXTRACTION DUAL               PPM & REIMPLANT TEMP PPM (IJ), SJM, ANES, EH, CHARMAINE, LASER,               NO CV BACKUP, 302  5/17/2024: INSERTION, PACEMAKER, SINGLE CHAMBER VENTRICULAR; N/A      Comment:  Procedure: Insertion, Pacemaker, Single Chamber                Ventricular;  Surgeon: MORELIA Serrato MD;  Location:               [facility] EP LAB;  Service: Cardiology;  Laterality: N/A;  5/17/2024: REMOVAL, DEVICE      Comment:  Procedure: Removal, Device;  Surgeon: MORELIA Serrato MD;  Location: [facility] EP LAB;  Service: Cardiology;;  5/17/2024: REVISION OF SKIN POCKET FOR PACEMAKER      Comment:  Procedure: REVISION, SKIN POCKET, FOR CARDIAC PACEMAKER;               Surgeon: MORELIA Serrato MD;  Location: [facility] EP LAB;                Service: Cardiology;;  No date: TONSILLECTOMY  5/17/2024: TRANSESOPHAGEAL ECHOCARDIOGRAPHY; N/A      Comment:  Procedure: ECHOCARDIOGRAM, TRANSESOPHAGEAL;  Surgeon:                Benton Jon MD;  Location: [facility] EP LAB;                 Service: Cardiology;  Laterality: N/A;    Social History    Socioeconomic History      Marital status: Single    Tobacco Use      Smoking status: Never        Passive exposure: Never      Smokeless tobacco: Never    Substance and Sexual Activity      Alcohol use: Yes        Alcohol/week: 1.0 standard drink of alcohol        Types: 1 Glasses of wine per week        Comment: 1 drink 2-3 times/weekly      Drug use: No    Social Determinants of Health  Financial Resource Strain: Low Risk  (5/16/2024)      Overall Financial Resource Strain (CARDIA)          Difficulty of Paying Living Expenses: Not hard at all  Food Insecurity: No Food Insecurity (5/16/2024)      Hunger Vital Sign          Worried About Running Out of Food  in the Last Year: Never true          Ran Out of Food in the Last Year: Never true  Transportation Needs: No Transportation Needs (5/15/2024)      TRANSPORTATION NEEDS          Transportation : No  Physical Activity: Insufficiently Active (5/16/2024)      Exercise Vital Sign          Days of Exercise per Week: 3 days          Minutes of Exercise per Session: 30 min  Stress: No Stress Concern Present (5/16/2024)      [employer] [place] [employer] - Occupational Stress Questionnaire          Feeling of Stress : Not at all  Housing Stability: Low Risk  (5/16/2024)      Housing Stability Vital Sign          Unable to Pay for Housing in the Last Year: No          Homeless in the Last Year: No    Review of patient's family history indicates:  Problem: Hyperlipidemia      Relation: Brother          Name: 2              Age of Onset: (Not Specified)  Problem: Hypertension      Relation: Brother          Name: 2              Age of Onset: (Not Specified)  Problem: Heart disease      Relation: Brother          Name: 2              Age of Onset: (Not Specified)  Problem: Heart attack      Relation: Brother          Name: 2              Age of Onset: (Not Specified)  Problem: Diabetes      Relation: Brother          Name: 2              Age of Onset: (Not Specified)      Current Outpatient Medications:  aspirin (ECOTRIN) 81 MG EC tablet, Take 81 mg by mouth 2 (two) times daily. (Breakfast & Afternoon), Disp: , Rfl:   atorvastatin (LIPITOR) 80 MG tablet, Take 1 tablet (80 mg total) by mouth once daily. (Patient taking differently: Take 80 mg by mouth every evening.), Disp: 90 tablet, Rfl: 3  calcium carbonate (TUMS ORAL), Take 2 tablets by mouth daily as needed (Heartburn)., Disp: , Rfl:   coenzyme Q10 (CO Q-10) 300 mg Cap, Take 1 capsule by mouth once daily., Disp: , Rfl:   dicyclomine (BENTYL) 10 MG capsule, TAKE 1 CAPSULE(10 MG) BY MOUTH BEFORE MEALS AND AT BEDTIME AS NEEDED FOR CRAMPING, Disp: 120 capsule, Rfl:  1  ERGOCALCIFEROL, VITAMIN D2, (VITAMIN D ORAL), Take 1 capsule by mouth Mon, Wed, Fri, Sat & Sun, Disp: , Rfl:   ezetimibe (ZETIA) 10 mg tablet, Take 1 tablet (10 mg total) by mouth once daily., Disp: 90 tablet, Rfl: 3  multivit-min/FA/lycopen/lutein (CENTRUM SILVER MEN ORAL), Take 1 tablet by mouth every Mon, Wed, Fri., Disp: , Rfl:     No current facility-administered medications for this visit.          Review of Systems   Constitutional: Negative.   HENT: Negative.     Eyes: Negative.    Cardiovascular:  Negative for chest pain, dyspnea on exertion, leg swelling, near-syncope, palpitations and syncope.   Respiratory: Negative.  Negative for shortness of breath.    Endocrine: Negative.    Hematologic/Lymphatic: Negative.    Skin: Negative.    Musculoskeletal: Negative.    Gastrointestinal: Negative.    Genitourinary: Negative.    Neurological: Negative.  Negative for dizziness and light-headedness.   Psychiatric/Behavioral: Negative.     Allergic/Immunologic: Negative.           Objective     Physical Exam  Vitals reviewed.   Constitutional:       General: He is not in acute distress.     Appearance: He is well-developed.   HENT:      Head: Normocephalic and atraumatic.   Eyes:      Pupils: Pupils are equal, round, and reactive to light.   Neck:      Thyroid: No thyromegaly.      Vascular: No JVD.   Cardiovascular:      Rate and Rhythm: Normal rate and regular rhythm.      Chest Wall: PMI is not displaced.      Heart sounds: Normal heart sounds, S1 normal and S2 normal. No murmur heard.     No friction rub. No gallop.      Comments: RU chest wound well healed  Pulmonary:      Effort: Pulmonary effort is normal. No respiratory distress.      Breath sounds: Normal breath sounds. No wheezing or rales.   Abdominal:      General: Bowel sounds are normal. There is no distension.      Palpations: Abdomen is soft.      Tenderness: There is no abdominal tenderness. There is no guarding or rebound.   Musculoskeletal:          General: No tenderness. Normal range of motion.      Cervical back: Normal range of motion.   Skin:     General: Skin is warm and dry.      Findings: No erythema or rash.   Neurological:      Mental Status: He is alert and oriented to person, place, and time.      Cranial Nerves: No cranial nerve deficit.   Psychiatric:         Behavior: Behavior normal.         Thought Content: Thought content normal.         Judgment: Judgment normal.            Assessment and Plan     1. Aortic atherosclerosis    2. Complete heart block    3. S/P CABG x 3    4. Pacemaker        Plan:  69 yoM CHB here for PM check after extraction and reimplant due to MSSA bacteremia. Normal DC PM function with no sustained arrhythmias. I discussed routine device follow up including quarterly to bi-annual device checks for device function as well as yearly follow up in the EP clinic. The patient  was advised to call with any concerns regarding their device. Device clinic follow up as scheduled. RTC 1y

## 2024-09-19 ENCOUNTER — PATIENT MESSAGE (OUTPATIENT)
Dept: ENDOCRINOLOGY | Facility: HOSPITAL | Age: 69
End: 2024-09-19
Payer: MEDICARE

## 2024-09-19 DIAGNOSIS — M89.9 DISORDER OF BONE, UNSPECIFIED: Primary | ICD-10-CM

## 2024-09-19 DIAGNOSIS — M81.0 OSTEOPOROSIS, UNSPECIFIED OSTEOPOROSIS TYPE, UNSPECIFIED PATHOLOGICAL FRACTURE PRESENCE: ICD-10-CM

## 2024-09-20 ENCOUNTER — PATIENT MESSAGE (OUTPATIENT)
Dept: ELECTROPHYSIOLOGY | Facility: CLINIC | Age: 69
End: 2024-09-20
Payer: MEDICARE

## 2024-09-23 ENCOUNTER — LAB VISIT (OUTPATIENT)
Dept: LAB | Facility: HOSPITAL | Age: 69
End: 2024-09-23
Payer: MEDICARE

## 2024-09-23 ENCOUNTER — CLINICAL SUPPORT (OUTPATIENT)
Dept: CARDIOLOGY | Facility: HOSPITAL | Age: 69
End: 2024-09-23
Attending: INTERNAL MEDICINE
Payer: MEDICARE

## 2024-09-23 ENCOUNTER — PATIENT MESSAGE (OUTPATIENT)
Dept: ENDOCRINOLOGY | Facility: CLINIC | Age: 69
End: 2024-09-23
Payer: MEDICARE

## 2024-09-23 DIAGNOSIS — Z95.0 PRESENCE OF CARDIAC PACEMAKER: ICD-10-CM

## 2024-09-23 DIAGNOSIS — M81.0 OSTEOPOROSIS, UNSPECIFIED OSTEOPOROSIS TYPE, UNSPECIFIED PATHOLOGICAL FRACTURE PRESENCE: ICD-10-CM

## 2024-09-23 DIAGNOSIS — I44.1 ATRIOVENTRICULAR BLOCK, SECOND DEGREE: ICD-10-CM

## 2024-09-23 DIAGNOSIS — M81.0 OSTEOPOROSIS, UNSPECIFIED OSTEOPOROSIS TYPE, UNSPECIFIED PATHOLOGICAL FRACTURE PRESENCE: Primary | ICD-10-CM

## 2024-09-23 LAB
ALBUMIN SERPL BCP-MCNC: 3.9 G/DL (ref 3.5–5.2)
ANION GAP SERPL CALC-SCNC: 8 MMOL/L (ref 8–16)
BUN SERPL-MCNC: 15 MG/DL (ref 8–23)
CALCIUM SERPL-MCNC: 9.2 MG/DL (ref 8.7–10.5)
CHLORIDE SERPL-SCNC: 110 MMOL/L (ref 95–110)
CO2 SERPL-SCNC: 23 MMOL/L (ref 23–29)
CREAT SERPL-MCNC: 0.9 MG/DL (ref 0.5–1.4)
EST. GFR  (NO RACE VARIABLE): >60 ML/MIN/1.73 M^2
GLUCOSE SERPL-MCNC: 74 MG/DL (ref 70–110)
PHOSPHATE SERPL-MCNC: 2.8 MG/DL (ref 2.7–4.5)
POTASSIUM SERPL-SCNC: 3.8 MMOL/L (ref 3.5–5.1)
SODIUM SERPL-SCNC: 141 MMOL/L (ref 136–145)

## 2024-09-23 PROCEDURE — 80069 RENAL FUNCTION PANEL: CPT | Performed by: STUDENT IN AN ORGANIZED HEALTH CARE EDUCATION/TRAINING PROGRAM

## 2024-09-23 PROCEDURE — 93296 REM INTERROG EVL PM/IDS: CPT | Performed by: INTERNAL MEDICINE

## 2024-09-23 PROCEDURE — 93294 REM INTERROG EVL PM/LDLS PM: CPT | Mod: ,,, | Performed by: INTERNAL MEDICINE

## 2024-09-23 PROCEDURE — 36415 COLL VENOUS BLD VENIPUNCTURE: CPT | Performed by: STUDENT IN AN ORGANIZED HEALTH CARE EDUCATION/TRAINING PROGRAM

## 2024-09-30 ENCOUNTER — LAB VISIT (OUTPATIENT)
Dept: LAB | Facility: HOSPITAL | Age: 69
End: 2024-09-30
Payer: MEDICARE

## 2024-09-30 ENCOUNTER — PATIENT MESSAGE (OUTPATIENT)
Dept: CARDIOLOGY | Facility: CLINIC | Age: 69
End: 2024-09-30
Payer: MEDICARE

## 2024-09-30 DIAGNOSIS — M89.9 DISORDER OF BONE, UNSPECIFIED: ICD-10-CM

## 2024-09-30 DIAGNOSIS — M81.0 OSTEOPOROSIS, UNSPECIFIED OSTEOPOROSIS TYPE, UNSPECIFIED PATHOLOGICAL FRACTURE PRESENCE: ICD-10-CM

## 2024-09-30 LAB
ALBUMIN SERPL BCP-MCNC: 3.9 G/DL (ref 3.5–5.2)
ANION GAP SERPL CALC-SCNC: 5 MMOL/L (ref 8–16)
BUN SERPL-MCNC: 12 MG/DL (ref 8–23)
CALCIUM SERPL-MCNC: 9 MG/DL (ref 8.7–10.5)
CHLORIDE SERPL-SCNC: 109 MMOL/L (ref 95–110)
CO2 SERPL-SCNC: 27 MMOL/L (ref 23–29)
CREAT SERPL-MCNC: 0.9 MG/DL (ref 0.5–1.4)
EST. GFR  (NO RACE VARIABLE): >60 ML/MIN/1.73 M^2
GLUCOSE SERPL-MCNC: 80 MG/DL (ref 70–110)
PHOSPHATE SERPL-MCNC: 2.4 MG/DL (ref 2.7–4.5)
POTASSIUM SERPL-SCNC: 3.2 MMOL/L (ref 3.5–5.1)
SODIUM SERPL-SCNC: 141 MMOL/L (ref 136–145)

## 2024-09-30 PROCEDURE — 80069 RENAL FUNCTION PANEL: CPT | Performed by: STUDENT IN AN ORGANIZED HEALTH CARE EDUCATION/TRAINING PROGRAM

## 2024-09-30 PROCEDURE — 83520 IMMUNOASSAY QUANT NOS NONAB: CPT | Performed by: STUDENT IN AN ORGANIZED HEALTH CARE EDUCATION/TRAINING PROGRAM

## 2024-09-30 PROCEDURE — 36415 COLL VENOUS BLD VENIPUNCTURE: CPT | Performed by: STUDENT IN AN ORGANIZED HEALTH CARE EDUCATION/TRAINING PROGRAM

## 2024-10-01 ENCOUNTER — PATIENT MESSAGE (OUTPATIENT)
Dept: INTERNAL MEDICINE | Facility: CLINIC | Age: 69
End: 2024-10-01
Payer: MEDICARE

## 2024-10-03 ENCOUNTER — IMMUNIZATION (OUTPATIENT)
Dept: INTERNAL MEDICINE | Facility: CLINIC | Age: 69
End: 2024-10-03
Payer: MEDICARE

## 2024-10-03 DIAGNOSIS — Z23 NEED FOR VACCINATION: Primary | ICD-10-CM

## 2024-10-03 LAB — FGF-23.INTACT SERPL-MCNC: 36 PG/ML

## 2024-10-03 PROCEDURE — G0008 ADMIN INFLUENZA VIRUS VAC: HCPCS | Mod: S$GLB,,, | Performed by: INTERNAL MEDICINE

## 2024-10-03 PROCEDURE — 91320 SARSCV2 VAC 30MCG TRS-SUC IM: CPT | Mod: S$GLB,,, | Performed by: INTERNAL MEDICINE

## 2024-10-03 PROCEDURE — 90653 IIV ADJUVANT VACCINE IM: CPT | Mod: S$GLB,,, | Performed by: INTERNAL MEDICINE

## 2024-10-03 PROCEDURE — 90480 ADMN SARSCOV2 VAC 1/ONLY CMP: CPT | Mod: S$GLB,,, | Performed by: INTERNAL MEDICINE

## 2024-10-04 ENCOUNTER — PATIENT MESSAGE (OUTPATIENT)
Dept: ENDOCRINOLOGY | Facility: CLINIC | Age: 69
End: 2024-10-04
Payer: MEDICARE

## 2024-10-04 DIAGNOSIS — M80.00XD AGE-RELATED OSTEOPOROSIS WITH CURRENT PATHOLOGICAL FRACTURE WITH ROUTINE HEALING, SUBSEQUENT ENCOUNTER: Primary | ICD-10-CM

## 2024-10-04 NOTE — TELEPHONE ENCOUNTER
Called patient to discuss findings of elevated FGF 23 and low phosphorus.  He agrees to have genetic testing for possible X-linked hypophosphatemia.

## 2024-10-09 ENCOUNTER — LAB VISIT (OUTPATIENT)
Dept: LAB | Facility: HOSPITAL | Age: 69
End: 2024-10-09
Payer: MEDICARE

## 2024-10-09 DIAGNOSIS — M80.00XD AGE-RELATED OSTEOPOROSIS WITH CURRENT PATHOLOGICAL FRACTURE WITH ROUTINE HEALING, SUBSEQUENT ENCOUNTER: ICD-10-CM

## 2024-10-09 PROCEDURE — 36415 COLL VENOUS BLD VENIPUNCTURE: CPT | Performed by: STUDENT IN AN ORGANIZED HEALTH CARE EDUCATION/TRAINING PROGRAM

## 2024-10-18 LAB
GENETIC COUNSELING?: NO
GENSO SPECIMEN TYPE: NORMAL
MISCELLANEOUS GENETIC TEST NAME: NORMAL
PARTENTAL OR SIBLING TESTING?: NO
REFERENCE LAB: NORMAL
TEST RESULT: NORMAL

## 2024-10-21 LAB
OHS CV AF BURDEN PERCENT: < 1
OHS CV DC REMOTE DEVICE TYPE: NORMAL
OHS CV RV PACING PERCENT: 94 %

## 2024-10-25 ENCOUNTER — PATIENT MESSAGE (OUTPATIENT)
Dept: ENDOCRINOLOGY | Facility: CLINIC | Age: 69
End: 2024-10-25
Payer: MEDICARE

## 2024-10-31 ENCOUNTER — TELEPHONE (OUTPATIENT)
Dept: OPHTHALMOLOGY | Facility: CLINIC | Age: 69
End: 2024-10-31
Payer: MEDICARE

## 2024-11-14 ENCOUNTER — PATIENT MESSAGE (OUTPATIENT)
Dept: ENDOCRINOLOGY | Facility: CLINIC | Age: 69
End: 2024-11-14
Payer: MEDICARE

## 2024-11-14 DIAGNOSIS — N52.8 OTHER MALE ERECTILE DYSFUNCTION: ICD-10-CM

## 2024-11-14 DIAGNOSIS — M89.9 DISORDER OF BONE, UNSPECIFIED: ICD-10-CM

## 2024-11-14 DIAGNOSIS — E29.1 HYPOGONADISM IN MALE: ICD-10-CM

## 2024-11-14 DIAGNOSIS — Z12.5 ENCOUNTER FOR SCREENING FOR MALIGNANT NEOPLASM OF PROSTATE: ICD-10-CM

## 2024-11-14 DIAGNOSIS — M80.00XS OSTEOPOROSIS WITH CURRENT PATHOLOGICAL FRACTURE, UNSPECIFIED OSTEOPOROSIS TYPE, SEQUELA: Primary | ICD-10-CM

## 2024-11-20 RX ORDER — TERIPARATIDE 250 UG/ML
20 INJECTION, SOLUTION SUBCUTANEOUS DAILY
Qty: 2.4 ML | Refills: 11 | Status: SHIPPED | OUTPATIENT
Start: 2024-11-20 | End: 2025-11-20

## 2024-11-21 RX ORDER — TESTOSTERONE 20.25 MG/1.25G
1 GEL TOPICAL DAILY
Qty: 75 G | Refills: 3 | Status: SHIPPED | OUTPATIENT
Start: 2024-11-21 | End: 2024-12-21

## 2024-11-25 ENCOUNTER — OFFICE VISIT (OUTPATIENT)
Dept: CARDIOLOGY | Facility: CLINIC | Age: 69
End: 2024-11-25
Payer: MEDICARE

## 2024-11-25 VITALS
SYSTOLIC BLOOD PRESSURE: 155 MMHG | HEIGHT: 67 IN | BODY MASS INDEX: 23.94 KG/M2 | OXYGEN SATURATION: 98 % | DIASTOLIC BLOOD PRESSURE: 81 MMHG | HEART RATE: 69 BPM | WEIGHT: 152.56 LBS

## 2024-11-25 DIAGNOSIS — I70.0 AORTIC ATHEROSCLEROSIS: ICD-10-CM

## 2024-11-25 DIAGNOSIS — I25.118 CORONARY ARTERY DISEASE OF NATIVE ARTERY OF NATIVE HEART WITH STABLE ANGINA PECTORIS: Primary | ICD-10-CM

## 2024-11-25 DIAGNOSIS — E80.4 GILBERT'S SYNDROME: ICD-10-CM

## 2024-11-25 DIAGNOSIS — E78.5 HYPERLIPIDEMIA, UNSPECIFIED HYPERLIPIDEMIA TYPE: ICD-10-CM

## 2024-11-25 DIAGNOSIS — Z95.1 S/P CABG X 3: ICD-10-CM

## 2024-11-25 DIAGNOSIS — I44.2 COMPLETE HEART BLOCK: ICD-10-CM

## 2024-11-25 DIAGNOSIS — Z95.0 PRESENCE OF CARDIAC PACEMAKER: ICD-10-CM

## 2024-11-25 PROCEDURE — 1101F PT FALLS ASSESS-DOCD LE1/YR: CPT | Mod: CPTII,S$GLB,, | Performed by: INTERNAL MEDICINE

## 2024-11-25 PROCEDURE — 1160F RVW MEDS BY RX/DR IN RCRD: CPT | Mod: CPTII,S$GLB,, | Performed by: INTERNAL MEDICINE

## 2024-11-25 PROCEDURE — 1157F ADVNC CARE PLAN IN RCRD: CPT | Mod: CPTII,S$GLB,, | Performed by: INTERNAL MEDICINE

## 2024-11-25 PROCEDURE — 3079F DIAST BP 80-89 MM HG: CPT | Mod: CPTII,S$GLB,, | Performed by: INTERNAL MEDICINE

## 2024-11-25 PROCEDURE — 1159F MED LIST DOCD IN RCRD: CPT | Mod: CPTII,S$GLB,, | Performed by: INTERNAL MEDICINE

## 2024-11-25 PROCEDURE — 3008F BODY MASS INDEX DOCD: CPT | Mod: CPTII,S$GLB,, | Performed by: INTERNAL MEDICINE

## 2024-11-25 PROCEDURE — 1126F AMNT PAIN NOTED NONE PRSNT: CPT | Mod: CPTII,S$GLB,, | Performed by: INTERNAL MEDICINE

## 2024-11-25 PROCEDURE — 3288F FALL RISK ASSESSMENT DOCD: CPT | Mod: CPTII,S$GLB,, | Performed by: INTERNAL MEDICINE

## 2024-11-25 PROCEDURE — 3077F SYST BP >= 140 MM HG: CPT | Mod: CPTII,S$GLB,, | Performed by: INTERNAL MEDICINE

## 2024-11-25 PROCEDURE — 99999 PR PBB SHADOW E&M-EST. PATIENT-LVL IV: CPT | Mod: PBBFAC,,, | Performed by: INTERNAL MEDICINE

## 2024-11-25 PROCEDURE — 99214 OFFICE O/P EST MOD 30 MIN: CPT | Mod: S$GLB,,, | Performed by: INTERNAL MEDICINE

## 2024-11-25 NOTE — PATIENT INSTRUCTIONS
Take your blood pressure each morning and evening for 1 week.  Record the blood pressure, pulse, date and time (AM or PM) and then send the the results all together at 1 time to Dr. Moss. Three (3) weeks after starting testosterone, please repeat this procedure.    Take BP after emptying your bladder and then sitting quietly for 5 minutes.  When you take your blood pressure, you should be sitting straight up in a chair, both feet flat on the floor, and the arm with the blood pressure cuff at chest height resting on a table.

## 2024-11-25 NOTE — PROGRESS NOTES
Chart has been dictated using voice recognition software.  It is not been reviewed carefully for any transcriptional errors due to this technology.   Subjective:   Patient ID:  Michael Espinoza is a 69 y.o. male who presents for follow-up of No chief complaint on file.      HPI:  Patient previously seen by Dr. Tapia with coronary artery disease status post CABG 2018, complete heart block in July-2022 treated with a permanent pacemaker, hyperlipidemia, and Gilbert's syndrome.  Patient was admitted 14-21-May-2024 for an infected pacemaker which was removed, treated, a new pacemaker inserted.    Patient had dyspnea on exertion (after 1 mile) prior to CABG. 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  Has osteoporosis and wants to start treatment with testosterone gel and with teriparatide. Patient denies any chest discomfort on exertion or at rest.  Patient denies any dyspnea at rest or on exertion, orthopnea, or PND. Has had mild edema in left lower extremity secondary to removal of saphenous vein during CABG.     BP at home around 130/70.    Past Medical History:   Diagnosis Date    Complete heart block 7/5/2022    Coronary artery disease of native artery of native heart with stable angina pectoris 4/10/2018    Hyperlipidemia        Outpatient Medications Prior to Visit   Medication Sig Dispense Refill    aspirin (ECOTRIN) 81 MG EC tablet Take 81 mg by mouth 2 (two) times daily. (Breakfast & Afternoon)      atorvastatin (LIPITOR) 80 MG tablet Take 1 tablet (80 mg total) by mouth once daily. (Patient taking differently: Take 80 mg by mouth every evening.) 90 tablet 3    calcium carbonate (TUMS ORAL) Take 2 tablets by mouth daily as needed (Heartburn).      coenzyme Q10 (CO Q-10) 300 mg Cap Take 1 capsule by mouth once daily.      dicyclomine (BENTYL) 10 MG capsule TAKE 1 CAPSULE(10 MG) BY MOUTH BEFORE MEALS AND AT BEDTIME AS NEEDED FOR CRAMPING 120 capsule 1    ERGOCALCIFEROL, VITAMIN D2, (VITAMIN D ORAL) Take 1  capsule by mouth Mon, Wed, Fri, Sat & Sun      ezetimibe (ZETIA) 10 mg tablet Take 1 tablet (10 mg total) by mouth once daily. 90 tablet 3    k phos di & mono-sod phos mono (K-PHOS-NEUTRAL) 250 mg Tab Take 1 tablet by mouth once daily. 30 each 6    multivit-min/FA/lycopen/lutein (CENTRUM SILVER MEN ORAL) Take 1 tablet by mouth every Mon, Wed, Fri.      teriparatide (FORTEO) 20 mcg/dose (600mcg/2.4mL) PnIj Inject 0.08 mLs (20 mcg total) into the skin once daily. 2.4 mL 11    testosterone (ANDROGEL) 20.25 mg/1.25 gram (1.62 %) GlPm Place 1 Pump  onto the skin once daily. Apply 1 pump to shoulders/upper arms daily. Wash hands immediately after. 75 g 3     No facility-administered medications prior to visit.       Review of Systems   Constitutional: Negative for weight gain and weight loss.   HENT:  Negative for nosebleeds.    Respiratory:  Negative for hemoptysis.    Hematologic/Lymphatic: Negative for bleeding problem. Does not bruise/bleed easily.   Gastrointestinal:  Negative for hematemesis and hematochezia.   Genitourinary:  Negative for hematuria.   Neurological:  Negative for focal weakness, numbness and weakness.      Objective:   Physical Exam      Lab Results   Component Value Date     09/30/2024    K 3.2 (L) 09/30/2024    BUN 12 09/30/2024    CREATININE 0.9 09/30/2024    EGFRNORACEVR >60.0 09/30/2024    GLU 80 09/30/2024    HGBA1C 5.6 08/11/2023    CHOL 101 (L) 08/11/2023    TRIG 35 08/11/2023    HDL 46 08/11/2023    LDLCALC 48.0 (L) 08/11/2023     (H) 07/05/2022    HGB 12.7 (L) 06/11/2024    HCT 37.9 (L) 06/11/2024    HCT 44 12/13/2023    WBC 5.26 06/11/2024     06/11/2024    INR 1.1 05/20/2024     ECG (10-September-2024) showed normal sinus rhythm with an atrial sensed ventricular pacemaker that appears to be an intra HIS placed lead with a right bundle-branch block pattern.  Assessment:     1. Coronary artery disease of native artery of native heart with stable angina pectoris    2.  S/P CABG x 3    3. Complete heart block    4. Presence of cardiac pacemaker    5. Hyperlipidemia, unspecified hyperlipidemia type    6. Aortic atherosclerosis    7. Gilbert's syndrome      Patient has no symptoms of cardiac ischemia, heart failure, or significant arrhythmias.  Patient's LDL cholesterol is well controlled.  His systolic blood pressure is mildly elevated today with the patient states that his blood pressure is well controlled at home.  He has been asked to obtain home blood pressures and send them to me.  His hemoglobin A1c is normal.  The patient's pacemaker is being monitored by the cardiac electrophysiology group.    Unless there are intervening problems, patient should return for re-evaluation in 1 year.     Plan:     Diagnoses and all orders for this visit:    Coronary artery disease of native artery of native heart with stable angina pectoris    S/P CABG x 3    Complete heart block    Presence of cardiac pacemaker    Hyperlipidemia, unspecified hyperlipidemia type    Aortic atherosclerosis    Gilbert's syndrome          Pravin Moss MD  Consultative Cardiology

## 2024-11-25 NOTE — Clinical Note
Thank you for allowing me to follow-up with Michael Espinoza for coronary artery disease. Please see my note for details of this encounter. If you have any questions, please contact me.  Thank you again for allowing to participate in the care of this patient.

## 2024-11-26 ENCOUNTER — PATIENT MESSAGE (OUTPATIENT)
Dept: CARDIOLOGY | Facility: CLINIC | Age: 69
End: 2024-11-26
Payer: MEDICARE

## 2024-12-06 ENCOUNTER — OFFICE VISIT (OUTPATIENT)
Dept: SPORTS MEDICINE | Facility: CLINIC | Age: 69
End: 2024-12-06
Payer: MEDICARE

## 2024-12-06 ENCOUNTER — HOSPITAL ENCOUNTER (OUTPATIENT)
Dept: RADIOLOGY | Facility: HOSPITAL | Age: 69
Discharge: HOME | End: 2024-12-06
Attending: ORTHOPAEDIC SURGERY
Payer: MEDICARE

## 2024-12-06 VITALS
HEART RATE: 80 BPM | WEIGHT: 149.94 LBS | BODY MASS INDEX: 23.48 KG/M2 | DIASTOLIC BLOOD PRESSURE: 88 MMHG | SYSTOLIC BLOOD PRESSURE: 139 MMHG

## 2024-12-06 DIAGNOSIS — M25.512 LEFT SHOULDER PAIN, UNSPECIFIED CHRONICITY: ICD-10-CM

## 2024-12-06 DIAGNOSIS — M25.511 RIGHT SHOULDER PAIN, UNSPECIFIED CHRONICITY: ICD-10-CM

## 2024-12-06 DIAGNOSIS — M75.02 ADHESIVE CAPSULITIS OF LEFT SHOULDER: Primary | ICD-10-CM

## 2024-12-06 PROCEDURE — 99999 PR PBB SHADOW E&M-EST. PATIENT-LVL II: CPT | Mod: PBBFAC,,, | Performed by: ORTHOPAEDIC SURGERY

## 2024-12-06 PROCEDURE — 73030 X-RAY EXAM OF SHOULDER: CPT | Mod: 26,LT,, | Performed by: RADIOLOGY

## 2024-12-06 PROCEDURE — 73030 X-RAY EXAM OF SHOULDER: CPT | Mod: TC,LT

## 2024-12-06 RX ORDER — TRIAMCINOLONE ACETONIDE 40 MG/ML
40 INJECTION, SUSPENSION INTRA-ARTICULAR; INTRAMUSCULAR
Status: DISCONTINUED | OUTPATIENT
Start: 2024-12-06 | End: 2024-12-06 | Stop reason: HOSPADM

## 2024-12-06 RX ADMIN — TRIAMCINOLONE ACETONIDE 40 MG: 40 INJECTION, SUSPENSION INTRA-ARTICULAR; INTRAMUSCULAR at 10:12

## 2024-12-06 NOTE — PROGRESS NOTES
NEW PATIENT    HISTORY OF PRESENT ILLNESS   69 y.o. 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  He states that is pain is localized to his superior shoulder. He admits to pain with overhead activity. He has had pain since early November 2024. He states the pain wake him up at night. He denies a history of left shoulder pain, but has a history of blood sepsis and a pace maker.  He has difficulty raising his arm overhead.  He has difficulty reaching back.  He has lost motion in the left arm.      - mechanical symptoms, - instability          PAST MEDICAL HISTORY    Past Medical History:   Diagnosis Date    Complete heart block 7/5/2022    Coronary artery disease of native artery of native heart with stable angina pectoris 4/10/2018    Hyperlipidemia        PAST SURGICAL HISTORY     Past Surgical History:   Procedure Laterality Date    A-V CARDIAC PACEMAKER INSERTION N/A 7/5/2022    Procedure: INSERTION, CARDIAC PACEMAKER, DUAL CHAMBER;  Surgeon: Alessandro Canales MD;  Location: [facility] EP LAB;  Service: Cardiology;  Laterality: N/A;  CHB, TBD, ANES, ED 6, MB    A-V CARDIAC PACEMAKER INSERTION Right 5/20/2024    Procedure: INSERTION, CARDIAC PACEMAKER, DUAL CHAMBER;  Surgeon: Arthur Pagan MD;  Location: [facility] EP LAB;  Service: Cardiology;  Laterality: Right;  CHB, DUAL PPM, BIO, ANES, [place],     COLONOSCOPY N/A 8/5/2022    Procedure: COLONOSCOPY;  Surgeon: Namita Dumont MD;  Location: [facility] ENDO (4TH FLR);  Service: Endoscopy;  Laterality: N/A;  vacc-inst portal-tb    CYST REMOVAL      ECHOCARDIOGRAM,TRANSESOPHAGEAL N/A 12/22/2023    Procedure: Transesophageal echo (CHARMAINE) intra-procedure log documentation;  Surgeon: Provider, [facility];  Location: [facility] EP LAB;  Service: Cardiology;  Laterality: N/A;    EXTRACORPOREAL SHOCK WAVE LITHOTRIPSY Right 3/1/2024    Procedure: LITHOTRIPSY, ESWL;  Surgeon: Sanchez Moreno Jr., MD;  Location: [facility] OR [facility] FLR;  Service:  Urology;  Laterality: Right;  1 hr    EXTRACTION, ELECTRODE LEAD N/A 5/17/2024    Procedure: EXTRACTION, ELECTRODE LEAD;  Surgeon: MORELIA Serrato MD;  Location: [facility] EP LAB;  Service: Cardiology;  Laterality: N/A;  INFECTION, EXTRACTION DUAL PPM & REIMPLANT TEMP PPM (IJ), SJM, ANES, EH, CHARMAINE, LASER, NO CV BACKUP, 302    INSERTION, PACEMAKER, SINGLE CHAMBER VENTRICULAR N/A 5/17/2024    Procedure: Insertion, Pacemaker, Single Chamber Ventricular;  Surgeon: MORELIA Serrato MD;  Location: [facility] EP LAB;  Service: Cardiology;  Laterality: N/A;    REMOVAL, DEVICE  5/17/2024    Procedure: Removal, Device;  Surgeon: MORELIA Serrato MD;  Location: [facility] EP LAB;  Service: Cardiology;;    REVISION OF SKIN POCKET FOR PACEMAKER  5/17/2024    Procedure: REVISION, SKIN POCKET, FOR CARDIAC PACEMAKER;  Surgeon: MORELIA Serrato MD;  Location: [facility] EP LAB;  Service: Cardiology;;    TONSILLECTOMY      TRANSESOPHAGEAL ECHOCARDIOGRAPHY N/A 5/17/2024    Procedure: ECHOCARDIOGRAM, TRANSESOPHAGEAL;  Surgeon: Benton Jon MD;  Location: [facility] EP LAB;  Service: Cardiology;  Laterality: N/A;       FAMILY HISTORY    Family History   Problem Relation Name Age of Onset    Hyperlipidemia Brother 2     Hypertension Brother 2     Heart disease Brother 2     Heart attack Brother 2     Diabetes Brother 2        SOCIAL HISTORY    Social History     Socioeconomic History    Marital status: Single   Tobacco Use    Smoking status: Never     Passive exposure: Never    Smokeless tobacco: Never   Substance and Sexual Activity    Alcohol use: Yes     Alcohol/week: 1.0 standard drink of alcohol     Types: 1 Glasses of wine per week     Comment: 1 drink 2-3 times/weekly    Drug use: No     Social Drivers of Health     Financial Resource Strain: Low Risk  (5/16/2024)    Overall Financial Resource Strain (CARDIA)     Difficulty of Paying Living Expenses: Not hard at all   Food Insecurity: No Food Insecurity (5/16/2024)    Hunger Vital Sign      Worried About Running Out of Food in the Last Year: Never true     Ran Out of Food in the Last Year: Never true   Transportation Needs: No Transportation Needs (5/15/2024)    TRANSPORTATION NEEDS     Transportation : No   Physical Activity: Insufficiently Active (5/16/2024)    Exercise Vital Sign     Days of Exercise per Week: 3 days     Minutes of Exercise per Session: 30 min   Stress: No Stress Concern Present (5/16/2024)    [employer] - Occupational Stress Questionnaire     Feeling of Stress : Not at all   Housing Stability: Low Risk  (5/16/2024)    Housing Stability Vital Sign     Unable to Pay for Housing in the Last Year: No     Homeless in the Last Year: No       MEDICATIONS      Current Outpatient Medications:     aspirin (ECOTRIN) 81 MG EC tablet, Take 81 mg by mouth 2 (two) times daily. (Breakfast & Afternoon), Disp: , Rfl:     atorvastatin (LIPITOR) 80 MG tablet, Take 1 tablet (80 mg total) by mouth once daily., Disp: 90 tablet, Rfl: 3    coenzyme Q10 (CO Q-10) 300 mg Cap, Take 1 capsule by mouth once daily., Disp: , Rfl:     ERGOCALCIFEROL, VITAMIN D2, (VITAMIN D ORAL), Take 1 capsule by mouth Mon, Wed, Fri, Sat & Sun, Disp: , Rfl:     ezetimibe (ZETIA) 10 mg tablet, Take 1 tablet (10 mg total) by mouth once daily., Disp: 90 tablet, Rfl: 3    k phos di & mono-sod phos mono (K-PHOS-NEUTRAL) 250 mg Tab, Take 1 tablet by mouth once daily., Disp: 30 each, Rfl: 6    multivit-min/FA/lycopen/lutein (CENTRUM SILVER MEN ORAL), Take 1 tablet by mouth every Mon, Wed, Fri., Disp: , Rfl:     teriparatide (FORTEO) 20 mcg/dose (600mcg/2.4mL) PnIj, Inject 0.08 mLs (20 mcg total) into the skin once daily., Disp: 2.4 mL, Rfl: 11    testosterone (ANDROGEL) 20.25 mg/1.25 gram (1.62 %) GlPm, Place 1 Pump  onto the skin once daily. Apply 1 pump to shoulders/upper arms daily. Wash hands immediately after., Disp: 75 g, Rfl: 3    ALLERGIES     Review of patient's allergies indicates:  No Known  Allergies      REVIEW OF SYSTEMS   Constitution: Negative. Negative for chills, fever and night sweats.   HENT: Negative for congestion and headaches.    Eyes: Negative for blurred vision, left vision loss and right vision loss.   Cardiovascular: Negative for chest pain and syncope.   Respiratory: Negative for cough and shortness of breath.    Endocrine: Negative for polydipsia, polyphagia and polyuria.   Hematologic/Lymphatic: Negative for bleeding problem. Does not bruise/bleed easily.   Skin: Negative for dry skin, itching and rash.   Musculoskeletal: Negative for falls. Positive for left knee pain   Gastrointestinal: Negative for abdominal pain and bowel incontinence.   Genitourinary: Negative for bladder incontinence and nocturia.   Neurological: Negative for disturbances in coordination, loss of balance and seizures.   Psychiatric/Behavioral: Negative for depression. The patient does not have insomnia.    Allergic/Immunologic: Negative for hives and persistent infections.     PHYSICAL EXAMINATION    Vitals: /88   Pulse 80   Wt 68 kg (149 lb 14.6 oz)   BMI 23.48 kg/m²     General: The patient appears active and healthy with no apparent physical problems.  No disturbance of mood or affect is demonstrated. Alert and Oriented.    HEENT: Eyes normal, pupils equally round, nose normal.    Resp: Equal and symmetrical chest rises. No wheezing    CV: Regular rate    Neck: Supple; nonpainful range of motion.    Extremities: no cyanosis, clubbing, edema, or diffuse swelling.  Palpable pulses, good capillary refill of the digits.  No coolness, discoloration, edema or obvious varicosities.    Skin: no lesions noted.    Lymphatic: no detected adenopathy in the upper or lower extremities.    Neurologic: normal mental status, normal reflexes, normal gait and balance.  Patient is alert and oriented to person, place and time.  No flaccidity or spasticity is noted.  No motor or sensory deficits are noted.  Light touch  is intact    Orthopaedic: SHOULDER EXAM - LEFT     Inspection:   Normal skin color and appearance with well-healed scars from previous pacemaker  No muscle atrophy noted.  No scapular winging.  Protracted shoulder posture    Palpation: No tenderness of the acromioclavicular joint, lateral edge of the acromion, biceps tendon, trapezius muscle or scapulothoracic bursa.      ROM:      PROM:     FE - 110°    Abd/ER -  30°  Abd/IR -  45°   Add/ER -  60°     AROM:    FE - 100°    Abd/ER -  30°  Abd/IR -  45°   Add/ER -  60°         Tests:     - Rand, - Neer's, - Cross Arm Adduction, - Hale, - Yerguson, - Speed. - Belly Press,  - Jobes, - Lift Off    Stability: - sulcus, - apprehension, - relocation, - load and shift, - DLS      Motor:  Rotator cuff strength is 5/5 supraspinatus with pain, 5/5 infraspinatus, 5/5 subscapularis. Biceps, triceps and deltoid strength is 5/5.      Neuro     Distally there are no paresthesias, and sensation is intact to light touch in the median, ulnar, and radial distributions.  Reflexes are 2/2 biceps, triceps and brachioradialis.        IMAGING    X-Ray Shoulder 2 or More Views Left  Narrative: EXAMINATION:  XR SHOULDER COMPLETE 2 OR MORE VIEWS LEFT    CLINICAL HISTORY:  Pain in left shoulder    TECHNIQUE:  Two or three views of the left shoulder were performed.    COMPARISON:  None    FINDINGS:  No fracture or dislocation.  No bone destruction identified.  No soft tissue calcifications.  Impression: See above    Electronically signed by: Kedar Porras MD  Date:    12/06/2024  Time:    10:41        IMPRESSION       ICD-10-CM ICD-9-CM   1. Adhesive capsulitis of left shoulder  M75.02 726.0   2. Right shoulder pain, unspecified chronicity  M25.511 719.41       MEDICATIONS PRESCRIBED      None today     RECOMMENDATIONS     Ultrasound guided left shoulder intra articular CSI for adhesive capsulitis   RTC in 1 month   We had a long discussion concerning the possible diagnosis of adhesive  capsulitis.  He has a lot of the signs and symptoms of adhesive capsulitis.  We will reassess how he does in 1 month based on the injection.      Large Joint Aspiration/Injection: L glenohumeral    Date/Time: 12/6/2024 10:45 AM    Performed by: Uriel Pop MD  Authorized by: Uriel Pop MD    Consent Done?:  Yes (Verbal)  Indications:  Pain  Site marked: the procedure site was marked    Timeout: prior to procedure the correct patient, procedure, and site was verified    Prep: patient was prepped and draped in usual sterile fashion      Local anesthesia used?: Yes    Local anesthetic:  Co-phenylcaine spray (0.2% Naropin)  Anesthetic total (ml):  4      Details:  Needle Size:  22 G  Ultrasonic Guidance for needle placement?: Yes (Ultrasound guidance was used for needle localization.  Images were saved and stored for documentation.  Dynamic visualization of the needle was continuous throughout the procedure and maintained accurate placement.)    Images are saved and documented.  Approach:  Posterior  Location:  Shoulder  Site:  L glenohumeral  Medications:  40 mg triamcinolone acetonide 40 mg/mL  Medications comment:  5 mL LIDOcaine HCL 10 mg/ml (1%) 10 mg/mL (1 %)  Patient tolerance:  Patient tolerated the procedure well with no immediate complications        All questions were answered, pt will contact us for questions or concerns in the interim.

## 2024-12-18 ENCOUNTER — PATIENT MESSAGE (OUTPATIENT)
Dept: INTERNAL MEDICINE | Facility: CLINIC | Age: 69
End: 2024-12-18
Payer: MEDICARE

## 2024-12-21 DIAGNOSIS — E78.00 PURE HYPERCHOLESTEROLEMIA: ICD-10-CM

## 2024-12-21 DIAGNOSIS — I25.10 CORONARY ARTERY DISEASE INVOLVING NATIVE CORONARY ARTERY OF NATIVE HEART WITHOUT ANGINA PECTORIS: ICD-10-CM

## 2024-12-22 ENCOUNTER — CLINICAL SUPPORT (OUTPATIENT)
Dept: CARDIOLOGY | Facility: HOSPITAL | Age: 69
End: 2024-12-22
Payer: MEDICARE

## 2024-12-22 DIAGNOSIS — I44.1 ATRIOVENTRICULAR BLOCK, SECOND DEGREE: ICD-10-CM

## 2024-12-22 DIAGNOSIS — Z95.0 PRESENCE OF CARDIAC PACEMAKER: ICD-10-CM

## 2024-12-23 ENCOUNTER — PATIENT MESSAGE (OUTPATIENT)
Dept: ADMINISTRATIVE | Facility: OTHER | Age: 69
End: 2024-12-23
Payer: MEDICARE

## 2024-12-23 ENCOUNTER — LAB VISIT (OUTPATIENT)
Dept: LAB | Facility: HOSPITAL | Age: 69
End: 2024-12-23
Payer: MEDICARE

## 2024-12-23 ENCOUNTER — CLINICAL SUPPORT (OUTPATIENT)
Dept: CARDIOLOGY | Facility: HOSPITAL | Age: 69
End: 2024-12-23
Attending: INTERNAL MEDICINE
Payer: MEDICARE

## 2024-12-23 DIAGNOSIS — Z95.0 PRESENCE OF CARDIAC PACEMAKER: ICD-10-CM

## 2024-12-23 DIAGNOSIS — I44.1 ATRIOVENTRICULAR BLOCK, SECOND DEGREE: ICD-10-CM

## 2024-12-23 DIAGNOSIS — M89.9 DISORDER OF BONE, UNSPECIFIED: ICD-10-CM

## 2024-12-23 LAB
ALBUMIN SERPL BCP-MCNC: 3.7 G/DL (ref 3.5–5.2)
ANION GAP SERPL CALC-SCNC: 6 MMOL/L (ref 8–16)
BUN SERPL-MCNC: 17 MG/DL (ref 8–23)
CALCIUM SERPL-MCNC: 9.5 MG/DL (ref 8.7–10.5)
CHLORIDE SERPL-SCNC: 106 MMOL/L (ref 95–110)
CO2 SERPL-SCNC: 28 MMOL/L (ref 23–29)
CREAT SERPL-MCNC: 0.9 MG/DL (ref 0.5–1.4)
EST. GFR  (NO RACE VARIABLE): >60 ML/MIN/1.73 M^2
GLUCOSE SERPL-MCNC: 76 MG/DL (ref 70–110)
PHOSPHATE SERPL-MCNC: 3.9 MG/DL (ref 2.7–4.5)
POTASSIUM SERPL-SCNC: 3.9 MMOL/L (ref 3.5–5.1)
SODIUM SERPL-SCNC: 140 MMOL/L (ref 136–145)

## 2024-12-23 PROCEDURE — 80069 RENAL FUNCTION PANEL: CPT | Performed by: STUDENT IN AN ORGANIZED HEALTH CARE EDUCATION/TRAINING PROGRAM

## 2024-12-23 PROCEDURE — 93294 REM INTERROG EVL PM/LDLS PM: CPT | Mod: ,,, | Performed by: INTERNAL MEDICINE

## 2024-12-23 PROCEDURE — 93296 REM INTERROG EVL PM/IDS: CPT | Performed by: INTERNAL MEDICINE

## 2024-12-23 PROCEDURE — 36415 COLL VENOUS BLD VENIPUNCTURE: CPT | Performed by: STUDENT IN AN ORGANIZED HEALTH CARE EDUCATION/TRAINING PROGRAM

## 2024-12-25 RX ORDER — ATORVASTATIN CALCIUM 80 MG/1
80 TABLET, FILM COATED ORAL
Qty: 90 TABLET | Refills: 3 | Status: SHIPPED | OUTPATIENT
Start: 2024-12-25

## 2024-12-25 RX ORDER — EZETIMIBE 10 MG
10 TABLET ORAL
Qty: 90 TABLET | Refills: 3 | Status: SHIPPED | OUTPATIENT
Start: 2024-12-25

## 2024-12-26 ENCOUNTER — PATIENT MESSAGE (OUTPATIENT)
Dept: ENDOCRINOLOGY | Facility: CLINIC | Age: 69
End: 2024-12-26
Payer: MEDICARE

## 2024-12-26 DIAGNOSIS — M89.9 DISORDER OF BONE, UNSPECIFIED: Primary | ICD-10-CM

## 2025-01-14 LAB
OHS CV AF BURDEN PERCENT: < 1
OHS CV DC REMOTE DEVICE TYPE: NORMAL
OHS CV RV PACING PERCENT: 91 %

## 2025-01-17 ENCOUNTER — OFFICE VISIT (OUTPATIENT)
Dept: SPORTS MEDICINE | Facility: CLINIC | Age: 70
End: 2025-01-17
Payer: MEDICARE

## 2025-01-17 ENCOUNTER — PATIENT MESSAGE (OUTPATIENT)
Dept: ADMINISTRATIVE | Facility: OTHER | Age: 70
End: 2025-01-17
Payer: MEDICARE

## 2025-01-17 VITALS
SYSTOLIC BLOOD PRESSURE: 130 MMHG | DIASTOLIC BLOOD PRESSURE: 89 MMHG | BODY MASS INDEX: 24.84 KG/M2 | HEIGHT: 66 IN | WEIGHT: 154.56 LBS | HEART RATE: 77 BPM

## 2025-01-17 DIAGNOSIS — M75.02 ADHESIVE CAPSULITIS OF LEFT SHOULDER: Primary | ICD-10-CM

## 2025-01-17 DIAGNOSIS — M25.511 RIGHT SHOULDER PAIN, UNSPECIFIED CHRONICITY: ICD-10-CM

## 2025-01-17 PROCEDURE — 3008F BODY MASS INDEX DOCD: CPT | Mod: CPTII,S$GLB,, | Performed by: ORTHOPAEDIC SURGERY

## 2025-01-17 PROCEDURE — 3079F DIAST BP 80-89 MM HG: CPT | Mod: CPTII,S$GLB,, | Performed by: ORTHOPAEDIC SURGERY

## 2025-01-17 PROCEDURE — 1101F PT FALLS ASSESS-DOCD LE1/YR: CPT | Mod: CPTII,S$GLB,, | Performed by: ORTHOPAEDIC SURGERY

## 2025-01-17 PROCEDURE — 1126F AMNT PAIN NOTED NONE PRSNT: CPT | Mod: CPTII,S$GLB,, | Performed by: ORTHOPAEDIC SURGERY

## 2025-01-17 PROCEDURE — 1159F MED LIST DOCD IN RCRD: CPT | Mod: CPTII,S$GLB,, | Performed by: ORTHOPAEDIC SURGERY

## 2025-01-17 PROCEDURE — 1157F ADVNC CARE PLAN IN RCRD: CPT | Mod: CPTII,S$GLB,, | Performed by: ORTHOPAEDIC SURGERY

## 2025-01-17 PROCEDURE — 3288F FALL RISK ASSESSMENT DOCD: CPT | Mod: CPTII,S$GLB,, | Performed by: ORTHOPAEDIC SURGERY

## 2025-01-17 PROCEDURE — 3075F SYST BP GE 130 - 139MM HG: CPT | Mod: CPTII,S$GLB,, | Performed by: ORTHOPAEDIC SURGERY

## 2025-01-17 PROCEDURE — 99214 OFFICE O/P EST MOD 30 MIN: CPT | Mod: S$GLB,,, | Performed by: ORTHOPAEDIC SURGERY

## 2025-01-17 PROCEDURE — 99999 PR PBB SHADOW E&M-EST. PATIENT-LVL III: CPT | Mod: PBBFAC,,, | Performed by: ORTHOPAEDIC SURGERY

## 2025-01-17 NOTE — PROGRESS NOTES
ESTABLISHED PATIENT    History 1/17/2025  Michael returns to clinic for follow up evaluation of his left shoulder. He received a glenohumeral CSI at his last visit on 12/6/24. He states he has had some improvement, but admits to hearing a pop and having pain after reaching over head.    History 12/6/2024  69 y.o. 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  He states that is pain is localized to his superior shoulder. He admits to pain with overhead activity. He has had pain since early November 2024. He states the pain wake him up at night. He denies a history of left shoulder pain, but has a history of blood sepsis and a pace maker.  He has difficulty raising his arm overhead.  He has difficulty reaching back.  He has lost motion in the left arm.      - mechanical symptoms, - instability          PAST MEDICAL HISTORY    Past Medical History:   Diagnosis Date    Complete heart block 7/5/2022    Coronary artery disease of native artery of native heart with stable angina pectoris 4/10/2018    Hyperlipidemia        PAST SURGICAL HISTORY     Past Surgical History:   Procedure Laterality Date    A-V CARDIAC PACEMAKER INSERTION N/A 7/5/2022    Procedure: INSERTION, CARDIAC PACEMAKER, DUAL CHAMBER;  Surgeon: Alessandro Canales MD;  Location: [facility] EP LAB;  Service: Cardiology;  Laterality: N/A;  CHB, TBD, ANES, ED 6, MB    A-V CARDIAC PACEMAKER INSERTION Right 5/20/2024    Procedure: INSERTION, CARDIAC PACEMAKER, DUAL CHAMBER;  Surgeon: Arthur Pagan MD;  Location: [facility] EP LAB;  Service: Cardiology;  Laterality: Right;  CHB, DUAL PPM, BIO, ANES, NE,     COLONOSCOPY N/A 8/5/2022    Procedure: COLONOSCOPY;  Surgeon: Namita Dumont MD;  Location: [facility] ENDO ([address] [place] [address]);  Service: Endoscopy;  Laterality: N/A;  vacc-inst portal-tb    CYST REMOVAL      ECHOCARDIOGRAM,TRANSESOPHAGEAL N/A 12/22/2023    Procedure: Transesophageal echo (CHARMAINE) intra-procedure log  documentation;  Surgeon: Provider, Dosc Diagnostic;  Location: [facility] EP LAB;  Service: Cardiology;  Laterality: N/A;    EXTRACORPOREAL SHOCK WAVE LITHOTRIPSY Right 3/1/2024    Procedure: LITHOTRIPSY, ESWL;  Surgeon: Sanchez Moreno Jr., MD;  Location: [facility] OR [address] [place] [address];  Service: Urology;  Laterality: Right;  1 hr    EXTRACTION, ELECTRODE LEAD N/A 5/17/2024    Procedure: EXTRACTION, ELECTRODE LEAD;  Surgeon: MORELIA Serrato MD;  Location: [facility] EP LAB;  Service: Cardiology;  Laterality: N/A;  INFECTION, EXTRACTION DUAL PPM & REIMPLANT TEMP PPM (IJ), SJM, ANES, EH, CHARMAINE, LASER, NO CV BACKUP, 302    INSERTION, PACEMAKER, SINGLE CHAMBER VENTRICULAR N/A 5/17/2024    Procedure: Insertion, Pacemaker, Single Chamber Ventricular;  Surgeon: MORELIA Serrato MD;  Location: [facility] EP LAB;  Service: Cardiology;  Laterality: N/A;    REMOVAL, DEVICE  5/17/2024    Procedure: Removal, Device;  Surgeon: MORELIA Serrato MD;  Location: [facility] EP LAB;  Service: Cardiology;;    REVISION OF SKIN POCKET FOR PACEMAKER  5/17/2024    Procedure: REVISION, SKIN POCKET, FOR CARDIAC PACEMAKER;  Surgeon: MORELIA Serrato MD;  Location: [facility] EP LAB;  Service: Cardiology;;    TONSILLECTOMY      TRANSESOPHAGEAL ECHOCARDIOGRAPHY N/A 5/17/2024    Procedure: ECHOCARDIOGRAM, TRANSESOPHAGEAL;  Surgeon: Benton Jon MD;  Location: [facility] EP LAB;  Service: Cardiology;  Laterality: N/A;       FAMILY HISTORY    Family History   Problem Relation Name Age of Onset    Hyperlipidemia Brother 2     Hypertension Brother 2     Heart disease Brother 2     Heart attack Brother 2     Diabetes Brother 2        SOCIAL HISTORY    Social History     Socioeconomic History    Marital status: Single   Tobacco Use    Smoking status: Never     Passive exposure: Never    Smokeless tobacco: Never   Substance and Sexual Activity    Alcohol use: Yes     Alcohol/week: 1.0 standard drink of alcohol     Types: 1 Glasses of wine per week     Comment: 1 drink 2-3  times/weekly    Drug use: No     Social Drivers of Health     Financial Resource Strain: Low Risk  (5/16/2024)    Overall Financial Resource Strain (CARDIA)     Difficulty of Paying Living Expenses: Not hard at all   Food Insecurity: No Food Insecurity (5/16/2024)    Hunger Vital Sign     Worried About Running Out of Food in the Last Year: Never true     Ran Out of Food in the Last Year: Never true   Transportation Needs: No Transportation Needs (5/15/2024)    TRANSPORTATION NEEDS     Transportation : No   Physical Activity: Insufficiently Active (5/16/2024)    Exercise Vital Sign     Days of Exercise per Week: 3 days     Minutes of Exercise per Session: 30 min   Stress: No Stress Concern Present (5/16/2024)    [employer] [place] [employer] - Occupational Stress Questionnaire     Feeling of Stress : Not at all   Housing Stability: Low Risk  (5/16/2024)    Housing Stability Vital Sign     Unable to Pay for Housing in the Last Year: No     Homeless in the Last Year: No       MEDICATIONS      Current Outpatient Medications:     aspirin (ECOTRIN) 81 MG EC tablet, Take 81 mg by mouth 2 (two) times daily. (Breakfast & Afternoon), Disp: , Rfl:     coenzyme Q10 (CO Q-10) 300 mg Cap, Take 1 capsule by mouth once daily., Disp: , Rfl:     ERGOCALCIFEROL, VITAMIN D2, (VITAMIN D ORAL), Take 1 capsule by mouth Mon, Wed, Fri, Sat & Sun, Disp: , Rfl:     k phos di & mono-sod phos mono (K-PHOS-NEUTRAL) 250 mg Tab, Take 1 tablet by mouth once daily., Disp: 30 each, Rfl: 6    LIPITOR 80 mg tablet, TAKE 1 TABLET BY MOUTH ONCE A DAY, Disp: 90 tablet, Rfl: 3    multivit-min/FA/lycopen/lutein (CENTRUM SILVER MEN ORAL), Take 1 tablet by mouth every Mon, Wed, Fri., Disp: , Rfl:     teriparatide (FORTEO) 20 mcg/dose (600mcg/2.4mL) PnIj, Inject 0.08 mLs (20 mcg total) into the skin once daily., Disp: 2.4 mL, Rfl: 11    ZETIA 10 mg tablet, TAKE 1 TABLET BY MOUTH ONCE A DAY, Disp: 90 tablet, Rfl: 3    ALLERGIES     Review of  patient's allergies indicates:  No Known Allergies      REVIEW OF SYSTEMS   Constitution: Negative. Negative for chills, fever and night sweats.   HENT: Negative for congestion and headaches.    Eyes: Negative for blurred vision, left vision loss and right vision loss.   Cardiovascular: Negative for chest pain and syncope.   Respiratory: Negative for cough and shortness of breath.    Endocrine: Negative for polydipsia, polyphagia and polyuria.   Hematologic/Lymphatic: Negative for bleeding problem. Does not bruise/bleed easily.   Skin: Negative for dry skin, itching and rash.   Musculoskeletal: Negative for falls. Positive for left knee pain   Gastrointestinal: Negative for abdominal pain and bowel incontinence.   Genitourinary: Negative for bladder incontinence and nocturia.   Neurological: Negative for disturbances in coordination, loss of balance and seizures.   Psychiatric/Behavioral: Negative for depression. The patient does not have insomnia.    Allergic/Immunologic: Negative for hives and persistent infections.     PHYSICAL EXAMINATION    Vitals: There were no vitals taken for this visit.    General: The patient appears active and healthy with no apparent physical problems.  No disturbance of mood or affect is demonstrated. Alert and Oriented.    HEENT: Eyes normal, pupils equally round, nose normal.    Resp: Equal and symmetrical chest rises. No wheezing    CV: Regular rate    Neck: Supple; nonpainful range of motion.    Extremities: no cyanosis, clubbing, edema, or diffuse swelling.  Palpable pulses, good capillary refill of the digits.  No coolness, discoloration, edema or obvious varicosities.    Skin: no lesions noted.    Lymphatic: no detected adenopathy in the upper or lower extremities.    Neurologic: normal mental status, normal reflexes, normal gait and balance.  Patient is alert and oriented to person, place and time.  No flaccidity or spasticity is noted.  No motor or sensory deficits are noted.   Light touch is intact    Orthopaedic: SHOULDER EXAM - LEFT     Inspection:   Normal skin color and appearance with well-healed scars from previous pacemaker  No muscle atrophy noted.  No scapular winging.  Protracted shoulder posture    Palpation: No tenderness of the acromioclavicular joint, lateral edge of the acromion, biceps tendon, trapezius muscle or scapulothoracic bursa.      ROM:      PROM:     FE - 110°    Abd/ER -  45°on the Left and 60° on the right   Abd/IR -  45°   Add/ER -  60°     AROM:    FE - 100°    Abd/ER -  30°  Abd/IR -  45°   Add/ER -  60°         Tests:     - Rand, - Neer's, - Cross Arm Adduction, - [place], - Yerguson, - Speed. - Belly Press,  - Jobes, - Lift Off    Stability: - sulcus, - apprehension, - relocation, - load and shift, - DLS      Motor:  Rotator cuff strength is 5/5 supraspinatus with pain, 5/5 infraspinatus, 5/5 subscapularis. Biceps, triceps and deltoid strength is 5/5.      Neuro     Distally there are no paresthesias, and sensation is intact to light touch in the median, ulnar, and radial distributions.  Reflexes are 2/2 biceps, triceps and brachioradialis.        IMAGING    X-Ray Shoulder 2 or More Views Left  Narrative: EXAMINATION:  XR SHOULDER COMPLETE 2 OR MORE VIEWS LEFT    CLINICAL HISTORY:  Pain in left shoulder    TECHNIQUE:  Two or three views of the left shoulder were performed.    COMPARISON:  None    FINDINGS:  No fracture or dislocation.  No bone destruction identified.  No soft tissue calcifications.  Impression: See above    Electronically signed by: Kedar Porras MD  Date:    12/06/2024  Time:    10:41        IMPRESSION       ICD-10-CM ICD-9-CM   1. Adhesive capsulitis of left shoulder  M75.02 726.0   2. Right shoulder pain, unspecified chronicity  M25.511 719.41         MEDICATIONS PRESCRIBED      None today     RECOMMENDATIONS     I still believe his symptoms are caused from a frozen shoulder and recommend that he continue with activity modification  and not reach overhead or outside of his body.   We discussed a future physical therapy for passive stretching once he is in the thawing phase.  RTC in 2 months for a repeat CSI         All questions were answered, pt will contact us for questions or concerns in the interim.

## 2025-01-30 DIAGNOSIS — Z00.00 ENCOUNTER FOR MEDICARE ANNUAL WELLNESS EXAM: ICD-10-CM

## 2025-02-05 ENCOUNTER — TELEPHONE (OUTPATIENT)
Dept: SPORTS MEDICINE | Facility: CLINIC | Age: 70
End: 2025-02-05
Payer: MEDICARE

## 2025-02-05 NOTE — TELEPHONE ENCOUNTER
Called pt to let them know that Dr. Pop is no longer seeing patients at [facility]. I have them rescheduled with Tim Singh PA-C on 3/18 at 9:30

## 2025-02-14 ENCOUNTER — PATIENT MESSAGE (OUTPATIENT)
Dept: ENDOCRINOLOGY | Facility: CLINIC | Age: 70
End: 2025-02-14
Payer: MEDICARE

## 2025-02-17 ENCOUNTER — PATIENT MESSAGE (OUTPATIENT)
Dept: ADMINISTRATIVE | Facility: OTHER | Age: 70
End: 2025-02-17
Payer: MEDICARE

## 2025-03-17 ENCOUNTER — PATIENT MESSAGE (OUTPATIENT)
Dept: ADMINISTRATIVE | Facility: OTHER | Age: 70
End: 2025-03-17
Payer: MEDICARE

## 2025-03-18 ENCOUNTER — OFFICE VISIT (OUTPATIENT)
Dept: SPORTS MEDICINE | Facility: CLINIC | Age: 70
End: 2025-03-18
Payer: MEDICARE

## 2025-03-18 VITALS
DIASTOLIC BLOOD PRESSURE: 87 MMHG | HEIGHT: 66 IN | BODY MASS INDEX: 24.89 KG/M2 | SYSTOLIC BLOOD PRESSURE: 129 MMHG | WEIGHT: 154.88 LBS | HEART RATE: 75 BPM

## 2025-03-18 DIAGNOSIS — M75.02 ADHESIVE CAPSULITIS OF LEFT SHOULDER: Primary | ICD-10-CM

## 2025-03-18 PROCEDURE — 1125F AMNT PAIN NOTED PAIN PRSNT: CPT | Mod: CPTII,S$GLB,, | Performed by: PHYSICIAN ASSISTANT

## 2025-03-18 PROCEDURE — 3074F SYST BP LT 130 MM HG: CPT | Mod: CPTII,S$GLB,, | Performed by: PHYSICIAN ASSISTANT

## 2025-03-18 PROCEDURE — 3079F DIAST BP 80-89 MM HG: CPT | Mod: CPTII,S$GLB,, | Performed by: PHYSICIAN ASSISTANT

## 2025-03-18 PROCEDURE — 1159F MED LIST DOCD IN RCRD: CPT | Mod: CPTII,S$GLB,, | Performed by: PHYSICIAN ASSISTANT

## 2025-03-18 PROCEDURE — 1157F ADVNC CARE PLAN IN RCRD: CPT | Mod: CPTII,S$GLB,, | Performed by: PHYSICIAN ASSISTANT

## 2025-03-18 PROCEDURE — 99999 PR PBB SHADOW E&M-EST. PATIENT-LVL III: CPT | Mod: PBBFAC,,, | Performed by: PHYSICIAN ASSISTANT

## 2025-03-18 PROCEDURE — 99214 OFFICE O/P EST MOD 30 MIN: CPT | Mod: 25,S$GLB,, | Performed by: PHYSICIAN ASSISTANT

## 2025-03-18 PROCEDURE — 3288F FALL RISK ASSESSMENT DOCD: CPT | Mod: CPTII,S$GLB,, | Performed by: PHYSICIAN ASSISTANT

## 2025-03-18 PROCEDURE — 20611 DRAIN/INJ JOINT/BURSA W/US: CPT | Mod: LT,S$GLB,, | Performed by: PHYSICIAN ASSISTANT

## 2025-03-18 PROCEDURE — 3008F BODY MASS INDEX DOCD: CPT | Mod: CPTII,S$GLB,, | Performed by: PHYSICIAN ASSISTANT

## 2025-03-18 PROCEDURE — 1101F PT FALLS ASSESS-DOCD LE1/YR: CPT | Mod: CPTII,S$GLB,, | Performed by: PHYSICIAN ASSISTANT

## 2025-03-18 PROCEDURE — 1160F RVW MEDS BY RX/DR IN RCRD: CPT | Mod: CPTII,S$GLB,, | Performed by: PHYSICIAN ASSISTANT

## 2025-03-18 RX ORDER — TRIAMCINOLONE ACETONIDE 40 MG/ML
40 INJECTION, SUSPENSION INTRA-ARTICULAR; INTRAMUSCULAR
Status: DISCONTINUED | OUTPATIENT
Start: 2025-03-18 | End: 2025-03-18 | Stop reason: HOSPADM

## 2025-03-18 RX ORDER — BUPIVACAINE HYDROCHLORIDE 2.5 MG/ML
2 INJECTION, SOLUTION INFILTRATION; PERINEURAL
Status: DISCONTINUED | OUTPATIENT
Start: 2025-03-18 | End: 2025-03-18 | Stop reason: HOSPADM

## 2025-03-18 RX ADMIN — TRIAMCINOLONE ACETONIDE 40 MG: 40 INJECTION, SUSPENSION INTRA-ARTICULAR; INTRAMUSCULAR at 09:03

## 2025-03-18 RX ADMIN — BUPIVACAINE HYDROCHLORIDE 2 ML: 2.5 INJECTION, SOLUTION INFILTRATION; PERINEURAL at 09:03

## 2025-03-18 NOTE — PROGRESS NOTES
ESTABLISHED PATIENT    History 3/18/2025:  Michael returns here today for follow-up evaluation of his left shoulder.  He has a history of adhesive capsulitis and has been treating his symptoms conservatively.  He was last given a glenohumeral joint corticosteroid injection in December.  This injection gave him temporary relief.    History 1/17/2025  Michael returns to clinic for follow up evaluation of his left shoulder. He received a glenohumeral CSI at his last visit on 12/6/24. He states he has had some improvement, but admits to hearing a pop and having pain after reaching over head.    History 12/6/2024  69 y.o. 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  He states that is pain is localized to his superior shoulder. He admits to pain with overhead activity. He has had pain since early November 2024. He states the pain wake him up at night. He denies a history of left shoulder pain, but has a history of blood sepsis and a pace maker.  He has difficulty raising his arm overhead.  He has difficulty reaching back.  He has lost motion in the left arm.      - mechanical symptoms, - instability          PAST MEDICAL HISTORY    Past Medical History:   Diagnosis Date    Complete heart block 7/5/2022    Coronary artery disease of native artery of native heart with stable angina pectoris 4/10/2018    Hyperlipidemia        PAST SURGICAL HISTORY     Past Surgical History:   Procedure Laterality Date    A-V CARDIAC PACEMAKER INSERTION N/A 7/5/2022    Procedure: INSERTION, CARDIAC PACEMAKER, DUAL CHAMBER;  Surgeon: Alessandro Canales MD;  Location: [facility] EP LAB;  Service: Cardiology;  Laterality: N/A;  CHB, TBD, ANES, ED 6, MB    A-V CARDIAC PACEMAKER INSERTION Right 5/20/2024    Procedure: INSERTION, CARDIAC PACEMAKER, DUAL CHAMBER;  Surgeon: Arthur Pagan MD;  Location: [facility] EP LAB;  Service: Cardiology;  Laterality: Right;  CHB, DUAL PPM, BIO, ANES, IA,      COLONOSCOPY N/A 8/5/2022    Procedure: COLONOSCOPY;  Surgeon: Namita Dumont MD;  Location: [facility] ENDO (4TH FLR);  Service: Endoscopy;  Laterality: N/A;  vacc-inst portal-tb    CYST REMOVAL      ECHOCARDIOGRAM,TRANSESOPHAGEAL N/A 12/22/2023    Procedure: Transesophageal echo (CHARMAINE) intra-procedure log documentation;  Surgeon: Provider, Dosc Diagnostic;  Location: [facility] EP LAB;  Service: Cardiology;  Laterality: N/A;    EXTRACORPOREAL SHOCK WAVE LITHOTRIPSY Right 3/1/2024    Procedure: LITHOTRIPSY, ESWL;  Surgeon: Sanchez Moreno Jr., MD;  Location: [facility] OR 2ND FLR;  Service: Urology;  Laterality: Right;  1 hr    EXTRACTION, ELECTRODE LEAD N/A 5/17/2024    Procedure: EXTRACTION, ELECTRODE LEAD;  Surgeon: MORELIA Serrato MD;  Location: [facility] EP LAB;  Service: Cardiology;  Laterality: N/A;  INFECTION, EXTRACTION DUAL PPM & REIMPLANT TEMP PPM (IJ), SJM, ANES, EH, CHARMAINE, LASER, NO CV BACKUP, 302    INSERTION, PACEMAKER, SINGLE CHAMBER VENTRICULAR N/A 5/17/2024    Procedure: Insertion, Pacemaker, Single Chamber Ventricular;  Surgeon: MORELIA Serrato MD;  Location: [facility] EP LAB;  Service: Cardiology;  Laterality: N/A;    REMOVAL, DEVICE  5/17/2024    Procedure: Removal, Device;  Surgeon: MORELIA Serrato MD;  Location: [facility] EP LAB;  Service: Cardiology;;    REVISION OF SKIN POCKET FOR PACEMAKER  5/17/2024    Procedure: REVISION, SKIN POCKET, FOR CARDIAC PACEMAKER;  Surgeon: MORELIA Serrato MD;  Location: [facility] EP LAB;  Service: Cardiology;;    TONSILLECTOMY      TRANSESOPHAGEAL ECHOCARDIOGRAPHY N/A 5/17/2024    Procedure: ECHOCARDIOGRAM, TRANSESOPHAGEAL;  Surgeon: Benton Jon MD;  Location: [facility] EP LAB;  Service: Cardiology;  Laterality: N/A;       FAMILY HISTORY    Family History   Problem Relation Name Age of Onset    Hyperlipidemia Brother 2     Hypertension Brother 2     Heart disease Brother 2     Heart attack Brother 2     Diabetes Brother 2        SOCIAL HISTORY    Social History     Socioeconomic  History    Marital status: Single   Tobacco Use    Smoking status: Never     Passive exposure: Never    Smokeless tobacco: Never   Substance and Sexual Activity    Alcohol use: Yes     Alcohol/week: 1.0 standard drink of alcohol     Types: 1 Glasses of wine per week     Comment: 1 drink 2-3 times/weekly    Drug use: No     Social Drivers of Health     Financial Resource Strain: Low Risk  (5/16/2024)    Overall Financial Resource Strain (CARDIA)     Difficulty of Paying Living Expenses: Not hard at all   Food Insecurity: No Food Insecurity (5/16/2024)    Hunger Vital Sign     Worried About Running Out of Food in the Last Year: Never true     Ran Out of Food in the Last Year: Never true   Transportation Needs: No Transportation Needs (5/15/2024)    TRANSPORTATION NEEDS     Transportation : No   Physical Activity: Insufficiently Active (5/16/2024)    Exercise Vital Sign     Days of Exercise per Week: 3 days     Minutes of Exercise per Session: 30 min   Stress: No Stress Concern Present (5/16/2024)    [employer] [place] [employer] - Occupational Stress Questionnaire     Feeling of Stress : Not at all   Housing Stability: Unknown (5/16/2024)    Housing Stability Vital Sign     Unable to Pay for Housing in the Last Year: No     Homeless in the Last Year: No       MEDICATIONS      Current Outpatient Medications:     aspirin (ECOTRIN) 81 MG EC tablet, Take 81 mg by mouth 2 (two) times daily. (Breakfast & Afternoon), Disp: , Rfl:     coenzyme Q10 (CO Q-10) 300 mg Cap, Take 1 capsule by mouth once daily., Disp: , Rfl:     ERGOCALCIFEROL, VITAMIN D2, (VITAMIN D ORAL), Take 1 capsule by mouth Mon, Wed, Fri, Sat & Sun, Disp: , Rfl:     k phos di & mono-sod phos mono (K-PHOS-NEUTRAL) 250 mg Tab, Take 1 tablet by mouth once daily., Disp: 30 each, Rfl: 6    LIPITOR 80 mg tablet, TAKE 1 TABLET BY MOUTH ONCE A DAY, Disp: 90 tablet, Rfl: 3    multivit-min/FA/lycopen/lutein (CENTRUM SILVER MEN ORAL), Take 1 tablet by mouth  "every Mon, Wed, Fri., Disp: , Rfl:     teriparatide (FORTEO) 20 mcg/dose (600mcg/2.4mL) PnIj, Inject 0.08 mLs (20 mcg total) into the skin once daily., Disp: 2.4 mL, Rfl: 11    ZETIA 10 mg tablet, TAKE 1 TABLET BY MOUTH ONCE A DAY, Disp: 90 tablet, Rfl: 3    ALLERGIES     Review of patient's allergies indicates:  No Known Allergies      REVIEW OF SYSTEMS   Constitution: Negative. Negative for chills, fever and night sweats.   HENT: Negative for congestion and headaches.    Eyes: Negative for blurred vision, left vision loss and right vision loss.   Cardiovascular: Negative for chest pain and syncope.   Respiratory: Negative for cough and shortness of breath.    Endocrine: Negative for polydipsia, polyphagia and polyuria.   Hematologic/Lymphatic: Negative for bleeding problem. Does not bruise/bleed easily.   Skin: Negative for dry skin, itching and rash.   Musculoskeletal: Negative for falls. Positive for left knee pain   Gastrointestinal: Negative for abdominal pain and bowel incontinence.   Genitourinary: Negative for bladder incontinence and nocturia.   Neurological: Negative for disturbances in coordination, loss of balance and seizures.   Psychiatric/Behavioral: Negative for depression. The patient does not have insomnia.    Allergic/Immunologic: Negative for hives and persistent infections.     PHYSICAL EXAMINATION    Vitals: /87   Pulse 75   Ht 5' 6" (1.676 m)   Wt 70.2 kg (154 lb 14 oz)   BMI 25.00 kg/m²     General: The patient appears active and healthy with no apparent physical problems.  No disturbance of mood or affect is demonstrated. Alert and Oriented.    HEENT: Eyes normal, pupils equally round, nose normal.    Resp: Equal and symmetrical chest rises. No wheezing    CV: Regular rate    Neck: Supple; nonpainful range of motion.    Extremities: no cyanosis, clubbing, edema, or diffuse swelling.  Palpable pulses, good capillary refill of the digits.  No coolness, discoloration, edema or " obvious varicosities.    Skin: no lesions noted.    Lymphatic: no detected adenopathy in the upper or lower extremities.    Neurologic: normal mental status, normal reflexes, normal gait and balance.  Patient is alert and oriented to person, place and time.  No flaccidity or spasticity is noted.  No motor or sensory deficits are noted.  Light touch is intact    Orthopaedic: SHOULDER EXAM - LEFT     Inspection:   Normal skin color and appearance with well-healed scars from previous pacemaker  No muscle atrophy noted.  No scapular winging.  Protracted shoulder posture    Palpation: No tenderness of the acromioclavicular joint, lateral edge of the acromion, biceps tendon, trapezius muscle or scapulothoracic bursa.      ROM:      PROM:     FE - 130°    Abd/ER -  45°on the Left and 60° on the right   Abd/IR -  45°   Add/ER -  60°     AROM:    FE - 110°    Abd/ER -  30°  Abd/IR -  45°   Add/ER -  60°         Tests:     - Rand, - Neer's, - Cross Arm Adduction, - [place], - [place]erguson, - Speed. - Belly Press,  - Jobes, - Lift Off    Stability: - sulcus, - apprehension, - relocation, - load and shift, - DLS      Motor:  Rotator cuff strength is 5/5 supraspinatus with pain, 5/5 infraspinatus, 5/5 subscapularis. Biceps, triceps and deltoid strength is 5/5.      Neuro     Distally there are no paresthesias, and sensation is intact to light touch in the median, ulnar, and radial distributions.  Reflexes are 2/2 biceps, triceps and brachioradialis.        IMAGING    X-Ray Shoulder 2 or More Views Left  Narrative: EXAMINATION:  XR SHOULDER COMPLETE 2 OR MORE VIEWS LEFT    CLINICAL HISTORY:  Pain in left shoulder    TECHNIQUE:  Two or three views of the left shoulder were performed.    COMPARISON:  None    FINDINGS:  No fracture or dislocation.  No bone destruction identified.  No soft tissue calcifications.  Impression: See above    Electronically signed by: Kedar Porras MD  Date:    12/06/2024  Time:    10:41        IMPRESSION        ICD-10-CM ICD-9-CM   1. Adhesive capsulitis of left shoulder  M75.02 726.0           MEDICATIONS PRESCRIBED      None today     RECOMMENDATIONS     His history and physical examination are consistent with adhesive capsulitis (frozen shoulder)  I recommend that he continue with activity modification and not reach overhead or outside of his body.   We discussed future physical therapy for passive stretching once he is in the thawing phase.  A repeat glenohumeral corticosteroid injection was administered under ultrasound guidance today  RTC in 3 months      Sports Medicine US - Guidance for Needle Placement    Date/Time: 3/18/2025 9:30 AM    Performed by: Tim Singh PA-C  Authorized by: Tim Singh PA-C  Preparation: Patient was prepped and draped in the usual sterile fashion.  Local anesthesia used: yes    Anesthesia:  Local anesthesia used: yes  Local Anesthetic: co-phenylcaine spray    Sedation:  Patient sedated: no    Patient tolerance: patient tolerated the procedure well with no immediate complications      Large Joint Aspiration/Injection: L glenohumeral    Date/Time: 3/18/2025 9:30 AM    Performed by: Tim Singh PA-C  Authorized by: Tim Singh PA-C    Consent Done?:  Yes (Verbal)  Indications:  Pain  Site marked: the procedure site was marked    Timeout: prior to procedure the correct patient, procedure, and site was verified    Prep: patient was prepped and draped in usual sterile fashion      Local anesthesia used?: Yes    Local anesthetic:  Co-phenylcaine spray    Details:  Needle Size:  22 G  Ultrasonic Guidance for needle placement?: Yes (Ultrasound guidance was used for needle localization.  Images were saved and stored for documentation.  Dynamic visualization of the needle was continuous throughout the procedure and maintained accurate placement.)    Images are saved and documented.  Approach:  Posterior  Location:  Shoulder  Site:  L glenohumeral  Medications:  40 mg  triamcinolone acetonide 40 mg/mL; 2 mL BUPivacaine 0.25% (2.5 mg/ml) 0.25 % (2.5 mg/mL)  Patient tolerance:  Patient tolerated the procedure well with no immediate complications          All questions were answered, pt will contact us for questions or concerns in the interim.        Tim Singh PA-C, MMS

## 2025-03-24 ENCOUNTER — CLINICAL SUPPORT (OUTPATIENT)
Dept: CARDIOLOGY | Facility: HOSPITAL | Age: 70
End: 2025-03-24
Payer: MEDICARE

## 2025-03-24 ENCOUNTER — LAB VISIT (OUTPATIENT)
Dept: LAB | Facility: HOSPITAL | Age: 70
End: 2025-03-24
Payer: MEDICARE

## 2025-03-24 ENCOUNTER — CLINICAL SUPPORT (OUTPATIENT)
Dept: CARDIOLOGY | Facility: HOSPITAL | Age: 70
End: 2025-03-24
Attending: INTERNAL MEDICINE
Payer: MEDICARE

## 2025-03-24 DIAGNOSIS — I44.1 ATRIOVENTRICULAR BLOCK, SECOND DEGREE: ICD-10-CM

## 2025-03-24 DIAGNOSIS — Z95.0 PRESENCE OF CARDIAC PACEMAKER: ICD-10-CM

## 2025-03-24 DIAGNOSIS — M81.0 OSTEOPOROSIS, UNSPECIFIED OSTEOPOROSIS TYPE, UNSPECIFIED PATHOLOGICAL FRACTURE PRESENCE: ICD-10-CM

## 2025-03-24 DIAGNOSIS — M89.9 DISORDER OF BONE, UNSPECIFIED: ICD-10-CM

## 2025-03-24 LAB
ALBUMIN SERPL BCP-MCNC: 4 G/DL (ref 3.5–5.2)
ANION GAP (OHS): 11 MMOL/L (ref 8–16)
BUN SERPL-MCNC: 16 MG/DL (ref 8–23)
CALCIUM SERPL-MCNC: 9.8 MG/DL (ref 8.7–10.5)
CHLORIDE SERPL-SCNC: 108 MMOL/L (ref 95–110)
CO2 SERPL-SCNC: 23 MMOL/L (ref 23–29)
CREAT SERPL-MCNC: 0.9 MG/DL (ref 0.5–1.4)
GFR SERPLBLD CREATININE-BSD FMLA CKD-EPI: >60 ML/MIN/1.73/M2
GLUCOSE SERPL-MCNC: 71 MG/DL (ref 70–110)
PHOSPHATE SERPL-MCNC: 3.7 MG/DL (ref 2.7–4.5)
POTASSIUM SERPL-SCNC: 3.8 MMOL/L (ref 3.5–5.1)
SODIUM SERPL-SCNC: 142 MMOL/L (ref 136–145)

## 2025-03-24 PROCEDURE — 83970 ASSAY OF PARATHORMONE: CPT

## 2025-03-24 PROCEDURE — 93294 REM INTERROG EVL PM/LDLS PM: CPT | Mod: ,,, | Performed by: INTERNAL MEDICINE

## 2025-03-24 PROCEDURE — 36415 COLL VENOUS BLD VENIPUNCTURE: CPT

## 2025-03-24 PROCEDURE — 93296 REM INTERROG EVL PM/IDS: CPT | Performed by: INTERNAL MEDICINE

## 2025-03-24 PROCEDURE — 80069 RENAL FUNCTION PANEL: CPT

## 2025-03-25 LAB — PTH-INTACT SERPL-MCNC: 36.4 PG/ML (ref 9–77)

## 2025-03-27 ENCOUNTER — RESULTS FOLLOW-UP (OUTPATIENT)
Dept: ENDOCRINOLOGY | Facility: HOSPITAL | Age: 70
End: 2025-03-27

## 2025-03-27 DIAGNOSIS — M81.0 OSTEOPOROSIS, UNSPECIFIED OSTEOPOROSIS TYPE, UNSPECIFIED PATHOLOGICAL FRACTURE PRESENCE: Primary | ICD-10-CM

## 2025-03-31 LAB
OHS CV AF BURDEN PERCENT: < 1
OHS CV DC REMOTE DEVICE TYPE: NORMAL
OHS CV ICD SHOCK: NO
OHS CV RV PACING PERCENT: 89 %

## 2025-04-14 ENCOUNTER — PATIENT MESSAGE (OUTPATIENT)
Dept: ADMINISTRATIVE | Facility: OTHER | Age: 70
End: 2025-04-14
Payer: MEDICARE

## 2025-05-07 ENCOUNTER — HOSPITAL ENCOUNTER (OUTPATIENT)
Dept: RADIOLOGY | Facility: HOSPITAL | Age: 70
Discharge: HOME | End: 2025-05-07
Attending: PHYSICIAN ASSISTANT
Payer: MEDICARE

## 2025-05-07 ENCOUNTER — OFFICE VISIT (OUTPATIENT)
Dept: INTERNAL MEDICINE | Facility: CLINIC | Age: 70
End: 2025-05-07
Payer: MEDICARE

## 2025-05-07 ENCOUNTER — PATIENT MESSAGE (OUTPATIENT)
Dept: INTERNAL MEDICINE | Facility: CLINIC | Age: 70
End: 2025-05-07
Payer: MEDICARE

## 2025-05-07 VITALS
HEIGHT: 66 IN | SYSTOLIC BLOOD PRESSURE: 122 MMHG | DIASTOLIC BLOOD PRESSURE: 84 MMHG | HEART RATE: 79 BPM | OXYGEN SATURATION: 95 % | WEIGHT: 150.81 LBS | BODY MASS INDEX: 24.24 KG/M2

## 2025-05-07 DIAGNOSIS — N39.0 ACUTE UTI: Primary | ICD-10-CM

## 2025-05-07 DIAGNOSIS — R31.9 HEMATURIA, UNSPECIFIED TYPE: ICD-10-CM

## 2025-05-07 DIAGNOSIS — Z87.442 HISTORY OF KIDNEY STONES: ICD-10-CM

## 2025-05-07 DIAGNOSIS — I44.2 COMPLETE HEART BLOCK: ICD-10-CM

## 2025-05-07 DIAGNOSIS — N39.0 ACUTE UTI: ICD-10-CM

## 2025-05-07 LAB
BILIRUB SERPL-MCNC: NEGATIVE MG/DL
BLOOD, POC UA: NORMAL
GLUCOSE UR QL STRIP: NORMAL
KETONES UR QL STRIP: NEGATIVE
LEUKOCYTE ESTERASE URINE, POC: NORMAL
NITRITE, POC UA: POSITIVE
PH, POC UA: 6
PROTEIN, POC: NORMAL
SPECIFIC GRAVITY, POC UA: >=1.03
UROBILINOGEN, POC UA: 0.2

## 2025-05-07 PROCEDURE — 3074F SYST BP LT 130 MM HG: CPT | Mod: CPTII,S$GLB,, | Performed by: PHYSICIAN ASSISTANT

## 2025-05-07 PROCEDURE — 3288F FALL RISK ASSESSMENT DOCD: CPT | Mod: CPTII,S$GLB,, | Performed by: PHYSICIAN ASSISTANT

## 2025-05-07 PROCEDURE — 1159F MED LIST DOCD IN RCRD: CPT | Mod: CPTII,S$GLB,, | Performed by: PHYSICIAN ASSISTANT

## 2025-05-07 PROCEDURE — 1125F AMNT PAIN NOTED PAIN PRSNT: CPT | Mod: CPTII,S$GLB,, | Performed by: PHYSICIAN ASSISTANT

## 2025-05-07 PROCEDURE — 76770 US EXAM ABDO BACK WALL COMP: CPT | Mod: TC

## 2025-05-07 PROCEDURE — 99999 PR PBB SHADOW E&M-EST. PATIENT-LVL IV: CPT | Mod: PBBFAC,,, | Performed by: PHYSICIAN ASSISTANT

## 2025-05-07 PROCEDURE — 3008F BODY MASS INDEX DOCD: CPT | Mod: CPTII,S$GLB,, | Performed by: PHYSICIAN ASSISTANT

## 2025-05-07 PROCEDURE — 1157F ADVNC CARE PLAN IN RCRD: CPT | Mod: CPTII,S$GLB,, | Performed by: PHYSICIAN ASSISTANT

## 2025-05-07 PROCEDURE — 1101F PT FALLS ASSESS-DOCD LE1/YR: CPT | Mod: CPTII,S$GLB,, | Performed by: PHYSICIAN ASSISTANT

## 2025-05-07 PROCEDURE — 3079F DIAST BP 80-89 MM HG: CPT | Mod: CPTII,S$GLB,, | Performed by: PHYSICIAN ASSISTANT

## 2025-05-07 PROCEDURE — 81003 URINALYSIS AUTO W/O SCOPE: CPT | Mod: QW,S$GLB,, | Performed by: PHYSICIAN ASSISTANT

## 2025-05-07 PROCEDURE — 76770 US EXAM ABDO BACK WALL COMP: CPT | Mod: 26,,, | Performed by: RADIOLOGY

## 2025-05-07 PROCEDURE — 99214 OFFICE O/P EST MOD 30 MIN: CPT | Mod: S$GLB,,, | Performed by: PHYSICIAN ASSISTANT

## 2025-05-07 PROCEDURE — 1160F RVW MEDS BY RX/DR IN RCRD: CPT | Mod: CPTII,S$GLB,, | Performed by: PHYSICIAN ASSISTANT

## 2025-05-07 RX ORDER — CIPROFLOXACIN 500 MG/1
500 TABLET, FILM COATED ORAL 2 TIMES DAILY
Qty: 14 TABLET | Refills: 0 | Status: SHIPPED | OUTPATIENT
Start: 2025-05-07 | End: 2025-05-09 | Stop reason: ALTCHOICE

## 2025-05-07 NOTE — PROGRESS NOTES
"Subjective     Patient ID: Michael Espinoza is a 69 y.o. male with PMH  has a past medical history of Complete heart block, Coronary artery disease of native artery of native heart with stable angina pectoris(s/p CABG), Hyperlipidemia and Osteoporosis.       Chief Complaint: Urinary Tract Infection and Dysuria    HPI    Established pt of Dominguez Hyatt MD     Here with concerns of UTI, onset of symptoms 3 to 5 days, fatigue, dysuria, urinary freq, hematuria, mild back pain    No fevers, n/v/d, or abdominal pain.     Hx of kidney stones and UTI in the past.     Past Medical History:   Diagnosis Date    Complete heart block 7/5/2022    Coronary artery disease of native artery of native heart with stable angina pectoris 4/10/2018    Hyperlipidemia      Social History[1]    Review of patient's allergies indicates:  No Known Allergies    Review of Systems   Constitutional:  Negative for chills, fever and unexpected weight change.   Respiratory:  Negative for cough and shortness of breath.    Cardiovascular:  Negative for chest pain and leg swelling.   Gastrointestinal:  Negative for abdominal pain, nausea and vomiting.   Genitourinary:  Positive for dysuria, frequency and hematuria. Negative for flank pain.   Integumentary:  Negative for rash.   Neurological:  Negative for weakness and headaches.          Objective  /84 (BP Location: Left arm, Patient Position: Sitting)   Pulse 79   Ht 5' 6" (1.676 m)   Wt 68.4 kg (150 lb 12.7 oz)   SpO2 95%   BMI 24.34 kg/m²       Physical Exam  Vitals reviewed.   Constitutional:       General: He is not in acute distress.     Appearance: He is well-developed.   HENT:      Head: Normocephalic and atraumatic.   Cardiovascular:      Rate and Rhythm: Normal rate and regular rhythm.      Heart sounds: No murmur heard.  Pulmonary:      Effort: Pulmonary effort is normal.      Breath sounds: Normal breath sounds. No wheezing or rales.   Abdominal:      General: Bowel sounds are " normal.      Palpations: Abdomen is soft.      Tenderness: There is no abdominal tenderness. There is no right CVA tenderness or left CVA tenderness.   Musculoskeletal:      Right lower leg: No edema.      Left lower leg: No edema.   Skin:     General: Skin is warm and dry.      Findings: No rash.   Neurological:      Mental Status: He is alert and oriented to person, place, and time.   Psychiatric:         Mood and Affect: Mood normal.            Assessment and Plan     1. Acute UTI  -     Urine Culture High Risk; Future; Expected date: 05/07/2025  -     US Retroperitoneal Complete; Future; Expected date: 05/07/2025  -     Discontinue: ciprofloxacin HCl (CIPRO) 500 MG tablet; Take 1 tablet (500 mg total) by mouth 2 (two) times daily. for 7 days  Dispense: 14 tablet; Refill: 0  -     Basic Metabolic Panel; Future; Expected date: 05/07/2025    2. Hematuria, unspecified type  -     POCT URINALYSIS  -     Urine Culture High Risk; Future; Expected date: 05/07/2025  -     US Retroperitoneal Complete; Future; Expected date: 05/07/2025  -     Basic Metabolic Panel; Future; Expected date: 05/07/2025  -     CBC Auto Differential; Future; Expected date: 05/07/2025    3. History of kidney stones  -     US Retroperitoneal Complete; Future; Expected date: 05/07/2025    4. Complete heart block  S/p PPM  Stable followed by Cardiology      Cadence Welch PA-C    Addendum:  Lab wnl  US right non obs stone  Urine culture with +MSSA (same as prior +urine cx(2022, 2023), hx of MSSA bacteremia in Dec 2023 and ppm infection in May 2024)  Cipro discontinued  Course of Bactrim Rx'd (culture sensitive)  Pt to notify clinic if symptoms worsen or do not improve    Cadence Welch PA-C         [1]   Social History  Tobacco Use    Smoking status: Never     Passive exposure: Never    Smokeless tobacco: Never   Substance Use Topics    Alcohol use: Yes     Alcohol/week: 1.0 standard drink of alcohol     Types: 1 Glasses of wine per week      Comment: 1 drink 2-3 times/weekly    Drug use: No

## 2025-05-08 ENCOUNTER — RESULTS FOLLOW-UP (OUTPATIENT)
Dept: INTERNAL MEDICINE | Facility: CLINIC | Age: 70
End: 2025-05-08

## 2025-05-09 ENCOUNTER — TELEPHONE (OUTPATIENT)
Dept: INTERNAL MEDICINE | Facility: CLINIC | Age: 70
End: 2025-05-09
Payer: MEDICARE

## 2025-05-09 RX ORDER — SULFAMETHOXAZOLE AND TRIMETHOPRIM 800; 160 MG/1; MG/1
1 TABLET ORAL 2 TIMES DAILY
Qty: 14 TABLET | Refills: 0 | Status: SHIPPED | OUTPATIENT
Start: 2025-05-09 | End: 2025-05-16

## 2025-05-09 NOTE — TELEPHONE ENCOUNTER
Spoke to pt  He is feeling better  Prelim urine cx with staph, final susceptibility pending  Recommend to STOP cipro and we will start Bactrim  Follow up final report  Pt voiced understanding

## 2025-05-13 ENCOUNTER — PATIENT MESSAGE (OUTPATIENT)
Dept: ADMINISTRATIVE | Facility: OTHER | Age: 70
End: 2025-05-13
Payer: MEDICARE

## 2025-05-21 ENCOUNTER — LAB VISIT (OUTPATIENT)
Dept: LAB | Facility: HOSPITAL | Age: 70
End: 2025-05-21
Payer: MEDICARE

## 2025-05-21 DIAGNOSIS — E29.1 HYPOGONADISM IN MALE: ICD-10-CM

## 2025-05-21 DIAGNOSIS — Z12.5 ENCOUNTER FOR SCREENING FOR MALIGNANT NEOPLASM OF PROSTATE: ICD-10-CM

## 2025-05-21 DIAGNOSIS — N52.8 OTHER MALE ERECTILE DYSFUNCTION: ICD-10-CM

## 2025-05-21 LAB
ALBUMIN SERPL BCP-MCNC: 3.9 G/DL (ref 3.5–5.2)
ALP SERPL-CCNC: 118 UNIT/L (ref 40–150)
ALT SERPL W/O P-5'-P-CCNC: 34 UNIT/L (ref 10–44)
ANION GAP (OHS): 6 MMOL/L (ref 8–16)
AST SERPL-CCNC: 38 UNIT/L (ref 11–45)
BILIRUB SERPL-MCNC: 2.1 MG/DL (ref 0.1–1)
BUN SERPL-MCNC: 14 MG/DL (ref 8–23)
CALCIUM SERPL-MCNC: 10 MG/DL (ref 8.7–10.5)
CHLORIDE SERPL-SCNC: 105 MMOL/L (ref 95–110)
CHOLEST SERPL-MCNC: 121 MG/DL (ref 120–199)
CHOLEST/HDLC SERPL: 2.3 {RATIO} (ref 2–5)
CO2 SERPL-SCNC: 27 MMOL/L (ref 23–29)
CREAT SERPL-MCNC: 0.9 MG/DL (ref 0.5–1.4)
ERYTHROCYTE [DISTWIDTH] IN BLOOD BY AUTOMATED COUNT: 13.5 % (ref 11.5–14.5)
GFR SERPLBLD CREATININE-BSD FMLA CKD-EPI: >60 ML/MIN/1.73/M2
GLUCOSE SERPL-MCNC: 85 MG/DL (ref 70–110)
HCT VFR BLD AUTO: 44.5 % (ref 40–54)
HDLC SERPL-MCNC: 53 MG/DL (ref 40–75)
HDLC SERPL: 43.8 % (ref 20–50)
HGB BLD-MCNC: 15.3 GM/DL (ref 14–18)
LDLC SERPL CALC-MCNC: 57.8 MG/DL (ref 63–159)
MCH RBC QN AUTO: 31.4 PG (ref 27–31)
MCHC RBC AUTO-ENTMCNC: 34.4 G/DL (ref 32–36)
MCV RBC AUTO: 91 FL (ref 82–98)
NONHDLC SERPL-MCNC: 68 MG/DL
PLATELET # BLD AUTO: 191 K/UL (ref 150–450)
PMV BLD AUTO: 10.4 FL (ref 9.2–12.9)
POTASSIUM SERPL-SCNC: 4.4 MMOL/L (ref 3.5–5.1)
PROT SERPL-MCNC: 7.2 GM/DL (ref 6–8.4)
PSA SERPL-MCNC: 0.48 NG/ML
RBC # BLD AUTO: 4.88 M/UL (ref 4.6–6.2)
SODIUM SERPL-SCNC: 138 MMOL/L (ref 136–145)
TRIGL SERPL-MCNC: 51 MG/DL (ref 30–150)
WBC # BLD AUTO: 4.57 K/UL (ref 3.9–12.7)

## 2025-05-21 PROCEDURE — 85027 COMPLETE CBC AUTOMATED: CPT

## 2025-05-21 PROCEDURE — 36415 COLL VENOUS BLD VENIPUNCTURE: CPT

## 2025-05-21 PROCEDURE — 82040 ASSAY OF SERUM ALBUMIN: CPT

## 2025-05-21 PROCEDURE — 84153 ASSAY OF PSA TOTAL: CPT

## 2025-05-21 PROCEDURE — 80061 LIPID PANEL: CPT

## 2025-05-21 PROCEDURE — 80053 COMPREHEN METABOLIC PANEL: CPT

## 2025-05-28 LAB
W ALBUMIN: 4.2 G/DL
W SEX HORMONE BINDING GLOBULIN: 65 NMOL/L
W TESTOSTERONE, BIOAVAIL: 91.4 NG/DL
W TESTOSTERONE, FREE: 47.1 PG/ML
W TESTOSTERONE, TOTAL, MS: 618 NG/DL

## 2025-05-29 ENCOUNTER — PATIENT MESSAGE (OUTPATIENT)
Dept: ENDOCRINOLOGY | Facility: CLINIC | Age: 70
End: 2025-05-29
Payer: MEDICARE

## 2025-05-29 DIAGNOSIS — M89.9 DISORDER OF BONE, UNSPECIFIED: ICD-10-CM

## 2025-05-29 DIAGNOSIS — E29.1 HYPOGONADISM IN MALE: Primary | ICD-10-CM

## 2025-05-29 NOTE — TELEPHONE ENCOUNTER
Called patient about labs. Still feeling symptoms though he noted improvement.  Increase T gel to 2 pumps daily.  Recheck labs 3 mo

## 2025-05-31 PROBLEM — D69.6 THROMBOCYTOPENIA: Status: RESOLVED | Noted: 2023-12-14 | Resolved: 2025-05-31

## 2025-06-11 ENCOUNTER — PATIENT MESSAGE (OUTPATIENT)
Dept: ADMINISTRATIVE | Facility: OTHER | Age: 70
End: 2025-06-11
Payer: MEDICARE

## 2025-06-12 ENCOUNTER — OFFICE VISIT (OUTPATIENT)
Dept: INTERNAL MEDICINE | Facility: CLINIC | Age: 70
End: 2025-06-12
Payer: MEDICARE

## 2025-06-12 VITALS
WEIGHT: 152.13 LBS | OXYGEN SATURATION: 96 % | DIASTOLIC BLOOD PRESSURE: 76 MMHG | SYSTOLIC BLOOD PRESSURE: 130 MMHG | BODY MASS INDEX: 24.45 KG/M2 | HEIGHT: 66 IN | HEART RATE: 83 BPM

## 2025-06-12 DIAGNOSIS — Z95.1 S/P CABG X 3: ICD-10-CM

## 2025-06-12 DIAGNOSIS — L81.9 DISCOLORATION OF SKIN OF FACE: ICD-10-CM

## 2025-06-12 DIAGNOSIS — Z00.00 ENCOUNTER FOR MEDICARE ANNUAL WELLNESS EXAM: Primary | ICD-10-CM

## 2025-06-12 DIAGNOSIS — I44.2 COMPLETE HEART BLOCK: ICD-10-CM

## 2025-06-12 DIAGNOSIS — Q21.10 ASD (ATRIAL SEPTAL DEFECT): ICD-10-CM

## 2025-06-12 DIAGNOSIS — I70.0 AORTIC ATHEROSCLEROSIS: ICD-10-CM

## 2025-06-12 DIAGNOSIS — I25.118 CORONARY ARTERY DISEASE OF NATIVE ARTERY OF NATIVE HEART WITH STABLE ANGINA PECTORIS: ICD-10-CM

## 2025-06-12 DIAGNOSIS — E78.5 HYPERLIPIDEMIA, UNSPECIFIED HYPERLIPIDEMIA TYPE: ICD-10-CM

## 2025-06-12 DIAGNOSIS — Z95.0 PRESENCE OF CARDIAC PACEMAKER: ICD-10-CM

## 2025-06-12 PROCEDURE — 99999 PR PBB SHADOW E&M-EST. PATIENT-LVL V: CPT | Mod: PBBFAC,,, | Performed by: NURSE PRACTITIONER

## 2025-06-12 NOTE — PROGRESS NOTES
"  Michael Espinoza presented for a follow-up Medicare AWV today. The following components were reviewed and updated:    Medical history  Family History  Social history  Allergies and Current Medications  Health Risk Assessment  Health Maintenance  Care Team    **See Completed Assessments for Annual Wellness visit with in the encounter summary    The following assessments were completed:  Depression Screening  Cognitive function Screening  Timed Get Up Test  Whisper Test      Opioid documentation:      Patient does not have a current opioid prescription.          Vitals:    06/12/25 1006   BP: 130/76   BP Location: Right arm   Patient Position: Sitting   Pulse: 83   SpO2: 96%   Weight: 69 kg (152 lb 1.9 oz)   Height: 5' 6" (1.676 m)     Body mass index is 24.55 kg/m².       Physical Exam  Constitutional:       General: He is not in acute distress.     Appearance: Normal appearance. He is not ill-appearing, toxic-appearing or diaphoretic.   HENT:      Head: Normocephalic and atraumatic.      Right Ear: Tympanic membrane, ear canal and external ear normal.      Left Ear: Tympanic membrane, ear canal and external ear normal.      Nose: Nose normal.      Mouth/Throat:      Mouth: Mucous membranes are moist.      Pharynx: Oropharynx is clear.   Cardiovascular:      Rate and Rhythm: Normal rate and regular rhythm.      Heart sounds: Normal heart sounds.   Pulmonary:      Effort: Pulmonary effort is normal.      Breath sounds: Normal breath sounds.   Abdominal:      General: Abdomen is flat.      Palpations: Abdomen is soft.   Musculoskeletal:      Cervical back: Neck supple.   Skin:     General: Skin is warm and dry.   Neurological:      General: No focal deficit present.      Mental Status: He is alert and oriented to person, place, and time.   Psychiatric:         Mood and Affect: Mood normal.         Behavior: Behavior normal.         Diagnoses and health risks identified today and associated recommendations/orders:  1. " Encounter for Medicare annual wellness exam  Here for Health Risk Assessment/Annual Wellness Visit. Health maintenance reviewed and updated. Follow up in one year.  -Pneumonia and RSV vaccinations today   - Referral to Enhanced Annual Wellness Visit (eAWV) W+1    2. Hyperlipidemia, unspecified hyperlipidemia type  Problem is stable. Will continue current management. Follow up with PCP      3. S/P CABG x 3  Problem is stable. Will continue current management. Follow up with PCP/cardiology       4. Aortic atherosclerosis  Problem is stable. Will continue current management. Follow up with PCP      5. Presence of cardiac pacemaker  Problem is stable. Will continue current management. Follow up with PCP/cardiology/EP      6. Complete heart block  Problem is stable. Will continue current management. Follow up with PCP/cardiology      7. ASD (atrial septal defect)  Problem is stable. Will continue current management. Follow up with PCP/cardiology      8. Coronary artery disease of native artery of native heart with stable angina pectoris  Problem is stable. Will continue current management. Follow up with PCP      9. Discoloration of skin of face  -Patient with discoloration to checks. 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 Will place referral to dermatology for further evaluation and expertise.   - Ambulatory referral/consult to Dermatology; Future      Provided Michael with a 5-10 year written screening schedule and personal prevention plan. Recommendations were developed using the USPSTF age appropriate recommendations. Education, counseling, and referrals were provided as needed.  After Visit Summary printed and given to patient which includes a list of additional screenings\tests needed.        Teo Oglesby, SOPHIE      I offered to discuss advanced care planning, including how to pick a person who would make decisions for you if you were unable to make them for yourself, called a health care  power of , and what kind of decisions you might make such as use of life sustaining treatments such as ventilators and tube feeding when faced with a life limiting illness recorded on a living will that they will need to know. (How you want to be cared for as you near the end of your natural life)     X Patient is interested in learning more about how to make advanced directives.  I provided them paperwork and offered to discuss this with them.

## 2025-06-12 NOTE — PATIENT INSTRUCTIONS
Counseling and Referral of Other Preventative  (Italic type indicates deductible and co-insurance are waived)    Patient Name: Michael Espinoza  Today's Date: 6/12/2025    Health Maintenance       Date Due Completion Date    RSV Vaccine (Age 60+ and Pregnant patients) (1 - Risk 60-74 years 1-dose series) Never done ---    Pneumococcal Vaccines (Age 50+) (2 of 2 - PPSV23) 11/11/2021 11/11/2020    PROSTATE-SPECIFIC ANTIGEN 05/21/2026 5/21/2025    High Dose Statin 05/31/2026 5/31/2025    Aspirin/Antiplatelet Therapy 05/31/2026 5/31/2025    DEXA Scan 06/20/2026 6/20/2024    Colorectal Cancer Screening 08/05/2029 8/5/2022    Override on 8/10/2005: Done    TETANUS VACCINE 08/22/2029 8/22/2019    Lipid Panel 05/21/2030 5/21/2025        No orders of the defined types were placed in this encounter.      The following information is provided to all patients.  This information is to help you find resources for any of the problems found today that may be affecting your health:                  Living healthy guide: www.[facility].[place].gov      Understanding Diabetes: www.diabetes.org      Eating healthy: www.cdc.gov/healthyweight      CDC home safety checklist: www.cdc.gov/steadi/patient.html      Agency on Aging: www.goea.[place].[facility]      Alcoholics anonymous (AA): www.aa.org      Physical Activity: www.yesi.nih.gov/jn7oqty      Tobacco use: www.quitwithusla.org

## 2025-06-23 ENCOUNTER — CLINICAL SUPPORT (OUTPATIENT)
Dept: CARDIOLOGY | Facility: HOSPITAL | Age: 70
End: 2025-06-23
Payer: MEDICARE

## 2025-06-23 ENCOUNTER — CLINICAL SUPPORT (OUTPATIENT)
Dept: CARDIOLOGY | Facility: HOSPITAL | Age: 70
End: 2025-06-23
Attending: INTERNAL MEDICINE
Payer: MEDICARE

## 2025-06-23 DIAGNOSIS — I44.1 ATRIOVENTRICULAR BLOCK, SECOND DEGREE: ICD-10-CM

## 2025-06-23 DIAGNOSIS — Z95.0 PRESENCE OF CARDIAC PACEMAKER: ICD-10-CM

## 2025-06-23 PROCEDURE — 93294 REM INTERROG EVL PM/LDLS PM: CPT | Mod: ,,, | Performed by: INTERNAL MEDICINE

## 2025-06-23 PROCEDURE — 93296 REM INTERROG EVL PM/IDS: CPT | Performed by: INTERNAL MEDICINE

## 2025-06-24 LAB
OHS CV AF BURDEN PERCENT: < 1
OHS CV DC REMOTE DEVICE TYPE: NORMAL
OHS CV RV PACING PERCENT: 91 %

## 2025-06-26 ENCOUNTER — OFFICE VISIT (OUTPATIENT)
Dept: SPORTS MEDICINE | Facility: CLINIC | Age: 70
End: 2025-06-26
Payer: MEDICARE

## 2025-06-26 VITALS
HEART RATE: 80 BPM | SYSTOLIC BLOOD PRESSURE: 132 MMHG | HEIGHT: 66 IN | WEIGHT: 151.56 LBS | BODY MASS INDEX: 24.36 KG/M2 | DIASTOLIC BLOOD PRESSURE: 84 MMHG

## 2025-06-26 DIAGNOSIS — M75.02 ADHESIVE CAPSULITIS OF LEFT SHOULDER: Primary | ICD-10-CM

## 2025-06-26 PROCEDURE — 1157F ADVNC CARE PLAN IN RCRD: CPT | Mod: CPTII,S$GLB,, | Performed by: PHYSICIAN ASSISTANT

## 2025-06-26 PROCEDURE — 3288F FALL RISK ASSESSMENT DOCD: CPT | Mod: CPTII,S$GLB,, | Performed by: PHYSICIAN ASSISTANT

## 2025-06-26 PROCEDURE — 3075F SYST BP GE 130 - 139MM HG: CPT | Mod: CPTII,S$GLB,, | Performed by: PHYSICIAN ASSISTANT

## 2025-06-26 PROCEDURE — 20611 DRAIN/INJ JOINT/BURSA W/US: CPT | Mod: LT,S$GLB,, | Performed by: PHYSICIAN ASSISTANT

## 2025-06-26 PROCEDURE — 1101F PT FALLS ASSESS-DOCD LE1/YR: CPT | Mod: CPTII,S$GLB,, | Performed by: PHYSICIAN ASSISTANT

## 2025-06-26 PROCEDURE — 1159F MED LIST DOCD IN RCRD: CPT | Mod: CPTII,S$GLB,, | Performed by: PHYSICIAN ASSISTANT

## 2025-06-26 PROCEDURE — 1160F RVW MEDS BY RX/DR IN RCRD: CPT | Mod: CPTII,S$GLB,, | Performed by: PHYSICIAN ASSISTANT

## 2025-06-26 PROCEDURE — 3079F DIAST BP 80-89 MM HG: CPT | Mod: CPTII,S$GLB,, | Performed by: PHYSICIAN ASSISTANT

## 2025-06-26 PROCEDURE — 1125F AMNT PAIN NOTED PAIN PRSNT: CPT | Mod: CPTII,S$GLB,, | Performed by: PHYSICIAN ASSISTANT

## 2025-06-26 PROCEDURE — 3008F BODY MASS INDEX DOCD: CPT | Mod: CPTII,S$GLB,, | Performed by: PHYSICIAN ASSISTANT

## 2025-06-26 PROCEDURE — 99999 PR PBB SHADOW E&M-EST. PATIENT-LVL III: CPT | Mod: PBBFAC,,, | Performed by: PHYSICIAN ASSISTANT

## 2025-06-26 PROCEDURE — 99214 OFFICE O/P EST MOD 30 MIN: CPT | Mod: 25,S$GLB,, | Performed by: PHYSICIAN ASSISTANT

## 2025-06-26 RX ORDER — BUPIVACAINE HYDROCHLORIDE 2.5 MG/ML
2 INJECTION, SOLUTION INFILTRATION; PERINEURAL
Status: DISCONTINUED | OUTPATIENT
Start: 2025-06-26 | End: 2025-06-26 | Stop reason: HOSPADM

## 2025-06-26 RX ORDER — TRIAMCINOLONE ACETONIDE 40 MG/ML
40 INJECTION, SUSPENSION INTRA-ARTICULAR; INTRAMUSCULAR
Status: DISCONTINUED | OUTPATIENT
Start: 2025-06-26 | End: 2025-06-26 | Stop reason: HOSPADM

## 2025-06-26 RX ADMIN — BUPIVACAINE HYDROCHLORIDE 2 ML: 2.5 INJECTION, SOLUTION INFILTRATION; PERINEURAL at 01:06

## 2025-06-26 RX ADMIN — TRIAMCINOLONE ACETONIDE 40 MG: 40 INJECTION, SUSPENSION INTRA-ARTICULAR; INTRAMUSCULAR at 01:06

## 2025-06-26 NOTE — PROGRESS NOTES
ESTABLISHED PATIENT    History 6/26/2025:  Celestino returns here today for follow-up evaluation of his left shoulder and adhesive capsulitis.  He complains today of having recurrent pain and stiffness.  Previous injections have given him good relief and last several months.    History 3/18/2025:  Michael returns here today for follow-up evaluation of his left shoulder.  He has a history of adhesive capsulitis and has been treating his symptoms conservatively.  He was last given a glenohumeral joint corticosteroid injection in December.  This injection gave him temporary relief.    History 1/17/2025  Michael returns to clinic for follow up evaluation of his left shoulder. He received a glenohumeral CSI at his last visit on 12/6/24. He states he has had some improvement, but admits to hearing a pop and having pain after reaching over head.    History 12/6/2024  69 y.o. 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  He states that is pain is localized to his superior shoulder. He admits to pain with overhead activity. He has had pain since early November 2024. He states the pain wake him up at night. He denies a history of left shoulder pain, but has a history of blood sepsis and a pace maker.  He has difficulty raising his arm overhead.  He has difficulty reaching back.  He has lost motion in the left arm.      - mechanical symptoms, - instability          PAST MEDICAL HISTORY    Past Medical History:   Diagnosis Date    Complete heart block 07/05/2022    Coronary artery disease of native artery of native heart with stable angina pectoris 04/10/2018    Hyperlipidemia     Osteoporosis        PAST SURGICAL HISTORY     Past Surgical History:   Procedure Laterality Date    A-V CARDIAC PACEMAKER INSERTION N/A 07/05/2022    Procedure: INSERTION, CARDIAC PACEMAKER, DUAL CHAMBER;  Surgeon: Alessandro Canales MD;  Location: [facility] LAB;  Service: Cardiology;  Laterality: N/A;  CHB,  TBD, ANES, ED 6, MB    A-V CARDIAC PACEMAKER INSERTION Right 05/20/2024    Procedure: INSERTION, CARDIAC PACEMAKER, DUAL CHAMBER;  Surgeon: Arthur Pagan MD;  Location: [facility] EP LAB;  Service: Cardiology;  Laterality: Right;  CHB, DUAL PPM, BIO, ANES, LA,     ADENOIDECTOMY  1963    COLONOSCOPY N/A 08/05/2022    Procedure: COLONOSCOPY;  Surgeon: Namita Dumont MD;  Location: [facility] ENDO (4TH FLR);  Service: Endoscopy;  Laterality: N/A;  vacc-inst portal-tb    CORONARY ARTERY BYPASS GRAFT  May 2018    CYST REMOVAL      ECHOCARDIOGRAM,TRANSESOPHAGEAL N/A 12/22/2023    Procedure: Transesophageal echo (CHARMAINE) intra-procedure log documentation;  Surgeon: Provider, [facility];  Location: [facility] EP LAB;  Service: Cardiology;  Laterality: N/A;    EXTRACORPOREAL SHOCK WAVE LITHOTRIPSY Right 03/01/2024    Procedure: LITHOTRIPSY, ESWL;  Surgeon: Sanchez Moreno Jr., MD;  Location: [facility] OR 2ND FLR;  Service: Urology;  Laterality: Right;  1 hr    EXTRACTION, ELECTRODE LEAD N/A 05/17/2024    Procedure: EXTRACTION, ELECTRODE LEAD;  Surgeon: MORELIA Serrato MD;  Location: [facility] EP LAB;  Service: Cardiology;  Laterality: N/A;  INFECTION, EXTRACTION DUAL PPM & REIMPLANT TEMP PPM (IJ), SJM, ANES, EH, CHARMAINE, LASER, NO CV BACKUP, 302    HERNIA REPAIR  2021    INSERTION, PACEMAKER, SINGLE CHAMBER VENTRICULAR N/A 05/17/2024    Procedure: Insertion, Pacemaker, Single Chamber Ventricular;  Surgeon: MORELIA Serrato MD;  Location: [facility] EP LAB;  Service: Cardiology;  Laterality: N/A;    REMOVAL, DEVICE  05/17/2024    Procedure: Removal, Device;  Surgeon: MORELIA Serrato MD;  Location: [facility] EP LAB;  Service: Cardiology;;    REVISION OF SKIN POCKET FOR PACEMAKER  05/17/2024    Procedure: REVISION, SKIN POCKET, FOR CARDIAC PACEMAKER;  Surgeon: MORELIA Serrato MD;  Location: [facility] EP LAB;  Service: Cardiology;;    TONSILLECTOMY      TRANSESOPHAGEAL ECHOCARDIOGRAPHY N/A 05/17/2024    Procedure: ECHOCARDIOGRAM, TRANSESOPHAGEAL;   Surgeon: Benton Jon MD;  Location: [facility] LAB;  Service: Cardiology;  Laterality: N/A;       FAMILY HISTORY    Family History   Problem Relation Name Age of Onset    Hyperlipidemia Brother 2     Hypertension Brother 2     Heart disease Brother 2     Heart attack Brother 2     Diabetes Brother 2     Stroke Mother Monet Espinoza     Arthritis Father Antonio Espinoza                SOCIAL HISTORY    Social History     Socioeconomic History    Marital status: Single   Tobacco Use    Smoking status: Never     Passive exposure: Never    Smokeless tobacco: Never   Substance and Sexual Activity    Alcohol use: Yes     Alcohol/week: 2.0 - 3.0 standard drinks of alcohol     Types: 1 Glasses of wine, 1 - 2 Drinks containing 0.5 oz of alcohol per week     Comment: 1 drink 2-3 times/weekly    Drug use: No    Sexual activity: Not Currently     Partners: Female     Birth control/protection: Condom     Social Drivers of Health     Financial Resource Strain: Low Risk  (2025)    Overall Financial Resource Strain (CARDIA)     Difficulty of Paying Living Expenses: Not hard at all   Food Insecurity: No Food Insecurity (2025)    Hunger Vital Sign     Worried About Running Out of Food in the Last Year: Never true     Ran Out of Food in the Last Year: Never true   Transportation Needs: No Transportation Needs (2025)    PRAPARE - Transportation     Lack of Transportation (Medical): No     Lack of Transportation (Non-Medical): No   Physical Activity: Insufficiently Active (2025)    Exercise Vital Sign     Days of Exercise per Week: 4 days     Minutes of Exercise per Session: 20 min   Stress: No Stress Concern Present (2025)    [employer] - Occupational Stress Questionnaire     Feeling of Stress : Not at all   Housing Stability: Low Risk  (2025)    Housing Stability Vital Sign     Unable to Pay for Housing in the Last Year: No     Number of Times Moved in the Last  Year: 0     Homeless in the Last Year: No       MEDICATIONS      Current Outpatient Medications:     aspirin (ECOTRIN) 81 MG EC tablet, Take 81 mg by mouth 2 (two) times daily. (Breakfast & Afternoon), Disp: , Rfl:     coenzyme Q10 (CO Q-10) 300 mg Cap, Take 1 capsule by mouth once daily., Disp: , Rfl:     ERGOCALCIFEROL, VITAMIN D2, (VITAMIN D ORAL), Take 1 capsule by mouth Mon, Wed, Fri, Sat & Sun, Disp: , Rfl:     k phos di & mono-sod phos mono (K-PHOS-NEUTRAL) 250 mg Tab, Take 1 tablet by mouth once daily., Disp: 90 tablet, Rfl: 3    LIPITOR 80 mg tablet, TAKE 1 TABLET BY MOUTH ONCE A DAY, Disp: 90 tablet, Rfl: 3    multivit-min/FA/lycopen/lutein (CENTRUM SILVER MEN ORAL), Take 1 tablet by mouth every Mon, Wed, Fri., Disp: , Rfl:     teriparatide (FORTEO) 20 mcg/dose (560mcg/2.24mL) PnIj, Inject 0.08 mLs (20 mcg total) into the skin once daily., Disp: 2.4 mL, Rfl: 11    ZETIA 10 mg tablet, TAKE 1 TABLET BY MOUTH ONCE A DAY, Disp: 90 tablet, Rfl: 3    ALLERGIES     Review of patient's allergies indicates:  No Known Allergies      REVIEW OF SYSTEMS   Constitution: Negative. Negative for chills, fever and night sweats.   HENT: Negative for congestion and headaches.    Eyes: Negative for blurred vision, left vision loss and right vision loss.   Cardiovascular: Negative for chest pain and syncope.   Respiratory: Negative for cough and shortness of breath.    Endocrine: Negative for polydipsia, polyphagia and polyuria.   Hematologic/Lymphatic: Negative for bleeding problem. Does not bruise/bleed easily.   Skin: Negative for dry skin, itching and rash.   Musculoskeletal: Negative for falls. Positive for left knee pain   Gastrointestinal: Negative for abdominal pain and bowel incontinence.   Genitourinary: Negative for bladder incontinence and nocturia.   Neurological: Negative for disturbances in coordination, loss of balance and seizures.   Psychiatric/Behavioral: Negative for depression. The patient does not have  "insomnia.    Allergic/Immunologic: Negative for hives and persistent infections.     PHYSICAL EXAMINATION    Vitals: /84 (Patient Position: Sitting)   Pulse 80   Ht 5' 6" (1.676 m)   Wt 68.8 kg (151 lb 9.1 oz)   BMI 24.46 kg/m²     General: The patient appears active and healthy with no apparent physical problems.  No disturbance of mood or affect is demonstrated. Alert and Oriented.    HEENT: Eyes normal, pupils equally round, nose normal.    Resp: Equal and symmetrical chest rises. No wheezing    CV: Regular rate    Neck: Supple; nonpainful range of motion.    Extremities: no cyanosis, clubbing, edema, or diffuse swelling.  Palpable pulses, good capillary refill of the digits.  No coolness, discoloration, edema or obvious varicosities.    Skin: no lesions noted.    Lymphatic: no detected adenopathy in the upper or lower extremities.    Neurologic: normal mental status, normal reflexes, normal gait and balance.  Patient is alert and oriented to person, place and time.  No flaccidity or spasticity is noted.  No motor or sensory deficits are noted.  Light touch is intact    Orthopaedic: SHOULDER EXAM - LEFT     Inspection:   Normal skin color and appearance with well-healed scars from previous pacemaker  No muscle atrophy noted.  No scapular winging.  Protracted shoulder posture    Palpation: No tenderness of the acromioclavicular joint, lateral edge of the acromion, biceps tendon, trapezius muscle or scapulothoracic bursa.      ROM:      PROM:     FE - 130°    Abd/ER -  45°on the Left and 60° on the right   Abd/IR -  45°   Add/ER -  30°     AROM:    FE - 110°    Abd/ER -  30°  Abd/IR -  45°   Add/ER -  60°         Tests:     - Rand, - Neer's, - Cross Arm Adduction, - Ciales, - Yerguson, - Speed. - Belly Press,  - Jobes, - Lift Off    Stability: - sulcus, - apprehension, - relocation, - load and shift, - DLS      Motor:  Rotator cuff strength is 5/5 supraspinatus with pain, 5/5 infraspinatus, 5/5 " subscapularis. Biceps, triceps and deltoid strength is 5/5.      Neuro     Distally there are no paresthesias, and sensation is intact to light touch in the median, ulnar, and radial distributions.  Reflexes are 2/2 biceps, triceps and brachioradialis.        IMAGING    X-Ray Shoulder 2 or More Views Left  Narrative: EXAMINATION:  XR SHOULDER COMPLETE 2 OR MORE VIEWS LEFT    CLINICAL HISTORY:  Pain in left shoulder    TECHNIQUE:  Two or three views of the left shoulder were performed.    COMPARISON:  None    FINDINGS:  No fracture or dislocation.  No bone destruction identified.  No soft tissue calcifications.  Impression: See above    Electronically signed by: Kedar Porras MD  Date:    12/06/2024  Time:    10:41        IMPRESSION       ICD-10-CM ICD-9-CM   1. Adhesive capsulitis of left shoulder  M75.02 726.0             MEDICATIONS PRESCRIBED      None today     RECOMMENDATIONS     History and physical examination are consistent with adhesive capsulitis  I recommend that he continue with activity modification and not reach overhead or outside of his body.   I recommend we initiate physical therapy for passive stretching  A repeat glenohumeral corticosteroid injection was administered under ultrasound guidance today  RTC in 3 months  If his symptoms continue to persist, we may consider getting an MRI in the future      Large Joint Aspiration/Injection: L glenohumeral    Date/Time: 6/26/2025 1:30 PM    Performed by: Tim Singh PA-C  Authorized by: Tim Singh PA-C    Consent Done?:  Yes (Verbal)  Indications:  Pain  Site marked: the procedure site was marked    Timeout: prior to procedure the correct patient, procedure, and site was verified    Prep: patient was prepped and draped in usual sterile fashion      Local anesthesia used?: Yes    Local anesthetic:  Co-phenylcaine spray    Details:  Needle Size:  22 G  Ultrasonic Guidance for needle placement?: Yes (Ultrasound guidance was used for needle  localization.  Images were saved and stored for documentation.  Dynamic visualization of the needle was continuous throughout the procedure and maintained accurate placement.)    Images are saved and documented.  Approach:  Posterior  Location:  Shoulder  Site:  L glenohumeral  Medications:  40 mg triamcinolone acetonide 40 mg/mL; 2 mL BUPivacaine 0.25% (2.5 mg/ml) 0.25 % (2.5 mg/mL)  Patient tolerance:  Patient tolerated the procedure well with no immediate complications          All questions were answered, pt will contact us for questions or concerns in the interim.        Tim Singh PA-C, [facility]

## 2025-07-07 ENCOUNTER — PATIENT MESSAGE (OUTPATIENT)
Dept: ENDOCRINOLOGY | Facility: CLINIC | Age: 70
End: 2025-07-07
Payer: MEDICARE

## 2025-07-09 ENCOUNTER — PATIENT MESSAGE (OUTPATIENT)
Dept: ADMINISTRATIVE | Facility: OTHER | Age: 70
End: 2025-07-09
Payer: MEDICARE

## 2025-07-14 ENCOUNTER — TELEPHONE (OUTPATIENT)
Dept: ENDOCRINOLOGY | Facility: CLINIC | Age: 70
End: 2025-07-14
Payer: MEDICARE

## 2025-07-14 DIAGNOSIS — E29.1 HYPOGONADISM IN MALE: Primary | ICD-10-CM

## 2025-07-14 RX ORDER — TESTOSTERONE 20.25 MG/1.25G
2 GEL TOPICAL DAILY
Qty: 75 G | Refills: 5 | Status: SHIPPED | OUTPATIENT
Start: 2025-07-14

## 2025-07-14 NOTE — TELEPHONE ENCOUNTER
Disp Refills Start End WAYNE   testosterone (ANDROGEL) 20.25 mg/1.25 gram (1.62 %) GlPm 75 g 3 11/21/2024 12/21/2024 --   Sig - Route: Place 1 Pump  onto the skin once daily. Apply 1 pump to shoulders/upper arms daily. Wash hands immediately after. - Transdermal   Sent to pharmacy as: testosterone (ANDROGEL) 20.25 mg/1.25 gram (1.62 %) GlPm   Class: Normal   Order: [id number]   Date/Time Signed: 11/21/2024 11:55       E-Prescribing Status: Receipt confirmed by pharmacy (11/21/2024 11:55 AM CST)   Prior authorization: Approved     Pharmacy    [id number] - [address] [facility]  AT [facility] [address] [facility] [address]    Associated Diagnoses    Hypogonadism in male

## 2025-07-14 NOTE — TELEPHONE ENCOUNTER
Up coming appt. 8/5/25  Dr. Bower, the Rx refill order one was approved and other one is not covered by plan you can see which one you want.    Per Pt. He stated that after his labs in May that Dr. Manriquez changed his dosage to two pumps one per shoulder.

## 2025-07-17 ENCOUNTER — E-VISIT (OUTPATIENT)
Dept: INTERNAL MEDICINE | Facility: CLINIC | Age: 70
End: 2025-07-17
Payer: MEDICARE

## 2025-07-17 ENCOUNTER — PATIENT MESSAGE (OUTPATIENT)
Dept: INTERNAL MEDICINE | Facility: CLINIC | Age: 70
End: 2025-07-17
Payer: MEDICARE

## 2025-07-17 ENCOUNTER — APPOINTMENT (OUTPATIENT)
Dept: LAB | Facility: HOSPITAL | Age: 70
End: 2025-07-17
Payer: MEDICARE

## 2025-07-17 DIAGNOSIS — R30.0 DYSURIA: ICD-10-CM

## 2025-07-17 DIAGNOSIS — N39.0 RECURRENT UTI: Primary | ICD-10-CM

## 2025-07-17 DIAGNOSIS — R30.0 DYSURIA: Primary | ICD-10-CM

## 2025-07-17 DIAGNOSIS — N20.0 RIGHT KIDNEY STONE: ICD-10-CM

## 2025-07-17 LAB
BACTERIA #/AREA URNS AUTO: ABNORMAL /HPF
BILIRUB UR QL STRIP.AUTO: NEGATIVE
CLARITY UR: CLEAR
COLOR UR AUTO: YELLOW
GLUCOSE UR QL STRIP: NEGATIVE
HGB UR QL STRIP: ABNORMAL
KETONES UR QL STRIP: NEGATIVE
LEUKOCYTE ESTERASE UR QL STRIP: ABNORMAL
MICROSCOPIC COMMENT: ABNORMAL
NITRITE UR QL STRIP: NEGATIVE
PH UR STRIP: 5 [PH]
PROT UR QL STRIP: NEGATIVE
RBC #/AREA URNS AUTO: 6 /HPF (ref 0–4)
SP GR UR STRIP: 1.03
SQUAMOUS #/AREA URNS AUTO: <1 /HPF
UROBILINOGEN UR STRIP-ACNC: NEGATIVE EU/DL
WBC #/AREA URNS AUTO: 70 /HPF (ref 0–5)
WBC CLUMPS UR QL AUTO: ABNORMAL

## 2025-07-17 PROCEDURE — 81003 URINALYSIS AUTO W/O SCOPE: CPT

## 2025-07-17 PROCEDURE — 87086 URINE CULTURE/COLONY COUNT: CPT

## 2025-07-17 RX ORDER — SULFAMETHOXAZOLE AND TRIMETHOPRIM 800; 160 MG/1; MG/1
1 TABLET ORAL 2 TIMES DAILY
Qty: 14 TABLET | Refills: 0 | Status: SHIPPED | OUTPATIENT
Start: 2025-07-17 | End: 2025-07-24

## 2025-07-17 NOTE — PROGRESS NOTES
Patient ID: Michael Espinoza is a 69 y.o. male.    Chief Complaint: General Illness (Entered automatically based on patient selection in [employer].)    The patient initiated a request through [employer] on 7/17/2025 for evaluation and management with a chief complaint of General Illness (Entered automatically based on patient selection in [employer].)     I evaluated the questionnaire submission on 7/17/2025.    [facility] P[facility] Evisit Supergroup-Common Problems    7/17/2025  1:49 PM CDT - Filed by Patient   What do you need help with? Urinary Symptoms   Do you agree to participate in an E-Visit? Yes   If you have any of the following symptoms, please go to the nearest emergency room or call 911: I acknowledge   What is the main issue you would like addressed today? Urinary Tract Infection   Do you have any of the following symptoms? ( Discomfort or pain passing urine, Passing urine more frequently, Cloudy urine, Discharge in urine, Other, None) Discomfort or pain passing urine   When did your concern begin? 7/13/2025   What medications or treatments have you used to help your symptoms? (Antibiotics, Cranberry products, Drinking more fluids, Painkillers, Other, None) None   What does your urine look like? (Clear, Cloudy, Dark yellow, Light yellow, Pink or red (bloody), Foamy, Not sure) Light yellow   Have you had any of the following symptoms during the past 24 hours?   Urgency (a sudden and uncontrollable urge to urinate) (None, Mild, Moderate, Severe) None   Frequency (going to the toilet very often) (None, Mild, Moderate, Severe) None   Burning pain when urinating (None, Mild, Moderate, Severe) Moderate   Incomplete bladder emptying (still feel like you need to urinate again after urination) (None, Mild, Moderate, Severe) None   Pain in the lower belly when youre not urinating. (None, Mild, Moderate, Severe) None   Please rate how much discomfort these symptoms have caused in the last 24 hours. (None, Mild, Moderate, Severe) Mild    Have you had any of the following symptoms during the past 24 hours?   Blood seen in the urine (None, Mild, Moderate, Severe) None   Pain in the lower back on one or both sides (flank pain) (None, Mild, Moderate, Severe) None   Discharge from the opening you urinate from (urethra) when not urinating. (None, Mild, Moderate, Severe) None   Tell us how the symptoms have interfered with your every day activities/work in the past 24 hours. (None, Mild, Moderate, Severe) None   Do you have a fever? (Less than 100.4°F, 100.4°F or greater, No, Not sure) No   Are you a diabetic? No   Do you have a history of kidney stones? Yes   Provide any information you feel is important to your history not asked above    Please attach any relevant images or files (if you have performed a home test for UTI, please submit a photo of results)    Are you able to take your vitals? Yes   Systolic Blood Pressure 119   Diastolic Blood Pressure 79   Weight: 151   Height: 66   Pluse: 78   Temp    Resp:    SpO2 97         Encounter Diagnoses   Name Primary?    Right kidney stone     Recurrent UTI Yes        No orders of the defined types were placed in this encounter.           No follow-ups on file.      E-Visit Time Trackinm    Michael was seen today for general illness.    Diagnoses and all orders for this visit:    Recurrent UTI  -     Ambulatory referral/consult to Urology; Future  -     sulfamethoxazole-trimethoprim 800-160mg (BACTRIM DS) 800-160 mg Tab; Take 1 tablet by mouth 2 (two) times daily. for 7 days    Right kidney stone  -     Ambulatory referral/consult to Urology; Future        Empiric abx sent  UA pending  Recommend urology f/u for further eval of recurrent UTI  Of note hx of Urine culture with +MSSA (same as prior +urine cx(, ), hx of MSSA bacteremia in Dec 2023 and ppm infection in May 2024)   He has a residual Right renal stone seen on ultrasound last month ?colonized                     Cadence Welch,  LUKE    Future Appointments   Date Time Provider Department Center   7/17/2025  2:30 PM LAB[facility]   7/23/2025  9:00 AM Dominguez Hyatt MD [facility] IM [facility]   8/5/2025  8:30 AM Odilon Bower MD [facility] ENDOD[facility]   9/30/2025  9:00 AM LAB, APPOINTMENT [facility] LAB IM [facility]   10/3/2025  9:40 AM LAB, APPOINTMENT [facility] LAB [facility]   10/16/2025 10:00 AM Candice Vázquez MD [facility]   10/21/2025 10:40 AM Alessandro Canales MD [facility]

## 2025-07-18 LAB — HOLD SPECIMEN: NORMAL

## 2025-07-20 LAB — BACTERIA UR CULT: ABNORMAL

## 2025-07-23 ENCOUNTER — OFFICE VISIT (OUTPATIENT)
Dept: INTERNAL MEDICINE | Facility: CLINIC | Age: 70
End: 2025-07-23
Payer: MEDICARE

## 2025-07-23 VITALS
SYSTOLIC BLOOD PRESSURE: 126 MMHG | OXYGEN SATURATION: 99 % | DIASTOLIC BLOOD PRESSURE: 78 MMHG | BODY MASS INDEX: 23.95 KG/M2 | WEIGHT: 149.06 LBS | HEART RATE: 70 BPM | HEIGHT: 66 IN

## 2025-07-23 DIAGNOSIS — I25.118 CORONARY ARTERY DISEASE OF NATIVE ARTERY OF NATIVE HEART WITH STABLE ANGINA PECTORIS: ICD-10-CM

## 2025-07-23 DIAGNOSIS — Z00.00 ROUTINE PHYSICAL EXAMINATION: Primary | ICD-10-CM

## 2025-07-23 DIAGNOSIS — N20.0 RIGHT KIDNEY STONE: ICD-10-CM

## 2025-07-23 DIAGNOSIS — N52.9 ERECTILE DYSFUNCTION, UNSPECIFIED ERECTILE DYSFUNCTION TYPE: ICD-10-CM

## 2025-07-23 DIAGNOSIS — K80.20 CALCULUS OF GALLBLADDER WITHOUT CHOLECYSTITIS WITHOUT OBSTRUCTION: ICD-10-CM

## 2025-07-23 PROCEDURE — 99999 PR PBB SHADOW E&M-EST. PATIENT-LVL III: CPT | Mod: PBBFAC,,, | Performed by: INTERNAL MEDICINE

## 2025-07-23 RX ORDER — SILDENAFIL 100 MG/1
100 TABLET, FILM COATED ORAL DAILY PRN
Qty: 10 TABLET | Refills: 6 | Status: SHIPPED | OUTPATIENT
Start: 2025-07-23

## 2025-07-23 NOTE — PROGRESS NOTES
Subjective:       Patient ID: Michael Espinoza is a 69 y.o. male.    Chief Complaint: Annual Exam    HPI: Pt here for annual exam. Doing PT for back and shoulder and also seeing Cardiology, Urology and ophtho for heart, renal stone with infection and cataracts.   Working on left shoulder after shots and kyphosis. Seems to be helping.   Taking Abx for UTI.   Labs from 2 months ago reviewed and stable. He would like a Viagra refill.     His mom  at 89 recently.       Review of Systems   Constitutional:  Negative for chills, fatigue and fever.   HENT:  Positive for hearing loss. Negative for nosebleeds and trouble swallowing.    Eyes:  Positive for visual disturbance. Negative for pain.   Respiratory:  Negative for cough, shortness of breath and wheezing.    Cardiovascular:  Negative for chest pain and palpitations.   Gastrointestinal:  Negative for abdominal pain, constipation, diarrhea, nausea and vomiting.   Genitourinary:  Negative for difficulty urinating and hematuria.   Musculoskeletal:  Positive for arthralgias, back pain and joint deformity. Negative for neck pain.   Integumentary:  Negative for rash.   Neurological:  Negative for dizziness and headaches.   Hematological:  Does not bruise/bleed easily.   Psychiatric/Behavioral:  Negative for dysphoric mood and sleep disturbance.            Past Medical History:   Diagnosis Date    Complete heart block 2022    Coronary artery disease of native artery of native heart with stable angina pectoris 04/10/2018    Hyperlipidemia     Osteoporosis      Past Surgical History:   Procedure Laterality Date    A-V CARDIAC PACEMAKER INSERTION N/A 2022    Procedure: INSERTION, CARDIAC PACEMAKER, DUAL CHAMBER;  Surgeon: Alessandro Canales MD;  Location: [facility] LAB;  Service: Cardiology;  Laterality: N/A;  CHB, TBD, ANES, ED 6, MB    A-V CARDIAC PACEMAKER INSERTION Right 2024    Procedure: INSERTION, CARDIAC PACEMAKER, DUAL CHAMBER;  Surgeon: Arthur Pagan  MD VEENA;  Location: [facility] EP LAB;  Service: Cardiology;  Laterality: Right;  CHB, DUAL PPM, BIO, ANES, HI,     ADENOIDECTOMY  1963    COLONOSCOPY N/A 08/05/2022    Procedure: COLONOSCOPY;  Surgeon: Namita Dumont MD;  Location: [facility] ENDO (4TH FLR);  Service: Endoscopy;  Laterality: N/A;  vacc-inst portal-tb    CORONARY ARTERY BYPASS GRAFT  May 2018    CYST REMOVAL      ECHOCARDIOGRAM,TRANSESOPHAGEAL N/A 12/22/2023    Procedure: Transesophageal echo (CHARMAINE) intra-procedure log documentation;  Surgeon: Provider, [facility];  Location: [facility] EP LAB;  Service: Cardiology;  Laterality: N/A;    EXTRACORPOREAL SHOCK WAVE LITHOTRIPSY Right 03/01/2024    Procedure: LITHOTRIPSY, ESWL;  Surgeon: Sanchez Moreno Jr., MD;  Location: [facility] OR 2ND FLR;  Service: Urology;  Laterality: Right;  1 hr    EXTRACTION, ELECTRODE LEAD N/A 05/17/2024    Procedure: EXTRACTION, ELECTRODE LEAD;  Surgeon: MORELIA Serrato MD;  Location: [facility] EP LAB;  Service: Cardiology;  Laterality: N/A;  INFECTION, EXTRACTION DUAL PPM & REIMPLANT TEMP PPM (IJ), SJM, ANES, EH, CHARMAINE, LASER, NO CV BACKUP, 302    HERNIA REPAIR  2021    INSERTION, PACEMAKER, SINGLE CHAMBER VENTRICULAR N/A 05/17/2024    Procedure: Insertion, Pacemaker, Single Chamber Ventricular;  Surgeon: MORELIA Serrato MD;  Location: [facility] EP LAB;  Service: Cardiology;  Laterality: N/A;    REMOVAL, DEVICE  05/17/2024    Procedure: Removal, Device;  Surgeon: MORELIA Serrato MD;  Location: [facility] EP LAB;  Service: Cardiology;;    REVISION OF SKIN POCKET FOR PACEMAKER  05/17/2024    Procedure: REVISION, SKIN POCKET, FOR CARDIAC PACEMAKER;  Surgeon: MORELIA Serrato MD;  Location: [facility] EP LAB;  Service: Cardiology;;    TONSILLECTOMY      TRANSESOPHAGEAL ECHOCARDIOGRAPHY N/A 05/17/2024    Procedure: ECHOCARDIOGRAM, TRANSESOPHAGEAL;  Surgeon: Benton Jon MD;  Location: [facility] EP LAB;  Service: Cardiology;  Laterality: N/A;      Problem List[1]     Objective:      Physical  Exam  Constitutional:       General: He is not in acute distress.     Appearance: He is well-developed.   HENT:      Head: Normocephalic and atraumatic.      Right Ear: Tympanic membrane, ear canal and external ear normal.      Left Ear: Tympanic membrane, ear canal and external ear normal.      Mouth/Throat:      Pharynx: No oropharyngeal exudate or posterior oropharyngeal erythema.   Eyes:      General: No scleral icterus.     Conjunctiva/sclera: Conjunctivae normal.      Pupils: Pupils are equal, round, and reactive to light.   Neck:      Thyroid: No thyromegaly.      Comments: No supraclavicular nodes palpated  Cardiovascular:      Rate and Rhythm: Normal rate and regular rhythm.      Pulses: Normal pulses.      Heart sounds: Normal heart sounds. No murmur heard.  Pulmonary:      Effort: Pulmonary effort is normal.      Breath sounds: Normal breath sounds. No wheezing.   Abdominal:      General: Bowel sounds are normal.      Palpations: Abdomen is soft. There is no mass.      Tenderness: There is no abdominal tenderness.   Musculoskeletal:         General: Deformity (kyphosis) present. No tenderness.      Cervical back: Normal range of motion and neck supple.      Right lower leg: No edema.      Left lower leg: No edema.   Lymphadenopathy:      Cervical: No cervical adenopathy.   Skin:     Coloration: Skin is not jaundiced or pale.   Neurological:      General: No focal deficit present.      Mental Status: He is alert and oriented to person, place, and time.   Psychiatric:         Mood and Affect: Mood normal.         Behavior: Behavior normal.         Assessment:       Problem List Items Addressed This Visit          Cardiac/Vascular    Coronary artery disease of native artery of native heart with stable angina pectoris       Renal/    Right kidney stone       GI    Calculus of gallbladder without cholecystitis without obstruction     Other Visit Diagnoses         Routine physical examination    -  Primary     "  Erectile dysfunction, unspecified erectile dysfunction type        Relevant Medications    sildenafiL (VIAGRA) 100 MG tablet            Plan:         Michael was seen today for annual exam.    Diagnoses and all orders for this visit:    Routine physical examination    Erectile dysfunction, unspecified erectile dysfunction type  -     sildenafiL (VIAGRA) 100 MG tablet; Take 1 tablet (100 mg total) by mouth daily as needed for Erectile Dysfunction.  Continue meds  Right kidney stone  Finish Abx, see Urology  Coronary artery disease of native artery of native heart with stable angina pectoris  Continue meds, see Cardiology.   Calculus of gallbladder without cholecystitis without obstruction  No symptoms. Monitor for changes.              Annual Follow up.       Portions of this note may have been created with voice recognition software. Occasional "wrong-word" or "sound-a-like" substitutions may have occurred due to the inherent limitations of voice recognition software. Please, read the note carefully and recognize, using context, where substitutions have occurred.       [1]   Patient Active Problem List  Diagnosis    HLD (hyperlipidemia)    Gilbert's syndrome    ASD (atrial septal defect)    Abnormal CT of the chest    Coronary artery disease of native artery of native heart with stable angina pectoris    S/P CABG x 3    Chronic right shoulder pain    Partial nontraumatic tear of rotator cuff, right    Pathological fracture due to osteoporosis    Age-related osteoporosis with current pathological fracture    Localized osteoporosis of [place]    Complete heart block    Diarrhea    Right kidney stone    Infection associated with cardiovascular device    Aortic atherosclerosis    Presence of cardiac pacemaker    Calculus of gallbladder without cholecystitis without obstruction     "

## 2025-08-05 ENCOUNTER — PATIENT MESSAGE (OUTPATIENT)
Dept: ADMINISTRATIVE | Facility: OTHER | Age: 70
End: 2025-08-05
Payer: MEDICARE

## 2025-08-11 ENCOUNTER — PATIENT MESSAGE (OUTPATIENT)
Dept: ENDOCRINOLOGY | Facility: CLINIC | Age: 70
End: 2025-08-11

## 2025-08-11 ENCOUNTER — OFFICE VISIT (OUTPATIENT)
Dept: ENDOCRINOLOGY | Facility: CLINIC | Age: 70
End: 2025-08-11
Payer: MEDICARE

## 2025-08-11 VITALS
SYSTOLIC BLOOD PRESSURE: 120 MMHG | DIASTOLIC BLOOD PRESSURE: 87 MMHG | HEART RATE: 80 BPM | WEIGHT: 149.94 LBS | BODY MASS INDEX: 24.2 KG/M2

## 2025-08-11 DIAGNOSIS — N20.0 RIGHT KIDNEY STONE: ICD-10-CM

## 2025-08-11 DIAGNOSIS — M80.00XS OSTEOPOROSIS WITH CURRENT PATHOLOGICAL FRACTURE, UNSPECIFIED OSTEOPOROSIS TYPE, SEQUELA: Primary | ICD-10-CM

## 2025-08-11 DIAGNOSIS — E83.39 HYPOPHOSPHATEMIA: ICD-10-CM

## 2025-08-11 DIAGNOSIS — E29.1 HYPOGONADISM IN MALE: ICD-10-CM

## 2025-08-11 DIAGNOSIS — M81.0 AGE-RELATED OSTEOPOROSIS WITHOUT CURRENT PATHOLOGICAL FRACTURE: ICD-10-CM

## 2025-08-11 DIAGNOSIS — M80.00XD AGE-RELATED OSTEOPOROSIS WITH CURRENT PATHOLOGICAL FRACTURE WITH ROUTINE HEALING, SUBSEQUENT ENCOUNTER: ICD-10-CM

## 2025-08-11 DIAGNOSIS — M80.08XD PATHOLOGICAL FRACTURE OF VERTEBRA DUE TO OSTEOPOROSIS WITH ROUTINE HEALING, UNSPECIFIED OSTEOPOROSIS TYPE, SUBSEQUENT ENCOUNTER: ICD-10-CM

## 2025-08-11 DIAGNOSIS — E29.1 MALE HYPOGONADISM: ICD-10-CM

## 2025-08-11 PROCEDURE — G2211 COMPLEX E/M VISIT ADD ON: HCPCS | Mod: S$GLB,,, | Performed by: INTERNAL MEDICINE

## 2025-08-11 PROCEDURE — 1126F AMNT PAIN NOTED NONE PRSNT: CPT | Mod: CPTII,S$GLB,, | Performed by: INTERNAL MEDICINE

## 2025-08-11 PROCEDURE — 1101F PT FALLS ASSESS-DOCD LE1/YR: CPT | Mod: CPTII,S$GLB,, | Performed by: INTERNAL MEDICINE

## 2025-08-11 PROCEDURE — 3288F FALL RISK ASSESSMENT DOCD: CPT | Mod: CPTII,S$GLB,, | Performed by: INTERNAL MEDICINE

## 2025-08-11 PROCEDURE — 3074F SYST BP LT 130 MM HG: CPT | Mod: CPTII,S$GLB,, | Performed by: INTERNAL MEDICINE

## 2025-08-11 PROCEDURE — 1159F MED LIST DOCD IN RCRD: CPT | Mod: CPTII,S$GLB,, | Performed by: INTERNAL MEDICINE

## 2025-08-11 PROCEDURE — 3079F DIAST BP 80-89 MM HG: CPT | Mod: CPTII,S$GLB,, | Performed by: INTERNAL MEDICINE

## 2025-08-11 PROCEDURE — 99215 OFFICE O/P EST HI 40 MIN: CPT | Mod: S$GLB,,, | Performed by: INTERNAL MEDICINE

## 2025-08-11 PROCEDURE — 3008F BODY MASS INDEX DOCD: CPT | Mod: CPTII,S$GLB,, | Performed by: INTERNAL MEDICINE

## 2025-08-11 PROCEDURE — 99999 PR PBB SHADOW E&M-EST. PATIENT-LVL III: CPT | Mod: PBBFAC,,, | Performed by: INTERNAL MEDICINE

## 2025-08-11 PROCEDURE — 1157F ADVNC CARE PLAN IN RCRD: CPT | Mod: CPTII,S$GLB,, | Performed by: INTERNAL MEDICINE

## 2025-08-12 PROBLEM — E29.1 MALE HYPOGONADISM: Status: ACTIVE | Noted: 2025-08-12

## 2025-08-18 ENCOUNTER — HOSPITAL ENCOUNTER (OUTPATIENT)
Dept: RADIOLOGY | Facility: HOSPITAL | Age: 70
Discharge: HOME | End: 2025-08-18
Attending: INTERNAL MEDICINE
Payer: MEDICARE

## 2025-08-18 DIAGNOSIS — M80.00XS OSTEOPOROSIS WITH CURRENT PATHOLOGICAL FRACTURE, UNSPECIFIED OSTEOPOROSIS TYPE, SEQUELA: ICD-10-CM

## 2025-08-18 PROCEDURE — 72100 X-RAY EXAM L-S SPINE 2/3 VWS: CPT | Mod: TC

## 2025-08-18 PROCEDURE — 72070 X-RAY EXAM THORAC SPINE 2VWS: CPT | Mod: TC

## 2025-08-19 ENCOUNTER — OFFICE VISIT (OUTPATIENT)
Dept: UROLOGY | Facility: CLINIC | Age: 70
End: 2025-08-19
Payer: MEDICARE

## 2025-08-19 VITALS
HEIGHT: 65 IN | DIASTOLIC BLOOD PRESSURE: 92 MMHG | HEART RATE: 97 BPM | WEIGHT: 151 LBS | BODY MASS INDEX: 25.16 KG/M2 | SYSTOLIC BLOOD PRESSURE: 147 MMHG

## 2025-08-19 DIAGNOSIS — N20.0 KIDNEY STONES: ICD-10-CM

## 2025-08-19 DIAGNOSIS — N39.0 RECURRENT UTI: ICD-10-CM

## 2025-08-19 DIAGNOSIS — N52.01 ERECTILE DYSFUNCTION DUE TO ARTERIAL INSUFFICIENCY: ICD-10-CM

## 2025-08-19 DIAGNOSIS — N22 CALCULUS OF URINARY TRACT IN DISEASES CLASSIFIED ELSEWHERE: ICD-10-CM

## 2025-08-19 DIAGNOSIS — R30.0 DYSURIA: Primary | ICD-10-CM

## 2025-08-19 PROCEDURE — 3080F DIAST BP >= 90 MM HG: CPT | Mod: CPTII,S$GLB,, | Performed by: UROLOGY

## 2025-08-19 PROCEDURE — 99999 PR PBB SHADOW E&M-EST. PATIENT-LVL III: CPT | Mod: PBBFAC,,, | Performed by: UROLOGY

## 2025-08-19 PROCEDURE — 99214 OFFICE O/P EST MOD 30 MIN: CPT | Mod: S$GLB,,, | Performed by: UROLOGY

## 2025-08-19 PROCEDURE — 1159F MED LIST DOCD IN RCRD: CPT | Mod: CPTII,S$GLB,, | Performed by: UROLOGY

## 2025-08-19 PROCEDURE — 1160F RVW MEDS BY RX/DR IN RCRD: CPT | Mod: CPTII,S$GLB,, | Performed by: UROLOGY

## 2025-08-19 PROCEDURE — 1157F ADVNC CARE PLAN IN RCRD: CPT | Mod: CPTII,S$GLB,, | Performed by: UROLOGY

## 2025-08-19 PROCEDURE — 3077F SYST BP >= 140 MM HG: CPT | Mod: CPTII,S$GLB,, | Performed by: UROLOGY

## 2025-08-19 PROCEDURE — 3008F BODY MASS INDEX DOCD: CPT | Mod: CPTII,S$GLB,, | Performed by: UROLOGY

## 2025-08-21 ENCOUNTER — PATIENT MESSAGE (OUTPATIENT)
Dept: UROLOGY | Facility: CLINIC | Age: 70
End: 2025-08-21
Payer: MEDICARE

## 2025-08-21 ENCOUNTER — APPOINTMENT (OUTPATIENT)
Dept: RADIOLOGY | Facility: CLINIC | Age: 70
End: 2025-08-21
Attending: INTERNAL MEDICINE
Payer: MEDICARE

## 2025-08-21 ENCOUNTER — TELEPHONE (OUTPATIENT)
Dept: UROLOGY | Facility: CLINIC | Age: 70
End: 2025-08-21
Payer: MEDICARE

## 2025-08-21 DIAGNOSIS — M81.0 AGE-RELATED OSTEOPOROSIS WITHOUT CURRENT PATHOLOGICAL FRACTURE: ICD-10-CM

## 2025-08-21 PROCEDURE — 77080 DXA BONE DENSITY AXIAL: CPT | Mod: TC,PO

## 2025-08-21 PROCEDURE — 77080 DXA BONE DENSITY AXIAL: CPT | Mod: 26,,, | Performed by: INTERNAL MEDICINE

## 2025-08-25 ENCOUNTER — HOSPITAL ENCOUNTER (OUTPATIENT)
Dept: RADIOLOGY | Facility: HOSPITAL | Age: 70
Discharge: HOME | End: 2025-08-25
Attending: UROLOGY
Payer: MEDICARE

## 2025-08-25 DIAGNOSIS — N22 CALCULUS OF URINARY TRACT IN DISEASES CLASSIFIED ELSEWHERE: ICD-10-CM

## 2025-08-25 PROCEDURE — 74176 CT ABD & PELVIS W/O CONTRAST: CPT | Mod: 26,,, | Performed by: STUDENT IN AN ORGANIZED HEALTH CARE EDUCATION/TRAINING PROGRAM

## 2025-08-25 PROCEDURE — 74176 CT ABD & PELVIS W/O CONTRAST: CPT | Mod: TC

## 2025-08-27 ENCOUNTER — TELEPHONE (OUTPATIENT)
Dept: OPHTHALMOLOGY | Facility: CLINIC | Age: 70
End: 2025-08-27
Payer: MEDICARE

## 2025-08-28 ENCOUNTER — PATIENT MESSAGE (OUTPATIENT)
Dept: UROLOGY | Facility: CLINIC | Age: 70
End: 2025-08-28
Payer: MEDICARE

## 2025-08-28 ENCOUNTER — TELEPHONE (OUTPATIENT)
Dept: UROLOGY | Facility: CLINIC | Age: 70
End: 2025-08-28
Payer: MEDICARE

## 2025-08-28 DIAGNOSIS — N20.0 KIDNEY STONES: Primary | ICD-10-CM

## 2025-08-29 RX ORDER — SULFAMETHOXAZOLE AND TRIMETHOPRIM 800; 160 MG/1; MG/1
1 TABLET ORAL 2 TIMES DAILY
Qty: 28 TABLET | Refills: 0 | Status: SHIPPED | OUTPATIENT
Start: 2025-08-29 | End: 2025-09-12

## (undated) DEVICE — TRAY MINOR GEN SURG

## (undated) DEVICE — DRESSING TELFA STRL 4X3 LF

## (undated) DEVICE — TOWEL OR DISP STRL BLUE 4/PK

## (undated) DEVICE — GUIDEWIRE STD .035X180CM ANG

## (undated) DEVICE — SUT CTD VICRYL 3-0 UND BR

## (undated) DEVICE — DRAIN CHANNEL ROUND 15FR

## (undated) DEVICE — DRESSING AQUACEL FOAM 5 X 5

## (undated) DEVICE — SET IRR URLGY 2LINE UNIV SPIKE

## (undated) DEVICE — KIT PROBE COVER WITH GEL

## (undated) DEVICE — SHEATH SAFESHEATH II ULTRA 6FR

## (undated) DEVICE — SYR 3CC LUER LOC

## (undated) DEVICE — CLOSURE SKIN STERI STRIP 1/8X3

## (undated) DEVICE — BANDAGE ELASTOPLAST 3INX5YDS

## (undated) DEVICE — TUBING HF INSUFFLATION W/ FLTR

## (undated) DEVICE — KIT LEAD LOCKING DEVICE ACC

## (undated) DEVICE — LUBRICANT SURGILUBE 2 OZ

## (undated) DEVICE — CATH IV INTROCAN 14G X 2.

## (undated) DEVICE — SUT 2-0 12-18IN SILK

## (undated) DEVICE — DRESSING SPONGE 16PLY 4X4 NS

## (undated) DEVICE — ADHESIVE MASTISOL VIAL 48/BX

## (undated) DEVICE — DRESSING ADH ISLAND 3.6 X 14

## (undated) DEVICE — DRAPE SPLIT STERILE

## (undated) DEVICE — PACK PACER PERMANENT

## (undated) DEVICE — SUT PROLENE 4-0 RB-1 BL MO

## (undated) DEVICE — SET DECANTER MEDICHOICE

## (undated) DEVICE — SUT SILK BLK BR. 2 2-60

## (undated) DEVICE — ELECTRODE REM PLYHSV RETURN 9

## (undated) DEVICE — NDL 18GA X1 1/2 REG BEVEL

## (undated) DEVICE — SYS VIRTUOSAPH PLUS EVM

## (undated) DEVICE — APPLICATOR CHLORAPREP ORN 26ML

## (undated) DEVICE — SOL IRR NACL .9% 3000ML

## (undated) DEVICE — SHEATH PRELUDE 9X13CM SNAP

## (undated) DEVICE — PAD DEFIB CADENCE ADULT R2

## (undated) DEVICE — SEE MEDLINE ITEM 157148

## (undated) DEVICE — CLOSURE SKIN STERI STRIP 1/4X3

## (undated) DEVICE — CUTTER LEAD

## (undated) DEVICE — GAUZE SPONGE 4X4 12PLY

## (undated) DEVICE — DRAPE SLUSH WARMER WITH DISC

## (undated) DEVICE — SEE MEDLINE ITEM 146417

## (undated) DEVICE — KIT BRIDGE ACCESSORY

## (undated) DEVICE — SUT 2/0 36IN ETHIBOND EXCE

## (undated) DEVICE — WIRE GUIDE 0.038OLD

## (undated) DEVICE — SUT VICRYL 3-0 27 SH

## (undated) DEVICE — Device

## (undated) DEVICE — DRAIN PENROSE XRAY 12 X 1/4 ST

## (undated) DEVICE — SPONGE GAUZE 16PLY 4X4

## (undated) DEVICE — SUT 2-0 VICRYL / SH (J417)

## (undated) DEVICE — GOWN SURGICAL X-LARGE

## (undated) DEVICE — CATH 5FR MULTI MPA2

## (undated) DEVICE — INSERTS STEALTH FIBRA SIZE 5

## (undated) DEVICE — STYLET 52CM

## (undated) DEVICE — WIRE INTRAMYOCARDIAL TEMP

## (undated) DEVICE — SUT 4-0 12-18IN SILK BLACK

## (undated) DEVICE — DRAPE INCISE IOBAN 2 23X17IN

## (undated) DEVICE — KIT WRENCH

## (undated) DEVICE — SHEATH INTRODUCER 9FR 11CM

## (undated) DEVICE — INTRODUCER HEMOSTASIS 6.5FR

## (undated) DEVICE — SUT 6 18IN STEEL MONO CCS

## (undated) DEVICE — DRESSING TELFA N ADH 3X8

## (undated) DEVICE — SUT VICRYL BR 1 GEN 27 CT-1

## (undated) DEVICE — DEVICE PLASMABLADE X 3.0S LT

## (undated) DEVICE — KIT SELECTRA ACCESSORY

## (undated) DEVICE — DRESSING AQUACEL AG ADV 3.5X6

## (undated) DEVICE — BLADE SAW STERNAL 5/BX

## (undated) DEVICE — SEE ITEM #153244

## (undated) DEVICE — ADHESIVE DERMABOND ADVANCED

## (undated) DEVICE — DRESSING TRANS 4X4 TEGADERM

## (undated) DEVICE — BLADE 4IN EDGE INSULATED

## (undated) DEVICE — SUT MCRYL PLUS 4-0 PS2 27IN

## (undated) DEVICE — DRAIN CHEST DRY SUCTION

## (undated) DEVICE — RETRACTOR OCTOBASE INSERT HOLD

## (undated) DEVICE — KIT SAHARA DRAPE DRAW/LIFT

## (undated) DEVICE — CANNULA VESSEL FREE FLOW

## (undated) DEVICE — SUT ETHIBOND EXCEL 2-0 SH

## (undated) DEVICE — CLIP SPRING 6MM

## (undated) DEVICE — SUT 0 ETHILON EN-2 48IN

## (undated) DEVICE — SUT PROLENE 5-0 BL C-1 4-24

## (undated) DEVICE — CATH BRIDGE OCCL BLLN 80CM

## (undated) DEVICE — SEE MEDLINE ITEM 152622

## (undated) DEVICE — PUNCH AORTIC 4.0MM 6/CASE

## (undated) DEVICE — KIT MICROINTRO 4F .018X40X7CM

## (undated) DEVICE — SCALPEL #11 BLADE STRL DISP

## (undated) DEVICE — PACK PACER PERMANENT OMC

## (undated) DEVICE — TRAY HEART

## (undated) DEVICE — SUT PROLENE 4-0 SH BLU 36IN

## (undated) DEVICE — SLING SWATHE UNIVERSAL FOAM

## (undated) DEVICE — BANDAGE ACE ELASTIC 6"

## (undated) DEVICE — SALINE .45% 1000ML VITEK2

## (undated) DEVICE — BLOWER MISTER

## (undated) DEVICE — SUT SILK 2-0 SH 18IN BLACK

## (undated) DEVICE — DRESSING CHG FOAM 7MM 1IN

## (undated) DEVICE — SEE MEDLINE ITEM 152515

## (undated) DEVICE — KIT EVACUATOR FULL PERF 100CC

## (undated) DEVICE — STYLET 58CM

## (undated) DEVICE — PAD K-THERMIA 24IN X 60IN

## (undated) DEVICE — SEE MEDLINE ITEM 157117

## (undated) DEVICE — R CATH RESPONS QPLR JSN 6F 120

## (undated) DEVICE — COVER INSTR ELASTIC BAND 40X20

## (undated) DEVICE — SUT 2/0 36IN COATED VICRYL

## (undated) DEVICE — SUT LIGACLIP SMALL XTRA

## (undated) DEVICE — COVER EQUIP BAND STRL 40X60IN

## (undated) DEVICE — PLEDGET SUT SOFT 3/8X3/16X1/16

## (undated) DEVICE — DRESSING AQUACEL SACRAL 9 X 9

## (undated) DEVICE — SUT PROLENE 7-0 BV-1 30